# Patient Record
Sex: MALE | Race: WHITE | NOT HISPANIC OR LATINO | Employment: FULL TIME | URBAN - METROPOLITAN AREA
[De-identification: names, ages, dates, MRNs, and addresses within clinical notes are randomized per-mention and may not be internally consistent; named-entity substitution may affect disease eponyms.]

---

## 2017-01-09 DIAGNOSIS — I10 ESSENTIAL HYPERTENSION: ICD-10-CM

## 2017-01-09 RX ORDER — VALSARTAN AND HYDROCHLOROTHIAZIDE 320; 25 MG/1; MG/1
1 TABLET, FILM COATED ORAL DAILY
Qty: 90 TABLET | Refills: 0 | Status: SHIPPED | OUTPATIENT
Start: 2017-01-09 | End: 2017-03-30 | Stop reason: SDUPTHER

## 2017-01-20 ENCOUNTER — PATIENT MESSAGE (OUTPATIENT)
Dept: FAMILY MEDICINE | Facility: CLINIC | Age: 62
End: 2017-01-20

## 2017-01-20 DIAGNOSIS — E78.5 HYPERLIPIDEMIA: ICD-10-CM

## 2017-01-20 RX ORDER — EZETIMIBE AND SIMVASTATIN 10; 40 MG/1; MG/1
TABLET ORAL
Qty: 90 TABLET | Refills: 0 | Status: SHIPPED | OUTPATIENT
Start: 2017-01-20 | End: 2017-03-30 | Stop reason: SDUPTHER

## 2017-02-10 ENCOUNTER — PATIENT MESSAGE (OUTPATIENT)
Dept: FAMILY MEDICINE | Facility: CLINIC | Age: 62
End: 2017-02-10

## 2017-02-21 ENCOUNTER — PATIENT MESSAGE (OUTPATIENT)
Dept: FAMILY MEDICINE | Facility: CLINIC | Age: 62
End: 2017-02-21

## 2017-03-30 ENCOUNTER — PATIENT MESSAGE (OUTPATIENT)
Dept: UROLOGY | Facility: CLINIC | Age: 62
End: 2017-03-30

## 2017-03-30 ENCOUNTER — LAB VISIT (OUTPATIENT)
Dept: LAB | Facility: HOSPITAL | Age: 62
End: 2017-03-30
Attending: INTERNAL MEDICINE
Payer: COMMERCIAL

## 2017-03-30 ENCOUNTER — OFFICE VISIT (OUTPATIENT)
Dept: FAMILY MEDICINE | Facility: CLINIC | Age: 62
End: 2017-03-30
Payer: COMMERCIAL

## 2017-03-30 ENCOUNTER — PATIENT MESSAGE (OUTPATIENT)
Dept: FAMILY MEDICINE | Facility: CLINIC | Age: 62
End: 2017-03-30

## 2017-03-30 VITALS
HEIGHT: 72 IN | BODY MASS INDEX: 26.24 KG/M2 | DIASTOLIC BLOOD PRESSURE: 100 MMHG | OXYGEN SATURATION: 95 % | SYSTOLIC BLOOD PRESSURE: 120 MMHG | WEIGHT: 193.69 LBS | TEMPERATURE: 98 F | HEART RATE: 83 BPM

## 2017-03-30 DIAGNOSIS — Z20.5 EXPOSURE TO VIRAL HEPATITIS: ICD-10-CM

## 2017-03-30 DIAGNOSIS — J01.00 ACUTE NON-RECURRENT MAXILLARY SINUSITIS: Primary | ICD-10-CM

## 2017-03-30 DIAGNOSIS — Z12.5 PROSTATE CANCER SCREENING: ICD-10-CM

## 2017-03-30 DIAGNOSIS — Z23 HEPATITIS B VACCINATION ADMINISTERED AT CURRENT VISIT: ICD-10-CM

## 2017-03-30 LAB
COMPLEXED PSA SERPL-MCNC: 2.7 NG/ML
HCV AB SERPL QL IA: NEGATIVE

## 2017-03-30 PROCEDURE — 84153 ASSAY OF PSA TOTAL: CPT

## 2017-03-30 PROCEDURE — 3074F SYST BP LT 130 MM HG: CPT | Mod: S$GLB,,, | Performed by: NURSE PRACTITIONER

## 2017-03-30 PROCEDURE — 99214 OFFICE O/P EST MOD 30 MIN: CPT | Mod: 25,S$GLB,, | Performed by: NURSE PRACTITIONER

## 2017-03-30 PROCEDURE — 86803 HEPATITIS C AB TEST: CPT

## 2017-03-30 PROCEDURE — 36415 COLL VENOUS BLD VENIPUNCTURE: CPT | Mod: PO

## 2017-03-30 PROCEDURE — 1160F RVW MEDS BY RX/DR IN RCRD: CPT | Mod: S$GLB,,, | Performed by: NURSE PRACTITIONER

## 2017-03-30 PROCEDURE — 3080F DIAST BP >= 90 MM HG: CPT | Mod: S$GLB,,, | Performed by: NURSE PRACTITIONER

## 2017-03-30 PROCEDURE — 99999 PR PBB SHADOW E&M-EST. PATIENT-LVL III: CPT | Mod: PBBFAC,,, | Performed by: NURSE PRACTITIONER

## 2017-03-30 PROCEDURE — 90746 HEPB VACCINE 3 DOSE ADULT IM: CPT | Mod: S$GLB,,, | Performed by: NURSE PRACTITIONER

## 2017-03-30 PROCEDURE — 90471 IMMUNIZATION ADMIN: CPT | Mod: S$GLB,,, | Performed by: NURSE PRACTITIONER

## 2017-03-30 RX ORDER — LEVOCETIRIZINE DIHYDROCHLORIDE 5 MG/1
5 TABLET, FILM COATED ORAL NIGHTLY
Qty: 30 TABLET | Refills: 0 | Status: SHIPPED | OUTPATIENT
Start: 2017-03-30 | End: 2017-10-09

## 2017-03-30 RX ORDER — PROMETHAZINE HYDROCHLORIDE AND DEXTROMETHORPHAN HYDROBROMIDE 6.25; 15 MG/5ML; MG/5ML
5 SYRUP ORAL
Qty: 180 ML | Refills: 0 | Status: SHIPPED | OUTPATIENT
Start: 2017-03-30 | End: 2017-04-09

## 2017-03-30 RX ORDER — FLUTICASONE PROPIONATE 50 MCG
2 SPRAY, SUSPENSION (ML) NASAL DAILY
Qty: 16 G | Refills: 0 | Status: SHIPPED | OUTPATIENT
Start: 2017-03-30 | End: 2021-11-03

## 2017-03-30 NOTE — PATIENT INSTRUCTIONS

## 2017-03-30 NOTE — PROGRESS NOTES
Subjective:       Patient ID: Sawyer Browning is a 62 y.o. male.    Chief Complaint: Sinus Problem and Cough    Sinusitis   This is a new problem. The current episode started in the past 7 days (2 days ago). The problem has been gradually worsening since onset. His pain is at a severity of 0/10. Associated symptoms include headaches and sinus pressure. Pertinent negatives include no chills, congestion, coughing, diaphoresis, ear pain, hoarse voice, neck pain, shortness of breath, sneezing, sore throat or swollen glands. Treatments tried: mucinex. The treatment provided mild relief.       Past Medical History:   Diagnosis Date    BPH (benign prostatic hyperplasia)     Glucose intolerance (impaired glucose tolerance)     Hiatal hernia     Hyperlipidemia     Hypertension     Overweight     Reflux        Social History     Social History    Marital status:      Spouse name: N/A    Number of children: 2    Years of education: N/A     Occupational History    Works in [employer]      Social History Main Topics    Smoking status: Never Smoker    Smokeless tobacco: Never Used    Alcohol use Yes      Comment: Nightly/socially    Drug use: Not on file    Sexual activity: Not on file     Other Topics Concern    Not on file     Social History Narrative       Past Surgical History:   Procedure Laterality Date    inguinal hernia      x2    tonsillectomy         Review of Systems   Constitutional: Negative for chills and diaphoresis.   HENT: Positive for sinus pressure. Negative for congestion, ear pain, hoarse voice, sneezing, sore throat and trouble swallowing.    Respiratory: Negative for cough and shortness of breath.    Gastrointestinal: Negative for constipation, diarrhea, nausea and vomiting.   Musculoskeletal: Negative for neck pain.   Skin: Negative for color change and rash.   Neurological: Positive for headaches.   All other systems reviewed and are negative.      Objective:   BP (!) 120/100 (BP  Location: Right arm, Patient Position: Sitting, BP Method: Manual)  Pulse 83  Temp 98.1 °F (36.7 °C) (Oral)   Ht 6' (1.829 m)  Wt 87.9 kg (193 lb 10.8 oz)  SpO2 95%  BMI 26.27 kg/m2     Physical Exam   Constitutional: He is oriented to person, place, and time. He appears well-developed and well-nourished. He is cooperative.   HENT:   Head: Normocephalic and atraumatic.   Right Ear: Hearing, external ear and ear canal normal. No middle ear effusion.   Left Ear: Hearing, external ear and ear canal normal.  No middle ear effusion.   Nose: Mucosal edema, rhinorrhea and sinus tenderness present. Right sinus exhibits maxillary sinus tenderness and frontal sinus tenderness. Left sinus exhibits maxillary sinus tenderness and frontal sinus tenderness.   Mouth/Throat: Uvula is midline and mucous membranes are normal. Posterior oropharyngeal erythema present. No oropharyngeal exudate or posterior oropharyngeal edema.   Cardiovascular: Normal rate, regular rhythm, S1 normal, S2 normal, normal heart sounds and normal pulses.    Pulmonary/Chest: Effort normal and breath sounds normal. No respiratory distress. He has no decreased breath sounds. He has no wheezes. He has no rhonchi. He has no rales.   Lymphadenopathy:     He has cervical adenopathy.   Neurological: He is alert and oriented to person, place, and time.   Skin: Skin is warm, dry and intact. He is not diaphoretic. No pallor.   Psychiatric: He has a normal mood and affect. His speech is normal and behavior is normal.   Vitals reviewed.      Assessment:       1. Acute non-recurrent maxillary sinusitis    2. Prostate cancer screening    3. Hepatitis B vaccination administered at current visit        Plan:       Sawyer was seen today for sinus problem and cough.    Diagnoses and all orders for this visit:    Acute non-recurrent maxillary sinusitis  -     fluticasone (FLONASE) 50 mcg/actuation nasal spray; 2 sprays by Each Nare route once daily.  -     levocetirizine  (XYZAL) 5 MG tablet; Take 1 tablet (5 mg total) by mouth every evening.  -     promethazine-dextromethorphan (PROMETHAZINE-DM) 6.25-15 mg/5 mL Syrp; Take 5 mLs by mouth every 4 to 6 hours as needed.  · Drink plenty of water, hot tea, and other liquids. This may help thin mucus. It also may promote sinus drainage.  · Heat may help soothe painful areas of the face. Use a towel soaked in hot water. Or,  the shower and direct the hot spray onto your face. Using a vaporizer along with a menthol rub at night may also help.   · An expectorant containing guaifenesin may help thin the mucus and promote drainage from the sinuses.  · Over-the-counter decongestants may be used unless a similar medicine was prescribed. Nasal sprays work the fastest. Use one that contains phenylephrine or oxymetazoline. First blow the nose gently. Then use the spray. Do not use these medicines more often than directed on the label or symptoms may get worse. You may also use tablets containing pseudoephedrine. Avoid products that combine ingredients, because side effects may be increased. Read labels. You can also ask the pharmacist for help. (NOTE: Persons with high blood pressure should not use decongestants. They can raise blood pressure.)  · Over-the-counter antihistamines may help if allergies contributed to your sinusitis.    · Use acetaminophen or ibuprofen to control pain, unless another pain medicine was prescribed. (If you have chronic liver or kidney disease or ever had a stomach ulcer, talk with your doctor before using these medicines. Aspirin should never be used in anyone under 18 years of age who is ill with a fever. It may cause severe liver damage.)  · Use nasal rinses or irrigation as instructed by your health care provider.  · Don't smoke. This can worsen symptoms.    Prostate cancer screening  -     PSA, Screening; Future    Hepatitis B vaccination administered at current visit  -     Hepatitis B Vaccine (Adult)  (IM)      Return if symptoms worsen or fail to improve.

## 2017-03-30 NOTE — MR AVS SNAPSHOT
[facility]  [address]  [place] [address]  Phone: [phone]  Fax: [phone]                  Sawyer Browning   3/30/2017 9:00 AM   Office Visit    Description:  Male : 1955   Provider:  YUMI Jasso   Department:  [facility]           Reason for Visit     Sinus Problem     Cough           Diagnoses this Visit        Comments    Acute non-recurrent maxillary sinusitis    -  Primary     Need for diphtheria-tetanus-pertussis (Tdap) vaccine, adult/adolescent         Prostate cancer screening                To Do List           Future Appointments        Provider Department Dept Phone    2017 10:45 AM Colt Lloyd Jr., MD [facility] [phone]      Goals (5 Years of Data)              8/26/16    1/12/16    7/23/15    HEMOGLOBIN A1C < 7.0   5.4  5.3  5.2    Related Problems    Glucose intolerance (impaired glucose tolerance)      Follow-Up and Disposition     Return if symptoms worsen or fail to improve.       These Medications        Disp Refills Start End    fluticasone (FLONASE) 50 mcg/actuation nasal spray 16 g 0 3/30/2017     2 sprays by Each Nare route once daily. - Each Nare    Pharmacy: [facility] [address] [place], [place] [address] Ph #: [phone]       levocetirizine (XYZAL) 5 MG tablet 30 tablet 0 3/30/2017 3/30/2018    Take 1 tablet (5 mg total) by mouth every evening. - Oral    Pharmacy: [facility] [address] [place], [place] [address] Ph #: [phone]       promethazine-dextromethorphan (PROMETHAZINE-DM) 6.25-15 mg/5 mL Syrp 180 mL 0 3/30/2017 2017    Take 5 mLs by mouth every 4 to 6 hours as needed. - Oral    Pharmacy: [facility] [address] [place], [place] [address] #: [phone]         [facility]     [facility] Care [facility] - 24/7 Assistance  Unless otherwise directed by your  provider, please contact [facility], our nurse care line that is available for  assistance.     Registered nurses in the [facility] provide: appointment scheduling, clinical advisement, health education, and other advisory services.  Call: [phone] (toll free)               Medications           Message regarding Medications     Verify the changes and/or additions to your medication regime listed below are the same as discussed with your clinician today.  If any of these changes or additions are incorrect, please notify your healthcare provider.        START taking these NEW medications        Refills    fluticasone (FLONASE) 50 mcg/actuation nasal spray 0    Si sprays by Each Nare route once daily.    Class: Normal    Route: Each Nare    levocetirizine (XYZAL) 5 MG tablet 0    Sig: Take 1 tablet (5 mg total) by mouth every evening.    Class: Normal    Route: Oral    promethazine-dextromethorphan (PROMETHAZINE-DM) 6.25-15 mg/5 mL Syrp 0    Sig: Take 5 mLs by mouth every 4 to 6 hours as needed.    Class: Normal    Route: Oral      STOP taking these medications     meloxicam (MOBIC) 15 MG tablet Take 1 tablet(s) by mouth once a day as needed.           Verify that the below list of medications is an accurate representation of the medications you are currently taking.  If none reported, the list may be blank. If incorrect, please contact your healthcare provider. Carry this list with you in case of emergency.           Current Medications     amlodipine (NORVASC) 10 MG tablet TAKE 1 BY MOUTH DAILY    CIALIS 20 mg Tab TAKE 1 BY MOUTH ONCE DAILY AS DIRECTED    ezetimibe-simvastatin 10-40 mg (VYTORIN) 10-40 mg per tablet Take 1 tablet by mouth every evening.    fluticasone (FLONASE) 50 mcg/actuation nasal spray 2 sprays by Each Nare route once daily.    lansoprazole (PREVACID) 30 MG capsule TAKE 1 BY MOUTH DAILY    levocetirizine (XYZAL) 5 MG tablet Take 1 tablet (5 mg total) by mouth every  evening.    promethazine-dextromethorphan (PROMETHAZINE-DM) 6.25-15 mg/5 mL Syrp Take 5 mLs by mouth every 4 to 6 hours as needed.    valsartan-hydrochlorothiazide (DIOVAN-HCT) 320-25 mg per tablet TAKE 1 BY MOUTH DAILY           Clinical Reference Information           Your Vitals Were     BP Pulse Temp Height Weight SpO2    120/100 (BP Location: Right arm, Patient Position: Sitting, BP Method: Manual) 83 98.1 °F (36.7 °C) (Oral) 6' (1.829 m) 87.9 kg (193 lb 10.8 oz) 95%    BMI                26.27 kg/m2          Blood Pressure          Most Recent Value    BP  (!)  120/100      Allergies as of 3/30/2017     Ace Inhibitors    Losartan      Immunizations Administered on Date of Encounter - 3/30/2017     Name Date Dose VIS Date Route    TDAP  Incomplete 0.5 mL 2/24/2015 Intramuscular      Orders Placed During Today's Visit      Normal Orders This Visit    Tdap Vaccine     Future Labs/Procedures Expected by Expires    PSA, Screening  3/30/2017 5/29/2018      Instructions      Sinusitis (No Antibiotics)    The sinuses are air-filled spaces within the bones of the face. They connect to the inside of the nose. Sinusitis is an inflammation of the tissue lining the sinus cavity. Sinus inflammation can occur during a cold. It can also be due to allergies to pollens and other particles in the air. It can cause symptoms such as sinus congestion, headache, sore throat, facial swelling and fullness. It may also cause a low-grade fever. No infection is present, and no antibiotic treatment is needed.  Home care  · Drink plenty of water, hot tea, and other liquids. This may help thin mucus. It also may promote sinus drainage.  · Heat may help soothe painful areas of the face. Use a towel soaked in hot water. Or,  the shower and direct the hot spray onto your face. Using a vaporizer along with a menthol rub at night may also help.   · An expectorant containing guaifenesin may help thin the mucus and promote drainage from  the sinuses.  · Over-the-counter decongestants may be used unless a similar medicine was prescribed. Nasal sprays work the fastest. Use one that contains phenylephrine or oxymetazoline. First blow the nose gently. Then use the spray. Do not use these medicines more often than directed on the label or symptoms may get worse. You may also use tablets containing pseudoephedrine. Avoid products that combine ingredients, because side effects may be increased. Read labels. You can also ask the pharmacist for help. (NOTE: Persons with high blood pressure should not use decongestants. They can raise blood pressure.)  · Over-the-counter antihistamines may help if allergies contributed to your sinusitis.    · Use acetaminophen or ibuprofen to control pain, unless another pain medicine was prescribed. (If you have chronic liver or kidney disease or ever had a stomach ulcer, talk with your doctor before using these medicines. Aspirin should never be used in anyone under 18 years of age who is ill with a fever. It may cause severe liver damage.)  · Use nasal rinses or irrigation as instructed by your health care provider.  · Don't smoke. This can worsen symptoms.  Follow-up care  Follow up with your healthcare provider or our staff if you are not improving within the next week.  When to seek medical advice  Call your healthcare provider if any of these occur:  · Green or yellow discharge from the nose or into the throat  · Facial pain or headache becoming more severe  · Stiff neck  · Unusual drowsiness or confusion  · Swelling of the forehead or eyelids  · Vision problems, including blurred or double vision  · Fever of 100.4ºF (38ºC) or higher, or as directed by your healthcare provider  · Seizure  · Breathing problems  · Symptoms not resolving within 10 days  Date Last Reviewed: 4/13/2015  © 7722-3664 [employer]. [address] [place] [address], [place] [address]. All rights reserved. This information is not intended as a  substitute for professional medical care. Always follow your healthcare professional's instructions.             Language Assistance Services     ATTENTION: Language assistance services are available, free of charge. Please call [phone].      ATENCIÓN: Si habla chiara, tiene a bailey disposición servicios gratuitos de asistencia lingüística. Llame al [phone].     CHÚ Ý: N?u b?n nói Ti?ng Vi?t, có các d?ch v? h? tr? ngôn ng? mi?n phí dành cho b?n. G?i s? [phone].         [facility] complies with applicable Federal civil rights laws and does not discriminate on the basis of race, color, national origin, age, disability, or sex.

## 2017-04-10 DIAGNOSIS — I10 ESSENTIAL HYPERTENSION: ICD-10-CM

## 2017-04-10 RX ORDER — AMLODIPINE BESYLATE 10 MG/1
TABLET ORAL
Qty: 90 TABLET | Refills: 0 | Status: SHIPPED | OUTPATIENT
Start: 2017-04-10 | End: 2017-04-21 | Stop reason: SDUPTHER

## 2017-04-21 DIAGNOSIS — N52.9 ED (ERECTILE DYSFUNCTION) OF ORGANIC ORIGIN: ICD-10-CM

## 2017-04-21 DIAGNOSIS — I10 ESSENTIAL HYPERTENSION: ICD-10-CM

## 2017-04-21 DIAGNOSIS — K21.9 GASTROESOPHAGEAL REFLUX DISEASE WITHOUT ESOPHAGITIS: ICD-10-CM

## 2017-04-21 DIAGNOSIS — E78.5 HYPERLIPIDEMIA, UNSPECIFIED HYPERLIPIDEMIA TYPE: Primary | ICD-10-CM

## 2017-04-21 DIAGNOSIS — R73.02 GLUCOSE INTOLERANCE (IMPAIRED GLUCOSE TOLERANCE): ICD-10-CM

## 2017-04-21 RX ORDER — VALSARTAN AND HYDROCHLOROTHIAZIDE 320; 25 MG/1; MG/1
TABLET, FILM COATED ORAL
Qty: 90 TABLET | Refills: 0 | Status: SHIPPED | OUTPATIENT
Start: 2017-04-21 | End: 2017-08-07 | Stop reason: SDUPTHER

## 2017-04-21 RX ORDER — AMLODIPINE BESYLATE 10 MG/1
TABLET ORAL
Qty: 90 TABLET | Refills: 0 | Status: SHIPPED | OUTPATIENT
Start: 2017-04-21 | End: 2017-04-21 | Stop reason: SDUPTHER

## 2017-04-21 RX ORDER — AMLODIPINE BESYLATE 10 MG/1
TABLET ORAL
Qty: 90 TABLET | Refills: 0 | Status: SHIPPED | OUTPATIENT
Start: 2017-04-21 | End: 2017-07-17 | Stop reason: SDUPTHER

## 2017-04-21 RX ORDER — LANSOPRAZOLE 30 MG/1
CAPSULE, DELAYED RELEASE ORAL
Qty: 90 CAPSULE | Refills: 0 | Status: SHIPPED | OUTPATIENT
Start: 2017-04-21 | End: 2017-07-27 | Stop reason: SDUPTHER

## 2017-04-21 RX ORDER — TADALAFIL 20 MG/1
TABLET, FILM COATED ORAL
Qty: 6 TABLET | Refills: 0 | Status: SHIPPED | OUTPATIENT
Start: 2017-04-21 | End: 2017-12-08 | Stop reason: SDUPTHER

## 2017-04-25 ENCOUNTER — OFFICE VISIT (OUTPATIENT)
Dept: UROLOGY | Facility: CLINIC | Age: 62
End: 2017-04-25
Payer: COMMERCIAL

## 2017-04-25 VITALS
HEIGHT: 72 IN | DIASTOLIC BLOOD PRESSURE: 106 MMHG | WEIGHT: 193 LBS | BODY MASS INDEX: 26.14 KG/M2 | HEART RATE: 68 BPM | SYSTOLIC BLOOD PRESSURE: 159 MMHG

## 2017-04-25 DIAGNOSIS — N40.1 BPH WITH URINARY OBSTRUCTION: Primary | ICD-10-CM

## 2017-04-25 DIAGNOSIS — N13.8 BPH WITH URINARY OBSTRUCTION: Primary | ICD-10-CM

## 2017-04-25 PROCEDURE — 99213 OFFICE O/P EST LOW 20 MIN: CPT | Mod: S$GLB,,, | Performed by: UROLOGY

## 2017-04-25 PROCEDURE — 3080F DIAST BP >= 90 MM HG: CPT | Mod: S$GLB,,, | Performed by: UROLOGY

## 2017-04-25 PROCEDURE — 1160F RVW MEDS BY RX/DR IN RCRD: CPT | Mod: S$GLB,,, | Performed by: UROLOGY

## 2017-04-25 PROCEDURE — 99999 PR PBB SHADOW E&M-EST. PATIENT-LVL III: CPT | Mod: PBBFAC,,, | Performed by: UROLOGY

## 2017-04-25 PROCEDURE — 3077F SYST BP >= 140 MM HG: CPT | Mod: S$GLB,,, | Performed by: UROLOGY

## 2017-04-25 NOTE — PROGRESS NOTES
Subjective:       Patient ID: Sawyer Browning is a 62 y.o. male.    Chief Complaint: Annual Exam    HPI she is here for yearly exam.  His PSA is 2.7 which is improved.  He has minimal symptoms.    Past Medical History:   Diagnosis Date    BPH (benign prostatic hyperplasia)     Glucose intolerance (impaired glucose tolerance)     Hiatal hernia     Hyperlipidemia     Hypertension     Overweight     Reflux        Past Surgical History:   Procedure Laterality Date    inguinal hernia      x2    tonsillectomy         Family History   Problem Relation Age of Onset    Hypertension Mother     Macular degeneration Mother      - gets shots in her eye    Stroke Father     Heart disease Father     Aneurysm Father     Migraines Sister     Arthritis Brother      knees    Dementia Paternal Grandmother     Diabetes Paternal Grandmother     Heart disease Paternal Grandfather     Diabetes Brother     Hernia Brother     Cancer Paternal Aunt     Cancer Paternal Uncle     Cancer Paternal Aunt     Cancer Paternal Uncle     Colon cancer Neg Hx     Prostate cancer Neg Hx        Social History     Social History    Marital status:      Spouse name: N/A    Number of children: 2    Years of education: N/A     Occupational History    Works in [employer]      Social History Main Topics    Smoking status: Never Smoker    Smokeless tobacco: Never Used    Alcohol use Yes      Comment: Nightly/socially    Drug use: Not on file    Sexual activity: Not on file     Other Topics Concern    Not on file     Social History Narrative       Allergies:  Ace inhibitors and Losartan    Medications:    Current Outpatient Prescriptions:     amlodipine (NORVASC) 10 MG tablet, TAKE 1 BY MOUTH DAILY, Disp: 90 tablet, Rfl: 0    CIALIS 20 mg Tab, TAKE 1 BY MOUTH DAILY AS DIRECTED, Disp: 6 tablet, Rfl: 0    ezetimibe-simvastatin 10-40 mg (VYTORIN) 10-40 mg per tablet, Take 1 tablet by mouth every evening., Disp: 90 tablet, Rfl:  0    fluticasone (FLONASE) 50 mcg/actuation nasal spray, 2 sprays by Each Nare route once daily., Disp: 16 g, Rfl: 0    lansoprazole (PREVACID) 30 MG capsule, TAKE 1 BY MOUTH DAILY, Disp: 90 capsule, Rfl: 0    levocetirizine (XYZAL) 5 MG tablet, Take 1 tablet (5 mg total) by mouth every evening., Disp: 30 tablet, Rfl: 0    valsartan-hydrochlorothiazide (DIOVAN-HCT) 320-25 mg per tablet, TAKE 1 BY MOUTH DAILY, Disp: 90 tablet, Rfl: 0    Review of Systems   Constitutional: Negative for activity change, appetite change, chills, diaphoresis, fatigue, fever and unexpected weight change.   HENT: Negative for congestion, dental problem, hearing loss, mouth sores, postnasal drip, rhinorrhea, sinus pressure and trouble swallowing.    Eyes: Negative for pain, discharge and itching.   Respiratory: Negative for apnea, cough, choking, chest tightness, shortness of breath and wheezing.    Cardiovascular: Negative for chest pain, palpitations and leg swelling.   Gastrointestinal: Negative for abdominal distention, abdominal pain, anal bleeding, blood in stool, constipation, diarrhea, nausea, rectal pain and vomiting.   Endocrine: Negative for polydipsia and polyuria.   Genitourinary: Negative for decreased urine volume, difficulty urinating, discharge, dysuria, enuresis, flank pain, frequency, genital sores, hematuria, penile pain, penile swelling, scrotal swelling, testicular pain and urgency.   Musculoskeletal: Negative for arthralgias, back pain and myalgias.   Skin: Negative for color change, rash and wound.   Neurological: Negative for dizziness, syncope, speech difficulty, light-headedness and headaches.   Hematological: Negative for adenopathy. Does not bruise/bleed easily.   Psychiatric/Behavioral: Negative for behavioral problems, confusion, hallucinations and sleep disturbance.       Objective:      Physical Exam   Constitutional: He appears well-developed.   HENT:   Head: Normocephalic.   Cardiovascular: Normal  rate.    Pulmonary/Chest: Effort normal.   Abdominal: Soft.   Genitourinary: Prostate normal.   Genitourinary Comments: 30 g benign   Neurological: He is alert.   Skin: Skin is warm.     Psychiatric: He has a normal mood and affect.       Assessment:       1. BPH with urinary obstruction        Plan:       Sawyer was seen today for annual exam.    Diagnoses and all orders for this visit:    BPH with urinary obstruction        turn clinic 1 year no PSA

## 2017-04-26 RX ORDER — EZETIMIBE AND SIMVASTATIN 10; 40 MG/1; MG/1
1 TABLET ORAL NIGHTLY
Qty: 90 TABLET | Refills: 0 | Status: SHIPPED | OUTPATIENT
Start: 2017-04-26 | End: 2017-09-12 | Stop reason: SDUPTHER

## 2017-05-16 ENCOUNTER — LAB VISIT (OUTPATIENT)
Dept: LAB | Facility: HOSPITAL | Age: 62
End: 2017-05-16
Attending: INTERNAL MEDICINE
Payer: COMMERCIAL

## 2017-05-16 DIAGNOSIS — I10 ESSENTIAL HYPERTENSION: ICD-10-CM

## 2017-05-16 DIAGNOSIS — R73.02 GLUCOSE INTOLERANCE (IMPAIRED GLUCOSE TOLERANCE): ICD-10-CM

## 2017-05-16 DIAGNOSIS — E78.5 HYPERLIPIDEMIA, UNSPECIFIED HYPERLIPIDEMIA TYPE: ICD-10-CM

## 2017-05-16 LAB
ALBUMIN SERPL BCP-MCNC: 4.3 G/DL
ALP SERPL-CCNC: 69 U/L
ALT SERPL W/O P-5'-P-CCNC: 36 U/L
ANION GAP SERPL CALC-SCNC: 11 MMOL/L
AST SERPL-CCNC: 29 U/L
BASOPHILS # BLD AUTO: 0.03 K/UL
BASOPHILS NFR BLD: 0.5 %
BILIRUB SERPL-MCNC: 0.8 MG/DL
BUN SERPL-MCNC: 21 MG/DL
CALCIUM SERPL-MCNC: 9.3 MG/DL
CHLORIDE SERPL-SCNC: 105 MMOL/L
CHOLEST/HDLC SERPL: 2.8 {RATIO}
CO2 SERPL-SCNC: 27 MMOL/L
CREAT SERPL-MCNC: 1.1 MG/DL
DIFFERENTIAL METHOD: ABNORMAL
EOSINOPHIL # BLD AUTO: 0.1 K/UL
EOSINOPHIL NFR BLD: 2 %
ERYTHROCYTE [DISTWIDTH] IN BLOOD BY AUTOMATED COUNT: 12.8 %
EST. GFR  (AFRICAN AMERICAN): >60 ML/MIN/1.73 M^2
EST. GFR  (NON AFRICAN AMERICAN): >60 ML/MIN/1.73 M^2
GLUCOSE SERPL-MCNC: 103 MG/DL
HCT VFR BLD AUTO: 46.9 %
HDL/CHOLESTEROL RATIO: 35.6 %
HDLC SERPL-MCNC: 160 MG/DL
HDLC SERPL-MCNC: 57 MG/DL
HGB BLD-MCNC: 16 G/DL
LDLC SERPL CALC-MCNC: 80.6 MG/DL
LYMPHOCYTES # BLD AUTO: 1.2 K/UL
LYMPHOCYTES NFR BLD: 21.4 %
MCH RBC QN AUTO: 31.1 PG
MCHC RBC AUTO-ENTMCNC: 34.1 %
MCV RBC AUTO: 91 FL
MONOCYTES # BLD AUTO: 0.8 K/UL
MONOCYTES NFR BLD: 13.7 %
NEUTROPHILS # BLD AUTO: 3.5 K/UL
NEUTROPHILS NFR BLD: 62 %
NONHDLC SERPL-MCNC: 103 MG/DL
PLATELET # BLD AUTO: 176 K/UL
PMV BLD AUTO: 11.9 FL
POTASSIUM SERPL-SCNC: 4.1 MMOL/L
PROT SERPL-MCNC: 7.2 G/DL
RBC # BLD AUTO: 5.15 M/UL
SODIUM SERPL-SCNC: 143 MMOL/L
TRIGL SERPL-MCNC: 112 MG/DL
WBC # BLD AUTO: 5.56 K/UL

## 2017-05-16 PROCEDURE — 85025 COMPLETE CBC W/AUTO DIFF WBC: CPT

## 2017-05-16 PROCEDURE — 36415 COLL VENOUS BLD VENIPUNCTURE: CPT | Mod: PO

## 2017-05-16 PROCEDURE — 83036 HEMOGLOBIN GLYCOSYLATED A1C: CPT

## 2017-05-16 PROCEDURE — 80053 COMPREHEN METABOLIC PANEL: CPT

## 2017-05-16 PROCEDURE — 80061 LIPID PANEL: CPT

## 2017-05-17 ENCOUNTER — TELEPHONE (OUTPATIENT)
Dept: FAMILY MEDICINE | Facility: CLINIC | Age: 62
End: 2017-05-17

## 2017-05-17 LAB
ESTIMATED AVG GLUCOSE: 111 MG/DL
HBA1C MFR BLD HPLC: 5.5 %

## 2017-07-17 DIAGNOSIS — I10 ESSENTIAL HYPERTENSION: ICD-10-CM

## 2017-07-17 RX ORDER — AMLODIPINE BESYLATE 10 MG/1
TABLET ORAL
Qty: 90 TABLET | Refills: 0 | Status: SHIPPED | OUTPATIENT
Start: 2017-07-17 | End: 2017-10-09 | Stop reason: SDUPTHER

## 2017-07-27 DIAGNOSIS — K21.9 GASTROESOPHAGEAL REFLUX DISEASE WITHOUT ESOPHAGITIS: ICD-10-CM

## 2017-07-27 RX ORDER — LANSOPRAZOLE 30 MG/1
30 CAPSULE, DELAYED RELEASE ORAL DAILY PRN
Qty: 90 CAPSULE | Refills: 0 | Status: SHIPPED | OUTPATIENT
Start: 2017-07-27 | End: 2017-10-23 | Stop reason: SDUPTHER

## 2017-08-07 DIAGNOSIS — I10 ESSENTIAL HYPERTENSION: ICD-10-CM

## 2017-08-08 RX ORDER — VALSARTAN AND HYDROCHLOROTHIAZIDE 320; 25 MG/1; MG/1
1 TABLET, FILM COATED ORAL DAILY
Qty: 90 TABLET | Refills: 0 | Status: SHIPPED | OUTPATIENT
Start: 2017-08-08 | End: 2017-10-09 | Stop reason: SDUPTHER

## 2017-09-12 DIAGNOSIS — E78.5 HYPERLIPIDEMIA, UNSPECIFIED HYPERLIPIDEMIA TYPE: Primary | ICD-10-CM

## 2017-09-12 RX ORDER — EZETIMIBE AND SIMVASTATIN 10; 40 MG/1; MG/1
1 TABLET ORAL NIGHTLY
Qty: 90 TABLET | Refills: 0 | Status: SHIPPED | OUTPATIENT
Start: 2017-09-12 | End: 2017-10-09 | Stop reason: SDUPTHER

## 2017-10-09 ENCOUNTER — OFFICE VISIT (OUTPATIENT)
Dept: FAMILY MEDICINE | Facility: CLINIC | Age: 62
End: 2017-10-09
Payer: COMMERCIAL

## 2017-10-09 VITALS
BODY MASS INDEX: 26.35 KG/M2 | OXYGEN SATURATION: 95 % | WEIGHT: 194.56 LBS | HEART RATE: 64 BPM | DIASTOLIC BLOOD PRESSURE: 80 MMHG | TEMPERATURE: 99 F | HEIGHT: 72 IN | SYSTOLIC BLOOD PRESSURE: 122 MMHG

## 2017-10-09 DIAGNOSIS — Z23 NEED FOR SHINGLES VACCINE: ICD-10-CM

## 2017-10-09 DIAGNOSIS — E66.3 OVERWEIGHT: ICD-10-CM

## 2017-10-09 DIAGNOSIS — K21.9 GASTROESOPHAGEAL REFLUX DISEASE, ESOPHAGITIS PRESENCE NOT SPECIFIED: ICD-10-CM

## 2017-10-09 DIAGNOSIS — R73.02 GLUCOSE INTOLERANCE (IMPAIRED GLUCOSE TOLERANCE): ICD-10-CM

## 2017-10-09 DIAGNOSIS — I10 ESSENTIAL HYPERTENSION: ICD-10-CM

## 2017-10-09 DIAGNOSIS — Z00.00 ROUTINE MEDICAL EXAM: Primary | ICD-10-CM

## 2017-10-09 DIAGNOSIS — E78.5 HYPERLIPIDEMIA, UNSPECIFIED HYPERLIPIDEMIA TYPE: ICD-10-CM

## 2017-10-09 DIAGNOSIS — Z23 NEED FOR TDAP VACCINATION: ICD-10-CM

## 2017-10-09 DIAGNOSIS — Z12.11 COLON CANCER SCREENING: ICD-10-CM

## 2017-10-09 PROCEDURE — 99396 PREV VISIT EST AGE 40-64: CPT | Mod: S$GLB,,, | Performed by: INTERNAL MEDICINE

## 2017-10-09 PROCEDURE — 99999 PR PBB SHADOW E&M-EST. PATIENT-LVL III: CPT | Mod: PBBFAC,,, | Performed by: INTERNAL MEDICINE

## 2017-10-09 RX ORDER — EZETIMIBE AND SIMVASTATIN 10; 40 MG/1; MG/1
1 TABLET ORAL NIGHTLY
Qty: 90 TABLET | Refills: 1 | Status: SHIPPED | OUTPATIENT
Start: 2017-10-09 | End: 2017-12-08 | Stop reason: SDUPTHER

## 2017-10-09 RX ORDER — VALSARTAN AND HYDROCHLOROTHIAZIDE 320; 25 MG/1; MG/1
1 TABLET, FILM COATED ORAL DAILY
Qty: 90 TABLET | Refills: 1 | Status: SHIPPED | OUTPATIENT
Start: 2017-10-09 | End: 2018-02-12 | Stop reason: SDUPTHER

## 2017-10-09 RX ORDER — AMLODIPINE BESYLATE 10 MG/1
TABLET ORAL
Qty: 90 TABLET | Refills: 1 | Status: SHIPPED | OUTPATIENT
Start: 2017-10-09 | End: 2017-10-12 | Stop reason: SDUPTHER

## 2017-10-09 NOTE — PROGRESS NOTES
HISTORY OF PRESENT ILLNESS:  Sawyer Browning is a 62 y.o. male who presents to the clinic today for a routine medical physical exam. His last physical exam was approximately 1 years(s) ago.      PAST MEDICAL HISTORY:  Past Medical History:   Diagnosis Date    BPH (benign prostatic hyperplasia)     Glucose intolerance (impaired glucose tolerance)     Hiatal hernia     Hyperlipidemia     Hypertension     Overweight     Reflux        PAST SURGICAL HISTORY:  Past Surgical History:   Procedure Laterality Date    inguinal hernia      x2    tonsillectomy         SOCIAL HISTORY:  Social History     Social History    Marital status:      Spouse name: N/A    Number of children: 2    Years of education: N/A     Occupational History    Works in [employer]      Social History Main Topics    Smoking status: Never Smoker    Smokeless tobacco: Never Used    Alcohol use Yes      Comment: Nightly/socially    Drug use: No    Sexual activity: Not on file     Other Topics Concern    Not on file     Social History Narrative    No narrative on file       FAMILY HISTORY:  Family History   Problem Relation Age of Onset    Hypertension Mother     Macular degeneration Mother      - gets shots in her eye    Stroke Father     Heart disease Father     Aneurysm Father     Migraines Sister     Arthritis Brother      knees    Dementia Paternal Grandmother     Diabetes Paternal Grandmother     Heart disease Paternal Grandfather     Diabetes Brother     Hernia Brother     Cancer Paternal Aunt     Cancer Paternal Uncle     Cancer Paternal Aunt     Cancer Paternal Uncle     Colon cancer Neg Hx     Prostate cancer Neg Hx        ALLERGIES AND MEDICATIONS: updated and reviewed.  Review of patient's allergies indicates:   Allergen Reactions    Ace inhibitors Other (See Comments)     cough    Losartan      headache     Medication List with Changes/Refills   Current Medications    CIALIS 20 MG TAB    TAKE 1 BY MOUTH  DAILY AS DIRECTED    FLUTICASONE (FLONASE) 50 MCG/ACTUATION NASAL SPRAY    2 sprays by Each Nare route once daily.    LANSOPRAZOLE (PREVACID) 30 MG CAPSULE    Take 1 capsule (30 mg total) by mouth daily as needed (heartburn).   Changed and/or Refilled Medications    Modified Medication Previous Medication    AMLODIPINE (NORVASC) 10 MG TABLET amlodipine (NORVASC) 10 MG tablet       TAKE 1 BY MOUTH DAILY    TAKE 1 BY MOUTH DAILY    EZETIMIBE-SIMVASTATIN 10-40 MG (VYTORIN) 10-40 MG PER TABLET ezetimibe-simvastatin 10-40 mg (VYTORIN) 10-40 mg per tablet       Take 1 tablet by mouth every evening.    Take 1 tablet by mouth every evening.    VALSARTAN-HYDROCHLOROTHIAZIDE (DIOVAN-HCT) 320-25 MG PER TABLET valsartan-hydrochlorothiazide (DIOVAN-HCT) 320-25 mg per tablet       Take 1 tablet by mouth once daily.    Take 1 tablet by mouth once daily.   Discontinued Medications    LEVOCETIRIZINE (XYZAL) 5 MG TABLET    Take 1 tablet (5 mg total) by mouth every evening.             SCREENING HISTORY:  Health Maintenance       Date Due Completion Date    TETANUS VACCINE 01/07/1973 ---    Zoster Vaccine 01/07/2015 ---    Colonoscopy 05/02/2017 5/2/2007 (Done)    Override on 5/2/2007: Done (normal)    PROSTATE-SPECIFIC ANTIGEN 03/30/2018 3/30/2017    Lipid Panel 05/16/2022 5/16/2017            REVIEW OF SYSTEMS:  The patient reports good dietary habits.  The patient does exercise regularly.  General ROS: negative for - chills, fever, malaise or weight loss  Psychological ROS: negative for - anxiety, depression, sleep disturbances or suicidal ideation  Ophthalmic ROS: negative for - blurry vision or eye pain  ENT ROS: negative for - epistaxis, headaches, nasal congestion, oral lesions or sore throat  Allergy and Immunology ROS: negative for - hives  Hematological and Lymphatic ROS: negative for - swollen lymph nodes  Endocrine ROS: negative for - palpitations, polydipsia/polyuria or temperature intolerance  Respiratory ROS: no cough,  "shortness of breath, or wheezing  Cardiovascular ROS: no chest pain or dyspnea on exertion  Gastrointestinal ROS: no abdominal pain, change in bowel habits, or black or bloody stools  Dermatological ROS: negative  Musculoskeletal ROS: negative for - gait disturbance, joint swelling, muscle pain or muscular weakness  Neurological ROS: no TIA or stroke symptoms  Genito-Urinary ROS: no dysuria, trouble voiding, or hematuria      Answers for HPI/ROS submitted by the patient on 10/8/2017   activity change: No  unexpected weight change: No  neck pain: No  hearing loss: No  rhinorrhea: No  trouble swallowing: No  eye discharge: No  visual disturbance: No  chest tightness: No  wheezing: No  chest pain: No  palpitations: No  blood in stool: No  constipation: No  vomiting: No  diarrhea: No  polydipsia: No  polyuria: No  difficulty urinating: No  urgency: No  hematuria: No  joint swelling: No  arthralgias: No  headaches: No  weakness: No  confusion: No  dysphoric mood: No          PHYSICAL EXAM:  Vitals:    10/09/17 1120   BP: 122/80   Pulse: 64   Temp: 98.5 °F (36.9 °C)     Weight: 88.3 kg (194 lb 8.9 oz)   Height: 6' 0.01" (182.9 cm)   Body mass index is 26.38 kg/m².    Physical Examination: General appearance - alert, well appearing, and in no distress and overweight  Mental status - alert, oriented to person, place, and time, normal mood, behavior, speech, dress, motor activity, and thought processes  Eyes - pupils equal and reactive, extraocular eye movements intact, sclera anicteric  Mouth - mucous membranes moist, pharynx normal without lesions  Neck - supple, no significant adenopathy, carotids upstroke normal bilaterally, no bruits, thyroid exam: thyroid is normal in size without nodules or tenderness  Lymphatics - no palpable lymphadenopathy  Chest - clear to auscultation, no wheezes, rales or rhonchi, symmetric air entry  Heart - normal rate and regular rhythm, no gallops noted  Abdomen - soft, nontender, " nondistended, no masses or organomegaly   Male - not examined  Back exam - full range of motion, no tenderness, palpable spasm or pain on motion  Neurological - alert, oriented, normal speech, no focal findings or movement disorder noted, cranial nerves II through XII intact  Musculoskeletal - no joint tenderness, deformity or swelling, no muscular tenderness noted  Extremities - peripheral pulses normal, no pedal edema, no clubbing or cyanosis  Skin - normal coloration and turgor, no rashes, no suspicious skin lesions noted       Results for orders placed or performed in visit on 05/16/17   CBC auto differential   Result Value Ref Range    WBC 5.56 3.90 - 12.70 K/uL    RBC 5.15 4.60 - 6.20 M/uL    Hemoglobin 16.0 14.0 - 18.0 g/dL    Hematocrit 46.9 40.0 - 54.0 %    MCV 91 82 - 98 fL    MCH 31.1 (H) 27.0 - 31.0 pg    MCHC 34.1 32.0 - 36.0 %    RDW 12.8 11.5 - 14.5 %    Platelets 176 150 - 350 K/uL    MPV 11.9 9.2 - 12.9 fL    Gran # 3.5 1.8 - 7.7 K/uL    Lymph # 1.2 1.0 - 4.8 K/uL    Mono # 0.8 0.3 - 1.0 K/uL    Eos # 0.1 0.0 - 0.5 K/uL    Baso # 0.03 0.00 - 0.20 K/uL    Gran% 62.0 38.0 - 73.0 %    Lymph% 21.4 18.0 - 48.0 %    Mono% 13.7 4.0 - 15.0 %    Eosinophil% 2.0 0.0 - 8.0 %    Basophil% 0.5 0.0 - 1.9 %    Differential Method Automated    Comprehensive metabolic panel   Result Value Ref Range    Sodium 143 136 - 145 mmol/L    Potassium 4.1 3.5 - 5.1 mmol/L    Chloride 105 95 - 110 mmol/L    CO2 27 23 - 29 mmol/L    Glucose 103 70 - 110 mg/dL    BUN, Bld 21 8 - 23 mg/dL    Creatinine 1.1 0.5 - 1.4 mg/dL    Calcium 9.3 8.7 - 10.5 mg/dL    Total Protein 7.2 6.0 - 8.4 g/dL    Albumin 4.3 3.5 - 5.2 g/dL    Total Bilirubin 0.8 0.1 - 1.0 mg/dL    Alkaline Phosphatase 69 55 - 135 U/L    AST 29 10 - 40 U/L    ALT 36 10 - 44 U/L    Anion Gap 11 8 - 16 mmol/L    eGFR if African American >60.0 >60 mL/min/1.73 m^2    eGFR if non African American >60.0 >60 mL/min/1.73 m^2   Lipid panel   Result Value Ref Range     Cholesterol 160 120 - 199 mg/dL    Triglycerides 112 30 - 150 mg/dL    HDL 57 40 - 75 mg/dL    LDL Cholesterol 80.6 63.0 - 159.0 mg/dL    HDL/Chol Ratio 35.6 20.0 - 50.0 %    Total Cholesterol/HDL Ratio 2.8 2.0 - 5.0    Non-HDL Cholesterol 103 mg/dL   Hemoglobin A1c   Result Value Ref Range    Hemoglobin A1C 5.5 4.5 - 6.2 %    Estimated Avg Glucose 111 68 - 131 mg/dL          ASSESSMENT AND PLAN:  1. Routine medical exam  Counseled on age appropriate medical preventative services including age appropriate cancer screenings, age appropriate eye and dental exams, over all nutritional health, need for a consistent exercise regimen, and an over all push towards maintaining a vigorous and active lifestyle.  Counseled on age appropriate vaccines and discussed upcoming health care needs based on age/gender. Discussed good sleep hygiene and stress management.     2. Essential hypertension  Discussed sodium restriction, maintaining ideal body weight and regular exercise program as physiologic means to achieve blood pressure control. The patient will strive towards this. The current medical regimen is effective;  continue present plan and medications. Recommended patient to check home readings to monitor and see me for followup as scheduled or sooner as needed. Patient was educated that both decongestant and anti-inflammatory medication may raise blood pressure.   - valsartan-hydrochlorothiazide (DIOVAN-HCT) 320-25 mg per tablet; Take 1 tablet by mouth once daily.  Dispense: 90 tablet; Refill: 1  - amlodipine (NORVASC) 10 MG tablet; TAKE 1 BY MOUTH DAILY  Dispense: 90 tablet; Refill: 1    3. Hyperlipidemia, unspecified hyperlipidemia type  We discussed low fat diet and regular exercise.The current medical regimen is effective;  continue present plan and medications.    - ezetimibe-simvastatin 10-40 mg (VYTORIN) 10-40 mg per tablet; Take 1 tablet by mouth every evening.  Dispense: 90 tablet; Refill: 1    4. Glucose intolerance  (impaired glucose tolerance)  Stable. We discussed low sugar/low carbohydrate diet and regular exercise to prevent progression. No need for prescription medication at this time.     5. Gastroesophageal reflux disease, esophagitis presence not specified  Symptoms controlled. Reflux precautions discussed (eliminate tobacco if a smoker; minimize caffeine, chocolate and red/white peppermint intake; avoid heavy and spicy meals; don't lay down within 2-3 hours after eating). Medication as needed. Patient asked to take medication breaks, if possible - discussed chronic use can limit calcium absorption, increases the risk for intestinal infections, and can cause kidney damage.      6. Overweight  The patient is asked to make an attempt to improve diet and exercise patterns to aid in medical management of this problem.     7. Colon cancer screening  Patient wishes to complete [facility] - paperwork completed and given to patient to complete his part and fax.    8. Need for Tdap vaccination  Patient declines.    9. Need for shingles vaccine  Patient declines.          Return in about 6 months (around 4/9/2018), or if symptoms worsen or fail to improve, for follow up chronic medical conditions.. or sooner as needed.

## 2017-10-12 DIAGNOSIS — I10 ESSENTIAL HYPERTENSION: ICD-10-CM

## 2017-10-12 RX ORDER — AMLODIPINE BESYLATE 10 MG/1
TABLET ORAL
Qty: 90 TABLET | Refills: 1 | Status: SHIPPED | OUTPATIENT
Start: 2017-10-12 | End: 2018-04-23 | Stop reason: SDUPTHER

## 2017-10-23 DIAGNOSIS — K21.9 GASTROESOPHAGEAL REFLUX DISEASE WITHOUT ESOPHAGITIS: ICD-10-CM

## 2017-10-23 RX ORDER — LANSOPRAZOLE 30 MG/1
CAPSULE, DELAYED RELEASE ORAL
Qty: 90 CAPSULE | Refills: 1 | Status: SHIPPED | OUTPATIENT
Start: 2017-10-23 | End: 2018-05-09 | Stop reason: SDUPTHER

## 2017-11-08 ENCOUNTER — PATIENT MESSAGE (OUTPATIENT)
Dept: FAMILY MEDICINE | Facility: CLINIC | Age: 62
End: 2017-11-08

## 2017-11-14 ENCOUNTER — PATIENT MESSAGE (OUTPATIENT)
Dept: FAMILY MEDICINE | Facility: CLINIC | Age: 62
End: 2017-11-14

## 2017-12-08 DIAGNOSIS — N52.9 ED (ERECTILE DYSFUNCTION) OF ORGANIC ORIGIN: ICD-10-CM

## 2017-12-08 DIAGNOSIS — E78.5 HYPERLIPIDEMIA, UNSPECIFIED HYPERLIPIDEMIA TYPE: ICD-10-CM

## 2017-12-08 RX ORDER — EZETIMIBE AND SIMVASTATIN 10; 40 MG/1; MG/1
1 TABLET ORAL NIGHTLY
Qty: 90 TABLET | Refills: 0 | Status: SHIPPED | OUTPATIENT
Start: 2017-12-08 | End: 2017-12-09 | Stop reason: SDUPTHER

## 2017-12-08 RX ORDER — TADALAFIL 20 MG/1
20 TABLET ORAL DAILY
Qty: 6 TABLET | Refills: 0 | Status: SHIPPED | OUTPATIENT
Start: 2017-12-08 | End: 2018-07-02 | Stop reason: SDUPTHER

## 2017-12-09 DIAGNOSIS — E78.5 HYPERLIPIDEMIA, UNSPECIFIED HYPERLIPIDEMIA TYPE: ICD-10-CM

## 2017-12-11 RX ORDER — EZETIMIBE AND SIMVASTATIN 10; 40 MG/1; MG/1
1 TABLET ORAL NIGHTLY
Qty: 90 TABLET | Refills: 0 | Status: SHIPPED | OUTPATIENT
Start: 2017-12-11 | End: 2018-03-21 | Stop reason: SDUPTHER

## 2017-12-21 ENCOUNTER — PATIENT MESSAGE (OUTPATIENT)
Dept: FAMILY MEDICINE | Facility: CLINIC | Age: 62
End: 2017-12-21

## 2017-12-29 ENCOUNTER — PATIENT MESSAGE (OUTPATIENT)
Dept: FAMILY MEDICINE | Facility: CLINIC | Age: 62
End: 2017-12-29

## 2018-01-11 ENCOUNTER — PATIENT MESSAGE (OUTPATIENT)
Dept: FAMILY MEDICINE | Facility: CLINIC | Age: 63
End: 2018-01-11

## 2018-02-01 ENCOUNTER — PATIENT MESSAGE (OUTPATIENT)
Dept: FAMILY MEDICINE | Facility: CLINIC | Age: 63
End: 2018-02-01

## 2018-02-12 DIAGNOSIS — I10 ESSENTIAL HYPERTENSION: ICD-10-CM

## 2018-02-12 RX ORDER — VALSARTAN AND HYDROCHLOROTHIAZIDE 320; 25 MG/1; MG/1
1 TABLET, FILM COATED ORAL DAILY
Qty: 90 TABLET | Refills: 0 | Status: SHIPPED | OUTPATIENT
Start: 2018-02-12 | End: 2018-05-09 | Stop reason: SDUPTHER

## 2018-02-20 ENCOUNTER — PATIENT MESSAGE (OUTPATIENT)
Dept: FAMILY MEDICINE | Facility: CLINIC | Age: 63
End: 2018-02-20

## 2018-02-28 ENCOUNTER — OFFICE VISIT (OUTPATIENT)
Dept: URGENT CARE | Facility: CLINIC | Age: 63
End: 2018-02-28
Payer: COMMERCIAL

## 2018-02-28 VITALS
DIASTOLIC BLOOD PRESSURE: 75 MMHG | TEMPERATURE: 97 F | WEIGHT: 194 LBS | OXYGEN SATURATION: 97 % | HEART RATE: 85 BPM | RESPIRATION RATE: 18 BRPM | HEIGHT: 72 IN | BODY MASS INDEX: 26.28 KG/M2 | SYSTOLIC BLOOD PRESSURE: 103 MMHG

## 2018-02-28 DIAGNOSIS — L25.5 CONTACT DERMATITIS DUE TO PLANTS, EXCEPT FOOD, UNSPECIFIED CONTACT DERMATITIS TYPE: Primary | ICD-10-CM

## 2018-02-28 PROCEDURE — 99214 OFFICE O/P EST MOD 30 MIN: CPT | Mod: S$GLB,,, | Performed by: EMERGENCY MEDICINE

## 2018-02-28 RX ORDER — PREDNISONE 20 MG/1
TABLET ORAL
Qty: 10 TABLET | Refills: 0 | Status: SHIPPED | OUTPATIENT
Start: 2018-02-28 | End: 2018-07-02

## 2018-02-28 NOTE — PROGRESS NOTES
Subjective:       Patient ID: Sawyer Browning is a 63 y.o. male.    Vitals:  height is 6' (1.829 m) and weight is 88 kg (194 lb). His oral temperature is 97 °F (36.1 °C). His blood pressure is 103/75 and his pulse is 85. His respiration is 18 and oxygen saturation is 97%.     Chief Complaint: Rash    Patient states he been having  a rash on his left arm since Sunday.  Patient states he think he may have posion ivy.He was trimming back some plants this weekend and noticed the rash a few days ago      Rash   This is a new problem. The current episode started in the past 7 days. The problem has been gradually worsening since onset. The affected locations include the left arm. Associated with: posion ivy. Pertinent negatives include no anorexia, congestion, fever, joint pain, shortness of breath or sore throat. Treatments tried: onitment. The treatment provided mild relief. There is no history of allergies, asthma, eczema or varicella.     Review of Systems   Constitution: Negative for chills and fever.   HENT: Negative for congestion and sore throat.    Respiratory: Negative for shortness of breath.    Skin: Positive for color change, dry skin, itching and rash.   Musculoskeletal: Negative for joint pain.   Gastrointestinal: Negative for anorexia.       Objective:      Physical Exam   Constitutional: He is oriented to person, place, and time. He appears well-developed and well-nourished.   HENT:   Head: Normocephalic and atraumatic. Head is without abrasion, without contusion and without laceration.   Right Ear: External ear normal.   Left Ear: External ear normal.   Nose: Nose normal.   Mouth/Throat: Oropharynx is clear and moist.   Eyes: Conjunctivae, EOM and lids are normal. Pupils are equal, round, and reactive to light.   Neck: Trachea normal, full passive range of motion without pain and phonation normal. Neck supple.   Cardiovascular: Normal rate, regular rhythm and normal heart sounds.    Pulmonary/Chest: Effort  normal and breath sounds normal. No stridor. No respiratory distress.   Musculoskeletal: Normal range of motion.   Neurological: He is alert and oriented to person, place, and time.   Skin: Skin is warm, dry and intact. Capillary refill takes less than 2 seconds. Lesion and rash noted. No abrasion, no bruising, no burn, no ecchymosis and no laceration noted. Rash is vesicular. No erythema.        Vesicular rash on left volar forearm and left anterior chest and left anterior axillary area.    Psychiatric: He has a normal mood and affect. His speech is normal and behavior is normal. Judgment and thought content normal. Cognition and memory are normal.   Nursing note and vitals reviewed.      Assessment:       1. Contact dermatitis due to plants, except food, unspecified contact dermatitis type        Plan:         Contact dermatitis due to plants, except food, unspecified contact dermatitis type    Other orders  -     predniSONE (DELTASONE) 20 MG tablet; 2 po once daily  for 5 days  Dispense: 10 tablet; Refill: 0

## 2018-02-28 NOTE — PATIENT INSTRUCTIONS
Poison Ivy Rash  You have a rash and itching. This is a delayed reaction to the oils of the poison ivy plant. You likely came in contact with it during the 3 days before your symptoms began. Your skin will become red and itchy. Small blisters may appear. These can break and leak a clear yellow fluid. This fluid is not contagious. The reaction usually starts to go away after 1 to 2 weeks. But it may take 4 to 6 weeks to fully clear.    Home care  Follow these guidelines when caring for yourself at home:  · The plant oils still on your skin or clothes can be spread to other places on your body. They can also be passed on to other people and cause a similar reaction. Thats why its important to wash all of the plant oils off your skin and any clothes that may have been exposed. Wash all clothes that you were wearing. Use hot water with ordinary laundry detergent.  · Don't use over-the-counter creams that have neomycin or bacitracin. These may make the rash worse.  · Avoid anything that heats up your skin. This includes hot showers or baths, or direct sunlight. These can make itching worse.  · Put a cold compress on areas that are leaking (weeping), or on blistered areas. Do this for 30 minutes 3 times a day. To make a cold compress, dip a wash cloth in a mixture of 1 pint of cold water and 1 packet of astringent or oatmeal bath powder. Keep the solution in the refrigerator for future use.  · If large areas of skin are affected, take a lukewarm bath. Add colloidal oatmeal, or 1 cup of cornstarch or baking soda to the water.  · For a rash in a smaller area, use hydrocortisone cream for redness and irritation. But dont use this if another medicine was prescribed. For severe itching, put an ice pack on the area. To make an ice pack, put ice cubes in a plastic bag that seals at the top. Wrap the bag in a clean, thin towel or cloth. Never put ice or an ice pack directly on the skin. Over-the-counter products that have  calamine lotion may also be helpful.  · You can also use an oral antihistamine medicine with diphenhydramine for itching, unless another medicine was prescribed. This medicine may make you sleepy. So use lower doses during the daytime and higher doses at bedtime. Dont use medicine that has diphenhydramine if you have glaucoma. Also dont use it if you are a man who has trouble urinating because of an enlarged prostate. Antihistamines with loratidine cause less drowsiness. They are a good choice for daytime use.  · For severe cases, your provider may prescribe oral steroid medicines. Always take these exactly as prescribed.  Follow-up care  Follow up with your healthcare provider, or as directed. Call your provider if your rash gets worse or you are not starting to get better after 1 week of treatment.  When to seek medical advice  Call your healthcare provider right away if any of these occur:  · Spreading facial rash with swollen mouth or eyelids  · Rash that spreads to the groin and causes swelling of the penis, scrotum, or vaginal area  · Trouble urinating because of swelling in the genital area  Also call your provider if you have signs of infection in the areas of broken blisters:  · Spreading redness  · Pus or fluid draining from the blisters  · Yellow-brown crusts form over the open blisters  · Fever of 1 degree, or higher, above your normal temperature, or as directed by your provider  Call 911  Call 911 if you have severe swelling on your face, eyelids, mouth, throat, or tongue.  Date Last Reviewed: 8/1/2016  © 8416-6034 [employer]. [address] [place] [address], [place] [address]. All rights reserved. This information is not intended as a substitute for professional medical care. Always follow your healthcare professional's instructions.

## 2018-03-21 DIAGNOSIS — E78.5 HYPERLIPIDEMIA, UNSPECIFIED HYPERLIPIDEMIA TYPE: ICD-10-CM

## 2018-03-21 RX ORDER — EZETIMIBE AND SIMVASTATIN 10; 40 MG/1; MG/1
1 TABLET ORAL NIGHTLY
Qty: 90 TABLET | Refills: 0 | Status: SHIPPED | OUTPATIENT
Start: 2018-03-21 | End: 2018-07-02 | Stop reason: SDUPTHER

## 2018-04-23 DIAGNOSIS — I10 ESSENTIAL HYPERTENSION: ICD-10-CM

## 2018-04-23 RX ORDER — AMLODIPINE BESYLATE 10 MG/1
10 TABLET ORAL DAILY
Qty: 90 TABLET | Refills: 0 | Status: SHIPPED | OUTPATIENT
Start: 2018-04-23 | End: 2018-07-30 | Stop reason: SDUPTHER

## 2018-05-09 DIAGNOSIS — K21.9 GASTROESOPHAGEAL REFLUX DISEASE WITHOUT ESOPHAGITIS: ICD-10-CM

## 2018-05-09 DIAGNOSIS — I10 ESSENTIAL HYPERTENSION: ICD-10-CM

## 2018-05-09 RX ORDER — LANSOPRAZOLE 30 MG/1
30 CAPSULE, DELAYED RELEASE ORAL DAILY PRN
Qty: 90 CAPSULE | Refills: 0 | Status: SHIPPED | OUTPATIENT
Start: 2018-05-09 | End: 2018-05-14 | Stop reason: SDUPTHER

## 2018-05-09 RX ORDER — VALSARTAN AND HYDROCHLOROTHIAZIDE 320; 25 MG/1; MG/1
1 TABLET, FILM COATED ORAL DAILY
Qty: 90 TABLET | Refills: 0 | Status: SHIPPED | OUTPATIENT
Start: 2018-05-09 | End: 2018-08-15 | Stop reason: SDUPTHER

## 2018-05-14 DIAGNOSIS — K21.9 GASTROESOPHAGEAL REFLUX DISEASE WITHOUT ESOPHAGITIS: ICD-10-CM

## 2018-05-14 RX ORDER — LANSOPRAZOLE 30 MG/1
30 CAPSULE, DELAYED RELEASE ORAL DAILY PRN
Qty: 90 CAPSULE | Refills: 0 | Status: SHIPPED | OUTPATIENT
Start: 2018-05-14 | End: 2018-11-26 | Stop reason: SDUPTHER

## 2018-07-02 ENCOUNTER — OFFICE VISIT (OUTPATIENT)
Dept: FAMILY MEDICINE | Facility: CLINIC | Age: 63
End: 2018-07-02
Payer: COMMERCIAL

## 2018-07-02 VITALS
DIASTOLIC BLOOD PRESSURE: 74 MMHG | BODY MASS INDEX: 26.32 KG/M2 | HEIGHT: 72 IN | HEART RATE: 66 BPM | WEIGHT: 194.31 LBS | SYSTOLIC BLOOD PRESSURE: 106 MMHG | TEMPERATURE: 99 F

## 2018-07-02 DIAGNOSIS — E78.5 HYPERLIPIDEMIA, UNSPECIFIED HYPERLIPIDEMIA TYPE: ICD-10-CM

## 2018-07-02 DIAGNOSIS — R73.02 GLUCOSE INTOLERANCE (IMPAIRED GLUCOSE TOLERANCE): ICD-10-CM

## 2018-07-02 DIAGNOSIS — N40.0 BENIGN PROSTATIC HYPERPLASIA, UNSPECIFIED WHETHER LOWER URINARY TRACT SYMPTOMS PRESENT: ICD-10-CM

## 2018-07-02 DIAGNOSIS — N52.9 ED (ERECTILE DYSFUNCTION) OF ORGANIC ORIGIN: ICD-10-CM

## 2018-07-02 DIAGNOSIS — Z23 NEED FOR SHINGLES VACCINE: ICD-10-CM

## 2018-07-02 DIAGNOSIS — Z23 NEED FOR TDAP VACCINATION: ICD-10-CM

## 2018-07-02 DIAGNOSIS — E66.3 OVERWEIGHT: ICD-10-CM

## 2018-07-02 DIAGNOSIS — M79.671 RIGHT FOOT PAIN: ICD-10-CM

## 2018-07-02 DIAGNOSIS — I10 ESSENTIAL HYPERTENSION: ICD-10-CM

## 2018-07-02 DIAGNOSIS — Z00.00 ROUTINE MEDICAL EXAM: Primary | ICD-10-CM

## 2018-07-02 DIAGNOSIS — K21.9 GASTROESOPHAGEAL REFLUX DISEASE, ESOPHAGITIS PRESENCE NOT SPECIFIED: ICD-10-CM

## 2018-07-02 PROCEDURE — 3078F DIAST BP <80 MM HG: CPT | Mod: CPTII,S$GLB,, | Performed by: INTERNAL MEDICINE

## 2018-07-02 PROCEDURE — 3074F SYST BP LT 130 MM HG: CPT | Mod: CPTII,S$GLB,, | Performed by: INTERNAL MEDICINE

## 2018-07-02 PROCEDURE — 99214 OFFICE O/P EST MOD 30 MIN: CPT | Mod: S$GLB,,, | Performed by: INTERNAL MEDICINE

## 2018-07-02 PROCEDURE — 3008F BODY MASS INDEX DOCD: CPT | Mod: CPTII,S$GLB,, | Performed by: INTERNAL MEDICINE

## 2018-07-02 PROCEDURE — 99999 PR PBB SHADOW E&M-EST. PATIENT-LVL III: CPT | Mod: PBBFAC,,, | Performed by: INTERNAL MEDICINE

## 2018-07-02 RX ORDER — TADALAFIL 20 MG/1
20 TABLET ORAL DAILY
Qty: 6 TABLET | Refills: 0 | Status: SHIPPED | OUTPATIENT
Start: 2018-07-02 | End: 2018-07-30 | Stop reason: SDUPTHER

## 2018-07-02 RX ORDER — EZETIMIBE AND SIMVASTATIN 10; 40 MG/1; MG/1
1 TABLET ORAL NIGHTLY
Qty: 90 TABLET | Refills: 1 | Status: SHIPPED | OUTPATIENT
Start: 2018-07-02 | End: 2019-01-22 | Stop reason: SDUPTHER

## 2018-07-02 RX ORDER — EZETIMIBE AND SIMVASTATIN 10; 40 MG/1; MG/1
1 TABLET ORAL NIGHTLY
Qty: 90 TABLET | Refills: 1 | Status: SHIPPED | OUTPATIENT
Start: 2018-07-02 | End: 2018-07-02 | Stop reason: SDUPTHER

## 2018-07-02 NOTE — PROGRESS NOTES
HISTORY OF PRESENT ILLNESS:  Sawyer Browning is a 63 y.o. male who presents to the clinic today for a routine medical physical exam. His last physical exam was approximately 1 years(s) ago.      PAST MEDICAL HISTORY:  Past Medical History:   Diagnosis Date    BPH (benign prostatic hyperplasia)     Glucose intolerance (impaired glucose tolerance)     Hiatal hernia     Hyperlipidemia     Hypertension     Overweight     Reflux        PAST SURGICAL HISTORY:  Past Surgical History:   Procedure Laterality Date    inguinal hernia      x2    tonsillectomy         SOCIAL HISTORY:  Social History     Social History    Marital status:      Spouse name: N/A    Number of children: 2    Years of education: N/A     Occupational History    Works in [employer]      Social History Main Topics    Smoking status: Never Smoker    Smokeless tobacco: Never Used    Alcohol use Yes      Comment: Nightly/socially    Drug use: No    Sexual activity: Not on file     Other Topics Concern    Not on file     Social History Narrative    No narrative on file       FAMILY HISTORY:  Family History   Problem Relation Age of Onset    Hypertension Mother     Macular degeneration Mother         - gets shots in her eye    Stroke Father     Heart disease Father     Aneurysm Father     Migraines Sister     Arthritis Brother         knees    Dementia Paternal Grandmother     Diabetes Paternal Grandmother     Heart disease Paternal Grandfather     Diabetes Brother     Hernia Brother     Cancer Paternal Aunt     Cancer Paternal Uncle     Cancer Paternal Aunt     Cancer Paternal Uncle     Colon cancer Neg Hx     Prostate cancer Neg Hx        ALLERGIES AND MEDICATIONS: updated and reviewed.  Review of patient's allergies indicates:   Allergen Reactions    Ace inhibitors Other (See Comments)     cough    Losartan      headache     Medication List with Changes/Refills   Current Medications    AMLODIPINE (NORVASC) 10 MG  TABLET    Take 1 tablet (10 mg total) by mouth once daily.    FLUTICASONE (FLONASE) 50 MCG/ACTUATION NASAL SPRAY    2 sprays by Each Nare route once daily.    LANSOPRAZOLE (PREVACID) 30 MG CAPSULE    Take 1 capsule (30 mg total) by mouth daily as needed (heartburn). Take only as needed.    VALSARTAN-HYDROCHLOROTHIAZIDE (DIOVAN-HCT) 320-25 MG PER TABLET    Take 1 tablet by mouth once daily.   Changed and/or Refilled Medications    Modified Medication Previous Medication    EZETIMIBE-SIMVASTATIN 10-40 MG (VYTORIN) 10-40 MG PER TABLET ezetimibe-simvastatin 10-40 mg (VYTORIN) 10-40 mg per tablet       Take 1 tablet by mouth every evening.    Take 1 tablet by mouth every evening.    TADALAFIL (CIALIS) 20 MG TAB tadalafil (CIALIS) 20 MG Tab       Take 1 tablet (20 mg total) by mouth once daily.    Take 1 tablet (20 mg total) by mouth once daily.   Discontinued Medications    PREDNISONE (DELTASONE) 20 MG TABLET    2 po once daily  for 5 days         CARE TEAM:  Patient Care Team:  Mary Kay Haines MD as PCP - General (Internal Medicine)           SCREENING HISTORY:  Health Maintenance       Date Due Completion Date    TETANUS VACCINE 01/07/1973 ---    Cologuard 01/07/2005 ---    Zoster Vaccine 01/07/2015 ---    PROSTATE-SPECIFIC ANTIGEN 03/30/2018 3/30/2017    Influenza Vaccine 08/01/2018 10/9/2017 (Declined)    Override on 10/9/2017: Declined    Override on 3/30/2017: Declined    Override on 1/29/2016: Declined    Override on 1/13/2014: Declined    Lipid Panel 05/16/2022 5/16/2017            REVIEW OF SYSTEMS:   The patient reports good dietary habits.  The patient does exercise regularly.  Review of Systems   Constitutional: Negative for activity change, chills, fatigue, fever and unexpected weight change.   HENT: Negative for congestion, ear pain, hearing loss, rhinorrhea, sore throat, trouble swallowing and voice change.    Eyes: Negative for photophobia, pain, discharge and visual disturbance.   Respiratory: Negative  for cough, chest tightness, shortness of breath and wheezing.    Cardiovascular: Negative for chest pain, palpitations and leg swelling.   Gastrointestinal: Negative for abdominal pain, blood in stool, constipation, diarrhea, nausea and vomiting.   Endocrine: Negative for polydipsia and polyuria.   Genitourinary: Negative for difficulty urinating, dysuria, frequency, hematuria and urgency.   Musculoskeletal: Negative for arthralgias, gait problem, joint swelling, neck pain and neck stiffness.        - he reports pain on bottom of right foot since Saturday - he walked barefoot around his pool and yard   Skin: Negative for color change and rash.   Neurological: Positive for weakness. Negative for seizures and headaches.   Hematological: Negative for adenopathy. Does not bruise/bleed easily.   Psychiatric/Behavioral: Negative for behavioral problems, confusion and dysphoric mood. The patient is not nervous/anxious.            PHYSICAL EXAM:  Vitals:    07/02/18 1047   BP: 106/74   Pulse: 66   Temp: 98.6 °F (37 °C)     Weight: 88.2 kg (194 lb 5.4 oz)   Height: 6' (182.9 cm)   Body mass index is 26.36 kg/m².    Physical Examination: General appearance - alert, well appearing, and in no distress and overweight  Mental status - alert, oriented to person, place, and time, normal mood, behavior, speech, dress, motor activity, and thought processes  Eyes - pupils equal and reactive, extraocular eye movements intact, sclera anicteric  Mouth - mucous membranes moist, pharynx normal without lesions  Neck - supple, no significant adenopathy, carotids upstroke normal bilaterally, no bruits, thyroid exam: thyroid is normal in size without nodules or tenderness  Lymphatics - no palpable lymphadenopathy  Chest - clear to auscultation, no wheezes, rales or rhonchi, symmetric air entry  Heart - normal rate and regular rhythm, no gallops noted  Abdomen - soft, nontender, nondistended, no masses or organomegaly   Male - not  examined  Back exam - full range of motion, no tenderness, palpable spasm or pain on motion  Neurological - alert, oriented, normal speech, no focal findings or movement disorder noted, cranial nerves II through XII intact  Musculoskeletal - no joint tenderness, deformity or swelling, no muscular tenderness noted  Extremities - peripheral pulses normal, no pedal edema, no clubbing or cyanosis  Skin - normal coloration and turgor, no rashes, no suspicious skin lesions noted; ? 'splinter' in bottom of right foot with point tenderness      ASSESSMENT AND PLAN:  1. Routine medical exam  Counseled on age appropriate medical preventative services including age appropriate cancer screenings, age appropriate eye and dental exams, over all nutritional health, need for a consistent exercise regimen, and an over all push towards maintaining a vigorous and active lifestyle.  Counseled on age appropriate vaccines and discussed upcoming health care needs based on age/gender. Discussed good sleep hygiene and stress management.     2. Essential hypertension  Discussed sodium restriction, maintaining ideal body weight and regular exercise program as physiologic means to achieve blood pressure control. The patient will strive towards this. The current medical regimen is effective;  continue present plan and medications. Recommended patient to check home readings to monitor and see me for followup as scheduled or sooner as needed. Patient was educated that both decongestant and anti-inflammatory medication may raise blood pressure.   - CBC auto differential; Future    3. Hyperlipidemia, unspecified hyperlipidemia type  We discussed low fat diet and regular exercise.The current medical regimen is effective;  continue present plan and medications.    - Lipid panel; Future  - Comprehensive metabolic panel; Future  - ezetimibe-simvastatin 10-40 mg (VYTORIN) 10-40 mg per tablet; Take 1 tablet by mouth every evening.  Dispense: 90 tablet;  Refill: 1    4. Gastroesophageal reflux disease, esophagitis presence not specified  Symptoms controlled. Reflux precautions discussed (eliminate tobacco if a smoker; minimize caffeine, chocolate and red/white peppermint intake; avoid heavy and spicy meals; don't lay down within 2-3 hours after eating; minimize the intake of NSAIDs). Medication as needed. Patient asked to take medication breaks, if possible - discussed chronic use can limit calcium absorption (which can lead to osteopenia/osteoporosis), increases the risk for intestinal infections, and can cause kidney damage. There are also some newer studies that show possible increased risk of mortality.     5. Glucose intolerance (impaired glucose tolerance)  Stable. We discussed low sugar/low carbohydrate diet and regular exercise to prevent progression. No need for prescription medication at this time.   - Hemoglobin A1c; Future    6. Benign prostatic hyperplasia, unspecified whether lower urinary tract symptoms present  Stable. Observe.  - PSA, Screening; Future    7. ED (erectile dysfunction) of organic origin  We discussed low fat diet and regular exercise. Patient is statin intolerant.   - tadalafil (CIALIS) 20 MG Tab; Take 1 tablet (20 mg total) by mouth once daily.  Dispense: 6 tablet; Refill: 0    8. Overweight  The patient is asked to make an attempt to improve diet and exercise patterns to aid in medical management of this problem.     9. Need for Tdap vaccination  Patient was advised to get immunization at the pharmacy.     10. Need for shingles vaccine  Patient was advised to get immunization at the pharmacy.     11. Right foot pain  I think he may have stepped on something.  It was hard to see and the right equipment was not available in the office to try to remove it.  I recommended him to use some over-the-counter drawing save.  If something comes to the surface is wife can try to take it out.  I otherwise offered referral to Podiatry to see if  they can get out of his foot.           Follow-up in about 1 year (around 7/2/2019), or if symptoms worsen or fail to improve, for annual exam. or sooner as needed.

## 2018-07-10 ENCOUNTER — TELEPHONE (OUTPATIENT)
Dept: FAMILY MEDICINE | Facility: CLINIC | Age: 63
End: 2018-07-10

## 2018-07-17 ENCOUNTER — LAB VISIT (OUTPATIENT)
Dept: LAB | Facility: HOSPITAL | Age: 63
End: 2018-07-17
Attending: INTERNAL MEDICINE
Payer: COMMERCIAL

## 2018-07-17 DIAGNOSIS — I10 ESSENTIAL HYPERTENSION: ICD-10-CM

## 2018-07-17 DIAGNOSIS — N40.0 BENIGN PROSTATIC HYPERPLASIA, UNSPECIFIED WHETHER LOWER URINARY TRACT SYMPTOMS PRESENT: ICD-10-CM

## 2018-07-17 DIAGNOSIS — E78.5 HYPERLIPIDEMIA, UNSPECIFIED HYPERLIPIDEMIA TYPE: ICD-10-CM

## 2018-07-17 DIAGNOSIS — R73.02 GLUCOSE INTOLERANCE (IMPAIRED GLUCOSE TOLERANCE): ICD-10-CM

## 2018-07-17 LAB
ALBUMIN SERPL BCP-MCNC: 4.2 G/DL
ALP SERPL-CCNC: 77 U/L
ALT SERPL W/O P-5'-P-CCNC: 47 U/L
ANION GAP SERPL CALC-SCNC: 10 MMOL/L
AST SERPL-CCNC: 36 U/L
BASOPHILS # BLD AUTO: 0.04 K/UL
BASOPHILS NFR BLD: 0.7 %
BILIRUB SERPL-MCNC: 0.7 MG/DL
BUN SERPL-MCNC: 20 MG/DL
CALCIUM SERPL-MCNC: 9.5 MG/DL
CHLORIDE SERPL-SCNC: 105 MMOL/L
CHOLEST SERPL-MCNC: 148 MG/DL
CHOLEST/HDLC SERPL: 3.1 {RATIO}
CO2 SERPL-SCNC: 27 MMOL/L
COMPLEXED PSA SERPL-MCNC: 2.8 NG/ML
CREAT SERPL-MCNC: 1.1 MG/DL
DIFFERENTIAL METHOD: NORMAL
EOSINOPHIL # BLD AUTO: 0.1 K/UL
EOSINOPHIL NFR BLD: 2 %
ERYTHROCYTE [DISTWIDTH] IN BLOOD BY AUTOMATED COUNT: 12.4 %
EST. GFR  (AFRICAN AMERICAN): >60 ML/MIN/1.73 M^2
EST. GFR  (NON AFRICAN AMERICAN): >60 ML/MIN/1.73 M^2
ESTIMATED AVG GLUCOSE: 105 MG/DL
GLUCOSE SERPL-MCNC: 106 MG/DL
HBA1C MFR BLD HPLC: 5.3 %
HCT VFR BLD AUTO: 46.7 %
HDLC SERPL-MCNC: 47 MG/DL
HDLC SERPL: 31.8 %
HGB BLD-MCNC: 15.7 G/DL
IMM GRANULOCYTES # BLD AUTO: 0.03 K/UL
IMM GRANULOCYTES NFR BLD AUTO: 0.5 %
LDLC SERPL CALC-MCNC: 78.8 MG/DL
LYMPHOCYTES # BLD AUTO: 1.3 K/UL
LYMPHOCYTES NFR BLD: 22 %
MCH RBC QN AUTO: 30.5 PG
MCHC RBC AUTO-ENTMCNC: 33.6 G/DL
MCV RBC AUTO: 91 FL
MONOCYTES # BLD AUTO: 0.8 K/UL
MONOCYTES NFR BLD: 13.6 %
NEUTROPHILS # BLD AUTO: 3.7 K/UL
NEUTROPHILS NFR BLD: 61.2 %
NONHDLC SERPL-MCNC: 101 MG/DL
NRBC BLD-RTO: 0 /100 WBC
PLATELET # BLD AUTO: 184 K/UL
PMV BLD AUTO: 11.2 FL
POTASSIUM SERPL-SCNC: 4 MMOL/L
PROT SERPL-MCNC: 7 G/DL
RBC # BLD AUTO: 5.15 M/UL
SODIUM SERPL-SCNC: 142 MMOL/L
TRIGL SERPL-MCNC: 111 MG/DL
WBC # BLD AUTO: 6.1 K/UL

## 2018-07-17 PROCEDURE — 85025 COMPLETE CBC W/AUTO DIFF WBC: CPT

## 2018-07-17 PROCEDURE — 84153 ASSAY OF PSA TOTAL: CPT

## 2018-07-17 PROCEDURE — 36415 COLL VENOUS BLD VENIPUNCTURE: CPT | Mod: PO

## 2018-07-17 PROCEDURE — 80061 LIPID PANEL: CPT

## 2018-07-17 PROCEDURE — 80053 COMPREHEN METABOLIC PANEL: CPT

## 2018-07-17 PROCEDURE — 83036 HEMOGLOBIN GLYCOSYLATED A1C: CPT

## 2018-07-27 ENCOUNTER — TELEPHONE (OUTPATIENT)
Dept: FAMILY MEDICINE | Facility: CLINIC | Age: 63
End: 2018-07-27

## 2018-07-27 NOTE — TELEPHONE ENCOUNTER
----- Message from Yvette Gonzales sent at 7/26/2018  4:01 PM CDT -----  Contact: Sawyer [phone]  Patient is returning a call to Ms. Edwards from a couple of weeks ago. Please call at your earliest convenience.

## 2018-07-27 NOTE — TELEPHONE ENCOUNTER
Spoke with the pt, he was calling back from a couple of weeks ago.  I informed the pt, that a refill on the Cialis was given on 7/2/18; 6 tablets with 0 refills.  I gave the pt, the address and telephone number to the pharmacy.  Mr. Jacques said he takes his Cialis only as needed.  Patient verbalized understandings.

## 2018-07-29 ENCOUNTER — PATIENT MESSAGE (OUTPATIENT)
Dept: FAMILY MEDICINE | Facility: CLINIC | Age: 63
End: 2018-07-29

## 2018-07-29 DIAGNOSIS — N52.9 ED (ERECTILE DYSFUNCTION) OF ORGANIC ORIGIN: ICD-10-CM

## 2018-07-30 DIAGNOSIS — I10 ESSENTIAL HYPERTENSION: ICD-10-CM

## 2018-07-30 RX ORDER — TADALAFIL 20 MG/1
20 TABLET ORAL DAILY
Qty: 6 TABLET | Refills: 0 | Status: SHIPPED | OUTPATIENT
Start: 2018-07-30 | End: 2019-02-19 | Stop reason: SDUPTHER

## 2018-07-30 RX ORDER — AMLODIPINE BESYLATE 10 MG/1
10 TABLET ORAL DAILY
Qty: 90 TABLET | Refills: 0 | Status: SHIPPED | OUTPATIENT
Start: 2018-07-30 | End: 2018-10-16 | Stop reason: SDUPTHER

## 2018-07-30 NOTE — TELEPHONE ENCOUNTER
Spoke with the pt, he states the pharmacy didn't receive the refill on his Cialis 20 mg.  Pt is requesting a second refill on Cialis 20 mg, to mail order [facility].  Patient verbalized understandings.

## 2018-07-30 NOTE — TELEPHONE ENCOUNTER
Spoke with the pt, I informed him that the refill on the Cialis was approved.  Patient verbalized understandings.

## 2018-08-15 DIAGNOSIS — I10 ESSENTIAL HYPERTENSION: ICD-10-CM

## 2018-08-15 RX ORDER — VALSARTAN AND HYDROCHLOROTHIAZIDE 320; 25 MG/1; MG/1
1 TABLET, FILM COATED ORAL DAILY
Qty: 90 TABLET | Refills: 1 | Status: SHIPPED | OUTPATIENT
Start: 2018-08-15 | End: 2019-01-22 | Stop reason: SDUPTHER

## 2018-10-16 DIAGNOSIS — I10 ESSENTIAL HYPERTENSION: ICD-10-CM

## 2018-10-16 RX ORDER — AMLODIPINE BESYLATE 10 MG/1
TABLET ORAL
Qty: 90 TABLET | Refills: 0 | Status: SHIPPED | OUTPATIENT
Start: 2018-10-16 | End: 2019-01-22 | Stop reason: SDUPTHER

## 2018-10-24 ENCOUNTER — TELEPHONE (OUTPATIENT)
Dept: FAMILY MEDICINE | Facility: CLINIC | Age: 63
End: 2018-10-24

## 2018-10-24 NOTE — TELEPHONE ENCOUNTER
----- Message from Mary Kay Haines MD sent at 10/19/2018  4:40 PM CDT -----  Please call patient:  I received a fax from [facility] that his colo guard order has .  We still do not have any type of colon cancer screening on him.  Does he want to try and get this ordered again, or does he want to switch to a fit kit or colonoscopy?

## 2018-10-25 NOTE — TELEPHONE ENCOUNTER
Patient return call and said that he will call laboratories and get another script for the [place] Guard.

## 2018-10-26 NOTE — TELEPHONE ENCOUNTER
Please advise patient that I just received the letter this past week that the order from last year had .  He needs to go to the web site and put in all his insurance information.  Then they will send me the request for an order.

## 2018-10-26 NOTE — TELEPHONE ENCOUNTER
Pt is calling to for an order for the colon guard. Pt stated it has to come from the provider and fax order to [phone].

## 2018-11-01 ENCOUNTER — PATIENT MESSAGE (OUTPATIENT)
Dept: FAMILY MEDICINE | Facility: CLINIC | Age: 63
End: 2018-11-01

## 2018-11-20 ENCOUNTER — PATIENT MESSAGE (OUTPATIENT)
Dept: FAMILY MEDICINE | Facility: CLINIC | Age: 63
End: 2018-11-20

## 2018-11-26 DIAGNOSIS — K21.9 GASTROESOPHAGEAL REFLUX DISEASE WITHOUT ESOPHAGITIS: ICD-10-CM

## 2018-11-26 RX ORDER — LANSOPRAZOLE 30 MG/1
30 CAPSULE, DELAYED RELEASE ORAL DAILY PRN
Qty: 90 CAPSULE | Refills: 0 | Status: SHIPPED | OUTPATIENT
Start: 2018-11-26 | End: 2019-01-22 | Stop reason: SDUPTHER

## 2018-12-10 ENCOUNTER — PATIENT MESSAGE (OUTPATIENT)
Dept: FAMILY MEDICINE | Facility: CLINIC | Age: 63
End: 2018-12-10

## 2019-01-07 ENCOUNTER — PATIENT MESSAGE (OUTPATIENT)
Dept: FAMILY MEDICINE | Facility: CLINIC | Age: 64
End: 2019-01-07

## 2019-01-17 ENCOUNTER — PATIENT MESSAGE (OUTPATIENT)
Dept: FAMILY MEDICINE | Facility: CLINIC | Age: 64
End: 2019-01-17

## 2019-01-19 ENCOUNTER — PATIENT MESSAGE (OUTPATIENT)
Dept: FAMILY MEDICINE | Facility: CLINIC | Age: 64
End: 2019-01-19

## 2019-01-21 DIAGNOSIS — N52.9 ED (ERECTILE DYSFUNCTION) OF ORGANIC ORIGIN: ICD-10-CM

## 2019-01-21 DIAGNOSIS — I10 ESSENTIAL HYPERTENSION: ICD-10-CM

## 2019-01-21 RX ORDER — TADALAFIL 20 MG/1
20 TABLET ORAL DAILY
Qty: 6 TABLET | Refills: 0 | Status: CANCELLED | OUTPATIENT
Start: 2019-01-21

## 2019-01-21 RX ORDER — AMLODIPINE BESYLATE 10 MG/1
10 TABLET ORAL DAILY
Qty: 90 TABLET | Refills: 0 | Status: CANCELLED | OUTPATIENT
Start: 2019-01-21

## 2019-01-21 NOTE — TELEPHONE ENCOUNTER
Spoke with the pt and he would like these medications send to [facility] delivery.

## 2019-01-22 ENCOUNTER — PATIENT MESSAGE (OUTPATIENT)
Dept: FAMILY MEDICINE | Facility: CLINIC | Age: 64
End: 2019-01-22

## 2019-01-22 DIAGNOSIS — E78.5 HYPERLIPIDEMIA, UNSPECIFIED HYPERLIPIDEMIA TYPE: ICD-10-CM

## 2019-01-22 DIAGNOSIS — K21.9 GASTROESOPHAGEAL REFLUX DISEASE WITHOUT ESOPHAGITIS: ICD-10-CM

## 2019-01-22 DIAGNOSIS — I10 ESSENTIAL HYPERTENSION: ICD-10-CM

## 2019-01-22 RX ORDER — EZETIMIBE AND SIMVASTATIN 10; 40 MG/1; MG/1
1 TABLET ORAL NIGHTLY
Qty: 90 TABLET | Refills: 1 | Status: SHIPPED | OUTPATIENT
Start: 2019-01-22 | End: 2019-07-18 | Stop reason: SDUPTHER

## 2019-01-22 RX ORDER — LANSOPRAZOLE 30 MG/1
30 CAPSULE, DELAYED RELEASE ORAL DAILY PRN
Qty: 90 CAPSULE | Refills: 1 | Status: SHIPPED | OUTPATIENT
Start: 2019-01-22 | End: 2019-06-06 | Stop reason: SDUPTHER

## 2019-01-22 RX ORDER — VALSARTAN AND HYDROCHLOROTHIAZIDE 320; 25 MG/1; MG/1
1 TABLET, FILM COATED ORAL DAILY
Qty: 90 TABLET | Refills: 1 | Status: SHIPPED | OUTPATIENT
Start: 2019-01-22 | End: 2019-08-19 | Stop reason: SDUPTHER

## 2019-01-22 RX ORDER — AMLODIPINE BESYLATE 10 MG/1
10 TABLET ORAL DAILY
Qty: 90 TABLET | Refills: 1 | Status: SHIPPED | OUTPATIENT
Start: 2019-01-22 | End: 2019-04-26 | Stop reason: SDUPTHER

## 2019-01-28 ENCOUNTER — PATIENT MESSAGE (OUTPATIENT)
Dept: FAMILY MEDICINE | Facility: CLINIC | Age: 64
End: 2019-01-28

## 2019-02-19 DIAGNOSIS — N52.9 ED (ERECTILE DYSFUNCTION) OF ORGANIC ORIGIN: ICD-10-CM

## 2019-02-19 RX ORDER — TADALAFIL 20 MG/1
20 TABLET ORAL DAILY
Qty: 6 TABLET | Refills: 0 | Status: SHIPPED | OUTPATIENT
Start: 2019-02-19 | End: 2019-11-21 | Stop reason: SDUPTHER

## 2019-02-26 ENCOUNTER — PATIENT MESSAGE (OUTPATIENT)
Dept: FAMILY MEDICINE | Facility: CLINIC | Age: 64
End: 2019-02-26

## 2019-02-28 ENCOUNTER — TELEPHONE (OUTPATIENT)
Dept: ADMINISTRATIVE | Facility: HOSPITAL | Age: 64
End: 2019-02-28

## 2019-04-26 DIAGNOSIS — I10 ESSENTIAL HYPERTENSION: ICD-10-CM

## 2019-04-26 RX ORDER — AMLODIPINE BESYLATE 10 MG/1
10 TABLET ORAL DAILY
Qty: 90 TABLET | Refills: 0 | Status: SHIPPED | OUTPATIENT
Start: 2019-04-26 | End: 2019-07-31 | Stop reason: SDUPTHER

## 2019-06-06 DIAGNOSIS — K21.9 GASTROESOPHAGEAL REFLUX DISEASE WITHOUT ESOPHAGITIS: ICD-10-CM

## 2019-06-06 RX ORDER — LANSOPRAZOLE 30 MG/1
30 CAPSULE, DELAYED RELEASE ORAL DAILY PRN
Qty: 90 CAPSULE | Refills: 0 | Status: SHIPPED | OUTPATIENT
Start: 2019-06-06 | End: 2019-11-11 | Stop reason: SDUPTHER

## 2019-07-18 DIAGNOSIS — E78.5 HYPERLIPIDEMIA, UNSPECIFIED HYPERLIPIDEMIA TYPE: ICD-10-CM

## 2019-07-18 RX ORDER — EZETIMIBE AND SIMVASTATIN 10; 40 MG/1; MG/1
1 TABLET ORAL NIGHTLY
Qty: 90 TABLET | Refills: 1 | Status: SHIPPED | OUTPATIENT
Start: 2019-07-18 | End: 2020-01-21 | Stop reason: SDUPTHER

## 2019-07-24 ENCOUNTER — PATIENT MESSAGE (OUTPATIENT)
Dept: FAMILY MEDICINE | Facility: CLINIC | Age: 64
End: 2019-07-24

## 2019-07-24 DIAGNOSIS — Z00.00 ROUTINE PHYSICAL EXAMINATION: Primary | ICD-10-CM

## 2019-07-24 DIAGNOSIS — S76.319A HAMSTRING STRAIN, UNSPECIFIED LATERALITY, INITIAL ENCOUNTER: Primary | ICD-10-CM

## 2019-07-29 ENCOUNTER — PATIENT MESSAGE (OUTPATIENT)
Dept: FAMILY MEDICINE | Facility: CLINIC | Age: 64
End: 2019-07-29

## 2019-07-31 ENCOUNTER — OFFICE VISIT (OUTPATIENT)
Dept: FAMILY MEDICINE | Facility: CLINIC | Age: 64
End: 2019-07-31
Payer: COMMERCIAL

## 2019-07-31 ENCOUNTER — LAB VISIT (OUTPATIENT)
Dept: LAB | Facility: HOSPITAL | Age: 64
End: 2019-07-31
Attending: NURSE PRACTITIONER
Payer: COMMERCIAL

## 2019-07-31 VITALS
HEART RATE: 81 BPM | HEIGHT: 72 IN | OXYGEN SATURATION: 96 % | BODY MASS INDEX: 25.94 KG/M2 | TEMPERATURE: 99 F | WEIGHT: 191.5 LBS | DIASTOLIC BLOOD PRESSURE: 74 MMHG | SYSTOLIC BLOOD PRESSURE: 120 MMHG

## 2019-07-31 DIAGNOSIS — R19.7 DIARRHEA, UNSPECIFIED TYPE: ICD-10-CM

## 2019-07-31 DIAGNOSIS — K21.9 GASTROESOPHAGEAL REFLUX DISEASE, ESOPHAGITIS PRESENCE NOT SPECIFIED: ICD-10-CM

## 2019-07-31 DIAGNOSIS — Z00.00 ROUTINE MEDICAL EXAM: Primary | ICD-10-CM

## 2019-07-31 DIAGNOSIS — Z23 NEED FOR TDAP VACCINATION: ICD-10-CM

## 2019-07-31 DIAGNOSIS — N40.0 BENIGN PROSTATIC HYPERPLASIA, UNSPECIFIED WHETHER LOWER URINARY TRACT SYMPTOMS PRESENT: ICD-10-CM

## 2019-07-31 DIAGNOSIS — R73.02 GLUCOSE INTOLERANCE (IMPAIRED GLUCOSE TOLERANCE): ICD-10-CM

## 2019-07-31 DIAGNOSIS — E78.5 HYPERLIPIDEMIA, UNSPECIFIED HYPERLIPIDEMIA TYPE: ICD-10-CM

## 2019-07-31 DIAGNOSIS — E66.3 OVERWEIGHT: ICD-10-CM

## 2019-07-31 DIAGNOSIS — Z71.89 ADVANCED DIRECTIVES, COUNSELING/DISCUSSION: ICD-10-CM

## 2019-07-31 DIAGNOSIS — I10 ESSENTIAL HYPERTENSION: ICD-10-CM

## 2019-07-31 DIAGNOSIS — Z00.00 ROUTINE PHYSICAL EXAMINATION: ICD-10-CM

## 2019-07-31 DIAGNOSIS — S76.301D RIGHT HAMSTRING INJURY, SUBSEQUENT ENCOUNTER: ICD-10-CM

## 2019-07-31 DIAGNOSIS — Z23 NEED FOR SHINGLES VACCINE: ICD-10-CM

## 2019-07-31 LAB
ALBUMIN SERPL BCP-MCNC: 4.5 G/DL (ref 3.5–5.2)
ALP SERPL-CCNC: 68 U/L (ref 55–135)
ALT SERPL W/O P-5'-P-CCNC: 39 U/L (ref 10–44)
ANION GAP SERPL CALC-SCNC: 10 MMOL/L (ref 8–16)
AST SERPL-CCNC: 25 U/L (ref 10–40)
BASOPHILS # BLD AUTO: 0.04 K/UL (ref 0–0.2)
BASOPHILS NFR BLD: 0.7 % (ref 0–1.9)
BILIRUB SERPL-MCNC: 0.8 MG/DL (ref 0.1–1)
BUN SERPL-MCNC: 25 MG/DL (ref 8–23)
CALCIUM SERPL-MCNC: 9.8 MG/DL (ref 8.7–10.5)
CHLORIDE SERPL-SCNC: 104 MMOL/L (ref 95–110)
CHOLEST SERPL-MCNC: 167 MG/DL (ref 120–199)
CHOLEST/HDLC SERPL: 3.2 {RATIO} (ref 2–5)
CO2 SERPL-SCNC: 28 MMOL/L (ref 23–29)
COMPLEXED PSA SERPL-MCNC: 2.9 NG/ML (ref 0–4)
CREAT SERPL-MCNC: 1.3 MG/DL (ref 0.5–1.4)
DIFFERENTIAL METHOD: NORMAL
EOSINOPHIL # BLD AUTO: 0.1 K/UL (ref 0–0.5)
EOSINOPHIL NFR BLD: 1.3 % (ref 0–8)
ERYTHROCYTE [DISTWIDTH] IN BLOOD BY AUTOMATED COUNT: 12.5 % (ref 11.5–14.5)
EST. GFR  (AFRICAN AMERICAN): >60 ML/MIN/1.73 M^2
EST. GFR  (NON AFRICAN AMERICAN): 57.7 ML/MIN/1.73 M^2
ESTIMATED AVG GLUCOSE: 108 MG/DL (ref 68–131)
GLUCOSE SERPL-MCNC: 111 MG/DL (ref 70–110)
HBA1C MFR BLD HPLC: 5.4 % (ref 4–5.6)
HCT VFR BLD AUTO: 47.4 % (ref 40–54)
HDLC SERPL-MCNC: 52 MG/DL (ref 40–75)
HDLC SERPL: 31.1 % (ref 20–50)
HGB BLD-MCNC: 16.1 G/DL (ref 14–18)
IMM GRANULOCYTES # BLD AUTO: 0.01 K/UL (ref 0–0.04)
IMM GRANULOCYTES NFR BLD AUTO: 0.2 % (ref 0–0.5)
LDLC SERPL CALC-MCNC: 79.4 MG/DL (ref 63–159)
LYMPHOCYTES # BLD AUTO: 1.3 K/UL (ref 1–4.8)
LYMPHOCYTES NFR BLD: 22.1 % (ref 18–48)
MCH RBC QN AUTO: 30.6 PG (ref 27–31)
MCHC RBC AUTO-ENTMCNC: 34 G/DL (ref 32–36)
MCV RBC AUTO: 90 FL (ref 82–98)
MONOCYTES # BLD AUTO: 0.8 K/UL (ref 0.3–1)
MONOCYTES NFR BLD: 12.9 % (ref 4–15)
NEUTROPHILS # BLD AUTO: 3.8 K/UL (ref 1.8–7.7)
NEUTROPHILS NFR BLD: 62.8 % (ref 38–73)
NONHDLC SERPL-MCNC: 115 MG/DL
NRBC BLD-RTO: 0 /100 WBC
PLATELET # BLD AUTO: 162 K/UL (ref 150–350)
PMV BLD AUTO: 11.8 FL (ref 9.2–12.9)
POTASSIUM SERPL-SCNC: 3.9 MMOL/L (ref 3.5–5.1)
PROT SERPL-MCNC: 7.2 G/DL (ref 6–8.4)
RBC # BLD AUTO: 5.27 M/UL (ref 4.6–6.2)
SODIUM SERPL-SCNC: 142 MMOL/L (ref 136–145)
TRIGL SERPL-MCNC: 178 MG/DL (ref 30–150)
WBC # BLD AUTO: 6.07 K/UL (ref 3.9–12.7)

## 2019-07-31 PROCEDURE — 99396 PR PREVENTIVE VISIT,EST,40-64: ICD-10-PCS | Mod: S$GLB,,, | Performed by: INTERNAL MEDICINE

## 2019-07-31 PROCEDURE — 3078F PR MOST RECENT DIASTOLIC BLOOD PRESSURE < 80 MM HG: ICD-10-PCS | Mod: CPTII,S$GLB,, | Performed by: INTERNAL MEDICINE

## 2019-07-31 PROCEDURE — 99999 PR PBB SHADOW E&M-EST. PATIENT-LVL III: ICD-10-PCS | Mod: PBBFAC,,, | Performed by: INTERNAL MEDICINE

## 2019-07-31 PROCEDURE — 80053 COMPREHEN METABOLIC PANEL: CPT

## 2019-07-31 PROCEDURE — 3074F SYST BP LT 130 MM HG: CPT | Mod: CPTII,S$GLB,, | Performed by: INTERNAL MEDICINE

## 2019-07-31 PROCEDURE — 84153 ASSAY OF PSA TOTAL: CPT

## 2019-07-31 PROCEDURE — 80061 LIPID PANEL: CPT

## 2019-07-31 PROCEDURE — 85025 COMPLETE CBC W/AUTO DIFF WBC: CPT

## 2019-07-31 PROCEDURE — 83036 HEMOGLOBIN GLYCOSYLATED A1C: CPT

## 2019-07-31 PROCEDURE — 3074F PR MOST RECENT SYSTOLIC BLOOD PRESSURE < 130 MM HG: ICD-10-PCS | Mod: CPTII,S$GLB,, | Performed by: INTERNAL MEDICINE

## 2019-07-31 PROCEDURE — 99396 PREV VISIT EST AGE 40-64: CPT | Mod: S$GLB,,, | Performed by: INTERNAL MEDICINE

## 2019-07-31 PROCEDURE — 3078F DIAST BP <80 MM HG: CPT | Mod: CPTII,S$GLB,, | Performed by: INTERNAL MEDICINE

## 2019-07-31 PROCEDURE — 36415 COLL VENOUS BLD VENIPUNCTURE: CPT | Mod: PO

## 2019-07-31 PROCEDURE — 99999 PR PBB SHADOW E&M-EST. PATIENT-LVL III: CPT | Mod: PBBFAC,,, | Performed by: INTERNAL MEDICINE

## 2019-07-31 RX ORDER — AMLODIPINE BESYLATE 10 MG/1
10 TABLET ORAL DAILY
Qty: 90 TABLET | Refills: 3 | Status: SHIPPED | OUTPATIENT
Start: 2019-07-31 | End: 2020-05-08 | Stop reason: SDUPTHER

## 2019-07-31 NOTE — PROGRESS NOTES
HISTORY OF PRESENT ILLNESS:  Sawyer Browning is a 64 y.o. male who presents to the clinic today for a routine physical exam. His last physical exam was approximately 1 years(s) ago.      PAST MEDICAL HISTORY:  Past Medical History:   Diagnosis Date    BPH (benign prostatic hyperplasia)     Glucose intolerance (impaired glucose tolerance)     Hiatal hernia     Hyperlipidemia     Hypertension     Overweight     Reflux        PAST SURGICAL HISTORY:  Past Surgical History:   Procedure Laterality Date    inguinal hernia      x2    tonsillectomy         SOCIAL HISTORY:  Social History     Socioeconomic History    Marital status:      Spouse name: Not on file    Number of children: 2    Years of education: Not on file    Highest education level: Not on file   Occupational History    Occupation: Works in [employer]   Social Needs    Financial resource strain: Not on file    Food insecurity:     Worry: Not on file     Inability: Not on file    Transportation needs:     Medical: Not on file     Non-medical: Not on file   Tobacco Use    Smoking status: Never Smoker    Smokeless tobacco: Never Used   Substance and Sexual Activity    Alcohol use: Yes     Comment: Nightly/socially    Drug use: No    Sexual activity: Not on file   Lifestyle    Physical activity:     Days per week: Not on file     Minutes per session: Not on file    Stress: Not at all   Relationships    Social connections:     Talks on phone: Not on file     Gets together: Not on file     Attends [employer] service: Not on file     Active member of club or organization: Not on file     Attends meetings of clubs or organizations: Not on file     Relationship status: Not on file   Other Topics Concern    Not on file   Social History Narrative    Not on file       FAMILY HISTORY:  Family History   Problem Relation Age of Onset    Hypertension Mother     Macular degeneration Mother         - gets shots in her eye    Stroke Father      Heart disease Father     Aneurysm Father     Migraines Sister     Arthritis Brother         knees    Dementia Paternal Grandmother     Diabetes Paternal Grandmother     Heart disease Paternal Grandfather     Diabetes Brother     Hernia Brother     Cancer Paternal Aunt     Cancer Paternal Uncle     Cancer Paternal Aunt     Cancer Paternal Uncle     Colon cancer Neg Hx     Prostate cancer Neg Hx        ALLERGIES AND MEDICATIONS: updated and reviewed.  Review of patient's allergies indicates:   Allergen Reactions    Ace inhibitors Other (See Comments)     cough    Losartan      headache     Medication List with Changes/Refills   Current Medications    AMLODIPINE (NORVASC) 10 MG TABLET    Take 1 tablet (10 mg total) by mouth once daily.    EZETIMIBE-SIMVASTATIN 10-40 MG (VYTORIN) 10-40 MG PER TABLET    Take 1 tablet by mouth every evening.    FLUTICASONE (FLONASE) 50 MCG/ACTUATION NASAL SPRAY    2 sprays by Each Nare route once daily.    LANSOPRAZOLE (PREVACID) 30 MG CAPSULE    Take 1 capsule (30 mg total) by mouth daily as needed (heartburn). Take only as needed.    TADALAFIL (CIALIS) 20 MG TAB    Take 1 tablet (20 mg total) by mouth once daily.    VALSARTAN-HYDROCHLOROTHIAZIDE (DIOVAN-HCT) 320-25 MG PER TABLET    Take 1 tablet by mouth once daily.         CARE TEAM:  Patient Care Team:  Mary Kay Haines MD as PCP - General (Internal Medicine)           SCREENING HISTORY:  Health Maintenance       Date Due Completion Date    TETANUS VACCINE 01/07/1973 ---    Shingles Vaccine (1 of 2) 01/07/2005 ---    PROSTATE-SPECIFIC ANTIGEN 07/17/2019 7/17/2018    Influenza Vaccine 08/01/2019 2/26/2019 (Declined)    Override on 2/26/2019: Declined    Override on 10/9/2017: Declined    Override on 3/30/2017: Declined    Override on 1/29/2016: Declined    Override on 1/13/2014: Declined    Cologuard 01/03/2022 1/3/2019    Lipid Panel 07/17/2023 7/17/2018            REVIEW OF SYSTEMS:   The patient reports good dietary  habits.  The patient does exercise regularly.  Review of Systems   Constitutional: Negative for activity change, chills, fatigue, fever and unexpected weight change.   HENT: Negative for congestion, ear pain, hearing loss, rhinorrhea, sore throat, trouble swallowing and voice change.    Eyes: Negative for photophobia, pain, discharge and visual disturbance.   Respiratory: Negative for cough, chest tightness, shortness of breath and wheezing.    Cardiovascular: Negative for chest pain, palpitations and leg swelling.   Gastrointestinal: Positive for diarrhea (- rare; mostly am after having breakfast). Negative for abdominal pain, blood in stool, constipation, nausea and vomiting.   Endocrine: Negative for polydipsia and polyuria.   Genitourinary: Negative for difficulty urinating, dysuria, frequency, hematuria and urgency.   Musculoskeletal: Negative for arthralgias, gait problem, joint swelling, neck pain and neck stiffness.        Right hamstring injury about 3 weeks ago - will go to PT soon; hurst mostly with driving   Skin: Negative for color change and rash.   Neurological: Negative for seizures, weakness and headaches.   Hematological: Negative for adenopathy. Does not bruise/bleed easily.   Psychiatric/Behavioral: Negative for behavioral problems, confusion and dysphoric mood. The patient is not nervous/anxious.            PHYSICAL EXAM:  Vitals:    07/31/19 0911   BP: 120/74   Pulse: 81   Temp: 98.6 °F (37 °C)     Weight: 86.8 kg (191 lb 7.5 oz)   Height: 6' (182.9 cm)   Body mass index is 25.97 kg/m².    Physical Examination: General appearance - alert, well appearing, and in no distress and overweight  Mental status - alert, oriented to person, place, and time, normal mood, behavior, speech, dress, motor activity, and thought processes  Eyes - pupils equal and reactive, extraocular eye movements intact, sclera anicteric  Mouth - mucous membranes moist, pharynx normal without lesions  Neck - supple, no  significant adenopathy, carotids upstroke normal bilaterally, no bruits, thyroid exam: thyroid is normal in size without nodules or tenderness  Lymphatics - no palpable lymphadenopathy  Chest - clear to auscultation, no wheezes, rales or rhonchi, symmetric air entry  Heart - normal rate and regular rhythm, no gallops noted  Abdomen - soft, nontender, nondistended, no masses or organomegaly   Male - not examined  Back exam - full range of motion, no tenderness, palpable spasm or pain on motion  Neurological - alert, oriented, normal speech, no focal findings or movement disorder noted, cranial nerves II through XII intact  Musculoskeletal - no joint tenderness, deformity or swelling, no muscular tenderness noted  Extremities - peripheral pulses normal, no pedal edema, no clubbing or cyanosis  Skin - normal coloration and turgor, no rashes, no suspicious skin lesions noted       ASSESSMENT AND PLAN:  1. Routine medical exam  Counseled on age appropriate medical preventative services including age appropriate cancer screenings, age appropriate eye and dental exams, over all nutritional health, need for a consistent exercise regimen, and an over all push towards maintaining a vigorous and active lifestyle.  Counseled on age appropriate vaccines and discussed upcoming health care needs based on age/gender. Discussed good sleep hygiene and stress management.     2. Essential hypertension  Discussed sodium restriction, maintaining ideal body weight and regular exercise program as physiologic means to achieve blood pressure control. The patient will strive towards this. The current medical regimen is effective;  continue present plan and medications. Recommended patient to check home readings to monitor and see me for followup as scheduled or sooner as needed. Patient was educated that both decongestant and anti-inflammatory medication may raise blood pressure.   - amLODIPine (NORVASC) 10 MG tablet; Take 1 tablet (10 mg  total) by mouth once daily.  Dispense: 90 tablet; Refill: 0    3. Hyperlipidemia, unspecified hyperlipidemia type  We discussed low fat diet and regular exercise.The current medical regimen is effective;  continue present plan and medications.      4. Glucose intolerance (impaired glucose tolerance)  Stable. We discussed low sugar/low carbohydrate diet and regular exercise to prevent progression. No need for prescription medication at this time.     5. Benign prostatic hyperplasia, unspecified whether lower urinary tract symptoms present  The natural history of prostate cancer and ongoing controversy regarding screening and potential treatment outcomes of prostate cancer has been discussed with the patient. The meaning of a false positive PSA and a false negative PSA has been discussed. He indicates understanding of the limitations of this screening test and wishes to proceed with screening PSA testing.   Followed by urology.    6. Gastroesophageal reflux disease, esophagitis presence not specified  Symptoms controlled: yes. Reflux precautions discussed (eliminate tobacco if a smoker; minimize caffeine, chocolate and red/white peppermint intake; avoid heavy and spicy meals; don't lay down within 2-3 hours after eating; minimize the intake of NSAIDs). Medication as needed. Patient asked to take medication breaks, if possible - discussed chronic use can limit calcium absorption (which can lead to osteopenia/osteoporosis), increases the risk for intestinal infections, and can cause kidney damage. There are also some newer studies that show possible increased risk of mortality.     7. Overweight  The patient is asked to continue make an attempt to improve diet and exercise patterns to aid in medical management of this problem.     8. Need for shingles vaccine  Patient was advised to get immunization at the pharmacy.     9. Need for Tdap vaccination  Patient was advised to get immunization at the pharmacy.     10.  Advanced directives, counseling/discussion  I initiated the process and explained the importance of advance care planning today with the patient.  Advanced directives were discussed due to patient's age and/or chronic medical conditions. Prognosis based on current condition: excellent.   Paperwork was given to complete living will (At this point in time, the patient does have full decision-making capacity.  We discussed different extreme health states that he could experience, and reviewed what kind of medical care he would want in those situations) and medical POA (The patient received detailed information about the importance of designating a Health Care Power of . He was also instructed to communicate with this person about their wishes for future healthcare, should he become sick and lose decision-making capacity).   [place] was not discussed.   Approximately 3 minute(s) were spent on counseling/discussion regarding end of life decision making.                Follow up in about 1 year (around 7/31/2020), or if symptoms worsen or fail to improve, for annual exam. or sooner as needed.

## 2019-08-02 ENCOUNTER — TELEPHONE (OUTPATIENT)
Dept: FAMILY MEDICINE | Facility: CLINIC | Age: 64
End: 2019-08-02

## 2019-08-02 NOTE — TELEPHONE ENCOUNTER
----- Message from Nila Kemp sent at 8/1/2019  4:41 PM CDT -----  Contact: [facility]- [place]   Type: Patient Call Back    Who called: Kori     What is the request in detail: Says the patient was seen today but they need a referral to be sent over to the office for him. Please call back     Can the clinic reply by [facility]?  Call  Would the patient rather a call back or a response via [facility]?  Call     Best call back number: [phone]

## 2019-08-19 DIAGNOSIS — I10 ESSENTIAL HYPERTENSION: ICD-10-CM

## 2019-08-19 RX ORDER — VALSARTAN AND HYDROCHLOROTHIAZIDE 320; 25 MG/1; MG/1
1 TABLET, FILM COATED ORAL DAILY
Qty: 90 TABLET | Refills: 2 | Status: SHIPPED | OUTPATIENT
Start: 2019-08-19 | End: 2019-11-21 | Stop reason: SDUPTHER

## 2019-08-19 NOTE — TELEPHONE ENCOUNTER
Medication refill.      Reason For Visit  Reason For Visit:   Patient presents for initial evaluation .   Chaperone: ARIS FLORES is unaccompanied and accompanied by his father.     Referred By:   Dr. James   Written Consent: Given      History of Present Illness  HPI Free Text: Social isolation, excessive video game playing. Depression and anxiety. Oppositionalism with father in particular.      Past Psych History  Past Psych Hx:   Current Diagnosis: F32.9, F40.10      Review of Systems   Review of Symptoms:   Sleeping patterns: Very, very poor, due to excessive video game playing.   Anxiety: Present.      Family History  Free Text:   Parents  when pt was around 10.  Pt lived with mother in [place] from age 11 to 15.  Step-father and mother have a tumultuous relationship.  1/2 sister Alaina 3 (on father's side)  Father 48  Step-mother- 41, alcoholic, frequently argues with pt's father.      Social History   Social History:   Effects/consequences of drug/alcohol use: consumed alcohol a few times a week while living in [place]   Legal Concerns: No    History: No   Access to Firearms: No   A FOID Card: No       Trama/Abuse   Trauma & Abuse:   Suicidal Potential: none.   Violence to Unrelated Others: none.   Domestic Violence: none.   Sexual Abuse: none.   Substance Abuse: none.   Child Abuse/Neglect: none.   Elder Abuse/Neglect: none.       Physical Exam    Orientation: Oriented x3.   Observed appearance/grooming: neat   The patient's psychomotor tone exhibited normal psychomotor tone   The patient's speech exhibited a normal rate, a normal rhythm, normal tone and normal volume   Thought processes: The patient exhibited normal thought processes.   Associations: Evaluation of thought association showed no deficiency.   Thought Content: The patient presents no evidence of delusions, presents no evidence of hallucinations, presents no evidence of obsessions, denied preoccupation with violent thoughts, presents no evidence of  suicidal ideation, presents no evidence of suicidal intent, presents no evidence of suicidal plans, presents no evidence of homicidal ideation, presents no evidence of homicidal intention and presents no evidence of homicidal plans, but not future oriented.         Judgment/Insight:The patient demonstrated impaired judgment and impaired insight.       Assessment   1. Depression, major (296.20) (F32.9)   2. Childhood social anxiety disorder (313.21) (F40.10)  Axis IV:   Primary support group problems.   Social environment problems.   Social maladjustment.   No educational problems.   No occupational problems.   No housing problems.   No financial problems.   No problems with access to health care.   No problems with legal system interaction.       Orders  Treatment Intervention Recommendations:   Level of Care Reccommended:Level of Outpatient Care Reccommended: Symptom Alleviation/Axis I.   Intesity (frequency) of Service Recommeded: Every other Week.   Mode(s) of Treatment: Individual Theapy.      Discussion/Summary  Discussion Summary: Pt voluntary, but, tires of attending therapy, has seen a few therapists in the past, and doesn't find the process to be especially helpful. But, will try in this case. Must work on streamlining his sleep, setting limits on video game time, and pursuing educational goals.      Time  Consult Counseling: Total face to face time spent with patient 60 minutes. Greater than 50% of the total time was spent counseling and/or coordinating care.      Signatures   Electronically signed by : CHANDLER CAGE LCPC; Feb 21 2017  6:47PM CST

## 2019-10-21 ENCOUNTER — HOSPITAL ENCOUNTER (EMERGENCY)
Facility: HOSPITAL | Age: 64
Discharge: HOME | End: 2019-10-21
Attending: EMERGENCY MEDICINE
Payer: COMMERCIAL

## 2019-10-21 VITALS
TEMPERATURE: 99 F | OXYGEN SATURATION: 96 % | HEART RATE: 90 BPM | DIASTOLIC BLOOD PRESSURE: 94 MMHG | WEIGHT: 190 LBS | SYSTOLIC BLOOD PRESSURE: 129 MMHG | HEIGHT: 72 IN | BODY MASS INDEX: 25.73 KG/M2 | RESPIRATION RATE: 19 BRPM

## 2019-10-21 DIAGNOSIS — R05.9 COUGH: ICD-10-CM

## 2019-10-21 DIAGNOSIS — J18.9 ATYPICAL PNEUMONIA: Primary | ICD-10-CM

## 2019-10-21 PROBLEM — I77.1 TORTUOUS AORTA: Status: ACTIVE | Noted: 2019-10-21

## 2019-10-21 PROCEDURE — 96372 THER/PROPH/DIAG INJ SC/IM: CPT | Mod: ER

## 2019-10-21 PROCEDURE — 99284 EMERGENCY DEPT VISIT MOD MDM: CPT | Mod: 25,ER

## 2019-10-21 PROCEDURE — 63600175 PHARM REV CODE 636 W HCPCS: Mod: ER

## 2019-10-21 RX ORDER — AZITHROMYCIN 250 MG/1
250 TABLET, FILM COATED ORAL DAILY
Qty: 6 TABLET | Refills: 0 | Status: SHIPPED | OUTPATIENT
Start: 2019-10-21 | End: 2020-08-03

## 2019-10-21 RX ORDER — DEXAMETHASONE SODIUM PHOSPHATE 100 MG/10ML
8 INJECTION INTRAMUSCULAR; INTRAVENOUS
Status: DISCONTINUED | OUTPATIENT
Start: 2019-10-21 | End: 2019-10-21

## 2019-10-21 RX ORDER — DEXAMETHASONE SODIUM PHOSPHATE 4 MG/ML
INJECTION, SOLUTION INTRA-ARTICULAR; INTRALESIONAL; INTRAMUSCULAR; INTRAVENOUS; SOFT TISSUE
Status: COMPLETED
Start: 2019-10-21 | End: 2019-10-21

## 2019-10-21 RX ORDER — DEXAMETHASONE SODIUM PHOSPHATE 100 MG/10ML
8 INJECTION INTRAMUSCULAR; INTRAVENOUS
Status: COMPLETED | OUTPATIENT
Start: 2019-10-21 | End: 2019-10-21

## 2019-10-21 RX ADMIN — DEXAMETHASONE SODIUM PHOSPHATE 8 MG: 4 INJECTION, SOLUTION INTRA-ARTICULAR; INTRALESIONAL; INTRAMUSCULAR; INTRAVENOUS; SOFT TISSUE at 11:10

## 2019-10-21 NOTE — ED PROVIDER NOTES
Encounter Date: 10/21/2019       History     Chief Complaint   Patient presents with    Cough     cough, increasing since last week, initially with sore throat, now increasing congested cough.  note- [place]     This is a 64-year-old male with history of hypertension comes in complaining of one week history of cough and congestion. He reports that he was in [place] last week and he got a sore throat initially that progressed to rhinorrhea and cough. He reports that the cough has gotten worse. He has been coughing up yellowish sputum. He reports that he was up all night coughing. He denies any chest pain or shortness of breath. No fevers or chills. No exacerbating or alleviating factors. He has been taking OTC meds with no improvement.        Review of patient's allergies indicates:   Allergen Reactions    Ace inhibitors Other (See Comments)     cough    Losartan      headache     Past Medical History:   Diagnosis Date    BPH (benign prostatic hyperplasia)     Glucose intolerance (impaired glucose tolerance)     Hiatal hernia     Hyperlipidemia     Hypertension     Overweight     Reflux      Past Surgical History:   Procedure Laterality Date    inguinal hernia      x2    tonsillectomy       Family History   Problem Relation Age of Onset    Hypertension Mother     Macular degeneration Mother         - gets shots in her eye    Stroke Father     Heart disease Father     Aneurysm Father     Migraines Sister     Arthritis Brother         knees    Dementia Paternal Grandmother     Diabetes Paternal Grandmother     Heart disease Paternal Grandfather     Diabetes Brother     Hernia Brother     Cancer Paternal Aunt     Cancer Paternal Uncle     Cancer Paternal Aunt     Cancer Paternal Uncle     Colon cancer Neg Hx     Prostate cancer Neg Hx      Social History     Tobacco Use    Smoking status: Never Smoker    Smokeless tobacco: Never Used   Substance Use Topics     Alcohol use: Yes     Comment: Nightly/socially    Drug use: No     Review of Systems   Constitutional: Negative for chills and fever.   HENT: Positive for congestion and sore throat. Negative for trouble swallowing.    Respiratory: Positive for cough. Negative for shortness of breath.    Cardiovascular: Negative for chest pain and palpitations.   Gastrointestinal: Negative for abdominal pain, diarrhea, nausea and vomiting.   Musculoskeletal: Negative for back pain and neck pain.   Neurological: Negative for weakness, numbness and headaches.   All other systems reviewed and are negative.      Physical Exam     Initial Vitals [10/21/19 0945]   BP Pulse Resp Temp SpO2   (!) 129/94 90 19 98.7 °F (37.1 °C) 96 %      MAP       --         Physical Exam    Constitutional: Vital signs are normal. He appears well-developed and well-nourished.  Non-toxic appearance. No distress.   HENT:   Head: Normocephalic and atraumatic.   Eyes: Conjunctivae and EOM are normal. Pupils are equal, round, and reactive to light.   Neck: Normal range of motion. Neck supple. No muscular tenderness present. No neck rigidity.   Cardiovascular: Normal rate, regular rhythm and intact distal pulses.   Pulmonary/Chest: Breath sounds normal. He has no wheezes.   Abdominal: Soft. Normal appearance and bowel sounds are normal. There is no tenderness.   Musculoskeletal: Normal range of motion.   Lymphadenopathy:     He has no cervical adenopathy.     He has no axillary adenopathy.   Neurological: He is alert and oriented to person, place, and time. He has normal strength. No cranial nerve deficit or sensory deficit. Gait normal.   Skin: Skin is warm, dry and intact.   Psychiatric: He has a normal mood and affect. His behavior is normal.         ED Course   Procedures  Labs Reviewed - No data to display       Imaging Results          X-Ray Chest PA And Lateral (Final result)  Result time 10/21/19 10:15:56    Final result by Manuelito James MD (10/21/19  10:15:56)                 Impression:      No acute findings      Electronically signed by: Manuelito James MD  Date:    10/21/2019  Time:    10:15             Narrative:    EXAMINATION:  XR CHEST PA AND LATERAL    CLINICAL HISTORY:  Cough    TECHNIQUE:  PA and lateral views of the chest were performed.    COMPARISON:  None    FINDINGS:  Heart size is normal.  Thoracic aorta has tortuosity in the thoracic descending portion.  Lungs are clear.  No effusion or pneumothorax identified.  Mild degenerative osteophytes and disc space narrowing in the thoracic spine.                                 Medical Decision Making:   Initial Assessment:   This is a 64-year-old male with history of hypertension who comes in complaining of cough and congestion.  On examination his vitals are stable.  He is afebrile.  On physical exam lungs are clear.  He is nontoxic appearing.  Orders included chest x-ray.  Differential Diagnosis:   Pneumonia, bronchitis, URI, influenza, sinusitis.  Independently Interpreted Test(s):   I have ordered and independently interpreted X-rays - see summary below.       <> Summary of X-Ray Reading(s): Chest x-ray was reviewed by me and showed no infiltrate or effusion.  ED Management:  Patient's workup is unremarkable but he is actively coughing with productive sputum in the ER.  In light of that he was given Decadron 8 mg IM for symptomatic relief.  He will be discharged with Z-Kevin.  He is to follow up closely outpatient and return to the ER for any concerns.                      Clinical Impression:       ICD-10-CM ICD-9-CM   1. Atypical pneumonia J18.9 486   2. Cough R05 786.2         Disposition:   Disposition: Discharged  Condition: Stable                        Mary Freitas MD  10/21/19 1055

## 2019-10-23 ENCOUNTER — PATIENT MESSAGE (OUTPATIENT)
Dept: FAMILY MEDICINE | Facility: CLINIC | Age: 64
End: 2019-10-23

## 2019-11-11 DIAGNOSIS — K21.9 GASTROESOPHAGEAL REFLUX DISEASE WITHOUT ESOPHAGITIS: ICD-10-CM

## 2019-11-11 RX ORDER — LANSOPRAZOLE 30 MG/1
30 CAPSULE, DELAYED RELEASE ORAL DAILY PRN
Qty: 90 CAPSULE | Refills: 0 | Status: SHIPPED | OUTPATIENT
Start: 2019-11-11 | End: 2020-03-15

## 2019-11-21 DIAGNOSIS — N52.9 ED (ERECTILE DYSFUNCTION) OF ORGANIC ORIGIN: ICD-10-CM

## 2019-11-21 DIAGNOSIS — I10 ESSENTIAL HYPERTENSION: ICD-10-CM

## 2019-11-21 RX ORDER — TADALAFIL 20 MG/1
20 TABLET ORAL DAILY
Qty: 18 TABLET | Refills: 1 | Status: SHIPPED | OUTPATIENT
Start: 2019-11-21 | End: 2020-08-03 | Stop reason: SDUPTHER

## 2019-11-21 RX ORDER — VALSARTAN AND HYDROCHLOROTHIAZIDE 320; 25 MG/1; MG/1
1 TABLET, FILM COATED ORAL DAILY
Qty: 90 TABLET | Refills: 1 | Status: SHIPPED | OUTPATIENT
Start: 2019-11-21 | End: 2020-05-22 | Stop reason: SDUPTHER

## 2019-11-25 DIAGNOSIS — I10 ESSENTIAL HYPERTENSION: Primary | ICD-10-CM

## 2019-11-25 RX ORDER — VALSARTAN AND HYDROCHLOROTHIAZIDE 160; 12.5 MG/1; MG/1
2 TABLET, FILM COATED ORAL DAILY
Qty: 180 TABLET | Refills: 0 | Status: SHIPPED | OUTPATIENT
Start: 2019-11-25 | End: 2020-08-03 | Stop reason: ALTCHOICE

## 2019-11-25 NOTE — TELEPHONE ENCOUNTER
[facility] states patient Vaksartan HCTZ  320-25 mg is on backordered. Pharmacy would like to know if you would authorize Valsartan HCTZ 160-12.5 2 tablets daily if so please send new script.

## 2019-11-26 ENCOUNTER — TELEPHONE (OUTPATIENT)
Dept: FAMILY MEDICINE | Facility: CLINIC | Age: 64
End: 2019-11-26

## 2019-11-26 NOTE — TELEPHONE ENCOUNTER
----- Message from Paulette Devries sent at 11/26/2019 11:25 AM CST -----  Contact: [facility]  Type:  Pharmacy Calling to Clarify an RX    Name of Caller: [facility]    Pharmacy Name: [facility]    Prescription Name: valsartan-hydrochlorothiazide (DIOVAN-HCT) 320-25 mg per tablet    What do they need to clarify? The prescription is on back order     Can you be contacted via [facility]? No     Best Call Back Number: [phone]   Ref# [id number]

## 2019-11-26 NOTE — TELEPHONE ENCOUNTER
(Spoke with Shaji) Pharmacy states that they have the rx as valsartan 160-12.5 if you would like to order the pateint would just have to double (2 tablets once daily 180 qty)    Please advise..    Thanks,  Nathaniel

## 2019-11-26 NOTE — TELEPHONE ENCOUNTER
Notified Nancy the pharmacy tech and Miles the pharmacist the message below to make the change and she verbalized understanding.      Thanks,  Nathaniel

## 2019-11-29 ENCOUNTER — PATIENT MESSAGE (OUTPATIENT)
Dept: UROLOGY | Facility: CLINIC | Age: 64
End: 2019-11-29

## 2019-12-10 DIAGNOSIS — I10 ESSENTIAL HYPERTENSION: Primary | ICD-10-CM

## 2019-12-10 RX ORDER — VALSARTAN 320 MG/1
320 TABLET ORAL DAILY
Qty: 90 TABLET | Refills: 1 | Status: SHIPPED | OUTPATIENT
Start: 2019-12-10 | End: 2020-08-03 | Stop reason: ALTCHOICE

## 2019-12-10 RX ORDER — HYDROCHLOROTHIAZIDE 25 MG/1
25 TABLET ORAL DAILY
Qty: 90 TABLET | Refills: 1 | Status: SHIPPED | OUTPATIENT
Start: 2019-12-10 | End: 2020-08-03 | Stop reason: ALTCHOICE

## 2019-12-10 NOTE — TELEPHONE ENCOUNTER
Please call patient to advise of this information. We already changed him to half the dose twice a day. He cannot tolerate losartan. Is he ok if we try olmesartan instead?

## 2019-12-10 NOTE — TELEPHONE ENCOUNTER
Left message for pt. To call office, blood pressure medication is on back order. Dr. Haines sent in 2 medication in place of the one . Medication was split

## 2019-12-10 NOTE — TELEPHONE ENCOUNTER
Medication on back order, can it be split in 2. Medication is queued up, but it is showing medication contraindication. Please advise.

## 2019-12-10 NOTE — TELEPHONE ENCOUNTER
----- Message from Michelle Keane sent at 12/10/2019 10:47 AM CST -----  Contact: [facility]   Name of Who is Calling:[facility]        What is the request in detail:[facility]   is requesting a call back from clinical team in regard tovalsartan-hydrochlorothiazide (DIOVAN-HCT) 160-12.5 mg per tablet     Please contact to further discuss and advise.          Can the clinic reply by [facility]:       What Number to Call Back if not in [facility]: [phone]

## 2019-12-20 ENCOUNTER — PATIENT OUTREACH (OUTPATIENT)
Dept: ADMINISTRATIVE | Facility: OTHER | Age: 64
End: 2019-12-20

## 2019-12-24 ENCOUNTER — OFFICE VISIT (OUTPATIENT)
Dept: UROLOGY | Facility: CLINIC | Age: 64
End: 2019-12-24
Payer: COMMERCIAL

## 2019-12-24 VITALS
HEART RATE: 70 BPM | SYSTOLIC BLOOD PRESSURE: 128 MMHG | WEIGHT: 194 LBS | DIASTOLIC BLOOD PRESSURE: 93 MMHG | BODY MASS INDEX: 26.28 KG/M2 | HEIGHT: 72 IN

## 2019-12-24 DIAGNOSIS — N40.1 BPH WITH URINARY OBSTRUCTION: Primary | ICD-10-CM

## 2019-12-24 DIAGNOSIS — N13.8 BPH WITH URINARY OBSTRUCTION: Primary | ICD-10-CM

## 2019-12-24 PROCEDURE — 3080F DIAST BP >= 90 MM HG: CPT | Mod: CPTII,S$GLB,, | Performed by: UROLOGY

## 2019-12-24 PROCEDURE — 3080F PR MOST RECENT DIASTOLIC BLOOD PRESSURE >= 90 MM HG: ICD-10-PCS | Mod: CPTII,S$GLB,, | Performed by: UROLOGY

## 2019-12-24 PROCEDURE — 99999 PR PBB SHADOW E&M-EST. PATIENT-LVL III: CPT | Mod: PBBFAC,,, | Performed by: UROLOGY

## 2019-12-24 PROCEDURE — 99213 OFFICE O/P EST LOW 20 MIN: CPT | Mod: S$GLB,,, | Performed by: UROLOGY

## 2019-12-24 PROCEDURE — 3008F PR BODY MASS INDEX (BMI) DOCUMENTED: ICD-10-PCS | Mod: CPTII,S$GLB,, | Performed by: UROLOGY

## 2019-12-24 PROCEDURE — 3074F SYST BP LT 130 MM HG: CPT | Mod: CPTII,S$GLB,, | Performed by: UROLOGY

## 2019-12-24 PROCEDURE — 99999 PR PBB SHADOW E&M-EST. PATIENT-LVL III: ICD-10-PCS | Mod: PBBFAC,,, | Performed by: UROLOGY

## 2019-12-24 PROCEDURE — 3074F PR MOST RECENT SYSTOLIC BLOOD PRESSURE < 130 MM HG: ICD-10-PCS | Mod: CPTII,S$GLB,, | Performed by: UROLOGY

## 2019-12-24 PROCEDURE — 3008F BODY MASS INDEX DOCD: CPT | Mod: CPTII,S$GLB,, | Performed by: UROLOGY

## 2019-12-24 PROCEDURE — 99213 PR OFFICE/OUTPT VISIT, EST, LEVL III, 20-29 MIN: ICD-10-PCS | Mod: S$GLB,,, | Performed by: UROLOGY

## 2019-12-24 NOTE — PROGRESS NOTES
Subjective:       Patient ID: Sawyer Browning is a 64 y.o. male.    Chief Complaint: Annual Exam    HPI Sawyer Browning is a 64 y.o. male with a PMHx of HTN, HLD, and BPH who presents to the clinic for prostate management. Patient is satisfied with voiding habits. He does report an occasional reduced void strength. He reports taking probiotics a year ago and noticed improvement of intermittent diarrhea. PSA 4 months ago was 2.9.      Past Medical History:   Diagnosis Date    BPH (benign prostatic hyperplasia)     Glucose intolerance (impaired glucose tolerance)     Hiatal hernia     Hyperlipidemia     Hypertension     Overweight     Reflux        Past Surgical History:   Procedure Laterality Date    inguinal hernia      x2    tonsillectomy         Family History   Problem Relation Age of Onset    Hypertension Mother     Macular degeneration Mother         - gets shots in her eye    Stroke Father     Heart disease Father     Aneurysm Father     Migraines Sister     Arthritis Brother         knees    Dementia Paternal Grandmother     Diabetes Paternal Grandmother     Heart disease Paternal Grandfather     Diabetes Brother     Hernia Brother     Cancer Paternal Aunt     Cancer Paternal Uncle     Cancer Paternal Aunt     Cancer Paternal Uncle     Colon cancer Neg Hx     Prostate cancer Neg Hx        Social History     Socioeconomic History    Marital status:      Spouse name: Not on file    Number of children: 2    Years of education: Not on file    Highest education level: Not on file   Occupational History    Occupation: Works in insurance   Social Needs    Financial resource strain: Not on file    Food insecurity:     Worry: Not on file     Inability: Not on file    Transportation needs:     Medical: Not on file     Non-medical: Not on file   Tobacco Use    Smoking status: Never Smoker    Smokeless tobacco: Never Used   Substance and Sexual Activity    Alcohol use: Yes      Comment: Nightly/socially    Drug use: No    Sexual activity: Not on file   Lifestyle    Physical activity:     Days per week: Not on file     Minutes per session: Not on file    Stress: Not at all   Relationships    Social connections:     Talks on phone: Not on file     Gets together: Not on file     Attends Mosque service: Not on file     Active member of club or organization: Not on file     Attends meetings of clubs or organizations: Not on file     Relationship status: Not on file   Other Topics Concern    Not on file   Social History Narrative    Not on file       Allergies:  Ace inhibitors and Losartan    Medications:    Current Outpatient Medications:     amLODIPine (NORVASC) 10 MG tablet, Take 1 tablet (10 mg total) by mouth once daily., Disp: 90 tablet, Rfl: 3    azithromycin (Z-CHAGO) 250 MG tablet, Take 1 tablet (250 mg total) by mouth once daily. Take first 2 tablets together, then 1 every day until finished., Disp: 6 tablet, Rfl: 0    ezetimibe-simvastatin 10-40 mg (VYTORIN) 10-40 mg per tablet, Take 1 tablet by mouth every evening., Disp: 90 tablet, Rfl: 1    fluticasone (FLONASE) 50 mcg/actuation nasal spray, 2 sprays by Each Nare route once daily., Disp: 16 g, Rfl: 0    hydroCHLOROthiazide (HYDRODIURIL) 25 MG tablet, Take 1 tablet (25 mg total) by mouth once daily., Disp: 90 tablet, Rfl: 1    lansoprazole (PREVACID) 30 MG capsule, Take 1 capsule (30 mg total) by mouth daily as needed (heartburn). Take only as needed., Disp: 90 capsule, Rfl: 0    tadalafil (CIALIS) 20 MG Tab, Take 1 tablet (20 mg total) by mouth once daily., Disp: 18 tablet, Rfl: 1    valsartan (DIOVAN) 320 MG tablet, Take 1 tablet (320 mg total) by mouth once daily., Disp: 90 tablet, Rfl: 1    valsartan-hydrochlorothiazide (DIOVAN-HCT) 160-12.5 mg per tablet, Take 2 tablets by mouth once daily., Disp: 180 tablet, Rfl: 0    valsartan-hydrochlorothiazide (DIOVAN-HCT) 320-25 mg per tablet, Take 1 tablet by mouth once  daily., Disp: 90 tablet, Rfl: 1    Review of Systems   Constitutional: Negative for activity change, appetite change, chills, diaphoresis, fatigue, fever and unexpected weight change.   HENT: Negative for congestion, dental problem, hearing loss, mouth sores, postnasal drip, rhinorrhea, sinus pressure and trouble swallowing.    Eyes: Negative for pain, discharge and itching.   Respiratory: Negative for apnea, cough, choking, chest tightness, shortness of breath and wheezing.    Cardiovascular: Negative for chest pain, palpitations and leg swelling.   Gastrointestinal: Negative for abdominal distention, abdominal pain, anal bleeding, blood in stool, constipation, diarrhea, nausea, rectal pain and vomiting.   Endocrine: Negative for polydipsia and polyuria.   Genitourinary: Negative for decreased urine volume, difficulty urinating, discharge, dysuria, enuresis, flank pain, frequency, genital sores, hematuria, penile pain, penile swelling, scrotal swelling and urgency.   Musculoskeletal: Negative for arthralgias and back pain.   Skin: Negative for color change, rash and wound.   Neurological: Negative for dizziness, syncope, speech difficulty, light-headedness and headaches.   Hematological: Negative for adenopathy. Does not bruise/bleed easily.   Psychiatric/Behavioral: Negative for behavioral problems and confusion.       Objective:       LABS  PSA 7/31/2019  Ref Range & Units 4mo ago    PSA, SCREEN 0.00 - 4.00 ng/mL 2.9          Physical Exam   Constitutional: He appears well-developed.   HENT:   Head: Normocephalic.   Neck: Neck supple.   Cardiovascular: Normal rate.    Pulmonary/Chest: Effort normal.   Abdominal: Soft.   Genitourinary:   Genitourinary Comments: Prostate was smooth without nodularity. No rectal masses. 35 grams benign. Normal perineum.     Neurological: He is alert.   Skin: Skin is warm.     Psychiatric: He has a normal mood and affect.       Assessment:       1. BPH with urinary obstruction         Plan:       Sawyer was seen today for annual exam.    Diagnoses and all orders for this visit:    BPH with urinary obstruction          RTC 1 year for YASH    I, Maryjane Peterson, am acting as a scribe on this patient encounter in the presence and under the supervision of Dr. Lloyd.    12/24/2019 9:26 AM  I, Dr. Lloyd, personally performed the services described in this documentation.   All medical record entries made by the scribe were at my direction and in my presence.   I have reviewed the chart and agree that the record is accurate and complete.   Colt Lloyd MD.  9:26 AM 12/24/2019

## 2020-01-21 DIAGNOSIS — E78.5 HYPERLIPIDEMIA, UNSPECIFIED HYPERLIPIDEMIA TYPE: ICD-10-CM

## 2020-01-22 RX ORDER — EZETIMIBE AND SIMVASTATIN 10; 40 MG/1; MG/1
1 TABLET ORAL NIGHTLY
Qty: 90 TABLET | Refills: 1 | Status: SHIPPED | OUTPATIENT
Start: 2020-01-22 | End: 2020-08-03 | Stop reason: SDUPTHER

## 2020-02-08 ENCOUNTER — PATIENT MESSAGE (OUTPATIENT)
Dept: FAMILY MEDICINE | Facility: CLINIC | Age: 65
End: 2020-02-08

## 2020-03-15 DIAGNOSIS — K21.9 GASTROESOPHAGEAL REFLUX DISEASE WITHOUT ESOPHAGITIS: ICD-10-CM

## 2020-03-15 RX ORDER — LANSOPRAZOLE 30 MG/1
CAPSULE, DELAYED RELEASE ORAL
Qty: 90 CAPSULE | Refills: 0 | Status: SHIPPED | OUTPATIENT
Start: 2020-03-15 | End: 2020-08-03 | Stop reason: SDUPTHER

## 2020-05-08 DIAGNOSIS — I10 ESSENTIAL HYPERTENSION: ICD-10-CM

## 2020-05-08 RX ORDER — AMLODIPINE BESYLATE 10 MG/1
10 TABLET ORAL DAILY
Qty: 90 TABLET | Refills: 0 | Status: SHIPPED | OUTPATIENT
Start: 2020-05-08 | End: 2020-08-03 | Stop reason: SDUPTHER

## 2020-05-22 ENCOUNTER — PATIENT MESSAGE (OUTPATIENT)
Dept: FAMILY MEDICINE | Facility: CLINIC | Age: 65
End: 2020-05-22

## 2020-07-14 ENCOUNTER — PATIENT MESSAGE (OUTPATIENT)
Dept: FAMILY MEDICINE | Facility: CLINIC | Age: 65
End: 2020-07-14

## 2020-07-14 DIAGNOSIS — Z00.00 NORMAL PHYSICAL EXAMINATION: Primary | ICD-10-CM

## 2020-07-14 DIAGNOSIS — Z12.5 SCREENING FOR PROSTATE CANCER: ICD-10-CM

## 2020-07-28 ENCOUNTER — LAB VISIT (OUTPATIENT)
Dept: LAB | Facility: HOSPITAL | Age: 65
End: 2020-07-28
Attending: INTERNAL MEDICINE
Payer: COMMERCIAL

## 2020-07-28 DIAGNOSIS — Z00.00 NORMAL PHYSICAL EXAMINATION: ICD-10-CM

## 2020-07-28 DIAGNOSIS — Z12.5 SCREENING FOR PROSTATE CANCER: ICD-10-CM

## 2020-07-28 LAB
ALBUMIN SERPL BCP-MCNC: 4.2 G/DL (ref 3.5–5.2)
ALP SERPL-CCNC: 64 U/L (ref 55–135)
ALT SERPL W/O P-5'-P-CCNC: 45 U/L (ref 10–44)
ANION GAP SERPL CALC-SCNC: 10 MMOL/L (ref 8–16)
AST SERPL-CCNC: 32 U/L (ref 10–40)
BASOPHILS # BLD AUTO: 0.05 K/UL (ref 0–0.2)
BASOPHILS NFR BLD: 0.8 % (ref 0–1.9)
BILIRUB SERPL-MCNC: 0.7 MG/DL (ref 0.1–1)
BUN SERPL-MCNC: 18 MG/DL (ref 8–23)
CALCIUM SERPL-MCNC: 9.1 MG/DL (ref 8.7–10.5)
CHLORIDE SERPL-SCNC: 105 MMOL/L (ref 95–110)
CHOLEST SERPL-MCNC: 150 MG/DL (ref 120–199)
CHOLEST/HDLC SERPL: 2.5 {RATIO} (ref 2–5)
CO2 SERPL-SCNC: 24 MMOL/L (ref 23–29)
COMPLEXED PSA SERPL-MCNC: 2.6 NG/ML (ref 0–4)
CREAT SERPL-MCNC: 1.1 MG/DL (ref 0.5–1.4)
DIFFERENTIAL METHOD: NORMAL
EOSINOPHIL # BLD AUTO: 0.1 K/UL (ref 0–0.5)
EOSINOPHIL NFR BLD: 1.8 % (ref 0–8)
ERYTHROCYTE [DISTWIDTH] IN BLOOD BY AUTOMATED COUNT: 12.9 % (ref 11.5–14.5)
EST. GFR  (AFRICAN AMERICAN): >60 ML/MIN/1.73 M^2
EST. GFR  (NON AFRICAN AMERICAN): >60 ML/MIN/1.73 M^2
ESTIMATED AVG GLUCOSE: 108 MG/DL (ref 68–131)
GLUCOSE SERPL-MCNC: 113 MG/DL (ref 70–110)
HBA1C MFR BLD HPLC: 5.4 % (ref 4–5.6)
HCT VFR BLD AUTO: 47.3 % (ref 40–54)
HDLC SERPL-MCNC: 61 MG/DL (ref 40–75)
HDLC SERPL: 40.7 % (ref 20–50)
HGB BLD-MCNC: 15.5 G/DL (ref 14–18)
IMM GRANULOCYTES # BLD AUTO: 0.03 K/UL (ref 0–0.04)
IMM GRANULOCYTES NFR BLD AUTO: 0.5 % (ref 0–0.5)
LDLC SERPL CALC-MCNC: 73.8 MG/DL (ref 63–159)
LYMPHOCYTES # BLD AUTO: 1.5 K/UL (ref 1–4.8)
LYMPHOCYTES NFR BLD: 25.2 % (ref 18–48)
MCH RBC QN AUTO: 30.9 PG (ref 27–31)
MCHC RBC AUTO-ENTMCNC: 32.8 G/DL (ref 32–36)
MCV RBC AUTO: 94 FL (ref 82–98)
MONOCYTES # BLD AUTO: 0.8 K/UL (ref 0.3–1)
MONOCYTES NFR BLD: 12.5 % (ref 4–15)
NEUTROPHILS # BLD AUTO: 3.6 K/UL (ref 1.8–7.7)
NEUTROPHILS NFR BLD: 59.2 % (ref 38–73)
NONHDLC SERPL-MCNC: 89 MG/DL
NRBC BLD-RTO: 0 /100 WBC
PLATELET # BLD AUTO: 155 K/UL (ref 150–350)
PMV BLD AUTO: 11.6 FL (ref 9.2–12.9)
POTASSIUM SERPL-SCNC: 4 MMOL/L (ref 3.5–5.1)
PROT SERPL-MCNC: 7.1 G/DL (ref 6–8.4)
RBC # BLD AUTO: 5.02 M/UL (ref 4.6–6.2)
SODIUM SERPL-SCNC: 139 MMOL/L (ref 136–145)
TRIGL SERPL-MCNC: 76 MG/DL (ref 30–150)
TSH SERPL DL<=0.005 MIU/L-ACNC: 1.29 UIU/ML (ref 0.4–4)
WBC # BLD AUTO: 6.02 K/UL (ref 3.9–12.7)

## 2020-07-28 PROCEDURE — 80061 LIPID PANEL: CPT

## 2020-07-28 PROCEDURE — 85025 COMPLETE CBC W/AUTO DIFF WBC: CPT

## 2020-07-28 PROCEDURE — 36415 COLL VENOUS BLD VENIPUNCTURE: CPT | Mod: PO

## 2020-07-28 PROCEDURE — 84153 ASSAY OF PSA TOTAL: CPT

## 2020-07-28 PROCEDURE — 83036 HEMOGLOBIN GLYCOSYLATED A1C: CPT

## 2020-07-28 PROCEDURE — 80053 COMPREHEN METABOLIC PANEL: CPT

## 2020-07-28 PROCEDURE — 84443 ASSAY THYROID STIM HORMONE: CPT

## 2020-08-03 ENCOUNTER — OFFICE VISIT (OUTPATIENT)
Dept: FAMILY MEDICINE | Facility: CLINIC | Age: 65
End: 2020-08-03
Payer: COMMERCIAL

## 2020-08-03 VITALS
HEIGHT: 72 IN | SYSTOLIC BLOOD PRESSURE: 120 MMHG | BODY MASS INDEX: 26.18 KG/M2 | WEIGHT: 193.25 LBS | TEMPERATURE: 98 F | DIASTOLIC BLOOD PRESSURE: 76 MMHG | OXYGEN SATURATION: 97 % | HEART RATE: 74 BPM

## 2020-08-03 DIAGNOSIS — Z23 NEED FOR STREPTOCOCCUS PNEUMONIAE VACCINATION: Primary | ICD-10-CM

## 2020-08-03 DIAGNOSIS — E78.49 OTHER HYPERLIPIDEMIA: ICD-10-CM

## 2020-08-03 DIAGNOSIS — N52.9 ED (ERECTILE DYSFUNCTION) OF ORGANIC ORIGIN: ICD-10-CM

## 2020-08-03 DIAGNOSIS — Z11.4 SCREENING FOR HIV (HUMAN IMMUNODEFICIENCY VIRUS): ICD-10-CM

## 2020-08-03 DIAGNOSIS — Z23 NEED FOR SHINGLES VACCINE: ICD-10-CM

## 2020-08-03 DIAGNOSIS — Z23 NEED FOR TDAP VACCINATION: ICD-10-CM

## 2020-08-03 DIAGNOSIS — Z71.89 ADVANCED DIRECTIVES, COUNSELING/DISCUSSION: ICD-10-CM

## 2020-08-03 DIAGNOSIS — E66.3 OVERWEIGHT: ICD-10-CM

## 2020-08-03 DIAGNOSIS — K21.9 GASTROESOPHAGEAL REFLUX DISEASE, ESOPHAGITIS PRESENCE NOT SPECIFIED: ICD-10-CM

## 2020-08-03 DIAGNOSIS — R73.02 GLUCOSE INTOLERANCE (IMPAIRED GLUCOSE TOLERANCE): ICD-10-CM

## 2020-08-03 DIAGNOSIS — Z00.00 ROUTINE MEDICAL EXAM: ICD-10-CM

## 2020-08-03 DIAGNOSIS — I77.1 TORTUOUS AORTA: ICD-10-CM

## 2020-08-03 DIAGNOSIS — I10 ESSENTIAL HYPERTENSION: ICD-10-CM

## 2020-08-03 PROCEDURE — 90471 PNEUMOCOCCAL CONJUGATE VACCINE 13-VALENT LESS THAN 5YO & GREATER THAN: ICD-10-PCS | Mod: S$GLB,,, | Performed by: INTERNAL MEDICINE

## 2020-08-03 PROCEDURE — 3074F SYST BP LT 130 MM HG: CPT | Mod: CPTII,S$GLB,, | Performed by: INTERNAL MEDICINE

## 2020-08-03 PROCEDURE — 3074F PR MOST RECENT SYSTOLIC BLOOD PRESSURE < 130 MM HG: ICD-10-PCS | Mod: CPTII,S$GLB,, | Performed by: INTERNAL MEDICINE

## 2020-08-03 PROCEDURE — 3078F DIAST BP <80 MM HG: CPT | Mod: CPTII,S$GLB,, | Performed by: INTERNAL MEDICINE

## 2020-08-03 PROCEDURE — 99397 PER PM REEVAL EST PAT 65+ YR: CPT | Mod: 25,S$GLB,, | Performed by: INTERNAL MEDICINE

## 2020-08-03 PROCEDURE — 3078F PR MOST RECENT DIASTOLIC BLOOD PRESSURE < 80 MM HG: ICD-10-PCS | Mod: CPTII,S$GLB,, | Performed by: INTERNAL MEDICINE

## 2020-08-03 PROCEDURE — 90670 PCV13 VACCINE IM: CPT | Mod: S$GLB,,, | Performed by: INTERNAL MEDICINE

## 2020-08-03 PROCEDURE — 99999 PR PBB SHADOW E&M-EST. PATIENT-LVL IV: CPT | Mod: PBBFAC,,, | Performed by: INTERNAL MEDICINE

## 2020-08-03 PROCEDURE — 99999 PR PBB SHADOW E&M-EST. PATIENT-LVL IV: ICD-10-PCS | Mod: PBBFAC,,, | Performed by: INTERNAL MEDICINE

## 2020-08-03 PROCEDURE — 99397 PR PREVENTIVE VISIT,EST,65 & OVER: ICD-10-PCS | Mod: 25,S$GLB,, | Performed by: INTERNAL MEDICINE

## 2020-08-03 PROCEDURE — 90471 IMMUNIZATION ADMIN: CPT | Mod: S$GLB,,, | Performed by: INTERNAL MEDICINE

## 2020-08-03 PROCEDURE — 90670 PNEUMOCOCCAL CONJUGATE VACCINE 13-VALENT LESS THAN 5YO & GREATER THAN: ICD-10-PCS | Mod: S$GLB,,, | Performed by: INTERNAL MEDICINE

## 2020-08-03 RX ORDER — AMLODIPINE BESYLATE 10 MG/1
10 TABLET ORAL DAILY
Qty: 90 TABLET | Refills: 3 | Status: SHIPPED | OUTPATIENT
Start: 2020-08-03 | End: 2020-10-20 | Stop reason: SDUPTHER

## 2020-08-03 RX ORDER — TADALAFIL 20 MG/1
20 TABLET ORAL DAILY
Qty: 18 TABLET | Refills: 1 | Status: SHIPPED | OUTPATIENT
Start: 2020-08-03 | End: 2020-10-20 | Stop reason: SDUPTHER

## 2020-08-03 RX ORDER — LANSOPRAZOLE 30 MG/1
CAPSULE, DELAYED RELEASE ORAL
Qty: 90 CAPSULE | Refills: 0 | Status: SHIPPED | OUTPATIENT
Start: 2020-08-03 | End: 2020-10-20 | Stop reason: SDUPTHER

## 2020-08-03 RX ORDER — EZETIMIBE AND SIMVASTATIN 10; 40 MG/1; MG/1
1 TABLET ORAL NIGHTLY
Qty: 90 TABLET | Refills: 3 | Status: SHIPPED | OUTPATIENT
Start: 2020-08-03 | End: 2020-10-20 | Stop reason: SDUPTHER

## 2020-08-03 NOTE — PROGRESS NOTES
HISTORY OF PRESENT ILLNESS:  Sawyer Browning is a 65 y.o. male who presents to the clinic today for a routine medical physical exam. His last physical exam was approximately 1 years(s) ago.      PAST MEDICAL HISTORY:  Past Medical History:   Diagnosis Date    BPH (benign prostatic hyperplasia)     Glucose intolerance (impaired glucose tolerance)     Hiatal hernia     Hyperlipidemia     Hypertension     Overweight     Reflux        PAST SURGICAL HISTORY:  Past Surgical History:   Procedure Laterality Date    inguinal hernia      x2    tonsillectomy         SOCIAL HISTORY:  Social History     Socioeconomic History    Marital status:      Spouse name: Not on file    Number of children: 2    Years of education: Not on file    Highest education level: Not on file   Occupational History    Occupation: Works in [employer]   Social Needs    Financial resource strain: Not hard at all    Food insecurity     Worry: Never true     Inability: Never true    Transportation needs     Medical: No     Non-medical: No   Tobacco Use    Smoking status: Never Smoker    Smokeless tobacco: Never Used   Substance and Sexual Activity    Alcohol use: Yes     Frequency: 2-4 times a month     Drinks per session: Patient refused     Binge frequency: Never     Comment: Nightly/socially    Drug use: No    Sexual activity: Not on file   Lifestyle    Physical activity     Days per week: 3 days     Minutes per session: 50 min    Stress: Not at all   Relationships    Social connections     Talks on phone: Twice a week     Gets together: Three times a week     Attends Anabaptist service: Not on file     Active member of club or organization: No     Attends meetings of clubs or organizations: Never     Relationship status:    Other Topics Concern    Not on file   Social History Narrative    Not on file       FAMILY HISTORY:  Family History   Problem Relation Age of Onset    Hypertension Mother     Macular  degeneration Mother         - gets shots in her eye    Stroke Father     Heart disease Father     Aneurysm Father     Migraines Sister     Arthritis Brother         knees    Dementia Paternal Grandmother     Diabetes Paternal Grandmother     Heart disease Paternal Grandfather     Diabetes Brother     Hernia Brother     Cancer Paternal Aunt     Cancer Paternal Uncle     Cancer Paternal Aunt     Cancer Paternal Uncle     Colon cancer Neg Hx     Prostate cancer Neg Hx        ALLERGIES AND MEDICATIONS: updated and reviewed.  Review of patient's allergies indicates:   Allergen Reactions    Ace inhibitors Other (See Comments)     cough    Losartan      headache     Medication List with Changes/Refills   Current Medications    FLUTICASONE (FLONASE) 50 MCG/ACTUATION NASAL SPRAY    2 sprays by Each Nare route once daily.    VALSARTAN-HYDROCHLOROTHIAZIDE (DIOVAN-HCT) 320-25 MG PER TABLET    Take 1 tablet by mouth once daily.   Changed and/or Refilled Medications    Modified Medication Previous Medication    AMLODIPINE (NORVASC) 10 MG TABLET amLODIPine (NORVASC) 10 MG tablet       Take 1 tablet (10 mg total) by mouth once daily.    Take 1 tablet (10 mg total) by mouth once daily.    EZETIMIBE-SIMVASTATIN 10-40 MG (VYTORIN) 10-40 MG PER TABLET ezetimibe-simvastatin 10-40 mg (VYTORIN) 10-40 mg per tablet       Take 1 tablet by mouth every evening.    Take 1 tablet by mouth every evening.    LANSOPRAZOLE (PREVACID) 30 MG CAPSULE lansoprazole (PREVACID) 30 MG capsule       TAKE 1 CAPSULE DAILY ONLY  AS NEEDED FOR HEARTBURN    TAKE 1 CAPSULE DAILY ONLY  AS NEEDED FOR HEARTBURN    TADALAFIL (CIALIS) 20 MG TAB tadalafil (CIALIS) 20 MG Tab       Take 1 tablet (20 mg total) by mouth once daily.    Take 1 tablet (20 mg total) by mouth once daily.   Discontinued Medications    AZITHROMYCIN (Z-CHAGO) 250 MG TABLET    Take 1 tablet (250 mg total) by mouth once daily. Take first 2 tablets together, then 1 every day until  finished.    HYDROCHLOROTHIAZIDE (HYDRODIURIL) 25 MG TABLET    Take 1 tablet (25 mg total) by mouth once daily.    VALSARTAN (DIOVAN) 320 MG TABLET    Take 1 tablet (320 mg total) by mouth once daily.    VALSARTAN-HYDROCHLOROTHIAZIDE (DIOVAN-HCT) 160-12.5 MG PER TABLET    Take 2 tablets by mouth once daily.         CARE TEAM:  Patient Care Team:  Mary Kay Haines MD as PCP - General (Internal Medicine)           SCREENING HISTORY:  Health Maintenance       Date Due Completion Date    HIV Screening 01/07/1970 ---    TETANUS VACCINE 01/07/1973 ---    Shingles Vaccine (1 of 2) 01/07/2005 ---    Pneumococcal Vaccine (65+ Low/Medium Risk) (1 of 2 - PCV13) 01/07/2020 ---    Influenza Vaccine (1) 09/01/2020 ---    PROSTATE-SPECIFIC ANTIGEN 07/28/2021 7/28/2020    Colorectal Cancer Screening 01/03/2022 1/3/2019    Override on 5/2/2007: Done (normal)    Lipid Panel 07/28/2025 7/28/2020            REVIEW OF SYSTEMS:   The patient reports: good dietary habits.  The patient reports : that they exercise regularly. He does cross fit 3 times per week.  Review of Systems   Constitutional: Negative for activity change, chills, fatigue, fever and unexpected weight change.   HENT: Negative for congestion, ear pain, hearing loss, rhinorrhea, sore throat, trouble swallowing and voice change.    Eyes: Negative for photophobia, pain, discharge and visual disturbance.   Respiratory: Negative for cough, chest tightness, shortness of breath and wheezing.    Cardiovascular: Negative for chest pain, palpitations and leg swelling.   Gastrointestinal: Negative for abdominal pain, blood in stool, constipation, diarrhea, nausea and vomiting.   Endocrine: Negative for polydipsia and polyuria.   Genitourinary: Negative for difficulty urinating, dysuria, frequency, hematuria and urgency.   Musculoskeletal: Negative for arthralgias, gait problem, joint swelling, neck pain and neck stiffness.   Skin: Negative for color change and rash.   Neurological:  Negative for seizures, weakness and headaches.   Hematological: Negative for adenopathy. Does not bruise/bleed easily.   Psychiatric/Behavioral: Negative for behavioral problems, confusion and dysphoric mood. The patient is not nervous/anxious.      ROS : patient denies: difficulty initiating urination          PHYSICAL EXAM:  Vitals:    08/03/20 1248   BP: 120/76   Pulse:    Temp:      Weight: 87.6 kg (193 lb 3.7 oz)   Height: 6' (182.9 cm)   Body mass index is 26.21 kg/m².    General appearance - alert, well appearing, and in no distress, overweight  Psychiatric - alert, oriented to person, place, and time, normal mood, behavior, speech, dress, motor activity, and thought processes  Eyes - pupils equal and reactive, extraocular eye movements intact, sclera anicteric  Mouth - not examined; patient wearing mask due to Covid 19 pandemic  Neck - supple, no significant adenopathy, carotids upstroke normal bilaterally, no bruits, thyroid exam: thyroid is normal in size without nodules or tenderness  Lymphatics - no palpable cervical lymphadenopathy  Chest - clear to auscultation, no wheezes, rales or rhonchi, symmetric air entry  Heart - normal rate and regular rhythm, no gallops noted  Abdomen - soft, nontender, nondistended, no masses or organomegaly   Male - not examined  Back exam - full range of motion, no tenderness, palpable spasm or pain on motion  Neurological - alert, normal speech, no focal findings or movement disorder noted, cranial nerves II through XII intact  Musculoskeletal - no joint tenderness, deformity or swelling, no muscular tenderness noted  Extremities - peripheral pulses normal, no pedal edema, no clubbing or cyanosis  Skin - normal coloration and turgor, no rashes, no suspicious skin lesions noted      Labs:  Results for orders placed or performed in visit on 07/28/20   Comprehensive metabolic panel   Result Value Ref Range    Sodium 139 136 - 145 mmol/L    Potassium 4.0 3.5 - 5.1 mmol/L     Chloride 105 95 - 110 mmol/L    CO2 24 23 - 29 mmol/L    Glucose 113 (H) 70 - 110 mg/dL    BUN, Bld 18 8 - 23 mg/dL    Creatinine 1.1 0.5 - 1.4 mg/dL    Calcium 9.1 8.7 - 10.5 mg/dL    Total Protein 7.1 6.0 - 8.4 g/dL    Albumin 4.2 3.5 - 5.2 g/dL    Total Bilirubin 0.7 0.1 - 1.0 mg/dL    Alkaline Phosphatase 64 55 - 135 U/L    AST 32 10 - 40 U/L    ALT 45 (H) 10 - 44 U/L    Anion Gap 10 8 - 16 mmol/L    eGFR if African American >60.0 >60 mL/min/1.73 m^2    eGFR if non African American >60.0 >60 mL/min/1.73 m^2   CBC auto differential   Result Value Ref Range    WBC 6.02 3.90 - 12.70 K/uL    RBC 5.02 4.60 - 6.20 M/uL    Hemoglobin 15.5 14.0 - 18.0 g/dL    Hematocrit 47.3 40.0 - 54.0 %    Mean Corpuscular Volume 94 82 - 98 fL    Mean Corpuscular Hemoglobin 30.9 27.0 - 31.0 pg    Mean Corpuscular Hemoglobin Conc 32.8 32.0 - 36.0 g/dL    RDW 12.9 11.5 - 14.5 %    Platelets 155 150 - 350 K/uL    MPV 11.6 9.2 - 12.9 fL    Immature Granulocytes 0.5 0.0 - 0.5 %    Gran # (ANC) 3.6 1.8 - 7.7 K/uL    Immature Grans (Abs) 0.03 0.00 - 0.04 K/uL    Lymph # 1.5 1.0 - 4.8 K/uL    Mono # 0.8 0.3 - 1.0 K/uL    Eos # 0.1 0.0 - 0.5 K/uL    Baso # 0.05 0.00 - 0.20 K/uL    nRBC 0 0 /100 WBC    Gran% 59.2 38.0 - 73.0 %    Lymph% 25.2 18.0 - 48.0 %    Mono% 12.5 4.0 - 15.0 %    Eosinophil% 1.8 0.0 - 8.0 %    Basophil% 0.8 0.0 - 1.9 %    Differential Method Automated    Hemoglobin A1C   Result Value Ref Range    Hemoglobin A1C 5.4 4.0 - 5.6 %    Estimated Avg Glucose 108 68 - 131 mg/dL   Lipid Panel   Result Value Ref Range    Cholesterol 150 120 - 199 mg/dL    Triglycerides 76 30 - 150 mg/dL    HDL 61 40 - 75 mg/dL    LDL Cholesterol 73.8 63.0 - 159.0 mg/dL    Hdl/Cholesterol Ratio 40.7 20.0 - 50.0 %    Total Cholesterol/HDL Ratio 2.5 2.0 - 5.0    Non-HDL Cholesterol 89 mg/dL   TSH   Result Value Ref Range    TSH 1.292 0.400 - 4.000 uIU/mL   PSA, Screening   Result Value Ref Range    PSA, SCREEN 2.6 0.00 - 4.00 ng/mL             ASSESSMENT AND PLAN:  1. Routine medical exam  Counseled on age appropriate medical preventative services including age appropriate cancer screenings, age appropriate eye and dental exams, over all nutritional health, need for a consistent exercise regimen, and an over all push towards maintaining a vigorous and active lifestyle.  Counseled on age appropriate vaccines and discussed upcoming health care needs based on age/gender. Discussed good sleep hygiene and stress management.    2. Essential hypertension  Discussed sodium restriction, maintaining ideal body weight and regular exercise program as physiologic means to achieve blood pressure control. The patient will strive towards this.   The current medical regimen is effective;  continue present plan and medications. Recommended patient to check home readings to monitor and see me for followup as scheduled or sooner as needed.   Patient was educated that both decongestant and anti-inflammatory medication may raise blood pressure.  The patient is not eligible for the digital hypertension program.  - amLODIPine (NORVASC) 10 MG tablet; Take 1 tablet (10 mg total) by mouth once daily.  Dispense: 90 tablet; Refill: 3    3. Other hyperlipidemia  We discussed low fat diet and regular exercise.The current medical regimen is effective;  continue present plan and medications.   - ezetimibe-simvastatin 10-40 mg (VYTORIN) 10-40 mg per tablet; Take 1 tablet by mouth every evening.  Dispense: 90 tablet; Refill: 3    4. Glucose intolerance (impaired glucose tolerance)  Stable. We discussed low sugar/low carbohydrate diet and regular exercise to prevent progression. No need for prescription medication at this time.    5. Tortuous aorta  Patient with tortuous/ectatic Aorta.  Stable/asymptomatic. Currently stable on lipid lowering medication and b/p monitoring.     6. Gastroesophageal reflux disease, esophagitis presence not specified  Symptoms controlled: yes - takes  medication occasionally as needed.   Reflux precautions discussed (eliminate tobacco if a smoker; minimize caffeine, chocolate and red/white peppermint intake; avoid heavy and spicy meals; don't lay down within 2-3 hours after eating; minimize the intake of NSAIDs). Medication as needed.   Patient asked to take medication breaks, if possible - discussed chronic use can limit calcium absorption (which can lead to osteopenia/osteoporosis), increases the risk for intestinal infections, and can cause kidney damage. There are also some newer studies that show possible increased risk of mortality.  - lansoprazole (PREVACID) 30 MG capsule; TAKE 1 CAPSULE DAILY ONLY  AS NEEDED FOR HEARTBURN  Dispense: 90 capsule; Refill: 0    7. Overweight  The patient is asked to continue to make an attempt to improve diet and exercise patterns to aid in medical management of this problem.     8. Need for Streptococcus pneumoniae vaccination    - (In Office Administered) Pneumococcal Conjugate Vaccine (13 Valent) (IM)    9. Need for shingles vaccine  Deferred.    10. Need for Tdap vaccination  Deferred.    11. Screening for HIV (human immunodeficiency virus)  Routine screening with next blood draw.    12. Advanced directives, counseling/discussion  He reports that he is planning to work on advanced care planning in the near future.  He will bring us a copy of these papers once completed.    13. ED (erectile dysfunction) of organic origin  Stable. Medication as needed.  - tadalafiL (CIALIS) 20 MG Tab; Take 1 tablet (20 mg total) by mouth once daily.  Dispense: 18 tablet; Refill: 1           Follow up in about 1 year (around 8/3/2021), or if symptoms worsen or fail to improve, for annual exam. or sooner as needed.

## 2020-10-20 DIAGNOSIS — I10 ESSENTIAL HYPERTENSION: ICD-10-CM

## 2020-10-20 RX ORDER — VALSARTAN AND HYDROCHLOROTHIAZIDE 320; 25 MG/1; MG/1
1 TABLET, FILM COATED ORAL DAILY
Qty: 90 TABLET | Refills: 2 | Status: SHIPPED | OUTPATIENT
Start: 2020-10-20 | End: 2020-10-20 | Stop reason: SDUPTHER

## 2020-12-21 DIAGNOSIS — E78.49 OTHER HYPERLIPIDEMIA: ICD-10-CM

## 2020-12-21 DIAGNOSIS — I10 ESSENTIAL HYPERTENSION: ICD-10-CM

## 2020-12-21 RX ORDER — AMLODIPINE BESYLATE 10 MG/1
10 TABLET ORAL DAILY
Qty: 90 TABLET | Refills: 2 | Status: SHIPPED | OUTPATIENT
Start: 2020-12-21 | End: 2021-04-26 | Stop reason: SDUPTHER

## 2020-12-21 RX ORDER — LANSOPRAZOLE 30 MG/1
CAPSULE, DELAYED RELEASE ORAL
Qty: 90 CAPSULE | Refills: 0 | Status: SHIPPED | OUTPATIENT
Start: 2020-12-21 | End: 2021-09-26 | Stop reason: SDUPTHER

## 2020-12-21 RX ORDER — EZETIMIBE AND SIMVASTATIN 10; 40 MG/1; MG/1
1 TABLET ORAL NIGHTLY
Qty: 90 TABLET | Refills: 2 | Status: SHIPPED | OUTPATIENT
Start: 2020-12-21 | End: 2021-04-26 | Stop reason: SDUPTHER

## 2020-12-21 RX ORDER — VALSARTAN AND HYDROCHLOROTHIAZIDE 320; 25 MG/1; MG/1
1 TABLET, FILM COATED ORAL DAILY
Qty: 90 TABLET | Refills: 2 | Status: SHIPPED | OUTPATIENT
Start: 2020-12-21 | End: 2021-04-26 | Stop reason: SDUPTHER

## 2020-12-30 ENCOUNTER — PATIENT OUTREACH (OUTPATIENT)
Dept: ADMINISTRATIVE | Facility: OTHER | Age: 65
End: 2020-12-30

## 2020-12-30 NOTE — PROGRESS NOTES
LINKS immunization registry not responding  Care Everywhere updated  Health Maintenance updated  Chart reviewed for overdue Proactive [facility] Encounters (ARIES) health maintenance testing (CRS, Breast Ca, Diabetic Eye Exam)   Orders entered:N/A

## 2021-02-03 ENCOUNTER — PATIENT MESSAGE (OUTPATIENT)
Dept: UROLOGY | Facility: CLINIC | Age: 66
End: 2021-02-03

## 2021-02-03 ENCOUNTER — PATIENT MESSAGE (OUTPATIENT)
Dept: FAMILY MEDICINE | Facility: CLINIC | Age: 66
End: 2021-02-03

## 2021-02-03 DIAGNOSIS — G56.00 CARPAL TUNNEL SYNDROME, UNSPECIFIED LATERALITY: Primary | ICD-10-CM

## 2021-02-08 ENCOUNTER — TELEPHONE (OUTPATIENT)
Dept: FAMILY MEDICINE | Facility: CLINIC | Age: 66
End: 2021-02-08

## 2021-02-09 ENCOUNTER — CLINICAL SUPPORT (OUTPATIENT)
Dept: URGENT CARE | Facility: CLINIC | Age: 66
End: 2021-02-09
Payer: COMMERCIAL

## 2021-02-09 DIAGNOSIS — Z11.9 SCREENING EXAMINATION FOR INFECTIOUS DISEASE: Primary | ICD-10-CM

## 2021-02-09 LAB
CTP QC/QA: YES
SARS-COV-2 RDRP RESP QL NAA+PROBE: NEGATIVE

## 2021-02-09 PROCEDURE — U0002 COVID-19 LAB TEST NON-CDC: HCPCS | Mod: QW,S$GLB,, | Performed by: INTERNAL MEDICINE

## 2021-02-09 PROCEDURE — U0002: ICD-10-PCS | Mod: QW,S$GLB,, | Performed by: INTERNAL MEDICINE

## 2021-03-08 DIAGNOSIS — G56.03 BILATERAL CARPAL TUNNEL SYNDROME: Primary | ICD-10-CM

## 2021-03-09 ENCOUNTER — OFFICE VISIT (OUTPATIENT)
Dept: ORTHOPEDICS | Facility: CLINIC | Age: 66
End: 2021-03-09
Payer: COMMERCIAL

## 2021-03-09 VITALS
WEIGHT: 194.44 LBS | BODY MASS INDEX: 26.34 KG/M2 | OXYGEN SATURATION: 96 % | SYSTOLIC BLOOD PRESSURE: 122 MMHG | RESPIRATION RATE: 18 BRPM | HEIGHT: 72 IN | DIASTOLIC BLOOD PRESSURE: 86 MMHG | HEART RATE: 84 BPM

## 2021-03-09 DIAGNOSIS — G56.03 BILATERAL CARPAL TUNNEL SYNDROME: Primary | ICD-10-CM

## 2021-03-09 PROCEDURE — 3008F BODY MASS INDEX DOCD: CPT | Mod: CPTII,S$GLB,, | Performed by: ORTHOPAEDIC SURGERY

## 2021-03-09 PROCEDURE — 99999 PR PBB SHADOW E&M-EST. PATIENT-LVL IV: ICD-10-PCS | Mod: PBBFAC,,, | Performed by: ORTHOPAEDIC SURGERY

## 2021-03-09 PROCEDURE — 99203 OFFICE O/P NEW LOW 30 MIN: CPT | Mod: S$GLB,,, | Performed by: ORTHOPAEDIC SURGERY

## 2021-03-09 PROCEDURE — 1101F PR PT FALLS ASSESS DOC 0-1 FALLS W/OUT INJ PAST YR: ICD-10-PCS | Mod: CPTII,S$GLB,, | Performed by: ORTHOPAEDIC SURGERY

## 2021-03-09 PROCEDURE — 3008F PR BODY MASS INDEX (BMI) DOCUMENTED: ICD-10-PCS | Mod: CPTII,S$GLB,, | Performed by: ORTHOPAEDIC SURGERY

## 2021-03-09 PROCEDURE — 3288F FALL RISK ASSESSMENT DOCD: CPT | Mod: CPTII,S$GLB,, | Performed by: ORTHOPAEDIC SURGERY

## 2021-03-09 PROCEDURE — 1159F PR MEDICATION LIST DOCUMENTED IN MEDICAL RECORD: ICD-10-PCS | Mod: S$GLB,,, | Performed by: ORTHOPAEDIC SURGERY

## 2021-03-09 PROCEDURE — 3288F PR FALLS RISK ASSESSMENT DOCUMENTED: ICD-10-PCS | Mod: CPTII,S$GLB,, | Performed by: ORTHOPAEDIC SURGERY

## 2021-03-09 PROCEDURE — 1159F MED LIST DOCD IN RCRD: CPT | Mod: S$GLB,,, | Performed by: ORTHOPAEDIC SURGERY

## 2021-03-09 PROCEDURE — 3079F PR MOST RECENT DIASTOLIC BLOOD PRESSURE 80-89 MM HG: ICD-10-PCS | Mod: CPTII,S$GLB,, | Performed by: ORTHOPAEDIC SURGERY

## 2021-03-09 PROCEDURE — 3074F SYST BP LT 130 MM HG: CPT | Mod: CPTII,S$GLB,, | Performed by: ORTHOPAEDIC SURGERY

## 2021-03-09 PROCEDURE — 1126F AMNT PAIN NOTED NONE PRSNT: CPT | Mod: S$GLB,,, | Performed by: ORTHOPAEDIC SURGERY

## 2021-03-09 PROCEDURE — 3079F DIAST BP 80-89 MM HG: CPT | Mod: CPTII,S$GLB,, | Performed by: ORTHOPAEDIC SURGERY

## 2021-03-09 PROCEDURE — 99999 PR PBB SHADOW E&M-EST. PATIENT-LVL IV: CPT | Mod: PBBFAC,,, | Performed by: ORTHOPAEDIC SURGERY

## 2021-03-09 PROCEDURE — 3074F PR MOST RECENT SYSTOLIC BLOOD PRESSURE < 130 MM HG: ICD-10-PCS | Mod: CPTII,S$GLB,, | Performed by: ORTHOPAEDIC SURGERY

## 2021-03-09 PROCEDURE — 99203 PR OFFICE/OUTPT VISIT, NEW, LEVL III, 30-44 MIN: ICD-10-PCS | Mod: S$GLB,,, | Performed by: ORTHOPAEDIC SURGERY

## 2021-03-09 PROCEDURE — 1126F PR PAIN SEVERITY QUANTIFIED, NO PAIN PRESENT: ICD-10-PCS | Mod: S$GLB,,, | Performed by: ORTHOPAEDIC SURGERY

## 2021-03-09 PROCEDURE — 1101F PT FALLS ASSESS-DOCD LE1/YR: CPT | Mod: CPTII,S$GLB,, | Performed by: ORTHOPAEDIC SURGERY

## 2021-04-26 DIAGNOSIS — I10 ESSENTIAL HYPERTENSION: ICD-10-CM

## 2021-04-26 DIAGNOSIS — E78.49 OTHER HYPERLIPIDEMIA: ICD-10-CM

## 2021-04-27 RX ORDER — VALSARTAN AND HYDROCHLOROTHIAZIDE 320; 25 MG/1; MG/1
1 TABLET, FILM COATED ORAL DAILY
Qty: 90 TABLET | Refills: 0 | Status: SHIPPED | OUTPATIENT
Start: 2021-04-27 | End: 2021-07-27

## 2021-04-27 RX ORDER — EZETIMIBE AND SIMVASTATIN 10; 40 MG/1; MG/1
1 TABLET ORAL NIGHTLY
Qty: 90 TABLET | Refills: 0 | Status: SHIPPED | OUTPATIENT
Start: 2021-04-27 | End: 2021-07-26

## 2021-04-27 RX ORDER — AMLODIPINE BESYLATE 10 MG/1
10 TABLET ORAL DAILY
Qty: 90 TABLET | Refills: 0 | Status: SHIPPED | OUTPATIENT
Start: 2021-04-27 | End: 2021-07-26

## 2021-07-13 ENCOUNTER — OFFICE VISIT (OUTPATIENT)
Dept: UROLOGY | Facility: CLINIC | Age: 66
End: 2021-07-13
Payer: COMMERCIAL

## 2021-07-13 VITALS
SYSTOLIC BLOOD PRESSURE: 118 MMHG | BODY MASS INDEX: 25.38 KG/M2 | WEIGHT: 187.38 LBS | DIASTOLIC BLOOD PRESSURE: 85 MMHG | HEART RATE: 77 BPM | HEIGHT: 72 IN

## 2021-07-13 DIAGNOSIS — N40.1 BPH WITH URINARY OBSTRUCTION: Primary | ICD-10-CM

## 2021-07-13 DIAGNOSIS — N13.8 BPH WITH URINARY OBSTRUCTION: Primary | ICD-10-CM

## 2021-07-13 PROCEDURE — 3008F PR BODY MASS INDEX (BMI) DOCUMENTED: ICD-10-PCS | Mod: CPTII,S$GLB,, | Performed by: UROLOGY

## 2021-07-13 PROCEDURE — 1126F AMNT PAIN NOTED NONE PRSNT: CPT | Mod: S$GLB,,, | Performed by: UROLOGY

## 2021-07-13 PROCEDURE — 1101F PT FALLS ASSESS-DOCD LE1/YR: CPT | Mod: CPTII,S$GLB,, | Performed by: UROLOGY

## 2021-07-13 PROCEDURE — 99213 PR OFFICE/OUTPT VISIT, EST, LEVL III, 20-29 MIN: ICD-10-PCS | Mod: S$GLB,,, | Performed by: UROLOGY

## 2021-07-13 PROCEDURE — 1101F PR PT FALLS ASSESS DOC 0-1 FALLS W/OUT INJ PAST YR: ICD-10-PCS | Mod: CPTII,S$GLB,, | Performed by: UROLOGY

## 2021-07-13 PROCEDURE — 1126F PR PAIN SEVERITY QUANTIFIED, NO PAIN PRESENT: ICD-10-PCS | Mod: S$GLB,,, | Performed by: UROLOGY

## 2021-07-13 PROCEDURE — 3288F FALL RISK ASSESSMENT DOCD: CPT | Mod: CPTII,S$GLB,, | Performed by: UROLOGY

## 2021-07-13 PROCEDURE — 3288F PR FALLS RISK ASSESSMENT DOCUMENTED: ICD-10-PCS | Mod: CPTII,S$GLB,, | Performed by: UROLOGY

## 2021-07-13 PROCEDURE — 99999 PR PBB SHADOW E&M-EST. PATIENT-LVL III: CPT | Mod: PBBFAC,,, | Performed by: UROLOGY

## 2021-07-13 PROCEDURE — 1159F PR MEDICATION LIST DOCUMENTED IN MEDICAL RECORD: ICD-10-PCS | Mod: S$GLB,,, | Performed by: UROLOGY

## 2021-07-13 PROCEDURE — 1159F MED LIST DOCD IN RCRD: CPT | Mod: S$GLB,,, | Performed by: UROLOGY

## 2021-07-13 PROCEDURE — 99213 OFFICE O/P EST LOW 20 MIN: CPT | Mod: S$GLB,,, | Performed by: UROLOGY

## 2021-07-13 PROCEDURE — 3008F BODY MASS INDEX DOCD: CPT | Mod: CPTII,S$GLB,, | Performed by: UROLOGY

## 2021-07-13 PROCEDURE — 99999 PR PBB SHADOW E&M-EST. PATIENT-LVL III: ICD-10-PCS | Mod: PBBFAC,,, | Performed by: UROLOGY

## 2021-07-26 DIAGNOSIS — I10 ESSENTIAL HYPERTENSION: ICD-10-CM

## 2021-07-26 DIAGNOSIS — E78.49 OTHER HYPERLIPIDEMIA: ICD-10-CM

## 2021-07-26 RX ORDER — AMLODIPINE BESYLATE 10 MG/1
TABLET ORAL
Qty: 90 TABLET | Refills: 0 | Status: SHIPPED | OUTPATIENT
Start: 2021-07-26 | End: 2021-11-03 | Stop reason: SDUPTHER

## 2021-07-26 RX ORDER — EZETIMIBE AND SIMVASTATIN 10; 40 MG/1; MG/1
TABLET ORAL
Qty: 90 TABLET | Refills: 0 | Status: SHIPPED | OUTPATIENT
Start: 2021-07-26 | End: 2021-11-03 | Stop reason: SDUPTHER

## 2021-07-27 DIAGNOSIS — I10 ESSENTIAL HYPERTENSION: ICD-10-CM

## 2021-07-27 RX ORDER — VALSARTAN AND HYDROCHLOROTHIAZIDE 320; 25 MG/1; MG/1
TABLET, FILM COATED ORAL
Qty: 90 TABLET | Refills: 0 | Status: SHIPPED | OUTPATIENT
Start: 2021-07-27 | End: 2021-11-03 | Stop reason: SDUPTHER

## 2021-07-29 ENCOUNTER — TELEPHONE (OUTPATIENT)
Dept: FAMILY MEDICINE | Facility: CLINIC | Age: 66
End: 2021-07-29

## 2021-07-29 DIAGNOSIS — R73.02 GLUCOSE INTOLERANCE (IMPAIRED GLUCOSE TOLERANCE): ICD-10-CM

## 2021-07-29 DIAGNOSIS — E78.49 OTHER HYPERLIPIDEMIA: Primary | ICD-10-CM

## 2021-07-29 DIAGNOSIS — I10 ESSENTIAL HYPERTENSION: ICD-10-CM

## 2021-08-09 ENCOUNTER — OFFICE VISIT (OUTPATIENT)
Dept: URGENT CARE | Facility: CLINIC | Age: 66
End: 2021-08-09
Payer: COMMERCIAL

## 2021-08-09 VITALS
HEART RATE: 97 BPM | SYSTOLIC BLOOD PRESSURE: 93 MMHG | WEIGHT: 187 LBS | BODY MASS INDEX: 25.33 KG/M2 | OXYGEN SATURATION: 95 % | DIASTOLIC BLOOD PRESSURE: 55 MMHG | HEIGHT: 72 IN | RESPIRATION RATE: 16 BRPM | TEMPERATURE: 99 F

## 2021-08-09 DIAGNOSIS — R05.9 COUGH: Primary | ICD-10-CM

## 2021-08-09 LAB
CTP QC/QA: YES
SARS-COV-2 RDRP RESP QL NAA+PROBE: NEGATIVE

## 2021-08-09 PROCEDURE — 99213 PR OFFICE/OUTPT VISIT, EST, LEVL III, 20-29 MIN: ICD-10-PCS | Mod: S$GLB,,, | Performed by: NURSE PRACTITIONER

## 2021-08-09 PROCEDURE — 1160F RVW MEDS BY RX/DR IN RCRD: CPT | Mod: CPTII,S$GLB,, | Performed by: NURSE PRACTITIONER

## 2021-08-09 PROCEDURE — U0002: ICD-10-PCS | Mod: QW,S$GLB,, | Performed by: NURSE PRACTITIONER

## 2021-08-09 PROCEDURE — 3008F PR BODY MASS INDEX (BMI) DOCUMENTED: ICD-10-PCS | Mod: CPTII,S$GLB,, | Performed by: NURSE PRACTITIONER

## 2021-08-09 PROCEDURE — 3074F PR MOST RECENT SYSTOLIC BLOOD PRESSURE < 130 MM HG: ICD-10-PCS | Mod: CPTII,S$GLB,, | Performed by: NURSE PRACTITIONER

## 2021-08-09 PROCEDURE — 99213 OFFICE O/P EST LOW 20 MIN: CPT | Mod: S$GLB,,, | Performed by: NURSE PRACTITIONER

## 2021-08-09 PROCEDURE — 1160F PR REVIEW ALL MEDS BY PRESCRIBER/CLIN PHARMACIST DOCUMENTED: ICD-10-PCS | Mod: CPTII,S$GLB,, | Performed by: NURSE PRACTITIONER

## 2021-08-09 PROCEDURE — 3078F DIAST BP <80 MM HG: CPT | Mod: CPTII,S$GLB,, | Performed by: NURSE PRACTITIONER

## 2021-08-09 PROCEDURE — 3008F BODY MASS INDEX DOCD: CPT | Mod: CPTII,S$GLB,, | Performed by: NURSE PRACTITIONER

## 2021-08-09 PROCEDURE — 1159F MED LIST DOCD IN RCRD: CPT | Mod: CPTII,S$GLB,, | Performed by: NURSE PRACTITIONER

## 2021-08-09 PROCEDURE — 1126F PR PAIN SEVERITY QUANTIFIED, NO PAIN PRESENT: ICD-10-PCS | Mod: CPTII,S$GLB,, | Performed by: NURSE PRACTITIONER

## 2021-08-09 PROCEDURE — 3078F PR MOST RECENT DIASTOLIC BLOOD PRESSURE < 80 MM HG: ICD-10-PCS | Mod: CPTII,S$GLB,, | Performed by: NURSE PRACTITIONER

## 2021-08-09 PROCEDURE — 1159F PR MEDICATION LIST DOCUMENTED IN MEDICAL RECORD: ICD-10-PCS | Mod: CPTII,S$GLB,, | Performed by: NURSE PRACTITIONER

## 2021-08-09 PROCEDURE — U0002 COVID-19 LAB TEST NON-CDC: HCPCS | Mod: QW,S$GLB,, | Performed by: NURSE PRACTITIONER

## 2021-08-09 PROCEDURE — 1126F AMNT PAIN NOTED NONE PRSNT: CPT | Mod: CPTII,S$GLB,, | Performed by: NURSE PRACTITIONER

## 2021-08-09 PROCEDURE — 3074F SYST BP LT 130 MM HG: CPT | Mod: CPTII,S$GLB,, | Performed by: NURSE PRACTITIONER

## 2021-08-11 ENCOUNTER — PATIENT MESSAGE (OUTPATIENT)
Dept: FAMILY MEDICINE | Facility: CLINIC | Age: 66
End: 2021-08-11

## 2021-09-27 RX ORDER — LANSOPRAZOLE 30 MG/1
CAPSULE, DELAYED RELEASE ORAL
Qty: 90 CAPSULE | Refills: 0 | Status: SHIPPED | OUTPATIENT
Start: 2021-09-27 | End: 2021-11-03 | Stop reason: SDUPTHER

## 2021-10-05 ENCOUNTER — PATIENT MESSAGE (OUTPATIENT)
Dept: ADMINISTRATIVE | Facility: HOSPITAL | Age: 66
End: 2021-10-05

## 2021-11-02 ENCOUNTER — LAB VISIT (OUTPATIENT)
Dept: LAB | Facility: HOSPITAL | Age: 66
End: 2021-11-02
Attending: INTERNAL MEDICINE
Payer: COMMERCIAL

## 2021-11-02 DIAGNOSIS — E78.49 OTHER HYPERLIPIDEMIA: ICD-10-CM

## 2021-11-02 DIAGNOSIS — R73.02 GLUCOSE INTOLERANCE (IMPAIRED GLUCOSE TOLERANCE): ICD-10-CM

## 2021-11-02 DIAGNOSIS — N40.1 BPH WITH URINARY OBSTRUCTION: ICD-10-CM

## 2021-11-02 DIAGNOSIS — N13.8 BPH WITH URINARY OBSTRUCTION: ICD-10-CM

## 2021-11-02 DIAGNOSIS — I10 ESSENTIAL HYPERTENSION: ICD-10-CM

## 2021-11-02 LAB
ALBUMIN SERPL BCP-MCNC: 4.4 G/DL (ref 3.5–5.2)
ALP SERPL-CCNC: 80 U/L (ref 55–135)
ALT SERPL W/O P-5'-P-CCNC: 57 U/L (ref 10–44)
ANION GAP SERPL CALC-SCNC: 10 MMOL/L (ref 8–16)
AST SERPL-CCNC: 37 U/L (ref 10–40)
BASOPHILS # BLD AUTO: 0.04 K/UL (ref 0–0.2)
BASOPHILS NFR BLD: 0.6 % (ref 0–1.9)
BILIRUB SERPL-MCNC: 0.8 MG/DL (ref 0.1–1)
BUN SERPL-MCNC: 17 MG/DL (ref 8–23)
CALCIUM SERPL-MCNC: 10 MG/DL (ref 8.7–10.5)
CHLORIDE SERPL-SCNC: 104 MMOL/L (ref 95–110)
CHOLEST SERPL-MCNC: 148 MG/DL (ref 120–199)
CHOLEST/HDLC SERPL: 3 {RATIO} (ref 2–5)
CO2 SERPL-SCNC: 28 MMOL/L (ref 23–29)
COMPLEXED PSA SERPL-MCNC: 5.3 NG/ML (ref 0–4)
CREAT SERPL-MCNC: 1.2 MG/DL (ref 0.5–1.4)
DIFFERENTIAL METHOD: NORMAL
EOSINOPHIL # BLD AUTO: 0.2 K/UL (ref 0–0.5)
EOSINOPHIL NFR BLD: 2.3 % (ref 0–8)
ERYTHROCYTE [DISTWIDTH] IN BLOOD BY AUTOMATED COUNT: 12.3 % (ref 11.5–14.5)
EST. GFR  (AFRICAN AMERICAN): >60 ML/MIN/1.73 M^2
EST. GFR  (NON AFRICAN AMERICAN): >60 ML/MIN/1.73 M^2
ESTIMATED AVG GLUCOSE: 111 MG/DL (ref 68–131)
GLUCOSE SERPL-MCNC: 120 MG/DL (ref 70–110)
HBA1C MFR BLD: 5.5 % (ref 4–5.6)
HCT VFR BLD AUTO: 49 % (ref 40–54)
HDLC SERPL-MCNC: 50 MG/DL (ref 40–75)
HDLC SERPL: 33.8 % (ref 20–50)
HGB BLD-MCNC: 16.5 G/DL (ref 14–18)
IMM GRANULOCYTES # BLD AUTO: 0.02 K/UL (ref 0–0.04)
IMM GRANULOCYTES NFR BLD AUTO: 0.3 % (ref 0–0.5)
LDLC SERPL CALC-MCNC: 81.4 MG/DL (ref 63–159)
LYMPHOCYTES # BLD AUTO: 1.6 K/UL (ref 1–4.8)
LYMPHOCYTES NFR BLD: 22.7 % (ref 18–48)
MCH RBC QN AUTO: 30.4 PG (ref 27–31)
MCHC RBC AUTO-ENTMCNC: 33.7 G/DL (ref 32–36)
MCV RBC AUTO: 90 FL (ref 82–98)
MONOCYTES # BLD AUTO: 0.8 K/UL (ref 0.3–1)
MONOCYTES NFR BLD: 12.3 % (ref 4–15)
NEUTROPHILS # BLD AUTO: 4.2 K/UL (ref 1.8–7.7)
NEUTROPHILS NFR BLD: 61.8 % (ref 38–73)
NONHDLC SERPL-MCNC: 98 MG/DL
NRBC BLD-RTO: 0 /100 WBC
PLATELET # BLD AUTO: 176 K/UL (ref 150–450)
PMV BLD AUTO: 11.1 FL (ref 9.2–12.9)
POTASSIUM SERPL-SCNC: 4.1 MMOL/L (ref 3.5–5.1)
PROT SERPL-MCNC: 8 G/DL (ref 6–8.4)
RBC # BLD AUTO: 5.43 M/UL (ref 4.6–6.2)
SODIUM SERPL-SCNC: 142 MMOL/L (ref 136–145)
TRIGL SERPL-MCNC: 83 MG/DL (ref 30–150)
WBC # BLD AUTO: 6.83 K/UL (ref 3.9–12.7)

## 2021-11-02 PROCEDURE — 80053 COMPREHEN METABOLIC PANEL: CPT | Performed by: INTERNAL MEDICINE

## 2021-11-02 PROCEDURE — 85025 COMPLETE CBC W/AUTO DIFF WBC: CPT | Performed by: INTERNAL MEDICINE

## 2021-11-02 PROCEDURE — 83036 HEMOGLOBIN GLYCOSYLATED A1C: CPT | Performed by: INTERNAL MEDICINE

## 2021-11-02 PROCEDURE — 80061 LIPID PANEL: CPT | Performed by: INTERNAL MEDICINE

## 2021-11-02 PROCEDURE — 36415 COLL VENOUS BLD VENIPUNCTURE: CPT | Mod: PN | Performed by: UROLOGY

## 2021-11-02 PROCEDURE — 84153 ASSAY OF PSA TOTAL: CPT | Performed by: UROLOGY

## 2021-11-03 ENCOUNTER — PATIENT MESSAGE (OUTPATIENT)
Dept: UROLOGY | Facility: CLINIC | Age: 66
End: 2021-11-03
Payer: COMMERCIAL

## 2021-11-03 ENCOUNTER — OFFICE VISIT (OUTPATIENT)
Dept: FAMILY MEDICINE | Facility: CLINIC | Age: 66
End: 2021-11-03
Payer: COMMERCIAL

## 2021-11-03 VITALS
WEIGHT: 189.25 LBS | DIASTOLIC BLOOD PRESSURE: 68 MMHG | SYSTOLIC BLOOD PRESSURE: 122 MMHG | TEMPERATURE: 98 F | HEART RATE: 73 BPM | OXYGEN SATURATION: 96 % | BODY MASS INDEX: 25.63 KG/M2 | HEIGHT: 72 IN

## 2021-11-03 DIAGNOSIS — K21.9 GASTROESOPHAGEAL REFLUX DISEASE, UNSPECIFIED WHETHER ESOPHAGITIS PRESENT: ICD-10-CM

## 2021-11-03 DIAGNOSIS — M25.562 CHRONIC PAIN OF BOTH KNEES: ICD-10-CM

## 2021-11-03 DIAGNOSIS — Z23 NEED FOR SHINGLES VACCINE: ICD-10-CM

## 2021-11-03 DIAGNOSIS — N40.0 BENIGN PROSTATIC HYPERPLASIA, UNSPECIFIED WHETHER LOWER URINARY TRACT SYMPTOMS PRESENT: ICD-10-CM

## 2021-11-03 DIAGNOSIS — R73.02 GLUCOSE INTOLERANCE (IMPAIRED GLUCOSE TOLERANCE): ICD-10-CM

## 2021-11-03 DIAGNOSIS — I77.1 TORTUOUS AORTA: ICD-10-CM

## 2021-11-03 DIAGNOSIS — G89.29 CHRONIC PAIN OF BOTH KNEES: ICD-10-CM

## 2021-11-03 DIAGNOSIS — I10 ESSENTIAL HYPERTENSION: ICD-10-CM

## 2021-11-03 DIAGNOSIS — R74.8 ELEVATED LIVER ENZYMES: ICD-10-CM

## 2021-11-03 DIAGNOSIS — Z23 NEED FOR STREPTOCOCCUS PNEUMONIAE VACCINATION: ICD-10-CM

## 2021-11-03 DIAGNOSIS — M25.561 CHRONIC PAIN OF BOTH KNEES: ICD-10-CM

## 2021-11-03 DIAGNOSIS — E78.49 OTHER HYPERLIPIDEMIA: ICD-10-CM

## 2021-11-03 DIAGNOSIS — E66.3 OVERWEIGHT: ICD-10-CM

## 2021-11-03 DIAGNOSIS — R19.5 LOOSE STOOLS: ICD-10-CM

## 2021-11-03 DIAGNOSIS — Z23 FLU VACCINE NEED: ICD-10-CM

## 2021-11-03 DIAGNOSIS — Z23 NEED FOR TDAP VACCINATION: ICD-10-CM

## 2021-11-03 DIAGNOSIS — Z00.00 ROUTINE MEDICAL EXAM: Primary | ICD-10-CM

## 2021-11-03 PROCEDURE — 3288F FALL RISK ASSESSMENT DOCD: CPT | Mod: CPTII,S$GLB,, | Performed by: INTERNAL MEDICINE

## 2021-11-03 PROCEDURE — 90471 PNEUMOCOCCAL POLYSACCHARIDE VACCINE 23-VALENT =>2YO SQ IM: ICD-10-PCS | Mod: S$GLB,,, | Performed by: INTERNAL MEDICINE

## 2021-11-03 PROCEDURE — 1159F MED LIST DOCD IN RCRD: CPT | Mod: CPTII,S$GLB,, | Performed by: INTERNAL MEDICINE

## 2021-11-03 PROCEDURE — 90732 PNEUMOCOCCAL POLYSACCHARIDE VACCINE 23-VALENT =>2YO SQ IM: ICD-10-PCS | Mod: S$GLB,,, | Performed by: INTERNAL MEDICINE

## 2021-11-03 PROCEDURE — 3288F PR FALLS RISK ASSESSMENT DOCUMENTED: ICD-10-PCS | Mod: CPTII,S$GLB,, | Performed by: INTERNAL MEDICINE

## 2021-11-03 PROCEDURE — 1101F PR PT FALLS ASSESS DOC 0-1 FALLS W/OUT INJ PAST YR: ICD-10-PCS | Mod: CPTII,S$GLB,, | Performed by: INTERNAL MEDICINE

## 2021-11-03 PROCEDURE — 1160F PR REVIEW ALL MEDS BY PRESCRIBER/CLIN PHARMACIST DOCUMENTED: ICD-10-PCS | Mod: CPTII,S$GLB,, | Performed by: INTERNAL MEDICINE

## 2021-11-03 PROCEDURE — 3074F PR MOST RECENT SYSTOLIC BLOOD PRESSURE < 130 MM HG: ICD-10-PCS | Mod: CPTII,S$GLB,, | Performed by: INTERNAL MEDICINE

## 2021-11-03 PROCEDURE — 90732 PPSV23 VACC 2 YRS+ SUBQ/IM: CPT | Mod: S$GLB,,, | Performed by: INTERNAL MEDICINE

## 2021-11-03 PROCEDURE — 1126F PR PAIN SEVERITY QUANTIFIED, NO PAIN PRESENT: ICD-10-PCS | Mod: CPTII,S$GLB,, | Performed by: INTERNAL MEDICINE

## 2021-11-03 PROCEDURE — 3078F DIAST BP <80 MM HG: CPT | Mod: CPTII,S$GLB,, | Performed by: INTERNAL MEDICINE

## 2021-11-03 PROCEDURE — 3008F PR BODY MASS INDEX (BMI) DOCUMENTED: ICD-10-PCS | Mod: CPTII,S$GLB,, | Performed by: INTERNAL MEDICINE

## 2021-11-03 PROCEDURE — 1159F PR MEDICATION LIST DOCUMENTED IN MEDICAL RECORD: ICD-10-PCS | Mod: CPTII,S$GLB,, | Performed by: INTERNAL MEDICINE

## 2021-11-03 PROCEDURE — 3078F PR MOST RECENT DIASTOLIC BLOOD PRESSURE < 80 MM HG: ICD-10-PCS | Mod: CPTII,S$GLB,, | Performed by: INTERNAL MEDICINE

## 2021-11-03 PROCEDURE — 90471 IMMUNIZATION ADMIN: CPT | Mod: S$GLB,,, | Performed by: INTERNAL MEDICINE

## 2021-11-03 PROCEDURE — 99999 PR PBB SHADOW E&M-EST. PATIENT-LVL III: CPT | Mod: PBBFAC,,, | Performed by: INTERNAL MEDICINE

## 2021-11-03 PROCEDURE — 99999 PR PBB SHADOW E&M-EST. PATIENT-LVL III: ICD-10-PCS | Mod: PBBFAC,,, | Performed by: INTERNAL MEDICINE

## 2021-11-03 PROCEDURE — 1101F PT FALLS ASSESS-DOCD LE1/YR: CPT | Mod: CPTII,S$GLB,, | Performed by: INTERNAL MEDICINE

## 2021-11-03 PROCEDURE — 3044F HG A1C LEVEL LT 7.0%: CPT | Mod: CPTII,S$GLB,, | Performed by: INTERNAL MEDICINE

## 2021-11-03 PROCEDURE — 3044F PR MOST RECENT HEMOGLOBIN A1C LEVEL <7.0%: ICD-10-PCS | Mod: CPTII,S$GLB,, | Performed by: INTERNAL MEDICINE

## 2021-11-03 PROCEDURE — 3008F BODY MASS INDEX DOCD: CPT | Mod: CPTII,S$GLB,, | Performed by: INTERNAL MEDICINE

## 2021-11-03 PROCEDURE — 99397 PER PM REEVAL EST PAT 65+ YR: CPT | Mod: 25,S$GLB,, | Performed by: INTERNAL MEDICINE

## 2021-11-03 PROCEDURE — 3074F SYST BP LT 130 MM HG: CPT | Mod: CPTII,S$GLB,, | Performed by: INTERNAL MEDICINE

## 2021-11-03 PROCEDURE — 99397 PR PREVENTIVE VISIT,EST,65 & OVER: ICD-10-PCS | Mod: 25,S$GLB,, | Performed by: INTERNAL MEDICINE

## 2021-11-03 PROCEDURE — 1126F AMNT PAIN NOTED NONE PRSNT: CPT | Mod: CPTII,S$GLB,, | Performed by: INTERNAL MEDICINE

## 2021-11-03 PROCEDURE — 1160F RVW MEDS BY RX/DR IN RCRD: CPT | Mod: CPTII,S$GLB,, | Performed by: INTERNAL MEDICINE

## 2021-11-03 RX ORDER — AMLODIPINE BESYLATE 10 MG/1
10 TABLET ORAL DAILY
Qty: 90 TABLET | Refills: 3 | Status: SHIPPED | OUTPATIENT
Start: 2021-11-03 | End: 2021-12-19 | Stop reason: SDUPTHER

## 2021-11-03 RX ORDER — CALC/MAG/B COMPLEX/D3/HERB 61
TABLET ORAL
COMMUNITY
End: 2023-06-28 | Stop reason: CLARIF

## 2021-11-03 RX ORDER — EZETIMIBE AND SIMVASTATIN 10; 40 MG/1; MG/1
1 TABLET ORAL NIGHTLY
Qty: 90 TABLET | Refills: 3 | Status: SHIPPED | OUTPATIENT
Start: 2021-11-03 | End: 2021-12-19 | Stop reason: SDUPTHER

## 2021-11-03 RX ORDER — LANSOPRAZOLE 30 MG/1
CAPSULE, DELAYED RELEASE ORAL
Qty: 90 CAPSULE | Refills: 0 | Status: SHIPPED | OUTPATIENT
Start: 2021-11-03 | End: 2022-07-07 | Stop reason: SDUPTHER

## 2021-11-03 RX ORDER — VALSARTAN AND HYDROCHLOROTHIAZIDE 320; 25 MG/1; MG/1
1 TABLET, FILM COATED ORAL DAILY
Qty: 90 TABLET | Refills: 3 | Status: SHIPPED | OUTPATIENT
Start: 2021-11-03 | End: 2021-11-22 | Stop reason: SDUPTHER

## 2021-11-17 ENCOUNTER — HOSPITAL ENCOUNTER (OUTPATIENT)
Dept: RADIOLOGY | Facility: HOSPITAL | Age: 66
Discharge: HOME | End: 2021-11-17
Attending: INTERNAL MEDICINE
Payer: COMMERCIAL

## 2021-11-17 DIAGNOSIS — R74.8 ELEVATED LIVER ENZYMES: ICD-10-CM

## 2021-11-17 DIAGNOSIS — N13.8 BPH WITH URINARY OBSTRUCTION: Primary | ICD-10-CM

## 2021-11-17 DIAGNOSIS — K76.0 FATTY LIVER: ICD-10-CM

## 2021-11-17 DIAGNOSIS — R97.20 ELEVATED PSA: ICD-10-CM

## 2021-11-17 DIAGNOSIS — N40.1 BPH WITH URINARY OBSTRUCTION: Primary | ICD-10-CM

## 2021-11-17 PROCEDURE — 76705 ECHO EXAM OF ABDOMEN: CPT | Mod: 26,,, | Performed by: RADIOLOGY

## 2021-11-17 PROCEDURE — 76705 ECHO EXAM OF ABDOMEN: CPT | Mod: TC

## 2021-11-17 PROCEDURE — 76705 US ABDOMEN LIMITED: ICD-10-PCS | Mod: 26,,, | Performed by: RADIOLOGY

## 2021-11-22 DIAGNOSIS — I10 ESSENTIAL HYPERTENSION: ICD-10-CM

## 2021-11-22 RX ORDER — VALSARTAN AND HYDROCHLOROTHIAZIDE 320; 25 MG/1; MG/1
1 TABLET, FILM COATED ORAL DAILY
Qty: 90 TABLET | Refills: 2 | Status: SHIPPED | OUTPATIENT
Start: 2021-11-22 | End: 2021-12-19 | Stop reason: SDUPTHER

## 2021-11-29 ENCOUNTER — PATIENT OUTREACH (OUTPATIENT)
Dept: ADMINISTRATIVE | Facility: OTHER | Age: 66
End: 2021-11-29
Payer: COMMERCIAL

## 2021-11-30 ENCOUNTER — OFFICE VISIT (OUTPATIENT)
Dept: HEPATOLOGY | Facility: CLINIC | Age: 66
End: 2021-11-30
Payer: COMMERCIAL

## 2021-11-30 VITALS
OXYGEN SATURATION: 95 % | SYSTOLIC BLOOD PRESSURE: 111 MMHG | HEIGHT: 72 IN | TEMPERATURE: 97 F | RESPIRATION RATE: 18 BRPM | WEIGHT: 192.44 LBS | DIASTOLIC BLOOD PRESSURE: 78 MMHG | BODY MASS INDEX: 26.07 KG/M2 | HEART RATE: 68 BPM

## 2021-11-30 DIAGNOSIS — K76.0 FATTY LIVER: Primary | ICD-10-CM

## 2021-11-30 DIAGNOSIS — R74.01 ELEVATED ALANINE AMINOTRANSFERASE (ALT) LEVEL: ICD-10-CM

## 2021-11-30 PROCEDURE — 99999 PR PBB SHADOW E&M-EST. PATIENT-LVL IV: ICD-10-PCS | Mod: PBBFAC,,, | Performed by: NURSE PRACTITIONER

## 2021-11-30 PROCEDURE — 99203 OFFICE O/P NEW LOW 30 MIN: CPT | Mod: S$GLB,,, | Performed by: NURSE PRACTITIONER

## 2021-11-30 PROCEDURE — 99999 PR PBB SHADOW E&M-EST. PATIENT-LVL IV: CPT | Mod: PBBFAC,,, | Performed by: NURSE PRACTITIONER

## 2021-11-30 PROCEDURE — 99203 PR OFFICE/OUTPT VISIT, NEW, LEVL III, 30-44 MIN: ICD-10-PCS | Mod: S$GLB,,, | Performed by: NURSE PRACTITIONER

## 2021-12-14 ENCOUNTER — PROCEDURE VISIT (OUTPATIENT)
Dept: HEPATOLOGY | Facility: CLINIC | Age: 66
End: 2021-12-14
Payer: COMMERCIAL

## 2021-12-14 ENCOUNTER — PATIENT MESSAGE (OUTPATIENT)
Dept: HEPATOLOGY | Facility: CLINIC | Age: 66
End: 2021-12-14

## 2021-12-14 DIAGNOSIS — R74.01 ELEVATED ALANINE AMINOTRANSFERASE (ALT) LEVEL: ICD-10-CM

## 2021-12-14 DIAGNOSIS — K76.0 FATTY LIVER: Primary | ICD-10-CM

## 2021-12-14 PROCEDURE — 91200 FIBROSCAN (VIBRATION CONTROLLED TRANSIENT ELASTOGRAPHY): ICD-10-PCS | Mod: S$GLB,,, | Performed by: NURSE PRACTITIONER

## 2021-12-14 PROCEDURE — 91200 LIVER ELASTOGRAPHY: CPT | Mod: S$GLB,,, | Performed by: NURSE PRACTITIONER

## 2021-12-19 DIAGNOSIS — I10 ESSENTIAL HYPERTENSION: ICD-10-CM

## 2021-12-19 DIAGNOSIS — E78.49 OTHER HYPERLIPIDEMIA: ICD-10-CM

## 2021-12-22 ENCOUNTER — TELEPHONE (OUTPATIENT)
Dept: FAMILY MEDICINE | Facility: CLINIC | Age: 66
End: 2021-12-22
Payer: COMMERCIAL

## 2021-12-22 ENCOUNTER — PATIENT MESSAGE (OUTPATIENT)
Dept: FAMILY MEDICINE | Facility: CLINIC | Age: 66
End: 2021-12-22
Payer: COMMERCIAL

## 2021-12-22 DIAGNOSIS — U07.1 COVID-19 VIRUS INFECTION: Primary | ICD-10-CM

## 2021-12-22 LAB
CTP QC/QA: YES
SARS-COV-2 AG RESP QL IA.RAPID: POSITIVE

## 2021-12-26 ENCOUNTER — PATIENT MESSAGE (OUTPATIENT)
Dept: FAMILY MEDICINE | Facility: CLINIC | Age: 66
End: 2021-12-26
Payer: COMMERCIAL

## 2021-12-29 RX ORDER — VALSARTAN AND HYDROCHLOROTHIAZIDE 320; 25 MG/1; MG/1
1 TABLET, FILM COATED ORAL DAILY
Qty: 90 TABLET | Refills: 2 | OUTPATIENT
Start: 2021-12-29

## 2021-12-29 RX ORDER — VALSARTAN AND HYDROCHLOROTHIAZIDE 320; 25 MG/1; MG/1
1 TABLET, FILM COATED ORAL DAILY
Qty: 90 TABLET | Refills: 0 | Status: SHIPPED | OUTPATIENT
Start: 2021-12-29 | End: 2022-04-11

## 2021-12-29 RX ORDER — AMLODIPINE BESYLATE 10 MG/1
10 TABLET ORAL DAILY
Qty: 90 TABLET | Refills: 0 | Status: SHIPPED | OUTPATIENT
Start: 2021-12-29 | End: 2022-03-24

## 2021-12-29 RX ORDER — EZETIMIBE AND SIMVASTATIN 10; 40 MG/1; MG/1
1 TABLET ORAL NIGHTLY
Qty: 90 TABLET | Refills: 0 | Status: SHIPPED | OUTPATIENT
Start: 2021-12-29 | End: 2022-06-15 | Stop reason: SDUPTHER

## 2022-01-04 DIAGNOSIS — Z12.11 COLON CANCER SCREENING: Primary | ICD-10-CM

## 2022-01-29 LAB — NONINV COLON CA DNA+OCC BLD SCRN STL QL: NEGATIVE

## 2022-02-17 ENCOUNTER — PATIENT MESSAGE (OUTPATIENT)
Dept: UROLOGY | Facility: CLINIC | Age: 67
End: 2022-02-17
Payer: COMMERCIAL

## 2022-02-17 ENCOUNTER — TELEPHONE (OUTPATIENT)
Dept: HEPATOLOGY | Facility: CLINIC | Age: 67
End: 2022-02-17
Payer: COMMERCIAL

## 2022-02-17 ENCOUNTER — LAB VISIT (OUTPATIENT)
Dept: LAB | Facility: HOSPITAL | Age: 67
End: 2022-02-17
Attending: UROLOGY
Payer: COMMERCIAL

## 2022-02-17 DIAGNOSIS — R97.20 ELEVATED PSA: ICD-10-CM

## 2022-02-17 DIAGNOSIS — R74.01 ELEVATED ALANINE AMINOTRANSFERASE (ALT) LEVEL: ICD-10-CM

## 2022-02-17 DIAGNOSIS — K76.0 FATTY LIVER: ICD-10-CM

## 2022-02-17 DIAGNOSIS — N40.1 BPH WITH URINARY OBSTRUCTION: ICD-10-CM

## 2022-02-17 DIAGNOSIS — N13.8 BPH WITH URINARY OBSTRUCTION: ICD-10-CM

## 2022-02-17 LAB
ALBUMIN SERPL BCP-MCNC: 4.2 G/DL (ref 3.5–5.2)
ALP SERPL-CCNC: 60 U/L (ref 55–135)
ALT SERPL W/O P-5'-P-CCNC: 34 U/L (ref 10–44)
AST SERPL-CCNC: 22 U/L (ref 10–40)
BILIRUB DIRECT SERPL-MCNC: 0.4 MG/DL (ref 0.1–0.3)
BILIRUB SERPL-MCNC: 0.9 MG/DL (ref 0.1–1)
COMPLEXED PSA SERPL-MCNC: 4.1 NG/ML (ref 0–4)
PROT SERPL-MCNC: 7 G/DL (ref 6–8.4)

## 2022-02-17 PROCEDURE — 84153 ASSAY OF PSA TOTAL: CPT | Performed by: UROLOGY

## 2022-02-17 PROCEDURE — 36415 COLL VENOUS BLD VENIPUNCTURE: CPT | Mod: PN | Performed by: UROLOGY

## 2022-02-17 PROCEDURE — 80076 HEPATIC FUNCTION PANEL: CPT | Performed by: NURSE PRACTITIONER

## 2022-03-03 ENCOUNTER — PATIENT MESSAGE (OUTPATIENT)
Dept: UROLOGY | Facility: CLINIC | Age: 67
End: 2022-03-03
Payer: COMMERCIAL

## 2022-03-24 DIAGNOSIS — I10 ESSENTIAL HYPERTENSION: ICD-10-CM

## 2022-03-24 RX ORDER — AMLODIPINE BESYLATE 10 MG/1
TABLET ORAL
Qty: 90 TABLET | Refills: 1 | Status: SHIPPED | OUTPATIENT
Start: 2022-03-24 | End: 2022-09-05 | Stop reason: SDUPTHER

## 2022-03-24 NOTE — TELEPHONE ENCOUNTER
Refill Authorization Note   Sawyer Browning  is requesting a refill authorization.  Brief Assessment and Rationale for Refill:  Defer    -Medication-Related Problems Identified:   Therapeutic duplication  Drug-drug interaction  Medication Therapy Plan:  defer - amlodipine suspension (historical med) 11/3/21 and amlodipine tablets both active on patient's chart - please remove duplicate meds if appropriate    Medication Reconciliation Completed: No   Comments:   --->Care Gap information included below if applicable.       Requested Prescriptions   Pending Prescriptions Disp Refills    amLODIPine (NORVASC) 10 MG tablet [Pharmacy Med Name: AMLODIPINE TAB 10MG] 90 tablet 2     Sig: TAKE 1 TABLET ONCE DAILY. (PLEASE INACTIVATE ALL PRIOR SCRIPTS WITH SAME NAME AND STRENGTH INCLUDING ON HOLDS)       Cardiovascular:  Calcium Channel Blockers Failed - 3/24/2022 12:25 PM        Failed - Matches previous order       Previous Authorizing Provider: Deny Mercado MD (amLODIPine (NORVASC) 10 MG tablet)  Previous Pharmacy: [facility] - [place], [place] - [address] AT Portal to Registered [facility] Sites            Passed - Patient is at least 18 years old        Passed - Last BP in normal range within 360 days     BP Readings from Last 3 Encounters:   11/30/21 111/78   11/03/21 122/68   08/09/21 (!) 93/55               Passed - Valid encounter within last 15 months     Recent Visits  Date Type Provider Dept   11/03/21 Office Visit Mary Kay Haines MD [facility]/ Internal Med/ Peds   08/03/20 Office Visit Mary Kay Haines MD [facility]/ Internal Med/ Peds   Showing recent visits within past 720 days and meeting all other requirements  Future Appointments  No visits were found meeting these conditions.  Showing future appointments within next 150 days and meeting all other requirements                Passed - No ED/Hospital visits since last PCP visit     Last PCP Visit: 11/3/2021 Last  Admission:  Last ED Visit: 10/21/2019              Appointments  past 12m or future 3m with PCP    Date Provider   Last Visit   11/3/2021 Mary Kay Haines MD   Next Visit   Visit date not found Mary Kay Haines MD   ED visits in past 90 days: 0     Note composed:12:31 PM 03/24/2022

## 2022-03-24 NOTE — TELEPHONE ENCOUNTER
No new care gaps identified.  Powered by [employer]. Reference number: [id number].   3/24/2022 11:47:21 AM CDT

## 2022-04-05 ENCOUNTER — OFFICE VISIT (OUTPATIENT)
Dept: URGENT CARE | Facility: CLINIC | Age: 67
End: 2022-04-05
Payer: COMMERCIAL

## 2022-04-05 VITALS
SYSTOLIC BLOOD PRESSURE: 126 MMHG | OXYGEN SATURATION: 98 % | BODY MASS INDEX: 26.01 KG/M2 | TEMPERATURE: 98 F | WEIGHT: 192 LBS | HEART RATE: 71 BPM | HEIGHT: 72 IN | RESPIRATION RATE: 18 BRPM | DIASTOLIC BLOOD PRESSURE: 86 MMHG

## 2022-04-05 DIAGNOSIS — L03.90 CELLULITIS, UNSPECIFIED CELLULITIS SITE: Primary | ICD-10-CM

## 2022-04-05 PROCEDURE — 1126F PR PAIN SEVERITY QUANTIFIED, NO PAIN PRESENT: ICD-10-PCS | Mod: CPTII,S$GLB,,

## 2022-04-05 PROCEDURE — 3079F PR MOST RECENT DIASTOLIC BLOOD PRESSURE 80-89 MM HG: ICD-10-PCS | Mod: CPTII,S$GLB,,

## 2022-04-05 PROCEDURE — 1160F RVW MEDS BY RX/DR IN RCRD: CPT | Mod: CPTII,S$GLB,,

## 2022-04-05 PROCEDURE — 3074F SYST BP LT 130 MM HG: CPT | Mod: CPTII,S$GLB,,

## 2022-04-05 PROCEDURE — 1160F PR REVIEW ALL MEDS BY PRESCRIBER/CLIN PHARMACIST DOCUMENTED: ICD-10-PCS | Mod: CPTII,S$GLB,,

## 2022-04-05 PROCEDURE — 3074F PR MOST RECENT SYSTOLIC BLOOD PRESSURE < 130 MM HG: ICD-10-PCS | Mod: CPTII,S$GLB,,

## 2022-04-05 PROCEDURE — 1159F MED LIST DOCD IN RCRD: CPT | Mod: CPTII,S$GLB,,

## 2022-04-05 PROCEDURE — 3008F BODY MASS INDEX DOCD: CPT | Mod: CPTII,S$GLB,,

## 2022-04-05 PROCEDURE — 99213 PR OFFICE/OUTPT VISIT, EST, LEVL III, 20-29 MIN: ICD-10-PCS | Mod: S$GLB,,,

## 2022-04-05 PROCEDURE — 1126F AMNT PAIN NOTED NONE PRSNT: CPT | Mod: CPTII,S$GLB,,

## 2022-04-05 PROCEDURE — 99213 OFFICE O/P EST LOW 20 MIN: CPT | Mod: S$GLB,,,

## 2022-04-05 PROCEDURE — 3008F PR BODY MASS INDEX (BMI) DOCUMENTED: ICD-10-PCS | Mod: CPTII,S$GLB,,

## 2022-04-05 PROCEDURE — 1159F PR MEDICATION LIST DOCUMENTED IN MEDICAL RECORD: ICD-10-PCS | Mod: CPTII,S$GLB,,

## 2022-04-05 PROCEDURE — 3079F DIAST BP 80-89 MM HG: CPT | Mod: CPTII,S$GLB,,

## 2022-04-05 RX ORDER — MUPIROCIN 20 MG/G
OINTMENT TOPICAL 3 TIMES DAILY
Qty: 22 G | Refills: 0 | Status: SHIPPED | OUTPATIENT
Start: 2022-04-05 | End: 2022-04-12

## 2022-04-05 RX ORDER — CEPHALEXIN 500 MG/1
500 CAPSULE ORAL 4 TIMES DAILY
Qty: 28 CAPSULE | Refills: 0 | Status: SHIPPED | OUTPATIENT
Start: 2022-04-05 | End: 2022-04-12

## 2022-04-05 NOTE — PROGRESS NOTES
"Subjective:       Patient ID: Sawyer Browning is a 67 y.o. male.    Vitals:  height is 6' 0.01" (1.829 m) and weight is 87.1 kg (192 lb). His temporal temperature is 97.7 °F (36.5 °C). His blood pressure is 126/86 and his pulse is 71. His respiration is 18 and oxygen saturation is 98%.     Chief Complaint: Rash    Patient reports an infected rash (bump) on his right elbow that started yesterday. Patient believes it is an infected staph.    Other  This is a new problem. The current episode started today. Associated symptoms include a rash. Pertinent negatives include no chills, congestion, diaphoresis, fatigue, fever, nausea, neck pain, sore throat or vomiting. Nothing aggravates the symptoms. He has tried nothing for the symptoms.       Constitution: Negative for chills, sweating, fatigue and fever.   HENT: Negative for ear pain, congestion, sinus pain, sinus pressure and sore throat.    Neck: Negative for neck pain and neck stiffness.   Eyes: Negative for eye pain, eye redness and photophobia.   Gastrointestinal: Negative for nausea, vomiting, constipation and diarrhea.   Skin: Positive for rash and erythema. Negative for hives.   Allergic/Immunologic: Negative for hives, itching and sneezing.   Neurological: Negative for disorientation and altered mental status.   Psychiatric/Behavioral: Negative for altered mental status, disorientation and confusion.       Objective:      Physical Exam   Constitutional: He is oriented to person, place, and time. He appears well-developed.   HENT:   Head: Normocephalic and atraumatic. Head is without abrasion, without contusion and without laceration.   Ears:   Right Ear: External ear normal.   Left Ear: External ear normal.   Nose: Nose normal.   Mouth/Throat: Oropharynx is clear and moist and mucous membranes are normal.   Eyes: Conjunctivae, EOM and lids are normal. Pupils are equal, round, and reactive to light.   Neck: Trachea normal and phonation normal. Neck supple. "   Cardiovascular: Normal rate, regular rhythm and normal heart sounds.   Pulmonary/Chest: Effort normal and breath sounds normal. No stridor. No respiratory distress.   Musculoskeletal: Normal range of motion.         General: Normal range of motion.   Neurological: He is alert and oriented to person, place, and time.   Skin: Skin is warm, dry, intact, rash, pustular and papular. Capillary refill takes less than 2 seconds. erythema No abrasion, No burn, No bruising and No ecchymosis   Psychiatric: His speech is normal and behavior is normal. Judgment and thought content normal.   Nursing note and vitals reviewed.            Assessment:       1. Cellulitis, unspecified cellulitis site          Plan:         Cellulitis, unspecified cellulitis site  -     cephALEXin (KEFLEX) 500 MG capsule; Take 1 capsule (500 mg total) by mouth 4 (four) times daily. for 7 days  Dispense: 28 capsule; Refill: 0  -     mupirocin (BACTROBAN) 2 % ointment; Apply topically 3 (three) times daily. for 7 days  Dispense: 22 g; Refill: 0                 Patient Instructions   - Rest.    - Drink plenty of fluids.    - Acetaminophen (tylenol) or Ibuprofen (advil,motrin) as directed as needed for fever/pain. Avoid tylenol if you have a history of liver disease. Do not take ibuprofen if you have a history of GI bleeding, kidney disease, or if you take blood thinners.   - Ibuprofen dosing for adults: 400 mg by mouth every 4-6 hours as needed. Max: 2400 mg/day; Info: use lowest effective dose, shortest effective treatment duration; give w/ food if GI upset occurs.  - Ibuprofen dosing for children: [6 mo-12 yo] Dose: 5-10 mg/kg/dose by mouth every 6-8h as needed; Max: 40 mg/kg/day; Info: use lower dose for fever <102.5 F, higher dose for fever >102.5 F; use shortest effective tx duration; give w/ food if GI upset occurs. [13 yo and older] Dose: 200-400 mg by mouth every 4-6 hours as needed; Max: 1200 mg/day; Info: use lowest effective dose, shortest  effective tx duration; give w/ food if GI upset occurs.  - Tylenol dosing for adults: [By mouth route, immediate-release form] Dose: 325-1000 mg by mouth every 4-6h as needed; Max: 1 g/4h and 4 g/day from all sources. [By mouth route, extended-release form] Dose: 650-1300 mg Extended Release by mouth every 8h as needed; Max: 4 g/day from all sources.   - Tylenol dosing for children: 6-10 yo [ oral tablet/capsule ] Dose: 1 tab/cap by mouth every 4-6h as needed; Max: 5 tabs or caps/24h; Info: do not exceed 75 mg/kg/day, up to 1 g/4h and 4 g/day, from all sources. 11 yo and older [ oral tablet/capsule ] Dose: 1-2 tabs/caps by mouth every 4-6h as needed; Max: 10 tabs or caps/24h; Info: do not exceed 1 g/4h and 4 g/day from all sources.    - You must understand that you have received an Urgent Care treatment only and that you may be released before all of your medical problems are known or treated.   - You, the patient, will arrange for follow up care as instructed.   - If your condition worsens or fails to improve we recommend that you receive another evaluation at the ER immediately or contact your PCP to discuss your concerns or return here.   - Follow up with your PCP or specialty clinic as directed in the next 1-2 weeks if not improved or as needed.  You can call [phone] to schedule an appointment with the appropriate provider.    If your symptoms do not improve or worsen, go to the emergency room immediately.

## 2022-04-05 NOTE — PATIENT INSTRUCTIONS
- Rest.    - Drink plenty of fluids.    - Acetaminophen (tylenol) or Ibuprofen (advil,motrin) as directed as needed for fever/pain. Avoid tylenol if you have a history of liver disease. Do not take ibuprofen if you have a history of GI bleeding, kidney disease, or if you take blood thinners.   - Ibuprofen dosing for adults: 400 mg by mouth every 4-6 hours as needed. Max: 2400 mg/day; Info: use lowest effective dose, shortest effective treatment duration; give w/ food if GI upset occurs.  - Ibuprofen dosing for children: [6 mo-10 yo] Dose: 5-10 mg/kg/dose by mouth every 6-8h as needed; Max: 40 mg/kg/day; Info: use lower dose for fever <102.5 F, higher dose for fever >102.5 F; use shortest effective tx duration; give w/ food if GI upset occurs. [11 yo and older] Dose: 200-400 mg by mouth every 4-6 hours as needed; Max: 1200 mg/day; Info: use lowest effective dose, shortest effective tx duration; give w/ food if GI upset occurs.  - Tylenol dosing for adults: [By mouth route, immediate-release form] Dose: 325-1000 mg by mouth every 4-6h as needed; Max: 1 g/4h and 4 g/day from all sources. [By mouth route, extended-release form] Dose: 650-1300 mg Extended Release by mouth every 8h as needed; Max: 4 g/day from all sources.   - Tylenol dosing for children: 6-10 yo [ oral tablet/capsule ] Dose: 1 tab/cap by mouth every 4-6h as needed; Max: 5 tabs or caps/24h; Info: do not exceed 75 mg/kg/day, up to 1 g/4h and 4 g/day, from all sources. 11 yo and older [ oral tablet/capsule ] Dose: 1-2 tabs/caps by mouth every 4-6h as needed; Max: 10 tabs or caps/24h; Info: do not exceed 1 g/4h and 4 g/day from all sources.    - You must understand that you have received an Urgent Care treatment only and that you may be released before all of your medical problems are known or treated.   - You, the patient, will arrange for follow up care as instructed.   - If your condition worsens or fails to improve we recommend that you receive another  evaluation at the ER immediately or contact your PCP to discuss your concerns or return here.   - Follow up with your PCP or specialty clinic as directed in the next 1-2 weeks if not improved or as needed.  You can call [phone] to schedule an appointment with the appropriate provider.    If your symptoms do not improve or worsen, go to the emergency room immediately.

## 2022-04-06 ENCOUNTER — PATIENT MESSAGE (OUTPATIENT)
Dept: UROLOGY | Facility: CLINIC | Age: 67
End: 2022-04-06
Payer: COMMERCIAL

## 2022-04-09 DIAGNOSIS — I10 ESSENTIAL HYPERTENSION: ICD-10-CM

## 2022-04-09 NOTE — TELEPHONE ENCOUNTER
No new care gaps identified.  Powered by [employer]. Reference number: [id number].   4/09/2022 1:19:44 AM CDT

## 2022-04-11 RX ORDER — VALSARTAN AND HYDROCHLOROTHIAZIDE 320; 25 MG/1; MG/1
TABLET, FILM COATED ORAL
Qty: 90 TABLET | Refills: 1 | Status: SHIPPED | OUTPATIENT
Start: 2022-04-11 | End: 2022-10-10 | Stop reason: SDUPTHER

## 2022-04-11 NOTE — TELEPHONE ENCOUNTER
Refill Authorization Note   Sawyer Browning  is requesting a refill authorization.  Brief Assessment and Rationale for Refill:  Approve     Medication Therapy Plan:       Medication Reconciliation Completed: No   Comments:     No Care Gaps recommended.     Note composed:11:10 AM 04/11/2022

## 2022-04-18 ENCOUNTER — PATIENT MESSAGE (OUTPATIENT)
Dept: FAMILY MEDICINE | Facility: CLINIC | Age: 67
End: 2022-04-18
Payer: COMMERCIAL

## 2022-06-14 ENCOUNTER — OFFICE VISIT (OUTPATIENT)
Dept: FAMILY MEDICINE | Facility: CLINIC | Age: 67
End: 2022-06-14
Payer: COMMERCIAL

## 2022-06-14 ENCOUNTER — PATIENT MESSAGE (OUTPATIENT)
Dept: FAMILY MEDICINE | Facility: CLINIC | Age: 67
End: 2022-06-14

## 2022-06-14 DIAGNOSIS — I10 ESSENTIAL HYPERTENSION: ICD-10-CM

## 2022-06-14 DIAGNOSIS — E78.49 OTHER HYPERLIPIDEMIA: ICD-10-CM

## 2022-06-14 DIAGNOSIS — J06.9 UPPER RESPIRATORY TRACT INFECTION, UNSPECIFIED TYPE: Primary | ICD-10-CM

## 2022-06-14 DIAGNOSIS — I77.1 TORTUOUS AORTA: ICD-10-CM

## 2022-06-14 DIAGNOSIS — K21.9 GASTROESOPHAGEAL REFLUX DISEASE, UNSPECIFIED WHETHER ESOPHAGITIS PRESENT: ICD-10-CM

## 2022-06-14 PROCEDURE — 99214 OFFICE O/P EST MOD 30 MIN: CPT | Mod: 95,,, | Performed by: NURSE PRACTITIONER

## 2022-06-14 PROCEDURE — 99214 PR OFFICE/OUTPT VISIT, EST, LEVL IV, 30-39 MIN: ICD-10-PCS | Mod: 95,,, | Performed by: NURSE PRACTITIONER

## 2022-06-14 RX ORDER — BENZONATATE 200 MG/1
200 CAPSULE ORAL 3 TIMES DAILY PRN
Qty: 15 CAPSULE | Refills: 0 | Status: SHIPPED | OUTPATIENT
Start: 2022-06-14 | End: 2022-07-01 | Stop reason: SDUPTHER

## 2022-06-14 RX ORDER — ACETAMINOPHEN AND CHLORPHENIRAMINE MALEATE 325; 2 MG/1; MG/1
1 TABLET, FILM COATED ORAL 4 TIMES DAILY PRN
Qty: 30 TABLET | Refills: 0 | Status: SHIPPED | OUTPATIENT
Start: 2022-06-14 | End: 2023-06-28

## 2022-06-14 NOTE — PROGRESS NOTES
The patient location is:  Patient Home  The chief complaint leading to consultation is: as below  Visit type: Virtual visit with synchronous audio and video  Total time spent with patient: 23 minutes  Each patient to whom he or she provides medical services by telemedicine is:  (1) informed of the relationship between the physician and patient and the respective role of any other health care provider with respect to management of the patient; and (2) notified that he may decline to receive medical services by telemedicine and may withdraw from such care at any time.      HPI     Chief Complaint:  cough.      Sawyer Browning is a 67 y.o. male with multiple medical diagnoses as listed in the medical history and problem list that presents for cough.  Pt is new to me but is known to this clinic with his last appointment being Visit date not found.      Cough  This is a new problem. The current episode started in the past 7 days. The problem has been waxing and waning. The problem occurs every few hours. The cough is non-productive. Associated symptoms include nasal congestion, postnasal drip, rhinorrhea and a sore throat. Pertinent negatives include no chest pain, chills, ear congestion, ear pain, fever, headaches, heartburn, hemoptysis, myalgias, rash, shortness of breath, sweats, weight loss or wheezing. Nothing aggravates the symptoms. His past medical history is significant for environmental allergies. There is no history of asthma, bronchiectasis, bronchitis, COPD, emphysema or pneumonia.       Recent COVID19 test today was negative.     he is compliant with medications daily without any adverse side effects.    Patient Care Team:  Mary Kay Haines MD as PCP - General (Internal Medicine)    History     Past Medical History:  Past Medical History:   Diagnosis Date    BPH (benign prostatic hyperplasia)     Elevated liver enzymes     Fatty infiltration of liver     Glucose intolerance (impaired glucose tolerance)      Hiatal hernia     Hyperlipidemia     Hypertension     Overweight     Reflux        Past Surgical History:  Past Surgical History:   Procedure Laterality Date    inguinal hernia      x2    tonsillectomy         Social History:  Social History     Socioeconomic History    Marital status:     Number of children: 2   Occupational History    Occupation: Works in insurance   Tobacco Use    Smoking status: Never Smoker    Smokeless tobacco: Never Used   Substance and Sexual Activity    Alcohol use: Yes     Comment: 2 glasses of wine most evenings.    Drug use: No     Social Determinants of Health     Financial Resource Strain: Unknown    Difficulty of Paying Living Expenses: Patient refused   Food Insecurity: Unknown    Worried About Running Out of Food in the Last Year: Patient refused    Ran Out of Food in the Last Year: Patient refused   Transportation Needs: Unknown    Lack of Transportation (Medical): Patient refused    Lack of Transportation (Non-Medical): Patient refused   Social Connections: Unknown    Frequency of Communication with Friends and Family: Patient refused    Frequency of Social Gatherings with Friends and Family: Patient refused    Active Member of Clubs or Organizations: Patient refused    Attends Club or Organization Meetings: Patient refused    Marital Status: Patient refused   Housing Stability: Unknown    Unable to Pay for Housing in the Last Year: Patient refused    Unstable Housing in the Last Year: Patient refused       Family History:  Family History   Problem Relation Age of Onset    Hypertension Mother     Macular degeneration Mother         - gets shots in her eye    Stroke Father     Heart disease Father     Aneurysm Father     Migraines Sister     Arthritis Brother         knees    Dementia Paternal Grandmother     Diabetes Paternal Grandmother     Heart disease Paternal Grandfather     Diabetes Brother     Hernia Brother     Cancer  Paternal Aunt     Cancer Paternal Uncle     Cancer Paternal Aunt     Cancer Paternal Uncle     Colon cancer Neg Hx     Prostate cancer Neg Hx        Allergies and Medications: (updated and reviewed)  Review of patient's allergies indicates:   Allergen Reactions    Ace inhibitors Other (See Comments)     cough    Losartan      headache     Current Outpatient Medications   Medication Sig Dispense Refill    amLODIPine (NORVASC) 10 MG tablet TAKE 1 TABLET ONCE DAILY. (PLEASE INACTIVATE ALL PRIOR SCRIPTS WITH SAME NAME AND STRENGTH INCLUDING ON HOLDS) 90 tablet 1    atorvastatin calcium (ATORVASTATIN ORAL) atorvastatin Take No date recorded No form recorded No frequency recorded No route recorded No set duration recorded No set duration amount recorded active No dosage strength recorded No dosage strength units of measure recorded      benzonatate (TESSALON) 200 MG capsule Take 1 capsule (200 mg total) by mouth 3 (three) times daily as needed for Cough. 15 capsule 0    chlorpheniramine-acetaminophen (CORICIDIN HBP COLD AND FLU) 2-325 mg Tab Take 1 tablet by mouth 4 (four) times daily as needed (runny nose, sneezing, body ache, fever). 30 tablet 0    ezetimibe-simvastatin 10-40 mg (VYTORIN) 10-40 mg per tablet Take 1 tablet by mouth every evening. 90 tablet 0    lansoprazole (PREVACID) 15 MG capsule lansoprazole Take No date recorded No form recorded No frequency recorded No route recorded No set duration recorded No set duration amount recorded active No dosage strength recorded No dosage strength units of measure recorded      lansoprazole (PREVACID) 30 MG capsule TAKE 1 CAPSULE DAILY ONLY  AS NEEDED FOR HEARTBURN. TAKE ONLY AS NEEDED. 90 capsule 0    tadalafiL (CIALIS) 20 MG Tab Take 1 tablet (20 mg total) by mouth once daily. 18 tablet 1    valsartan-hydrochlorothiazide (DIOVAN-HCT) 320-25 mg per tablet TAKE 1 TABLET ONCE DAILY (PLEASE INACTIVATE ALL PRIOR SCRIPTS WITH SAME NAME AND STRENGTH INCLUDING  ON HOLDS) 90 tablet 1     No current facility-administered medications for this visit.       Exam     Review of Systems:  (as noted above)  Review of Systems   Constitutional: Negative for chills, fever and weight loss.   HENT: Positive for postnasal drip, rhinorrhea and sore throat. Negative for ear pain.    Eyes: Negative for visual disturbance.   Respiratory: Positive for cough. Negative for hemoptysis, chest tightness, shortness of breath and wheezing.    Cardiovascular: Negative for chest pain.   Gastrointestinal: Negative for abdominal distention and heartburn.   Endocrine: Negative for polyuria.   Musculoskeletal: Negative for myalgias.   Skin: Negative for rash.   Allergic/Immunologic: Positive for environmental allergies.   Neurological: Negative for headaches.   Psychiatric/Behavioral: Negative for agitation.       Physical Exam:   Physical Exam  Constitutional:       General: He is not in acute distress.     Appearance: He is ill-appearing. He is not toxic-appearing or diaphoretic.       There were no vitals filed for this visit.   There is no height or weight on file to calculate BMI.    Assessment & Plan   (all problems are new to me)      Problem List Items Addressed This Visit        Cardiac/Vascular    Essential hypertension    Current Assessment & Plan       -continue current medication regimen  -DASH diet, regular cardiovascular exercises, portion control  - ?weight loss  -f/u with BP logs in 2 weeks if BP is not consistently <140/90               Other hyperlipidemia    Current Assessment & Plan     discussed ways to lower triglycerides such as cutting simple sugars out of diet (white breads, candies, cookies, cakes, etc.) and reducing/eliminating intake of highly processed trans fatty acids.   Exercise 30 minutes a day for 4-5 days a week.   Eat more fiber.               Tortuous aorta    Overview     - noted on CXR 10/21/2019              GI    GERD (gastroesophageal reflux disease)    Current  Assessment & Plan     -stable, discussed with patient about avoiding potential dietary triggers  -avoid spicy/greasy/sour/acidic foods, as well as tea/coffee/chocolate if possible  -Tylenol as needed for pain, avoid NSAIDs  -Keep food diary                 Other Visit Diagnoses     Upper respiratory tract infection, unspecified type    -  Primary    -Mild symptoms, most likely viral etiology.  -based on clinical findings today, does not warrant Abx treatment at this time. D/w pt about the potential risks of inappropriate Abx use and that if patient's Sx worsens, we will re-evaluate to assess the need to change the treatment plan.    -Warm tea with honey and lemon, and/or warm salt water gargles every 4 hours PRN for sore throat. May try OTC Cepacol if pt desires.  -advised frequent hand washing, rest, and plenty of fluids. Increase water intake to 64-80 oz daily to help thin mucus.  -coricidin tablets as needed for fever, headaches, sore throat, ear pain, bodyaches, and/or nasal/sinus inflammation.   -Nasal Saline spray (Over the counter [place] spray or [place])  2 sprays each nostril 2-3 times a day for nasal congestion.     I counseled the patient on fluids, rest, OTC medications that can safely be used, hand/cough hygiene, expected course of illness, and when further medical attention would be warranted.      Discussed DDx, condition, and treatment     ED precautions given.   Notify provider if symptoms do not resolve or increase in severity.   Patient verbalizes understanding and agrees with plan of care.        Relevant Medications    benzonatate (TESSALON) 200 MG capsule    chlorpheniramine-acetaminophen (CORICIDIN HBP COLD AND FLU) 2-325 mg Tab          --------------------------------------------    Health Maintenance:  Health Maintenance       Date Due Completion Date    TETANUS VACCINE Never done ---    Shingles Vaccine (1 of 2) Never done ---    COVID-19 Vaccine (3 - Booster for Pfizer series) 07/22/2021  2/22/2021    Influenza Vaccine (Season Ended) 09/01/2022 ---    PROSTATE-SPECIFIC ANTIGEN 02/17/2023 2/17/2022    Colorectal Cancer Screening 01/21/2025 1/21/2022    Override on 5/2/2007: Done (normal)    Lipid Panel 11/02/2026 11/2/2021          Health maintenance reviewed.     Follow Up:  Follow up in about 2 weeks (around 6/28/2022), or if symptoms worsen or fail to improve.      The patient expressed understanding and no barriers to adherence were identified.      - The patient indicates understanding of these issues and agrees with the plan. Brief care plan is updated and reviewed with the patient as applicable.      - The patient is given an After Visit Summary that lists all medications with directions, allergies, education, orders placed during this encounter and follow-up instructions.      - I have reviewed the patient's medical information including past medical, family, and social history sections including the medications and allergies.      - We discussed the patient's current medications.     This note was created by combination of typed  and MModal dictation.  Transcription errors may be present.  If there are any questions, please contact me.       Butch Crowe NP

## 2022-06-14 NOTE — PATIENT INSTRUCTIONS
Patient Education       Viral Upper Respiratory Infection Discharge Instructions, Adult   About this topic   You have an upper respiratory infection or URI. A URI can affect your nose, throat, ears, and sinuses. A virus is the cause of almost all URIs and antibiotics will not help you feel better more quickly. The common cold is an example of a viral URI.  URIs are easy to spread from person to person, most often through coughing or sneezing. A URI will almost always get better in a week or two without any treatment.         What care is needed at home?   Ask your doctor what you need to do when you go home. Make sure you ask questions if you do not understand what the doctor says.  If you smoke, try to quit. Your doctor or nurse can help.  Drink lots of fluids like water, juice, or broth. This will help replace any fluids lost if you have a runny nose or fever. Warm tea or soup can help soothe a sore throat.  If the air in your home feels dry, use a cool mist humidifier. This can help a stuffy nose and make it easier to breathe.  You can also use saline nose drops to relieve stuffiness.  If you decide to take over-the-counter cough or cold medicines, follow the directions on the label carefully. Be sure you do not take more than 1 medicine that contains acetaminophen. Also, if you have a heart problem or high blood pressure, check with your doctor before you take any of these medicines.  Wash your hands often. Cough or sneeze into a tissue or your elbow instead of your hands. This will help keep others healthy.  What follow-up care is needed?   Your doctor may ask you to make visits to the office to check on your progress. Be sure to keep these visits.  What drugs may be needed?   The doctor may order drugs to:  Open up the tubes of your lungs  Treat viral infection  Relieve or stop coughing  Help with pain from a sore throat  Relieve runny and stuffy nose  Provide oxygen  Will physical activity be limited?   You  need to rest for a few days to let your body recover from the infection.  What changes to diet are needed?   Eat soft foods like soup if swallowing is too painful.  What problems could happen?   Asthma attack  Sinus infections  Lung problems like pneumonia and bronchitis  Severe fluid loss. This is dehydration.  What can be done to prevent this health problem?   Wash your hands often with soap and water for at least 20 seconds, especially after coughing or sneezing. Alcohol-based hand sanitizers also work to kill the virus.  If you are sick, cover your mouth and nose with tissue when you cough or sneeze. You can also cough into your elbow. Throw away tissues in the trash and wash your hands after touching used tissues.  Do not get too close (kissing, hugging) to people who are sick.  Do not share towels or hankies with anyone who is sick. Clean commonly handled things like door handles, remotes, toys, and phones. Wipe them with a disinfectant.  Stay away from crowded places.  Cover your nose and mouth when you sneeze or cough.  Take vitamin C to help build up your body's ability to fight disease.  Get a flu shot each year.  When do I need to call the doctor?   You have trouble breathing when talking or sitting still.  You have a fever of 100.4°F (38°C) or higher for several days, chills, a very bad sore throat, or ear or sinus pain.  You develop a new fever after several days of feeling the same or improving.  You develop chest pain when you cough.  You have a cough that lasts more than 10 days.  You cough up blood, or the color of the mucus you cough up changes.  Teach Back: Helping You Understand   The Teach Back Method helps you understand the information we are giving you. After you talk with the staff, tell them in your own words what you learned. This helps to make sure the staff has described each thing clearly. It also helps to explain things that may have been confusing. Before going home, make sure you can  do these:  I can tell you about my condition.  I can tell you what may help ease my signs.  I can tell you what I will do if I have a fever, chills, breathing very fast, or trouble breathing.  Where can I learn more?   American Lung Association  https://www.lung.org/blog/can-you-exercise-with-a-cold   American Lung Association  https://www.lung.org/lung-health-diseases/lung-disease-lookup/influenza/facts-about-the-common-cold   NHS Choices  https://www.nhs.uk/conditions/respiratory-tract-infection/   UpToDate  https://www.[employer]/contents/the-common-cold-in-adults-beyond-the-basics   Last Reviewed Date   2021-06-08  Consumer Information Use and Disclaimer   This information is not specific medical advice and does not replace information you receive from your health care provider. This is only a brief summary of general information. It does NOT include all information about conditions, illnesses, injuries, tests, procedures, treatments, therapies, discharge instructions or life-style choices that may apply to you. You must talk with your health care provider for complete information about your health and treatment options. This information should not be used to decide whether or not to accept your health care providers advice, instructions or recommendations. Only your health care provider has the knowledge and training to provide advice that is right for you.  Copyright   Copyright © 2021 Up[employer]Date, Inc. and its affiliates and/or licensors. All rights reserved.  Patient Education       Cough, Runny Nose, and the Common Cold Discharge Instructions   About this topic   The common cold is an infection in the nose and throat. A tiny germ, called a virus, causes this infection. It often affects your nose, throat, ears, and sinuses. A cold can easily spread from person to person. Coughing, sneezing, or touching something with the germ on it spreads the cold.  Most colds go away on their own without treatment.  Antibiotics will not help a cold. Your doctor may order drugs to help with the signs of a cold. Even though you may feel very sick, the common cold is not a health emergency.         What care is needed at home?   Ask your doctor what you need to do when you go home. Make sure you ask questions if you do not understand what the doctor says.  To help you feel better:  Use a cool mist humidifier to avoid breathing dry air.  Use hard candy or cough drops to soothe sore throat and cough.  Gargle with salt water. Mix 1/2 teaspoon (2.5 grams) salt with a cup (240 mL) of warm water a few times a day.  Spray saltwater mist in each nostril. Any normal saline spray works.  Sip warm liquids to keep your throat moist.  Take warm, steamy showers to help soothe the cough.  Do not smoke or be in smoke-filled places. Avoid things that may cause breathing problems like vaping, fumes, pollution, dust, and other common allergens.  You may want to use over-the-counter medicines for allergies or acid reflux if your cough is due to one of these problems.  You can also use an over-the-counter cough medicine.  Drink plenty of fluids whenever you are thirsty.  What follow-up care is needed?   Your doctor may ask you to make visits to the office to check on your progress. Be sure to keep these visits.  What drugs may be needed?   Your doctor may order drugs to:  Help a stuffy nose  Lower a fever  Help with pain  Treat an allergy  Help with cough  Always talk to your doctor before you take any drugs. This includes over-the-counter (OTC) drugs and herbal supplements. This is very important if you have high blood pressure.  Will physical activity be limited?   Get lots of rest. Sleep when you are feeling tired. Avoid doing tiring activities.  What changes to diet are needed?   Chicken soup may be helpful. Warm fluids help thin mucus and help the body get rid of it easier.  What problems could happen?   Colds may cause signs of asthma for people  who have asthma.  Sinus or ear infection  Lung infections  Bronchitis  What can be done to prevent this health problem?   Wash your hands often with soap and water for at least 20 seconds, especially after coughing or sneezing. Alcohol-based hand sanitizers also work to kill the virus.  If you are sick, cover your mouth and nose with tissue when you cough or sneeze. You can also cough into your elbow. Throw away tissues in the trash and wash your hands after touching used tissues.  Clean items and surfaces you most often touch like door handles, remotes, phones, or toys. Wipe them with a disinfectant. This can help reduce the spread of infection.  Do not get too close (kissing, hugging) to people who are sick.  Do not share towels or hankies with anyone who is sick.  Stay away from crowded places.  Keep your hands away from your eyes and nose because the virus can enter easily to those body areas.  Get a flu shot each year.  When do I need to call the doctor?   You have chest pain when you cough or trouble breathing.  You start to cough up blood or yellow or green mucus.  You have a fever of 100.4°F (38°C) or higher or chills.  You cough so hard you throw up.  You are still coughing in 10 days.  Helpful tips   It is normal that mucus from your runny nose may become thicker and change color. Do not take an antibiotic. Instead, drink more water-based fluids, especially hot tea and soups.  Most colds go away within a week. If you are still sick after 14 days, see your doctor.  Teach Back: Helping You Understand   The Teach Back Method helps you understand the information we are giving you. After you talk with the staff, tell them in your own words what you learned. This helps to make sure the staff has described each thing clearly. It also helps to explain things that may have been confusing. Before going home, make sure you can do these:  I can tell you about my condition.  I can tell you what may help ease my  breathing.  I can tell you what I can do to help avoid passing the infection to others.  I can tell you what I will do if I have a fever, chills, or trouble breathing.  Where can I learn more?   American Academy of Family Physicians  https://familydoctor.org/condition/colds-and-the-flu/   American Lung Association  http://www.lung.org/lung-health-and-diseases/lung-disease-lookup/influenza/facts-about-the-common-cold.html   Centers for Disease Control and Prevention  https://www.cdc.gov/features/rhinoviruses/   Kids Health  http://kidshealth.org/en/parents/cold.html?ref=search&WT.ac=efl-y-jjua-fq-xmzzal-coh   Last Reviewed Date   2021-06-09  Consumer Information Use and Disclaimer   This information is not specific medical advice and does not replace information you receive from your health care provider. This is only a brief summary of general information. It does NOT include all information about conditions, illnesses, injuries, tests, procedures, treatments, therapies, discharge instructions or life-style choices that may apply to you. You must talk with your health care provider for complete information about your health and treatment options. This information should not be used to decide whether or not to accept your health care providers advice, instructions or recommendations. Only your health care provider has the knowledge and training to provide advice that is right for you.  Copyright   Copyright © 2021 UpToDate, Inc. and its affiliates and/or licensors. All rights reserved.

## 2022-06-15 NOTE — ASSESSMENT & PLAN NOTE
-continue current medication regimen  -DASH diet, regular cardiovascular exercises, portion control  - ?weight loss  -f/u with BP logs in 2 weeks if BP is not consistently <140/90

## 2022-06-16 ENCOUNTER — PATIENT MESSAGE (OUTPATIENT)
Dept: FAMILY MEDICINE | Facility: CLINIC | Age: 67
End: 2022-06-16
Payer: COMMERCIAL

## 2022-06-30 ENCOUNTER — PATIENT MESSAGE (OUTPATIENT)
Dept: FAMILY MEDICINE | Facility: CLINIC | Age: 67
End: 2022-06-30
Payer: COMMERCIAL

## 2022-06-30 DIAGNOSIS — J06.9 UPPER RESPIRATORY TRACT INFECTION, UNSPECIFIED TYPE: ICD-10-CM

## 2022-07-01 RX ORDER — BENZONATATE 200 MG/1
200 CAPSULE ORAL 3 TIMES DAILY PRN
Qty: 30 CAPSULE | Refills: 0 | Status: SHIPPED | OUTPATIENT
Start: 2022-07-01 | End: 2023-06-28

## 2022-07-07 DIAGNOSIS — K21.9 GASTROESOPHAGEAL REFLUX DISEASE, UNSPECIFIED WHETHER ESOPHAGITIS PRESENT: ICD-10-CM

## 2022-07-07 RX ORDER — LANSOPRAZOLE 30 MG/1
CAPSULE, DELAYED RELEASE ORAL
Qty: 90 CAPSULE | Refills: 0 | Status: SHIPPED | OUTPATIENT
Start: 2022-07-07 | End: 2022-11-30 | Stop reason: SDUPTHER

## 2022-07-07 NOTE — TELEPHONE ENCOUNTER
No new care gaps identified.  [facility] Embedded Care Gaps. Reference number: [id number]. 7/07/2022   8:37:20 AM CDT

## 2022-08-06 ENCOUNTER — PATIENT MESSAGE (OUTPATIENT)
Dept: FAMILY MEDICINE | Facility: CLINIC | Age: 67
End: 2022-08-06
Payer: COMMERCIAL

## 2022-09-30 ENCOUNTER — PATIENT MESSAGE (OUTPATIENT)
Dept: FAMILY MEDICINE | Facility: CLINIC | Age: 67
End: 2022-09-30
Payer: COMMERCIAL

## 2022-10-06 NOTE — LETTER
December 24, 2019      Mary Kay Haines MD  [address]  [place] [address]           [facility] - Urology 4th Floor  [address]  [place] [address]  Phone: [phone]          Patient: Sawyer Browning   MR Number: [id number]   YOB: 1955   Date of Visit: 12/24/2019       Dear Dr. Mary Kay Haines:    Thank you for referring Sawyer Browning to me for evaluation. Attached you will find relevant portions of my assessment and plan of care.    If you have questions, please do not hesitate to call me. I look forward to following Sawyer Browning along with you.    Sincerely,    Colt Lloyd Jr., MD    Enclosure  CC:  No Recipients    If you would like to receive this communication electronically, please contact [email or web address] or [phone] to request more information on [employer] access.    For providers and/or their staff who would like to refer a patient to [facility], please contact us through our one-stop-shop provider referral line, [facility] , at [phone].    If you feel you have received this communication in error or would no longer like to receive these types of communications, please e-mail [email or web address]         
Cellulitis of forearm

## 2022-10-11 ENCOUNTER — OFFICE VISIT (OUTPATIENT)
Dept: UROLOGY | Facility: CLINIC | Age: 67
End: 2022-10-11
Payer: COMMERCIAL

## 2022-10-11 VITALS
DIASTOLIC BLOOD PRESSURE: 88 MMHG | BODY MASS INDEX: 25.68 KG/M2 | HEIGHT: 72 IN | HEART RATE: 81 BPM | SYSTOLIC BLOOD PRESSURE: 151 MMHG | WEIGHT: 189.63 LBS

## 2022-10-11 DIAGNOSIS — N40.1 BPH WITH URINARY OBSTRUCTION: Primary | ICD-10-CM

## 2022-10-11 DIAGNOSIS — N13.8 BPH WITH URINARY OBSTRUCTION: Primary | ICD-10-CM

## 2022-10-11 PROCEDURE — 1126F PR PAIN SEVERITY QUANTIFIED, NO PAIN PRESENT: ICD-10-PCS | Mod: CPTII,S$GLB,, | Performed by: UROLOGY

## 2022-10-11 PROCEDURE — 1159F MED LIST DOCD IN RCRD: CPT | Mod: CPTII,S$GLB,, | Performed by: UROLOGY

## 2022-10-11 PROCEDURE — 1160F PR REVIEW ALL MEDS BY PRESCRIBER/CLIN PHARMACIST DOCUMENTED: ICD-10-PCS | Mod: CPTII,S$GLB,, | Performed by: UROLOGY

## 2022-10-11 PROCEDURE — 3079F DIAST BP 80-89 MM HG: CPT | Mod: CPTII,S$GLB,, | Performed by: UROLOGY

## 2022-10-11 PROCEDURE — 3288F PR FALLS RISK ASSESSMENT DOCUMENTED: ICD-10-PCS | Mod: CPTII,S$GLB,, | Performed by: UROLOGY

## 2022-10-11 PROCEDURE — 1101F PR PT FALLS ASSESS DOC 0-1 FALLS W/OUT INJ PAST YR: ICD-10-PCS | Mod: CPTII,S$GLB,, | Performed by: UROLOGY

## 2022-10-11 PROCEDURE — 3288F FALL RISK ASSESSMENT DOCD: CPT | Mod: CPTII,S$GLB,, | Performed by: UROLOGY

## 2022-10-11 PROCEDURE — 99999 PR PBB SHADOW E&M-EST. PATIENT-LVL III: ICD-10-PCS | Mod: PBBFAC,,, | Performed by: UROLOGY

## 2022-10-11 PROCEDURE — 1159F PR MEDICATION LIST DOCUMENTED IN MEDICAL RECORD: ICD-10-PCS | Mod: CPTII,S$GLB,, | Performed by: UROLOGY

## 2022-10-11 PROCEDURE — 99213 PR OFFICE/OUTPT VISIT, EST, LEVL III, 20-29 MIN: ICD-10-PCS | Mod: S$GLB,,, | Performed by: UROLOGY

## 2022-10-11 PROCEDURE — 3079F PR MOST RECENT DIASTOLIC BLOOD PRESSURE 80-89 MM HG: ICD-10-PCS | Mod: CPTII,S$GLB,, | Performed by: UROLOGY

## 2022-10-11 PROCEDURE — 3077F SYST BP >= 140 MM HG: CPT | Mod: CPTII,S$GLB,, | Performed by: UROLOGY

## 2022-10-11 PROCEDURE — 1101F PT FALLS ASSESS-DOCD LE1/YR: CPT | Mod: CPTII,S$GLB,, | Performed by: UROLOGY

## 2022-10-11 PROCEDURE — 1160F RVW MEDS BY RX/DR IN RCRD: CPT | Mod: CPTII,S$GLB,, | Performed by: UROLOGY

## 2022-10-11 PROCEDURE — 99999 PR PBB SHADOW E&M-EST. PATIENT-LVL III: CPT | Mod: PBBFAC,,, | Performed by: UROLOGY

## 2022-10-11 PROCEDURE — 3008F BODY MASS INDEX DOCD: CPT | Mod: CPTII,S$GLB,, | Performed by: UROLOGY

## 2022-10-11 PROCEDURE — 99213 OFFICE O/P EST LOW 20 MIN: CPT | Mod: S$GLB,,, | Performed by: UROLOGY

## 2022-10-11 PROCEDURE — 3077F PR MOST RECENT SYSTOLIC BLOOD PRESSURE >= 140 MM HG: ICD-10-PCS | Mod: CPTII,S$GLB,, | Performed by: UROLOGY

## 2022-10-11 PROCEDURE — 1126F AMNT PAIN NOTED NONE PRSNT: CPT | Mod: CPTII,S$GLB,, | Performed by: UROLOGY

## 2022-10-11 PROCEDURE — 3008F PR BODY MASS INDEX (BMI) DOCUMENTED: ICD-10-PCS | Mod: CPTII,S$GLB,, | Performed by: UROLOGY

## 2022-10-11 NOTE — PROGRESS NOTES
Subjective:       Patient ID: Sawyer Browning is a 67 y.o. male.    Chief Complaint: Annual Exam    HPI patient is here for prostate check.  His last PSA was 4.1.  He is voiding well at this time    Past Medical History:   Diagnosis Date    BPH (benign prostatic hyperplasia)     Elevated liver enzymes     Fatty infiltration of liver     Glucose intolerance (impaired glucose tolerance)     Hiatal hernia     Hyperlipidemia     Hypertension     Overweight     Reflux        Past Surgical History:   Procedure Laterality Date    inguinal hernia      x2    tonsillectomy         Family History   Problem Relation Age of Onset    Hypertension Mother     Macular degeneration Mother         - gets shots in her eye    Stroke Father     Heart disease Father     Aneurysm Father     Migraines Sister     Arthritis Brother         knees    Dementia Paternal Grandmother     Diabetes Paternal Grandmother     Heart disease Paternal Grandfather     Diabetes Brother     Hernia Brother     Cancer Paternal Aunt     Cancer Paternal Uncle     Cancer Paternal Aunt     Cancer Paternal Uncle     Colon cancer Neg Hx     Prostate cancer Neg Hx        Social History     Socioeconomic History    Marital status:     Number of children: 2   Occupational History    Occupation: Works in insurance   Tobacco Use    Smoking status: Never    Smokeless tobacco: Never   Substance and Sexual Activity    Alcohol use: Yes     Comment: 2 glasses of wine most evenings.    Drug use: No     Social Determinants of Health     Financial Resource Strain: Unknown    Difficulty of Paying Living Expenses: Patient refused   Food Insecurity: Unknown    Worried About Running Out of Food in the Last Year: Patient refused    Ran Out of Food in the Last Year: Patient refused   Transportation Needs: Unknown    Lack of Transportation (Medical): Patient refused    Lack of Transportation (Non-Medical): Patient refused   Social Connections: Unknown    Frequency of Communication  with Friends and Family: Patient refused    Frequency of Social Gatherings with Friends and Family: Patient refused    Active Member of Clubs or Organizations: Patient refused    Attends Club or Organization Meetings: Patient refused    Marital Status: Patient refused   Housing Stability: Unknown    Unable to Pay for Housing in the Last Year: Patient refused    Unstable Housing in the Last Year: Patient refused       Allergies:  Ace inhibitors and Losartan    Medications:    Current Outpatient Medications:     amLODIPine (NORVASC) 10 MG tablet, Take 1 tablet (10 mg total) by mouth once daily., Disp: 90 tablet, Rfl: 0    ezetimibe-simvastatin 10-40 mg (VYTORIN) 10-40 mg per tablet, Take 1 tablet by mouth every evening., Disp: 90 tablet, Rfl: 0    lansoprazole (PREVACID) 15 MG capsule, lansoprazole Take No date recorded No form recorded No frequency recorded No route recorded No set duration recorded No set duration amount recorded active No dosage strength recorded No dosage strength units of measure recorded, Disp: , Rfl:     lansoprazole (PREVACID) 30 MG capsule, TAKE 1 CAPSULE DAILY ONLY  AS NEEDED FOR HEARTBURN. TAKE ONLY AS NEEDED., Disp: 90 capsule, Rfl: 0    valsartan-hydrochlorothiazide (DIOVAN-HCT) 320-25 mg per tablet, TAKE 1 TABLET ONCE DAILY (PLEASE INACTIVATE ALL PRIOR SCRIPTS WITH SAME NAME AND STRENGTH INCLUDING ON HOLDS) Strength: 320-25 mg, Disp: 90 tablet, Rfl: 0    atorvastatin calcium (ATORVASTATIN ORAL), atorvastatin Take No date recorded No form recorded No frequency recorded No route recorded No set duration recorded No set duration amount recorded active No dosage strength recorded No dosage strength units of measure recorded, Disp: , Rfl:     benzonatate (TESSALON) 200 MG capsule, Take 1 capsule (200 mg total) by mouth 3 (three) times daily as needed for Cough. (Patient not taking: Reported on 10/11/2022), Disp: 30 capsule, Rfl: 0    chlorpheniramine-acetaminophen (CORICIDIN HBP COLD AND FLU)  2-325 mg Tab, Take 1 tablet by mouth 4 (four) times daily as needed (runny nose, sneezing, body ache, fever). (Patient not taking: Reported on 10/11/2022), Disp: 30 tablet, Rfl: 0    tadalafiL (CIALIS) 20 MG Tab, Take 1 tablet (20 mg total) by mouth once daily. (Patient not taking: Reported on 10/11/2022), Disp: 18 tablet, Rfl: 1    Review of Systems   Constitutional:  Negative for activity change, appetite change, chills, diaphoresis, fatigue, fever and unexpected weight change.   HENT:  Negative for congestion, dental problem, hearing loss, mouth sores, postnasal drip, rhinorrhea, sinus pressure and trouble swallowing.    Eyes:  Negative for pain, discharge and itching.   Respiratory:  Negative for apnea, cough, choking, chest tightness, shortness of breath and wheezing.    Cardiovascular:  Negative for chest pain, palpitations and leg swelling.   Gastrointestinal:  Negative for abdominal distention, abdominal pain, anal bleeding, blood in stool, constipation, diarrhea, nausea, rectal pain and vomiting.   Endocrine: Negative for polydipsia and polyuria.   Genitourinary:  Negative for decreased urine volume, difficulty urinating, dysuria, enuresis, flank pain, frequency, genital sores, hematuria, penile discharge, penile pain, penile swelling, scrotal swelling, testicular pain and urgency.   Musculoskeletal:  Negative for arthralgias, back pain and myalgias.   Skin:  Negative for color change, rash and wound.   Neurological:  Negative for dizziness, syncope, speech difficulty, light-headedness and headaches.   Hematological:  Negative for adenopathy. Does not bruise/bleed easily.   Psychiatric/Behavioral:  Negative for behavioral problems, confusion, hallucinations and sleep disturbance.      Objective:      Physical Exam  Constitutional:       Appearance: He is well-developed.   HENT:      Head: Normocephalic.   Cardiovascular:      Rate and Rhythm: Normal rate.   Pulmonary:      Effort: Pulmonary effort is  normal.   Abdominal:      Palpations: Abdomen is soft.   Genitourinary:     Prostate: Normal.      Comments: 30 g benign no nodules  Skin:     General: Skin is warm.   Neurological:      Mental Status: He is alert.       Assessment:       1. BPH with urinary obstruction          Plan:       Sawyer was seen today for annual exam.    Diagnoses and all orders for this visit:    BPH with urinary obstruction        Yearly check

## 2022-11-01 ENCOUNTER — PATIENT MESSAGE (OUTPATIENT)
Dept: FAMILY MEDICINE | Facility: CLINIC | Age: 67
End: 2022-11-01
Payer: COMMERCIAL

## 2022-11-01 DIAGNOSIS — E78.49 OTHER HYPERLIPIDEMIA: Primary | ICD-10-CM

## 2022-11-01 DIAGNOSIS — R73.02 GLUCOSE INTOLERANCE (IMPAIRED GLUCOSE TOLERANCE): ICD-10-CM

## 2022-11-01 DIAGNOSIS — I10 ESSENTIAL HYPERTENSION: ICD-10-CM

## 2022-11-03 ENCOUNTER — LAB VISIT (OUTPATIENT)
Dept: LAB | Facility: HOSPITAL | Age: 67
End: 2022-11-03
Attending: INTERNAL MEDICINE
Payer: COMMERCIAL

## 2022-11-03 ENCOUNTER — TELEPHONE (OUTPATIENT)
Dept: FAMILY MEDICINE | Facility: CLINIC | Age: 67
End: 2022-11-03
Payer: COMMERCIAL

## 2022-11-03 DIAGNOSIS — Z12.5 SCREENING PSA (PROSTATE SPECIFIC ANTIGEN): ICD-10-CM

## 2022-11-03 DIAGNOSIS — R73.02 GLUCOSE INTOLERANCE (IMPAIRED GLUCOSE TOLERANCE): ICD-10-CM

## 2022-11-03 DIAGNOSIS — E78.49 OTHER HYPERLIPIDEMIA: ICD-10-CM

## 2022-11-03 DIAGNOSIS — I10 ESSENTIAL HYPERTENSION: ICD-10-CM

## 2022-11-03 DIAGNOSIS — Z12.5 SCREENING PSA (PROSTATE SPECIFIC ANTIGEN): Primary | ICD-10-CM

## 2022-11-03 LAB
ALBUMIN SERPL BCP-MCNC: 4.3 G/DL (ref 3.5–5.2)
ALP SERPL-CCNC: 58 U/L (ref 55–135)
ALT SERPL W/O P-5'-P-CCNC: 39 U/L (ref 10–44)
ANION GAP SERPL CALC-SCNC: 8 MMOL/L (ref 8–16)
AST SERPL-CCNC: 27 U/L (ref 10–40)
BASOPHILS # BLD AUTO: 0.06 K/UL (ref 0–0.2)
BASOPHILS NFR BLD: 1 % (ref 0–1.9)
BILIRUB SERPL-MCNC: 0.9 MG/DL (ref 0.1–1)
BUN SERPL-MCNC: 18 MG/DL (ref 8–23)
CALCIUM SERPL-MCNC: 9.2 MG/DL (ref 8.7–10.5)
CHLORIDE SERPL-SCNC: 106 MMOL/L (ref 95–110)
CHOLEST SERPL-MCNC: 148 MG/DL (ref 120–199)
CHOLEST/HDLC SERPL: 2.9 {RATIO} (ref 2–5)
CO2 SERPL-SCNC: 26 MMOL/L (ref 23–29)
COMPLEXED PSA SERPL-MCNC: 4.3 NG/ML (ref 0–4)
CREAT SERPL-MCNC: 1.2 MG/DL (ref 0.5–1.4)
DIFFERENTIAL METHOD: ABNORMAL
EOSINOPHIL # BLD AUTO: 0.3 K/UL (ref 0–0.5)
EOSINOPHIL NFR BLD: 5.3 % (ref 0–8)
ERYTHROCYTE [DISTWIDTH] IN BLOOD BY AUTOMATED COUNT: 12.9 % (ref 11.5–14.5)
EST. GFR  (NO RACE VARIABLE): >60 ML/MIN/1.73 M^2
ESTIMATED AVG GLUCOSE: 111 MG/DL (ref 68–131)
GLUCOSE SERPL-MCNC: 104 MG/DL (ref 70–110)
HBA1C MFR BLD: 5.5 % (ref 4–5.6)
HCT VFR BLD AUTO: 46.9 % (ref 40–54)
HDLC SERPL-MCNC: 51 MG/DL (ref 40–75)
HDLC SERPL: 34.5 % (ref 20–50)
HGB BLD-MCNC: 15.9 G/DL (ref 14–18)
IMM GRANULOCYTES # BLD AUTO: 0.01 K/UL (ref 0–0.04)
IMM GRANULOCYTES NFR BLD AUTO: 0.2 % (ref 0–0.5)
LDLC SERPL CALC-MCNC: 78 MG/DL (ref 63–159)
LYMPHOCYTES # BLD AUTO: 1.5 K/UL (ref 1–4.8)
LYMPHOCYTES NFR BLD: 24.7 % (ref 18–48)
MCH RBC QN AUTO: 31.2 PG (ref 27–31)
MCHC RBC AUTO-ENTMCNC: 33.9 G/DL (ref 32–36)
MCV RBC AUTO: 92 FL (ref 82–98)
MONOCYTES # BLD AUTO: 0.9 K/UL (ref 0.3–1)
MONOCYTES NFR BLD: 13.7 % (ref 4–15)
NEUTROPHILS # BLD AUTO: 3.4 K/UL (ref 1.8–7.7)
NEUTROPHILS NFR BLD: 55.1 % (ref 38–73)
NONHDLC SERPL-MCNC: 97 MG/DL
NRBC BLD-RTO: 0 /100 WBC
PLATELET # BLD AUTO: 144 K/UL (ref 150–450)
PMV BLD AUTO: 11.4 FL (ref 9.2–12.9)
POTASSIUM SERPL-SCNC: 3.8 MMOL/L (ref 3.5–5.1)
PROT SERPL-MCNC: 7.1 G/DL (ref 6–8.4)
RBC # BLD AUTO: 5.09 M/UL (ref 4.6–6.2)
SODIUM SERPL-SCNC: 140 MMOL/L (ref 136–145)
TRIGL SERPL-MCNC: 95 MG/DL (ref 30–150)
WBC # BLD AUTO: 6.2 K/UL (ref 3.9–12.7)

## 2022-11-03 PROCEDURE — 80061 LIPID PANEL: CPT | Performed by: INTERNAL MEDICINE

## 2022-11-03 PROCEDURE — 84153 ASSAY OF PSA TOTAL: CPT | Performed by: INTERNAL MEDICINE

## 2022-11-03 PROCEDURE — 36415 COLL VENOUS BLD VENIPUNCTURE: CPT | Mod: PO | Performed by: INTERNAL MEDICINE

## 2022-11-03 PROCEDURE — 85025 COMPLETE CBC W/AUTO DIFF WBC: CPT | Performed by: INTERNAL MEDICINE

## 2022-11-03 PROCEDURE — 80053 COMPREHEN METABOLIC PANEL: CPT | Performed by: INTERNAL MEDICINE

## 2022-11-03 PROCEDURE — 83036 HEMOGLOBIN GLYCOSYLATED A1C: CPT | Performed by: INTERNAL MEDICINE

## 2022-11-03 NOTE — TELEPHONE ENCOUNTER
Please delete order and advise patient he had one less than a year ago. He just saw his urologist and I will defer to them if he needs one now or not.

## 2022-11-07 ENCOUNTER — OFFICE VISIT (OUTPATIENT)
Dept: FAMILY MEDICINE | Facility: CLINIC | Age: 67
End: 2022-11-07
Payer: COMMERCIAL

## 2022-11-07 VITALS
TEMPERATURE: 98 F | HEART RATE: 75 BPM | BODY MASS INDEX: 26.2 KG/M2 | DIASTOLIC BLOOD PRESSURE: 82 MMHG | WEIGHT: 193.44 LBS | SYSTOLIC BLOOD PRESSURE: 118 MMHG | HEIGHT: 72 IN | OXYGEN SATURATION: 94 %

## 2022-11-07 DIAGNOSIS — E66.3 OVERWEIGHT: ICD-10-CM

## 2022-11-07 DIAGNOSIS — K76.0 FATTY LIVER: ICD-10-CM

## 2022-11-07 DIAGNOSIS — Z23 NEED FOR SHINGLES VACCINE: ICD-10-CM

## 2022-11-07 DIAGNOSIS — I10 ESSENTIAL HYPERTENSION: ICD-10-CM

## 2022-11-07 DIAGNOSIS — R73.02 GLUCOSE INTOLERANCE (IMPAIRED GLUCOSE TOLERANCE): ICD-10-CM

## 2022-11-07 DIAGNOSIS — Z23 NEED FOR TDAP VACCINATION: ICD-10-CM

## 2022-11-07 DIAGNOSIS — I77.1 TORTUOUS AORTA: ICD-10-CM

## 2022-11-07 DIAGNOSIS — K21.9 GASTROESOPHAGEAL REFLUX DISEASE, UNSPECIFIED WHETHER ESOPHAGITIS PRESENT: ICD-10-CM

## 2022-11-07 DIAGNOSIS — Z23 FLU VACCINE NEED: ICD-10-CM

## 2022-11-07 DIAGNOSIS — E78.49 OTHER HYPERLIPIDEMIA: ICD-10-CM

## 2022-11-07 DIAGNOSIS — Z00.00 ROUTINE MEDICAL EXAM: Primary | ICD-10-CM

## 2022-11-07 DIAGNOSIS — N40.0 BENIGN PROSTATIC HYPERPLASIA, UNSPECIFIED WHETHER LOWER URINARY TRACT SYMPTOMS PRESENT: ICD-10-CM

## 2022-11-07 PROCEDURE — 3288F FALL RISK ASSESSMENT DOCD: CPT | Mod: CPTII,S$GLB,, | Performed by: INTERNAL MEDICINE

## 2022-11-07 PROCEDURE — 3008F BODY MASS INDEX DOCD: CPT | Mod: CPTII,S$GLB,, | Performed by: INTERNAL MEDICINE

## 2022-11-07 PROCEDURE — 99999 PR PBB SHADOW E&M-EST. PATIENT-LVL IV: CPT | Mod: PBBFAC,,, | Performed by: INTERNAL MEDICINE

## 2022-11-07 PROCEDURE — 1160F PR REVIEW ALL MEDS BY PRESCRIBER/CLIN PHARMACIST DOCUMENTED: ICD-10-PCS | Mod: CPTII,S$GLB,, | Performed by: INTERNAL MEDICINE

## 2022-11-07 PROCEDURE — 3008F PR BODY MASS INDEX (BMI) DOCUMENTED: ICD-10-PCS | Mod: CPTII,S$GLB,, | Performed by: INTERNAL MEDICINE

## 2022-11-07 PROCEDURE — 1126F PR PAIN SEVERITY QUANTIFIED, NO PAIN PRESENT: ICD-10-PCS | Mod: CPTII,S$GLB,, | Performed by: INTERNAL MEDICINE

## 2022-11-07 PROCEDURE — 99397 PR PREVENTIVE VISIT,EST,65 & OVER: ICD-10-PCS | Mod: S$GLB,,, | Performed by: INTERNAL MEDICINE

## 2022-11-07 PROCEDURE — 99999 PR PBB SHADOW E&M-EST. PATIENT-LVL IV: ICD-10-PCS | Mod: PBBFAC,,, | Performed by: INTERNAL MEDICINE

## 2022-11-07 PROCEDURE — 1159F PR MEDICATION LIST DOCUMENTED IN MEDICAL RECORD: ICD-10-PCS | Mod: CPTII,S$GLB,, | Performed by: INTERNAL MEDICINE

## 2022-11-07 PROCEDURE — 3074F PR MOST RECENT SYSTOLIC BLOOD PRESSURE < 130 MM HG: ICD-10-PCS | Mod: CPTII,S$GLB,, | Performed by: INTERNAL MEDICINE

## 2022-11-07 PROCEDURE — 3044F PR MOST RECENT HEMOGLOBIN A1C LEVEL <7.0%: ICD-10-PCS | Mod: CPTII,S$GLB,, | Performed by: INTERNAL MEDICINE

## 2022-11-07 PROCEDURE — 3288F PR FALLS RISK ASSESSMENT DOCUMENTED: ICD-10-PCS | Mod: CPTII,S$GLB,, | Performed by: INTERNAL MEDICINE

## 2022-11-07 PROCEDURE — 1101F PT FALLS ASSESS-DOCD LE1/YR: CPT | Mod: CPTII,S$GLB,, | Performed by: INTERNAL MEDICINE

## 2022-11-07 PROCEDURE — 3044F HG A1C LEVEL LT 7.0%: CPT | Mod: CPTII,S$GLB,, | Performed by: INTERNAL MEDICINE

## 2022-11-07 PROCEDURE — 1126F AMNT PAIN NOTED NONE PRSNT: CPT | Mod: CPTII,S$GLB,, | Performed by: INTERNAL MEDICINE

## 2022-11-07 PROCEDURE — 99397 PER PM REEVAL EST PAT 65+ YR: CPT | Mod: S$GLB,,, | Performed by: INTERNAL MEDICINE

## 2022-11-07 PROCEDURE — 1101F PR PT FALLS ASSESS DOC 0-1 FALLS W/OUT INJ PAST YR: ICD-10-PCS | Mod: CPTII,S$GLB,, | Performed by: INTERNAL MEDICINE

## 2022-11-07 PROCEDURE — 1160F RVW MEDS BY RX/DR IN RCRD: CPT | Mod: CPTII,S$GLB,, | Performed by: INTERNAL MEDICINE

## 2022-11-07 PROCEDURE — 3079F PR MOST RECENT DIASTOLIC BLOOD PRESSURE 80-89 MM HG: ICD-10-PCS | Mod: CPTII,S$GLB,, | Performed by: INTERNAL MEDICINE

## 2022-11-07 PROCEDURE — 1159F MED LIST DOCD IN RCRD: CPT | Mod: CPTII,S$GLB,, | Performed by: INTERNAL MEDICINE

## 2022-11-07 PROCEDURE — 3074F SYST BP LT 130 MM HG: CPT | Mod: CPTII,S$GLB,, | Performed by: INTERNAL MEDICINE

## 2022-11-07 PROCEDURE — 3079F DIAST BP 80-89 MM HG: CPT | Mod: CPTII,S$GLB,, | Performed by: INTERNAL MEDICINE

## 2022-11-08 NOTE — PROGRESS NOTES
HISTORY OF PRESENT ILLNESS:  Sawyer Browning is a 67 y.o. male who presents to the clinic today for a routine medical physical exam. His last physical exam was lw pe time; ago: approximately 1 years(s) ago.    Objective    PAST MEDICAL HISTORY:  Past Medical History:   Diagnosis Date    BPH (benign prostatic hyperplasia)     Elevated liver enzymes     Fatty infiltration of liver     Glucose intolerance (impaired glucose tolerance)     Hiatal hernia     Hyperlipidemia     Hypertension     Overweight     Reflux        PAST SURGICAL HISTORY:  Past Surgical History:   Procedure Laterality Date    inguinal hernia      x2    tonsillectomy         SOCIAL HISTORY:  Social History     Socioeconomic History    Marital status:     Number of children: 2   Occupational History    Occupation: Works in insurance   Tobacco Use    Smoking status: Never    Smokeless tobacco: Never   Substance and Sexual Activity    Alcohol use: Yes     Comment: 2 glasses of wine most evenings.    Drug use: No     Social Determinants of Health     Financial Resource Strain: Unknown    Difficulty of Paying Living Expenses: Patient refused   Food Insecurity: Unknown    Worried About Running Out of Food in the Last Year: Patient refused    Ran Out of Food in the Last Year: Patient refused   Transportation Needs: Unknown    Lack of Transportation (Medical): Patient refused    Lack of Transportation (Non-Medical): Patient refused   Physical Activity: Insufficiently Active    Days of Exercise per Week: 3 days    Minutes of Exercise per Session: 30 min   Stress: No Stress Concern Present    Feeling of Stress : Not at all   Social Connections: Unknown    Frequency of Communication with Friends and Family: More than three times a week    Frequency of Social Gatherings with Friends and Family: More than three times a week    Active Member of Clubs or Organizations: Patient refused    Attends Club or Organization Meetings: Patient refused    Marital Status:     Housing Stability: Unknown    Unable to Pay for Housing in the Last Year: Patient refused    Number of Places Lived in the Last Year: 1    Unstable Housing in the Last Year: No       FAMILY HISTORY:  Family History   Problem Relation Age of Onset    Hypertension Mother     Macular degeneration Mother         - gets shots in her eye    Stroke Father     Heart disease Father     Aneurysm Father     Migraines Sister     Arthritis Brother         knees    Dementia Paternal Grandmother     Diabetes Paternal Grandmother     Heart disease Paternal Grandfather     Diabetes Brother     Hernia Brother     Cancer Paternal Aunt     Cancer Paternal Uncle     Cancer Paternal Aunt     Cancer Paternal Uncle     Colon cancer Neg Hx     Prostate cancer Neg Hx        ALLERGIES AND MEDICATIONS: updated and reviewed.  Review of patient's allergies indicates:   Allergen Reactions    Ace inhibitors Other (See Comments)     cough    Losartan      headache     Medication List with Changes/Refills   Current Medications    AMLODIPINE (NORVASC) 10 MG TABLET    Take 1 tablet (10 mg total) by mouth once daily.    ATORVASTATIN CALCIUM (ATORVASTATIN ORAL)    atorvastatin Take No date recorded No form recorded No frequency recorded No route recorded No set duration recorded No set duration amount recorded active No dosage strength recorded No dosage strength units of measure recorded    BENZONATATE (TESSALON) 200 MG CAPSULE    Take 1 capsule (200 mg total) by mouth 3 (three) times daily as needed for Cough.    CHLORPHENIRAMINE-ACETAMINOPHEN (CORICIDIN HBP COLD AND FLU) 2-325 MG TAB    Take 1 tablet by mouth 4 (four) times daily as needed (runny nose, sneezing, body ache, fever).    EZETIMIBE-SIMVASTATIN 10-40 MG (VYTORIN) 10-40 MG PER TABLET    Take 1 tablet by mouth every evening.    LANSOPRAZOLE (PREVACID) 15 MG CAPSULE    lansoprazole Take No date recorded No form recorded No frequency recorded No route recorded No set duration  recorded No set duration amount recorded active No dosage strength recorded No dosage strength units of measure recorded    LANSOPRAZOLE (PREVACID) 30 MG CAPSULE    TAKE 1 CAPSULE DAILY ONLY  AS NEEDED FOR HEARTBURN. TAKE ONLY AS NEEDED.    TADALAFIL (CIALIS) 20 MG TAB    Take 1 tablet (20 mg total) by mouth once daily.    VALSARTAN-HYDROCHLOROTHIAZIDE (DIOVAN-HCT) 320-25 MG PER TABLET    TAKE 1 TABLET ONCE DAILY (PLEASE INACTIVATE ALL PRIOR SCRIPTS WITH SAME NAME AND STRENGTH INCLUDING ON HOLDS) Strength: 320-25 mg         CARE TEAM:  Patient Care Team:  Mary Kay Haines MD as PCP - General (Internal Medicine)             SCREENING HISTORY:  Health Maintenance         Date Due Completion Date    TETANUS VACCINE Never done ---    Shingles Vaccine (1 of 2) Never done ---    COVID-19 Vaccine (3 - Booster for Pfizer series) 04/19/2021 2/22/2021    PROSTATE-SPECIFIC ANTIGEN 11/03/2023 11/3/2022    Colorectal Cancer Screening 01/21/2025 1/21/2022    Override on 5/2/2007: Done (normal)    Lipid Panel 11/03/2027 11/3/2022              REVIEW OF SYSTEMS:   The patient reports : good dietary habits.  The patient reports  : that they exercise regularly.  Review of Systems   Constitutional:  Negative for activity change and unexpected weight change.        He has been eating local honey recently and he states he feels 'better'.   HENT:  Negative for hearing loss, rhinorrhea and trouble swallowing.    Eyes:  Negative for discharge and visual disturbance.   Respiratory:  Negative for chest tightness and wheezing.    Cardiovascular:  Negative for chest pain and palpitations.   Gastrointestinal:  Negative for blood in stool, constipation, diarrhea and vomiting.   Endocrine: Negative for polydipsia and polyuria.   Genitourinary:  Negative for difficulty urinating, hematuria and urgency.   Musculoskeletal:  Negative for arthralgias, joint swelling and neck pain.   Neurological:  Negative for weakness and headaches.  "  Psychiatric/Behavioral:  Negative for confusion and dysphoric mood.    ROS : patient denies: difficulty initiating urination          PHYSICAL EXAM:  274}  Vitals:    11/07/22 1412   BP: 118/82   Pulse: 75   Temp: 98 °F (36.7 °C)     Weight: 87.8 kg (193 lb 7.3 oz)   Height: 6' 0.01" (182.9 cm)   Body mass index is 26.23 kg/m².    General appearance - alert, well appearing, and in no distress, overweight  Psychiatric - alert, oriented to person, place, and time, normal behavior, speech, dress, motor activity and thought process  Eyes - pupils equal and reactive, extraocular eye movements intact, sclera anicteric  Mouth - not examined  Neck - supple, no significant adenopathy, carotids upstroke normal bilaterally, no bruits  Lymphatics - no palpable cervical lymphadenopathy  Chest - clear to auscultation, no wheezes, rales or rhonchi, symmetric air entry  Heart - normal rate and regular rhythm, no gallops noted  Neurological - alert, normal speech, no focal findings; cranial nerves II through XII intact  Musculoskeletal - no joint tenderness, deformity or swelling, no muscular tenderness noted  Extremities - peripheral pulses normal, no pedal edema, no clubbing or cyanosis  Skin - normal coloration, no suspicious skin lesions      Labs:  Results for orders placed or performed in visit on 11/03/22   CBC Auto Differential   Result Value Ref Range    WBC 6.20 3.90 - 12.70 K/uL    RBC 5.09 4.60 - 6.20 M/uL    Hemoglobin 15.9 14.0 - 18.0 g/dL    Hematocrit 46.9 40.0 - 54.0 %    MCV 92 82 - 98 fL    MCH 31.2 (H) 27.0 - 31.0 pg    MCHC 33.9 32.0 - 36.0 g/dL    RDW 12.9 11.5 - 14.5 %    Platelets 144 (L) 150 - 450 K/uL    MPV 11.4 9.2 - 12.9 fL    Immature Granulocytes 0.2 0.0 - 0.5 %    Gran # (ANC) 3.4 1.8 - 7.7 K/uL    Immature Grans (Abs) 0.01 0.00 - 0.04 K/uL    Lymph # 1.5 1.0 - 4.8 K/uL    Mono # 0.9 0.3 - 1.0 K/uL    Eos # 0.3 0.0 - 0.5 K/uL    Baso # 0.06 0.00 - 0.20 K/uL    nRBC 0 0 /100 WBC    Gran % 55.1 38.0 " - 73.0 %    Lymph % 24.7 18.0 - 48.0 %    Mono % 13.7 4.0 - 15.0 %    Eosinophil % 5.3 0.0 - 8.0 %    Basophil % 1.0 0.0 - 1.9 %    Differential Method Automated    Comprehensive Metabolic Panel   Result Value Ref Range    Sodium 140 136 - 145 mmol/L    Potassium 3.8 3.5 - 5.1 mmol/L    Chloride 106 95 - 110 mmol/L    CO2 26 23 - 29 mmol/L    Glucose 104 70 - 110 mg/dL    BUN 18 8 - 23 mg/dL    Creatinine 1.2 0.5 - 1.4 mg/dL    Calcium 9.2 8.7 - 10.5 mg/dL    Total Protein 7.1 6.0 - 8.4 g/dL    Albumin 4.3 3.5 - 5.2 g/dL    Total Bilirubin 0.9 0.1 - 1.0 mg/dL    Alkaline Phosphatase 58 55 - 135 U/L    AST 27 10 - 40 U/L    ALT 39 10 - 44 U/L    Anion Gap 8 8 - 16 mmol/L    eGFR >60.0 >60 mL/min/1.73 m^2   Lipid Panel   Result Value Ref Range    Cholesterol 148 120 - 199 mg/dL    Triglycerides 95 30 - 150 mg/dL    HDL 51 40 - 75 mg/dL    LDL Cholesterol 78.0 63.0 - 159.0 mg/dL    HDL/Cholesterol Ratio 34.5 20.0 - 50.0 %    Total Cholesterol/HDL Ratio 2.9 2.0 - 5.0    Non-HDL Cholesterol 97 mg/dL   Hemoglobin A1C   Result Value Ref Range    Hemoglobin A1C 5.5 4.0 - 5.6 %    Estimated Avg Glucose 111 68 - 131 mg/dL   PSA, Screening   Result Value Ref Range    PSA, Screen 4.3 (H) 0.00 - 4.00 ng/mL            ASSESSMENT AND PLAN: 274}  1. Routine medical exam  Counseled on age appropriate medical preventative services including age appropriate cancer screenings, age appropriate eye and dental exams, over all nutritional health, need for a consistent exercise regimen, and an over all push towards maintaining a vigorous and active lifestyle.  Counseled on age appropriate vaccines and discussed upcoming health care needs based on age/gender. Discussed good sleep hygiene and stress management.      2. Essential hypertension  BP Readings from Last 1 Encounters:   11/07/22 118/82      The current medical regimen is effective;  continue present plan and medications. Recommended patient to check home readings to monitor and see  me for followup as scheduled or sooner as needed.   Discussed sodium restriction, maintaining ideal body weight and regular exercise program as physiologic means to continue to achieve blood pressure control in addition to medication compliance.  Patient was educated that both decongestant and anti-inflammatory medication may raise blood pressure.  The patient is not active on the digital hypertension program.      3. Other hyperlipidemia  Lab Results   Component Value Date    CHOL 148 11/03/2022     Lab Results   Component Value Date    HDL 51 11/03/2022     Lab Results   Component Value Date    LDLCALC 78.0 11/03/2022     Lab Results   Component Value Date    TRIG 95 11/03/2022     Lab Results   Component Value Date    LDLCALC 78.0 11/03/2022     We discussed low fat diet and regular exercise.The current medical regimen is effective;  continue present plan and medications.       4. Glucose intolerance (impaired glucose tolerance)  Lab Results   Component Value Date    HGBA1C 5.5 11/03/2022     Stable. We discussed low sugar/low carbohydrate diet and regular exercise to prevent progression. No need for prescription medication at this time.  Check A1c yearly.      5. Tortuous aorta  Patient with tortuous/ectatic Aorta.  Stable/asymptomatic. Currently stable on lipid lowering medication and blood pressure monitoring.     Overview:  - noted on CXR 10/21/2019      6. Benign prostatic hyperplasia, unspecified whether lower urinary tract symptoms present  The current medical regimen is effective;  continue present plan and medications. Recent PSA slightly elevated but stable from previous - will ask his urologist to review.  Followed by: Urology.     Overview:  - followed by urology, Dr. Lloyd  - 4/2015 - TR ultrasound with hbx - bx negative x 6 (for elevated PSA)      7. Gastroesophageal reflux disease, unspecified whether esophagitis present  Symptoms controlled: yes - takes medication occasionally as needed.   Reflux  precautions discussed (eliminate tobacco if a smoker; minimize caffeine, chocolate and red/white peppermint intake; avoid heavy and spicy meals; don't lay down within 2-3 hours after eating; minimize the intake of NSAIDs). Medication as needed.   Patient asked to take medication breaks, if possible - discussed chronic use can limit calcium absorption (which can lead to osteopenia/osteoporosis), increases the risk for intestinal infections, and can cause kidney damage. There are also some newer studies that show possible increased risk of mortality.      8. Fatty liver  Lab Results   Component Value Date    ALT 39 11/03/2022    AST 27 11/03/2022    GGT 29 03/18/2009    ALKPHOS 58 11/03/2022    BILITOT 0.9 11/03/2022     Asymptomatic. We discussed the need for weight loss to prevent progression to cirrhosis of the liver. LFTs normal. Patient does not require further evaluation at this time.      9. Overweight  BMI Readings from Last 3 Encounters:   11/07/22 26.23 kg/m²   10/11/22 25.71 kg/m²   04/05/22 26.03 kg/m²     The patient is asked to continue to make an attempt to improve diet and exercise patterns to aid in medical management of this problem.       10. Flu vaccine need/11. Need for shingles vaccine/12. Need for Tdap vaccination  Patient declined all immunizations today.           Orders Placed This Encounter   Procedures    Zoster Recombinant Vaccine       Follow up in about 1 year (around 11/7/2023), or if symptoms worsen or fail to improve, for annual exam. or sooner as needed.

## 2022-11-23 ENCOUNTER — TELEPHONE (OUTPATIENT)
Dept: FAMILY MEDICINE | Facility: CLINIC | Age: 67
End: 2022-11-23
Payer: COMMERCIAL

## 2022-11-23 NOTE — TELEPHONE ENCOUNTER
----- Message from Anu Jonas sent at 11/23/2022  1:41 PM CST -----  Type:  Patient Returning Call    Who Called: self    Who Left Message for Patient: Yenni    Does the patient know what this is regarding?:yes    Would the patient rather a call back or a response via [facility]? call    Best Call Back Number:.[phone] (home) [phone] (work)

## 2022-11-23 NOTE — TELEPHONE ENCOUNTER
----- Message from Anastasiya Groves MA sent at 11/23/2022 10:52 AM CST -----  Type: Patient Call Back    Who called: Self    What is the request in detail: had a missed call from the office ..     Can the clinic reply by [facility]?No    Would the patient rather a call back or a response via [facility]? yes    Best call back number: [phone]

## 2023-05-09 DIAGNOSIS — E78.49 OTHER HYPERLIPIDEMIA: ICD-10-CM

## 2023-05-09 RX ORDER — EZETIMIBE AND SIMVASTATIN 10; 40 MG/1; MG/1
TABLET ORAL
Qty: 90 TABLET | Refills: 1 | Status: SHIPPED | OUTPATIENT
Start: 2023-05-09 | End: 2023-08-14

## 2023-05-09 NOTE — TELEPHONE ENCOUNTER
No care due was identified.  [facility] Embedded Care Due Messages. Reference number: [id number].   5/09/2023 6:01:26 PM CDT

## 2023-05-10 NOTE — TELEPHONE ENCOUNTER
Refill Decision Note   Sawyer Browning  is requesting a refill authorization.  Brief Assessment and Rationale for Refill:  Approve     Medication Therapy Plan:       Medication Reconciliation Completed: No   Comments:     No Care Gaps recommended.     Note composed:10:49 PM 05/09/2023

## 2023-06-28 ENCOUNTER — OFFICE VISIT (OUTPATIENT)
Dept: CARDIOLOGY | Facility: CLINIC | Age: 68
End: 2023-06-28
Payer: COMMERCIAL

## 2023-06-28 VITALS
BODY MASS INDEX: 26.16 KG/M2 | DIASTOLIC BLOOD PRESSURE: 87 MMHG | SYSTOLIC BLOOD PRESSURE: 125 MMHG | HEART RATE: 74 BPM | WEIGHT: 193.13 LBS | HEIGHT: 72 IN | OXYGEN SATURATION: 96 %

## 2023-06-28 DIAGNOSIS — R01.1 UNDIAGNOSED CARDIAC MURMURS: ICD-10-CM

## 2023-06-28 DIAGNOSIS — E78.49 OTHER HYPERLIPIDEMIA: ICD-10-CM

## 2023-06-28 DIAGNOSIS — I77.1 TORTUOUS AORTA: ICD-10-CM

## 2023-06-28 DIAGNOSIS — Z82.49 FAMILY HISTORY OF AORTIC ANEURYSM: ICD-10-CM

## 2023-06-28 DIAGNOSIS — I10 ESSENTIAL HYPERTENSION: Primary | ICD-10-CM

## 2023-06-28 PROCEDURE — 1126F PR PAIN SEVERITY QUANTIFIED, NO PAIN PRESENT: ICD-10-PCS | Mod: CPTII,S$GLB,, | Performed by: INTERNAL MEDICINE

## 2023-06-28 PROCEDURE — 3074F SYST BP LT 130 MM HG: CPT | Mod: CPTII,S$GLB,, | Performed by: INTERNAL MEDICINE

## 2023-06-28 PROCEDURE — 1160F RVW MEDS BY RX/DR IN RCRD: CPT | Mod: CPTII,S$GLB,, | Performed by: INTERNAL MEDICINE

## 2023-06-28 PROCEDURE — 1159F MED LIST DOCD IN RCRD: CPT | Mod: CPTII,S$GLB,, | Performed by: INTERNAL MEDICINE

## 2023-06-28 PROCEDURE — 3079F DIAST BP 80-89 MM HG: CPT | Mod: CPTII,S$GLB,, | Performed by: INTERNAL MEDICINE

## 2023-06-28 PROCEDURE — 3008F BODY MASS INDEX DOCD: CPT | Mod: CPTII,S$GLB,, | Performed by: INTERNAL MEDICINE

## 2023-06-28 PROCEDURE — 3079F PR MOST RECENT DIASTOLIC BLOOD PRESSURE 80-89 MM HG: ICD-10-PCS | Mod: CPTII,S$GLB,, | Performed by: INTERNAL MEDICINE

## 2023-06-28 PROCEDURE — 99205 PR OFFICE/OUTPT VISIT, NEW, LEVL V, 60-74 MIN: ICD-10-PCS | Mod: S$GLB,,, | Performed by: INTERNAL MEDICINE

## 2023-06-28 PROCEDURE — 99205 OFFICE O/P NEW HI 60 MIN: CPT | Mod: S$GLB,,, | Performed by: INTERNAL MEDICINE

## 2023-06-28 PROCEDURE — 3008F PR BODY MASS INDEX (BMI) DOCUMENTED: ICD-10-PCS | Mod: CPTII,S$GLB,, | Performed by: INTERNAL MEDICINE

## 2023-06-28 PROCEDURE — 1160F PR REVIEW ALL MEDS BY PRESCRIBER/CLIN PHARMACIST DOCUMENTED: ICD-10-PCS | Mod: CPTII,S$GLB,, | Performed by: INTERNAL MEDICINE

## 2023-06-28 PROCEDURE — 99999 PR PBB SHADOW E&M-EST. PATIENT-LVL IV: CPT | Mod: PBBFAC,,, | Performed by: INTERNAL MEDICINE

## 2023-06-28 PROCEDURE — 99999 PR PBB SHADOW E&M-EST. PATIENT-LVL IV: ICD-10-PCS | Mod: PBBFAC,,, | Performed by: INTERNAL MEDICINE

## 2023-06-28 PROCEDURE — 1159F PR MEDICATION LIST DOCUMENTED IN MEDICAL RECORD: ICD-10-PCS | Mod: CPTII,S$GLB,, | Performed by: INTERNAL MEDICINE

## 2023-06-28 PROCEDURE — 1126F AMNT PAIN NOTED NONE PRSNT: CPT | Mod: CPTII,S$GLB,, | Performed by: INTERNAL MEDICINE

## 2023-06-28 PROCEDURE — 3074F PR MOST RECENT SYSTOLIC BLOOD PRESSURE < 130 MM HG: ICD-10-PCS | Mod: CPTII,S$GLB,, | Performed by: INTERNAL MEDICINE

## 2023-06-28 NOTE — PROGRESS NOTES
Subjective:   Patient ID:  Sawyer Browning is a 68 y.o. male who presents for management of Hyperlipidemia, Hypertension, and cardiovascular risk factors      HPI:       68-year-old male with past medical history of hypertension, hyperlipidemia, family history of infrarenal abdominal aortic aneurysm presenting for management of cardiovascular risk factors.  He is able to exercise without any cardiovascular limitations.  He is compliant with his medications.  He takes statin and an ARB.  He has a 10 year cardiovascular risk of 14.7%.        The 10-year ASCVD risk score (Rell BURT, et al., 2019) is: 14.7%    Values used to calculate the score:      Age: 68 years      Sex: Male      Is Non- : No      Diabetic: No      Tobacco smoker: No      Systolic Blood Pressure: 125 mmHg      Is BP treated: Yes      HDL Cholesterol: 51 mg/dL      Total Cholesterol: 148 mg/dL          Patient Active Problem List    Diagnosis Date Noted    Elevated alanine aminotransferase (ALT) level 2021    Fatty liver 2021    Tortuous aorta 10/21/2019     - noted on CXR 10/21/2019      BPH (benign prostatic hyperplasia)      - followed by urology, Dr. Lloyd  - 2015 - TR ultrasound with hbx - bx negative x 6 (for elevated PSA)      Glucose intolerance (impaired glucose tolerance)     Overweight     Essential hypertension     Other hyperlipidemia     GERD (gastroesophageal reflux disease)            Right Arm BP - Sittin/86  Left Arm BP - Sittin/87        LABS      LAST HbA1c  Lab Results   Component Value Date    HGBA1C 5.5 2022       Lipid panel  Lab Results   Component Value Date    CHOL 148 2022    CHOL 148 2021    CHOL 150 2020     Lab Results   Component Value Date    HDL 51 2022    HDL 50 2021    HDL 61 2020     Lab Results   Component Value Date    LDLCALC 78.0 2022    LDLCALC 81.4 2021    LDLCALC 73.8 2020     Lab Results   Component  Value Date    TRIG 95 11/03/2022    TRIG 83 11/02/2021    TRIG 76 07/28/2020     Lab Results   Component Value Date    CHOLHDL 34.5 11/03/2022    CHOLHDL 33.8 11/02/2021    CHOLHDL 40.7 07/28/2020            Review of Systems   Constitutional: Negative for diaphoresis, night sweats, weight gain and weight loss.   HENT:  Negative for congestion.    Eyes:  Negative for blurred vision, discharge and double vision.   Cardiovascular:  Negative for chest pain, claudication, cyanosis, dyspnea on exertion, irregular heartbeat, leg swelling, near-syncope, orthopnea, palpitations, paroxysmal nocturnal dyspnea and syncope.   Respiratory:  Negative for cough, shortness of breath and wheezing.    Endocrine: Negative for cold intolerance, heat intolerance and polyphagia.   Hematologic/Lymphatic: Negative for adenopathy and bleeding problem. Does not bruise/bleed easily.   Skin:  Negative for dry skin and nail changes.   Musculoskeletal:  Negative for arthritis, back pain, falls, joint pain, myalgias and neck pain.   Gastrointestinal:  Negative for bloating, abdominal pain, change in bowel habit and constipation.   Genitourinary:  Negative for bladder incontinence, dysuria, flank pain, genital sores and missed menses.   Neurological:  Negative for aphonia, brief paralysis, difficulty with concentration, dizziness and weakness.   Psychiatric/Behavioral:  Negative for altered mental status and memory loss. The patient does not have insomnia.    Allergic/Immunologic: Negative for environmental allergies.     Objective:   Physical Exam  Constitutional:       Appearance: He is well-developed.      Interventions: He is not intubated.  HENT:      Head: Normocephalic and atraumatic.      Right Ear: External ear normal.      Left Ear: External ear normal.   Eyes:      General: No scleral icterus.        Right eye: No discharge.         Left eye: No discharge.      Conjunctiva/sclera: Conjunctivae normal.      Pupils: Pupils are equal,  round, and reactive to light.   Neck:      Thyroid: No thyromegaly.      Vascular: Normal carotid pulses. No carotid bruit, hepatojugular reflux or JVD.      Trachea: No tracheal deviation.   Cardiovascular:      Rate and Rhythm: Normal rate and regular rhythm. No extrasystoles are present.     Chest Wall: PMI is not displaced.      Pulses:           Carotid pulses are 2+ on the right side and 2+ on the left side.       Radial pulses are 2+ on the right side and 2+ on the left side.        Femoral pulses are 2+ on the right side and 2+ on the left side.       Popliteal pulses are 2+ on the right side and 2+ on the left side.        Dorsalis pedis pulses are 2+ on the right side and 2+ on the left side.        Posterior tibial pulses are 2+ on the right side and 2+ on the left side.      Heart sounds: S1 normal and S2 normal. Heart sounds not distant. No midsystolic click. Murmur heard.   Systolic murmur is present with a grade of 2/6 at the upper right sternal border.     No friction rub. No gallop. No S3 sounds.   Pulmonary:      Effort: Pulmonary effort is normal. No tachypnea, bradypnea, accessory muscle usage or respiratory distress. He is not intubated.      Breath sounds: Normal breath sounds. No stridor. No decreased breath sounds, wheezing or rales.   Chest:      Chest wall: No tenderness.   Abdominal:      General: There is no distension or abdominal bruit.      Palpations: There is no mass or pulsatile mass.      Tenderness: There is no abdominal tenderness. There is no guarding or rebound.   Musculoskeletal:         General: No tenderness. Normal range of motion.      Cervical back: Normal range of motion and neck supple.   Lymphadenopathy:      Cervical: No cervical adenopathy.   Skin:     General: Skin is warm.      Coloration: Skin is not pale.      Findings: No erythema or rash.   Neurological:      Mental Status: He is alert and oriented to person, place, and time.      Cranial Nerves: No cranial  nerve deficit.      Coordination: Coordination normal.      Deep Tendon Reflexes: Reflexes are normal and symmetric.   Psychiatric:         Behavior: Behavior normal.         Thought Content: Thought content normal.         Judgment: Judgment normal.       Assessment:     1. Essential hypertension    2. Other hyperlipidemia    3. Tortuous aorta    4. Family history of aortic aneurysm        Plan:         Calcium score  AAA US in view of family history of AAA  Echo in view of undiagnosed cardiac murmur        Virtual visit after results.  Adjust lipid-lowering regiment target LDL less than 70  Add aspirin 81 mg daily          Continue with current medical plan and lifestyle changes.  Return sooner for concerns or questions. If symptoms persist go to the ED  I have reviewed all pertinent data on this patient       I have reviewed the patient's medical history in detail and updated the computerized patient record.    Orders Placed This Encounter   Procedures    CT Cardiac Scoring     Standing Status:   Future     Standing Expiration Date:   6/28/2024     Order Specific Question:   May the Radiologist modify the order per protocol to meet the clinical needs of the patient?     Answer:   Yes    US Abdominal Aorta     Standing Status:   Future     Standing Expiration Date:   6/28/2024     Order Specific Question:   May the Radiologist modify the order per protocol to meet the clinical needs of the patient?     Answer:   Yes     Order Specific Question:   Release to patient     Answer:   Immediate       Follow up as scheduled. Return sooner for concerns or questions            He expressed verbal understanding and agreed with the plan        Patient's Medications   New Prescriptions    No medications on file   Previous Medications    AMLODIPINE (NORVASC) 10 MG TABLET    TAKE 1 TABLET ONCE DAILY    EZETIMIBE-SIMVASTATIN 10-40 MG (VYTORIN) 10-40 MG PER TABLET    TAKE 1 TABLET EVERY EVENING    LANSOPRAZOLE (PREVACID) 30 MG  CAPSULE    TAKE 1 CAPSULE DAILY ONLY  AS NEEDED FOR HEARTBURN.   TAKE ONLY AS NEEDED.    TADALAFIL (CIALIS) 20 MG TAB    Take 1 tablet (20 mg total) by mouth once daily.    VALSARTAN-HYDROCHLOROTHIAZIDE (DIOVAN-HCT) 320-25 MG PER TABLET    TAKE 1 TABLET by mouth ONCE DAILY   Modified Medications    No medications on file   Discontinued Medications

## 2023-06-29 ENCOUNTER — HOSPITAL ENCOUNTER (OUTPATIENT)
Dept: CARDIOLOGY | Facility: HOSPITAL | Age: 68
Discharge: HOME | End: 2023-06-29
Attending: INTERNAL MEDICINE
Payer: COMMERCIAL

## 2023-06-29 VITALS — BODY MASS INDEX: 26.14 KG/M2 | WEIGHT: 193 LBS | HEIGHT: 72 IN

## 2023-06-29 DIAGNOSIS — R01.1 UNDIAGNOSED CARDIAC MURMURS: ICD-10-CM

## 2023-06-29 DIAGNOSIS — I10 ESSENTIAL HYPERTENSION: ICD-10-CM

## 2023-06-29 LAB
ASCENDING AORTA: 4.88 CM
AV INDEX (PROSTH): 1.17
AV MEAN GRADIENT: 4 MMHG
AV PEAK GRADIENT: 7 MMHG
AV REGURGITATION PRESSURE HALF TIME: 723.88 MS
AV VALVE AREA: 3.74 CM2
AV VELOCITY RATIO: 1.06
BSA FOR ECHO PROCEDURE: 2.11 M2
CV ECHO LV RWT: 0.55 CM
DOP CALC AO PEAK VEL: 1.34 M/S
DOP CALC AO VTI: 22.8 CM
DOP CALC LVOT AREA: 3.2 CM2
DOP CALC LVOT DIAMETER: 2.02 CM
DOP CALC LVOT PEAK VEL: 1.42 M/S
DOP CALC LVOT STROKE VOLUME: 85.2 CM3
DOP CALC MV VTI: 27.2 CM
DOP CALCLVOT PEAK VEL VTI: 26.6 CM
E WAVE DECELERATION TIME: 339.69 MSEC
E/A RATIO: 0.89
E/E' RATIO: 6.33 M/S
ECHO LV POSTERIOR WALL: 1.01 CM (ref 0.6–1.1)
EJECTION FRACTION: 65 %
FRACTIONAL SHORTENING: 32 % (ref 28–44)
INTERVENTRICULAR SEPTUM: 1 CM (ref 0.6–1.1)
IVC DIAMETER: 1.55 CM
LA MAJOR: 3.82 CM
LA MINOR: 3.52 CM
LA WIDTH: 3.6 CM
LEFT ATRIUM SIZE: 3.69 CM
LEFT ATRIUM VOLUME INDEX: 19.7 ML/M2
LEFT ATRIUM VOLUME: 41.37 CM3
LEFT INTERNAL DIMENSION IN SYSTOLE: 2.52 CM (ref 2.1–4)
LEFT VENTRICLE DIASTOLIC VOLUME INDEX: 27.65 ML/M2
LEFT VENTRICLE DIASTOLIC VOLUME: 58.06 ML
LEFT VENTRICLE MASS INDEX: 54 G/M2
LEFT VENTRICLE SYSTOLIC VOLUME INDEX: 10.8 ML/M2
LEFT VENTRICLE SYSTOLIC VOLUME: 22.73 ML
LEFT VENTRICULAR INTERNAL DIMENSION IN DIASTOLE: 3.7 CM (ref 3.5–6)
LEFT VENTRICULAR MASS: 113.35 G
LV LATERAL E/E' RATIO: 5.18 M/S
LV SEPTAL E/E' RATIO: 8.14 M/S
LVOT MG: 4.11 MMHG
LVOT MV: 0.94 CM/S
MV MEAN GRADIENT: 1 MMHG
MV PEAK A VEL: 0.64 M/S
MV PEAK E VEL: 0.57 M/S
MV PEAK GRADIENT: 4 MMHG
MV STENOSIS PRESSURE HALF TIME: 98.51 MS
MV VALVE AREA BY CONTINUITY EQUATION: 3.13 CM2
MV VALVE AREA P 1/2 METHOD: 2.23 CM2
PISA AR MAX VEL: 3.1 M/S
PISA TR MAX VEL: 2.68 M/S
PV PEAK VELOCITY: 0.86 CM/S
RA MAJOR: 4.95 CM
RA PRESSURE: 3 MMHG
RA WIDTH: 3.8 CM
RV TISSUE DOPPLER FREE WALL SYSTOLIC VELOCITY 1 (APICAL 4 CHAMBER VIEW): 13.77 CM/S
SINUS: 3.12 CM
STJ: 2.84 CM
TDI LATERAL: 0.11 M/S
TDI SEPTAL: 0.07 M/S
TDI: 0.09 M/S
TR MAX PG: 29 MMHG
TRICUSPID ANNULAR PLANE SYSTOLIC EXCURSION: 2.54 CM
TV REST PULMONARY ARTERY PRESSURE: 32 MMHG

## 2023-06-29 PROCEDURE — 93306 ECHO (CUPID ONLY): ICD-10-PCS | Mod: 26,,, | Performed by: INTERNAL MEDICINE

## 2023-06-29 PROCEDURE — 93306 TTE W/DOPPLER COMPLETE: CPT

## 2023-06-29 PROCEDURE — 93306 TTE W/DOPPLER COMPLETE: CPT | Mod: 26,,, | Performed by: INTERNAL MEDICINE

## 2023-06-30 ENCOUNTER — TELEPHONE (OUTPATIENT)
Dept: CARDIOLOGY | Facility: CLINIC | Age: 68
End: 2023-06-30
Payer: COMMERCIAL

## 2023-06-30 ENCOUNTER — HOSPITAL ENCOUNTER (OUTPATIENT)
Dept: RADIOLOGY | Facility: HOSPITAL | Age: 68
Discharge: HOME | End: 2023-06-30
Attending: INTERNAL MEDICINE
Payer: COMMERCIAL

## 2023-06-30 DIAGNOSIS — I77.1 TORTUOUS AORTA: ICD-10-CM

## 2023-06-30 PROCEDURE — 75571 CT HRT W/O DYE W/CA TEST: CPT | Mod: 26,,, | Performed by: RADIOLOGY

## 2023-06-30 PROCEDURE — 75571 CT CALCIUM SCORING CARDIAC: ICD-10-PCS | Mod: 26,,, | Performed by: RADIOLOGY

## 2023-06-30 PROCEDURE — 75571 CT HRT W/O DYE W/CA TEST: CPT | Mod: TC

## 2023-06-30 NOTE — TELEPHONE ENCOUNTER
Tryied calling her back, no answer,    It looked like patient was already at her taking the Score     test at the time I called her.        Nw                      ----- Message from Lorene Kellogg sent at 6/30/2023  7:08 AM CDT -----  Type:  Needs Medical Advice    Who Called: pt  Symptoms (please be specific): pt has questions /concerns about a test that is scheduled for 06/30/23@3:30 pm  a ct scoring test    Would the patient rather a call back or a response via [facility]? call  Best Call Back Number: [phone]  Additional Information:

## 2023-07-21 ENCOUNTER — HOSPITAL ENCOUNTER (OUTPATIENT)
Dept: RADIOLOGY | Facility: HOSPITAL | Age: 68
Discharge: HOME | End: 2023-07-21
Attending: INTERNAL MEDICINE
Payer: COMMERCIAL

## 2023-07-21 DIAGNOSIS — K21.9 GASTROESOPHAGEAL REFLUX DISEASE, UNSPECIFIED WHETHER ESOPHAGITIS PRESENT: ICD-10-CM

## 2023-07-21 DIAGNOSIS — Z82.49 FAMILY HISTORY OF AORTIC ANEURYSM: ICD-10-CM

## 2023-07-21 PROCEDURE — 76775 US EXAM ABDO BACK WALL LIM: CPT | Mod: TC

## 2023-07-21 PROCEDURE — 76775 US ABDOMINAL AORTA: ICD-10-PCS | Mod: 26,,, | Performed by: RADIOLOGY

## 2023-07-21 PROCEDURE — 76775 US EXAM ABDO BACK WALL LIM: CPT | Mod: 26,,, | Performed by: RADIOLOGY

## 2023-07-22 NOTE — TELEPHONE ENCOUNTER
Refill Routing Note   Medication(s) are not appropriate for processing by [facility] for the following reason(s):      Drug-disease interaction    [facility] action(s):  Defer Care Due:  None identified     Medication Therapy Plan: Drug-Disease: lansoprazole and Fatty liver      Appointments  past 12m or future 3m with PCP    Date Provider   Last Visit   11/7/2022 Mary Kay Haines MD   Next Visit   Visit date not found Mary Kay Haines MD   ED visits in past 90 days: 0        Note composed:11:08 PM 07/21/2023

## 2023-07-24 RX ORDER — LANSOPRAZOLE 30 MG/1
CAPSULE, DELAYED RELEASE ORAL
Qty: 90 CAPSULE | Refills: 0 | Status: SHIPPED | OUTPATIENT
Start: 2023-07-24 | End: 2023-11-20 | Stop reason: SDUPTHER

## 2023-07-24 NOTE — TELEPHONE ENCOUNTER
Patient due for office visit in November - please schedule.  Please address HM now, if applicable.

## 2023-07-25 ENCOUNTER — PATIENT MESSAGE (OUTPATIENT)
Dept: CARDIOLOGY | Facility: CLINIC | Age: 68
End: 2023-07-25
Payer: COMMERCIAL

## 2023-07-28 ENCOUNTER — PATIENT MESSAGE (OUTPATIENT)
Dept: CARDIOLOGY | Facility: CLINIC | Age: 68
End: 2023-07-28
Payer: COMMERCIAL

## 2023-07-29 ENCOUNTER — TELEPHONE (OUTPATIENT)
Dept: CARDIOLOGY | Facility: HOSPITAL | Age: 68
End: 2023-07-29
Payer: COMMERCIAL

## 2023-07-29 NOTE — TELEPHONE ENCOUNTER
Your total calcium score is 4147     Incidental findings: There is a 3 mm right upper lobe perifissural pulmonary nodule (series 3, image 18).  There is an aneurysm of the ascending thoracic aorta measuring up to 5.2 x 5.1 cm in greatest transaxial dimension.  There are calcifications of the thoracic aorta.     Impression:     Your total calcium score is 4147.  High coronary heart disease risk.     Ascending thoracic aortic aneurysm.     Right upper lobe pulmonary nodule measuring 3 mm.  For a solid nodule <6 mm, Fleischner Society 2017 guidelines recommend no routine follow up for a low risk patient, or follow-up with non-contrast chest CT at 12 months in a high risk patient.       There is no infra-renal abdominal aortic aneurysm       Echo:    The estimated ejection fraction is 65%.  The left ventricle is normal in size with normal systolic function.  Grade I left ventricular diastolic dysfunction.  The ascending aorta is moderately dilated.  Normal right ventricular size with normal right ventricular systolic function.  Mild left atrial enlargement.  Normal central venous pressure (3 mmHg).  The estimated PA systolic pressure is 32 mmHg.           We will set up a follow up with one of the cardiologist in the office for further care

## 2023-07-29 NOTE — TELEPHONE ENCOUNTER
----- Message from Kyra Martinez MA sent at 7/28/2023  3:11 PM CDT -----  Contact: pt  Dear Demetri,    Pt would like the interpretation of ABdominal Us,  CT score ,  Echo  6-.      Maryellen Carrillo    ----- Message -----  From: Carl Gomez  Sent: 7/28/2023   2:42 PM CDT  To: Esmer BOCANEGRA Staff    Type: Requesting to speak with nurse        Who Called: PT  Regarding: discuss test results  Would the patient rather a call back or a response via [facility]? Call back  Best Call Back Number: [phone]  Additional Information:

## 2023-08-01 ENCOUNTER — PATIENT MESSAGE (OUTPATIENT)
Dept: CARDIOLOGY | Facility: CLINIC | Age: 68
End: 2023-08-01
Payer: COMMERCIAL

## 2023-08-03 ENCOUNTER — TELEPHONE (OUTPATIENT)
Dept: CARDIOLOGY | Facility: CLINIC | Age: 68
End: 2023-08-03
Payer: COMMERCIAL

## 2023-08-03 NOTE — TELEPHONE ENCOUNTER
----- Message from Meenakshi Alcantara sent at 8/3/2023  3:01 PM CDT -----  Regarding: Earlier Appointment  Name of Who is Calling:  Patient          What is the request in detail:  Patient would like to be seen earlier than September            Can the clinic reply by [facility]: Yes            What Number to Call Back if not in [facility]:[phone]

## 2023-08-14 ENCOUNTER — OFFICE VISIT (OUTPATIENT)
Dept: CARDIOLOGY | Facility: CLINIC | Age: 68
End: 2023-08-14
Payer: COMMERCIAL

## 2023-08-14 VITALS
OXYGEN SATURATION: 97 % | HEART RATE: 76 BPM | BODY MASS INDEX: 26.64 KG/M2 | WEIGHT: 196.38 LBS | DIASTOLIC BLOOD PRESSURE: 82 MMHG | SYSTOLIC BLOOD PRESSURE: 114 MMHG

## 2023-08-14 DIAGNOSIS — E78.00 HYPERCHOLESTEROLEMIA: ICD-10-CM

## 2023-08-14 DIAGNOSIS — I71.21 ANEURYSM OF ASCENDING AORTA WITHOUT RUPTURE: ICD-10-CM

## 2023-08-14 DIAGNOSIS — R93.1 AGATSTON CORONARY ARTERY CALCIUM SCORE GREATER THAN 400: Primary | ICD-10-CM

## 2023-08-14 DIAGNOSIS — I25.10 ATHEROSCLEROSIS OF NATIVE CORONARY ARTERY OF NATIVE HEART WITHOUT ANGINA PECTORIS: ICD-10-CM

## 2023-08-14 DIAGNOSIS — I10 PRIMARY HYPERTENSION: ICD-10-CM

## 2023-08-14 PROCEDURE — 1159F PR MEDICATION LIST DOCUMENTED IN MEDICAL RECORD: ICD-10-PCS | Mod: CPTII,S$GLB,, | Performed by: INTERNAL MEDICINE

## 2023-08-14 PROCEDURE — 3074F PR MOST RECENT SYSTOLIC BLOOD PRESSURE < 130 MM HG: ICD-10-PCS | Mod: CPTII,S$GLB,, | Performed by: INTERNAL MEDICINE

## 2023-08-14 PROCEDURE — 1160F RVW MEDS BY RX/DR IN RCRD: CPT | Mod: CPTII,S$GLB,, | Performed by: INTERNAL MEDICINE

## 2023-08-14 PROCEDURE — 1159F MED LIST DOCD IN RCRD: CPT | Mod: CPTII,S$GLB,, | Performed by: INTERNAL MEDICINE

## 2023-08-14 PROCEDURE — 93010 ELECTROCARDIOGRAM REPORT: CPT | Mod: S$GLB,,, | Performed by: INTERNAL MEDICINE

## 2023-08-14 PROCEDURE — 99999 PR PBB SHADOW E&M-EST. PATIENT-LVL IV: CPT | Mod: PBBFAC,,, | Performed by: INTERNAL MEDICINE

## 2023-08-14 PROCEDURE — 3079F DIAST BP 80-89 MM HG: CPT | Mod: CPTII,S$GLB,, | Performed by: INTERNAL MEDICINE

## 2023-08-14 PROCEDURE — 93010 EKG 12-LEAD: ICD-10-PCS | Mod: S$GLB,,, | Performed by: INTERNAL MEDICINE

## 2023-08-14 PROCEDURE — 3288F FALL RISK ASSESSMENT DOCD: CPT | Mod: CPTII,S$GLB,, | Performed by: INTERNAL MEDICINE

## 2023-08-14 PROCEDURE — 1160F PR REVIEW ALL MEDS BY PRESCRIBER/CLIN PHARMACIST DOCUMENTED: ICD-10-PCS | Mod: CPTII,S$GLB,, | Performed by: INTERNAL MEDICINE

## 2023-08-14 PROCEDURE — 1126F PR PAIN SEVERITY QUANTIFIED, NO PAIN PRESENT: ICD-10-PCS | Mod: CPTII,S$GLB,, | Performed by: INTERNAL MEDICINE

## 2023-08-14 PROCEDURE — 3074F SYST BP LT 130 MM HG: CPT | Mod: CPTII,S$GLB,, | Performed by: INTERNAL MEDICINE

## 2023-08-14 PROCEDURE — 3288F PR FALLS RISK ASSESSMENT DOCUMENTED: ICD-10-PCS | Mod: CPTII,S$GLB,, | Performed by: INTERNAL MEDICINE

## 2023-08-14 PROCEDURE — 99204 PR OFFICE/OUTPT VISIT, NEW, LEVL IV, 45-59 MIN: ICD-10-PCS | Mod: S$GLB,,, | Performed by: INTERNAL MEDICINE

## 2023-08-14 PROCEDURE — 99204 OFFICE O/P NEW MOD 45 MIN: CPT | Mod: S$GLB,,, | Performed by: INTERNAL MEDICINE

## 2023-08-14 PROCEDURE — 93005 ELECTROCARDIOGRAM TRACING: CPT

## 2023-08-14 PROCEDURE — 3079F PR MOST RECENT DIASTOLIC BLOOD PRESSURE 80-89 MM HG: ICD-10-PCS | Mod: CPTII,S$GLB,, | Performed by: INTERNAL MEDICINE

## 2023-08-14 PROCEDURE — 1101F PT FALLS ASSESS-DOCD LE1/YR: CPT | Mod: CPTII,S$GLB,, | Performed by: INTERNAL MEDICINE

## 2023-08-14 PROCEDURE — 1126F AMNT PAIN NOTED NONE PRSNT: CPT | Mod: CPTII,S$GLB,, | Performed by: INTERNAL MEDICINE

## 2023-08-14 PROCEDURE — 3008F BODY MASS INDEX DOCD: CPT | Mod: CPTII,S$GLB,, | Performed by: INTERNAL MEDICINE

## 2023-08-14 PROCEDURE — 1101F PR PT FALLS ASSESS DOC 0-1 FALLS W/OUT INJ PAST YR: ICD-10-PCS | Mod: CPTII,S$GLB,, | Performed by: INTERNAL MEDICINE

## 2023-08-14 PROCEDURE — 99999 PR PBB SHADOW E&M-EST. PATIENT-LVL IV: ICD-10-PCS | Mod: PBBFAC,,, | Performed by: INTERNAL MEDICINE

## 2023-08-14 PROCEDURE — 3008F PR BODY MASS INDEX (BMI) DOCUMENTED: ICD-10-PCS | Mod: CPTII,S$GLB,, | Performed by: INTERNAL MEDICINE

## 2023-08-14 RX ORDER — ATORVASTATIN CALCIUM 80 MG/1
80 TABLET, FILM COATED ORAL DAILY
Qty: 30 TABLET | Refills: 0 | Status: SHIPPED | OUTPATIENT
Start: 2023-08-14 | End: 2023-08-28

## 2023-08-14 RX ORDER — EZETIMIBE 10 MG/1
10 TABLET ORAL DAILY
Qty: 90 TABLET | Refills: 3 | Status: ON HOLD | OUTPATIENT
Start: 2023-08-14 | End: 2023-09-24 | Stop reason: HOSPADM

## 2023-08-14 RX ORDER — EZETIMIBE 10 MG/1
10 TABLET ORAL DAILY
Qty: 30 TABLET | Refills: 0 | Status: SHIPPED | OUTPATIENT
Start: 2023-08-14 | End: 2023-08-28

## 2023-08-14 RX ORDER — ATORVASTATIN CALCIUM 80 MG/1
80 TABLET, FILM COATED ORAL DAILY
Qty: 90 TABLET | Refills: 3 | Status: SHIPPED | OUTPATIENT
Start: 2023-08-14 | End: 2023-11-20 | Stop reason: SDUPTHER

## 2023-08-14 NOTE — PROGRESS NOTES
[facility]    Chief Complaint  Chief Complaint   Patient presents with    Coronary Artery Disease       HPI:    Had a calcium score done for risk stratification.  No prior cardiac problems.  Had a stress test maybe 30 years ago for screening.  It was normal.  Reports being active.  Exercises at least 3 days per week without exertional dyspnea or chest discomfort.    Medications  Current Outpatient Medications   Medication Sig Dispense Refill    amLODIPine (NORVASC) 10 MG tablet TAKE 1 TABLET ONCE DAILY 90 tablet 3    lansoprazole (PREVACID) 30 MG capsule TAKE 1 CAPSULE DAILY ONLY  AS NEEDED FOR HEARTBURN 90 capsule 0    tadalafiL (CIALIS) 20 MG Tab Take 1 tablet (20 mg total) by mouth once daily. 18 tablet 1    valsartan-hydrochlorothiazide (DIOVAN-HCT) 320-25 mg per tablet TAKE 1 TABLET by mouth ONCE DAILY 90 tablet 3    atorvastatin (LIPITOR) 80 MG tablet Take 1 tablet (80 mg total) by mouth once daily. 90 tablet 3    ezetimibe (ZETIA) 10 mg tablet Take 1 tablet (10 mg total) by mouth once daily. 90 tablet 3     No current facility-administered medications for this visit.        History  Past Medical History:   Diagnosis Date    BPH (benign prostatic hyperplasia)     Elevated liver enzymes     Fatty infiltration of liver     Glucose intolerance (impaired glucose tolerance)     Hiatal hernia     Hyperlipidemia     Hypertension     Overweight     Reflux      Past Surgical History:   Procedure Laterality Date    inguinal hernia      x2    tonsillectomy       Social History     Socioeconomic History    Marital status:     Number of children: 2   Occupational History    Occupation: Works in insurance   Tobacco Use    Smoking status: Never    Smokeless tobacco: Never   Substance and Sexual Activity    Alcohol use: Yes     Comment: 2 glasses of wine most evenings.    Drug use: No     Social Determinants of Health     Financial Resource Strain: Low Risk  (8/11/2023)    Overall Financial Resource  Strain (CARDIA)     Difficulty of Paying Living Expenses: Not hard at all   Food Insecurity: Unknown (8/11/2023)    Hunger Vital Sign     Worried About Running Out of Food in the Last Year: Patient refused     Ran Out of Food in the Last Year: Patient refused   Transportation Needs: Unknown (8/11/2023)    PRAPARE - Transportation     Lack of Transportation (Medical): Patient refused     Lack of Transportation (Non-Medical): Patient refused   Physical Activity: Sufficiently Active (8/11/2023)    Exercise Vital Sign     Days of Exercise per Week: 3 days     Minutes of Exercise per Session: 60 min   Stress: No Stress Concern Present (8/11/2023)    [employer] - Occupational Stress Questionnaire     Feeling of Stress : Only a little   Social Connections: Unknown (8/11/2023)    Social Connection and Isolation Panel [NHANES]     Frequency of Communication with Friends and Family: More than three times a week     Frequency of Social Gatherings with Friends and Family: More than three times a week     Active Member of Clubs or Organizations: Patient refused     Attends Club or Organization Meetings: Patient refused     Marital Status:    Housing Stability: Unknown (8/11/2023)    Housing Stability Vital Sign     Unable to Pay for Housing in the Last Year: Patient refused     Number of Places Lived in the Last Year: 1     Unstable Housing in the Last Year: Patient refused     Family History   Problem Relation Age of Onset    Hypertension Mother     Macular degeneration Mother         - gets shots in her eye    Stroke Father     Heart disease Father     Aneurysm Father     Migraines Sister     Arthritis Brother         knees    Dementia Paternal Grandmother     Diabetes Paternal Grandmother     Heart disease Paternal Grandfather     Diabetes Brother     Hernia Brother     Cancer Paternal Aunt     Cancer Paternal Uncle     Cancer Paternal Aunt     Cancer Paternal Uncle     Colon cancer Neg Hx      Prostate cancer Neg Hx         Allergies  Review of patient's allergies indicates:   Allergen Reactions    Ace inhibitors Other (See Comments)     cough    Losartan      headache       Review of Systems   Review of Systems   Constitutional: Negative for malaise/fatigue, weight gain and weight loss.   Eyes:  Negative for visual disturbance.   Cardiovascular:  Negative for chest pain, claudication, cyanosis, dyspnea on exertion, irregular heartbeat, leg swelling, near-syncope, orthopnea, palpitations, paroxysmal nocturnal dyspnea and syncope.   Respiratory:  Negative for cough, hemoptysis, shortness of breath, sleep disturbances due to breathing and wheezing.    Hematologic/Lymphatic: Negative for bleeding problem. Does not bruise/bleed easily.   Skin:  Negative for poor wound healing.   Musculoskeletal:  Negative for muscle cramps and myalgias.   Gastrointestinal:  Negative for abdominal pain, anorexia, diarrhea, heartburn, hematemesis, hematochezia, melena, nausea and vomiting.   Genitourinary:  Negative for hematuria and nocturia.   Neurological:  Negative for excessive daytime sleepiness, dizziness, focal weakness, light-headedness and weakness.       Physical Exam  Vitals:    08/14/23 0947   BP: 114/82   Pulse: 76     Wt Readings from Last 1 Encounters:   08/14/23 89.1 kg (196 lb 6.4 oz)     Physical Exam  Vitals and nursing note reviewed.   Constitutional:       General: He is not in acute distress.     Appearance: He is not toxic-appearing or diaphoretic.   HENT:      Head: Normocephalic and atraumatic.      Mouth/Throat:      Lips: Pink.      Mouth: Mucous membranes are moist.   Eyes:      General: No scleral icterus.     Conjunctiva/sclera: Conjunctivae normal.   Neck:      Thyroid: No thyromegaly.      Vascular: No carotid bruit, hepatojugular reflux or JVD.      Trachea: Trachea normal.   Cardiovascular:      Rate and Rhythm: Normal rate and regular rhythm. No extrasystoles are present.     Chest Wall:  PMI is not displaced.      Pulses:           Carotid pulses are 2+ on the right side and 2+ on the left side.       Radial pulses are 2+ on the right side and 2+ on the left side.        Dorsalis pedis pulses are 2+ on the right side and 2+ on the left side.        Posterior tibial pulses are 2+ on the right side and 2+ on the left side.      Heart sounds: S1 normal and S2 normal. No murmur heard.     No friction rub. No S3 or S4 sounds.   Pulmonary:      Effort: Pulmonary effort is normal. No tachypnea, bradypnea, accessory muscle usage or respiratory distress.      Breath sounds: Normal breath sounds and air entry. No decreased breath sounds, wheezing, rhonchi or rales.   Abdominal:      General: Bowel sounds are normal. There is no distension or abdominal bruit.      Palpations: Abdomen is soft. There is no hepatomegaly, splenomegaly or pulsatile mass.      Tenderness: There is no abdominal tenderness.   Musculoskeletal:         General: No tenderness or deformity.      Right lower leg: No edema.      Left lower leg: No edema.   Skin:     General: Skin is warm and dry.      Capillary Refill: Capillary refill takes less than 2 seconds.      Coloration: Skin is not cyanotic or pale.      Nails: There is no clubbing.   Neurological:      General: No focal deficit present.      Mental Status: He is alert and oriented to person, place, and time.   Psychiatric:         Attention and Perception: Attention normal.         Mood and Affect: Mood normal.         Speech: Speech normal.         Behavior: Behavior normal. Behavior is cooperative.         Labs  Hospital Outpatient Visit on 06/29/2023   Component Date Value Ref Range Status    BSA 06/29/2023 2.11  m2 Final    TDI SEPTAL 06/29/2023 0.07  m/s Final    LV LATERAL E/E' RATIO 06/29/2023 5.18  m/s Final    LV SEPTAL E/E' RATIO 06/29/2023 8.14  m/s Final    LA WIDTH 06/29/2023 3.60  cm Final    IVC diameter 06/29/2023 1.55  cm Final    Left Ventricular Outflow Tract  Laury* 06/29/2023 0.94  cm/s Final    Left Ventricular Outflow Tract Laury* 06/29/2023 4.11  mmHg Final    TDI LATERAL 06/29/2023 0.11  m/s Final    PV PEAK VELOCITY 06/29/2023 0.86  cm/s Final    LVIDd 06/29/2023 3.70  3.5 - 6.0 cm Final    IVS 06/29/2023 1.00  0.6 - 1.1 cm Final    Posterior Wall 06/29/2023 1.01  0.6 - 1.1 cm Final    LVIDs 06/29/2023 2.52  2.1 - 4.0 cm Final    FS 06/29/2023 32  28 - 44 % Final    LA volume 06/29/2023 41.37  cm3 Final    Sinus 06/29/2023 3.12  cm Final    STJ 06/29/2023 2.84  cm Final    Ascending aorta 06/29/2023 4.88  cm Final    LV mass 06/29/2023 113.35  g Final    LA size 06/29/2023 3.69  cm Final    TAPSE 06/29/2023 2.54  cm Final    RV S' 06/29/2023 13.77  cm/s Final    Left Ventricle Relative Wall Thick* 06/29/2023 0.55  cm Final    AV regurgitation pressure 1/2 time 06/29/2023 [id number]  ms Final    AV mean gradient 06/29/2023 4  mmHg Final    AV valve area 06/29/2023 3.74  cm2 Final    AV Velocity Ratio 06/29/2023 1.06   Final    AV index (prosthetic) 06/29/2023 1.17   Final    MV mean gradient 06/29/2023 1  mmHg Final    MV valve area p 1/2 method 06/29/2023 2.23  cm2 Final    MV valve area by continuity eq 06/29/2023 3.13  cm2 Final    E/A ratio 06/29/2023 0.89   Final    Mean e' 06/29/2023 0.09  m/s Final    E wave deceleration time 06/29/2023 339.69  msec Final    LVOT diameter 06/29/2023 2.02  cm Final    LVOT area 06/29/2023 3.2  cm2 Final    LVOT peak bernie 06/29/2023 1.42  m/s Final    LVOT peak VTI 06/29/2023 26.60  cm Final    Ao peak bernie 06/29/2023 1.34  m/s Final    Ao VTI 06/29/2023 22.8  cm Final    LVOT stroke volume 06/29/2023 85.20  cm3 Final    AV peak gradient 06/29/2023 7  mmHg Final    MV peak gradient 06/29/2023 4  mmHg Final    E/E' ratio 06/29/2023 6.33  m/s Final    MV Peak E Bernie 06/29/2023 0.57  m/s Final    AR Max Bernie 06/29/2023 3.10  m/s Final    TR Max Bernie 06/29/2023 2.68  m/s Final    MV VTI 06/29/2023 27.2  cm Final    MV stenosis  pressure 1/2 time 06/29/2023 98.51  ms Final    MV Peak A Arias 06/29/2023 0.64  m/s Final    LV Systolic Volume 06/29/2023 22.73  mL Final    LV Systolic Volume Index 06/29/2023 10.8  mL/m2 Final    LV Diastolic Volume 06/29/2023 58.06  mL Final    LV Diastolic Volume Index 06/29/2023 27.65  mL/m2 Final    LA Volume Index 06/29/2023 19.7  mL/m2 Final    LV Mass Index 06/29/2023 54  g/m2 Final    RA Major Axis 06/29/2023 4.95  cm Final    Left Atrium Minor Axis 06/29/2023 3.52  cm Final    Left Atrium Major Axis 06/29/2023 3.82  cm Final    Triscuspid Valve Regurgitation Pea* 06/29/2023 29  mmHg Final    RA Width 06/29/2023 3.80  cm Final    Right Atrial Pressure (from IVC) 06/29/2023 3  mmHg Final    EF 06/29/2023 65  % Final    TV resting pulmonary artery pressu* 06/29/2023 32  mmHg Final       Imaging  US Abdominal Aorta    Result Date: 7/21/2023  EXAMINATION: US ABDOMINAL AORTA CLINICAL HISTORY: Family history of ischemic heart disease and other diseases of the circulatory system TECHNIQUE: Limited ultrasound was performed of the abdominal aorta, with cross sectional diameter measurements obtained. COMPARISON: None. FINDINGS: The proximal abdominal aorta measures 2.1 x 1.9 cm. The mid abdominal aorta measures 2.1 x 2.4 cm. The distal abdominal aorta measures 2.1 x 2.3 cm. The right iliac artery measures 1.3 x 1.4 cm. The left iliac artery measures 1.4 x 1.5 cm. Aortoiliac atherosclerosis: Moderate     No abdominal aortic aneurysm. Electronically signed by resident: Jaison Mccoy Date:    07/21/2023 Time:    08:58 Electronically signed by: Rafal Villegas Jr Date:    07/21/2023 Time:    09:38      Assessment  1. Agatston coronary artery calcium score greater than 400  - Stress Echo Which stress agent will be used? Treadmill Exercise; Color Flow Doppler? No; Future    2. Atherosclerosis of native coronary artery of native heart without angina pectoris  - Lipid Panel; Future  - Stress Echo Which stress agent will  be used? Treadmill Exercise; Color Flow Doppler? No; Future  - Comprehensive Metabolic Panel; Future    3. Aneurysm of ascending aorta without rupture  - CTA Chest Aorta Non Coronary; Future    4. Primary hypertension  Controlled    5. Hypercholesterolemia  With his calcium score, will make changes with a goal of achieving LDL less than 70.      Plan and Discussion    He has a good functional status without angina.  We discussed the implications of his high calcium score.  Discussed invasive versus noninvasive methods for determining if he has obstructive coronary artery disease.  Will plan for stress test.  Discussed his diagnosis of thoracic aortic aneurysm.  Needs to be monitored.  Will plan for a dedicated scan 6 months after his last one (December.)    The 10-year ASCVD risk score (Rell DK, et al., 2019) is: 12.6%    Values used to calculate the score:      Age: 68 years      Sex: Male      Is Non- : No      Diabetic: No      Tobacco smoker: No      Systolic Blood Pressure: 114 mmHg      Is BP treated: Yes      HDL Cholesterol: 51 mg/dL      Total Cholesterol: 148 mg/dL     Follow Up  Follow up in about 6 months (around 2/14/2024).      Chon Lopez MD

## 2023-08-22 ENCOUNTER — HOSPITAL ENCOUNTER (OUTPATIENT)
Dept: CARDIOLOGY | Facility: HOSPITAL | Age: 68
Discharge: HOME | End: 2023-08-22
Attending: INTERNAL MEDICINE
Payer: COMMERCIAL

## 2023-08-22 VITALS — BODY MASS INDEX: 25.6 KG/M2 | HEIGHT: 72 IN | WEIGHT: 189 LBS

## 2023-08-22 DIAGNOSIS — R93.1 AGATSTON CORONARY ARTERY CALCIUM SCORE GREATER THAN 400: ICD-10-CM

## 2023-08-22 DIAGNOSIS — I25.10 ATHEROSCLEROSIS OF NATIVE CORONARY ARTERY OF NATIVE HEART WITHOUT ANGINA PECTORIS: ICD-10-CM

## 2023-08-22 LAB
ASCENDING AORTA: 5 CM
AV INDEX (PROSTH): 0.9
AV MEAN GRADIENT: 5 MMHG
AV PEAK GRADIENT: 11 MMHG
AV VALVE AREA BY VELOCITY RATIO: 3.54 CM²
AV VALVE AREA: 3.21 CM²
AV VELOCITY RATIO: 0.99
BSA FOR ECHO PROCEDURE: 2.09 M2
CV ECHO LV RWT: 0.31 CM
CV STRESS BASE HR: 68 BPM
DIASTOLIC BLOOD PRESSURE: 94 MMHG
DOP CALC AO PEAK VEL: 1.63 M/S
DOP CALC AO VTI: 31.75 CM
DOP CALC LVOT AREA: 3.6 CM2
DOP CALC LVOT DIAMETER: 2.13 CM
DOP CALC LVOT PEAK VEL: 1.62 M/S
DOP CALC LVOT STROKE VOLUME: 101.82 CM3
DOP CALCLVOT PEAK VEL VTI: 28.59 CM
E WAVE DECELERATION TIME: 238.66 MSEC
E/A RATIO: 0.87
E/E' RATIO: 9.05 M/S
ECHO LV POSTERIOR WALL: 0.7 CM (ref 0.6–1.1)
FRACTIONAL SHORTENING: 35 % (ref 28–44)
INTERVENTRICULAR SEPTUM: 0.8 CM (ref 0.6–1.1)
IVRT: 114.18 MSEC
LA MAJOR: 5.57 CM
LA MINOR: 5.99 CM
LA WIDTH: 3.53 CM
LEFT ATRIUM SIZE: 4.17 CM
LEFT ATRIUM VOLUME INDEX: 34.7 ML/M2
LEFT ATRIUM VOLUME: 72.22 CM3
LEFT INTERNAL DIMENSION IN SYSTOLE: 2.93 CM (ref 2.1–4)
LEFT VENTRICLE DIASTOLIC VOLUME INDEX: 44.42 ML/M2
LEFT VENTRICLE DIASTOLIC VOLUME: 92.39 ML
LEFT VENTRICLE MASS INDEX: 50 G/M2
LEFT VENTRICLE SYSTOLIC VOLUME INDEX: 15.9 ML/M2
LEFT VENTRICLE SYSTOLIC VOLUME: 32.99 ML
LEFT VENTRICULAR INTERNAL DIMENSION IN DIASTOLE: 4.5 CM (ref 3.5–6)
LEFT VENTRICULAR MASS: 104.5 G
LV LATERAL E/E' RATIO: 7.17 M/S
LV SEPTAL E/E' RATIO: 12.29 M/S
MV A" WAVE DURATION": 6.28 MSEC
MV PEAK A VEL: 0.99 M/S
MV PEAK E VEL: 0.86 M/S
MV STENOSIS PRESSURE HALF TIME: 69.21 MS
MV VALVE AREA P 1/2 METHOD: 3.18 CM2
OHS CV CPX 1 MINUTE RECOVERY HEART RATE: 118 BPM
OHS CV CPX 85 PERCENT MAX PREDICTED HEART RATE MALE: 129
OHS CV CPX ESTIMATED METS: 13
OHS CV CPX MAX PREDICTED HEART RATE: 152
OHS CV CPX PATIENT IS FEMALE: 0
OHS CV CPX PATIENT IS MALE: 1
OHS CV CPX PEAK DIASTOLIC BLOOD PRESSURE: 96 MMHG
OHS CV CPX PEAK HEAR RATE: 144 BPM
OHS CV CPX PEAK RATE PRESSURE PRODUCT: ABNORMAL
OHS CV CPX PEAK SYSTOLIC BLOOD PRESSURE: 178 MMHG
OHS CV CPX PERCENT MAX PREDICTED HEART RATE ACHIEVED: 95
OHS CV CPX RATE PRESSURE PRODUCT PRESENTING: 8296
PISA TR MAX VEL: 2.57 M/S
POST STRESS EJECTION FRACTION: 60 %
PULM VEIN S/D RATIO: 1.74
PV PEAK D VEL: 0.38 M/S
PV PEAK S VEL: 0.66 M/S
RA MAJOR: 5.59 CM
RA PRESSURE ESTIMATED: 3 MMHG
RA WIDTH: 3.99 CM
RIGHT VENTRICULAR END-DIASTOLIC DIMENSION: 3.2 CM
RV TB RVSP: 6 MMHG
SINUS: 4.2 CM
STJ: 4.2 CM
STRESS ECHO POST EXERCISE DUR MIN: 8 MINUTES
STRESS ECHO POST EXERCISE DUR SEC: 4 SECONDS
STRESS ST DEPRESSION: 1 MM
SYSTOLIC BLOOD PRESSURE: 122 MMHG
TDI LATERAL: 0.12 M/S
TDI SEPTAL: 0.07 M/S
TDI: 0.1 M/S
TR MAX PG: 26 MMHG
TRICUSPID ANNULAR PLANE SYSTOLIC EXCURSION: 1.96 CM
TV REST PULMONARY ARTERY PRESSURE: 29 MMHG
Z-SCORE OF LEFT VENTRICULAR DIMENSION IN END DIASTOLE: -3.47
Z-SCORE OF LEFT VENTRICULAR DIMENSION IN END SYSTOLE: -2.25

## 2023-08-22 PROCEDURE — 93351 STRESS TTE COMPLETE: CPT | Mod: 26,,, | Performed by: INTERNAL MEDICINE

## 2023-08-22 PROCEDURE — 93351 STRESS ECHO (CUPID ONLY): ICD-10-PCS | Mod: 26,,, | Performed by: INTERNAL MEDICINE

## 2023-08-22 PROCEDURE — 93351 STRESS TTE COMPLETE: CPT

## 2023-08-28 ENCOUNTER — OFFICE VISIT (OUTPATIENT)
Dept: CARDIOLOGY | Facility: CLINIC | Age: 68
End: 2023-08-28
Payer: COMMERCIAL

## 2023-08-28 ENCOUNTER — LAB VISIT (OUTPATIENT)
Dept: LAB | Facility: OTHER | Age: 68
End: 2023-08-28
Attending: INTERNAL MEDICINE
Payer: COMMERCIAL

## 2023-08-28 VITALS
BODY MASS INDEX: 26.42 KG/M2 | HEART RATE: 72 BPM | SYSTOLIC BLOOD PRESSURE: 134 MMHG | WEIGHT: 194.81 LBS | DIASTOLIC BLOOD PRESSURE: 86 MMHG

## 2023-08-28 DIAGNOSIS — I25.10 ATHEROSCLEROSIS OF NATIVE CORONARY ARTERY OF NATIVE HEART WITHOUT ANGINA PECTORIS: Primary | ICD-10-CM

## 2023-08-28 DIAGNOSIS — I25.10 ATHEROSCLEROSIS OF NATIVE CORONARY ARTERY OF NATIVE HEART WITHOUT ANGINA PECTORIS: ICD-10-CM

## 2023-08-28 LAB
ANION GAP SERPL CALC-SCNC: 10 MMOL/L (ref 8–16)
BASOPHILS # BLD AUTO: 0.06 K/UL (ref 0–0.2)
BASOPHILS NFR BLD: 0.9 % (ref 0–1.9)
BUN SERPL-MCNC: 14 MG/DL (ref 8–23)
CALCIUM SERPL-MCNC: 9.2 MG/DL (ref 8.7–10.5)
CHLORIDE SERPL-SCNC: 105 MMOL/L (ref 95–110)
CO2 SERPL-SCNC: 25 MMOL/L (ref 23–29)
CREAT SERPL-MCNC: 1 MG/DL (ref 0.5–1.4)
DIFFERENTIAL METHOD: NORMAL
EOSINOPHIL # BLD AUTO: 0.1 K/UL (ref 0–0.5)
EOSINOPHIL NFR BLD: 1.2 % (ref 0–8)
ERYTHROCYTE [DISTWIDTH] IN BLOOD BY AUTOMATED COUNT: 12.5 % (ref 11.5–14.5)
EST. GFR  (NO RACE VARIABLE): >60 ML/MIN/1.73 M^2
GLUCOSE SERPL-MCNC: 130 MG/DL (ref 70–110)
HCT VFR BLD AUTO: 46.6 % (ref 40–54)
HGB BLD-MCNC: 16.3 G/DL (ref 14–18)
IMM GRANULOCYTES # BLD AUTO: 0.03 K/UL (ref 0–0.04)
IMM GRANULOCYTES NFR BLD AUTO: 0.5 % (ref 0–0.5)
INR PPP: 1 (ref 0.8–1.2)
LYMPHOCYTES # BLD AUTO: 1.3 K/UL (ref 1–4.8)
LYMPHOCYTES NFR BLD: 19.4 % (ref 18–48)
MCH RBC QN AUTO: 30.4 PG (ref 27–31)
MCHC RBC AUTO-ENTMCNC: 35 G/DL (ref 32–36)
MCV RBC AUTO: 87 FL (ref 82–98)
MONOCYTES # BLD AUTO: 0.8 K/UL (ref 0.3–1)
MONOCYTES NFR BLD: 13.1 % (ref 4–15)
NEUTROPHILS # BLD AUTO: 4.2 K/UL (ref 1.8–7.7)
NEUTROPHILS NFR BLD: 64.9 % (ref 38–73)
NRBC BLD-RTO: 0 /100 WBC
PLATELET # BLD AUTO: 191 K/UL (ref 150–450)
PMV BLD AUTO: 10.9 FL (ref 9.2–12.9)
POTASSIUM SERPL-SCNC: 3.5 MMOL/L (ref 3.5–5.1)
PROTHROMBIN TIME: 10.9 SEC (ref 9–12.5)
RBC # BLD AUTO: 5.36 M/UL (ref 4.6–6.2)
SODIUM SERPL-SCNC: 140 MMOL/L (ref 136–145)
WBC # BLD AUTO: 6.43 K/UL (ref 3.9–12.7)

## 2023-08-28 PROCEDURE — 99999 PR PBB SHADOW E&M-EST. PATIENT-LVL III: ICD-10-PCS | Mod: PBBFAC,,, | Performed by: INTERNAL MEDICINE

## 2023-08-28 PROCEDURE — 1159F MED LIST DOCD IN RCRD: CPT | Mod: CPTII,S$GLB,, | Performed by: INTERNAL MEDICINE

## 2023-08-28 PROCEDURE — 1101F PT FALLS ASSESS-DOCD LE1/YR: CPT | Mod: CPTII,S$GLB,, | Performed by: INTERNAL MEDICINE

## 2023-08-28 PROCEDURE — 3079F DIAST BP 80-89 MM HG: CPT | Mod: CPTII,S$GLB,, | Performed by: INTERNAL MEDICINE

## 2023-08-28 PROCEDURE — 3288F PR FALLS RISK ASSESSMENT DOCUMENTED: ICD-10-PCS | Mod: CPTII,S$GLB,, | Performed by: INTERNAL MEDICINE

## 2023-08-28 PROCEDURE — 1126F PR PAIN SEVERITY QUANTIFIED, NO PAIN PRESENT: ICD-10-PCS | Mod: CPTII,S$GLB,, | Performed by: INTERNAL MEDICINE

## 2023-08-28 PROCEDURE — 3075F PR MOST RECENT SYSTOLIC BLOOD PRESS GE 130-139MM HG: ICD-10-PCS | Mod: CPTII,S$GLB,, | Performed by: INTERNAL MEDICINE

## 2023-08-28 PROCEDURE — 1160F PR REVIEW ALL MEDS BY PRESCRIBER/CLIN PHARMACIST DOCUMENTED: ICD-10-PCS | Mod: CPTII,S$GLB,, | Performed by: INTERNAL MEDICINE

## 2023-08-28 PROCEDURE — 3079F PR MOST RECENT DIASTOLIC BLOOD PRESSURE 80-89 MM HG: ICD-10-PCS | Mod: CPTII,S$GLB,, | Performed by: INTERNAL MEDICINE

## 2023-08-28 PROCEDURE — 3075F SYST BP GE 130 - 139MM HG: CPT | Mod: CPTII,S$GLB,, | Performed by: INTERNAL MEDICINE

## 2023-08-28 PROCEDURE — 85610 PROTHROMBIN TIME: CPT | Performed by: INTERNAL MEDICINE

## 2023-08-28 PROCEDURE — 36415 COLL VENOUS BLD VENIPUNCTURE: CPT | Performed by: INTERNAL MEDICINE

## 2023-08-28 PROCEDURE — 99214 PR OFFICE/OUTPT VISIT, EST, LEVL IV, 30-39 MIN: ICD-10-PCS | Mod: S$GLB,,, | Performed by: INTERNAL MEDICINE

## 2023-08-28 PROCEDURE — 3008F PR BODY MASS INDEX (BMI) DOCUMENTED: ICD-10-PCS | Mod: CPTII,S$GLB,, | Performed by: INTERNAL MEDICINE

## 2023-08-28 PROCEDURE — 85025 COMPLETE CBC W/AUTO DIFF WBC: CPT | Performed by: INTERNAL MEDICINE

## 2023-08-28 PROCEDURE — 1126F AMNT PAIN NOTED NONE PRSNT: CPT | Mod: CPTII,S$GLB,, | Performed by: INTERNAL MEDICINE

## 2023-08-28 PROCEDURE — 80048 BASIC METABOLIC PNL TOTAL CA: CPT | Performed by: INTERNAL MEDICINE

## 2023-08-28 PROCEDURE — 3008F BODY MASS INDEX DOCD: CPT | Mod: CPTII,S$GLB,, | Performed by: INTERNAL MEDICINE

## 2023-08-28 PROCEDURE — 3288F FALL RISK ASSESSMENT DOCD: CPT | Mod: CPTII,S$GLB,, | Performed by: INTERNAL MEDICINE

## 2023-08-28 PROCEDURE — 1101F PR PT FALLS ASSESS DOC 0-1 FALLS W/OUT INJ PAST YR: ICD-10-PCS | Mod: CPTII,S$GLB,, | Performed by: INTERNAL MEDICINE

## 2023-08-28 PROCEDURE — 99999 PR PBB SHADOW E&M-EST. PATIENT-LVL III: CPT | Mod: PBBFAC,,, | Performed by: INTERNAL MEDICINE

## 2023-08-28 PROCEDURE — 99214 OFFICE O/P EST MOD 30 MIN: CPT | Mod: S$GLB,,, | Performed by: INTERNAL MEDICINE

## 2023-08-28 PROCEDURE — 1159F PR MEDICATION LIST DOCUMENTED IN MEDICAL RECORD: ICD-10-PCS | Mod: CPTII,S$GLB,, | Performed by: INTERNAL MEDICINE

## 2023-08-28 PROCEDURE — 1160F RVW MEDS BY RX/DR IN RCRD: CPT | Mod: CPTII,S$GLB,, | Performed by: INTERNAL MEDICINE

## 2023-08-28 RX ORDER — SODIUM CHLORIDE 0.9 % (FLUSH) 0.9 %
10 SYRINGE (ML) INJECTION
Status: DISCONTINUED | OUTPATIENT
Start: 2023-08-28 | End: 2023-09-24 | Stop reason: HOSPADM

## 2023-08-28 RX ORDER — SODIUM CHLORIDE 9 MG/ML
INJECTION, SOLUTION INTRAVENOUS CONTINUOUS
Status: CANCELLED | OUTPATIENT
Start: 2023-08-28

## 2023-08-28 NOTE — PATIENT INSTRUCTIONS
Start Aspirin 81mg daily.    Procedure: cardiac catheterization/angiogram  Procedure date: September 6, 2023  Procedure time: 8:00am    Arrive at the Outpatient Surgery Unit ([facility]) 2 hours prior to procedure, 6:00am.  Nothing to eat or drink after midnight.  Take all morning medication with a sip of water.  If you are diabetic, do not take any diabetes medication the morning of the procedure.   Please make arrangements for a family member or friend to bring you home after the procedure. You will not be able to drive home.          If you have any questions, please call our office at [phone]. Ask for Dr.Yount Molly's nurse.

## 2023-08-28 NOTE — H&P (VIEW-ONLY)
[facility]    Chief Complaint  Chief Complaint   Patient presents with    Coronary Artery Disease       HPI:    No problems since last visit.  Stress test was positive for ischemia in the distribution anterior descending artery.    Medications  Current Outpatient Medications   Medication Sig Dispense Refill    amLODIPine (NORVASC) 10 MG tablet TAKE 1 TABLET ONCE DAILY 90 tablet 3    atorvastatin (LIPITOR) 80 MG tablet Take 1 tablet (80 mg total) by mouth once daily. 90 tablet 3    ezetimibe (ZETIA) 10 mg tablet Take 1 tablet (10 mg total) by mouth once daily. 90 tablet 3    lansoprazole (PREVACID) 30 MG capsule TAKE 1 CAPSULE DAILY ONLY  AS NEEDED FOR HEARTBURN 90 capsule 0    tadalafiL (CIALIS) 20 MG Tab Take 1 tablet (20 mg total) by mouth once daily. 18 tablet 1    valsartan-hydrochlorothiazide (DIOVAN-HCT) 320-25 mg per tablet TAKE 1 TABLET by mouth ONCE DAILY 90 tablet 3     No current facility-administered medications for this visit.        History  Past Medical History:   Diagnosis Date    BPH (benign prostatic hyperplasia)     Elevated liver enzymes     Fatty infiltration of liver     Glucose intolerance (impaired glucose tolerance)     Hiatal hernia     Hyperlipidemia     Hypertension     Overweight     Reflux      Past Surgical History:   Procedure Laterality Date    inguinal hernia      x2    tonsillectomy       Social History     Socioeconomic History    Marital status:     Number of children: 2   Occupational History    Occupation: Works in insurance   Tobacco Use    Smoking status: Never    Smokeless tobacco: Never   Substance and Sexual Activity    Alcohol use: Yes     Comment: 2 glasses of wine most evenings.    Drug use: No     Social Determinants of Health     Financial Resource Strain: Low Risk  (8/11/2023)    Overall Financial Resource Strain (CARDIA)     Difficulty of Paying Living Expenses: Not hard at all   Food Insecurity: Unknown (8/11/2023)    Hunger Vital Sign      Worried About Running Out of Food in the Last Year: Patient refused     Ran Out of Food in the Last Year: Patient refused   Transportation Needs: Unknown (8/11/2023)    PRAPARE - Transportation     Lack of Transportation (Medical): Patient refused     Lack of Transportation (Non-Medical): Patient refused   Physical Activity: Sufficiently Active (8/11/2023)    Exercise Vital Sign     Days of Exercise per Week: 3 days     Minutes of Exercise per Session: 60 min   Stress: No Stress Concern Present (8/11/2023)    [employer] - Occupational Stress Questionnaire     Feeling of Stress : Only a little   Social Connections: Unknown (8/11/2023)    Social Connection and Isolation Panel [NHANES]     Frequency of Communication with Friends and Family: More than three times a week     Frequency of Social Gatherings with Friends and Family: More than three times a week     Active Member of Clubs or Organizations: Patient refused     Attends Club or Organization Meetings: Patient refused     Marital Status:    Housing Stability: Unknown (8/11/2023)    Housing Stability Vital Sign     Unable to Pay for Housing in the Last Year: Patient refused     Number of Places Lived in the Last Year: 1     Unstable Housing in the Last Year: Patient refused     Family History   Problem Relation Age of Onset    Hypertension Mother     Macular degeneration Mother         - gets shots in her eye    Stroke Father     Heart disease Father     Aneurysm Father     Migraines Sister     Arthritis Brother         knees    Dementia Paternal Grandmother     Diabetes Paternal Grandmother     Heart disease Paternal Grandfather     Diabetes Brother     Hernia Brother     Cancer Paternal Aunt     Cancer Paternal Uncle     Cancer Paternal Aunt     Cancer Paternal Uncle     Colon cancer Neg Hx     Prostate cancer Neg Hx         Allergies  Review of patient's allergies indicates:   Allergen Reactions    Ace inhibitors Other (See  Comments)     cough    Losartan      headache       Review of Systems   Review of Systems   Constitutional: Negative for malaise/fatigue, weight gain and weight loss.   Eyes:  Negative for visual disturbance.   Cardiovascular:  Negative for chest pain, claudication, cyanosis, dyspnea on exertion, irregular heartbeat, leg swelling, near-syncope, orthopnea, palpitations, paroxysmal nocturnal dyspnea and syncope.   Respiratory:  Negative for cough, hemoptysis, shortness of breath, sleep disturbances due to breathing and wheezing.    Hematologic/Lymphatic: Negative for bleeding problem. Does not bruise/bleed easily.   Skin:  Negative for poor wound healing.   Musculoskeletal:  Negative for muscle cramps and myalgias.   Gastrointestinal:  Negative for abdominal pain, anorexia, diarrhea, heartburn, hematemesis, hematochezia, melena, nausea and vomiting.   Genitourinary:  Negative for hematuria and nocturia.   Neurological:  Negative for excessive daytime sleepiness, dizziness, focal weakness, light-headedness and weakness.       Physical Exam  Vitals:    08/28/23 1024   BP: 134/86   Pulse: 72     Wt Readings from Last 1 Encounters:   08/28/23 88.4 kg (194 lb 12.8 oz)     Physical Exam  Vitals and nursing note reviewed.   Constitutional:       General: He is not in acute distress.     Appearance: He is not toxic-appearing or diaphoretic.   HENT:      Head: Normocephalic and atraumatic.      Mouth/Throat:      Lips: Pink.      Mouth: Mucous membranes are moist.   Eyes:      General: No scleral icterus.     Conjunctiva/sclera: Conjunctivae normal.   Neck:      Thyroid: No thyromegaly.      Vascular: No carotid bruit, hepatojugular reflux or JVD.      Trachea: Trachea normal.   Cardiovascular:      Rate and Rhythm: Normal rate and regular rhythm. No extrasystoles are present.     Chest Wall: PMI is not displaced.      Pulses:           Carotid pulses are 2+ on the right side and 2+ on the left side.       Radial pulses are 2+  on the right side and 2+ on the left side.        Dorsalis pedis pulses are 2+ on the right side and 2+ on the left side.        Posterior tibial pulses are 2+ on the right side and 2+ on the left side.      Heart sounds: S1 normal and S2 normal. No murmur heard.     No friction rub. No S3 or S4 sounds.   Pulmonary:      Effort: Pulmonary effort is normal. No tachypnea, bradypnea, accessory muscle usage or respiratory distress.      Breath sounds: Normal breath sounds and air entry. No decreased breath sounds, wheezing, rhonchi or rales.   Abdominal:      General: Bowel sounds are normal. There is no distension or abdominal bruit.      Palpations: Abdomen is soft. There is no hepatomegaly, splenomegaly or pulsatile mass.      Tenderness: There is no abdominal tenderness.   Musculoskeletal:         General: No tenderness or deformity.      Right lower leg: No edema.      Left lower leg: No edema.   Skin:     General: Skin is warm and dry.      Capillary Refill: Capillary refill takes less than 2 seconds.      Coloration: Skin is not cyanotic or pale.      Nails: There is no clubbing.   Neurological:      General: No focal deficit present.      Mental Status: He is alert and oriented to person, place, and time.   Psychiatric:         Attention and Perception: Attention normal.         Mood and Affect: Mood normal.         Speech: Speech normal.         Behavior: Behavior normal. Behavior is cooperative.         Labs  Hospital Outpatient Visit on 08/22/2023   Component Date Value Ref Range Status    BSA 08/22/2023 2.09  m2 Final    85% Max Predicted HR 08/22/2023 129   Final    Max Predicted HR 08/22/2023 152   Final    [facility] CV CPX PATIENT IS MALE 08/22/2023 1.0   Final    [facility] CV CPX PATIENT IS FEMALE 08/22/2023 0.0   Final    Systolic blood pressure 08/22/2023 122  mmHg Final    Diastolic blood pressure 08/22/2023 94  mmHg Final    HR at rest 08/22/2023 68  bpm Final    Exercise duration (min) 08/22/2023 8  minutes  "Final    Exercise duration (sec) 08/22/2023 4  seconds Final    Peak Systolic BP 08/22/2023 178  mmHg Final    Peak Diatolic BP 08/22/2023 96  mmHg Final    Peak HR 08/22/2023 144  bpm Final    Estimated METs 08/22/2023 13   Final    % Max HR Achieved 08/22/2023 95   Final    RPP 08/22/2023 8,296   Final    Peak RPP 08/22/2023 25,632   Final    LVOT stroke volume 08/22/2023 101.82  cm3 Final    LVIDd 08/22/2023 4.50  3.5 - 6.0 cm Final    LV Systolic Volume 08/22/2023 32.99  mL Final    LV Systolic Volume Index 08/22/2023 15.9  mL/m2 Final    LVIDs 08/22/2023 2.93  2.1 - 4.0 cm Final    LV Diastolic Volume 08/22/2023 92.39  mL Final    LV Diastolic Volume Index 08/22/2023 44.42  mL/m2 Final    IVS 08/22/2023 0.8  0.6 - 1.1 cm Final    LVOT diameter 08/22/2023 2.13  cm Final    LVOT area 08/22/2023 3.6  cm2 Final    FS 08/22/2023 35  28 - 44 % Final    Left Ventricle Relative Wall Thick* 08/22/2023 0.31  cm Final    Posterior Wall 08/22/2023 0.7  0.6 - 1.1 cm Final    LV mass 08/22/2023 104.50  g Final    LV Mass Index 08/22/2023 50  g/m2 Final    MV Peak E Arias 08/22/2023 0.86  m/s Final    TDI LATERAL 08/22/2023 0.12  m/s Final    TDI SEPTAL 08/22/2023 0.07  m/s Final    E/E' ratio 08/22/2023 9.05  m/s Final    MV Peak A Arias 08/22/2023 0.99  m/s Final    TR Max Arias 08/22/2023 2.57  m/s Final    E/A ratio 08/22/2023 0.87   Final    IVRT 08/22/2023 114.18  msec Final    E wave deceleration time 08/22/2023 238.66  msec Final    MV "A" wave duration 08/22/2023 6.28  msec Final    LV SEPTAL E/E' RATIO 08/22/2023 12.29  m/s Final    LA Volume Index 08/22/2023 34.7  mL/m2 Final    LV LATERAL E/E' RATIO 08/22/2023 7.17  m/s Final    LA volume 08/22/2023 72.22  cm3 Final    PV Peak S Arias 08/22/2023 0.66  m/s Final    PV Peak D Arias 08/22/2023 0.38  m/s Final    Pulm vein S/D ratio 08/22/2023 1.74   Final    LVOT peak arias 08/22/2023 1.62  m/s Final    LA size 08/22/2023 4.17  cm Final    Left Atrium Major Axis 08/22/2023 5.57  " cm Final    Left Atrium Minor Axis 08/22/2023 5.99  cm Final    LA WIDTH 08/22/2023 3.53  cm Final    RVDD 08/22/2023 3.20  cm Final    TAPSE 08/22/2023 1.96  cm Final    RA Major Axis 08/22/2023 5.59  cm Final    RA Width 08/22/2023 3.99  cm Final    AV mean gradient 08/22/2023 5  mmHg Final    AV peak gradient 08/22/2023 11  mmHg Final    Ao peak bernie 08/22/2023 1.63  m/s Final    Ao VTI 08/22/2023 31.75  cm Final    LVOT peak VTI 08/22/2023 28.59  cm Final    AV valve area 08/22/2023 3.21  cm² Final    AV Velocity Ratio 08/22/2023 0.99   Final    AV index (prosthetic) 08/22/2023 0.90   Final    RUPA by Velocity Ratio 08/22/2023 3.54  cm² Final    MV stenosis pressure 1/2 time 08/22/2023 69.21  ms Final    MV valve area p 1/2 method 08/22/2023 3.18  cm2 Final    Triscuspid Valve Regurgitation Pea* 08/22/2023 26  mmHg Final    Sinus 08/22/2023 4.2  cm Final    STJ 08/22/2023 4.2  cm Final    Ascending aorta 08/22/2023 5.0  cm Final    Mean e' 08/22/2023 0.10  m/s Final    ZLVIDS 08/22/2023 -2.25   Final    ZLVIDD 08/22/2023 -3.47   Final    1 Minute Recovery HR 08/22/2023 118  bpm Final    ST Depression (mm) 08/22/2023 1.0  mm Final    TV resting pulmonary artery pressu* 08/22/2023 29  mmHg Final    RV TB RVSP 08/22/2023 6  mmHg Final    Est. RA pres 08/22/2023 3  mmHg Final    Post-Stress Ejection Fraction 08/22/2023 60  % Final   Hospital Outpatient Visit on 06/29/2023   Component Date Value Ref Range Status    BSA 06/29/2023 2.11  m2 Final    TDI SEPTAL 06/29/2023 0.07  m/s Final    LV LATERAL E/E' RATIO 06/29/2023 5.18  m/s Final    LV SEPTAL E/E' RATIO 06/29/2023 8.14  m/s Final    LA WIDTH 06/29/2023 3.60  cm Final    IVC diameter 06/29/2023 1.55  cm Final    Left Ventricular Outflow Tract Laury* 06/29/2023 0.94  cm/s Final    Left Ventricular Outflow Tract Laury* 06/29/2023 4.11  mmHg Final    TDI LATERAL 06/29/2023 0.11  m/s Final    PV PEAK VELOCITY 06/29/2023 0.86  cm/s Final    LVIDd 06/29/2023 3.70  3.5 - 6.0  cm Final    IVS 06/29/2023 1.00  0.6 - 1.1 cm Final    Posterior Wall 06/29/2023 1.01  0.6 - 1.1 cm Final    LVIDs 06/29/2023 2.52  2.1 - 4.0 cm Final    FS 06/29/2023 32  28 - 44 % Final    LA volume 06/29/2023 41.37  cm3 Final    Sinus 06/29/2023 3.12  cm Final    STJ 06/29/2023 2.84  cm Final    Ascending aorta 06/29/2023 4.88  cm Final    LV mass 06/29/2023 113.35  g Final    LA size 06/29/2023 3.69  cm Final    TAPSE 06/29/2023 2.54  cm Final    RV S' 06/29/2023 13.77  cm/s Final    Left Ventricle Relative Wall Thick* 06/29/2023 0.55  cm Final    AV regurgitation pressure 1/2 time 06/29/2023 [id number]  ms Final    AV mean gradient 06/29/2023 4  mmHg Final    AV valve area 06/29/2023 3.74  cm2 Final    AV Velocity Ratio 06/29/2023 1.06   Final    AV index (prosthetic) 06/29/2023 1.17   Final    MV mean gradient 06/29/2023 1  mmHg Final    MV valve area p 1/2 method 06/29/2023 2.23  cm2 Final    MV valve area by continuity eq 06/29/2023 3.13  cm2 Final    E/A ratio 06/29/2023 0.89   Final    Mean e' 06/29/2023 0.09  m/s Final    E wave deceleration time 06/29/2023 339.69  msec Final    LVOT diameter 06/29/2023 2.02  cm Final    LVOT area 06/29/2023 3.2  cm2 Final    LVOT peak arias 06/29/2023 1.42  m/s Final    LVOT peak VTI 06/29/2023 26.60  cm Final    Ao peak arias 06/29/2023 1.34  m/s Final    Ao VTI 06/29/2023 22.8  cm Final    LVOT stroke volume 06/29/2023 85.20  cm3 Final    AV peak gradient 06/29/2023 7  mmHg Final    MV peak gradient 06/29/2023 4  mmHg Final    E/E' ratio 06/29/2023 6.33  m/s Final    MV Peak E Arias 06/29/2023 0.57  m/s Final    AR Max Raias 06/29/2023 3.10  m/s Final    TR Max Arias 06/29/2023 2.68  m/s Final    MV VTI 06/29/2023 27.2  cm Final    MV stenosis pressure 1/2 time 06/29/2023 98.51  ms Final    MV Peak A Arias 06/29/2023 0.64  m/s Final    LV Systolic Volume 06/29/2023 22.73  mL Final    LV Systolic Volume Index 06/29/2023 10.8  mL/m2 Final    LV Diastolic Volume 06/29/2023  58.06  mL Final    LV Diastolic Volume Index 06/29/2023 27.65  mL/m2 Final    LA Volume Index 06/29/2023 19.7  mL/m2 Final    LV Mass Index 06/29/2023 54  g/m2 Final    RA Major Axis 06/29/2023 4.95  cm Final    Left Atrium Minor Axis 06/29/2023 3.52  cm Final    Left Atrium Major Axis 06/29/2023 3.82  cm Final    Triscuspid Valve Regurgitation Pea* 06/29/2023 29  mmHg Final    RA Width 06/29/2023 3.80  cm Final    Right Atrial Pressure (from IVC) 06/29/2023 3  mmHg Final    EF 06/29/2023 65  % Final    TV resting pulmonary artery pressu* 06/29/2023 32  mmHg Final       Imaging  Stress Echo Which stress agent will be used? Treadmill Exercise; Color Flow Doppler? No    Result Date: 8/22/2023    Stress Protocol: The patient exercised for 8 minutes 4 seconds on a high ramp protocol, corresponding to a functional capacity of 13 METS, achieving a peak heart rate of 144 bpm, which is 95 % of the age predicted maximum heart rate. Their exercise capacity was average. The patient reported no symptoms during the stress test. The test was stopped because the patient experienced fatigue.   Post-stress Echo: The left ventricle systolic function is normal with an EF of 60 %. The post-stress echo showed worsened wall motion abnormalities compared to baseline which are indicative of myocardial ischemia in the LAD territory. The following segments are hypokinetic at peak stress: mid anteroseptal, apical septal and apex.. Right ventricle systolic function is normal.   ECG Conclusion: The ECG portion of the study is negative for ischemia.   Stress ECG: There is 1.0 mm of upward-sloping ST segment depression in the inferolateral leads (II, III, aVF, V5 and V6) noted during stress. During stress, occasional PACs are noted. During stress, frequent PVCs are noted. There is normal blood pressure response with stress.   Left Ventricle: The left ventricle is normal in size. Ventricular mass is normal. Normal wall thickness. Normal wall  motion. There is normal systolic function with a visually estimated ejection fraction of 60 - 65%. There is normal diastolic function.   Right Ventricle: Normal right ventricular cavity size. Wall thickness is normal. Right ventricle wall motion  is normal. Systolic function is normal.   Aortic Valve: The aortic valve is a trileaflet valve. There is mild to moderate aortic regurgitation.   Pulmonary Artery: The estimated pulmonary artery systolic pressure is 29 mmHg.   Aorta: Aortic root is mildly dilated measuring 4.2 cm. Ascending aorta is moderately dilated measuring 5.0 cm.   IVC/SVC: Normal venous pressure at 3 mmHg.       Assessment  1. Atherosclerosis of native coronary artery of native heart without angina pectoris  Abnormal stress test      Plan and Discussion    Plan for coronary angiography.  Start aspirin 81 mg daily. The procedure including its risks, benefits and alternatives were discussed in detail.  All questions were answered.  After shared decision making, appropriate consents were signed.    The 10-year ASCVD risk score (Rell BURT, et al., 2019) is: 16.5%    Values used to calculate the score:      Age: 68 years      Sex: Male      Is Non- : No      Diabetic: No      Tobacco smoker: No      Systolic Blood Pressure: 134 mmHg      Is BP treated: Yes      HDL Cholesterol: 51 mg/dL      Total Cholesterol: 148 mg/dL         Chon Lopez MD

## 2023-08-28 NOTE — PROGRESS NOTES
[facility]    Chief Complaint  Chief Complaint   Patient presents with    Coronary Artery Disease       HPI:    No problems since last visit.  Stress test was positive for ischemia in the distribution anterior descending artery.    Medications  Current Outpatient Medications   Medication Sig Dispense Refill    amLODIPine (NORVASC) 10 MG tablet TAKE 1 TABLET ONCE DAILY 90 tablet 3    atorvastatin (LIPITOR) 80 MG tablet Take 1 tablet (80 mg total) by mouth once daily. 90 tablet 3    ezetimibe (ZETIA) 10 mg tablet Take 1 tablet (10 mg total) by mouth once daily. 90 tablet 3    lansoprazole (PREVACID) 30 MG capsule TAKE 1 CAPSULE DAILY ONLY  AS NEEDED FOR HEARTBURN 90 capsule 0    tadalafiL (CIALIS) 20 MG Tab Take 1 tablet (20 mg total) by mouth once daily. 18 tablet 1    valsartan-hydrochlorothiazide (DIOVAN-HCT) 320-25 mg per tablet TAKE 1 TABLET by mouth ONCE DAILY 90 tablet 3     No current facility-administered medications for this visit.        History  Past Medical History:   Diagnosis Date    BPH (benign prostatic hyperplasia)     Elevated liver enzymes     Fatty infiltration of liver     Glucose intolerance (impaired glucose tolerance)     Hiatal hernia     Hyperlipidemia     Hypertension     Overweight     Reflux      Past Surgical History:   Procedure Laterality Date    inguinal hernia      x2    tonsillectomy       Social History     Socioeconomic History    Marital status:     Number of children: 2   Occupational History    Occupation: Works in insurance   Tobacco Use    Smoking status: Never    Smokeless tobacco: Never   Substance and Sexual Activity    Alcohol use: Yes     Comment: 2 glasses of wine most evenings.    Drug use: No     Social Determinants of Health     Financial Resource Strain: Low Risk  (8/11/2023)    Overall Financial Resource Strain (CARDIA)     Difficulty of Paying Living Expenses: Not hard at all   Food Insecurity: Unknown (8/11/2023)    Hunger Vital Sign      Worried About Running Out of Food in the Last Year: Patient refused     Ran Out of Food in the Last Year: Patient refused   Transportation Needs: Unknown (8/11/2023)    PRAPARE - Transportation     Lack of Transportation (Medical): Patient refused     Lack of Transportation (Non-Medical): Patient refused   Physical Activity: Sufficiently Active (8/11/2023)    Exercise Vital Sign     Days of Exercise per Week: 3 days     Minutes of Exercise per Session: 60 min   Stress: No Stress Concern Present (8/11/2023)    [employer] - Occupational Stress Questionnaire     Feeling of Stress : Only a little   Social Connections: Unknown (8/11/2023)    Social Connection and Isolation Panel [NHANES]     Frequency of Communication with Friends and Family: More than three times a week     Frequency of Social Gatherings with Friends and Family: More than three times a week     Active Member of Clubs or Organizations: Patient refused     Attends Club or Organization Meetings: Patient refused     Marital Status:    Housing Stability: Unknown (8/11/2023)    Housing Stability Vital Sign     Unable to Pay for Housing in the Last Year: Patient refused     Number of Places Lived in the Last Year: 1     Unstable Housing in the Last Year: Patient refused     Family History   Problem Relation Age of Onset    Hypertension Mother     Macular degeneration Mother         - gets shots in her eye    Stroke Father     Heart disease Father     Aneurysm Father     Migraines Sister     Arthritis Brother         knees    Dementia Paternal Grandmother     Diabetes Paternal Grandmother     Heart disease Paternal Grandfather     Diabetes Brother     Hernia Brother     Cancer Paternal Aunt     Cancer Paternal Uncle     Cancer Paternal Aunt     Cancer Paternal Uncle     Colon cancer Neg Hx     Prostate cancer Neg Hx         Allergies  Review of patient's allergies indicates:   Allergen Reactions    Ace inhibitors Other (See  Comments)     cough    Losartan      headache       Review of Systems   Review of Systems   Constitutional: Negative for malaise/fatigue, weight gain and weight loss.   Eyes:  Negative for visual disturbance.   Cardiovascular:  Negative for chest pain, claudication, cyanosis, dyspnea on exertion, irregular heartbeat, leg swelling, near-syncope, orthopnea, palpitations, paroxysmal nocturnal dyspnea and syncope.   Respiratory:  Negative for cough, hemoptysis, shortness of breath, sleep disturbances due to breathing and wheezing.    Hematologic/Lymphatic: Negative for bleeding problem. Does not bruise/bleed easily.   Skin:  Negative for poor wound healing.   Musculoskeletal:  Negative for muscle cramps and myalgias.   Gastrointestinal:  Negative for abdominal pain, anorexia, diarrhea, heartburn, hematemesis, hematochezia, melena, nausea and vomiting.   Genitourinary:  Negative for hematuria and nocturia.   Neurological:  Negative for excessive daytime sleepiness, dizziness, focal weakness, light-headedness and weakness.       Physical Exam  Vitals:    08/28/23 1024   BP: 134/86   Pulse: 72     Wt Readings from Last 1 Encounters:   08/28/23 88.4 kg (194 lb 12.8 oz)     Physical Exam  Vitals and nursing note reviewed.   Constitutional:       General: He is not in acute distress.     Appearance: He is not toxic-appearing or diaphoretic.   HENT:      Head: Normocephalic and atraumatic.      Mouth/Throat:      Lips: Pink.      Mouth: Mucous membranes are moist.   Eyes:      General: No scleral icterus.     Conjunctiva/sclera: Conjunctivae normal.   Neck:      Thyroid: No thyromegaly.      Vascular: No carotid bruit, hepatojugular reflux or JVD.      Trachea: Trachea normal.   Cardiovascular:      Rate and Rhythm: Normal rate and regular rhythm. No extrasystoles are present.     Chest Wall: PMI is not displaced.      Pulses:           Carotid pulses are 2+ on the right side and 2+ on the left side.       Radial pulses are 2+  on the right side and 2+ on the left side.        Dorsalis pedis pulses are 2+ on the right side and 2+ on the left side.        Posterior tibial pulses are 2+ on the right side and 2+ on the left side.      Heart sounds: S1 normal and S2 normal. No murmur heard.     No friction rub. No S3 or S4 sounds.   Pulmonary:      Effort: Pulmonary effort is normal. No tachypnea, bradypnea, accessory muscle usage or respiratory distress.      Breath sounds: Normal breath sounds and air entry. No decreased breath sounds, wheezing, rhonchi or rales.   Abdominal:      General: Bowel sounds are normal. There is no distension or abdominal bruit.      Palpations: Abdomen is soft. There is no hepatomegaly, splenomegaly or pulsatile mass.      Tenderness: There is no abdominal tenderness.   Musculoskeletal:         General: No tenderness or deformity.      Right lower leg: No edema.      Left lower leg: No edema.   Skin:     General: Skin is warm and dry.      Capillary Refill: Capillary refill takes less than 2 seconds.      Coloration: Skin is not cyanotic or pale.      Nails: There is no clubbing.   Neurological:      General: No focal deficit present.      Mental Status: He is alert and oriented to person, place, and time.   Psychiatric:         Attention and Perception: Attention normal.         Mood and Affect: Mood normal.         Speech: Speech normal.         Behavior: Behavior normal. Behavior is cooperative.         Labs  Hospital Outpatient Visit on 08/22/2023   Component Date Value Ref Range Status    BSA 08/22/2023 2.09  m2 Final    85% Max Predicted HR 08/22/2023 129   Final    Max Predicted HR 08/22/2023 152   Final    [facility] CV CPX PATIENT IS MALE 08/22/2023 1.0   Final    [facility] CV CPX PATIENT IS FEMALE 08/22/2023 0.0   Final    Systolic blood pressure 08/22/2023 122  mmHg Final    Diastolic blood pressure 08/22/2023 94  mmHg Final    HR at rest 08/22/2023 68  bpm Final    Exercise duration (min) 08/22/2023 8  minutes  "Final    Exercise duration (sec) 08/22/2023 4  seconds Final    Peak Systolic BP 08/22/2023 178  mmHg Final    Peak Diatolic BP 08/22/2023 96  mmHg Final    Peak HR 08/22/2023 144  bpm Final    Estimated METs 08/22/2023 13   Final    % Max HR Achieved 08/22/2023 95   Final    RPP 08/22/2023 8,296   Final    Peak RPP 08/22/2023 25,632   Final    LVOT stroke volume 08/22/2023 101.82  cm3 Final    LVIDd 08/22/2023 4.50  3.5 - 6.0 cm Final    LV Systolic Volume 08/22/2023 32.99  mL Final    LV Systolic Volume Index 08/22/2023 15.9  mL/m2 Final    LVIDs 08/22/2023 2.93  2.1 - 4.0 cm Final    LV Diastolic Volume 08/22/2023 92.39  mL Final    LV Diastolic Volume Index 08/22/2023 44.42  mL/m2 Final    IVS 08/22/2023 0.8  0.6 - 1.1 cm Final    LVOT diameter 08/22/2023 2.13  cm Final    LVOT area 08/22/2023 3.6  cm2 Final    FS 08/22/2023 35  28 - 44 % Final    Left Ventricle Relative Wall Thick* 08/22/2023 0.31  cm Final    Posterior Wall 08/22/2023 0.7  0.6 - 1.1 cm Final    LV mass 08/22/2023 104.50  g Final    LV Mass Index 08/22/2023 50  g/m2 Final    MV Peak E Arias 08/22/2023 0.86  m/s Final    TDI LATERAL 08/22/2023 0.12  m/s Final    TDI SEPTAL 08/22/2023 0.07  m/s Final    E/E' ratio 08/22/2023 9.05  m/s Final    MV Peak A Arias 08/22/2023 0.99  m/s Final    TR Max Arias 08/22/2023 2.57  m/s Final    E/A ratio 08/22/2023 0.87   Final    IVRT 08/22/2023 114.18  msec Final    E wave deceleration time 08/22/2023 238.66  msec Final    MV "A" wave duration 08/22/2023 6.28  msec Final    LV SEPTAL E/E' RATIO 08/22/2023 12.29  m/s Final    LA Volume Index 08/22/2023 34.7  mL/m2 Final    LV LATERAL E/E' RATIO 08/22/2023 7.17  m/s Final    LA volume 08/22/2023 72.22  cm3 Final    PV Peak S Arias 08/22/2023 0.66  m/s Final    PV Peak D Arias 08/22/2023 0.38  m/s Final    Pulm vein S/D ratio 08/22/2023 1.74   Final    LVOT peak arias 08/22/2023 1.62  m/s Final    LA size 08/22/2023 4.17  cm Final    Left Atrium Major Axis 08/22/2023 5.57  " cm Final    Left Atrium Minor Axis 08/22/2023 5.99  cm Final    LA WIDTH 08/22/2023 3.53  cm Final    RVDD 08/22/2023 3.20  cm Final    TAPSE 08/22/2023 1.96  cm Final    RA Major Axis 08/22/2023 5.59  cm Final    RA Width 08/22/2023 3.99  cm Final    AV mean gradient 08/22/2023 5  mmHg Final    AV peak gradient 08/22/2023 11  mmHg Final    Ao peak bernie 08/22/2023 1.63  m/s Final    Ao VTI 08/22/2023 31.75  cm Final    LVOT peak VTI 08/22/2023 28.59  cm Final    AV valve area 08/22/2023 3.21  cm² Final    AV Velocity Ratio 08/22/2023 0.99   Final    AV index (prosthetic) 08/22/2023 0.90   Final    RUPA by Velocity Ratio 08/22/2023 3.54  cm² Final    MV stenosis pressure 1/2 time 08/22/2023 69.21  ms Final    MV valve area p 1/2 method 08/22/2023 3.18  cm2 Final    Triscuspid Valve Regurgitation Pea* 08/22/2023 26  mmHg Final    Sinus 08/22/2023 4.2  cm Final    STJ 08/22/2023 4.2  cm Final    Ascending aorta 08/22/2023 5.0  cm Final    Mean e' 08/22/2023 0.10  m/s Final    ZLVIDS 08/22/2023 -2.25   Final    ZLVIDD 08/22/2023 -3.47   Final    1 Minute Recovery HR 08/22/2023 118  bpm Final    ST Depression (mm) 08/22/2023 1.0  mm Final    TV resting pulmonary artery pressu* 08/22/2023 29  mmHg Final    RV TB RVSP 08/22/2023 6  mmHg Final    Est. RA pres 08/22/2023 3  mmHg Final    Post-Stress Ejection Fraction 08/22/2023 60  % Final   Hospital Outpatient Visit on 06/29/2023   Component Date Value Ref Range Status    BSA 06/29/2023 2.11  m2 Final    TDI SEPTAL 06/29/2023 0.07  m/s Final    LV LATERAL E/E' RATIO 06/29/2023 5.18  m/s Final    LV SEPTAL E/E' RATIO 06/29/2023 8.14  m/s Final    LA WIDTH 06/29/2023 3.60  cm Final    IVC diameter 06/29/2023 1.55  cm Final    Left Ventricular Outflow Tract Laury* 06/29/2023 0.94  cm/s Final    Left Ventricular Outflow Tract Laury* 06/29/2023 4.11  mmHg Final    TDI LATERAL 06/29/2023 0.11  m/s Final    PV PEAK VELOCITY 06/29/2023 0.86  cm/s Final    LVIDd 06/29/2023 3.70  3.5 - 6.0  cm Final    IVS 06/29/2023 1.00  0.6 - 1.1 cm Final    Posterior Wall 06/29/2023 1.01  0.6 - 1.1 cm Final    LVIDs 06/29/2023 2.52  2.1 - 4.0 cm Final    FS 06/29/2023 32  28 - 44 % Final    LA volume 06/29/2023 41.37  cm3 Final    Sinus 06/29/2023 3.12  cm Final    STJ 06/29/2023 2.84  cm Final    Ascending aorta 06/29/2023 4.88  cm Final    LV mass 06/29/2023 113.35  g Final    LA size 06/29/2023 3.69  cm Final    TAPSE 06/29/2023 2.54  cm Final    RV S' 06/29/2023 13.77  cm/s Final    Left Ventricle Relative Wall Thick* 06/29/2023 0.55  cm Final    AV regurgitation pressure 1/2 time 06/29/2023 723.884949460790412  ms Final    AV mean gradient 06/29/2023 4  mmHg Final    AV valve area 06/29/2023 3.74  cm2 Final    AV Velocity Ratio 06/29/2023 1.06   Final    AV index (prosthetic) 06/29/2023 1.17   Final    MV mean gradient 06/29/2023 1  mmHg Final    MV valve area p 1/2 method 06/29/2023 2.23  cm2 Final    MV valve area by continuity eq 06/29/2023 3.13  cm2 Final    E/A ratio 06/29/2023 0.89   Final    Mean e' 06/29/2023 0.09  m/s Final    E wave deceleration time 06/29/2023 339.69  msec Final    LVOT diameter 06/29/2023 2.02  cm Final    LVOT area 06/29/2023 3.2  cm2 Final    LVOT peak arias 06/29/2023 1.42  m/s Final    LVOT peak VTI 06/29/2023 26.60  cm Final    Ao peak arias 06/29/2023 1.34  m/s Final    Ao VTI 06/29/2023 22.8  cm Final    LVOT stroke volume 06/29/2023 85.20  cm3 Final    AV peak gradient 06/29/2023 7  mmHg Final    MV peak gradient 06/29/2023 4  mmHg Final    E/E' ratio 06/29/2023 6.33  m/s Final    MV Peak E Arias 06/29/2023 0.57  m/s Final    AR Max Arias 06/29/2023 3.10  m/s Final    TR Max Arias 06/29/2023 2.68  m/s Final    MV VTI 06/29/2023 27.2  cm Final    MV stenosis pressure 1/2 time 06/29/2023 98.51  ms Final    MV Peak A Arias 06/29/2023 0.64  m/s Final    LV Systolic Volume 06/29/2023 22.73  mL Final    LV Systolic Volume Index 06/29/2023 10.8  mL/m2 Final    LV Diastolic Volume 06/29/2023  58.06  mL Final    LV Diastolic Volume Index 06/29/2023 27.65  mL/m2 Final    LA Volume Index 06/29/2023 19.7  mL/m2 Final    LV Mass Index 06/29/2023 54  g/m2 Final    RA Major Axis 06/29/2023 4.95  cm Final    Left Atrium Minor Axis 06/29/2023 3.52  cm Final    Left Atrium Major Axis 06/29/2023 3.82  cm Final    Triscuspid Valve Regurgitation Pea* 06/29/2023 29  mmHg Final    RA Width 06/29/2023 3.80  cm Final    Right Atrial Pressure (from IVC) 06/29/2023 3  mmHg Final    EF 06/29/2023 65  % Final    TV resting pulmonary artery pressu* 06/29/2023 32  mmHg Final       Imaging  Stress Echo Which stress agent will be used? Treadmill Exercise; Color Flow Doppler? No    Result Date: 8/22/2023    Stress Protocol: The patient exercised for 8 minutes 4 seconds on a high ramp protocol, corresponding to a functional capacity of 13 METS, achieving a peak heart rate of 144 bpm, which is 95 % of the age predicted maximum heart rate. Their exercise capacity was average. The patient reported no symptoms during the stress test. The test was stopped because the patient experienced fatigue.   Post-stress Echo: The left ventricle systolic function is normal with an EF of 60 %. The post-stress echo showed worsened wall motion abnormalities compared to baseline which are indicative of myocardial ischemia in the LAD territory. The following segments are hypokinetic at peak stress: mid anteroseptal, apical septal and apex.. Right ventricle systolic function is normal.   ECG Conclusion: The ECG portion of the study is negative for ischemia.   Stress ECG: There is 1.0 mm of upward-sloping ST segment depression in the inferolateral leads (II, III, aVF, V5 and V6) noted during stress. During stress, occasional PACs are noted. During stress, frequent PVCs are noted. There is normal blood pressure response with stress.   Left Ventricle: The left ventricle is normal in size. Ventricular mass is normal. Normal wall thickness. Normal wall  motion. There is normal systolic function with a visually estimated ejection fraction of 60 - 65%. There is normal diastolic function.   Right Ventricle: Normal right ventricular cavity size. Wall thickness is normal. Right ventricle wall motion  is normal. Systolic function is normal.   Aortic Valve: The aortic valve is a trileaflet valve. There is mild to moderate aortic regurgitation.   Pulmonary Artery: The estimated pulmonary artery systolic pressure is 29 mmHg.   Aorta: Aortic root is mildly dilated measuring 4.2 cm. Ascending aorta is moderately dilated measuring 5.0 cm.   IVC/SVC: Normal venous pressure at 3 mmHg.       Assessment  1. Atherosclerosis of native coronary artery of native heart without angina pectoris  Abnormal stress test      Plan and Discussion    Plan for coronary angiography.  Start aspirin 81 mg daily. The procedure including its risks, benefits and alternatives were discussed in detail.  All questions were answered.  After shared decision making, appropriate consents were signed.    The 10-year ASCVD risk score (Rell BURT, et al., 2019) is: 16.5%    Values used to calculate the score:      Age: 68 years      Sex: Male      Is Non- : No      Diabetic: No      Tobacco smoker: No      Systolic Blood Pressure: 134 mmHg      Is BP treated: Yes      HDL Cholesterol: 51 mg/dL      Total Cholesterol: 148 mg/dL         Chon Lopez MD

## 2023-09-06 ENCOUNTER — HOSPITAL ENCOUNTER (OUTPATIENT)
Facility: OTHER | Age: 68
Discharge: HOME | End: 2023-09-06
Attending: INTERNAL MEDICINE | Admitting: INTERNAL MEDICINE
Payer: COMMERCIAL

## 2023-09-06 ENCOUNTER — PATIENT MESSAGE (OUTPATIENT)
Dept: CARDIOLOGY | Facility: OTHER | Age: 68
End: 2023-09-06
Payer: COMMERCIAL

## 2023-09-06 VITALS
SYSTOLIC BLOOD PRESSURE: 139 MMHG | DIASTOLIC BLOOD PRESSURE: 92 MMHG | BODY MASS INDEX: 26.38 KG/M2 | HEART RATE: 63 BPM | OXYGEN SATURATION: 96 % | WEIGHT: 194.81 LBS | HEIGHT: 72 IN | RESPIRATION RATE: 16 BRPM | TEMPERATURE: 98 F

## 2023-09-06 DIAGNOSIS — I25.758: Primary | ICD-10-CM

## 2023-09-06 DIAGNOSIS — I25.10 ATHEROSCLEROSIS OF NATIVE CORONARY ARTERY OF NATIVE HEART WITHOUT ANGINA PECTORIS: ICD-10-CM

## 2023-09-06 PROCEDURE — 63600175 PHARM REV CODE 636 W HCPCS: Performed by: INTERNAL MEDICINE

## 2023-09-06 PROCEDURE — 93454 PR CATH PLACE/CORONARY ANGIO, IMG SUPER/INTERP: ICD-10-PCS | Mod: 26,,, | Performed by: INTERNAL MEDICINE

## 2023-09-06 PROCEDURE — 93454 CORONARY ARTERY ANGIO S&I: CPT | Mod: 26,,, | Performed by: INTERNAL MEDICINE

## 2023-09-06 PROCEDURE — 93454 CORONARY ARTERY ANGIO S&I: CPT | Performed by: INTERNAL MEDICINE

## 2023-09-06 PROCEDURE — C1769 GUIDE WIRE: HCPCS | Performed by: INTERNAL MEDICINE

## 2023-09-06 PROCEDURE — 25000003 PHARM REV CODE 250: Performed by: INTERNAL MEDICINE

## 2023-09-06 PROCEDURE — C1894 INTRO/SHEATH, NON-LASER: HCPCS | Performed by: INTERNAL MEDICINE

## 2023-09-06 PROCEDURE — 99152 PR MOD CONSCIOUS SEDATION, SAME PHYS, 5+ YRS, FIRST 15 MIN: ICD-10-PCS | Mod: ,,, | Performed by: INTERNAL MEDICINE

## 2023-09-06 PROCEDURE — 99152 MOD SED SAME PHYS/QHP 5/>YRS: CPT | Mod: ,,, | Performed by: INTERNAL MEDICINE

## 2023-09-06 PROCEDURE — 25500020 PHARM REV CODE 255: Performed by: INTERNAL MEDICINE

## 2023-09-06 PROCEDURE — 99152 MOD SED SAME PHYS/QHP 5/>YRS: CPT | Performed by: INTERNAL MEDICINE

## 2023-09-06 PROCEDURE — C1887 CATHETER, GUIDING: HCPCS | Performed by: INTERNAL MEDICINE

## 2023-09-06 RX ORDER — ACETAMINOPHEN 325 MG/1
650 TABLET ORAL EVERY 4 HOURS PRN
Status: DISCONTINUED | OUTPATIENT
Start: 2023-09-06 | End: 2023-09-06 | Stop reason: HOSPADM

## 2023-09-06 RX ORDER — FENTANYL CITRATE 50 UG/ML
INJECTION, SOLUTION INTRAMUSCULAR; INTRAVENOUS
Status: DISCONTINUED | OUTPATIENT
Start: 2023-09-06 | End: 2023-09-06 | Stop reason: HOSPADM

## 2023-09-06 RX ORDER — SODIUM CHLORIDE 9 MG/ML
INJECTION, SOLUTION INTRAVENOUS CONTINUOUS
Status: DISCONTINUED | OUTPATIENT
Start: 2023-09-06 | End: 2023-09-06 | Stop reason: HOSPADM

## 2023-09-06 RX ORDER — SODIUM CHLORIDE 9 MG/ML
INJECTION, SOLUTION INTRAVENOUS CONTINUOUS
Status: ACTIVE | OUTPATIENT
Start: 2023-09-06 | End: 2023-09-06

## 2023-09-06 RX ORDER — LIDOCAINE HYDROCHLORIDE 10 MG/ML
INJECTION, SOLUTION EPIDURAL; INFILTRATION; INTRACAUDAL; PERINEURAL
Status: DISCONTINUED | OUTPATIENT
Start: 2023-09-06 | End: 2023-09-06 | Stop reason: HOSPADM

## 2023-09-06 RX ORDER — HEPARIN SODIUM 1000 [USP'U]/ML
INJECTION, SOLUTION INTRAVENOUS; SUBCUTANEOUS
Status: DISCONTINUED | OUTPATIENT
Start: 2023-09-06 | End: 2023-09-06 | Stop reason: HOSPADM

## 2023-09-06 RX ORDER — HEPARIN SOD,PORCINE/0.9 % NACL 1000/500ML
INTRAVENOUS SOLUTION INTRAVENOUS
Status: DISCONTINUED | OUTPATIENT
Start: 2023-09-06 | End: 2023-09-06 | Stop reason: HOSPADM

## 2023-09-06 RX ORDER — ASPIRIN 81 MG/1
81 TABLET ORAL DAILY
Refills: 0 | Status: ON HOLD
Start: 2023-09-06 | End: 2023-09-24 | Stop reason: HOSPADM

## 2023-09-06 RX ORDER — MIDAZOLAM HYDROCHLORIDE 1 MG/ML
INJECTION INTRAMUSCULAR; INTRAVENOUS
Status: DISCONTINUED | OUTPATIENT
Start: 2023-09-06 | End: 2023-09-06 | Stop reason: HOSPADM

## 2023-09-06 RX ORDER — ONDANSETRON 8 MG/1
8 TABLET, ORALLY DISINTEGRATING ORAL EVERY 8 HOURS PRN
Status: DISCONTINUED | OUTPATIENT
Start: 2023-09-06 | End: 2023-09-06 | Stop reason: HOSPADM

## 2023-09-06 RX ADMIN — SODIUM CHLORIDE: 9 INJECTION, SOLUTION INTRAVENOUS at 06:09

## 2023-09-06 NOTE — Clinical Note
The site was marked. The groin and right radial was prepped. The site was prepped with ChloraPrep. The site was clipped. The patient was draped.

## 2023-09-06 NOTE — Clinical Note
The catheter was inserted over the wire into the ostium   left main. An angiography was performed of the left coronary arteries. Multiple views were taken. The angiography was performed via hand injection with .

## 2023-09-06 NOTE — PLAN OF CARE
Sawyer Browning has met all discharge criteria from Phase II. Vital Signs are stable, ambulating  without difficulty. Discharge instructions given, patient verbalized understanding. Discharged from facility via wheelchair in stable condition.

## 2023-09-06 NOTE — Clinical Note
The catheter was removed from the ostium   right coronary artery. The catheter was unable to engage the area..

## 2023-09-06 NOTE — Clinical Note
The catheter was inserted over the wire into the ostium   right coronary artery. An angiography was performed of the right coronary arteries. The angiography was performed via hand injection with .

## 2023-09-06 NOTE — DISCHARGE SUMMARY
[facility] - Cath Lab ([employer])  Discharge Note  Short Stay    Procedure(s) (LRB):  ANGIOGRAM, CORONARY ARTERY (N/A)      OUTCOME: Patient tolerated treatment/procedure well without complication and is now ready for discharge.    DISPOSITION: Home or Self Care    FINAL DIAGNOSIS:  Coronary atherosclerosis doses of native artery in native heart with stable angina    FOLLOWUP: In clinic    DISCHARGE INSTRUCTIONS:    Discharge Procedure Orders   Diet general     Remove dressing in 24 hours        TIME SPENT ON DISCHARGE: 14 minutes

## 2023-09-07 ENCOUNTER — TELEPHONE (OUTPATIENT)
Dept: CARDIOTHORACIC SURGERY | Facility: CLINIC | Age: 68
End: 2023-09-07
Payer: COMMERCIAL

## 2023-09-07 ENCOUNTER — PATIENT MESSAGE (OUTPATIENT)
Dept: FAMILY MEDICINE | Facility: CLINIC | Age: 68
End: 2023-09-07
Payer: COMMERCIAL

## 2023-09-07 DIAGNOSIS — I25.10 CORONARY ARTERY DISEASE, UNSPECIFIED VESSEL OR LESION TYPE, UNSPECIFIED WHETHER ANGINA PRESENT, UNSPECIFIED WHETHER NATIVE OR TRANSPLANTED HEART: ICD-10-CM

## 2023-09-07 DIAGNOSIS — I10 ESSENTIAL HYPERTENSION: Primary | ICD-10-CM

## 2023-09-07 NOTE — TELEPHONE ENCOUNTER
Spoke with patient and updated him on additional testing requested by Dr Mooney. Appointments scheduled.    Informed patient that he would need to fast for 4 hours prior to CTA TAVR scan.    Pt verbalizes understanding and is able to see appointments in [facility] portal.    Julie Haase RN  [facility] Nurse Navigator [phone]

## 2023-09-08 ENCOUNTER — HOSPITAL ENCOUNTER (OUTPATIENT)
Dept: RADIOLOGY | Facility: HOSPITAL | Age: 68
Discharge: HOME | End: 2023-09-08
Attending: THORACIC SURGERY (CARDIOTHORACIC VASCULAR SURGERY)
Payer: COMMERCIAL

## 2023-09-08 DIAGNOSIS — I10 ESSENTIAL HYPERTENSION: ICD-10-CM

## 2023-09-08 DIAGNOSIS — I25.10 CORONARY ARTERY DISEASE, UNSPECIFIED VESSEL OR LESION TYPE, UNSPECIFIED WHETHER ANGINA PRESENT, UNSPECIFIED WHETHER NATIVE OR TRANSPLANTED HEART: ICD-10-CM

## 2023-09-08 PROCEDURE — 71275 CT ANGIOGRAPHY CHEST: CPT | Mod: 26,,, | Performed by: RADIOLOGY

## 2023-09-08 PROCEDURE — 74174 CTA ABD&PLVS W/CONTRAST: CPT | Mod: TC

## 2023-09-08 PROCEDURE — 74174 CTA CARDIAC TAVR_PARTNERS (XPD): ICD-10-PCS | Mod: 26,,, | Performed by: RADIOLOGY

## 2023-09-08 PROCEDURE — 71275 CT ANGIOGRAPHY CHEST: CPT | Mod: TC

## 2023-09-08 PROCEDURE — 71275 CTA CARDIAC TAVR_PARTNERS (XPD): ICD-10-PCS | Mod: 26,,, | Performed by: RADIOLOGY

## 2023-09-08 PROCEDURE — 74174 CTA ABD&PLVS W/CONTRAST: CPT | Mod: 26,,, | Performed by: RADIOLOGY

## 2023-09-08 PROCEDURE — 25500020 PHARM REV CODE 255: Performed by: THORACIC SURGERY (CARDIOTHORACIC VASCULAR SURGERY)

## 2023-09-08 RX ADMIN — IOHEXOL 100 ML: 350 INJECTION, SOLUTION INTRAVENOUS at 02:09

## 2023-09-13 ENCOUNTER — OFFICE VISIT (OUTPATIENT)
Dept: CARDIOTHORACIC SURGERY | Facility: CLINIC | Age: 68
End: 2023-09-13
Payer: COMMERCIAL

## 2023-09-13 ENCOUNTER — HOSPITAL ENCOUNTER (OUTPATIENT)
Dept: VASCULAR SURGERY | Facility: CLINIC | Age: 68
Discharge: HOME | End: 2023-09-13
Attending: THORACIC SURGERY (CARDIOTHORACIC VASCULAR SURGERY)
Payer: COMMERCIAL

## 2023-09-13 VITALS
HEIGHT: 72 IN | DIASTOLIC BLOOD PRESSURE: 86 MMHG | BODY MASS INDEX: 26.12 KG/M2 | WEIGHT: 192.81 LBS | SYSTOLIC BLOOD PRESSURE: 142 MMHG | HEART RATE: 79 BPM

## 2023-09-13 DIAGNOSIS — I71.20 THORACIC AORTIC ANEURYSM WITHOUT RUPTURE, UNSPECIFIED PART: ICD-10-CM

## 2023-09-13 DIAGNOSIS — I65.23 BILATERAL CAROTID ARTERY STENOSIS: Primary | ICD-10-CM

## 2023-09-13 DIAGNOSIS — I10 ESSENTIAL HYPERTENSION: ICD-10-CM

## 2023-09-13 DIAGNOSIS — I25.10 CORONARY ARTERY DISEASE, UNSPECIFIED VESSEL OR LESION TYPE, UNSPECIFIED WHETHER ANGINA PRESENT, UNSPECIFIED WHETHER NATIVE OR TRANSPLANTED HEART: ICD-10-CM

## 2023-09-13 DIAGNOSIS — I25.10 CORONARY ARTERY DISEASE, UNSPECIFIED VESSEL OR LESION TYPE, UNSPECIFIED WHETHER ANGINA PRESENT, UNSPECIFIED WHETHER NATIVE OR TRANSPLANTED HEART: Primary | ICD-10-CM

## 2023-09-13 DIAGNOSIS — I25.758: ICD-10-CM

## 2023-09-13 PROCEDURE — 1126F AMNT PAIN NOTED NONE PRSNT: CPT | Mod: CPTII,S$GLB,, | Performed by: THORACIC SURGERY (CARDIOTHORACIC VASCULAR SURGERY)

## 2023-09-13 PROCEDURE — 93880 PR DUPLEX SCAN EXTRACRANIAL,BILAT: ICD-10-PCS | Mod: S$GLB,,, | Performed by: SURGERY

## 2023-09-13 PROCEDURE — 3079F DIAST BP 80-89 MM HG: CPT | Mod: CPTII,S$GLB,, | Performed by: THORACIC SURGERY (CARDIOTHORACIC VASCULAR SURGERY)

## 2023-09-13 PROCEDURE — 99205 OFFICE O/P NEW HI 60 MIN: CPT | Mod: S$GLB,,, | Performed by: THORACIC SURGERY (CARDIOTHORACIC VASCULAR SURGERY)

## 2023-09-13 PROCEDURE — 1159F MED LIST DOCD IN RCRD: CPT | Mod: CPTII,S$GLB,, | Performed by: THORACIC SURGERY (CARDIOTHORACIC VASCULAR SURGERY)

## 2023-09-13 PROCEDURE — 1126F PR PAIN SEVERITY QUANTIFIED, NO PAIN PRESENT: ICD-10-PCS | Mod: CPTII,S$GLB,, | Performed by: THORACIC SURGERY (CARDIOTHORACIC VASCULAR SURGERY)

## 2023-09-13 PROCEDURE — 1159F PR MEDICATION LIST DOCUMENTED IN MEDICAL RECORD: ICD-10-PCS | Mod: CPTII,S$GLB,, | Performed by: THORACIC SURGERY (CARDIOTHORACIC VASCULAR SURGERY)

## 2023-09-13 PROCEDURE — 93880 EXTRACRANIAL BILAT STUDY: CPT | Mod: S$GLB,,, | Performed by: SURGERY

## 2023-09-13 PROCEDURE — 3288F PR FALLS RISK ASSESSMENT DOCUMENTED: ICD-10-PCS | Mod: CPTII,S$GLB,, | Performed by: THORACIC SURGERY (CARDIOTHORACIC VASCULAR SURGERY)

## 2023-09-13 PROCEDURE — 99999 PR PBB SHADOW E&M-EST. PATIENT-LVL III: ICD-10-PCS | Mod: PBBFAC,,, | Performed by: THORACIC SURGERY (CARDIOTHORACIC VASCULAR SURGERY)

## 2023-09-13 PROCEDURE — 3077F PR MOST RECENT SYSTOLIC BLOOD PRESSURE >= 140 MM HG: ICD-10-PCS | Mod: CPTII,S$GLB,, | Performed by: THORACIC SURGERY (CARDIOTHORACIC VASCULAR SURGERY)

## 2023-09-13 PROCEDURE — 3079F PR MOST RECENT DIASTOLIC BLOOD PRESSURE 80-89 MM HG: ICD-10-PCS | Mod: CPTII,S$GLB,, | Performed by: THORACIC SURGERY (CARDIOTHORACIC VASCULAR SURGERY)

## 2023-09-13 PROCEDURE — 3008F PR BODY MASS INDEX (BMI) DOCUMENTED: ICD-10-PCS | Mod: CPTII,S$GLB,, | Performed by: THORACIC SURGERY (CARDIOTHORACIC VASCULAR SURGERY)

## 2023-09-13 PROCEDURE — 99205 PR OFFICE/OUTPT VISIT, NEW, LEVL V, 60-74 MIN: ICD-10-PCS | Mod: S$GLB,,, | Performed by: THORACIC SURGERY (CARDIOTHORACIC VASCULAR SURGERY)

## 2023-09-13 PROCEDURE — 3288F FALL RISK ASSESSMENT DOCD: CPT | Mod: CPTII,S$GLB,, | Performed by: THORACIC SURGERY (CARDIOTHORACIC VASCULAR SURGERY)

## 2023-09-13 PROCEDURE — 3077F SYST BP >= 140 MM HG: CPT | Mod: CPTII,S$GLB,, | Performed by: THORACIC SURGERY (CARDIOTHORACIC VASCULAR SURGERY)

## 2023-09-13 PROCEDURE — 1101F PT FALLS ASSESS-DOCD LE1/YR: CPT | Mod: CPTII,S$GLB,, | Performed by: THORACIC SURGERY (CARDIOTHORACIC VASCULAR SURGERY)

## 2023-09-13 PROCEDURE — 3008F BODY MASS INDEX DOCD: CPT | Mod: CPTII,S$GLB,, | Performed by: THORACIC SURGERY (CARDIOTHORACIC VASCULAR SURGERY)

## 2023-09-13 PROCEDURE — 99999 PR PBB SHADOW E&M-EST. PATIENT-LVL III: CPT | Mod: PBBFAC,,, | Performed by: THORACIC SURGERY (CARDIOTHORACIC VASCULAR SURGERY)

## 2023-09-13 PROCEDURE — 1101F PR PT FALLS ASSESS DOC 0-1 FALLS W/OUT INJ PAST YR: ICD-10-PCS | Mod: CPTII,S$GLB,, | Performed by: THORACIC SURGERY (CARDIOTHORACIC VASCULAR SURGERY)

## 2023-09-13 NOTE — H&P (VIEW-ONLY)
Subjective:      Patient ID: Sawyer Browning is a 68 y.o. male.    Chief Complaint: No chief complaint on file.      HPI:  Sawyer Browning is a 68 y.o. male who presents to an initial surgical evaluation for multivessel CAD and TAA. Medical conditions include hypertension, hyperlipidemia, fatty liver disease. Patient had an elevated calcium score of 4147 which prompt further cardiac ischemic work up. A 5 cm TAA was also incidentally noted.  Stress test was positive for ischemia in the distribution anterior descending artery. He subsequently underwent  LHC significant for chronic total occlusion of the mid left anterior descending coronary artery with left-to-left and right-to-left collateral flow; high-grade stenosis of the distal right coronary artery. Patient is asymptomatic. Denies chest pain and back pain. He is able to exercise without any cardiovascular limitations. Does cross fit. Independently perform ADLs. No Assistive devices for walking. Denies prior history if strokes, stents, sternotomies.  Blood pressure well controlled with medications. Denies tobacco or illicit drug use. Has 2 glass of wine/ day. He is compliant with his medications. Positive for family history of aneurysm- father.     Of note, was diagnosed with NALD in 2021,   fibroscan : 12/2021  Findings  Median liver stiffness score:  4.4  CAP Reading: dB/m:  291     IQR/med %:  39  Interpretation  Fibrosis interpretation is based on medial liver stiffness - Kilopascal (kPa).     Fibrosis Stage:  F 0-1  Steatosis interpretation is based on controlled attenuation parameter - (dB/m).     Steatosis Grade:  S3    Family and social history reviewed    Review of patient's allergies indicates:   Allergen Reactions    Ace inhibitors Other (See Comments)     cough    Losartan      headache     Past Medical History:   Diagnosis Date    BPH (benign prostatic hyperplasia)     Elevated liver enzymes     Fatty infiltration of liver     Glucose intolerance (impaired  glucose tolerance)     Hiatal hernia     Hyperlipidemia     Hypertension     Overweight     Reflux      Past Surgical History:   Procedure Laterality Date    CORONARY ANGIOGRAPHY N/A 9/6/2023    Procedure: ANGIOGRAM, CORONARY ARTERY;  Surgeon: Chon Lopez MD;  [facility];  Service: Cardiology;  Laterality: N/A;  right radial    inguinal hernia      x2    tonsillectomy       Family History       Problem Relation (Age of Onset)    Aneurysm Father    Arthritis Brother    Cancer Paternal Aunt, Paternal Uncle, Paternal Aunt, Paternal Uncle    Dementia Paternal Grandmother    Diabetes Paternal Grandmother, Brother    Heart disease Father, Paternal Grandfather    Hernia Brother    Hypertension Mother    Macular degeneration Mother    Migraines Sister    Stroke Father          Social History     Socioeconomic History    Marital status:     Number of children: 2   Occupational History    Occupation: Works in insurance   Tobacco Use    Smoking status: Never    Smokeless tobacco: Never   Substance and Sexual Activity    Alcohol use: Yes     Comment: 2 glasses of wine most evenings.    Drug use: No     Social Determinants of Health     Financial Resource Strain: Low Risk  (8/11/2023)    Overall Financial Resource Strain (CARDIA)     Difficulty of Paying Living Expenses: Not hard at all   Food Insecurity: Unknown (8/11/2023)    Hunger Vital Sign     Worried About Running Out of Food in the Last Year: Patient refused     Ran Out of Food in the Last Year: Patient refused   Transportation Needs: Unknown (8/11/2023)    PRAPARE - Transportation     Lack of Transportation (Medical): Patient refused     Lack of Transportation (Non-Medical): Patient refused   Physical Activity: Sufficiently Active (8/11/2023)    Exercise Vital Sign     Days of Exercise per Week: 3 days     Minutes of Exercise per Session: 60 min   Stress: No Stress Concern Present (8/11/2023)    [employer] -  Occupational Stress Questionnaire     Feeling of Stress : Only a little   Social Connections: Unknown (8/11/2023)    Social Connection and Isolation Panel [NHANES]     Frequency of Communication with Friends and Family: More than three times a week     Frequency of Social Gatherings with Friends and Family: More than three times a week     Active Member of Clubs or Organizations: Patient refused     Attends Club or Organization Meetings: Patient refused     Marital Status:    Housing Stability: Unknown (8/11/2023)    Housing Stability Vital Sign     Unable to Pay for Housing in the Last Year: Patient refused     Number of Places Lived in the Last Year: 1     Unstable Housing in the Last Year: Patient refused       Current medications Reviewed  Current Outpatient Medications on File Prior to Visit   Medication Sig Dispense Refill    amLODIPine (NORVASC) 10 MG tablet TAKE 1 TABLET ONCE DAILY 90 tablet 3    aspirin (ECOTRIN) 81 MG EC tablet Take 1 tablet (81 mg total) by mouth once daily.  0    atorvastatin (LIPITOR) 80 MG tablet Take 1 tablet (80 mg total) by mouth once daily. 90 tablet 3    ezetimibe (ZETIA) 10 mg tablet Take 1 tablet (10 mg total) by mouth once daily. 90 tablet 3    lansoprazole (PREVACID) 30 MG capsule TAKE 1 CAPSULE DAILY ONLY  AS NEEDED FOR HEARTBURN 90 capsule 0    tadalafiL (CIALIS) 20 MG Tab Take 1 tablet (20 mg total) by mouth once daily. 18 tablet 1    valsartan-hydrochlorothiazide (DIOVAN-HCT) 320-25 mg per tablet TAKE 1 TABLET by mouth ONCE DAILY 90 tablet 3     Current Facility-Administered Medications on File Prior to Visit   Medication Dose Route Frequency Provider Last Rate Last Admin    sodium chloride 0.9% flush 10 mL  10 mL Intravenous PRN Chon Lopez MD           Review of Systems   Constitutional:  Negative for activity change, appetite change, fatigue and fever.   HENT:  Negative for nosebleeds.    Respiratory:  Negative for cough and shortness of breath.     Cardiovascular:  Negative for chest pain, palpitations and leg swelling.   Gastrointestinal:  Negative for abdominal distention, abdominal pain and nausea.   Genitourinary:  Negative for frequency.   Musculoskeletal:  Negative for arthralgias and myalgias.   Skin:  Negative for rash.   Neurological:  Negative for dizziness and numbness.   Hematological:  Does not bruise/bleed easily.     Objective:   Physical Exam  Constitutional:       Appearance: Normal appearance.   HENT:      Head: Normocephalic and atraumatic.   Eyes:      Extraocular Movements: Extraocular movements intact.   Cardiovascular:      Rate and Rhythm: Normal rate and regular rhythm.   Pulmonary:      Effort: Pulmonary effort is normal.   Abdominal:      General: Abdomen is flat.   Musculoskeletal:         General: Normal range of motion.      Cervical back: Normal range of motion.   Skin:     General: Skin is warm and dry.      Capillary Refill: Capillary refill takes less than 2 seconds.      Coloration: Skin is not pale.   Neurological:      General: No focal deficit present.      Mental Status: He is alert.         Diagnostic Results: reviewed  Carotid US 9/13/23  RIGHT SIDE:   1-39% Right ICA stenosis.   Heterogeneous plaque noted in the right internal carotid artery.   Retrograde flow noted in the right vertebral artery.     LEFT SIDE:   1-39% Left ICA stenosis.   Heterogeneous plaque noted in the left internal carotid artery.   Antegrade flow noted in the left vertebral artery.      CTA TAVR 9/8/23  Impression:     TAVR measurements as above     Ascending thoracic aortic aneurysm measuring up to 5.0 cm, similar to prior.  Note is made of a prominent sinus of Valsalva measuring up to 4.1 cm.     Mild bladder wall thickening and in the context of prostatomegaly may reflect chronic outlet obstruction.  No surrounding inflammatory change.     Sigmoid diverticulosis.     Additional findings as above.    [facility] 9/6/23    Chronic total occlusion of  the mid left anterior descending coronary artery with left-to-left and right-to-left collateral flow.    High-grade stenosis of the distal right coronary artery.    Stress ECHO 8/22/23    Stress Protocol: The patient exercised for 8 minutes 4 seconds on a high ramp protocol, corresponding to a functional capacity of 13 METS, achieving a peak heart rate of 144 bpm, which is 95 % of the age predicted maximum heart rate. Their exercise capacity was average. The patient reported no symptoms during the stress test. The test was stopped because the patient experienced fatigue.    Post-stress Echo: The left ventricle systolic function is normal with an EF of 60 %. The post-stress echo showed worsened wall motion abnormalities compared to baseline which are indicative of myocardial ischemia in the LAD territory. The following segments are hypokinetic at peak stress: mid anteroseptal, apical septal and apex.. Right ventricle systolic function is normal.    ECG Conclusion: The ECG portion of the study is negative for ischemia.    Stress ECG: There is 1.0 mm of upward-sloping ST segment depression in the inferolateral leads (II, III, aVF, V5 and V6) noted during stress. During stress, occasional PACs are noted. During stress, frequent PVCs are noted. There is normal blood pressure response with stress.    Left Ventricle: The left ventricle is normal in size. Ventricular mass is normal. Normal wall thickness. Normal wall motion. There is normal systolic function with a visually estimated ejection fraction of 60 - 65%. There is normal diastolic function.    Right Ventricle: Normal right ventricular cavity size. Wall thickness is normal. Right ventricle wall motion  is normal. Systolic function is normal.    Aortic Valve: The aortic valve is a trileaflet valve. There is mild to moderate aortic regurgitation.    Pulmonary Artery: The estimated pulmonary artery systolic pressure is 29 mmHg.    Aorta: Aortic root is mildly dilated  measuring 4.2 cm. Ascending aorta is moderately dilated measuring 5.0 cm.    IVC/SVC: Normal venous pressure at 3 mmHg.     ECHO   The estimated ejection fraction is 65%.  The left ventricle is normal in size with normal systolic function.  Grade I left ventricular diastolic dysfunction.  The ascending aorta is moderately dilated.  Normal right ventricular size with normal right ventricular systolic function.  Mild left atrial enlargement.  Normal central venous pressure (3 mmHg).  The estimated PA systolic pressure is 32 mmHg.     CT cardiac score   Impression:     Your total calcium score is 4147.  High coronary heart disease risk.     Ascending thoracic aortic aneurysm.     Right upper lobe pulmonary nodule measuring 3 mm.  For a solid nodule <6 mm, Fleischner Society 2017 guidelines recommend no routine follow up for a low risk patient, or follow-up with non-contrast chest CT at 12 months in a high risk patient.  Assessment:   Multivessel CAD   TAA   Plan:     I have seen the patient and reviewed the physician assistant's note above. I have personally interviewed and examined the patient at bedside and agree with the findings.     Mr. Browning is a 68 year-old gentleman with a history of aortic root and ascending aortic aneurysm, fatty liver disease, and hypertension.  He is asymptomatic (no angina, no dyspnea) and had a recent positive stress test showing ischemia.  His most recent echocardiogram showed 65% ejection fraction with no significant valvulopathy.  Angiogram showed totally occluded proximal LAD with a small-moderate vs moderate sized distal LAD (fills via left to left collaterals), nonsignificant LCx disease, and 95% distal RCA with a large distal RCA target, moderate PDA target, and moderate right posterolateral ventricular artery target.    CTA chest showed 4.5cm Sinus of Valsalva, 5.0cm Ascending Aorta, 4.0cm proximal aortic arch, minimal ascending aortic and moderate aortic arch  calcifications.    We had an extensive discussion with him regarding the ACC/AHA guidelines and we agreed that he has indications for surgery involving coronary artery bypass surgery x 2 (LIMA-LAD, SVG-dRCA), valve sparing aortic root replacement vs bioBentall, ascending aorta replacement, possible yvonne arch replacement under hypothermic circulatory arrest.  We went through the risks and benefits as well as the perioperative expectations and we agreed he is an appropriate surgical candidate.  We have tentatively scheduled his surgery for Monday, September 18, 2023.    We will also obtain a carotid ultrasound prior to surgery.       Imtiaz Mooney MD  Cardiothoracic Surgery  [facility]

## 2023-09-13 NOTE — PROGRESS NOTES
Subjective:      Patient ID: Sawyer Browning is a 68 y.o. male.    Chief Complaint: No chief complaint on file.      HPI:  Sawyer Browning is a 68 y.o. male who presents to an initial surgical evaluation for multivessel CAD and TAA. Medical conditions include hypertension, hyperlipidemia, fatty liver disease. Patient had an elevated calcium score of 4147 which prompt further cardiac ischemic work up. A 5 cm TAA was also incidentally noted.  Stress test was positive for ischemia in the distribution anterior descending artery. He subsequently underwent  LHC significant for chronic total occlusion of the mid left anterior descending coronary artery with left-to-left and right-to-left collateral flow; high-grade stenosis of the distal right coronary artery. Patient is asymptomatic. Denies chest pain and back pain. He is able to exercise without any cardiovascular limitations. Does cross fit. Independently perform ADLs. No Assistive devices for walking. Denies prior history if strokes, stents, sternotomies.  Blood pressure well controlled with medications. Denies tobacco or illicit drug use. Has 2 glass of wine/ day. He is compliant with his medications. Positive for family history of aneurysm- father.     Of note, was diagnosed with NALD in 2021,   fibroscan : 12/2021  Findings  Median liver stiffness score:  4.4  CAP Reading: dB/m:  291     IQR/med %:  39  Interpretation  Fibrosis interpretation is based on medial liver stiffness - Kilopascal (kPa).     Fibrosis Stage:  F 0-1  Steatosis interpretation is based on controlled attenuation parameter - (dB/m).     Steatosis Grade:  S3    Family and social history reviewed    Review of patient's allergies indicates:   Allergen Reactions    Ace inhibitors Other (See Comments)     cough    Losartan      headache     Past Medical History:   Diagnosis Date    BPH (benign prostatic hyperplasia)     Elevated liver enzymes     Fatty infiltration of liver     Glucose intolerance (impaired  glucose tolerance)     Hiatal hernia     Hyperlipidemia     Hypertension     Overweight     Reflux      Past Surgical History:   Procedure Laterality Date    CORONARY ANGIOGRAPHY N/A 9/6/2023    Procedure: ANGIOGRAM, CORONARY ARTERY;  Surgeon: Chon Lopez MD;  Location: [facility] CATH LAB;  Service: Cardiology;  Laterality: N/A;  right radial    inguinal hernia      x2    tonsillectomy       Family History       Problem Relation (Age of Onset)    Aneurysm Father    Arthritis Brother    Cancer Paternal Aunt, Paternal Uncle, Paternal Aunt, Paternal Uncle    Dementia Paternal Grandmother    Diabetes Paternal Grandmother, Brother    Heart disease Father, Paternal Grandfather    Hernia Brother    Hypertension Mother    Macular degeneration Mother    Migraines Sister    Stroke Father          Social History     Socioeconomic History    Marital status:     Number of children: 2   Occupational History    Occupation: Works in insurance   Tobacco Use    Smoking status: Never    Smokeless tobacco: Never   Substance and Sexual Activity    Alcohol use: Yes     Comment: 2 glasses of wine most evenings.    Drug use: No     Social Determinants of Health     Financial Resource Strain: Low Risk  (8/11/2023)    Overall Financial Resource Strain (CARDIA)     Difficulty of Paying Living Expenses: Not hard at all   Food Insecurity: Unknown (8/11/2023)    Hunger Vital Sign     Worried About Running Out of Food in the Last Year: Patient refused     Ran Out of Food in the Last Year: Patient refused   Transportation Needs: Unknown (8/11/2023)    PRAPARE - Transportation     Lack of Transportation (Medical): Patient refused     Lack of Transportation (Non-Medical): Patient refused   Physical Activity: Sufficiently Active (8/11/2023)    Exercise Vital Sign     Days of Exercise per Week: 3 days     Minutes of Exercise per Session: 60 min   Stress: No Stress Concern Present (8/11/2023)    [employer] -  Occupational Stress Questionnaire     Feeling of Stress : Only a little   Social Connections: Unknown (8/11/2023)    Social Connection and Isolation Panel [NHANES]     Frequency of Communication with Friends and Family: More than three times a week     Frequency of Social Gatherings with Friends and Family: More than three times a week     Active Member of Clubs or Organizations: Patient refused     Attends Club or Organization Meetings: Patient refused     Marital Status:    Housing Stability: Unknown (8/11/2023)    Housing Stability Vital Sign     Unable to Pay for Housing in the Last Year: Patient refused     Number of Places Lived in the Last Year: 1     Unstable Housing in the Last Year: Patient refused       Current medications Reviewed  Current Outpatient Medications on File Prior to Visit   Medication Sig Dispense Refill    amLODIPine (NORVASC) 10 MG tablet TAKE 1 TABLET ONCE DAILY 90 tablet 3    aspirin (ECOTRIN) 81 MG EC tablet Take 1 tablet (81 mg total) by mouth once daily.  0    atorvastatin (LIPITOR) 80 MG tablet Take 1 tablet (80 mg total) by mouth once daily. 90 tablet 3    ezetimibe (ZETIA) 10 mg tablet Take 1 tablet (10 mg total) by mouth once daily. 90 tablet 3    lansoprazole (PREVACID) 30 MG capsule TAKE 1 CAPSULE DAILY ONLY  AS NEEDED FOR HEARTBURN 90 capsule 0    tadalafiL (CIALIS) 20 MG Tab Take 1 tablet (20 mg total) by mouth once daily. 18 tablet 1    valsartan-hydrochlorothiazide (DIOVAN-HCT) 320-25 mg per tablet TAKE 1 TABLET by mouth ONCE DAILY 90 tablet 3     Current Facility-Administered Medications on File Prior to Visit   Medication Dose Route Frequency Provider Last Rate Last Admin    sodium chloride 0.9% flush 10 mL  10 mL Intravenous PRN Chon Lopez MD           Review of Systems   Constitutional:  Negative for activity change, appetite change, fatigue and fever.   HENT:  Negative for nosebleeds.    Respiratory:  Negative for cough and shortness of breath.     Cardiovascular:  Negative for chest pain, palpitations and leg swelling.   Gastrointestinal:  Negative for abdominal distention, abdominal pain and nausea.   Genitourinary:  Negative for frequency.   Musculoskeletal:  Negative for arthralgias and myalgias.   Skin:  Negative for rash.   Neurological:  Negative for dizziness and numbness.   Hematological:  Does not bruise/bleed easily.     Objective:   Physical Exam  Constitutional:       Appearance: Normal appearance.   HENT:      Head: Normocephalic and atraumatic.   Eyes:      Extraocular Movements: Extraocular movements intact.   Cardiovascular:      Rate and Rhythm: Normal rate and regular rhythm.   Pulmonary:      Effort: Pulmonary effort is normal.   Abdominal:      General: Abdomen is flat.   Musculoskeletal:         General: Normal range of motion.      Cervical back: Normal range of motion.   Skin:     General: Skin is warm and dry.      Capillary Refill: Capillary refill takes less than 2 seconds.      Coloration: Skin is not pale.   Neurological:      General: No focal deficit present.      Mental Status: He is alert.         Diagnostic Results: reviewed  Carotid US 9/13/23  RIGHT SIDE:   1-39% Right ICA stenosis.   Heterogeneous plaque noted in the right internal carotid artery.   Retrograde flow noted in the right vertebral artery.     LEFT SIDE:   1-39% Left ICA stenosis.   Heterogeneous plaque noted in the left internal carotid artery.   Antegrade flow noted in the left vertebral artery.      CTA TAVR 9/8/23  Impression:     TAVR measurements as above     Ascending thoracic aortic aneurysm measuring up to 5.0 cm, similar to prior.  Note is made of a prominent sinus of Valsalva measuring up to 4.1 cm.     Mild bladder wall thickening and in the context of prostatomegaly may reflect chronic outlet obstruction.  No surrounding inflammatory change.     Sigmoid diverticulosis.     Additional findings as above.    [facility] 9/6/23    Chronic total occlusion of  the mid left anterior descending coronary artery with left-to-left and right-to-left collateral flow.    High-grade stenosis of the distal right coronary artery.    Stress ECHO 8/22/23    Stress Protocol: The patient exercised for 8 minutes 4 seconds on a high ramp protocol, corresponding to a functional capacity of 13 METS, achieving a peak heart rate of 144 bpm, which is 95 % of the age predicted maximum heart rate. Their exercise capacity was average. The patient reported no symptoms during the stress test. The test was stopped because the patient experienced fatigue.    Post-stress Echo: The left ventricle systolic function is normal with an EF of 60 %. The post-stress echo showed worsened wall motion abnormalities compared to baseline which are indicative of myocardial ischemia in the LAD territory. The following segments are hypokinetic at peak stress: mid anteroseptal, apical septal and apex.. Right ventricle systolic function is normal.    ECG Conclusion: The ECG portion of the study is negative for ischemia.    Stress ECG: There is 1.0 mm of upward-sloping ST segment depression in the inferolateral leads (II, III, aVF, V5 and V6) noted during stress. During stress, occasional PACs are noted. During stress, frequent PVCs are noted. There is normal blood pressure response with stress.    Left Ventricle: The left ventricle is normal in size. Ventricular mass is normal. Normal wall thickness. Normal wall motion. There is normal systolic function with a visually estimated ejection fraction of 60 - 65%. There is normal diastolic function.    Right Ventricle: Normal right ventricular cavity size. Wall thickness is normal. Right ventricle wall motion  is normal. Systolic function is normal.    Aortic Valve: The aortic valve is a trileaflet valve. There is mild to moderate aortic regurgitation.    Pulmonary Artery: The estimated pulmonary artery systolic pressure is 29 mmHg.    Aorta: Aortic root is mildly dilated  measuring 4.2 cm. Ascending aorta is moderately dilated measuring 5.0 cm.    IVC/SVC: Normal venous pressure at 3 mmHg.     ECHO   The estimated ejection fraction is 65%.  The left ventricle is normal in size with normal systolic function.  Grade I left ventricular diastolic dysfunction.  The ascending aorta is moderately dilated.  Normal right ventricular size with normal right ventricular systolic function.  Mild left atrial enlargement.  Normal central venous pressure (3 mmHg).  The estimated PA systolic pressure is 32 mmHg.     CT cardiac score   Impression:     Your total calcium score is 4147.  High coronary heart disease risk.     Ascending thoracic aortic aneurysm.     Right upper lobe pulmonary nodule measuring 3 mm.  For a solid nodule <6 mm, Fleischner Society 2017 guidelines recommend no routine follow up for a low risk patient, or follow-up with non-contrast chest CT at 12 months in a high risk patient.  Assessment:   Multivessel CAD   TAA   Plan:     I have seen the patient and reviewed the physician assistant's note above. I have personally interviewed and examined the patient at bedside and agree with the findings.     Mr. Browning is a 68 year-old gentleman with a history of aortic root and ascending aortic aneurysm, fatty liver disease, and hypertension.  He is asymptomatic (no angina, no dyspnea) and had a recent positive stress test showing ischemia.  His most recent echocardiogram showed 65% ejection fraction with no significant valvulopathy.  Angiogram showed totally occluded proximal LAD with a small-moderate vs moderate sized distal LAD (fills via left to left collaterals), nonsignificant LCx disease, and 95% distal RCA with a large distal RCA target, moderate PDA target, and moderate right posterolateral ventricular artery target.    CTA chest showed 4.5cm Sinus of Valsalva, 5.0cm Ascending Aorta, 4.0cm proximal aortic arch, minimal ascending aortic and moderate aortic arch  calcifications.    We had an extensive discussion with him regarding the ACC/AHA guidelines and we agreed that he has indications for surgery involving coronary artery bypass surgery x 2 (LIMA-LAD, SVG-dRCA), valve sparing aortic root replacement vs bioBentall, ascending aorta replacement, possible yvonne arch replacement under hypothermic circulatory arrest.  We went through the risks and benefits as well as the perioperative expectations and we agreed he is an appropriate surgical candidate.  We have tentatively scheduled his surgery for Monday, September 18, 2023.    We will also obtain a carotid ultrasound prior to surgery.       Imtiaz Mooney MD  Cardiothoracic Surgery  [facility]

## 2023-09-13 NOTE — LETTER
September 13, 2023        Chon Lopez MD  [address]  [address]  [facility] [address]             [facility] - Cardiovasc Surg 2nd Fl  [address]  [facility] [address]  Phone: [phone]   Patient: Sawyer Browning   MR Number: [id number]   YOB: 1955   Date of Visit: 9/13/2023     Dear Dr. Lopez,     It has been a privilege for us to see your patient, Mr. Browning, at our [facility].  We had a chance to meet with him and went over the history, echocardiographic, as well as angiographic findings in detail.  As you know, he is a 68 year-old gentleman with a history of aortic root and ascending aortic aneurysm, fatty liver disease, and hypertension.  He is asymptomatic (no angina, no dyspnea) and had a recent positive stress test showing ischemia.  His most recent echocardiogram showed 65% ejection fraction with no significant valvulopathy.  Angiogram showed totally occluded proximal LAD with a small-moderate vs moderate sized distal LAD (fills via left to left collaterals), nonsignificant LCx disease, and 95% distal RCA with a large distal RCA target, moderate PDA target, and moderate right posterolateral ventricular artery target.    CTA chest showed 4.5cm Sinus of Valsalva, 5.0cm Ascending Aorta, 4.0cm proximal aortic arch, minimal ascending aortic and moderate aortic arch calcifications.    We had an extensive discussion with him regarding the ACC/AHA guidelines and we agreed that he has indications for surgery involving coronary artery bypass surgery x 2 (LIMA-LAD, SVG-dRCA), valve sparing aortic root replacement vs bioBentall, ascending aorta replacement, possible yvonne arch replacement under hypothermic circulatory arrest.  We went through the risks and benefits as well as the perioperative expectations and we agreed he is an appropriate surgical candidate.  We have tentatively scheduled his surgery for Monday, September 18, 2023, and I will be delighted to  contact you around the time of his surgery.    We will also obtain a carotid ultrasound prior to surgery.     Thank you for referring Mr. Browning and for your trust and confidence in our [facility].    Sincerely,     Imtiaz Mooney MD  Cardiothoracic Surgery  [facility]

## 2023-09-14 ENCOUNTER — DOCUMENTATION ONLY (OUTPATIENT)
Dept: CARDIOTHORACIC SURGERY | Facility: CLINIC | Age: 68
End: 2023-09-14
Payer: COMMERCIAL

## 2023-09-14 NOTE — PROGRESS NOTES
"Pt and spouse given both verbal and written instructions given for surgery.    Instructed patient to take his last dose of amlodipine and valsartan on Thursday, 9/14.      PREPARING FOR SURGERY    Your surgery has been scheduled for:    Day: Monday    Date:  9/18    Arrival Time: 5am    Start Time: 7am    You should report to the second floor surgery center, located on the [facility] side of the second floor of the [facility]. The phone number is [phone].       THE NIGHT BEFORE SURGERY    Eat a light supper on the night before your surgery, no greasy or fatty foods.    DO NOT EAT OR DRINK ANYTHING AFTER MIDNIGHT, INCLUDING GUM, HARD CANDY, MINTS, OR CHEWING TOBACCO    Take a complete shower. Wash your body from the neck down with Hibiclens (chlorhexidine gluconate) soap. Hibiclens soap may be purchased over the counter at the pharmacy. Keep the soap away from your eyes, ears, and mouth. After washing with Hibiclens, rinse thoroughly. You may also use any soap labeled "antibacterial". Shampoo your hair with your regular shampoo.         THE DAY OF SURGERY    Take another shower with Hibiclens or any antibacterial soap, to reduce the change of infection.    You may brush your teeth and rinse your mouth, but do not shallow any water.    Do not apply perfume, powder, body lotions or deodorant on the day of surgery.    Do not wear any makeup, including mascara and false eyelashes.    Nail polish should be removed.    Wear comfortable clothes, such as button front shirt and loose-fitting pants.    Leave all jewelry, including body piercings and valuables at home.    Hairpins and clasps must be removed before you enter the operating room.    You may wear glasses, dentures and hearing aids before and after surgery. They may need to be removed before going into the operating room. Contact lenses worn before surgery must be removed before entering the operating room. Please bring a case for your hearing " aids, glasses and/or contacts.    Bring any devices you will need after surgery such as crutches or canes.    If you have sleep apnea, please bring your CPAP machine.    If you have an implantable device, such as a pacemaker or AICD, please bring the device information card, if you have one.       If you have any questions or concerns, please don't hesitate to call.     Julie Haase RN  [facility] Nurse Navigator [phone]

## 2023-09-14 NOTE — PROGRESS NOTES
09/14/23 0002   Pre-Operative Teaching Checklist   Pre-Op Home Scrubs and/or clip Complete   Pre-Op Home Treatments (bowl prep, antibiotic prep) Complete   Pre-Operative Teaching Instructions Complete;Patient;Significant Other   Verbalizes Understanding of Pre-Operative Instructions Complete   Arrangements for Special Needs N/A   Pre-Op Medical Evaluation By Complete   Pre-Op Medical Evaluation Status at the Time of Teaching Complete   Blood Bank Orders T&S only   Operative Consent Complete;Signed;Dated;Timed;Witnessed  (Scanned into media)   Blood Consent Complete;Signed;Dated;Witnessed  (scanned into media)   Pain Management Complete   Pre-Operative Instructions   Bedtime Medication/Morning Medication Complete   NPO after Complete   Family Lounge Complete   Post Operative Instructions Given Preoperatively Complete   Verbalizes Understanding of Pre-Operative Instructions Complete   Anticoag instructions Complete

## 2023-09-15 ENCOUNTER — ANESTHESIA EVENT (OUTPATIENT)
Dept: SURGERY | Facility: HOSPITAL | Age: 68
DRG: 220 | End: 2023-09-15
Payer: COMMERCIAL

## 2023-09-15 ENCOUNTER — TELEPHONE (OUTPATIENT)
Dept: CARDIOTHORACIC SURGERY | Facility: CLINIC | Age: 68
End: 2023-09-15
Payer: COMMERCIAL

## 2023-09-15 NOTE — TELEPHONE ENCOUNTER
Called pt and informed him of arrival time for surgery.  Pt instructed to report to [facility] at 5:00 Monday morning.  Pt reminded to perform Hibiclens shower Sunday night and Monday morning, and to become NPO at midnight.  Pt verbalized understanding.     Julie Haase RN  [facility] Nurse Navigator [phone]

## 2023-09-16 NOTE — ANESTHESIA PREPROCEDURE EVALUATION
[facility]  Anesthesia Pre-Operative Evaluation   09/16/2023        Sawyer Browning, 1955  [id number]  Procedure(s) (LRB):  REPLACEMENT, AORTIC ROOT (N/A)  BENTALL PROCEDURE (N/A)  EXCISION, ANEURYSM, AORTA, THORACIC (N/A)  CORONARY ARTERY BYPASS GRAFT (CABG) (N/A)  HARVEST-VEIN-ENDOVASCULAR (N/A)    Subjective    Sawyer Browning is a 68 y.o. male w/ a significant PMHx of HTN, fatty liver disease, CAD (asymptomatic, positive stress test w/ ischemia in LAD region, angiogram w/  at mid LAD and high grade stenosis of distal RCA), and and aortic root and ascending aortic aneurysm.    Echocardiogram 8/22: showed 65% ejection fraction with no significant valvulopathy.      Angiogram 9/6: showed totally occluded proximal LAD with a small-moderate vs moderate sized distal LAD (fills via left to left collaterals), nonsignificant LCx disease, and 95% distal RCA with a large distal RCA target, moderate PDA target, and moderate right posterolateral ventricular artery target.     CTA Chest 9/8: showed 4.5cm Sinus of Valsalva, 5.0cm Ascending Aorta, 4.0cm proximal aortic arch, minimal ascending aortic and moderate aortic arch calcifications.     Patient now presents for coronary artery bypass surgery x 2 (LIMA-LAD, SVG-dRCA), valve sparing aortic root replacement vs bioBentall, ascending aorta replacement, possible yvonne arch replacement under hypothermic circulatory arrest with Dr. Shirley.       Prev Airway: None documented.      Patient Active Problem List   Diagnosis    Essential hypertension    Other hyperlipidemia    GERD (gastroesophageal reflux disease)    Overweight    Glucose intolerance (impaired glucose tolerance)    BPH (benign prostatic hyperplasia)    Tortuous aorta    Fatty liver    Elevated alanine aminotransferase (ALT) level    Bilateral carotid artery stenosis       Review of patient's allergies indicates:   Allergen Reactions    Ace inhibitors Other (See Comments)     cough     Losartan      headache       Current Inpatient Medications:       Current Facility-Administered Medications on File Prior to Encounter   Medication Dose Route Frequency Provider Last Rate Last Admin    sodium chloride 0.9% flush 10 mL  10 mL Intravenous PRN Chon Lopez MD         Current Outpatient Medications on File Prior to Encounter   Medication Sig Dispense Refill    amLODIPine (NORVASC) 10 MG tablet TAKE 1 TABLET ONCE DAILY 90 tablet 3    aspirin (ECOTRIN) 81 MG EC tablet Take 1 tablet (81 mg total) by mouth once daily.  0    atorvastatin (LIPITOR) 80 MG tablet Take 1 tablet (80 mg total) by mouth once daily. 90 tablet 3    ezetimibe (ZETIA) 10 mg tablet Take 1 tablet (10 mg total) by mouth once daily. 90 tablet 3    lansoprazole (PREVACID) 30 MG capsule TAKE 1 CAPSULE DAILY ONLY  AS NEEDED FOR HEARTBURN 90 capsule 0    tadalafiL (CIALIS) 20 MG Tab Take 1 tablet (20 mg total) by mouth once daily. 18 tablet 1    valsartan-hydrochlorothiazide (DIOVAN-HCT) 320-25 mg per tablet TAKE 1 TABLET by mouth ONCE DAILY 90 tablet 3       Past Surgical History:   Procedure Laterality Date    CORONARY ANGIOGRAPHY N/A 9/6/2023    Procedure: ANGIOGRAM, CORONARY ARTERY;  Surgeon: Chon Lopez MD;  Location: [facility] CATH LAB;  Service: Cardiology;  Laterality: N/A;  right radial    inguinal hernia      x2    tonsillectomy         Social History:  Tobacco Use: Low Risk  (9/8/2023)    Patient History     Smoking Tobacco Use: Never     Smokeless Tobacco Use: Never     Passive Exposure: Not on file       Alcohol Use: Unknown (8/11/2023)    AUDIT-C     Frequency of Alcohol Consumption: Patient refused     Average Number of Drinks: Patient refused     Frequency of Binge Drinking: Patient refused       Objective    Vital Signs Range:  BMI Readings from Last 1 Encounters:   09/13/23 26.15 kg/m²               Significant Labs:        Component Value Date/Time    WBC 6.43 08/28/2023 1102    HGB 16.3 08/28/2023 1102     HCT 46.6 08/28/2023 1102     08/28/2023 1102     08/28/2023 1102    K 3.5 08/28/2023 1102     08/28/2023 1102    CO2 25 08/28/2023 1102     (H) 08/28/2023 1102    BUN 14 08/28/2023 1102    CREATININE 1.0 08/28/2023 1102    CALCIUM 9.2 08/28/2023 1102    ALBUMIN 4.3 11/03/2022 1001    PROT 7.1 11/03/2022 1001    ALKPHOS 58 11/03/2022 1001    BILITOT 0.9 11/03/2022 1001    AST 27 11/03/2022 1001    ALT 39 11/03/2022 1001    INR 1.0 08/28/2023 1102    HGBA1C 5.5 11/03/2022 1001        Please see Results Review for additional labs.     Diagnostic Studies: All relevant studies, reviewed.      EKG:   Results for orders placed or performed in visit on 08/14/23   IN OFFICE EKG 12-LEAD (to [place])    Collection Time: 08/14/23 10:00 AM    Narrative    Test Reason : R93.1,    Vent. Rate : 068 BPM     Atrial Rate : 068 BPM     P-R Int : 160 ms          QRS Dur : 090 ms      QT Int : 396 ms       P-R-T Axes : 060 -01 020 degrees     QTc Int : 421 ms    Normal sinus rhythm  Normal ECG  No previous ECGs available  Confirmed by John FRANCO, Chon LEDESMA ([id number]) on 8/16/2023 12:11:36 PM    Referred By: [email or web address]   SELF           Confirmed By:Chon Lopez MD       ECHO:  Results for orders placed during the hospital encounter of 06/29/23    Echo    Interpretation Summary  · The estimated ejection fraction is 65%.  · The left ventricle is normal in size with normal systolic function.  · Grade I left ventricular diastolic dysfunction.  · The ascending aorta is moderately dilated.  · Normal right ventricular size with normal right ventricular systolic function.  · Mild left atrial enlargement.  · Normal central venous pressure (3 mmHg).  · The estimated PA systolic pressure is 32 mmHg.        Pre-op Assessment    I have reviewed the Patient Summary Reports.     I have reviewed the Nursing Notes. I have reviewed the NPO Status.   I have reviewed the Medications.     Review of Systems  Anesthesia Hx:  Denies Family Hx  of Anesthesia complications.   Denies Personal Hx of Anesthesia complications.   Social:  Non-Smoker    Hematology/Oncology:  Hematology Normal        EENT/Dental:EENT/Dental Normal   Cardiovascular:   Exercise tolerance: good Hypertension CAD   hyperlipidemia    Pulmonary:  Pulmonary Normal    Hepatic/GI:   Denies Liver Disease.    Neurological:  Neurology Normal    Endocrine:  Endocrine Normal        Physical Exam  General: Well nourished, Cooperative, Alert and Oriented    Airway:  Mallampati: III   Mouth Opening: Small, but > 3cm  TM Distance: Normal  Tongue: Normal  Neck ROM: Normal ROM    Dental:  Intact        Anesthesia Plan  Type of Anesthesia, risks & benefits discussed:    Anesthesia Type: Gen ETT  Intra-op Monitoring Plan: Standard ASA Monitors, Art Line, Central Line and GWEN  Post Op Pain Control Plan: multimodal analgesia and IV/PO Opioids PRN  Induction:  IV  Airway Plan: Direct and Video, Post-Induction  Informed Consent: Informed consent signed with the Patient and all parties understand the risks and agree with anesthesia plan.  All questions answered.   ASA Score: 4  Day of Surgery Review of History & Physical: H&P Update referred to the surgeon/provider.    Ready For Surgery From Anesthesia Perspective.     .

## 2023-09-18 ENCOUNTER — HOSPITAL ENCOUNTER (INPATIENT)
Facility: HOSPITAL | Age: 68
LOS: 6 days | Discharge: HOME | DRG: 220 | End: 2023-09-24
Attending: THORACIC SURGERY (CARDIOTHORACIC VASCULAR SURGERY) | Admitting: THORACIC SURGERY (CARDIOTHORACIC VASCULAR SURGERY)
Payer: COMMERCIAL

## 2023-09-18 ENCOUNTER — ANESTHESIA (OUTPATIENT)
Dept: SURGERY | Facility: HOSPITAL | Age: 68
DRG: 220 | End: 2023-09-18
Payer: COMMERCIAL

## 2023-09-18 DIAGNOSIS — Q24.8 ABNORMAL CARDIAC VALVE: ICD-10-CM

## 2023-09-18 DIAGNOSIS — I71.21 AORTIC ROOT ANEURYSM: ICD-10-CM

## 2023-09-18 DIAGNOSIS — Z98.890 POST-OPERATIVE STATE: Primary | ICD-10-CM

## 2023-09-18 DIAGNOSIS — R73.9 TRANSIENT HYPERGLYCEMIA POST PROCEDURE: ICD-10-CM

## 2023-09-18 DIAGNOSIS — Z95.1 S/P CABG (CORONARY ARTERY BYPASS GRAFT): Primary | ICD-10-CM

## 2023-09-18 DIAGNOSIS — I25.10 CORONARY ARTERY DISEASE: ICD-10-CM

## 2023-09-18 PROBLEM — I71.20 THORACIC AORTIC ANEURYSM WITHOUT RUPTURE: Status: ACTIVE | Noted: 2023-09-18

## 2023-09-18 PROBLEM — Q25.43 AORTIC ROOT ANEURYSM: Status: ACTIVE | Noted: 2023-09-18

## 2023-09-18 LAB
ABO + RH BLD: NORMAL
ALBUMIN SERPL BCP-MCNC: 3.3 G/DL (ref 3.5–5.2)
ALBUMIN SERPL BCP-MCNC: 3.3 G/DL (ref 3.5–5.2)
ALBUMIN SERPL BCP-MCNC: 4.3 G/DL (ref 3.5–5.2)
ALLENS TEST: ABNORMAL
ALP SERPL-CCNC: 41 U/L (ref 55–135)
ALP SERPL-CCNC: 41 U/L (ref 55–135)
ALP SERPL-CCNC: 63 U/L (ref 55–135)
ALT SERPL W/O P-5'-P-CCNC: 27 U/L (ref 10–44)
ALT SERPL W/O P-5'-P-CCNC: 27 U/L (ref 10–44)
ALT SERPL W/O P-5'-P-CCNC: 39 U/L (ref 10–44)
ANION GAP SERPL CALC-SCNC: 11 MMOL/L (ref 8–16)
ANION GAP SERPL CALC-SCNC: 16 MMOL/L (ref 8–16)
ANION GAP SERPL CALC-SCNC: 16 MMOL/L (ref 8–16)
ANISOCYTOSIS BLD QL SMEAR: SLIGHT
APTT PPP: 23.7 SEC (ref 21–32)
APTT PPP: 24.5 SEC (ref 21–32)
APTT PPP: 24.8 SEC (ref 21–32)
APTT PPP: 47.4 SEC (ref 21–32)
APTT PPP: 47.4 SEC (ref 21–32)
AST SERPL-CCNC: 38 U/L (ref 10–40)
AST SERPL-CCNC: 49 U/L (ref 10–40)
AST SERPL-CCNC: 49 U/L (ref 10–40)
BASOPHILS # BLD AUTO: 0.03 K/UL (ref 0–0.2)
BASOPHILS # BLD AUTO: 0.03 K/UL (ref 0–0.2)
BASOPHILS # BLD AUTO: 0.07 K/UL (ref 0–0.2)
BASOPHILS # BLD AUTO: 0.08 K/UL (ref 0–0.2)
BASOPHILS NFR BLD: 0.2 % (ref 0–1.9)
BASOPHILS NFR BLD: 0.3 % (ref 0–1.9)
BASOPHILS NFR BLD: 0.4 % (ref 0–1.9)
BASOPHILS NFR BLD: 1 % (ref 0–1.9)
BILIRUB SERPL-MCNC: 0.7 MG/DL (ref 0.1–1)
BILIRUB SERPL-MCNC: 1.2 MG/DL (ref 0.1–1)
BILIRUB SERPL-MCNC: 1.2 MG/DL (ref 0.1–1)
BLD GP AB SCN CELLS X3 SERPL QL: NORMAL
BUN SERPL-MCNC: 15 MG/DL (ref 8–23)
BUN SERPL-MCNC: 15 MG/DL (ref 8–23)
BUN SERPL-MCNC: 16 MG/DL (ref 8–23)
CA-I BLDV-SCNC: 1.05 MMOL/L (ref 1.06–1.42)
CALCIUM SERPL-MCNC: 8.3 MG/DL (ref 8.7–10.5)
CALCIUM SERPL-MCNC: 8.3 MG/DL (ref 8.7–10.5)
CALCIUM SERPL-MCNC: 9.1 MG/DL (ref 8.7–10.5)
CHLORIDE SERPL-SCNC: 103 MMOL/L (ref 95–110)
CHLORIDE SERPL-SCNC: 107 MMOL/L (ref 95–110)
CHLORIDE SERPL-SCNC: 107 MMOL/L (ref 95–110)
CO2 SERPL-SCNC: 19 MMOL/L (ref 23–29)
CO2 SERPL-SCNC: 19 MMOL/L (ref 23–29)
CO2 SERPL-SCNC: 23 MMOL/L (ref 23–29)
CREAT SERPL-MCNC: 0.9 MG/DL (ref 0.5–1.4)
CREAT SERPL-MCNC: 1 MG/DL (ref 0.5–1.4)
CREAT SERPL-MCNC: 1 MG/DL (ref 0.5–1.4)
DELSYS: ABNORMAL
DIFFERENTIAL METHOD: ABNORMAL
EOSINOPHIL # BLD AUTO: 0 K/UL (ref 0–0.5)
EOSINOPHIL # BLD AUTO: 0 K/UL (ref 0–0.5)
EOSINOPHIL # BLD AUTO: 0.1 K/UL (ref 0–0.5)
EOSINOPHIL # BLD AUTO: 0.2 K/UL (ref 0–0.5)
EOSINOPHIL NFR BLD: 0 % (ref 0–8)
EOSINOPHIL NFR BLD: 0.1 % (ref 0–8)
EOSINOPHIL NFR BLD: 0.5 % (ref 0–8)
EOSINOPHIL NFR BLD: 2.1 % (ref 0–8)
ERYTHROCYTE [DISTWIDTH] IN BLOOD BY AUTOMATED COUNT: 12.2 % (ref 11.5–14.5)
ERYTHROCYTE [DISTWIDTH] IN BLOOD BY AUTOMATED COUNT: 12.4 % (ref 11.5–14.5)
ERYTHROCYTE [DISTWIDTH] IN BLOOD BY AUTOMATED COUNT: 12.7 % (ref 11.5–14.5)
ERYTHROCYTE [SEDIMENTATION RATE] IN BLOOD BY WESTERGREN METHOD: 20 MM/H
ERYTHROCYTE [SEDIMENTATION RATE] IN BLOOD BY WESTERGREN METHOD: 30 MM/H
ERYTHROCYTE [SEDIMENTATION RATE] IN BLOOD BY WESTERGREN METHOD: 30 MM/H
EST. GFR  (NO RACE VARIABLE): >60 ML/MIN/1.73 M^2
FIBRINOGEN PPP-MCNC: 182 MG/DL (ref 182–400)
FIBRINOGEN PPP-MCNC: 194 MG/DL (ref 182–400)
FIBRINOGEN PPP-MCNC: 194 MG/DL (ref 182–400)
FIBRINOGEN PPP-MCNC: 196 MG/DL (ref 182–400)
FIO2: 0.65
FIO2: 100
FIO2: 50
FIO2: 50
FIO2: 60
FIO2: 65
GLUCOSE SERPL-MCNC: 117 MG/DL (ref 70–110)
GLUCOSE SERPL-MCNC: 139 MG/DL (ref 70–110)
GLUCOSE SERPL-MCNC: 147 MG/DL (ref 70–110)
GLUCOSE SERPL-MCNC: 150 MG/DL (ref 70–110)
GLUCOSE SERPL-MCNC: 153 MG/DL (ref 70–110)
GLUCOSE SERPL-MCNC: 176 MG/DL (ref 70–110)
GLUCOSE SERPL-MCNC: 179 MG/DL (ref 70–110)
GLUCOSE SERPL-MCNC: 191 MG/DL (ref 70–110)
GLUCOSE SERPL-MCNC: 192 MG/DL (ref 70–110)
GLUCOSE SERPL-MCNC: 193 MG/DL (ref 70–110)
GLUCOSE SERPL-MCNC: 195 MG/DL (ref 70–110)
GLUCOSE SERPL-MCNC: 213 MG/DL (ref 70–110)
GLUCOSE SERPL-MCNC: 213 MG/DL (ref 70–110)
HCO3 UR-SCNC: 16.7 MMOL/L (ref 24–28)
HCO3 UR-SCNC: 17.2 MMOL/L (ref 24–28)
HCO3 UR-SCNC: 18.4 MMOL/L (ref 24–28)
HCO3 UR-SCNC: 19 MMOL/L (ref 24–28)
HCO3 UR-SCNC: 19.5 MMOL/L (ref 24–28)
HCO3 UR-SCNC: 19.8 MMOL/L (ref 24–28)
HCO3 UR-SCNC: 20.9 MMOL/L (ref 24–28)
HCO3 UR-SCNC: 24.3 MMOL/L (ref 24–28)
HCO3 UR-SCNC: 25.2 MMOL/L (ref 24–28)
HCO3 UR-SCNC: 27.3 MMOL/L (ref 24–28)
HCO3 UR-SCNC: 27.3 MMOL/L (ref 24–28)
HCO3 UR-SCNC: 28 MMOL/L (ref 24–28)
HCO3 UR-SCNC: 28.1 MMOL/L (ref 24–28)
HCO3 UR-SCNC: 28.1 MMOL/L (ref 24–28)
HCO3 UR-SCNC: 28.8 MMOL/L (ref 24–28)
HCO3 UR-SCNC: 29.6 MMOL/L (ref 24–28)
HCT VFR BLD AUTO: 31.6 % (ref 40–54)
HCT VFR BLD AUTO: 32.4 % (ref 40–54)
HCT VFR BLD AUTO: 37.1 % (ref 40–54)
HCT VFR BLD AUTO: 37.1 % (ref 40–54)
HCT VFR BLD AUTO: 44 % (ref 40–54)
HCT VFR BLD CALC: 25 %PCV (ref 36–54)
HCT VFR BLD CALC: 26 %PCV (ref 36–54)
HCT VFR BLD CALC: 28 %PCV (ref 36–54)
HCT VFR BLD CALC: 29 %PCV (ref 36–54)
HCT VFR BLD CALC: 30 %PCV (ref 36–54)
HCT VFR BLD CALC: 31 %PCV (ref 36–54)
HCT VFR BLD CALC: 33 %PCV (ref 36–54)
HCT VFR BLD CALC: 34 %PCV (ref 36–54)
HCT VFR BLD CALC: 37 %PCV (ref 36–54)
HGB BLD-MCNC: 10.9 G/DL (ref 14–18)
HGB BLD-MCNC: 11 G/DL (ref 14–18)
HGB BLD-MCNC: 12.8 G/DL (ref 14–18)
HGB BLD-MCNC: 12.8 G/DL (ref 14–18)
HGB BLD-MCNC: 15.6 G/DL (ref 14–18)
IMM GRANULOCYTES # BLD AUTO: 0.05 K/UL (ref 0–0.04)
IMM GRANULOCYTES # BLD AUTO: 0.11 K/UL (ref 0–0.04)
IMM GRANULOCYTES # BLD AUTO: 0.13 K/UL (ref 0–0.04)
IMM GRANULOCYTES # BLD AUTO: 0.24 K/UL (ref 0–0.04)
IMM GRANULOCYTES NFR BLD AUTO: 0.7 % (ref 0–0.5)
IMM GRANULOCYTES NFR BLD AUTO: 0.7 % (ref 0–0.5)
IMM GRANULOCYTES NFR BLD AUTO: 1.1 % (ref 0–0.5)
IMM GRANULOCYTES NFR BLD AUTO: 1.1 % (ref 0–0.5)
INR PPP: 1 (ref 0.8–1.2)
INR PPP: 1.1 (ref 0.8–1.2)
INR PPP: 1.2 (ref 0.8–1.2)
INR PPP: 1.2 (ref 0.8–1.2)
INR PPP: 4.4 (ref 0.8–1.2)
LDH SERPL L TO P-CCNC: 1.15 MMOL/L (ref 0.5–2.2)
LDH SERPL L TO P-CCNC: 1.35 MMOL/L (ref 0.36–1.25)
LDH SERPL L TO P-CCNC: 1.54 MMOL/L (ref 0.36–1.25)
LDH SERPL L TO P-CCNC: 1.68 MMOL/L (ref 0.36–1.25)
LDH SERPL L TO P-CCNC: 4.78 MMOL/L (ref 0.36–1.25)
LDH SERPL L TO P-CCNC: 5.12 MMOL/L (ref 0.36–1.25)
LDH SERPL L TO P-CCNC: 5.42 MMOL/L (ref 0.36–1.25)
LDH SERPL L TO P-CCNC: 7.25 MMOL/L (ref 0.36–1.25)
LDH SERPL L TO P-CCNC: 7.32 MMOL/L (ref 0.36–1.25)
LDH SERPL L TO P-CCNC: 7.4 MMOL/L (ref 0.36–1.25)
LDH SERPL L TO P-CCNC: 7.64 MMOL/L (ref 0.36–1.25)
LYMPHOCYTES # BLD AUTO: 0.6 K/UL (ref 1–4.8)
LYMPHOCYTES # BLD AUTO: 1.4 K/UL (ref 1–4.8)
LYMPHOCYTES # BLD AUTO: 1.5 K/UL (ref 1–4.8)
LYMPHOCYTES # BLD AUTO: 2.3 K/UL (ref 1–4.8)
LYMPHOCYTES NFR BLD: 10.2 % (ref 18–48)
LYMPHOCYTES NFR BLD: 14 % (ref 18–48)
LYMPHOCYTES NFR BLD: 20.8 % (ref 18–48)
LYMPHOCYTES NFR BLD: 3.4 % (ref 18–48)
MAGNESIUM SERPL-MCNC: 2.8 MG/DL (ref 1.6–2.6)
MAGNESIUM SERPL-MCNC: 2.8 MG/DL (ref 1.6–2.6)
MCH RBC QN AUTO: 29.9 PG (ref 27–31)
MCH RBC QN AUTO: 30.6 PG (ref 27–31)
MCH RBC QN AUTO: 30.9 PG (ref 27–31)
MCH RBC QN AUTO: 31.1 PG (ref 27–31)
MCH RBC QN AUTO: 31.1 PG (ref 27–31)
MCHC RBC AUTO-ENTMCNC: 34 G/DL (ref 32–36)
MCHC RBC AUTO-ENTMCNC: 34.5 G/DL (ref 32–36)
MCHC RBC AUTO-ENTMCNC: 35.5 G/DL (ref 32–36)
MCV RBC AUTO: 84 FL (ref 82–98)
MCV RBC AUTO: 90 FL (ref 82–98)
MODE: ABNORMAL
MONOCYTES # BLD AUTO: 0.9 K/UL (ref 0.3–1)
MONOCYTES # BLD AUTO: 1 K/UL (ref 0.3–1)
MONOCYTES # BLD AUTO: 2.2 K/UL (ref 0.3–1)
MONOCYTES # BLD AUTO: 2.3 K/UL (ref 0.3–1)
MONOCYTES NFR BLD: 10.1 % (ref 4–15)
MONOCYTES NFR BLD: 12.5 % (ref 4–15)
MONOCYTES NFR BLD: 12.8 % (ref 4–15)
MONOCYTES NFR BLD: 9.7 % (ref 4–15)
NEUTROPHILS # BLD AUTO: 15.1 K/UL (ref 1.8–7.7)
NEUTROPHILS # BLD AUTO: 17.9 K/UL (ref 1.8–7.7)
NEUTROPHILS # BLD AUTO: 4.5 K/UL (ref 1.8–7.7)
NEUTROPHILS # BLD AUTO: 7.5 K/UL (ref 1.8–7.7)
NEUTROPHILS NFR BLD: 62.6 % (ref 38–73)
NEUTROPHILS NFR BLD: 74 % (ref 38–73)
NEUTROPHILS NFR BLD: 78.5 % (ref 38–73)
NEUTROPHILS NFR BLD: 83.2 % (ref 38–73)
NRBC BLD-RTO: 0 /100 WBC
PCO2 BLDA: 31.5 MMHG (ref 35–45)
PCO2 BLDA: 33.8 MMHG (ref 35–45)
PCO2 BLDA: 34 MMHG (ref 35–45)
PCO2 BLDA: 34.2 MMHG (ref 35–45)
PCO2 BLDA: 35.9 MMHG (ref 35–45)
PCO2 BLDA: 38.9 MMHG (ref 35–45)
PCO2 BLDA: 39 MMHG (ref 35–45)
PCO2 BLDA: 42.8 MMHG (ref 35–45)
PCO2 BLDA: 43.7 MMHG (ref 35–45)
PCO2 BLDA: 48.9 MMHG (ref 35–45)
PCO2 BLDA: 49.4 MMHG (ref 35–45)
PCO2 BLDA: 52.1 MMHG (ref 35–45)
PCO2 BLDA: 52.7 MMHG (ref 35–45)
PCO2 BLDA: 59.2 MMHG (ref 35–45)
PCO2 BLDA: 60.6 MMHG (ref 35–45)
PCO2 BLDA: 61.3 MMHG (ref 35–45)
PEEP: 5
PH SMN: 7.25 [PH] (ref 7.35–7.45)
PH SMN: 7.27 [PH] (ref 7.35–7.45)
PH SMN: 7.27 [PH] (ref 7.35–7.45)
PH SMN: 7.29 [PH] (ref 7.35–7.45)
PH SMN: 7.3 [PH] (ref 7.35–7.45)
PH SMN: 7.31 [PH] (ref 7.35–7.45)
PH SMN: 7.32 [PH] (ref 7.35–7.45)
PH SMN: 7.32 [PH] (ref 7.35–7.45)
PH SMN: 7.34 [PH] (ref 7.35–7.45)
PH SMN: 7.34 [PH] (ref 7.35–7.45)
PH SMN: 7.37 [PH] (ref 7.35–7.45)
PH SMN: 7.37 [PH] (ref 7.35–7.45)
PH SMN: 7.38 [PH] (ref 7.35–7.45)
PH SMN: 7.39 [PH] (ref 7.35–7.45)
PH SMN: 7.4 [PH] (ref 7.35–7.45)
PH SMN: 7.4 [PH] (ref 7.35–7.45)
PHOSPHATE SERPL-MCNC: 4.2 MG/DL (ref 2.7–4.5)
PHOSPHATE SERPL-MCNC: 4.2 MG/DL (ref 2.7–4.5)
PLATELET # BLD AUTO: 106 K/UL (ref 150–450)
PLATELET # BLD AUTO: 106 K/UL (ref 150–450)
PLATELET # BLD AUTO: 132 K/UL (ref 150–450)
PLATELET # BLD AUTO: 132 K/UL (ref 150–450)
PLATELET # BLD AUTO: 181 K/UL (ref 150–450)
PLATELET BLD QL SMEAR: ABNORMAL
PMV BLD AUTO: 10.7 FL (ref 9.2–12.9)
PMV BLD AUTO: 11.1 FL (ref 9.2–12.9)
PMV BLD AUTO: 11.2 FL (ref 9.2–12.9)
PMV BLD AUTO: 11.2 FL (ref 9.2–12.9)
PMV BLD AUTO: 11.3 FL (ref 9.2–12.9)
PO2 BLDA: 143 MMHG (ref 80–100)
PO2 BLDA: 251 MMHG (ref 80–100)
PO2 BLDA: 267 MMHG (ref 80–100)
PO2 BLDA: 306 MMHG (ref 80–100)
PO2 BLDA: 313 MMHG (ref 80–100)
PO2 BLDA: 349 MMHG (ref 80–100)
PO2 BLDA: 356 MMHG (ref 80–100)
PO2 BLDA: 424 MMHG (ref 80–100)
PO2 BLDA: 552 MMHG (ref 80–100)
PO2 BLDA: 61 MMHG (ref 40–60)
PO2 BLDA: 73 MMHG (ref 80–100)
PO2 BLDA: 84 MMHG (ref 80–100)
PO2 BLDA: 86 MMHG (ref 80–100)
PO2 BLDA: 96 MMHG (ref 80–100)
PO2 BLDA: 98 MMHG (ref 80–100)
PO2 BLDA: 98 MMHG (ref 80–100)
POC BE: -1 MMOL/L
POC BE: -10 MMOL/L
POC BE: -5 MMOL/L
POC BE: -8 MMOL/L
POC BE: -8 MMOL/L
POC BE: -9 MMOL/L
POC BE: 0 MMOL/L
POC BE: 0 MMOL/L
POC BE: 1 MMOL/L
POC BE: 1 MMOL/L
POC BE: 2 MMOL/L
POC BE: 2 MMOL/L
POC BE: 3 MMOL/L
POC BE: 5 MMOL/L
POC IONIZED CALCIUM: 0.92 MMOL/L (ref 1.06–1.42)
POC IONIZED CALCIUM: 0.95 MMOL/L (ref 1.06–1.42)
POC IONIZED CALCIUM: 0.97 MMOL/L (ref 1.06–1.42)
POC IONIZED CALCIUM: 0.98 MMOL/L (ref 1.06–1.42)
POC IONIZED CALCIUM: 1 MMOL/L (ref 1.06–1.42)
POC IONIZED CALCIUM: 1 MMOL/L (ref 1.06–1.42)
POC IONIZED CALCIUM: 1.02 MMOL/L (ref 1.06–1.42)
POC IONIZED CALCIUM: 1.02 MMOL/L (ref 1.06–1.42)
POC IONIZED CALCIUM: 1.11 MMOL/L (ref 1.06–1.42)
POC IONIZED CALCIUM: 1.14 MMOL/L (ref 1.06–1.42)
POC IONIZED CALCIUM: 1.14 MMOL/L (ref 1.06–1.42)
POC IONIZED CALCIUM: 1.17 MMOL/L (ref 1.06–1.42)
POC IONIZED CALCIUM: 1.17 MMOL/L (ref 1.06–1.42)
POC IONIZED CALCIUM: 1.19 MMOL/L (ref 1.06–1.42)
POC IONIZED CALCIUM: 1.19 MMOL/L (ref 1.06–1.42)
POC IONIZED CALCIUM: 1.57 MMOL/L (ref 1.06–1.42)
POC PCO2 TEMP: 33.6 MMHG
POC PCO2 TEMP: 39.1 MMHG
POC PCO2 TEMP: 39.3 MMHG
POC PCO2 TEMP: 41.7 MMHG
POC PCO2 TEMP: 47.2 MMHG
POC PH TEMP: 7.35
POC PH TEMP: 7.38
POC PH TEMP: 7.42
POC PH TEMP: 7.44
POC PH TEMP: 7.48
POC PO2 TEMP: 240 MMHG
POC PO2 TEMP: 270 MMHG
POC PO2 TEMP: 301 MMHG
POC PO2 TEMP: 369 MMHG
POC PO2 TEMP: 43 MMHG
POC SATURATED O2: 100 % (ref 95–100)
POC SATURATED O2: 90 % (ref 95–100)
POC SATURATED O2: 93 % (ref 95–100)
POC SATURATED O2: 96 % (ref 95–100)
POC SATURATED O2: 96 % (ref 95–100)
POC SATURATED O2: 97 % (ref 95–100)
POC SATURATED O2: 97 % (ref 95–100)
POC SATURATED O2: 98 % (ref 95–100)
POC SATURATED O2: 99 % (ref 95–100)
POC TCO2: 18 MMOL/L (ref 23–27)
POC TCO2: 18 MMOL/L (ref 23–27)
POC TCO2: 19 MMOL/L (ref 23–27)
POC TCO2: 20 MMOL/L (ref 23–27)
POC TCO2: 20 MMOL/L (ref 23–27)
POC TCO2: 21 MMOL/L (ref 23–27)
POC TCO2: 22 MMOL/L (ref 23–27)
POC TCO2: 25 MMOL/L (ref 23–27)
POC TCO2: 27 MMOL/L (ref 23–27)
POC TCO2: 29 MMOL/L (ref 23–27)
POC TCO2: 29 MMOL/L (ref 23–27)
POC TCO2: 29 MMOL/L (ref 24–29)
POC TCO2: 30 MMOL/L (ref 23–27)
POC TCO2: 30 MMOL/L (ref 23–27)
POC TCO2: 31 MMOL/L (ref 23–27)
POC TCO2: 31 MMOL/L (ref 23–27)
POC TEMPERATURE: ABNORMAL
POCT GLUCOSE: 178 MG/DL (ref 70–110)
POCT GLUCOSE: 182 MG/DL (ref 70–110)
POCT GLUCOSE: 190 MG/DL (ref 70–110)
POCT GLUCOSE: 192 MG/DL (ref 70–110)
POCT GLUCOSE: 193 MG/DL (ref 70–110)
POCT GLUCOSE: 212 MG/DL (ref 70–110)
POTASSIUM BLD-SCNC: 3.1 MMOL/L (ref 3.5–5.1)
POTASSIUM BLD-SCNC: 3.5 MMOL/L (ref 3.5–5.1)
POTASSIUM BLD-SCNC: 3.6 MMOL/L (ref 3.5–5.1)
POTASSIUM BLD-SCNC: 3.7 MMOL/L (ref 3.5–5.1)
POTASSIUM BLD-SCNC: 3.7 MMOL/L (ref 3.5–5.1)
POTASSIUM BLD-SCNC: 3.8 MMOL/L (ref 3.5–5.1)
POTASSIUM BLD-SCNC: 3.9 MMOL/L (ref 3.5–5.1)
POTASSIUM BLD-SCNC: 3.9 MMOL/L (ref 3.5–5.1)
POTASSIUM BLD-SCNC: 4 MMOL/L (ref 3.5–5.1)
POTASSIUM BLD-SCNC: 4.3 MMOL/L (ref 3.5–5.1)
POTASSIUM BLD-SCNC: 4.3 MMOL/L (ref 3.5–5.1)
POTASSIUM BLD-SCNC: 4.4 MMOL/L (ref 3.5–5.1)
POTASSIUM BLD-SCNC: 4.5 MMOL/L (ref 3.5–5.1)
POTASSIUM BLD-SCNC: 4.7 MMOL/L (ref 3.5–5.1)
POTASSIUM SERPL-SCNC: 3.3 MMOL/L (ref 3.5–5.1)
POTASSIUM SERPL-SCNC: 3.3 MMOL/L (ref 3.5–5.1)
POTASSIUM SERPL-SCNC: 4.1 MMOL/L (ref 3.5–5.1)
PROT SERPL-MCNC: 5.1 G/DL (ref 6–8.4)
PROT SERPL-MCNC: 5.1 G/DL (ref 6–8.4)
PROT SERPL-MCNC: 7.5 G/DL (ref 6–8.4)
PROTHROMBIN TIME: 10.7 SEC (ref 9–12.5)
PROTHROMBIN TIME: 12.1 SEC (ref 9–12.5)
PROTHROMBIN TIME: 12.3 SEC (ref 9–12.5)
PROTHROMBIN TIME: 12.3 SEC (ref 9–12.5)
PROTHROMBIN TIME: 43.4 SEC (ref 9–12.5)
PROVIDER CREDENTIALS: ABNORMAL
PROVIDER NOTIFIED: ABNORMAL
RBC # BLD AUTO: 3.53 M/UL (ref 4.6–6.2)
RBC # BLD AUTO: 3.6 M/UL (ref 4.6–6.2)
RBC # BLD AUTO: 4.11 M/UL (ref 4.6–6.2)
RBC # BLD AUTO: 4.11 M/UL (ref 4.6–6.2)
RBC # BLD AUTO: 5.22 M/UL (ref 4.6–6.2)
SAMPLE: ABNORMAL
SAMPLE: NORMAL
SITE: ABNORMAL
SODIUM BLD-SCNC: 137 MMOL/L (ref 136–145)
SODIUM BLD-SCNC: 140 MMOL/L (ref 136–145)
SODIUM BLD-SCNC: 141 MMOL/L (ref 136–145)
SODIUM BLD-SCNC: 142 MMOL/L (ref 136–145)
SODIUM BLD-SCNC: 143 MMOL/L (ref 136–145)
SODIUM BLD-SCNC: 144 MMOL/L (ref 136–145)
SODIUM SERPL-SCNC: 137 MMOL/L (ref 136–145)
SODIUM SERPL-SCNC: 142 MMOL/L (ref 136–145)
SODIUM SERPL-SCNC: 142 MMOL/L (ref 136–145)
SP02: 100
SP02: 96
SP02: 99
SPECIMEN OUTDATE: NORMAL
TIME NOTIFIED: 1710
TIME NOTIFIED: 1710
TIME NOTIFIED: 1800
TIME NOTIFIED: 1805
VERBAL RESULT READBACK PERFORMED: YES
VT: 460
WBC # BLD AUTO: 10.11 K/UL (ref 3.9–12.7)
WBC # BLD AUTO: 18.16 K/UL (ref 3.9–12.7)
WBC # BLD AUTO: 22.79 K/UL (ref 3.9–12.7)
WBC # BLD AUTO: 22.79 K/UL (ref 3.9–12.7)
WBC # BLD AUTO: 7.25 K/UL (ref 3.9–12.7)

## 2023-09-18 PROCEDURE — 27100025 HC TUBING, SET FLUID WARMER: Performed by: ANESTHESIOLOGY

## 2023-09-18 PROCEDURE — 33517 PR CABG, ARTERY-VEIN, SINGLE: ICD-10-PCS | Mod: ,,, | Performed by: THORACIC SURGERY (CARDIOTHORACIC VASCULAR SURGERY)

## 2023-09-18 PROCEDURE — C1751 CATH, INF, PER/CENT/MIDLINE: HCPCS | Performed by: ANESTHESIOLOGY

## 2023-09-18 PROCEDURE — 33517 CABG ARTERY-VEIN SINGLE: CPT | Mod: ,,, | Performed by: THORACIC SURGERY (CARDIOTHORACIC VASCULAR SURGERY)

## 2023-09-18 PROCEDURE — 99499 UNLISTED E&M SERVICE: CPT | Mod: ,,, | Performed by: ANESTHESIOLOGY

## 2023-09-18 PROCEDURE — 82565 ASSAY OF CREATININE: CPT

## 2023-09-18 PROCEDURE — 86920 COMPATIBILITY TEST SPIN: CPT | Performed by: STUDENT IN AN ORGANIZED HEALTH CARE EDUCATION/TRAINING PROGRAM

## 2023-09-18 PROCEDURE — 84295 ASSAY OF SERUM SODIUM: CPT

## 2023-09-18 PROCEDURE — 27800505 HC CATH, RADIAL ARTERY KIT: Performed by: ANESTHESIOLOGY

## 2023-09-18 PROCEDURE — C1768 GRAFT, VASCULAR: HCPCS | Performed by: THORACIC SURGERY (CARDIOTHORACIC VASCULAR SURGERY)

## 2023-09-18 PROCEDURE — 85610 PROTHROMBIN TIME: CPT | Mod: 91 | Performed by: THORACIC SURGERY (CARDIOTHORACIC VASCULAR SURGERY)

## 2023-09-18 PROCEDURE — 33866 PR AORTIC HEMIARCH GRAFT, W/ISOL & CONTRL ARCH VESSELS: ICD-10-PCS | Mod: ,,, | Performed by: THORACIC SURGERY (CARDIOTHORACIC VASCULAR SURGERY)

## 2023-09-18 PROCEDURE — 99223 1ST HOSP IP/OBS HIGH 75: CPT | Mod: ,,, | Performed by: NURSE PRACTITIONER

## 2023-09-18 PROCEDURE — 84100 ASSAY OF PHOSPHORUS: CPT | Performed by: THORACIC SURGERY (CARDIOTHORACIC VASCULAR SURGERY)

## 2023-09-18 PROCEDURE — C1894 INTRO/SHEATH, NON-LASER: HCPCS | Performed by: ANESTHESIOLOGY

## 2023-09-18 PROCEDURE — 82803 BLOOD GASES ANY COMBINATION: CPT

## 2023-09-18 PROCEDURE — 37000009 HC ANESTHESIA EA ADD 15 MINS: Performed by: THORACIC SURGERY (CARDIOTHORACIC VASCULAR SURGERY)

## 2023-09-18 PROCEDURE — 27000445 HC TEMPORARY PACEMAKER LEADS

## 2023-09-18 PROCEDURE — 37000008 HC ANESTHESIA 1ST 15 MINUTES: Performed by: THORACIC SURGERY (CARDIOTHORACIC VASCULAR SURGERY)

## 2023-09-18 PROCEDURE — 37799 UNLISTED PX VASCULAR SURGERY: CPT

## 2023-09-18 PROCEDURE — 99499 NO LOS: ICD-10-PCS | Mod: ,,, | Performed by: ANESTHESIOLOGY

## 2023-09-18 PROCEDURE — 33533 CABG ARTERIAL SINGLE: CPT | Mod: 51,,, | Performed by: THORACIC SURGERY (CARDIOTHORACIC VASCULAR SURGERY)

## 2023-09-18 PROCEDURE — 85520 HEPARIN ASSAY: CPT

## 2023-09-18 PROCEDURE — 83605 ASSAY OF LACTIC ACID: CPT

## 2023-09-18 PROCEDURE — 86901 BLOOD TYPING SEROLOGIC RH(D): CPT | Mod: 91

## 2023-09-18 PROCEDURE — 84132 ASSAY OF SERUM POTASSIUM: CPT

## 2023-09-18 PROCEDURE — 93325 DOPPLER ECHO COLOR FLOW MAPG: CPT | Mod: 26,,, | Performed by: ANESTHESIOLOGY

## 2023-09-18 PROCEDURE — 99223 PR INITIAL HOSPITAL CARE,LEVL III: ICD-10-PCS | Mod: ,,, | Performed by: NURSE PRACTITIONER

## 2023-09-18 PROCEDURE — 85025 COMPLETE CBC W/AUTO DIFF WBC: CPT | Mod: 91 | Performed by: THORACIC SURGERY (CARDIOTHORACIC VASCULAR SURGERY)

## 2023-09-18 PROCEDURE — 63600175 PHARM REV CODE 636 W HCPCS

## 2023-09-18 PROCEDURE — 85610 PROTHROMBIN TIME: CPT | Performed by: ANESTHESIOLOGY

## 2023-09-18 PROCEDURE — 27000191 HC C-V MONITORING

## 2023-09-18 PROCEDURE — 85384 FIBRINOGEN ACTIVITY: CPT | Mod: 91 | Performed by: STUDENT IN AN ORGANIZED HEALTH CARE EDUCATION/TRAINING PROGRAM

## 2023-09-18 PROCEDURE — 85025 COMPLETE CBC W/AUTO DIFF WBC: CPT | Mod: 91

## 2023-09-18 PROCEDURE — 93503 SWAN GANZ LINE: ICD-10-PCS | Mod: 59,,, | Performed by: ANESTHESIOLOGY

## 2023-09-18 PROCEDURE — 27100026 HC SHUNT SENSOR, TERUMO

## 2023-09-18 PROCEDURE — 93325 PR DOPPLER COLOR FLOW VELOCITY MAP: ICD-10-PCS | Mod: 26,,, | Performed by: ANESTHESIOLOGY

## 2023-09-18 PROCEDURE — 85014 HEMATOCRIT: CPT

## 2023-09-18 PROCEDURE — 93312 TEE: ICD-10-PCS | Mod: 26,59,, | Performed by: ANESTHESIOLOGY

## 2023-09-18 PROCEDURE — 27200953 HC CARDIOPLEGIA SYSTEM

## 2023-09-18 PROCEDURE — 27202608 HC CANNULA, MISC

## 2023-09-18 PROCEDURE — 85610 PROTHROMBIN TIME: CPT | Mod: 91

## 2023-09-18 PROCEDURE — 76937 SWAN GANZ LINE: ICD-10-PCS | Mod: 26,,, | Performed by: ANESTHESIOLOGY

## 2023-09-18 PROCEDURE — 82330 ASSAY OF CALCIUM: CPT

## 2023-09-18 PROCEDURE — 27200678 HC TRANSDUCER MONITOR KIT TRIPLE: Performed by: ANESTHESIOLOGY

## 2023-09-18 PROCEDURE — 25000003 PHARM REV CODE 250: Performed by: STUDENT IN AN ORGANIZED HEALTH CARE EDUCATION/TRAINING PROGRAM

## 2023-09-18 PROCEDURE — C1729 CATH, DRAINAGE: HCPCS | Performed by: THORACIC SURGERY (CARDIOTHORACIC VASCULAR SURGERY)

## 2023-09-18 PROCEDURE — 33866 AORTIC HEMIARCH GRAFT: CPT | Mod: ,,, | Performed by: THORACIC SURGERY (CARDIOTHORACIC VASCULAR SURGERY)

## 2023-09-18 PROCEDURE — D9220A PRA ANESTHESIA: ICD-10-PCS | Mod: ,,, | Performed by: ANESTHESIOLOGY

## 2023-09-18 PROCEDURE — 63600175 PHARM REV CODE 636 W HCPCS: Performed by: STUDENT IN AN ORGANIZED HEALTH CARE EDUCATION/TRAINING PROGRAM

## 2023-09-18 PROCEDURE — 93320 DOPPLER ECHO COMPLETE: CPT | Mod: 26,,, | Performed by: ANESTHESIOLOGY

## 2023-09-18 PROCEDURE — 85384 FIBRINOGEN ACTIVITY: CPT | Mod: 91 | Performed by: THORACIC SURGERY (CARDIOTHORACIC VASCULAR SURGERY)

## 2023-09-18 PROCEDURE — 85730 THROMBOPLASTIN TIME PARTIAL: CPT | Mod: 91 | Performed by: THORACIC SURGERY (CARDIOTHORACIC VASCULAR SURGERY)

## 2023-09-18 PROCEDURE — 33533 PR CABG, ARTERIAL, SINGLE: ICD-10-PCS | Mod: 51,,, | Performed by: THORACIC SURGERY (CARDIOTHORACIC VASCULAR SURGERY)

## 2023-09-18 PROCEDURE — 33864 PR ASCEND AORTA GRAFT W VALVE SPARING ANNULUS REMODEL: ICD-10-PCS | Mod: ,,, | Performed by: THORACIC SURGERY (CARDIOTHORACIC VASCULAR SURGERY)

## 2023-09-18 PROCEDURE — 63600175 PHARM REV CODE 636 W HCPCS: Performed by: ANESTHESIOLOGY

## 2023-09-18 PROCEDURE — 63600175 PHARM REV CODE 636 W HCPCS: Mod: JZ,JG

## 2023-09-18 PROCEDURE — 76937 US GUIDE VASCULAR ACCESS: CPT | Mod: 26,,, | Performed by: ANESTHESIOLOGY

## 2023-09-18 PROCEDURE — 33508 PR ENDOSCOPY W/VIDEO-ASST VEIN HARVEST,CABG: ICD-10-PCS | Mod: 59,,, | Performed by: THORACIC SURGERY (CARDIOTHORACIC VASCULAR SURGERY)

## 2023-09-18 PROCEDURE — 36620 INSERTION CATHETER ARTERY: CPT | Mod: 59,,, | Performed by: ANESTHESIOLOGY

## 2023-09-18 PROCEDURE — 36000712 HC OR TIME LEV V 1ST 15 MIN: Performed by: THORACIC SURGERY (CARDIOTHORACIC VASCULAR SURGERY)

## 2023-09-18 PROCEDURE — C9248 INJ, CLEVIDIPINE BUTYRATE: HCPCS | Mod: JZ,JG

## 2023-09-18 PROCEDURE — 99900035 HC TECH TIME PER 15 MIN (STAT)

## 2023-09-18 PROCEDURE — 20000000 HC ICU ROOM

## 2023-09-18 PROCEDURE — 27100088 HC CELL SAVER

## 2023-09-18 PROCEDURE — 86900 BLOOD TYPING SEROLOGIC ABO: CPT | Performed by: STUDENT IN AN ORGANIZED HEALTH CARE EDUCATION/TRAINING PROGRAM

## 2023-09-18 PROCEDURE — 88305 TISSUE EXAM BY PATHOLOGIST: ICD-10-PCS | Mod: 26,,, | Performed by: PATHOLOGY

## 2023-09-18 PROCEDURE — 94002 VENT MGMT INPAT INIT DAY: CPT

## 2023-09-18 PROCEDURE — 36620 ARTERIAL: ICD-10-PCS | Mod: 59,,, | Performed by: ANESTHESIOLOGY

## 2023-09-18 PROCEDURE — 85730 THROMBOPLASTIN TIME PARTIAL: CPT | Performed by: STUDENT IN AN ORGANIZED HEALTH CARE EDUCATION/TRAINING PROGRAM

## 2023-09-18 PROCEDURE — 93312 ECHO TRANSESOPHAGEAL: CPT | Mod: 26,59,, | Performed by: ANESTHESIOLOGY

## 2023-09-18 PROCEDURE — 83735 ASSAY OF MAGNESIUM: CPT | Performed by: THORACIC SURGERY (CARDIOTHORACIC VASCULAR SURGERY)

## 2023-09-18 PROCEDURE — 39541 PR REPAIR DIAPHR HERNIA TRAUMA CHR: ICD-10-PCS | Mod: 51,,, | Performed by: THORACIC SURGERY (CARDIOTHORACIC VASCULAR SURGERY)

## 2023-09-18 PROCEDURE — P9045 ALBUMIN (HUMAN), 5%, 250 ML: HCPCS | Mod: JZ,JG

## 2023-09-18 PROCEDURE — 85025 COMPLETE CBC W/AUTO DIFF WBC: CPT | Mod: 91 | Performed by: STUDENT IN AN ORGANIZED HEALTH CARE EDUCATION/TRAINING PROGRAM

## 2023-09-18 PROCEDURE — 94761 N-INVAS EAR/PLS OXIMETRY MLT: CPT

## 2023-09-18 PROCEDURE — 36415 COLL VENOUS BLD VENIPUNCTURE: CPT | Performed by: STUDENT IN AN ORGANIZED HEALTH CARE EDUCATION/TRAINING PROGRAM

## 2023-09-18 PROCEDURE — 93503 INSERT/PLACE HEART CATHETER: CPT | Mod: 59,,, | Performed by: ANESTHESIOLOGY

## 2023-09-18 PROCEDURE — 85610 PROTHROMBIN TIME: CPT | Mod: 91 | Performed by: STUDENT IN AN ORGANIZED HEALTH CARE EDUCATION/TRAINING PROGRAM

## 2023-09-18 PROCEDURE — 27201423 OPTIME MED/SURG SUP & DEVICES STERILE SUPPLY: Performed by: THORACIC SURGERY (CARDIOTHORACIC VASCULAR SURGERY)

## 2023-09-18 PROCEDURE — 33864 ASCENDING AORTIC GRAFT: CPT | Mod: ,,, | Performed by: THORACIC SURGERY (CARDIOTHORACIC VASCULAR SURGERY)

## 2023-09-18 PROCEDURE — 80053 COMPREHEN METABOLIC PANEL: CPT | Mod: 91 | Performed by: THORACIC SURGERY (CARDIOTHORACIC VASCULAR SURGERY)

## 2023-09-18 PROCEDURE — 25000003 PHARM REV CODE 250

## 2023-09-18 PROCEDURE — 88305 TISSUE EXAM BY PATHOLOGIST: CPT | Performed by: PATHOLOGY

## 2023-09-18 PROCEDURE — 27201037 HC PRESSURE MONITORING SET UP

## 2023-09-18 PROCEDURE — 82330 ASSAY OF CALCIUM: CPT | Performed by: STUDENT IN AN ORGANIZED HEALTH CARE EDUCATION/TRAINING PROGRAM

## 2023-09-18 PROCEDURE — D9220A PRA ANESTHESIA: Mod: ,,, | Performed by: ANESTHESIOLOGY

## 2023-09-18 PROCEDURE — 85025 COMPLETE CBC W/AUTO DIFF WBC: CPT | Performed by: ANESTHESIOLOGY

## 2023-09-18 PROCEDURE — 85384 FIBRINOGEN ACTIVITY: CPT | Performed by: ANESTHESIOLOGY

## 2023-09-18 PROCEDURE — 27100171 HC OXYGEN HIGH FLOW UP TO 24 HOURS

## 2023-09-18 PROCEDURE — 33508 ENDOSCOPIC VEIN HARVEST: CPT | Mod: 59,,, | Performed by: THORACIC SURGERY (CARDIOTHORACIC VASCULAR SURGERY)

## 2023-09-18 PROCEDURE — 27000175 HC ADULT BYPASS PUMP

## 2023-09-18 PROCEDURE — 25000003 PHARM REV CODE 250: Performed by: ANESTHESIOLOGY

## 2023-09-18 PROCEDURE — 85730 THROMBOPLASTIN TIME PARTIAL: CPT | Mod: 91

## 2023-09-18 PROCEDURE — 82800 BLOOD PH: CPT

## 2023-09-18 PROCEDURE — 93320 PR DOPPLER ECHO HEART,COMPLETE: ICD-10-PCS | Mod: 26,,, | Performed by: ANESTHESIOLOGY

## 2023-09-18 PROCEDURE — 99900026 HC AIRWAY MAINTENANCE (STAT)

## 2023-09-18 PROCEDURE — 39541 REPAIR OF DIAPHRAGM HERNIA: CPT | Mod: 51,,, | Performed by: THORACIC SURGERY (CARDIOTHORACIC VASCULAR SURGERY)

## 2023-09-18 PROCEDURE — 88305 TISSUE EXAM BY PATHOLOGIST: CPT | Mod: 26,,, | Performed by: PATHOLOGY

## 2023-09-18 PROCEDURE — 85002 BLEEDING TIME TEST: CPT

## 2023-09-18 PROCEDURE — 36592 COLLECT BLOOD FROM PICC: CPT

## 2023-09-18 PROCEDURE — 36000713 HC OR TIME LEV V EA ADD 15 MIN: Performed by: THORACIC SURGERY (CARDIOTHORACIC VASCULAR SURGERY)

## 2023-09-18 PROCEDURE — 80053 COMPREHEN METABOLIC PANEL: CPT | Performed by: STUDENT IN AN ORGANIZED HEALTH CARE EDUCATION/TRAINING PROGRAM

## 2023-09-18 DEVICE — PATCH PERI-GUARD 6CM X 8CM: Type: IMPLANTABLE DEVICE | Site: THORACIC | Status: FUNCTIONAL

## 2023-09-18 RX ORDER — PROPOFOL 10 MG/ML
0-50 INJECTION, EMULSION INTRAVENOUS CONTINUOUS
Status: DISCONTINUED | OUTPATIENT
Start: 2023-09-18 | End: 2023-09-19

## 2023-09-18 RX ORDER — FAMOTIDINE 10 MG/ML
20 INJECTION INTRAVENOUS 2 TIMES DAILY
Status: DISCONTINUED | OUTPATIENT
Start: 2023-09-18 | End: 2023-09-19

## 2023-09-18 RX ORDER — CALCIUM GLUCONATE 20 MG/ML
1 INJECTION, SOLUTION INTRAVENOUS
Status: DISCONTINUED | OUTPATIENT
Start: 2023-09-18 | End: 2023-09-21

## 2023-09-18 RX ORDER — ASPIRIN 325 MG
325 TABLET, DELAYED RELEASE (ENTERIC COATED) ORAL DAILY
Status: DISCONTINUED | OUTPATIENT
Start: 2023-09-18 | End: 2023-09-18 | Stop reason: SDUPTHER

## 2023-09-18 RX ORDER — HEPARIN SODIUM 1000 [USP'U]/ML
INJECTION, SOLUTION INTRAVENOUS; SUBCUTANEOUS
Status: DISCONTINUED | OUTPATIENT
Start: 2023-09-18 | End: 2023-09-18

## 2023-09-18 RX ORDER — ALBUMIN HUMAN 50 G/1000ML
25 SOLUTION INTRAVENOUS ONCE
Status: COMPLETED | OUTPATIENT
Start: 2023-09-18 | End: 2023-09-18

## 2023-09-18 RX ORDER — HYDROMORPHONE HYDROCHLORIDE 1 MG/ML
0.5 INJECTION, SOLUTION INTRAMUSCULAR; INTRAVENOUS; SUBCUTANEOUS
Status: DISCONTINUED | OUTPATIENT
Start: 2023-09-18 | End: 2023-09-18

## 2023-09-18 RX ORDER — NOREPINEPHRINE BITARTRATE/D5W 4MG/250ML
0-3 PLASTIC BAG, INJECTION (ML) INTRAVENOUS CONTINUOUS
Status: DISCONTINUED | OUTPATIENT
Start: 2023-09-18 | End: 2023-09-18

## 2023-09-18 RX ORDER — POTASSIUM CHLORIDE 14.9 MG/ML
20 INJECTION INTRAVENOUS
Status: DISCONTINUED | OUTPATIENT
Start: 2023-09-18 | End: 2023-09-20

## 2023-09-18 RX ORDER — METOCLOPRAMIDE HYDROCHLORIDE 5 MG/ML
5 INJECTION INTRAMUSCULAR; INTRAVENOUS EVERY 6 HOURS PRN
Status: DISCONTINUED | OUTPATIENT
Start: 2023-09-18 | End: 2023-09-21 | Stop reason: DRUGHIGH

## 2023-09-18 RX ORDER — INDOMETHACIN 25 MG/1
100 CAPSULE ORAL ONCE
Status: COMPLETED | OUTPATIENT
Start: 2023-09-18 | End: 2023-09-18

## 2023-09-18 RX ORDER — NOREPINEPHRINE BITARTRATE 1 MG/ML
INJECTION, SOLUTION INTRAVENOUS
Status: DISCONTINUED | OUTPATIENT
Start: 2023-09-18 | End: 2023-09-18

## 2023-09-18 RX ORDER — ESMOLOL HYDROCHLORIDE 10 MG/ML
INJECTION INTRAVENOUS
Status: DISCONTINUED | OUTPATIENT
Start: 2023-09-18 | End: 2023-09-18

## 2023-09-18 RX ORDER — LIDOCAINE HYDROCHLORIDE 20 MG/ML
INJECTION, SOLUTION EPIDURAL; INFILTRATION; INTRACAUDAL; PERINEURAL
Status: DISCONTINUED | OUTPATIENT
Start: 2023-09-18 | End: 2023-09-18

## 2023-09-18 RX ORDER — ACETAMINOPHEN 500 MG
1000 TABLET ORAL
Status: COMPLETED | OUTPATIENT
Start: 2023-09-18 | End: 2023-09-18

## 2023-09-18 RX ORDER — TRANEXAMIC ACID 100 MG/ML
INJECTION, SOLUTION INTRAVENOUS CONTINUOUS PRN
Status: DISCONTINUED | OUTPATIENT
Start: 2023-09-18 | End: 2023-09-18

## 2023-09-18 RX ORDER — FENTANYL CITRATE 50 UG/ML
INJECTION, SOLUTION INTRAMUSCULAR; INTRAVENOUS
Status: DISCONTINUED | OUTPATIENT
Start: 2023-09-18 | End: 2023-09-18

## 2023-09-18 RX ORDER — ASPIRIN 325 MG
325 TABLET ORAL DAILY
Status: DISCONTINUED | OUTPATIENT
Start: 2023-09-18 | End: 2023-09-24 | Stop reason: HOSPADM

## 2023-09-18 RX ORDER — ONDANSETRON 2 MG/ML
INJECTION INTRAMUSCULAR; INTRAVENOUS
Status: DISCONTINUED | OUTPATIENT
Start: 2023-09-18 | End: 2023-09-18

## 2023-09-18 RX ORDER — ROCURONIUM BROMIDE 10 MG/ML
INJECTION, SOLUTION INTRAVENOUS
Status: DISCONTINUED | OUTPATIENT
Start: 2023-09-18 | End: 2023-09-18

## 2023-09-18 RX ORDER — MIDAZOLAM HYDROCHLORIDE 1 MG/ML
INJECTION INTRAMUSCULAR; INTRAVENOUS
Status: DISCONTINUED | OUTPATIENT
Start: 2023-09-18 | End: 2023-09-18

## 2023-09-18 RX ORDER — DEXMEDETOMIDINE HYDROCHLORIDE 4 UG/ML
0-1.4 INJECTION, SOLUTION INTRAVENOUS CONTINUOUS
Status: DISCONTINUED | OUTPATIENT
Start: 2023-09-18 | End: 2023-09-19

## 2023-09-18 RX ORDER — PHENYLEPHRINE HYDROCHLORIDE 10 MG/ML
INJECTION INTRAVENOUS
Status: DISCONTINUED | OUTPATIENT
Start: 2023-09-18 | End: 2023-09-18

## 2023-09-18 RX ORDER — OXYCODONE HYDROCHLORIDE 5 MG/1
5 TABLET ORAL EVERY 4 HOURS PRN
Status: DISCONTINUED | OUTPATIENT
Start: 2023-09-18 | End: 2023-09-24 | Stop reason: HOSPADM

## 2023-09-18 RX ORDER — ALBUMIN HUMAN 50 G/1000ML
25 SOLUTION INTRAVENOUS ONCE
Status: COMPLETED | OUTPATIENT
Start: 2023-09-19 | End: 2023-09-19

## 2023-09-18 RX ORDER — NITROGLYCERIN 5 MG/ML
INJECTION, SOLUTION INTRAVENOUS
Status: DISCONTINUED | OUTPATIENT
Start: 2023-09-18 | End: 2023-09-18

## 2023-09-18 RX ORDER — MAGNESIUM SULFATE HEPTAHYDRATE 40 MG/ML
4 INJECTION, SOLUTION INTRAVENOUS
Status: DISCONTINUED | OUTPATIENT
Start: 2023-09-18 | End: 2023-09-20

## 2023-09-18 RX ORDER — ONDANSETRON 2 MG/ML
4 INJECTION INTRAMUSCULAR; INTRAVENOUS EVERY 6 HOURS PRN
Status: DISCONTINUED | OUTPATIENT
Start: 2023-09-18 | End: 2023-09-24 | Stop reason: HOSPADM

## 2023-09-18 RX ORDER — ALBUMIN HUMAN 50 G/1000ML
SOLUTION INTRAVENOUS CONTINUOUS PRN
Status: DISCONTINUED | OUTPATIENT
Start: 2023-09-18 | End: 2023-09-18

## 2023-09-18 RX ORDER — SODIUM CHLORIDE 9 MG/ML
INJECTION, SOLUTION INTRAVENOUS CONTINUOUS PRN
Status: DISCONTINUED | OUTPATIENT
Start: 2023-09-18 | End: 2023-09-18

## 2023-09-18 RX ORDER — PROTAMINE SULFATE 10 MG/ML
INJECTION, SOLUTION INTRAVENOUS
Status: DISCONTINUED | OUTPATIENT
Start: 2023-09-18 | End: 2023-09-18

## 2023-09-18 RX ORDER — POLYETHYLENE GLYCOL 3350 17 G/17G
17 POWDER, FOR SOLUTION ORAL DAILY
Status: DISCONTINUED | OUTPATIENT
Start: 2023-09-18 | End: 2023-09-22

## 2023-09-18 RX ORDER — CALCIUM GLUCONATE 20 MG/ML
2 INJECTION, SOLUTION INTRAVENOUS
Status: DISCONTINUED | OUTPATIENT
Start: 2023-09-18 | End: 2023-09-21

## 2023-09-18 RX ORDER — HYDROCODONE BITARTRATE AND ACETAMINOPHEN 500; 5 MG/1; MG/1
TABLET ORAL
Status: DISCONTINUED | OUTPATIENT
Start: 2023-09-18 | End: 2023-09-18

## 2023-09-18 RX ORDER — SODIUM CHLORIDE 9 MG/ML
INJECTION, SOLUTION INTRAVENOUS CONTINUOUS
Status: DISCONTINUED | OUTPATIENT
Start: 2023-09-18 | End: 2023-09-18

## 2023-09-18 RX ORDER — MUPIROCIN 20 MG/G
OINTMENT TOPICAL 2 TIMES DAILY
Status: COMPLETED | OUTPATIENT
Start: 2023-09-18 | End: 2023-09-23

## 2023-09-18 RX ORDER — ASPIRIN 325 MG
325 TABLET ORAL ONCE
Status: DISCONTINUED | OUTPATIENT
Start: 2023-09-18 | End: 2023-09-19

## 2023-09-18 RX ORDER — OXYCODONE HYDROCHLORIDE 10 MG/1
10 TABLET ORAL EVERY 4 HOURS PRN
Status: DISCONTINUED | OUTPATIENT
Start: 2023-09-18 | End: 2023-09-24 | Stop reason: HOSPADM

## 2023-09-18 RX ORDER — KETAMINE HCL IN 0.9 % NACL 50 MG/5 ML
SYRINGE (ML) INTRAVENOUS
Status: DISCONTINUED | OUTPATIENT
Start: 2023-09-18 | End: 2023-09-18

## 2023-09-18 RX ORDER — POTASSIUM CHLORIDE 29.8 MG/ML
40 INJECTION INTRAVENOUS
Status: DISCONTINUED | OUTPATIENT
Start: 2023-09-18 | End: 2023-09-20

## 2023-09-18 RX ORDER — MUPIROCIN 20 MG/G
OINTMENT TOPICAL
Status: DISCONTINUED | OUTPATIENT
Start: 2023-09-18 | End: 2023-09-18

## 2023-09-18 RX ORDER — TRANEXAMIC ACID 100 MG/ML
INJECTION, SOLUTION INTRAVENOUS
Status: DISCONTINUED | OUTPATIENT
Start: 2023-09-18 | End: 2023-09-18

## 2023-09-18 RX ORDER — ACETAMINOPHEN 500 MG
1000 TABLET ORAL EVERY 8 HOURS
Status: DISCONTINUED | OUTPATIENT
Start: 2023-09-18 | End: 2023-09-24 | Stop reason: HOSPADM

## 2023-09-18 RX ORDER — ATORVASTATIN CALCIUM 20 MG/1
80 TABLET, FILM COATED ORAL DAILY
Status: DISCONTINUED | OUTPATIENT
Start: 2023-09-18 | End: 2023-09-24 | Stop reason: HOSPADM

## 2023-09-18 RX ORDER — HYDROMORPHONE HYDROCHLORIDE 1 MG/ML
0.5 INJECTION, SOLUTION INTRAMUSCULAR; INTRAVENOUS; SUBCUTANEOUS
Status: DISCONTINUED | OUTPATIENT
Start: 2023-09-18 | End: 2023-09-20

## 2023-09-18 RX ORDER — LIDOCAINE HYDROCHLORIDE 10 MG/ML
1 INJECTION, SOLUTION EPIDURAL; INFILTRATION; INTRACAUDAL; PERINEURAL ONCE AS NEEDED
Status: DISCONTINUED | OUTPATIENT
Start: 2023-09-18 | End: 2023-09-18

## 2023-09-18 RX ORDER — CALCIUM GLUCONATE 20 MG/ML
3 INJECTION, SOLUTION INTRAVENOUS
Status: DISCONTINUED | OUTPATIENT
Start: 2023-09-18 | End: 2023-09-21

## 2023-09-18 RX ORDER — PROTAMINE SULFATE 10 MG/ML
50 INJECTION, SOLUTION INTRAVENOUS ONCE
Status: COMPLETED | OUTPATIENT
Start: 2023-09-18 | End: 2023-09-18

## 2023-09-18 RX ORDER — PROPOFOL 10 MG/ML
VIAL (ML) INTRAVENOUS
Status: DISCONTINUED | OUTPATIENT
Start: 2023-09-18 | End: 2023-09-18

## 2023-09-18 RX ORDER — MAGNESIUM SULFATE HEPTAHYDRATE 40 MG/ML
2 INJECTION, SOLUTION INTRAVENOUS
Status: DISCONTINUED | OUTPATIENT
Start: 2023-09-18 | End: 2023-09-20

## 2023-09-18 RX ORDER — DOCUSATE SODIUM 100 MG/1
100 CAPSULE, LIQUID FILLED ORAL 2 TIMES DAILY
Status: DISCONTINUED | OUTPATIENT
Start: 2023-09-18 | End: 2023-09-20

## 2023-09-18 RX ORDER — SODIUM CHLORIDE 0.9 % (FLUSH) 0.9 %
10 SYRINGE (ML) INJECTION
Status: DISCONTINUED | OUTPATIENT
Start: 2023-09-18 | End: 2023-09-18

## 2023-09-18 RX ORDER — POTASSIUM CHLORIDE 14.9 MG/ML
60 INJECTION INTRAVENOUS
Status: DISCONTINUED | OUTPATIENT
Start: 2023-09-18 | End: 2023-09-20

## 2023-09-18 RX ORDER — LIDOCAINE HYDROCHLORIDE 10 MG/ML
1 INJECTION, SOLUTION EPIDURAL; INFILTRATION; INTRACAUDAL; PERINEURAL ONCE
Status: DISCONTINUED | OUTPATIENT
Start: 2023-09-18 | End: 2023-09-18

## 2023-09-18 RX ADMIN — ONDANSETRON 1 G: 2 INJECTION INTRAMUSCULAR; INTRAVENOUS at 01:09

## 2023-09-18 RX ADMIN — FENTANYL CITRATE 100 MCG: 50 INJECTION, SOLUTION INTRAMUSCULAR; INTRAVENOUS at 07:09

## 2023-09-18 RX ADMIN — PHENYLEPHRINE HYDROCHLORIDE 100 MCG: 10 INJECTION INTRAVENOUS at 08:09

## 2023-09-18 RX ADMIN — FAMOTIDINE 20 MG: 10 INJECTION, SOLUTION INTRAVENOUS at 08:09

## 2023-09-18 RX ADMIN — PROPOFOL 50 MG: 10 INJECTION, EMULSION INTRAVENOUS at 07:09

## 2023-09-18 RX ADMIN — ALBUMIN (HUMAN) 25 G: 12.5 SOLUTION INTRAVENOUS at 08:09

## 2023-09-18 RX ADMIN — POTASSIUM CHLORIDE 40 MEQ: 29.8 INJECTION, SOLUTION INTRAVENOUS at 03:09

## 2023-09-18 RX ADMIN — NOREPINEPHRINE BITARTRATE 0.02 MCG/KG/MIN: 1 INJECTION, SOLUTION, CONCENTRATE INTRAVENOUS at 07:09

## 2023-09-18 RX ADMIN — EPINEPHRINE 0.04 MCG/KG/MIN: 1 INJECTION INTRAMUSCULAR; INTRAVENOUS; SUBCUTANEOUS at 12:09

## 2023-09-18 RX ADMIN — METHADONE HYDROCHLORIDE 22 MG: 10 INJECTION, SOLUTION INTRAMUSCULAR; INTRAVENOUS; SUBCUTANEOUS at 07:09

## 2023-09-18 RX ADMIN — INSULIN HUMAN 1 UNITS/HR: 1 INJECTION, SOLUTION INTRAVENOUS at 04:09

## 2023-09-18 RX ADMIN — SODIUM BICARBONATE 100 MEQ: 84 INJECTION, SOLUTION INTRAVENOUS at 09:09

## 2023-09-18 RX ADMIN — SUGAMMADEX 200 MG: 100 INJECTION, SOLUTION INTRAVENOUS at 02:09

## 2023-09-18 RX ADMIN — ACETAMINOPHEN 1000 MG: 500 TABLET ORAL at 06:09

## 2023-09-18 RX ADMIN — MUPIROCIN: 20 OINTMENT TOPICAL at 06:09

## 2023-09-18 RX ADMIN — HYDROMORPHONE HYDROCHLORIDE 0.5 MG: 0.5 INJECTION, SOLUTION INTRAMUSCULAR; INTRAVENOUS; SUBCUTANEOUS at 10:09

## 2023-09-18 RX ADMIN — ROCURONIUM BROMIDE 50 MG: 10 INJECTION, SOLUTION INTRAVENOUS at 10:09

## 2023-09-18 RX ADMIN — LIDOCAINE HYDROCHLORIDE 100 MG: 20 INJECTION, SOLUTION EPIDURAL; INFILTRATION; INTRACAUDAL; PERINEURAL at 07:09

## 2023-09-18 RX ADMIN — HEPARIN SODIUM 31000 UNITS: 1000 INJECTION, SOLUTION INTRAVENOUS; SUBCUTANEOUS at 08:09

## 2023-09-18 RX ADMIN — HYDROMORPHONE HYDROCHLORIDE 0.5 MG: 0.5 INJECTION, SOLUTION INTRAMUSCULAR; INTRAVENOUS; SUBCUTANEOUS at 07:09

## 2023-09-18 RX ADMIN — PHENYLEPHRINE HYDROCHLORIDE 100 MCG: 10 INJECTION INTRAVENOUS at 07:09

## 2023-09-18 RX ADMIN — CLEVIPIDINE 2 MG/HR: 0.5 EMULSION INTRAVENOUS at 10:09

## 2023-09-18 RX ADMIN — ALBUMIN (HUMAN) 25 G: 12.5 SOLUTION INTRAVENOUS at 11:09

## 2023-09-18 RX ADMIN — NITROGLYCERIN 50 MCG: 5 INJECTION, SOLUTION INTRAVENOUS at 12:09

## 2023-09-18 RX ADMIN — MIDAZOLAM HYDROCHLORIDE 2 MG: 1 INJECTION INTRAMUSCULAR; INTRAVENOUS at 10:09

## 2023-09-18 RX ADMIN — TRANEXAMIC ACID 1 MG/KG/HR: 100 INJECTION INTRAVENOUS at 07:09

## 2023-09-18 RX ADMIN — ESMOLOL HYDROCHLORIDE 50 MG: 100 INJECTION, SOLUTION INTRAVENOUS at 09:09

## 2023-09-18 RX ADMIN — SODIUM CHLORIDE, SODIUM GLUCONATE, SODIUM ACETATE, POTASSIUM CHLORIDE, MAGNESIUM CHLORIDE, SODIUM PHOSPHATE, DIBASIC, AND POTASSIUM PHOSPHATE: .53; .5; .37; .037; .03; .012; .00082 INJECTION, SOLUTION INTRAVENOUS at 07:09

## 2023-09-18 RX ADMIN — PROTAMINE SULFATE 50 MG: 10 INJECTION, SOLUTION INTRAVENOUS at 06:09

## 2023-09-18 RX ADMIN — CALCIUM GLUCONATE 1 G: 20 INJECTION, SOLUTION INTRAVENOUS at 04:09

## 2023-09-18 RX ADMIN — LIDOCAINE HYDROCHLORIDE 100 MG: 20 INJECTION, SOLUTION EPIDURAL; INFILTRATION; INTRACAUDAL; PERINEURAL at 12:09

## 2023-09-18 RX ADMIN — CEFAZOLIN 2 G: 2 INJECTION, POWDER, FOR SOLUTION INTRAMUSCULAR; INTRAVENOUS at 08:09

## 2023-09-18 RX ADMIN — PROTAMINE SULFATE 260 MG: 10 INJECTION, SOLUTION INTRAVENOUS at 12:09

## 2023-09-18 RX ADMIN — NOREPINEPHRINE BITARTRATE 16 MCG: 1 INJECTION, SOLUTION, CONCENTRATE INTRAVENOUS at 09:09

## 2023-09-18 RX ADMIN — DEXMEDETOMIDINE HYDROCHLORIDE 0.2 MCG/KG/HR: 4 INJECTION, SOLUTION INTRAVENOUS at 07:09

## 2023-09-18 RX ADMIN — CEFAZOLIN 2 G: 2 INJECTION, POWDER, FOR SOLUTION INTRAMUSCULAR; INTRAVENOUS at 12:09

## 2023-09-18 RX ADMIN — ROCURONIUM BROMIDE 50 MG: 10 INJECTION, SOLUTION INTRAVENOUS at 09:09

## 2023-09-18 RX ADMIN — ONDANSETRON 1 G: 2 INJECTION INTRAMUSCULAR; INTRAVENOUS at 12:09

## 2023-09-18 RX ADMIN — ONDANSETRON 4 MG: 2 INJECTION INTRAMUSCULAR; INTRAVENOUS at 09:09

## 2023-09-18 RX ADMIN — MUPIROCIN: 20 OINTMENT TOPICAL at 08:09

## 2023-09-18 RX ADMIN — MIDAZOLAM HYDROCHLORIDE 2 MG: 1 INJECTION INTRAMUSCULAR; INTRAVENOUS at 06:09

## 2023-09-18 RX ADMIN — ROCURONIUM BROMIDE 50 MG: 10 INJECTION, SOLUTION INTRAVENOUS at 11:09

## 2023-09-18 RX ADMIN — Medication 50 MG: at 07:09

## 2023-09-18 RX ADMIN — PROPOFOL 30 MCG/KG/MIN: 10 INJECTION, EMULSION INTRAVENOUS at 06:09

## 2023-09-18 RX ADMIN — ALBUMIN HUMAN: 50 SOLUTION INTRAVENOUS at 01:09

## 2023-09-18 RX ADMIN — SODIUM CHLORIDE: 0.9 INJECTION, SOLUTION INTRAVENOUS at 06:09

## 2023-09-18 RX ADMIN — ROCURONIUM BROMIDE 100 MG: 10 INJECTION, SOLUTION INTRAVENOUS at 07:09

## 2023-09-18 RX ADMIN — TRANEXAMIC ACID 1000 MG: 100 INJECTION INTRAVENOUS at 08:09

## 2023-09-18 RX ADMIN — ROCURONIUM BROMIDE 100 MG: 10 INJECTION, SOLUTION INTRAVENOUS at 08:09

## 2023-09-18 NOTE — CONSULTS
"Unable to see pt today in hospital. Information packet sent to patient re: Phase 2 cardiac rehab, which includes "Your Guide to Living with Heart Disease".  Letter was also sent to patient.    Rl Gibbs RN  Cardiac Rehab Nurse   "

## 2023-09-18 NOTE — PROGRESS NOTES
Autotransfusion/Rapid Infusion Record:      09/18/2023  Autotransfusionist:  Duc Daniel    Surgeon(s) and Role:     * Imtiaz Mooney MD - Primary  Anesthesiologist:  Kasi Lino MD    Past Medical History:   Diagnosis Date    BPH (benign prostatic hyperplasia)     Elevated liver enzymes     Fatty infiltration of liver     Glucose intolerance (impaired glucose tolerance)     Hiatal hernia     Hyperlipidemia     Hypertension     Overweight     Reflux        Procedure(s) (LRB):  VALVE SPARING AORTIC ROOT REPLACEMENT (N/A)  CORONARY ARTERY BYPASS GRAFT (CABG) (N/A)  HARVEST-VEIN-ENDOVASCULAR (Left)  REPAIR, HERNIA, DIAPHRAGMATIC     2:06 PM    Equipment:    Cell Saver     R.I.S.  : Buggl Model: CATSmart or CATSplus : [place]   Model: [id number]     Serial number: [id number]   Serial number:    Disposable lot #: [id number]   Disposable lot #:      Were extra cardiotomies used for cell saver?  no   if yes, #:      Solutions:  Anticoagulant: ACD-A   Expiration date: 02/24 Volume used: 750   Wash solution: 0.9% NaCl   Expiration date: 11/24 Volume used: 4463     Cell saver checklist  Time completed: 07:00          [x]   Circuit assembled correctly     [x]   Cell saver powered and operational     [x]   Vacuum connected, functional, adjust to max -150mmHg     [x]   Anticoagulant drip rate adjusted     [x]   Transfer bag properly labeled with patient name, expiration time, volume,       anticoagulant, OR number, and initials     [x]   Cell saver disinfected after use (completed at end of case)       Cell Saver volumes:    Total volume processed:     2731 mL     Total volume pRBCs recovered     715 mL     Volume pRBCs infused     715 mL         RIS checklist   Time completed:  []   RIS circuit assembled correctly     []   RIS power and operational     []   RIS disinfected after use (completed at end of case)       RIS volumes:    Total volume infused:    (see anesthesia record for blood        product information)    mL       Additional comments:

## 2023-09-18 NOTE — ASSESSMENT & PLAN NOTE
BG goal 110-140 (CTS protocol)     Continue IV insulin infusion protocol  Requires intensive BG monitoring while on protocol       ** Please call Endocrine for any BG related issues **    ** Please notify Endocrine for any change and/or advance in diet**    Discharge planning: TBFABIAN

## 2023-09-18 NOTE — H&P
[facility]  Critical Care - Surgery  History & Physical    Patient Name: Sawyer Browning  MRN: [id number]  Admission Date: 9/18/2023  Code Status: Full Code  Attending Physician: Imtiaz Mooney MD   Primary Care Provider: Mary Kay Haines MD   Principal Problem: Aortic root aneurysm    Subjective:     HPI:  Sawyer Browning is a 68 y.o. male with a PMHx of HTN, fatty liver disease, CAD (asx, positive stress test w/ ischemia in LAD region, angiogram w/  at mid LAD and high grade stenosis of distal RCA), and aortic root as well as ascending aorta aneurysm.  Patient presents to the SICU s/p CABG x 2, AV sparing aortic root repair and hemiarch under 6 minutes of circ arrest as well as a diaphragmatic hernia repair with Dr. Mooney. On admission, patient is intubated, sedated with propofol, and in stable condition. Inotropic and vasopressor requirements upon admission are epi .04 mcg/kg/min of epinephrine, .05 mcg/kg/min of norepinephrine to maintain blood pressure at a MAP >65 and SBP < 120. Central access includes a RIJ 9Fr MAC indroducer with a swan, arterial access includes a L radial arterial line, and peripheral access includes a 20 G PIV left forearm, and a 14 G R forearm. patient also has 1 pleural and 2 mediastinal chest tubes with minimal output.   Intraoperatively, received 3L of crystalloid, 500cc of 5% albumin, 300 of cell saver, 2 autologus units and no other blood product intraoperativel. Urine output intraoperatively was recorded as 765cc. Pre-operative echocardiogram was notable for an EF 65, normal LV systolic function and ascending aorta dilation. Post-operative echo was normal biventricular function with slight increase in RV size, mild valvular abnormalities.   Immediate post-operative plans include hemodynamic stabilization and weaning of cardiac and respiratory support. Plan to wean vasopressors and inotropes as tolerated, wean ventilator support with goal of early extubation,  and closely monitor fluid status with strict Is/Os and continued fluid resuscitation as needed.          Hospital/ICU Course:  No notes on file    Follow-up For: Procedure(s) (LRB):  VALVE SPARING AORTIC ROOT REPLACEMENT (N/A)  CORONARY ARTERY BYPASS GRAFT (CABG) (N/A)  HARVEST-VEIN-ENDOVASCULAR (Left)  REPAIR, HERNIA, DIAPHRAGMATIC    Post-Operative Day: Day of Surgery     Past Medical History:   Diagnosis Date    BPH (benign prostatic hyperplasia)     Elevated liver enzymes     Fatty infiltration of liver     Glucose intolerance (impaired glucose tolerance)     Hiatal hernia     Hyperlipidemia     Hypertension     Overweight     Reflux        Past Surgical History:   Procedure Laterality Date    CORONARY ANGIOGRAPHY N/A 9/6/2023    Procedure: ANGIOGRAM, CORONARY ARTERY;  Surgeon: Chon Lopez MD;  Location: [facility] CATH LAB;  Service: Cardiology;  Laterality: N/A;  right radial    inguinal hernia      x2    tonsillectomy         Review of patient's allergies indicates:   Allergen Reactions    Ace inhibitors Other (See Comments)     cough    Losartan      headache       Family History       Problem Relation (Age of Onset)    Aneurysm Father    Arthritis Brother    Cancer Paternal Aunt, Paternal Uncle, Paternal Aunt, Paternal Uncle    Dementia Paternal Grandmother    Diabetes Paternal Grandmother, Brother    Heart disease Father, Paternal Grandfather    Hernia Brother    Hypertension Mother    Macular degeneration Mother    Migraines Sister    Stroke Father          Tobacco Use    Smoking status: Never    Smokeless tobacco: Never   Substance and Sexual Activity    Alcohol use: Yes     Comment: 2 glasses of wine most evenings.    Drug use: No    Sexual activity: Not on file      Review of Systems   Unable to perform ROS: Intubated     Objective:     Vital Signs (Most Recent):  Temp: 97.6 °F (36.4 °C) (09/18/23 1500)  Pulse: 71 (09/18/23 1500)  Resp: 19 (09/18/23 1500)  BP: (!) 152/95 (09/18/23  0612)  SpO2: 100 % (09/18/23 1500) Vital Signs (24h Range):  Temp:  [97 °F (36.1 °C)-97.6 °F (36.4 °C)] 97.6 °F (36.4 °C)  Pulse:  [70-71] 71  Resp:  [19-20] 19  SpO2:  [98 %-100 %] 100 %  BP: (152)/(95) 152/95  Arterial Line BP: (103)/(52) 103/52     Weight: 86.8 kg (191 lb 5.8 oz)  Body mass index is 25.95 kg/m².      Intake/Output Summary (Last 24 hours) at 9/18/2023 1516  Last data filed at 9/18/2023 1500  Gross per 24 hour   Intake 3811 ml   Output 890 ml   Net 2921 ml          Physical Exam  Constitutional:       General: He is not in acute distress.     Appearance: He is not ill-appearing.      Interventions: He is intubated.   HENT:      Head: Atraumatic.      Mouth/Throat:      Comments: Intubated  Cardiovascular:      Rate and Rhythm: Normal rate and regular rhythm.      Heart sounds: No murmur heard.  Pulmonary:      Effort: No respiratory distress. He is intubated.      Comments: Mechanically ventilated  Abdominal:      Palpations: Abdomen is soft.   Musculoskeletal:      Comments: Left leg wrapped in ace bandage   Skin:     Comments: [id number] incision CDI, Chest tubes in place   Neurological:      Comments: Sedated            Vents:  Vent Mode: A/C (09/18/23 1446)  Ventilator Initiated: Yes (09/18/23 1446)  Set Rate: 20 BPM (09/18/23 1446)  Vt Set: 460 mL (09/18/23 1446)  PEEP/CPAP: 5 cmH20 (09/18/23 1446)  Oxygen Concentration (%): 100 (09/18/23 1500)  Peak Airway Pressure: 19 cmH20 (09/18/23 1446)  Plateau Pressure: 0 cmH20 (09/18/23 1446)  Total Ve: 7.82 L/m (09/18/23 1446)  Negative Inspiratory Force (cm H2O): 0 (09/18/23 1446)    Lines/Drains/Airways       Central Venous Catheter Line  Duration             Pulmonary Artery Catheter Assessment  09/18/23 0824 <1 day              Drain  Duration                  Chest Tube 09/18/23 1300 Tube - 1 Anterior Mediastinal 19 Fr. <1 day         Chest Tube 09/18/23 1300 Tube - 2 Mediastinal 19 Fr. <1 day         Chest Tube 09/18/23 1300 Tube - 3 Left Pleural  19 Fr. <1 day         Urethral Catheter 09/18/23 0748 Temperature probe <1 day              Airway  Duration                  Airway - Non-Surgical 09/18/23 0715 <1 day              Arterial Line  Duration             Arterial Line 09/18/23 0821 Left Radial <1 day              Line  Duration                  Pacer Wires 09/18/23 1259 <1 day              Peripheral Intravenous Line  Duration                  Peripheral IV - Single Lumen 14 G  Right Forearm -- days         Peripheral IV - Single Lumen 09/18/23 0655 20 G Anterior;Distal;Left Forearm <1 day                    Significant Labs:    CBC/Anemia Profile:  Recent Labs   Lab 09/18/23  0635 09/18/23  0745 09/18/23  1137 09/18/23  1204 09/18/23  1233 09/18/23  1309   WBC 7.25  --  10.11  --   --   --    HGB 15.6  --  11.0*  --   --   --    HCT 44.0   < > 32.4* 30* 25* 26*     --  106*  --   --   --    MCV 84  --  90  --   --   --    RDW 12.4  --  12.2  --   --   --     < > = values in this interval not displayed.        Chemistries:  Recent Labs   Lab 09/18/23  0635      K 4.1      CO2 23   BUN 16   CREATININE 0.9   CALCIUM 9.1   ALBUMIN 4.3   PROT 7.5   BILITOT 0.7   ALKPHOS 63   ALT 39   AST 38       ABGs:   Recent Labs   Lab 09/18/23  1309   PH 7.336*   PCO2 39.0   HCO3 20.9*   POCSATURATED 97   BE -5     CMP:   Recent Labs   Lab 09/18/23  0635      K 4.1      CO2 23   *   BUN 16   CREATININE 0.9   CALCIUM 9.1   PROT 7.5   ALBUMIN 4.3   BILITOT 0.7   ALKPHOS 63   AST 38   ALT 39   ANIONGAP 11     All pertinent labs within the past 24 hours have been reviewed.    Significant Imaging: I have reviewed all pertinent imaging results/findings within the past 24 hours.    Assessment/Plan:     Cardiac/Vascular  * Aortic root aneurysm    Neuro/Psych:     - Sedation: Propofol, may switch to precedex if needed to wean to extubate    - Pain:     - Scheduled Tylenol 1000mg Q8H   - PRN Oxycodone 5mg/10mg, Hydromorphone 0.5 Q1H                Cardiac:     - POD #0 s/p valve sparing aortic root repair, hemiarch replacement (6 minute circ arrest), CABG x2, and repair of hernia of morgani with Dr. Mooney    - Postoperative GWEN: Normal bivent function, no wall motion abnormalities, mild increase RV diameter    - BP Goal: MAP > 65, SBP < 120    - Inotropes/Pressors: Epi, levo    - Anti-HTNs: Will initiate when appropriate    - Rhythm:    - Beta Blocker: Will initiate when appropriate    - Statin: Home statin ordered        Pulmonary:     - Goal SpO2 >92%    - Will wean ventilator support as tolerated to extubate    - Chest Tubes x 3 (2 Meds & 1 Pleural)    - ABGs Q1H x 6H, Q3H x 3          Renal:    - Trend BUN/Cr     - Maintain Tsai, record strict Is/Os  - 1.5 L albumin over first 12 hours    Recent Labs   Lab 09/18/23  0635 09/18/23  1504   BUN 16 15  15   CREATININE 0.9 1.0  1.0         FEN / GI:     - Daily CMP, PRN K/Mag/Phos per protocol     - Replace electrolytes as needed    - Nutrition: NPO pending extubation    - Bowel Regimen: Miralax, docusate      ID:     - Tmax:    - Abx: Complete perioperative cefazolin 2g Q8H x 5 doses    Recent Labs   Lab 09/18/23  0635 09/18/23  1137 09/18/23  1504   WBC 7.25 10.11 22.79*  22.79*         Heme/Onc:     - Hgb 15.6 pre-operatively    - Received 300 Cell Saver intraoperatively    - CBC daily    - ASA 325mg daily, will start pending chest tube output    Recent Labs   Lab 09/18/23  0635 09/18/23  1137 09/18/23  1504   HGB 15.6 11.0* 12.8*  12.8*    106* 132*  132*   APTT 24.8  --   --    INR 1.0 4.4*  --          Endocrine:     - CTS Goal -140    - HgbA1c: 5.5    - Endocrinology consulted for insulin management      PPx:   Feeding: NPO, will advance as tolerated following extubation  Analgesia/Sedation: propofol  Thromboembolic Prevention: Start tomorrow  HOB >30: Yes  Stress Ulcer: Yes - famotidine 20mg IV  Glucose Control: Yes, insulin management per Endocrinology -       Lines/Drains/Airway:   ETT  PIV    Art Line: L radial   Central Line: RIJ CVC, swan   Tsai    Chest Tubes: (2 Mediastinal, 1 Pleural)    Pacing Wires: Temporary v pacing wires      Dispo/Code Status/Palliative:     - Continue SICU Care    - Full Code             Critical care was time spent personally by me on the following activities: development of treatment plan with patient or surrogate and bedside caregivers, discussions with consultants, evaluation of patient's response to treatment, examination of patient, ordering and performing treatments and interventions, ordering and review of laboratory studies, ordering and review of radiographic studies, pulse oximetry, re-evaluation of patient's condition.  This critical care time did not overlap with that of any other provider or involve time for any procedures.     Jean Claude Sandoval, DO  Critical Care - Surgery  [facility]

## 2023-09-18 NOTE — OP NOTE
[facility]  Cardiothoracic Surgery Operative Report    Patient Name:  Sawyer Browning; [id number]    Preoperative Diagnosis: Aortic root and ascending aortic aneurysm, proximal aortic arch aneurysm, aortic regurgitation, coronary artery disease    Postoperative Diagnosis:  Same plus Hernia of Delmergagderrell    Date of Operation:  09/18/2023    Operation:  Valve Sparing Aortic Root Replacement (Rafal Procedure) with Coronary Reimplantation with 30 mm Cardioroot bulged aortic root graft  Transverse aortic arch replacement (yvonne arch replacement) with 30mm Cardioroot graft under hypothermic circulatory arrest  Double vessel coronary artery bypass grafting  Left internal mammary artery to the distal left anterior descending artery  Saphenous vein graft to the posterior descending artery  Endoscopic saphenous vein harvest from the left lower extremity  Repair of hernia of Delmergagderrell with patch of bovine pericardium    Surgeon:  Imtiaz Mooney MD    Assistant Surgeon:  none    Fellow:  Frederick Armas MD    Anesthesiologist:  Kasi Lino MD      ---------------------------------------------------------------------------------------------------------------------      Indications for surgery: The patient is a Mr. Browning is a 68 year-old gentleman with a history of aortic root and ascending aortic aneurysm, fatty liver disease, and hypertension.  He is asymptomatic (no angina, no dyspnea) and had a recent positive stress test showing ischemia.  His most recent echocardiogram showed 65% ejection fraction with no significant valvulopathy.  Angiogram showed totally occluded proximal LAD with a small-moderate vs moderate sized distal LAD (fills via left to left collaterals), nonsignificant LCx disease, and 95% distal RCA with a large distal RCA target, moderate PDA target, and moderate right posterolateral ventricular artery target.     CTA chest showed 4.5cm Sinus of Valsalva, 5.0cm Ascending Aorta, 4.0cm proximal aortic arch,  minimal ascending aortic and moderate aortic arch calcifications.     We had an extensive discussion with him regarding the ACC/AHA guidelines and we agreed that he has indications for surgery involving coronary artery bypass surgery x 2 (LIMA-LAD, SVG-dRCA), valve sparing aortic root replacement vs bioBentall, ascending aorta replacement, possible yvonne arch replacement under hypothermic circulatory arrest. . The risks and benefits were explained and informed consent was obtained.     Gross findings: No pericardial adhesions. The aortic valve was trileaflet with dilated Sinus of Valsalva and sinotubular junction causing the poor coaptation.  Moderate sized LAD target, large PDA target.  Superior portion of pericardium closed at finish.  Good biventricular function pre and post bypass.      Procedure:  The patient was brought to the holding area and the indications for surgery were reviewed.  He was placed supine on the operating room table and prepped and draped in the usual sterile fashion. A surgical time out was performed.     A midline incision was followed with division of the sternum. The left internal mammary artery was harvested. Simultaneously, one piece of saphenous vein was harvested endoscopically from the leg. ACT guided heparinization was administered.  The distal aortic arch and right atrium were cannulated.  Cardiopulmonary bypass was commenced and the patient was actively cooled to a target temperature of 26 degrees Celsius..  The ascending aorta was cannulated for administration of cardioplegia and a retrograde catheter was placed in the coronary sinus.  A cross-clamp was applied and 1200cc antegrade cold blood cardioplegia was administered followed by 300cc of retrograde cardioplegia. Subsequent doses of 500cc of retrograde cardioplegia were administered every 20 minutes.  A left ventricular vent was placed through the right superior pulmonary vein.    Attention was turned to the posterior  descending artery. The heart was positioned and the vessel was exposed. The artery was identified and arteriotomy performed with an 11 blade scalpel and elongated with scissors.  A segment of vein was prepared for use.  An end to side anastomosis was constructed with continuous 7-0 prolene. The vein was infused with 80cc of cardioplegia to confirm patency and hemostasis.      Attention was next turned to the distal left anterior descending artery. The heart was repositioned and the LAD was identified. The vessel was opened and the arteriotomy elongated with scissors.  The left internal mammary artery was brought to the field and prepared for use.  The left internal mammary artery was sewn end to side to the LAD with continuous 7-0 prolene. The vessel was briefly unclamped to assess for hemostasis.     The aorta was transected at the sinotubular junction. The distal end was retracted superiorly. Attention was turned towards resection of the root. Sharp dissection was used to remove all diseased aortic tissue to the level of the commisures. A 3-0 silk suture was placed above each commissure and these retraction sutures were clamped radially to the drapes. The right coronary ostia was circumferentially resected. Excess  aortic tissue was removed from the right sinus, leaving a 5mm rim of tissue. This process was repeated for the left coronary ostium and the left sinus tissue. Finally, the non-coronary sinus tissue was excised.     At this point, the target temperature of 26 degrees Celsius was reached.  Ice was placed on the head, the patient was put in Trendelenburg position, the cardiopulmonary bypass pump was turned off and the aortic cross clamp removed.  The diseased aorta was transected to the yvonne arch (to the mid aortic arch and the base of the innominate artery).  A 30mm Cardioroot graft was prepared for use.  The distal arch anastomosis was completed with a continuous 4-0 SH prolene suture to the 30mm  straight graft.  Cardiopulmonary bypass pump was resumed, the arch deaired, and the cross clamped placed on the graft.  Circulatory arrest time was 6 minutes.    Attention was turned back to the aortic root.  The LVOT was sized with a Medtronic Mosaic aortic valve sizer and found to be 27 mm. A 30 mm Cardioroot bulged aortic root graft was selected and the skirt was trimmed to an appropriate size. Six 3-0 Tevdek sutures were placed full thickness through the LVOT: one under each commissure and one at the marianne of each cusp. The sutures were then passed through the graft. The graft was then lowered in place over the valve and each suture was tied. Clips were placed on these knots so that the aortic annulus could easily be visualized with fluoroscopy in case TAVR would be needed in the future.    Attention was turned to the commissure height. In order to achieve perfect coapatation, the valve was allowed to coapt in an ideal plane and the commisures were gradually brought radially to the appropriate height. A 4-0 prolene suture was placed over each commissure in a mattress fashion and then through the graft. The suture was tied. The valve leaflets were reexamined and found to be in perfect position.  Another 4-0 prolene suture was placed at the marianne of the sinus and then through the graft and tied.  A single arm from each suture was then used to construct a continuous anastomosis to the graft which each stitch 2mm apart. Once each sinus was completed the sutures were tied to the 4-0 prolene suture placed earlier at the commissure.  This step was completed at the other sinuses until the entire anastomosis was completed. A hand-held cautery was used to create apertures in the graft for the left and right coronary ostia. Each anastomosis was constructed with 5-0 prolene suture, in a continuous manner.      Attention was now turned to the suvrg-te-wdxpt anastomosis.  The yvonne-arch graft and the root graft were tailored  "for the best fit. The plhlc-lp-gcstn anastomosis was completed with a continuous 4-0 RB prolene suture.  Next, the proximal coronary anastomosis was attended to.  An aperture was created in the ascending aortic graft.  The proximal anastomosis of the SVG-PDA was constructed with continuous 6-0 prolene suture.    A dose of warm "hot shot" cardioplegia was given retrograde.  Air was evacuated and the cross-clamp was removed.    Transesophageal echocardiography confirmed perfect coaptation of the valve leaflets and the absence of aortic insufficiency. Hemostasis was obtained and the patient was weaned from cardio-pulmonary bypass on inotropic support.  The total cardiopulmonary bypass time was 206 minutes and cross clamp time was 164 minutes.  Circulatory arrest time was 6 minutes.  The cannulae were removed and heparin was reversed.      Attention was turned to repair of the hernia of Morgagni.  A 4cm x 6cm patch of bovine pericardium was tailored to an oval.  Using a 0 PDS suture, the patch was anastomosed around the hernia of Morgagni (just below the xiphoid) in a continuous fashion, making sure there was no tension on the patch.  The area was inspected and no defects remained.  Now, drainage tubes were placed behind the sternum and temporary ventricular pacing wires were placed on the heart.  The superior portion of the pericardium was closed with interrupted sutures. The sternum was closed with interrupted steel wires and the soft tissue with layers of absorbable suture. A sterile dressing was applied and the patient was brought to the ICU in stable condition.     I was present in the operating room for the entire operation and immediately available thereafter.    "

## 2023-09-18 NOTE — ANESTHESIA PROCEDURE NOTES
Intubation    Date/Time: 9/18/2023 7:15 AM    Performed by: Kasi Lino MD  Authorized by: Kasi Lino MD    Intubation:     Induction:  Intravenous    Intubated:  Postinduction    Mask Ventilation:  Easy mask    Attempts:  1    Attempted By:  Staff anesthesiologist    Method of Intubation:  Video laryngoscopy    Blade:  Khan 4    Laryngeal View Grade: Grade I - full view of cords      Difficult Airway Encountered?: No      Complications:  None    Airway Device:  Oral endotracheal tube    Airway Device Size:  7.5    Style/Cuff Inflation:  Cuffed (inflated to minimal occlusive pressure)    Placement Verified By:  Capnometry    Complicating Factors:  Small mouth    Findings Post-Intubation:  BS equal bilateral and atraumatic/condition of teeth unchanged

## 2023-09-18 NOTE — ANESTHESIA PROCEDURE NOTES
Arterial    Diagnosis: Coronary artery disease    Patient location during procedure: done in OR    Staffing  Authorizing Provider: Kasi Lino MD  Performing Provider: Costa Shay DO    Staffing  Performed by: Costa Shay DO  Authorized by: Kasi Lino MD    Anesthesiologist was present at the time of the procedure.    Preanesthetic Checklist  Completed: patient identified, IV checked, site marked, risks and benefits discussed, surgical consent, monitors and equipment checked, pre-op evaluation, timeout performed and anesthesia consent givenArterial  Skin Prep: chlorhexidine gluconate and isopropyl alcohol  Local Infiltration: lidocaine  Orientation: left  Location: radial    Catheter Size: 20 G  Catheter placement by Ultrasound guidance. Heme positive aspiration all ports.   Vessel Caliber: medium, patent, compressibility normal  Needle advanced into vessel with real time Ultrasound guidance.  Guidewire confirmed in vessel.Insertion Attempts: 1  Assessment  Dressing: secured with tape and tegaderm  Patient: Tolerated well

## 2023-09-18 NOTE — SUBJECTIVE & OBJECTIVE
Interval HPI:   Overnight events: Admitted to SICU intubated. BG above goal ranges with IV insulin infusion protocol orders in place. No diet orders on file    Eating:   NPO  Nausea: No  Hypoglycemia and intervention: No  Fever: No  TPN and/or TF: No  If yes, type of TF/TPN and rate: n/a    PMH, PSH, FH, SH reviewed     ROS:  Unable to obtain due to: Intubated     Review of Systems    Current Medications and/or Treatments Impacting Glycemic Control  Immunotherapy:    Immunosuppressants       None          Steroids:   Hormones (From admission, onward)      None          Pressors:    Autonomic Drugs (From admission, onward)      None          Hyperglycemia/Diabetes Medications:   Antihyperglycemics (From admission, onward)      Start     Stop Route Frequency Ordered    09/18/23 1545  insulin regular in 0.9 % NaCl 100 unit/100 mL (1 unit/mL) infusion        Question Answer Comment   Insulin rate changes (DO NOT MODIFY ANSWER) \\[email or web address]\CTS INSULIN [id number].pdf    Enter initial dose (Units/hr): 1        -- IV Continuous 09/18/23 1437             PHYSICAL EXAMINATION:  Vitals:    09/18/23 1500   BP:    Pulse: 71   Resp: 19   Temp:      Body mass index is 25.95 kg/m².     Physical Exam   Constitutional: Well developed, NAD.  ENT: External ears no masses with nose patent  Neck: Supple; trachea midline  Cardiovascular: Normal heart sounds, no LE edema. DP +2 bilaterally.  Lungs: Normal effort; lungs anterior bilaterally clear to auscultation.  Intubated on a ventilator.  Abdomen: Soft, no masses, no hernias.  Hypoactive BS noted.  MS: No clubbing or cyanosis of nails noted; unable to assess gait.  Skin: No rashes, lesions, or ulcers; no nodules.   Mid-sternal incision with telfa island dressing, CDI.  CT x 2.  LLE wrapped with ACE wrap.  Psychiatric: BRIAN  Neurological: BRIAN  Foot: Nails in good condition, no amputations noted

## 2023-09-18 NOTE — ASSESSMENT & PLAN NOTE
  Neuro/Psych:     - Sedation: Propofol, may switch to precedex if needed to wean to extubate    - Pain:     - Scheduled Tylenol 1000mg Q8H   - PRN Oxycodone 5mg/10mg, Hydromorphone 0.5 Q1H               Cardiac:     - POD #0 s/p valve sparing aortic root repair, hemiarch replacement (6 minute circ arrest), CABG x2, and repair of hernia of morgani with Dr. Mooney    - Postoperative GWEN: Normal bivent function, no wall motion abnormalities, mild increase RV diameter    - BP Goal: MAP > 65, SBP < 120    - Inotropes/Pressors: Epi, levo    - Anti-HTNs: Will initiate when appropriate    - Rhythm:    - Beta Blocker: Will initiate when appropriate    - Statin: Home statin ordered        Pulmonary:     - Goal SpO2 >92%    - Will wean ventilator support as tolerated to extubate    - Chest Tubes x 3 (2 Meds & 1 Pleural)    - ABGs Q1H x 6H, Q3H x 3          Renal:    - Trend BUN/Cr     - Maintain Tsai, record strict Is/Os  - 1.5 L albumin over first 12 hours    Recent Labs   Lab 09/18/23  0635 09/18/23  1504   BUN 16 15  15   CREATININE 0.9 1.0  1.0         FEN / GI:     - Daily CMP, PRN K/Mag/Phos per protocol     - Replace electrolytes as needed    - Nutrition: NPO pending extubation    - Bowel Regimen: Miralax, docusate      ID:     - Tmax:    - Abx: Complete perioperative cefazolin 2g Q8H x 5 doses    Recent Labs   Lab 09/18/23  0635 09/18/23  1137 09/18/23  1504   WBC 7.25 10.11 22.79*  22.79*         Heme/Onc:     - Hgb 15.6 pre-operatively    - Received 300 Cell Saver intraoperatively    - CBC daily    - ASA 325mg daily, will start pending chest tube output    Recent Labs   Lab 09/18/23  0635 09/18/23  1137 09/18/23  1504   HGB 15.6 11.0* 12.8*  12.8*    106* 132*  132*   APTT 24.8  --   --    INR 1.0 4.4*  --          Endocrine:     - CTS Goal -140    - HgbA1c: 5.5    - Endocrinology consulted for insulin management      PPx:   Feeding: NPO, will advance as tolerated following  extubation  Analgesia/Sedation: propofol  Thromboembolic Prevention: Start tomorrow  HOB >30: Yes  Stress Ulcer: Yes - famotidine 20mg IV  Glucose Control: Yes, insulin management per Endocrinology -      Lines/Drains/Airway:   ETT  PIV    Art Line: L radial   Central Line: RIJ CVC, swan   Tsai    Chest Tubes: (2 Mediastinal, 1 Pleural)    Pacing Wires: Temporary v pacing wires      Dispo/Code Status/Palliative:     - Continue SICU Care    - Full Code

## 2023-09-18 NOTE — ANESTHESIA PROCEDURE NOTES
GWEN    Diagnosis: aortic aneurysm  Patient location during procedure: OR  Exam type: Baseline    Staffing  Performed: anesthesiologist and fellow     Anesthesiologist: Kasi Lino MD        Anesthesiologist Present  Yes      Setup & Induction  Probe Insertion: easy  Exam: completeDoppler Echo: 2D, continuous wave Doppler, 3D, color flow mapping and pulse wave Doppler.  Exam     Left Heart  Left Atruim: normal (men 3.0-4.0; women 2.7-3.8)    Left Ventricle: cm, normal (men 4.2-5.9; women 3.8-5.2)  LV Wall Thickness (posterior wall):cm, normal (men 0.6-1.0 cm; women 0.6-0.9 cm)    LVAD:no  Estimated Ejection Fraction: > 55% normal  Regional Wall Abnormalities: no RWMA            Right Heart  Right Ventricle: dilated  Right Ventricle Function: normal  Right Atria:  normal    Intra Atrial Septum  PFO: no shunt by color flow doppler  no IAS aneurysm  no lipomatous hypertrophy  no Atrial Septal Defect (Asd)    Right Ventricle  Size: dilated, Free Wall Thickness: normal    Aortic Valve:  Stenosis: none.  Morphology: trileaflet    Regurgitation: mild (2+) aortic regurgitation      Mitral Valve:   Morphology:normal  Prolapse: none  Flail: no flail  Jet Description: trace    Tricuspid Valve:  Morphology: normal  Regurgitation: none    Pulmonic Valve:  Morphology:normal  Regurgitation(color flow): none    Great Vessels  Ascending Aorta Atherosclerosis: 2=mild dz (<3mm)  Aortic Arch Atherosclerosis: 3=sessile dz (3-5mm)  IABP: no  Descending Aorta Atherosclerosis: 3=sessile dz (3-5mm)  Aorta    Descending aorta IABP: no    Effusions none    SummaryFindings discussed with surgeon.    Other Findings   Intraoperative GWEN for valve sparing root and arch replacement with CABG x2    Baseline exam:  - LV systolic function is normal with LVEF >55%  - RV is dilated  with normal systolic function  - No regional wall motion abnormalities  - MV: Trace to mild MR; no MS  - AV: Mild AI, no AS  - TV: Trace TR with PA catheter  traversing the valve  - PV: Trace PI with PA catheter traversing the valve  - No PFO via color flow doppler and bubble study  - No significant aortic pathology  - No effusions    Post-procedure exam:  - s/p valve sparing root and arch replacement with CABG x2  - LV systolic function is normal with LVEF >55%  - RV systolic function is normal  - No regional wall motion abnormalities  - MV: Mild MR, no MS  - AV: Trace to mild AI, and AS  - TV: mild TR in the setting of a PA catheter traversing the valve  - PV: Trace PI in the setting of a PA catheter traversing the valve  - No new aortic pathology  - Small left pleural effusion     Stable s/p chest closure  Probe removed atraumatically

## 2023-09-18 NOTE — TRANSFER OF CARE
Anesthesia Transfer of Care Note    Patient: Sawyer Browning    Procedure(s) Performed: Procedure(s) (LRB):  VALVE SPARING AORTIC ROOT REPLACEMENT (N/A)  CORONARY ARTERY BYPASS GRAFT (CABG) (N/A)  HARVEST-VEIN-ENDOVASCULAR (Left)  REPAIR, HERNIA, DIAPHRAGMATIC    Patient location: ICU    Anesthesia Type: general    Transport from OR: Transported from OR intubated on 100% O2  with adequate ventilation controlled by transport ventilator. Upon arrival to PACU/ICU, patient attached to ventilator and auscultated to confirm bilateral breath sounds and adequate TV. Continuous ECG monitoring in transport. Continuous SpO2 monitoring in transport. Continuos invasive BP monitoring in transport    Post pain: adequate analgesia    Post assessment: no apparent anesthetic complications and tolerated procedure well    Post vital signs: stable    Level of consciousness: sedated    Nausea/Vomiting: no nausea/vomiting    Complications: none    Transfer of care protocol was followed      Last vitals:   Visit Vitals  BP (!) 152/95 (BP Location: Right arm, Patient Position: Lying)   Pulse 70   Temp 36.1 °C (97 °F) (Skin)   Resp 20   Ht 6' (1.829 m)   Wt 86.8 kg (191 lb 5.8 oz)   SpO2 100%   BMI 25.95 kg/m²

## 2023-09-18 NOTE — NURSING
Pt arrived to SICU from OR with anesthesia at bedside.  and charge RN notified and at bedside. Pt connected to bedside monitor, VSS at this time. Pt arrives on levo and epi, intubated and sedated. STAT ABG and labs drawn, see results for details. Bed plugged in,wheels locked, call light in reach. Pt spouse updated on plan of care and verbalizes understanding.

## 2023-09-18 NOTE — NURSING
Nurses Note -- 4 Eyes      9/18/2023   6:36 PM      Skin assessed during: Admit      [x] No Altered Skin Integrity Present    [x]Prevention Measures Documented      [] Yes- Altered Skin Integrity Present or Discovered   [] LDA Added if Not in Epic (Describe Wound)   [] New Altered Skin Integrity was Present on Admit and Documented in LDA   [] Wound Image Taken    Wound Care Consulted? No    Attending Nurse:  Yasmin De La Cruz RN/Staff Member:   LYLY Null

## 2023-09-18 NOTE — HPI
Reason for Consult: Management of Hyperglycemia     Surgical Procedure and Date:   9/18/23  VALVE SPARING AORTIC ROOT REPLACEMENT (N/A)  CORONARY ARTERY BYPASS GRAFT (CABG) (N/A)  HARVEST-VEIN-ENDOVASCULAR (Left)  REPAIR, HERNIA, DIAPHRAGMATIC      HPI:   Patient is a 68 y.o. male with a diagnosis of HTN, fatty liver disease, CAD (asymptomatic, positive stress test w/ ischemia in LAD region, angiogram w/  at mid LAD and high grade stenosis of distal RCA), and aortic root and ascending aortic aneurysm. Patient now presents for coronary artery bypass surgery x 2 (LIMA-LAD, SVG-dRCA), valve sparing aortic root replacement vs bioBentall, ascending aorta replacement, possible yvonne arch replacement under hypothermic circulatory arrest with Dr. Shirley. Endocrinology consulted for management of hyperglycemia.     Lab Results   Component Value Date    HGBA1C 5.5 11/03/2022

## 2023-09-18 NOTE — ANESTHESIA PROCEDURE NOTES
[place] [facility]    Diagnosis: Coronary artery disease  Patient location during procedure: done in OR    Staffing  Authorizing Provider: Kasi Lino MD  Performing Provider: Costa Shay DO    Staffing  Performed by: Costa Shay DO  Authorized by: Kasi Lino MD    Anesthesiologist was present at the time of the procedure.  Preanesthetic Checklist  Completed: patient identified, IV checked, site marked, risks and benefits discussed, surgical consent, monitors and equipment checked, pre-op evaluation, timeout performed and anesthesia consent given  [place] [facility]  Skin Prep: chlorhexidine gluconate and isopropyl alcohol  Local Infiltration: none  Location: right,  internal jugular vein  Vessel Caliber: large, patent, compressibility normal  Introducer: 14 Fr double lumen, manometry used.  Device: CCO/Oximetric Catheter   Catheter Size: 9 Fr  Catheter placement by yes. Heme positive aspiration all ports. PAC floated with balloon up not wedgedSterile sheath usedInsertion Attempts: 1  Indication: intravenous therapy, hemodynamic monitoring  Ultrasound Guidance  Needle advanced into vessel with real time Ultrasound guidance.  Image recorded and saved.  Guidewire confirmed in vessel.  Sterile sheath used.  Assessment  Central Line Bundle Protocol followed. Hand hygiene before procedure, surgical cap worn, surgical mask worn, sterile surgical gloves worn, large sterile drape used.  Verification: blood return  Dressing: sutured in place and taped and tegaderm  Patient: Tolerated Well

## 2023-09-18 NOTE — CONSULTS
[facility] - Surgical Intensive Care  Endocrinology  Diabetes Consult Note    Consult Requested by: Imtiaz Mooney MD   Reason for admit: Aortic root aneurysm    HISTORY OF PRESENT ILLNESS:  Reason for Consult: Management of Hyperglycemia     Surgical Procedure and Date:   9/18/23  VALVE SPARING AORTIC ROOT REPLACEMENT (N/A)  CORONARY ARTERY BYPASS GRAFT (CABG) (N/A)  HARVEST-VEIN-ENDOVASCULAR (Left)  REPAIR, HERNIA, DIAPHRAGMATIC      HPI:   Patient is a 68 y.o. male with a diagnosis of HTN, fatty liver disease, CAD (asymptomatic, positive stress test w/ ischemia in LAD region, angiogram w/  at mid LAD and high grade stenosis of distal RCA), and aortic root and ascending aortic aneurysm. Patient now presents for coronary artery bypass surgery x 2 (LIMA-LAD, SVG-dRCA), valve sparing aortic root replacement vs bioBentall, ascending aorta replacement, possible yvonne arch replacement under hypothermic circulatory arrest with Dr. Shirley. Endocrinology consulted for management of hyperglycemia.     Lab Results   Component Value Date    HGBA1C 5.5 11/03/2022             Interval HPI:   Overnight events: Admitted to SICU intubated. BG above goal ranges with IV insulin infusion protocol orders in place. No diet orders on file    Eating:   NPO  Nausea: No  Hypoglycemia and intervention: No  Fever: No  TPN and/or TF: No  If yes, type of TF/TPN and rate: n/a    PMH, PSH, FH, SH reviewed     ROS:  Unable to obtain due to: Intubated     Review of Systems    Current Medications and/or Treatments Impacting Glycemic Control  Immunotherapy:    Immunosuppressants       None          Steroids:   Hormones (From admission, onward)      None          Pressors:    Autonomic Drugs (From admission, onward)      None          Hyperglycemia/Diabetes Medications:   Antihyperglycemics (From admission, onward)      Start     Stop Route Frequency Ordered    09/18/23 3502  insulin regular in 0.9 % NaCl 100 unit/100 mL (1 unit/mL) infusion   "      Question Answer Comment   Insulin rate changes (DO NOT MODIFY ANSWER) [email or web address]\epic\Images\Pharmacy\InsulinInfusions\CTS INSULIN [id number].pdf    Enter initial dose (Units/hr): 1        -- IV Continuous 09/18/23 1437             PHYSICAL EXAMINATION:  Vitals:    09/18/23 1500   BP:    Pulse: 71   Resp: 19   Temp:      Body mass index is 25.95 kg/m².     Physical Exam   Constitutional: Well developed, NAD.  ENT: External ears no masses with nose patent  Neck: Supple; trachea midline  Cardiovascular: Normal heart sounds, no LE edema. DP +2 bilaterally.  Lungs: Normal effort; lungs anterior bilaterally clear to auscultation.  Intubated on a ventilator.  Abdomen: Soft, no masses, no hernias.  Hypoactive BS noted.  MS: No clubbing or cyanosis of nails noted; unable to assess gait.  Skin: No rashes, lesions, or ulcers; no nodules.   Mid-sternal incision with telfa island dressing, CDI.  CT x 2.  LLE wrapped with ACE wrap.  Psychiatric: BRIAN  Neurological: BRIAN  Foot: Nails in good condition, no amputations noted        Labs Reviewed and Include   Recent Labs   Lab 09/18/23  0635   *   CALCIUM 9.1   ALBUMIN 4.3   PROT 7.5      K 4.1   CO2 23      BUN 16   CREATININE 0.9   ALKPHOS 63   ALT 39   AST 38   BILITOT 0.7     Lab Results   Component Value Date    WBC 22.79 (H) 09/18/2023    WBC 22.79 (H) 09/18/2023    HGB 12.8 (L) 09/18/2023    HGB 12.8 (L) 09/18/2023    HCT 37.1 (L) 09/18/2023    HCT 37.1 (L) 09/18/2023    MCV 90 09/18/2023    MCV 90 09/18/2023     (L) 09/18/2023     (L) 09/18/2023     No results for input(s): "TSH", "FREET4" in the last 168 hours.  Lab Results   Component Value Date    HGBA1C 5.5 11/03/2022       Nutritional status:   Body mass index is 25.95 kg/m².  Lab Results   Component Value Date    ALBUMIN 4.3 09/18/2023    ALBUMIN 4.3 11/03/2022    ALBUMIN 4.2 02/17/2022     No results found for: "PREALBUMIN"    Estimated Creatinine Clearance: 86.2 mL/min (based on " "SCr of 0.9 mg/dL).    Accu-Checks  No results for input(s): "POCTGLUCOSE" in the last 72 hours.     ASSESSMENT and PLAN    Cardiac/Vascular  * Aortic root aneurysm  Managed per primary team  Optimize BG control        S/P CABG (coronary artery bypass graft)  Managed per primary team  Optimize BG control          Endocrine  Transient hyperglycemia post procedure  BG goal 110-140 (CTS protocol)     Continue IV insulin infusion protocol  Requires intensive BG monitoring while on protocol       ** Please call Endocrine for any BG related issues **    ** Please notify Endocrine for any change and/or advance in diet**    Discharge planning: TBD            Plan discussed with patient, family, and RN at bedside.     Hedy Holloway NP  Endocrinology  [facility] - Surgical Intensive Care  "

## 2023-09-18 NOTE — RESPIRATORY THERAPY
Received patient from the OR intubated with 7.5 ETT secured at 23cm at the lips. Placed on ventilator with documented settings. Ambu and mask at bedside. Will continue to monitor.

## 2023-09-18 NOTE — HPI
Sawyer Browning is a 68 y.o. male with a PMHx of HTN, fatty liver disease, CAD (asx, positive stress test w/ ischemia in LAD region, angiogram w/  at mid LAD and high grade stenosis of distal RCA), and aortic root as well as ascending aorta aneurysm.  Patient presents to the SICU s/p CABG x 2, AV sparing aortic root repair and hemiarch under 6 minutes of circ arrest as well as a diaphragmatic hernia repair with Dr. Mooney. On admission, patient is intubated, sedated with propofol, and in stable condition. Inotropic and vasopressor requirements upon admission are epi .04 mcg/kg/min of epinephrine, .05 mcg/kg/min of norepinephrine to maintain blood pressure at a MAP >65 and SBP < 120. Central access includes a RIJ 9Fr MAC indroducer with a swan, arterial access includes a L radial arterial line, and peripheral access includes a 20 G PIV left forearm, and a 14 G R forearm. patient also has 1 pleural and 2 mediastinal chest tubes with minimal output.   Intraoperatively, received 3L of crystalloid, 500cc of 5% albumin, 300 of cell saver, 2 autologus units and no other blood product intraoperativel. Urine output intraoperatively was recorded as 765cc. Pre-operative echocardiogram was notable for an EF 65, normal LV systolic function and ascending aorta dilation. Post-operative echo was normal biventricular function with slight increase in RV size, mild valvular abnormalities.   Immediate post-operative plans include hemodynamic stabilization and weaning of cardiac and respiratory support. Plan to wean vasopressors and inotropes as tolerated, wean ventilator support with goal of early extubation, and closely monitor fluid status with strict Is/Os and continued fluid resuscitation as needed.

## 2023-09-19 LAB
ALBUMIN SERPL BCP-MCNC: 3.9 G/DL (ref 3.5–5.2)
ALBUMIN SERPL BCP-MCNC: 4.1 G/DL (ref 3.5–5.2)
ALLENS TEST: ABNORMAL
ALP SERPL-CCNC: 30 U/L (ref 55–135)
ALP SERPL-CCNC: 34 U/L (ref 55–135)
ALT SERPL W/O P-5'-P-CCNC: 19 U/L (ref 10–44)
ALT SERPL W/O P-5'-P-CCNC: 20 U/L (ref 10–44)
ANION GAP SERPL CALC-SCNC: 10 MMOL/L (ref 8–16)
ANION GAP SERPL CALC-SCNC: 6 MMOL/L (ref 8–16)
ANION GAP SERPL CALC-SCNC: 6 MMOL/L (ref 8–16)
APTT PPP: 25.4 SEC (ref 21–32)
AST SERPL-CCNC: 36 U/L (ref 10–40)
AST SERPL-CCNC: 39 U/L (ref 10–40)
BILIRUB SERPL-MCNC: 0.6 MG/DL (ref 0.1–1)
BILIRUB SERPL-MCNC: 0.8 MG/DL (ref 0.1–1)
BLD PROD TYP BPU: NORMAL
BLOOD UNIT EXPIRATION DATE: NORMAL
BLOOD UNIT TYPE CODE: 600
BLOOD UNIT TYPE CODE: 600
BLOOD UNIT TYPE CODE: 6200
BLOOD UNIT TYPE CODE: 6200
BLOOD UNIT TYPE: NORMAL
BUN SERPL-MCNC: 12 MG/DL (ref 8–23)
BUN SERPL-MCNC: 14 MG/DL (ref 8–23)
BUN SERPL-MCNC: 16 MG/DL (ref 8–23)
CA-I BLDV-SCNC: 1.01 MMOL/L (ref 1.06–1.42)
CALCIUM SERPL-MCNC: 8.1 MG/DL (ref 8.7–10.5)
CALCIUM SERPL-MCNC: 8.2 MG/DL (ref 8.7–10.5)
CALCIUM SERPL-MCNC: 8.5 MG/DL (ref 8.7–10.5)
CHLORIDE SERPL-SCNC: 107 MMOL/L (ref 95–110)
CHLORIDE SERPL-SCNC: 109 MMOL/L (ref 95–110)
CHLORIDE SERPL-SCNC: 112 MMOL/L (ref 95–110)
CO2 SERPL-SCNC: 22 MMOL/L (ref 23–29)
CO2 SERPL-SCNC: 27 MMOL/L (ref 23–29)
CO2 SERPL-SCNC: 28 MMOL/L (ref 23–29)
CODING SYSTEM: NORMAL
CREAT SERPL-MCNC: 0.8 MG/DL (ref 0.5–1.4)
CREAT SERPL-MCNC: 0.8 MG/DL (ref 0.5–1.4)
CREAT SERPL-MCNC: 0.9 MG/DL (ref 0.5–1.4)
CROSSMATCH INTERPRETATION: NORMAL
DELSYS: ABNORMAL
DISPENSE STATUS: NORMAL
ERYTHROCYTE [DISTWIDTH] IN BLOOD BY AUTOMATED COUNT: 12.7 % (ref 11.5–14.5)
ERYTHROCYTE [SEDIMENTATION RATE] IN BLOOD BY WESTERGREN METHOD: 20 MM/H
EST. GFR  (NO RACE VARIABLE): >60 ML/MIN/1.73 M^2
FIBRINOGEN PPP-MCNC: 235 MG/DL (ref 182–400)
FIBRINOGEN PPP-MCNC: 255 MG/DL (ref 182–400)
FIO2: 100
FLOW: 10
GLUCOSE SERPL-MCNC: 135 MG/DL (ref 70–110)
GLUCOSE SERPL-MCNC: 140 MG/DL (ref 70–110)
GLUCOSE SERPL-MCNC: 189 MG/DL (ref 70–110)
HCO3 UR-SCNC: 20.4 MMOL/L (ref 24–28)
HCO3 UR-SCNC: 20.7 MMOL/L (ref 24–28)
HCO3 UR-SCNC: 21.9 MMOL/L (ref 24–28)
HCO3 UR-SCNC: 23 MMOL/L (ref 24–28)
HCO3 UR-SCNC: 23.3 MMOL/L (ref 24–28)
HCO3 UR-SCNC: 23.5 MMOL/L (ref 24–28)
HCO3 UR-SCNC: 26.8 MMOL/L (ref 24–28)
HCT VFR BLD AUTO: 29.5 % (ref 40–54)
HCT VFR BLD CALC: 29 %PCV (ref 36–54)
HCT VFR BLD CALC: 30 %PCV (ref 36–54)
HCT VFR BLD CALC: 35 %PCV (ref 36–54)
HGB BLD-MCNC: 10.6 G/DL (ref 14–18)
INR PPP: 1.1 (ref 0.8–1.2)
LDH SERPL L TO P-CCNC: 2.59 MMOL/L (ref 0.36–1.25)
LDH SERPL L TO P-CCNC: 3.38 MMOL/L (ref 0.36–1.25)
LDH SERPL L TO P-CCNC: 4.61 MMOL/L (ref 0.36–1.25)
LDH SERPL L TO P-CCNC: 5.5 MMOL/L (ref 0.36–1.25)
LDH SERPL L TO P-CCNC: 6.75 MMOL/L (ref 0.36–1.25)
LDH SERPL L TO P-CCNC: 8.15 MMOL/L (ref 0.36–1.25)
MAGNESIUM SERPL-MCNC: 1.9 MG/DL (ref 1.6–2.6)
MAGNESIUM SERPL-MCNC: 2.3 MG/DL (ref 1.6–2.6)
MCH RBC QN AUTO: 31.3 PG (ref 27–31)
MCHC RBC AUTO-ENTMCNC: 35.9 G/DL (ref 32–36)
MCV RBC AUTO: 87 FL (ref 82–98)
MODE: ABNORMAL
MODE: ABNORMAL
NUM UNITS TRANS FFP: NORMAL
PCO2 BLDA: 34 MMHG (ref 35–45)
PCO2 BLDA: 38.2 MMHG (ref 35–45)
PCO2 BLDA: 38.4 MMHG (ref 35–45)
PCO2 BLDA: 38.8 MMHG (ref 35–45)
PCO2 BLDA: 41.1 MMHG (ref 35–45)
PCO2 BLDA: 43.1 MMHG (ref 35–45)
PCO2 BLDA: 48.8 MMHG (ref 35–45)
PEEP: 5
PH SMN: 7.23 [PH] (ref 7.35–7.45)
PH SMN: 7.33 [PH] (ref 7.35–7.45)
PH SMN: 7.34 [PH] (ref 7.35–7.45)
PH SMN: 7.34 [PH] (ref 7.35–7.45)
PH SMN: 7.38 [PH] (ref 7.35–7.45)
PH SMN: 7.39 [PH] (ref 7.35–7.45)
PH SMN: 7.5 [PH] (ref 7.35–7.45)
PHOSPHATE SERPL-MCNC: 2.1 MG/DL (ref 2.7–4.5)
PHOSPHATE SERPL-MCNC: 2.7 MG/DL (ref 2.7–4.5)
PLATELET # BLD AUTO: 104 K/UL (ref 150–450)
PMV BLD AUTO: 11.5 FL (ref 9.2–12.9)
PO2 BLDA: 135 MMHG (ref 80–100)
PO2 BLDA: 47 MMHG (ref 80–100)
PO2 BLDA: 55 MMHG (ref 80–100)
PO2 BLDA: 59 MMHG (ref 80–100)
PO2 BLDA: 78 MMHG (ref 80–100)
PO2 BLDA: 80 MMHG (ref 80–100)
PO2 BLDA: 87 MMHG (ref 80–100)
POC BE: -2 MMOL/L
POC BE: -4 MMOL/L
POC BE: -5 MMOL/L
POC BE: -7 MMOL/L
POC BE: 4 MMOL/L
POC IONIZED CALCIUM: 1.05 MMOL/L (ref 1.06–1.42)
POC IONIZED CALCIUM: 1.16 MMOL/L (ref 1.06–1.42)
POC IONIZED CALCIUM: 1.18 MMOL/L (ref 1.06–1.42)
POC IONIZED CALCIUM: 1.18 MMOL/L (ref 1.06–1.42)
POC IONIZED CALCIUM: 1.2 MMOL/L (ref 1.06–1.42)
POC IONIZED CALCIUM: 1.2 MMOL/L (ref 1.06–1.42)
POC IONIZED CALCIUM: 1.22 MMOL/L (ref 1.06–1.42)
POC SATURATED O2: 87 % (ref 95–100)
POC SATURATED O2: 88 % (ref 95–100)
POC SATURATED O2: 90 % (ref 95–100)
POC SATURATED O2: 95 % (ref 95–100)
POC SATURATED O2: 95 % (ref 95–100)
POC SATURATED O2: 96 % (ref 95–100)
POC SATURATED O2: 98 % (ref 95–100)
POC TCO2: 22 MMOL/L (ref 23–27)
POC TCO2: 22 MMOL/L (ref 23–27)
POC TCO2: 23 MMOL/L (ref 23–27)
POC TCO2: 24 MMOL/L (ref 23–27)
POC TCO2: 24 MMOL/L (ref 23–27)
POC TCO2: 25 MMOL/L (ref 23–27)
POC TCO2: 28 MMOL/L (ref 23–27)
POCT GLUCOSE: 112 MG/DL (ref 70–110)
POCT GLUCOSE: 113 MG/DL (ref 70–110)
POCT GLUCOSE: 113 MG/DL (ref 70–110)
POCT GLUCOSE: 115 MG/DL (ref 70–110)
POCT GLUCOSE: 126 MG/DL (ref 70–110)
POCT GLUCOSE: 128 MG/DL (ref 70–110)
POCT GLUCOSE: 132 MG/DL (ref 70–110)
POCT GLUCOSE: 148 MG/DL (ref 70–110)
POCT GLUCOSE: 151 MG/DL (ref 70–110)
POCT GLUCOSE: 155 MG/DL (ref 70–110)
POCT GLUCOSE: 156 MG/DL (ref 70–110)
POCT GLUCOSE: 157 MG/DL (ref 70–110)
POCT GLUCOSE: 167 MG/DL (ref 70–110)
POCT GLUCOSE: 179 MG/DL (ref 70–110)
POCT GLUCOSE: 180 MG/DL (ref 70–110)
POCT GLUCOSE: 181 MG/DL (ref 70–110)
POCT GLUCOSE: 188 MG/DL (ref 70–110)
POCT GLUCOSE: 190 MG/DL (ref 70–110)
POCT GLUCOSE: 197 MG/DL (ref 70–110)
POTASSIUM BLD-SCNC: 3 MMOL/L (ref 3.5–5.1)
POTASSIUM BLD-SCNC: 3.2 MMOL/L (ref 3.5–5.1)
POTASSIUM BLD-SCNC: 3.2 MMOL/L (ref 3.5–5.1)
POTASSIUM BLD-SCNC: 3.3 MMOL/L (ref 3.5–5.1)
POTASSIUM BLD-SCNC: 3.4 MMOL/L (ref 3.5–5.1)
POTASSIUM SERPL-SCNC: 3 MMOL/L (ref 3.5–5.1)
POTASSIUM SERPL-SCNC: 3.2 MMOL/L (ref 3.5–5.1)
POTASSIUM SERPL-SCNC: 3.5 MMOL/L (ref 3.5–5.1)
PROT SERPL-MCNC: 5.4 G/DL (ref 6–8.4)
PROT SERPL-MCNC: 5.5 G/DL (ref 6–8.4)
PROTHROMBIN TIME: 11.9 SEC (ref 9–12.5)
PROVIDER CREDENTIALS: ABNORMAL
PROVIDER CREDENTIALS: ABNORMAL
PROVIDER NOTIFIED: ABNORMAL
PROVIDER NOTIFIED: ABNORMAL
RBC # BLD AUTO: 3.39 M/UL (ref 4.6–6.2)
SAMPLE: ABNORMAL
SITE: ABNORMAL
SODIUM BLD-SCNC: 143 MMOL/L (ref 136–145)
SODIUM BLD-SCNC: 143 MMOL/L (ref 136–145)
SODIUM BLD-SCNC: 145 MMOL/L (ref 136–145)
SODIUM BLD-SCNC: 146 MMOL/L (ref 136–145)
SODIUM BLD-SCNC: 146 MMOL/L (ref 136–145)
SODIUM SERPL-SCNC: 141 MMOL/L (ref 136–145)
SODIUM SERPL-SCNC: 142 MMOL/L (ref 136–145)
SODIUM SERPL-SCNC: 144 MMOL/L (ref 136–145)
SP02: 100
SP02: 93
TIME NOTIFIED: 1455
TIME NOTIFIED: 1455
VERBAL RESULT READBACK PERFORMED: YES
VERBAL RESULT READBACK PERFORMED: YES
VT: 460
WBC # BLD AUTO: 16.01 K/UL (ref 3.9–12.7)

## 2023-09-19 PROCEDURE — 82800 BLOOD PH: CPT

## 2023-09-19 PROCEDURE — 25000003 PHARM REV CODE 250: Performed by: NURSE PRACTITIONER

## 2023-09-19 PROCEDURE — 85384 FIBRINOGEN ACTIVITY: CPT | Mod: 91 | Performed by: STUDENT IN AN ORGANIZED HEALTH CARE EDUCATION/TRAINING PROGRAM

## 2023-09-19 PROCEDURE — 99900035 HC TECH TIME PER 15 MIN (STAT)

## 2023-09-19 PROCEDURE — 85730 THROMBOPLASTIN TIME PARTIAL: CPT

## 2023-09-19 PROCEDURE — 84295 ASSAY OF SERUM SODIUM: CPT

## 2023-09-19 PROCEDURE — 80053 COMPREHEN METABOLIC PANEL: CPT

## 2023-09-19 PROCEDURE — 27100171 HC OXYGEN HIGH FLOW UP TO 24 HOURS

## 2023-09-19 PROCEDURE — 99499 NO LOS: ICD-10-PCS | Mod: ,,, | Performed by: ANESTHESIOLOGY

## 2023-09-19 PROCEDURE — 37799 UNLISTED PX VASCULAR SURGERY: CPT

## 2023-09-19 PROCEDURE — 63600175 PHARM REV CODE 636 W HCPCS: Performed by: STUDENT IN AN ORGANIZED HEALTH CARE EDUCATION/TRAINING PROGRAM

## 2023-09-19 PROCEDURE — C9248 INJ, CLEVIDIPINE BUTYRATE: HCPCS | Mod: JZ,JG

## 2023-09-19 PROCEDURE — 83605 ASSAY OF LACTIC ACID: CPT

## 2023-09-19 PROCEDURE — 63600175 PHARM REV CODE 636 W HCPCS

## 2023-09-19 PROCEDURE — 51798 US URINE CAPACITY MEASURE: CPT

## 2023-09-19 PROCEDURE — 82803 BLOOD GASES ANY COMBINATION: CPT

## 2023-09-19 PROCEDURE — 85014 HEMATOCRIT: CPT

## 2023-09-19 PROCEDURE — 84100 ASSAY OF PHOSPHORUS: CPT

## 2023-09-19 PROCEDURE — 20000000 HC ICU ROOM

## 2023-09-19 PROCEDURE — 25000003 PHARM REV CODE 250: Performed by: THORACIC SURGERY (CARDIOTHORACIC VASCULAR SURGERY)

## 2023-09-19 PROCEDURE — 82565 ASSAY OF CREATININE: CPT

## 2023-09-19 PROCEDURE — 97161 PT EVAL LOW COMPLEX 20 MIN: CPT

## 2023-09-19 PROCEDURE — 99499 UNLISTED E&M SERVICE: CPT | Mod: ,,, | Performed by: ANESTHESIOLOGY

## 2023-09-19 PROCEDURE — 99232 SBSQ HOSP IP/OBS MODERATE 35: CPT | Mod: ,,, | Performed by: NURSE PRACTITIONER

## 2023-09-19 PROCEDURE — 25000003 PHARM REV CODE 250

## 2023-09-19 PROCEDURE — P9045 ALBUMIN (HUMAN), 5%, 250 ML: HCPCS | Mod: JG

## 2023-09-19 PROCEDURE — 94799 UNLISTED PULMONARY SVC/PX: CPT

## 2023-09-19 PROCEDURE — 97165 OT EVAL LOW COMPLEX 30 MIN: CPT

## 2023-09-19 PROCEDURE — 63600175 PHARM REV CODE 636 W HCPCS: Mod: JG

## 2023-09-19 PROCEDURE — 99900026 HC AIRWAY MAINTENANCE (STAT)

## 2023-09-19 PROCEDURE — 82330 ASSAY OF CALCIUM: CPT

## 2023-09-19 PROCEDURE — 94761 N-INVAS EAR/PLS OXIMETRY MLT: CPT

## 2023-09-19 PROCEDURE — 25000003 PHARM REV CODE 250: Performed by: STUDENT IN AN ORGANIZED HEALTH CARE EDUCATION/TRAINING PROGRAM

## 2023-09-19 PROCEDURE — 83735 ASSAY OF MAGNESIUM: CPT | Mod: 91

## 2023-09-19 PROCEDURE — 85027 COMPLETE CBC AUTOMATED: CPT

## 2023-09-19 PROCEDURE — 84132 ASSAY OF SERUM POTASSIUM: CPT

## 2023-09-19 PROCEDURE — 80048 BASIC METABOLIC PNL TOTAL CA: CPT | Mod: XB

## 2023-09-19 PROCEDURE — 99232 PR SUBSEQUENT HOSPITAL CARE,LEVL II: ICD-10-PCS | Mod: ,,, | Performed by: NURSE PRACTITIONER

## 2023-09-19 PROCEDURE — 80053 COMPREHEN METABOLIC PANEL: CPT | Mod: 91

## 2023-09-19 PROCEDURE — 85610 PROTHROMBIN TIME: CPT

## 2023-09-19 PROCEDURE — 84100 ASSAY OF PHOSPHORUS: CPT | Mod: 91

## 2023-09-19 PROCEDURE — 83735 ASSAY OF MAGNESIUM: CPT

## 2023-09-19 RX ORDER — INSULIN ASPART 100 [IU]/ML
0-10 INJECTION, SOLUTION INTRAVENOUS; SUBCUTANEOUS
Status: DISCONTINUED | OUTPATIENT
Start: 2023-09-19 | End: 2023-09-21

## 2023-09-19 RX ORDER — IBUPROFEN 200 MG
16 TABLET ORAL
Status: DISCONTINUED | OUTPATIENT
Start: 2023-09-19 | End: 2023-09-21

## 2023-09-19 RX ORDER — ALBUMIN HUMAN 50 G/1000ML
12.5 SOLUTION INTRAVENOUS ONCE
Status: COMPLETED | OUTPATIENT
Start: 2023-09-19 | End: 2023-09-19

## 2023-09-19 RX ORDER — GLUCAGON 1 MG
1 KIT INJECTION
Status: DISCONTINUED | OUTPATIENT
Start: 2023-09-19 | End: 2023-09-21

## 2023-09-19 RX ORDER — TALC
6 POWDER (GRAM) TOPICAL NIGHTLY PRN
Status: DISCONTINUED | OUTPATIENT
Start: 2023-09-19 | End: 2023-09-24 | Stop reason: HOSPADM

## 2023-09-19 RX ORDER — FAMOTIDINE 20 MG/1
20 TABLET, FILM COATED ORAL 2 TIMES DAILY
Status: DISCONTINUED | OUTPATIENT
Start: 2023-09-19 | End: 2023-09-24 | Stop reason: HOSPADM

## 2023-09-19 RX ORDER — ASPIRIN 300 MG/1
300 SUPPOSITORY RECTAL ONCE
Status: COMPLETED | OUTPATIENT
Start: 2023-09-19 | End: 2023-09-19

## 2023-09-19 RX ORDER — IBUPROFEN 200 MG
24 TABLET ORAL
Status: DISCONTINUED | OUTPATIENT
Start: 2023-09-19 | End: 2023-09-21

## 2023-09-19 RX ORDER — ENOXAPARIN SODIUM 100 MG/ML
40 INJECTION SUBCUTANEOUS EVERY 24 HOURS
Status: DISCONTINUED | OUTPATIENT
Start: 2023-09-19 | End: 2023-09-20

## 2023-09-19 RX ORDER — EZETIMIBE 10 MG/1
10 TABLET ORAL NIGHTLY
Status: DISCONTINUED | OUTPATIENT
Start: 2023-09-19 | End: 2023-09-21

## 2023-09-19 RX ADMIN — EPINEPHRINE 0.06 MCG/KG/MIN: 1 INJECTION INTRAMUSCULAR; INTRAVENOUS; SUBCUTANEOUS at 07:09

## 2023-09-19 RX ADMIN — CEFAZOLIN 2 G: 2 INJECTION, POWDER, FOR SOLUTION INTRAMUSCULAR; INTRAVENOUS at 12:09

## 2023-09-19 RX ADMIN — INSULIN HUMAN 4.7 UNITS/HR: 1 INJECTION, SOLUTION INTRAVENOUS at 04:09

## 2023-09-19 RX ADMIN — ATORVASTATIN CALCIUM 80 MG: 20 TABLET, FILM COATED ORAL at 08:09

## 2023-09-19 RX ADMIN — ACETAMINOPHEN 1000 MG: 500 TABLET ORAL at 09:09

## 2023-09-19 RX ADMIN — ONDANSETRON 4 MG: 2 INJECTION INTRAMUSCULAR; INTRAVENOUS at 03:09

## 2023-09-19 RX ADMIN — OXYCODONE HYDROCHLORIDE 5 MG: 5 TABLET ORAL at 12:09

## 2023-09-19 RX ADMIN — DEXMEDETOMIDINE HYDROCHLORIDE 1 MCG/KG/HR: 4 INJECTION, SOLUTION INTRAVENOUS at 12:09

## 2023-09-19 RX ADMIN — HYDROMORPHONE HYDROCHLORIDE 0.5 MG: 0.5 INJECTION, SOLUTION INTRAMUSCULAR; INTRAVENOUS; SUBCUTANEOUS at 07:09

## 2023-09-19 RX ADMIN — ACETAMINOPHEN 1000 MG: 500 TABLET ORAL at 01:09

## 2023-09-19 RX ADMIN — OXYCODONE HYDROCHLORIDE 5 MG: 5 TABLET ORAL at 08:09

## 2023-09-19 RX ADMIN — SODIUM PHOSPHATE, MONOBASIC, MONOHYDRATE AND SODIUM PHOSPHATE, DIBASIC, ANHYDROUS 15 MMOL: 142; 276 INJECTION, SOLUTION INTRAVENOUS at 11:09

## 2023-09-19 RX ADMIN — POTASSIUM CHLORIDE 40 MEQ: 29.8 INJECTION, SOLUTION INTRAVENOUS at 11:09

## 2023-09-19 RX ADMIN — SODIUM PHOSPHATE, MONOBASIC, MONOHYDRATE AND SODIUM PHOSPHATE, DIBASIC, ANHYDROUS 20.01 MMOL: 142; 276 INJECTION, SOLUTION INTRAVENOUS at 06:09

## 2023-09-19 RX ADMIN — DOCUSATE SODIUM 100 MG: 100 CAPSULE, LIQUID FILLED ORAL at 08:09

## 2023-09-19 RX ADMIN — HYDROMORPHONE HYDROCHLORIDE 0.5 MG: 0.5 INJECTION, SOLUTION INTRAMUSCULAR; INTRAVENOUS; SUBCUTANEOUS at 04:09

## 2023-09-19 RX ADMIN — MUPIROCIN: 20 OINTMENT TOPICAL at 08:09

## 2023-09-19 RX ADMIN — CALCIUM GLUCONATE 2 G: 20 INJECTION, SOLUTION INTRAVENOUS at 10:09

## 2023-09-19 RX ADMIN — CEFAZOLIN 2 G: 2 INJECTION, POWDER, FOR SOLUTION INTRAMUSCULAR; INTRAVENOUS at 03:09

## 2023-09-19 RX ADMIN — OXYCODONE HYDROCHLORIDE 10 MG: 10 TABLET ORAL at 04:09

## 2023-09-19 RX ADMIN — ASPIRIN 300 MG: 300 SUPPOSITORY RECTAL at 02:09

## 2023-09-19 RX ADMIN — DOCUSATE SODIUM 100 MG: 100 CAPSULE, LIQUID FILLED ORAL at 09:09

## 2023-09-19 RX ADMIN — SODIUM CHLORIDE 20 MG/HR: 9 INJECTION, SOLUTION INTRAVENOUS at 10:09

## 2023-09-19 RX ADMIN — HYDROMORPHONE HYDROCHLORIDE 0.5 MG: 0.5 INJECTION, SOLUTION INTRAMUSCULAR; INTRAVENOUS; SUBCUTANEOUS at 10:09

## 2023-09-19 RX ADMIN — SODIUM CHLORIDE 10 MG/HR: 9 INJECTION, SOLUTION INTRAVENOUS at 01:09

## 2023-09-19 RX ADMIN — INSULIN HUMAN 1.8 UNITS/HR: 1 INJECTION, SOLUTION INTRAVENOUS at 03:09

## 2023-09-19 RX ADMIN — ALBUMIN (HUMAN) 12.5 G: 12.5 SOLUTION INTRAVENOUS at 05:09

## 2023-09-19 RX ADMIN — EPINEPHRINE 0.05 MCG/KG/MIN: 1 INJECTION INTRAMUSCULAR; INTRAVENOUS; SUBCUTANEOUS at 01:09

## 2023-09-19 RX ADMIN — FAMOTIDINE 20 MG: 10 INJECTION, SOLUTION INTRAVENOUS at 08:09

## 2023-09-19 RX ADMIN — POLYETHYLENE GLYCOL 3350 17 G: 17 POWDER, FOR SOLUTION ORAL at 08:09

## 2023-09-19 RX ADMIN — INSULIN HUMAN 4.7 UNITS/HR: 1 INJECTION, SOLUTION INTRAVENOUS at 07:09

## 2023-09-19 RX ADMIN — HYDROMORPHONE HYDROCHLORIDE 0.5 MG: 0.5 INJECTION, SOLUTION INTRAMUSCULAR; INTRAVENOUS; SUBCUTANEOUS at 02:09

## 2023-09-19 RX ADMIN — OXYCODONE HYDROCHLORIDE 5 MG: 5 TABLET ORAL at 10:09

## 2023-09-19 RX ADMIN — CLEVIPIDINE 5 MG/HR: 0.5 EMULSION INTRAVENOUS at 08:09

## 2023-09-19 RX ADMIN — POTASSIUM CHLORIDE 20 MEQ: 200 INJECTION, SOLUTION INTRAVENOUS at 05:09

## 2023-09-19 RX ADMIN — ENOXAPARIN SODIUM 40 MG: 40 INJECTION SUBCUTANEOUS at 05:09

## 2023-09-19 RX ADMIN — ASPIRIN 325 MG ORAL TABLET 325 MG: 325 PILL ORAL at 08:09

## 2023-09-19 RX ADMIN — FAMOTIDINE 20 MG: 20 TABLET ORAL at 09:09

## 2023-09-19 RX ADMIN — Medication 6 MG: at 11:09

## 2023-09-19 RX ADMIN — POTASSIUM CHLORIDE 40 MEQ: 29.8 INJECTION, SOLUTION INTRAVENOUS at 06:09

## 2023-09-19 NOTE — SUBJECTIVE & OBJECTIVE
"Interval HPI:   Overnight events: Remains in SICU. BG within goal ranges on IV intensive insulin protocol. Insulin infusion rates ranging from 3.7-6.7 u/hr. Diet clear liquid Fluid - 1500mL    Eating:    clear liquids   Nausea: No  Hypoglycemia and intervention: No  Fever: No  TPN and/or TF: No  If yes, type of TF/TPN and rate: n/a    BP (!) 109/59   Pulse 90   Temp 98.4 °F (36.9 °C) (Oral)   Resp (!) 28   Ht 6' (1.829 m)   Wt 86.8 kg (191 lb 5.8 oz)   SpO2 (!) 90%   BMI 25.95 kg/m²     Labs Reviewed and Include    Recent Labs   Lab 09/19/23  0300   *   CALCIUM 8.2*   ALBUMIN 4.1   PROT 5.5*      K 3.5   CO2 22*   *   BUN 14   CREATININE 0.8   ALKPHOS 30*   ALT 20   AST 39   BILITOT 0.6     Lab Results   Component Value Date    WBC 16.01 (H) 09/19/2023    HGB 10.6 (L) 09/19/2023    HCT 29 (L) 09/19/2023    MCV 87 09/19/2023     (L) 09/19/2023     No results for input(s): "TSH", "FREET4" in the last 168 hours.  Lab Results   Component Value Date    HGBA1C 5.5 11/03/2022       Nutritional status:   Body mass index is 25.95 kg/m².  Lab Results   Component Value Date    ALBUMIN 4.1 09/19/2023    ALBUMIN 3.3 (L) 09/18/2023    ALBUMIN 3.3 (L) 09/18/2023     No results found for: "PREALBUMIN"    Estimated Creatinine Clearance: 97 mL/min (based on SCr of 0.8 mg/dL).    Accu-Checks  Recent Labs     09/19/23  0453 09/19/23  0559 09/19/23  0716 09/19/23  0816 09/19/23  0850 09/19/23  1011 09/19/23  1058 09/19/23  1209 09/19/23  1305 09/19/23  1411   POCTGLUCOSE 167* 155* 156* 151* 157* 128* 113* 113* 115* 112*       Current Medications and/or Treatments Impacting Glycemic Control  Immunotherapy:    Immunosuppressants       None          Steroids:   Hormones (From admission, onward)      None          Pressors:    Autonomic Drugs (From admission, onward)      Start     Stop Route Frequency Ordered    09/18/23 4660  EPINEPHrine 5 mg in dextrose 5% 250 mL infusion (premix)        Question Answer " Comment   Begin at (in mcg/kg/min): 0.02    Titrate by: (in mcg/kg/min) 0.02    Titrate interval: (in minutes) 5    Titrate to maintain: (SBP or MAP or Cardiac Index) MAP    Greater than: (in mmHg) 65    Maximum dose: (in mcg/kg/min) 0.1        -- IV Continuous 09/18/23 1549          Hyperglycemia/Diabetes Medications:   Antihyperglycemics (From admission, onward)      Start     Stop Route Frequency Ordered    09/18/23 1545  insulin regular in 0.9 % NaCl 100 unit/100 mL (1 unit/mL) infusion        Question Answer Comment   Insulin rate changes (DO NOT MODIFY ANSWER) \\[email or web address]\epic\Images\Pharmacy\InsulinInfusions\CTS INSULIN [id number].pdf    Enter initial dose (Units/hr): 1        -- IV Continuous 09/18/23 1434

## 2023-09-19 NOTE — PT/OT/SLP EVAL
Physical Therapy Co-Evaluation with OT    Patient Name:  Sawyer Browning  MRN: [id number]    Admit Date: 9/18/2023  Admitting Diagnosis:  Aortic root aneurysm  Length of Stay: 1 days  Recent Surgery: Procedure(s) (LRB):  VALVE SPARING AORTIC ROOT REPLACEMENT (N/A)  CORONARY ARTERY BYPASS GRAFT (CABG) (N/A)  HARVEST-VEIN-ENDOVASCULAR (Left)  REPAIR, HERNIA, DIAPHRAGMATIC 1 Day Post-Op    Recommendations:     Discharge Recommendations: Other  Equipment recommendations: none  Barriers to discharge: None Identified     Assessment:     Sawyer Browning is a 68 y.o. male admitted to [facility] on 9/18/2023 with medical diagnosis of Aortic root aneurysm. Pt presents with weakness, impaired endurance, impaired functional mobility, decreased coordination, pain, impaired cardiopulmonary response to activity. These deficits effect their roles and responsibilities in which they were able to complete prior to admit.     Pt is agreeable to therapy evaluation and session. Pt up in recliner with daughter at bedside. PTA, pt was (I) with ambulation and ADLs. Pt works out at [facility] 3x/week. Initially, pt was agreeable to walk to sink and perform standing ADLs. But then he stated his chest pain was too much to tolerate that. Pt was agreeable to trial standing and then return to sitting. Able to stand for 15 seconds without assistance. O2 sats between 85-89 during session with breathing technique education provided. Will continue to progress as tolerated.    Sawyer Browning would benefit from acute PT intervention to improve quality of life, focus on recovery of impairments, provide patient/caregiver education, reduce fall risk, and maximize (I) and safety with functional mobility. Once medically stable, recommending pt discharge to Other (home).      Rehab Prognosis: Good    Plan:     During this hospitalization, patient to be seen 5 x/week to address the identified rehab impairments via gait training, therapeutic activities, therapeutic exercises,  "neuromuscular re-education and progress towards stated goals.     Plan of Care Expires:  10/19/23  Plan of Care reviewed with: patient, daughter    This plan of care has been discussed with the patient/caregiver, who was included in its development and is in agreement with the identified goals and treatment plan.     Subjective     Communicated with RN prior to session.  Patient found up in chair upon PT entry to room, agreeable to evaluation. Pt's daughter present during session.    Chief Complaint: chest tube    Patient/Family Comments/goals: "I do not want to do that anymore"    Pain/Comfort:  Pain Rating 1: 8/10  Location 1: chest  Pain Addressed 1: Reposition, Distraction, Cessation of Activity    Patients cultural, spiritual, Oriental orthodox conflicts given the current situation: None identified     Patient History: information obtained from pt     Living Environment: Pt lives with wife in 2 story home  with 3 EDOUARD with dionne HR (Cannot reach both). Bathroom set-up: walk-in shower and built in bench  Bed and bath on second level with full flight of stairs with R HR  Prior Level of Function: independent with mobility and ADLs  DME owned: none  Support Available/Caregiver Assistance:  wife    Objective:   OT present for coeval due to pt's multiple medical comorbidities and functional/cognition deficits requiring two skilled therapists to appropriately progress pt's musculoskeletal strength, neuromuscular control, and endurance while taking into consideration medical acuity and pt safety.    Patient found with: arterial line, blood pressure cuff, central line, chest tube, telemetry, pulse ox (continuous), oxygen, petit catheter    Recent Surgery: Procedure(s) (LRB):  VALVE SPARING AORTIC ROOT REPLACEMENT (N/A)  CORONARY ARTERY BYPASS GRAFT (CABG) (N/A)  HARVEST-VEIN-ENDOVASCULAR (Left)  REPAIR, HERNIA, DIAPHRAGMATIC 1 Day Post-Op  General Precautions: Standard, fall, sternal   Orthopedic Precautions:    Braces:   "   Oxygen Device: nasal cannula 9 L      Exams:    Cognition:  Alert  Command following: Follows multistep verbal commands  Communication: clear/fluent    Sensation:   Light touch sensation: Intact BLEs    Gross Motor Coordination: No deficits noted during functional mobility tasks     Edema/Skin Integrity: None noted; Visible skin intact    Postural examination/scapula alignment:  no deficits noted    Lower Extremity Range of Motion:  Right Lower Extremity: WFL  Left Lower Extremity: WFL    Lower Extremity Strength:  Right Lower Extremity: WFL  Left Lower Extremity: WFL    Functional Mobility:    Bed Mobility:  Not assessed d/t in recliner    Transfers:   Sit <> Stand Transfer: Contact Guard Assistance x 1 trials from recliner with no AD          Balance:  Dynamic Sitting: GOOD: Maintains balance through MODERATE excursions of active trunk movement    Outcome Measure: AM-PAC 6 CLICK MOBILITY  Total Score:18     Patient/Caregiver Education:     Therapist educated pt/caregiver regarding:   PT POC and goals for therapy   Safety with mobility and fall risk   Instruction on use of call button and importance of calling nursing staff for assistance with mobility   Time provided for therapeutic counseling and discussion of current health disposition. All questions answered to satisfaction, within scope of PT practice     Therapist reinforced education re:   Post-op sternal precautions, including no lifting > 5 lbs, pulling or pushing with BUEs.    Modifying daily activities and functional mobility tasks to maintain sternal precautions appropriately   Importance of participation in therapy and engaging in increased OOB mobility with assistance to improve endurance     Patient/caregiver able to verbalize understanding and expressed no further questions this visit; will follow-up with pt/caregiver during current admit for additional questions/concerns within scope of practice.     White board updated.     Patient left up in  chair with all lines intact, call button in reach, RN notified, and daughter present.    GOALS:   Multidisciplinary Problems       Physical Therapy Goals          Problem: Physical Therapy    Goal Priority Disciplines Outcome Goal Variances Interventions   Physical Therapy Goal     PT, PT/OT Ongoing, Progressing     Description: Goals to be met by: 10/3/23     Patient will increase functional independence with mobility by performin. Supine to sit with Stand-by Assistance  2. Sit to supine with Stand-by Assistance  3. Sit to stand transfer with Supervision  4. Bed to chair transfer with Supervision using No Assistive Device  5. Gait  x 250 feet with Supervision using No Assistive Device.   6. Ascend/descend 3 stair with unilateral Handrails Supervision using No Assistive Device.                            History:     Past Medical History:   Diagnosis Date    BPH (benign prostatic hyperplasia)     Elevated liver enzymes     Fatty infiltration of liver     Glucose intolerance (impaired glucose tolerance)     Hiatal hernia     Hyperlipidemia     Hypertension     Overweight     Reflux        Past Surgical History:   Procedure Laterality Date    AORTIC ROOT REPLACEMENT N/A 2023    Procedure: VALVE SPARING AORTIC ROOT REPLACEMENT;  Surgeon: Imtiaz Mooney MD;  Location: [facility] OR [address] [place] [address];  Service: Cardiovascular;  Laterality: N/A;  Valve sparing aortic root replacement, vs biobentall, hemiarch, CABGx2 under  hypothermic circ arrest    CORONARY ANGIOGRAPHY N/A 2023    Procedure: ANGIOGRAM, CORONARY ARTERY;  Surgeon: Chon Lopez MD;  Location: [facility] CATH LAB;  Service: Cardiology;  Laterality: N/A;  right radial    CORONARY ARTERY BYPASS GRAFT (CABG) N/A 2023    Procedure: CORONARY ARTERY BYPASS GRAFT (CABG);  Surgeon: Imtiaz Mooney MD;  Location: [facility] OR [address] [place] [address];  Service: Cardiovascular;  Laterality: N/A;  Vein [place] Start: 821  Vein [place] End: 838  Vein Preparation Start:  839  Vein Preparation End: 912    ENDOSCOPIC HARVEST OF VEIN Left 9/18/2023    Procedure: HARVEST-VEIN-ENDOVASCULAR;  Surgeon: Imtiaz Mooney MD;  Location: [facility] OR [address] [place] [address];  Service: Cardiovascular;  Laterality: Left;  Valve sparing aortic root replacement, vs biobentall, hemiarch, CABGx2 under    inguinal hernia      x2    REPAIR OF DIAPHRAGMATIC HERNIA  9/18/2023    Procedure: REPAIR, HERNIA, DIAPHRAGMATIC;  Surgeon: Imtiaz Mooney MD;  Location: [facility] OR [address] [place] [address];  Service: Cardiovascular;;    tonsillectomy         Time Tracking:     PT Received On: 09/19/23  PT Start Time: 1338     PT Stop Time: 1352  PT Total Time (min): 14 min     Billable Minutes: Evaluation 14    09/19/2023

## 2023-09-19 NOTE — CONSULTS
[facility] - Surgical Intensive Care  Adult Nutrition  Progress Note    SUMMARY       Recommendations    1.) Recommend if and when medically feasible to ADAT, goal of Cardiac diet- fluid per MD.     2.) If PO intake <50%, recommend to provided Boost Plus with meals to help optimize PRO/Kcal.     3.) RD to monitor wt, PO intake, skin, labs.      Goals: to meet % of EEN/EPN by next RD f/u  Nutrition Goal Status: new  Communication of RD Recs:  (POC)    Assessment and Plan    Nutrition Problem  Increased PRO/energy needs    Related to (etiology):   Increased physiological needs    Signs and Symptoms (as evidenced by):   S/p CABG x2     Interventions/Recommendations (treatment strategy):  Collaboration of nutritional care with other providers.    Nutrition Diagnosis Status:   New    Reason for Assessment    Reason For Assessment: consult  Diagnosis: cardiac disease (Aortic root aneurysm; s/p CABG x2)  Relevant Medical History: HLD, GERD, HTN  Interdisciplinary Rounds: did not attend  General Information Comments: RD was consulted for cardiac post op: Pt denies n/v/d/c. Pt endorses good appetite and stated they were looking forward to some food. Pt consumed 50-75% of their CLD. Pt stated that appetite was great PTA, eating 3 meals/day with snacks. Pt states that #. Pt denies any sig wt changes. Pt appears to be well nourished, NFPE not warranted. RD provided Low Na and Cardiac diet edu with pt and caregiver. All questions and comments were addressed. RD contact info left for pt.  Nutrition Discharge Planning: Cardiac diet    Nutrition/Diet History    Spiritual, Cultural Beliefs, Christian Practices, Values that Affect Care: no    Anthropometrics    Temp: 98.4 °F (36.9 °C)  Height Method: Stated  Height: 6' (182.9 cm)  Height (inches): 72 in  Weight Method: Standard Scale  Weight: 86.8 kg (191 lb 5.8 oz)  Weight (lb): 191.36 lb  Ideal Body Weight (IBW), Male: 178 lb  % Ideal Body Weight, Male (lb): 107.51  %  BMI (Calculated): 25.9  BMI Grade: 25 - 29.9 - overweight    Lab/Procedures/Meds    Pertinent Labs Reviewed: reviewed  Pertinent Labs Comments: gluc: 189, Ca: 8.2, phos: 2.1, alk phos: 30, PRO: 5.5  Pertinent Medications Reviewed: reviewed  Pertinent Medications Comments: docusate, enoxaparin, famotidine, atorvastatin, polyethylene glycol, cleviprex, epi, insulin    Estimated/Assessed Needs    Weight Used For Calorie Calculations: 86.8 kg (191 lb 5.8 oz)  Energy Calorie Requirements (kcal): 1736- 2170 kcal  Energy Need Method: Kcal/kg (20-25kcal/kg)  Protein Requirements: 104- 130g (1.2-1.5g/kg)  Weight Used For Protein Calculations: 86.8 kg (191 lb 5.8 oz)  Fluid Requirements (mL): 1ml/1kcal or per MD  Estimated Fluid Requirement Method: RDA Method  RDA Method (mL): 1736    Nutrition Prescription Ordered    Current Diet Order: CLD (no sugar; 1.5L FR)    Evaluation of Received Nutrient/Fluid Intake    I/O: +2.7L since admit  Energy Calories Required: not meeting needs  Protein Required: not meeting needs  Fluid Required:  (as per MD)  Comments: no BM recorded  Tolerance: tolerating  % Intake of Estimated Energy Needs: 0 - 25 %  % Meal Intake: Other: CLD    Nutrition Risk    Level of Risk/Frequency of Follow-up:  (RD to f/u x1-2/week)     Monitor and Evaluation    Food and Nutrient Intake: energy intake, food and beverage intake  Food and Nutrient Adminstration: diet order  Knowledge/Beliefs/Attitudes: food and nutrition knowledge/skill, beliefs and attitudes  Physical Activity and Function: nutrition-related ADLs and IADLs  Anthropometric Measurements: weight, weight change, body mass index  Biochemical Data, Medical Tests and Procedures: electrolyte and renal panel, gastrointestinal profile, glucose/endocrine profile, inflammatory profile, lipid profile  Nutrition-Focused Physical Findings: overall appearance, skin     Nutrition Follow-Up    RD Follow-up?: Yes

## 2023-09-19 NOTE — NURSING
SICU PLAN OF CARE NOTE    Dx: Aortic root aneurysm    Shift Events: VSS, no acute events during shift. See nursing notes for shift details.     Gtts: TXA, epi, insulin, propofol    Neuro: Sedated, Follows Commands, and Moves All Extremities    Cardiac: NSR 70s    Respiratory: Ventilator 50% 5 PEEP    GI: NPO    : Urinary Catheter 1275 cc/shift    Drains: Chest Tube, total output 220 cc /  shift    Labs/Accuchecks: daily labs, accuchecks Q1    Skin: No new skin breakdown noted during shift, pt turned and repositioned throughout shift. Pillows in use, foams in place, SCDs on. Bed plugged in,wheels locked,call light in reach. Spouse updated on plan of care and verbalizes understanding.

## 2023-09-19 NOTE — PLAN OF CARE
[facility] - Surgical Intensive Care  Initial Discharge Assessment       Primary Care Provider: Mary Kay Haines MD    Admission Diagnosis: Coronary artery disease, unspecified vessel or lesion type, unspecified whether angina present, unspecified whether native or transplanted heart [I25.10]  Thoracic aortic aneurysm without rupture, unspecified part [I71.20]  Aortic root aneurysm [I71.21]    Admission Date: 9/18/2023  Expected Discharge Date:          Payor: BLUE CROSS BLUE SHIELD / Plan: BCBS ALL OUT OF STATE / Product Type: PPO /     Extended Emergency Contact Information  Primary Emergency Contact: Chinyere Browning  Address: [address] [place] [address] [address]            [place] [address] [place]  Mobile Phone: [phone]  Relation: Spouse    Discharge Plan A: Home  Discharge Plan B: Home Health      [facility] - [place]SHALL Mcinty[facility] - [facility] AT Portal to Registered [facility] Sites  [facility]  [place] [address]  Phone: [phone] Fax: [phone]    [facility] [place] - [address]  [address] [facility] [address]  Phone: [phone] Fax: [phone]    [id number] - [facility] [address] AT [facility]  [address] [place] [address]  Phone: [phone] Fax: [phone]    [id number] - [place] - [address] [facility]  [address] [facility] [address]  Phone: [phone] Fax: [phone]      Initial Assessment (most recent)       Adult Discharge Assessment - 09/19/23 1149          Discharge Assessment    Assessment Type Discharge Planning Assessment     Confirmed/corrected address, phone number and insurance Yes     Confirmed Demographics Correct on Facesheet     Source of Information family     Communicated FATEMEH with patient/caregiver Date not available/Unable to determine     Reason For Admission Aortic root aneurysm      People in Home spouse     Do you expect to return to your current living situation? Yes     Do you have help at home or someone to help you manage your care at home? Yes     Who are your caregiver(s) and their phone number(s)? Chinyere Browning [phone]     Equipment Currently Used at Home none     Readmission within 30 days? No     Patient currently being followed by outpatient case management? No     Do you currently have service(s) that help you manage your care at home? No     Do you take prescription medications? Yes     Do you have prescription coverage? Yes     Coverage [facility] SHIELD - [facility] ALL OUT OF STATE     Do you have any problems affording any of your prescribed medications? TBD     Is the patient taking medications as prescribed? yes     Who is going to help you get home at discharge? Chinyere Browning     Are you on dialysis? No     Do you take coumadin? No     DME Needed Upon Discharge  other (see comments)   TBD    Discharge Plan discussed with: Spouse/sig other;Patient     Discharge Plan A Home     Discharge Plan B Home Health        Physical Activity    On average, how many days per week do you engage in moderate to strenuous exercise (like a brisk walk)? 3 days        Financial Resource Strain    How hard is it for you to pay for the very basics like food, housing, medical care, and heating? Not hard at all        Housing Stability    In the last 12 months, was there a time when you were not able to pay the mortgage or rent on time? No     In the last 12 months, how many places have you lived? 1     In the last 12 months, was there a time when you did not have a steady place to sleep or slept in a shelter (including now)? No        Transportation Needs    In the past 12 months, has lack of transportation kept you from medical appointments or from getting medications? No     In the past 12 months, has lack of transportation kept you from meetings, work, or from getting things needed for  daily living? No        Food Insecurity    Within the past 12 months, you worried that your food would run out before you got the money to buy more. Never true     Within the past 12 months, the food you bought just didn't last and you didn't have money to get more. Never true        Stress    Do you feel stress - tense, restless, nervous, or anxious, or unable to sleep at night because your mind is troubled all the time - these days? Patient refused        Social Connections    In a typical week, how many times do you talk on the phone with family, friends, or neighbors? More than three times a week     How often do you get together with friends or relatives? More than three times a week     How often do you attend Restoration or Caodaism services? Patient refused     Do you belong to any clubs or organizations such as Restoration groups, unions, fraternal or athletic groups, or school groups? Patient refused     How often do you attend meetings of the clubs or organizations you belong to? Patient refused     Are you , , , , never , or living with a partner?         Alcohol Use    Q1: How often do you have a drink containing alcohol? Patient refused     Q2: How many drinks containing alcohol do you have on a typical day when you are drinking? Patient refused     Q3: How often do you have six or more drinks on one occasion? Patient refused        OTHER    Name(s) of People in Home Chinyere Browning                      Anamika RANDLE met with patient and family  at bedside to complete DPA. Questions answered / contact numbers provided.  Use PREFERRED PHARMACY / BEDSIDE DELIVERY for any necessary medications at time of discharge. The patient is independent with all ADLs - does not use DME, In-home equipment, is not on HD, Coumadin or home oxygen.The patient's wife will be assisting with help upon discharge. The patient's wife  will be providing transportation home. Hospital follow up will be  scheduled with PCP. Will continue to follow for course of hospitalization.     Ld Pompa LMSW  Case Management [facility];

## 2023-09-19 NOTE — ASSESSMENT & PLAN NOTE
Hx of Essental hypertension on home Amlodipine and Valsartan. Will hold home medications in the immediate post operative setting and start when appropriate.     --MAP >65 SBP < 130  --PRN Cleviprex

## 2023-09-19 NOTE — ASSESSMENT & PLAN NOTE
Hx of CAD, s/p CABGx2 (LIMA-LAD and SVG-PDA) and aortic root replacement with Dr. Mooney on 9/18/23    --Continue ASA   --Continue Ezetimibe and Atorvastatin

## 2023-09-19 NOTE — PLAN OF CARE
PT evaluation complete - see note for details. POC and goals established.    Problem: Physical Therapy  Goal: Physical Therapy Goal  Description: Goals to be met by: 10/3/23     Patient will increase functional independence with mobility by performin. Supine to sit with Stand-by Assistance  2. Sit to supine with Stand-by Assistance  3. Sit to stand transfer with Supervision  4. Bed to chair transfer with Supervision using No Assistive Device  5. Gait  x 250 feet with Supervision using No Assistive Device.   6. Ascend/descend 3 stair with unilateral Handrails Supervision using No Assistive Device.     2023 1524 by Darcy Rose, PT  Outcome: Ongoing, Progressing  2023 1523 by Darcy Rose, PT  Outcome: Ongoing, Progressing     2023

## 2023-09-19 NOTE — PLAN OF CARE
Problem: Occupational Therapy  Goal: Occupational Therapy Goal  Description: Goals to be met by:  10/10/2023    Patient will increase functional independence with ADLs by performing:    UE Dressing with Set-up Assistance.  LE Dressing with Supervision.  Grooming while standing at sink with Modified [place].  Toileting from toilet with Modified [place] for hygiene and clothing management.   Supine to sit with Modified [place].  Stand pivot transfers with Modified [place].  Toilet transfer to toilet with Modified [place].    Outcome: Ongoing, Progressing   Patient's goals are set.    Problem: Discharge Planning  Goal: Discharge to home or other facility with appropriate resources  Outcome: Progressing  Flowsheets (Taken 2/12/2023 0258)  Discharge to home or other facility with appropriate resources: Identify barriers to discharge with patient and caregiver     Problem: Neurosensory - Adult  Goal: Achieves stable or improved neurological status  Outcome: Progressing  Flowsheets (Taken 2/12/2023 0258)  Achieves stable or improved neurological status: Assess for and report changes in neurological status     Problem: Skin/Tissue Integrity - Adult  Goal: Skin integrity remains intact  Outcome: Progressing  Flowsheets (Taken 2/12/2023 0258)  Skin Integrity Remains Intact: Monitor for areas of redness and/or skin breakdown     Problem: Musculoskeletal - Adult  Goal: Return mobility to safest level of function  Outcome: Progressing  Flowsheets (Taken 2/12/2023 0258)  Return Mobility to Safest Level of Function:   Assess patient stability and activity tolerance for standing, transferring and ambulating with or without assistive devices   Assist with transfers and ambulation using safe patient handling equipment as needed     Problem: Safety - Adult  Goal: Free from fall injury  Outcome: Progressing  Flowsheets (Taken 2/12/2023 0258)  Free From Fall Injury: Instruct family/caregiver on patient safety     Problem: Skin/Tissue Integrity  Goal: Absence of new skin breakdown  Description: 1. Monitor for areas of redness and/or skin breakdown  2. Assess vascular access sites hourly  3. Every 4-6 hours minimum:  Change oxygen saturation probe site  4. Every 4-6 hours:  If on nasal continuous positive airway pressure, respiratory therapy assess nares and determine need for appliance change or resting period.   Outcome: Progressing     Problem: Pain  Goal: Verbalizes/displays adequate comfort level or baseline comfort level  Outcome: Progressing  Flowsheets (Taken 2/12/2023 0258)  Verbalizes/displays adequate comfort level or baseline comfort level:   Encourage patient to monitor pain and request assistance   Assess pain using appropriate pain scale   Administer analgesics based on type and severity of pain and evaluate response

## 2023-09-19 NOTE — PROGRESS NOTES
"[facility] - Surgical Intensive Care  Endocrinology  Progress Note    Admit Date: 9/18/2023     Reason for Consult: Management of Hyperglycemia     Surgical Procedure and Date:   9/18/23  VALVE SPARING AORTIC ROOT REPLACEMENT (N/A)  CORONARY ARTERY BYPASS GRAFT (CABG) (N/A)  HARVEST-VEIN-ENDOVASCULAR (Left)  REPAIR, HERNIA, DIAPHRAGMATIC      HPI:   Patient is a 68 y.o. male with a diagnosis of HTN, fatty liver disease, CAD (asymptomatic, positive stress test w/ ischemia in LAD region, angiogram w/  at mid LAD and high grade stenosis of distal RCA), and aortic root and ascending aortic aneurysm. Patient now presents for coronary artery bypass surgery x 2 (LIMA-LAD, SVG-dRCA), valve sparing aortic root replacement vs bioBentall, ascending aorta replacement, possible yvonne arch replacement under hypothermic circulatory arrest with Dr. Shirley. Endocrinology consulted for management of hyperglycemia.     Lab Results   Component Value Date    HGBA1C 5.5 11/03/2022             Interval HPI:   Overnight events: Remains in SICU. BG within goal ranges on IV intensive insulin protocol. Insulin infusion rates ranging from 3.7-6.7 u/hr. Diet clear liquid Fluid - 1500mL    Eating:    clear liquids   Nausea: No  Hypoglycemia and intervention: No  Fever: No  TPN and/or TF: No  If yes, type of TF/TPN and rate: n/a    BP (!) 109/59   Pulse 90   Temp 98.4 °F (36.9 °C) (Oral)   Resp (!) 28   Ht 6' (1.829 m)   Wt 86.8 kg (191 lb 5.8 oz)   SpO2 (!) 90%   BMI 25.95 kg/m²     Labs Reviewed and Include    Recent Labs   Lab 09/19/23  0300   *   CALCIUM 8.2*   ALBUMIN 4.1   PROT 5.5*      K 3.5   CO2 22*   *   BUN 14   CREATININE 0.8   ALKPHOS 30*   ALT 20   AST 39   BILITOT 0.6     Lab Results   Component Value Date    WBC 16.01 (H) 09/19/2023    HGB 10.6 (L) 09/19/2023    HCT 29 (L) 09/19/2023    MCV 87 09/19/2023     (L) 09/19/2023     No results for input(s): "TSH", "FREET4" in the last 168 hours.  Lab " "Results   Component Value Date    HGBA1C 5.5 11/03/2022       Nutritional status:   Body mass index is 25.95 kg/m².  Lab Results   Component Value Date    ALBUMIN 4.1 09/19/2023    ALBUMIN 3.3 (L) 09/18/2023    ALBUMIN 3.3 (L) 09/18/2023     No results found for: "PREALBUMIN"    Estimated Creatinine Clearance: 97 mL/min (based on SCr of 0.8 mg/dL).    Accu-Checks  Recent Labs     09/19/23  0453 09/19/23  0559 09/19/23  0716 09/19/23  0816 09/19/23  0850 09/19/23  1011 09/19/23  1058 09/19/23  1209 09/19/23  1305 09/19/23  1411   POCTGLUCOSE 167* 155* 156* 151* 157* 128* 113* 113* 115* 112*       Current Medications and/or Treatments Impacting Glycemic Control  Immunotherapy:    Immunosuppressants       None          Steroids:   Hormones (From admission, onward)      None          Pressors:    Autonomic Drugs (From admission, onward)      Start     Stop Route Frequency Ordered    09/18/23 1645  EPINEPHrine 5 mg in dextrose 5% 250 mL infusion (premix)        Question Answer Comment   Begin at (in mcg/kg/min): 0.02    Titrate by: (in mcg/kg/min) 0.02    Titrate interval: (in minutes) 5    Titrate to maintain: (SBP or MAP or Cardiac Index) MAP    Greater than: (in mmHg) 65    Maximum dose: (in mcg/kg/min) 0.1        -- IV Continuous 09/18/23 1549          Hyperglycemia/Diabetes Medications:   Antihyperglycemics (From admission, onward)      Start     Stop Route Frequency Ordered    09/18/23 1545  insulin regular in 0.9 % NaCl 100 unit/100 mL (1 unit/mL) infusion        Question Answer Comment   Insulin rate changes (DO NOT MODIFY ANSWER) [email or web address] [email or web address] [email or web address]    Enter initial dose (Units/hr): 1        -- IV Continuous 09/18/23 1437            ASSESSMENT and PLAN    Cardiac/Vascular  * Aortic root aneurysm  Managed per primary team  Optimize BG control        S/P CABG (coronary artery bypass graft)  Managed per primary team  Optimize BG " control          Endocrine  Transient hyperglycemia post procedure  BG goal 110-140 (CTS protocol)     Discontinue IV insulin infusion protocol  Start Transition IV insulin infusion at 1.8 u/hr with stepdown parameters (insulin infusion decreased by 50%)  Start Moderate Dose Correction Scale  BG monitoring /hs/0200    ** Please call Endocrine for any BG related issues **    ** Please notify Endocrine for any change and/or advance in diet**    Discharge planning: HAMZAH Holloway NP  Endocrinology  [facility] - Surgical Intensive Care

## 2023-09-19 NOTE — ASSESSMENT & PLAN NOTE
  Neuro/Psych:     - Sedation: None    - Pain:     - Scheduled Tylenol 1000mg Q8H   - PRN Oxycodone 5mg/10mg, Hydromorphone 0.5 Q1H               Cardiac:     - POD #1 s/p valve sparing aortic root repair, hemiarch replacement (6 minute circ arrest), CABG x2, and repair of hernia of morgani with Dr. Mooney    - Postoperative GWEN: Normal bivent function, no wall motion abnormalities, mild increase RV diameter    - BP Goal: MAP > 65, SBP < 130    - Inotropes/Pressors: Epi .06, consider weaning    - Anti-HTNs: Cleviprex gtt to maintain SBP < 130    - Rhythm:    - Beta Blocker: Will initiate when appropriate    - Statin: statin daily    - CVP 18, PA 40's/20's        Pulmonary:     - Goal SpO2 >92%    - Wean supplemental O2 as tolerated    - Chest Tubes x 3 (2 Meds & 1 Pleural)    - ABGs Q2 Hr to trend lactate          Renal:    - Trend BUN/Cr     - Maintain Tsai, record strict Is/Os  - 1.5 L albumin recieved over first 12 hours. Additional 250 given with decreased UOP to ~ 30 cc/hr. Will continue ton monitor closely.     Recent Labs   Lab 09/18/23  0635 09/18/23  1504 09/19/23  0300   BUN 16 15  15 14   CREATININE 0.9 1.0  1.0 0.8         FEN / GI:     - Daily CMP, PRN K/Mag/Phos per protocol     - Replace electrolytes as needed    - Nutrition: CLD, advancing as tolerated    - Bowel Regimen: Miralax, docusate      ID:     - Tmax:    - Abx: Complete perioperative cefazolin 2g Q8H x 5 doses    Recent Labs   Lab 09/18/23  1504 09/18/23 2019 09/19/23  0300   WBC 22.79*  22.79* 18.16* 16.01*         Heme/Onc:     - Hgb 15.6 pre-operatively    - Received 300 Cell Saver intraoperatively    - CBC daily    - ASA 325mg daily, will start pending chest tube output    Recent Labs   Lab 09/18/23  1504 09/18/23  1840 09/18/23 2019 09/19/23  0300   HGB 12.8*  12.8*  --  10.9* 10.6*   *  132*  --  106* 104*   APTT 47.4*  47.4* 24.5 23.7 25.4   INR 1.2  1.2  --  1.1 1.1         Endocrine:     - CTS Goal BG  110-140    - HgbA1c: 5.5    - Endocrinology consulted for insulin management      PPx:   Feeding: CLD, advancing as tolerated  Analgesia/Sedation: MM  Thromboembolic Prevention: Lovenox  HOB >30: Yes  Stress Ulcer: Yes - famotidine 20mg IV  Glucose Control: Yes, insulin management per Endocrinology -      Lines/Drains/Airway:  PIV    Art Line: L radial   Central Line: RIJ CVC, swan   Tsai    Chest Tubes: (2 Mediastinal, 1 Pleural)    Pacing Wires: Temporary v pacing wires      Dispo/Code Status/Palliative:     - Continue SICU Care    - Full Code

## 2023-09-19 NOTE — PT/OT/SLP EVAL
Occupational Therapy   Evaluation/Treatment    Name: Sawyer Browning  MRN: [id number]  Admitting Diagnosis: Aortic root aneurysm  Recent Surgery: Procedure(s) (LRB):  VALVE SPARING AORTIC ROOT REPLACEMENT (N/A)  CORONARY ARTERY BYPASS GRAFT (CABG) (N/A)  HARVEST-VEIN-ENDOVASCULAR (Left)  REPAIR, HERNIA, DIAPHRAGMATIC 1 Day Post-Op    Recommendations:     Discharge Recommendations: other (see comments)  Discharge Equipment Recommendations:  none  Barriers to discharge:  None    Assessment:     Sawyer Browning is a 68 y.o. male with a medical diagnosis of Aortic root aneurysm. Performance deficits affecting function: weakness, impaired endurance, impaired self care skills, impaired functional mobility, gait instability, impaired balance. Patient limited by chest pain on this date and O2 sats between 85-89% during session when standing. Patient would benefit from continued skilled acute OT 5x/wk to improve functional mobility, increase independence with ADLs, and address established goals prior to discharge home.     Rehab Prognosis: Good; patient would benefit from acute skilled OT services to address these deficits and reach maximum level of function.       Plan:     Patient to be seen 5 x/week to address the above listed problems via self-care/home management, therapeutic activities, therapeutic exercises  Plan of Care Expires: 10/19/23  Plan of Care Reviewed with: patient, family    Subjective     Chief Complaint: chest pain  Patient/Family Comments/goals: patient agreed to therapy    Occupational Profile:  Living Environment: Patient lives with wife in a 2 SH with 3 EDOUARD to enter with B HRs (patient cannot reach both). Patient has bed/bath on second floor with full flight of stairs with R handrail. Patient has a walk in shower with built in bench. Patient was independent with ADLs and functional mobility. Patient drives and works.      Pain/Comfort:  Pain Rating 1: 8/10  Location - Orientation 1: generalized  Location 1:  chest  Pain Addressed 1: Reposition, Distraction  Pain Rating Post-Intervention 1: 8/10    Patients cultural, spiritual, Zoroastrian conflicts given the current situation: no    Objective:     Communicated with: NSG prior to session.  Patient found HOB elevated with arterial line, blood pressure cuff, central line, telemetry, pulse ox (continuous), peripheral IV, oxygen, petit catheter, chest tube, external pacer upon OT entry to room.    General Precautions: Standard, fall, sternal  Orthopedic Precautions: N/A  Braces: N/A  Respiratory Status: Nasal cannula, flow 9 L/min    Occupational Performance:    Functional Mobility/Transfers:  Patient completed Sit <> Stand Transfer with contact guard assistance  with  hand-held assist     Cognitive/Visual Perceptual:  Cognitive/Psychosocial Skills:     -       Oriented to: Person, Place, Time, and Situation   -       Follows Commands/attention:Follows multistep  commands  -       Communication: clear/fluent  -       Memory: No Deficits noted  -       Safety awareness/insight to disability: intact   -       Mood/Affect/Coping skills/emotional control: Appropriate to situation  Visual/Perceptual:      -Intact      Physical Exam:  Upper Extremity Range of Motion:     -       Right Upper Extremity: WFL  -       Left Upper Extremity: WFL  Upper Extremity Strength:    -       Right Upper Extremity: WFL  -       Left Upper Extremity: WFL   Strength:    -       Right Upper Extremity: WFL  -       Left Upper Extremity: WFL  Fine Motor Coordination:    -       Intact  Gross motor coordination:   WFL    AMPAC 6 Click ADL:  AMPAC Total Score: 18    Treatment & Education:  Role of OT and POC  ADL retraining  Functional mobility training  Safety  Discharge planning  Importance EOB/OOB activity    Co-treatment performed due to patient's multiple deficits requiring two skilled therapists to appropriately and safely assess patient's strength and endurance while facilitating functional  tasks in addition to accommodating for patient's activity tolerance.     Patient left up in chair with all lines intact, call button in reach, and all needs met.     GOALS:   Multidisciplinary Problems       Occupational Therapy Goals          Problem: Occupational Therapy    Goal Priority Disciplines Outcome Interventions   Occupational Therapy Goal     OT, PT/OT Ongoing, Progressing    Description: Goals to be met by:  10/10/2023    Patient will increase functional independence with ADLs by performing:    UE Dressing with Set-up Assistance.  LE Dressing with Supervision.  Grooming while standing at sink with Modified [place].  Toileting from toilet with Modified [place] for hygiene and clothing management.   Supine to sit with Modified [place].  Stand pivot transfers with Modified [place].  Toilet transfer to toilet with Modified [place].                         History:     Past Medical History:   Diagnosis Date    BPH (benign prostatic hyperplasia)     Elevated liver enzymes     Fatty infiltration of liver     Glucose intolerance (impaired glucose tolerance)     Hiatal hernia     Hyperlipidemia     Hypertension     Overweight     Reflux          Past Surgical History:   Procedure Laterality Date    AORTIC ROOT REPLACEMENT N/A 9/18/2023    Procedure: VALVE SPARING AORTIC ROOT REPLACEMENT;  Surgeon: Imtiaz Mooney MD;  Location: [address];  Service: Cardiovascular;  Laterality: N/A;  Valve sparing aortic root replacement, vs biobentall, hemiarch, CABGx2 under  hypothermic circ arrest    CORONARY ANGIOGRAPHY N/A 9/6/2023    Procedure: ANGIOGRAM, CORONARY ARTERY;  Surgeon: Chon Lopez MD;  Location: [facility] CATH LAB;  Service: Cardiology;  Laterality: N/A;  right radial    CORONARY ARTERY BYPASS GRAFT (CABG) N/A 9/18/2023    Procedure: CORONARY ARTERY BYPASS GRAFT (CABG);  Surgeon: Imtiaz Mooney MD;  Location: [address];  Service: Cardiovascular;  Laterality: N/A;   Vein [place] Start: 821  Vein [place] End: 838  Vein Preparation Start: 839  Vein Preparation End: 912    ENDOSCOPIC HARVEST OF VEIN Left 9/18/2023    Procedure: HARVEST-VEIN-ENDOVASCULAR;  Surgeon: Imtiaz Mooney MD;  Location: [facility] OR [address] [place] [address];  Service: Cardiovascular;  Laterality: Left;  Valve sparing aortic root replacement, vs biobentall, hemiarch, CABGx2 under    inguinal hernia      x2    REPAIR OF DIAPHRAGMATIC HERNIA  9/18/2023    Procedure: REPAIR, HERNIA, DIAPHRAGMATIC;  Surgeon: Imtiaz Mooney MD;  Location: [facility] OR [address] [place] [address];  Service: Cardiovascular;;    tonsillectomy         Time Tracking:     OT Date of Treatment: 09/19/23  OT Start Time: 1338  OT Stop Time: 1352  OT Total Time (min): 14 min    Billable Minutes:Evaluation 14    9/19/2023

## 2023-09-19 NOTE — PROGRESS NOTES
[facility]  Critical Care - Surgery  Progress Note    Patient Name: Sawyer Browning  MRN: [id number]  Admission Date: 9/18/2023  Hospital Length of Stay: 1 days  Code Status: Full Code  Attending Provider: Imtiaz Mooney MD  Primary Care Provider: Mary Kay Haines MD   Principal Problem: Aortic root aneurysm    Subjective:     Hospital/ICU Course:  No notes on file    Interval History/Significant Events: Lactate down trending overnight, extubated to nasal canula ~ 3 am. Received 1.5 L albumin.  decreased. UOP falling off, additional 250 cc albumin being given.     Follow-up For: Procedure(s) (LRB):  VALVE SPARING AORTIC ROOT REPLACEMENT (N/A)  CORONARY ARTERY BYPASS GRAFT (CABG) (N/A)  HARVEST-VEIN-ENDOVASCULAR (Left)  REPAIR, HERNIA, DIAPHRAGMATIC    Post-Operative Day: 1 Day Post-Op    Objective:     Vital Signs (Most Recent):  Temp: 98.5 °F (36.9 °C) (09/19/23 0300)  Pulse: 82 (09/19/23 0615)  Resp: 19 (09/19/23 0615)  BP: (!) 92/57 (09/19/23 0500)  SpO2: (!) 92 % (09/19/23 0615) Vital Signs (24h Range):  Temp:  [97.4 °F (36.3 °C)-98.5 °F (36.9 °C)] 98.5 °F (36.9 °C)  Pulse:  [69-89] 82  Resp:  [11-31] 19  SpO2:  [90 %-100 %] 92 %  BP: ()/(53-84) 92/57  Arterial Line BP: ()/(44-72) 110/47     Weight: 86.8 kg (191 lb 5.8 oz)  Body mass index is 25.95 kg/m².      Intake/Output Summary (Last 24 hours) at 9/19/2023 0625  Last data filed at 9/19/2023 0600  Gross per 24 hour   Intake 5713.96 ml   Output 2600 ml   Net 3113.96 ml          Physical Exam  Constitutional:       General: He is not in acute distress.     Appearance: He is ill-appearing. He is not diaphoretic.   HENT:      Head: Atraumatic.   Eyes:      Extraocular Movements: Extraocular movements intact.   Neck:      Comments: RIJ CVC  Cardiovascular:      Rate and Rhythm: Normal rate and regular rhythm.      Heart sounds: No murmur heard.  Pulmonary:      Effort: Pulmonary effort is normal. No respiratory distress.       Breath sounds: No wheezing.      Comments: Chest tubes in place  Abdominal:      Palpations: Abdomen is soft.      Tenderness: There is no abdominal tenderness.   Musculoskeletal:         General: No swelling.      Cervical back: Neck supple.      Right lower leg: No edema.      Left lower leg: No edema.   Skin:     General: Skin is warm and dry.   Neurological:      Mental Status: He is alert. Mental status is at baseline.   Psychiatric:         Mood and Affect: Mood normal.            Vents:  Vent Mode: Spont (09/19/23 0400)  Ventilator Initiated: Yes (09/18/23 1446)  Set Rate: 30 BPM (09/18/23 1700)  Vt Set: 460 mL (09/18/23 1700)  Pressure Support: 12 cmH20 (09/19/23 0400)  PEEP/CPAP: 5 cmH20 (09/19/23 0400)  Oxygen Concentration (%): 50 (09/19/23 0400)  Peak Airway Pressure: 17 cmH20 (09/19/23 0400)  Plateau Pressure: 0 cmH20 (09/19/23 0400)  Total Ve: 7.26 L/m (09/19/23 0400)  Negative Inspiratory Force (cm H2O): -28 (09/19/23 0400)  F/VT Ratio<105 (RSBI): (!) 16.97 (09/19/23 0100)    Lines/Drains/Airways       Central Venous Catheter Line  Duration              Introducer with Double Lumen 09/18/23 0800 Internal Jugular Right <1 day    Pulmonary Artery Catheter Assessment  09/18/23 0824 <1 day              Drain  Duration                  Chest Tube 09/18/23 1300 Tube - 1 Anterior Mediastinal 19 Fr. <1 day         Chest Tube 09/18/23 1300 Tube - 2 Mediastinal 19 Fr. <1 day         Chest Tube 09/18/23 1300 Tube - 3 Left Pleural 19 Fr. <1 day         Urethral Catheter 09/18/23 0748 Temperature probe <1 day              Airway  Duration                  Airway - Non-Surgical 09/18/23 0715 <1 day              Arterial Line  Duration             Arterial Line 09/18/23 0821 Left Radial <1 day              Line  Duration                  Pacer Wires 09/18/23 1259 <1 day              Peripheral Intravenous Line  Duration                  Peripheral IV - Single Lumen 14 G  Right Forearm -- days         Peripheral IV -  Single Lumen 09/18/23 0655 20 G Anterior;Distal;Left Forearm <1 day                    Significant Labs:    CBC/Anemia Profile:  Recent Labs   Lab 09/18/23  1504 09/18/23  1604 09/18/23 2019 09/18/23 2113 09/19/23  0300   WBC 22.79*  22.79*  --  18.16*  --  16.01*   HGB 12.8*  12.8*  --  10.9*  --  10.6*   HCT 37.1*  37.1*   < > 31.6* 29* 29.5*   *  132*  --  106*  --  104*   MCV 90  90  --  90  --  87   RDW 12.4  12.4  --  12.7  --  12.7    < > = values in this interval not displayed.        Chemistries:  Recent Labs   Lab 09/18/23  0635 09/18/23  1504 09/19/23  0300    142  142 144   K 4.1 3.3*  3.3* 3.5    107  107 112*   CO2 23 19*  19* 22*   BUN 16 15  15 14   CREATININE 0.9 1.0  1.0 0.8   CALCIUM 9.1 8.3*  8.3* 8.2*   ALBUMIN 4.3 3.3*  3.3* 4.1   PROT 7.5 5.1*  5.1* 5.5*   BILITOT 0.7 1.2*  1.2* 0.6   ALKPHOS 63 41*  41* 30*   ALT 39 27  27 20   AST 38 49*  49* 39   MG  --  2.8*  2.8* 2.3   PHOS  --  4.2  4.2 2.1*       CMP:   Recent Labs   Lab 09/18/23  0635 09/18/23  1504 09/19/23  0300    142  142 144   K 4.1 3.3*  3.3* 3.5    107  107 112*   CO2 23 19*  19* 22*   * 213*  213* 189*   BUN 16 15  15 14   CREATININE 0.9 1.0  1.0 0.8   CALCIUM 9.1 8.3*  8.3* 8.2*   PROT 7.5 5.1*  5.1* 5.5*   ALBUMIN 4.3 3.3*  3.3* 4.1   BILITOT 0.7 1.2*  1.2* 0.6   ALKPHOS 63 41*  41* 30*   AST 38 49*  49* 39   ALT 39 27  27 20   ANIONGAP 11 16  16 10     All pertinent labs within the past 24 hours have been reviewed.    Significant Imaging:  I have reviewed all pertinent imaging results/findings within the past 24 hours.    Assessment/Plan:     Cardiac/Vascular  * Aortic root aneurysm    Neuro/Psych:     - Sedation: None    - Pain:     - Scheduled Tylenol 1000mg Q8H   - PRN Oxycodone 5mg/10mg, Hydromorphone 0.5 Q1H               Cardiac:     - POD #1 s/p valve sparing aortic root repair, hemiarch replacement (6 minute circ arrest), CABG x2, and  repair of hernia of morgani with Dr. Mooney    - Postoperative GWEN: Normal bivent function, no wall motion abnormalities, mild increase RV diameter    - BP Goal: MAP > 65, SBP < 130    - Inotropes/Pressors: Epi .06, consider weaning    - Anti-HTNs: Cleviprex gtt to maintain SBP < 130    - Rhythm:    - Beta Blocker: Will initiate when appropriate    - Statin: statin daily    - CVP 18, PA 40's/20's        Pulmonary:     - Goal SpO2 >92%    - Wean supplemental O2 as tolerated    - Chest Tubes x 3 (2 Meds & 1 Pleural)    - ABGs Q2 Hr to trend lactate          Renal:    - Trend BUN/Cr     - Maintain Tsai, record strict Is/Os  - 1.5 L albumin recieved over first 12 hours. Additional 250 given with decreased UOP to ~ 30 cc/hr. Will continue ton monitor closely.     Recent Labs   Lab 09/18/23  0635 09/18/23  1504 09/19/23  0300   BUN 16 15  15 14   CREATININE 0.9 1.0  1.0 0.8         FEN / GI:     - Daily CMP, PRN K/Mag/Phos per protocol     - Replace electrolytes as needed    - Nutrition: CLD, advancing as tolerated    - Bowel Regimen: Miralax, docusate      ID:     - Tmax:    - Abx: Complete perioperative cefazolin 2g Q8H x 5 doses    Recent Labs   Lab 09/18/23  1504 09/18/23  2019 09/19/23  0300   WBC 22.79*  22.79* 18.16* 16.01*         Heme/Onc:     - Hgb 15.6 pre-operatively    - Received 300 Cell Saver intraoperatively    - CBC daily    - ASA 325mg daily, will start pending chest tube output    Recent Labs   Lab 09/18/23  1504 09/18/23  1840 09/18/23 2019 09/19/23  0300   HGB 12.8*  12.8*  --  10.9* 10.6*   *  132*  --  106* 104*   APTT 47.4*  47.4* 24.5 23.7 25.4   INR 1.2  1.2  --  1.1 1.1         Endocrine:     - CTS Goal -140    - HgbA1c: 5.5    - Endocrinology consulted for insulin management      PPx:   Feeding: CLD, advancing as tolerated  Analgesia/Sedation: MM  Thromboembolic Prevention: Lovenox  HOB >30: Yes  Stress Ulcer: Yes - famotidine 20mg IV  Glucose Control: Yes, insulin  management per Endocrinology -      Lines/Drains/Airway:  PIV    Art Line: L radial   Central Line: RIJ CVC, swan   Tsai    Chest Tubes: (2 Mediastinal, 1 Pleural)    Pacing Wires: Temporary v pacing wires      Dispo/Code Status/Palliative:     - Continue SICU Care    - Full Code          Critical care was time spent personally by me on the following activities: development of treatment plan with patient or surrogate and bedside caregivers, discussions with consultants, evaluation of patient's response to treatment, examination of patient, ordering and performing treatments and interventions, ordering and review of laboratory studies, ordering and review of radiographic studies, pulse oximetry, re-evaluation of patient's condition.  This critical care time did not overlap with that of any other provider or involve time for any procedures.     Jean Claude Sandoval, DO  Critical Care - Surgery  [facility]

## 2023-09-19 NOTE — SUBJECTIVE & OBJECTIVE
Interval History/Significant Events: Lactate down trending overnight, extubated to nasal canula ~ 3 am. Received 1.5 L albumin.  decreased. UOP falling off, additional 250 cc albumin being given.     Follow-up For: Procedure(s) (LRB):  VALVE SPARING AORTIC ROOT REPLACEMENT (N/A)  CORONARY ARTERY BYPASS GRAFT (CABG) (N/A)  HARVEST-VEIN-ENDOVASCULAR (Left)  REPAIR, HERNIA, DIAPHRAGMATIC    Post-Operative Day: 1 Day Post-Op    Objective:     Vital Signs (Most Recent):  Temp: 98.5 °F (36.9 °C) (09/19/23 0300)  Pulse: 82 (09/19/23 0615)  Resp: 19 (09/19/23 0615)  BP: (!) 92/57 (09/19/23 0500)  SpO2: (!) 92 % (09/19/23 0615) Vital Signs (24h Range):  Temp:  [97.4 °F (36.3 °C)-98.5 °F (36.9 °C)] 98.5 °F (36.9 °C)  Pulse:  [69-89] 82  Resp:  [11-31] 19  SpO2:  [90 %-100 %] 92 %  BP: ()/(53-84) 92/57  Arterial Line BP: ()/(44-72) 110/47     Weight: 86.8 kg (191 lb 5.8 oz)  Body mass index is 25.95 kg/m².      Intake/Output Summary (Last 24 hours) at 9/19/2023 0625  Last data filed at 9/19/2023 0600  Gross per 24 hour   Intake 5713.96 ml   Output 2600 ml   Net 3113.96 ml          Physical Exam  Constitutional:       General: He is not in acute distress.     Appearance: He is ill-appearing. He is not diaphoretic.   HENT:      Head: Atraumatic.   Eyes:      Extraocular Movements: Extraocular movements intact.   Neck:      Comments: [facility] CVC  Cardiovascular:      Rate and Rhythm: Normal rate and regular rhythm.      Heart sounds: No murmur heard.  Pulmonary:      Effort: Pulmonary effort is normal. No respiratory distress.      Breath sounds: No wheezing.      Comments: Chest tubes in place  Abdominal:      Palpations: Abdomen is soft.      Tenderness: There is no abdominal tenderness.   Musculoskeletal:         General: No swelling.      Cervical back: Neck supple.      Right lower leg: No edema.      Left lower leg: No edema.   Skin:     General: Skin is warm and dry.   Neurological:      Mental Status: He is  alert. Mental status is at baseline.   Psychiatric:         Mood and Affect: Mood normal.            Vents:  Vent Mode: Spont (09/19/23 0400)  Ventilator Initiated: Yes (09/18/23 1446)  Set Rate: 30 BPM (09/18/23 1700)  Vt Set: 460 mL (09/18/23 1700)  Pressure Support: 12 cmH20 (09/19/23 0400)  PEEP/CPAP: 5 cmH20 (09/19/23 0400)  Oxygen Concentration (%): 50 (09/19/23 0400)  Peak Airway Pressure: 17 cmH20 (09/19/23 0400)  Plateau Pressure: 0 cmH20 (09/19/23 0400)  Total Ve: 7.26 L/m (09/19/23 0400)  Negative Inspiratory Force (cm H2O): -28 (09/19/23 0400)  F/VT Ratio<105 (RSBI): (!) 16.97 (09/19/23 0100)    Lines/Drains/Airways       Central Venous Catheter Line  Duration              Introducer with Double Lumen 09/18/23 0800 Internal Jugular Right <1 day    Pulmonary Artery Catheter Assessment  09/18/23 0824 <1 day              Drain  Duration                  Chest Tube 09/18/23 1300 Tube - 1 Anterior Mediastinal 19 Fr. <1 day         Chest Tube 09/18/23 1300 Tube - 2 Mediastinal 19 Fr. <1 day         Chest Tube 09/18/23 1300 Tube - 3 Left Pleural 19 Fr. <1 day         Urethral Catheter 09/18/23 0748 Temperature probe <1 day              Airway  Duration                  Airway - Non-Surgical 09/18/23 0715 <1 day              Arterial Line  Duration             Arterial Line 09/18/23 0821 Left Radial <1 day              Line  Duration                  Pacer Wires 09/18/23 1259 <1 day              Peripheral Intravenous Line  Duration                  Peripheral IV - Single Lumen 14 G  Right Forearm -- days         Peripheral IV - Single Lumen 09/18/23 0655 20 G Anterior;Distal;Left Forearm <1 day                    Significant Labs:    CBC/Anemia Profile:  Recent Labs   Lab 09/18/23  1504 09/18/23  1604 09/18/23  2019 09/18/23  2113 09/19/23  0300   WBC 22.79*  22.79*  --  18.16*  --  16.01*   HGB 12.8*  12.8*  --  10.9*  --  10.6*   HCT 37.1*  37.1*   < > 31.6* 29* 29.5*   *  132*  --  106*  --  104*    MCV 90  90  --  90  --  87   RDW 12.4  12.4  --  12.7  --  12.7    < > = values in this interval not displayed.        Chemistries:  Recent Labs   Lab 09/18/23  0635 09/18/23  1504 09/19/23  0300    142  142 144   K 4.1 3.3*  3.3* 3.5    107  107 112*   CO2 23 19*  19* 22*   BUN 16 15  15 14   CREATININE 0.9 1.0  1.0 0.8   CALCIUM 9.1 8.3*  8.3* 8.2*   ALBUMIN 4.3 3.3*  3.3* 4.1   PROT 7.5 5.1*  5.1* 5.5*   BILITOT 0.7 1.2*  1.2* 0.6   ALKPHOS 63 41*  41* 30*   ALT 39 27  27 20   AST 38 49*  49* 39   MG  --  2.8*  2.8* 2.3   PHOS  --  4.2  4.2 2.1*       CMP:   Recent Labs   Lab 09/18/23  0635 09/18/23  1504 09/19/23  0300    142  142 144   K 4.1 3.3*  3.3* 3.5    107  107 112*   CO2 23 19*  19* 22*   * 213*  213* 189*   BUN 16 15  15 14   CREATININE 0.9 1.0  1.0 0.8   CALCIUM 9.1 8.3*  8.3* 8.2*   PROT 7.5 5.1*  5.1* 5.5*   ALBUMIN 4.3 3.3*  3.3* 4.1   BILITOT 0.7 1.2*  1.2* 0.6   ALKPHOS 63 41*  41* 30*   AST 38 49*  49* 39   ALT 39 27  27 20   ANIONGAP 11 16  16 10     All pertinent labs within the past 24 hours have been reviewed.    Significant Imaging:  I have reviewed all pertinent imaging results/findings within the past 24 hours.

## 2023-09-19 NOTE — ASSESSMENT & PLAN NOTE
BG goal 110-140 (CTS protocol)     Discontinue IV insulin infusion protocol  Start Transition IV insulin infusion at 1.8 u/hr with stepdown parameters (insulin infusion decreased by 50%)  Start Moderate Dose Correction Scale  BG monitoring ac/hs/0200    ** Please call Endocrine for any BG related issues **    ** Please notify Endocrine for any change and/or advance in diet**    Discharge planning: TBD

## 2023-09-19 NOTE — PLAN OF CARE
CM and Sw met with the patient and family re sitter services vs Home Health and how to proceed with in home care post discharge

## 2023-09-19 NOTE — ANESTHESIA POSTPROCEDURE EVALUATION
Anesthesia Post Evaluation    Patient: Sawyer Browning    Procedure(s) Performed: Procedure(s) (LRB):  VALVE SPARING AORTIC ROOT REPLACEMENT (N/A)  CORONARY ARTERY BYPASS GRAFT (CABG) (N/A)  HARVEST-VEIN-ENDOVASCULAR (Left)  REPAIR, HERNIA, DIAPHRAGMATIC    Final Anesthesia Type: general      Patient location during evaluation: ICU  Patient participation: Yes- Able to Participate  Level of consciousness: awake  Post-procedure vital signs: reviewed and stable  Pain management: adequate  Airway patency: patent    PONV status at discharge: No PONV  Anesthetic complications: no      Cardiovascular status: hemodynamically stable  Respiratory status: nasal cannula  Follow-up not needed.          Vitals Value Taken Time   BP 94/66 09/19/23 0901   Temp 36.4 °C (97.6 °F) 09/19/23 0715   Pulse 87 09/19/23 0907   Resp 23 09/19/23 0907   SpO2 92 % 09/19/23 0907   Vitals shown include unvalidated device data.      No case tracking events are documented in the log.      Pain/Phillip Score: Pain Rating Prior to Med Admin: 7 (9/19/2023  8:02 AM)  Pain Rating Post Med Admin: 4 (9/19/2023  5:12 AM)

## 2023-09-19 NOTE — PLAN OF CARE
Pt AAOx4. NSR, BP goals maintained with Cleviprex gtt infusing, SpO2 reading >90% on 10L HFNC. Epi gtt remains infusing @ set rate per CTS team. 1.75L Albumin given. UOP 655cc/shift. CT output 450cc/shift. Pt extubated during shift. CVPs trended, MD aware of current trend. POC reviewed with pt and family members and all questions and concerns addressed. Refer to flowsheet for VS and further assessment.

## 2023-09-19 NOTE — ASSESSMENT & PLAN NOTE
Hx of hyperlipidemia, on atorvastatin and ezetimibe at home. Most recent lipid panel Cholesterol 148, Triglycerides 95, HDL 51, LDL 78    -- Continue home Atorvastatin and Ezetimibe

## 2023-09-20 LAB
ALBUMIN SERPL BCP-MCNC: 3.8 G/DL (ref 3.5–5.2)
ALP SERPL-CCNC: 41 U/L (ref 55–135)
ALT SERPL W/O P-5'-P-CCNC: 20 U/L (ref 10–44)
ANION GAP SERPL CALC-SCNC: 11 MMOL/L (ref 8–16)
ANION GAP SERPL CALC-SCNC: 7 MMOL/L (ref 8–16)
ANION GAP SERPL CALC-SCNC: 7 MMOL/L (ref 8–16)
ANION GAP SERPL CALC-SCNC: 9 MMOL/L (ref 8–16)
AST SERPL-CCNC: 40 U/L (ref 10–40)
BILIRUB SERPL-MCNC: 0.9 MG/DL (ref 0.1–1)
BUN SERPL-MCNC: 12 MG/DL (ref 8–23)
BUN SERPL-MCNC: 13 MG/DL (ref 8–23)
BUN SERPL-MCNC: 16 MG/DL (ref 8–23)
BUN SERPL-MCNC: 17 MG/DL (ref 8–23)
CALCIUM SERPL-MCNC: 8.5 MG/DL (ref 8.7–10.5)
CALCIUM SERPL-MCNC: 8.6 MG/DL (ref 8.7–10.5)
CALCIUM SERPL-MCNC: 8.7 MG/DL (ref 8.7–10.5)
CALCIUM SERPL-MCNC: 8.7 MG/DL (ref 8.7–10.5)
CHLORIDE SERPL-SCNC: 100 MMOL/L (ref 95–110)
CHLORIDE SERPL-SCNC: 103 MMOL/L (ref 95–110)
CHLORIDE SERPL-SCNC: 105 MMOL/L (ref 95–110)
CHLORIDE SERPL-SCNC: 97 MMOL/L (ref 95–110)
CO2 SERPL-SCNC: 29 MMOL/L (ref 23–29)
CO2 SERPL-SCNC: 29 MMOL/L (ref 23–29)
CO2 SERPL-SCNC: 32 MMOL/L (ref 23–29)
CO2 SERPL-SCNC: 34 MMOL/L (ref 23–29)
CREAT SERPL-MCNC: 0.8 MG/DL (ref 0.5–1.4)
CREAT SERPL-MCNC: 0.9 MG/DL (ref 0.5–1.4)
CREAT SERPL-MCNC: 1 MG/DL (ref 0.5–1.4)
CREAT SERPL-MCNC: 1 MG/DL (ref 0.5–1.4)
ERYTHROCYTE [DISTWIDTH] IN BLOOD BY AUTOMATED COUNT: 13.4 % (ref 11.5–14.5)
EST. GFR  (NO RACE VARIABLE): >60 ML/MIN/1.73 M^2
GLUCOSE SERPL-MCNC: 114 MG/DL (ref 70–110)
GLUCOSE SERPL-MCNC: 129 MG/DL (ref 70–110)
GLUCOSE SERPL-MCNC: 179 MG/DL (ref 70–110)
GLUCOSE SERPL-MCNC: 202 MG/DL (ref 70–110)
HCT VFR BLD AUTO: 31.2 % (ref 40–54)
HGB BLD-MCNC: 10.6 G/DL (ref 14–18)
MAGNESIUM SERPL-MCNC: 1.9 MG/DL (ref 1.6–2.6)
MCH RBC QN AUTO: 30.5 PG (ref 27–31)
MCHC RBC AUTO-ENTMCNC: 34 G/DL (ref 32–36)
MCV RBC AUTO: 90 FL (ref 82–98)
PHOSPHATE SERPL-MCNC: 3.7 MG/DL (ref 2.7–4.5)
PLATELET # BLD AUTO: 82 K/UL (ref 150–450)
PMV BLD AUTO: 11.7 FL (ref 9.2–12.9)
POCT GLUCOSE: 102 MG/DL (ref 70–110)
POCT GLUCOSE: 113 MG/DL (ref 70–110)
POCT GLUCOSE: 124 MG/DL (ref 70–110)
POCT GLUCOSE: 159 MG/DL (ref 70–110)
POCT GLUCOSE: 213 MG/DL (ref 70–110)
POTASSIUM SERPL-SCNC: 3.1 MMOL/L (ref 3.5–5.1)
POTASSIUM SERPL-SCNC: 3.2 MMOL/L (ref 3.5–5.1)
POTASSIUM SERPL-SCNC: 3.6 MMOL/L (ref 3.5–5.1)
POTASSIUM SERPL-SCNC: 3.6 MMOL/L (ref 3.5–5.1)
PROT SERPL-MCNC: 5.7 G/DL (ref 6–8.4)
RBC # BLD AUTO: 3.47 M/UL (ref 4.6–6.2)
SODIUM SERPL-SCNC: 139 MMOL/L (ref 136–145)
SODIUM SERPL-SCNC: 140 MMOL/L (ref 136–145)
SODIUM SERPL-SCNC: 141 MMOL/L (ref 136–145)
SODIUM SERPL-SCNC: 143 MMOL/L (ref 136–145)
WBC # BLD AUTO: 17.08 K/UL (ref 3.9–12.7)

## 2023-09-20 PROCEDURE — 97535 SELF CARE MNGMENT TRAINING: CPT

## 2023-09-20 PROCEDURE — 80048 BASIC METABOLIC PNL TOTAL CA: CPT | Mod: XB

## 2023-09-20 PROCEDURE — 83735 ASSAY OF MAGNESIUM: CPT | Performed by: STUDENT IN AN ORGANIZED HEALTH CARE EDUCATION/TRAINING PROGRAM

## 2023-09-20 PROCEDURE — 99499 NO LOS: ICD-10-PCS | Mod: ,,, | Performed by: ANESTHESIOLOGY

## 2023-09-20 PROCEDURE — 63600175 PHARM REV CODE 636 W HCPCS

## 2023-09-20 PROCEDURE — 63600175 PHARM REV CODE 636 W HCPCS: Performed by: NURSE PRACTITIONER

## 2023-09-20 PROCEDURE — 80053 COMPREHEN METABOLIC PANEL: CPT

## 2023-09-20 PROCEDURE — 99499 UNLISTED E&M SERVICE: CPT | Mod: ,,, | Performed by: ANESTHESIOLOGY

## 2023-09-20 PROCEDURE — 94761 N-INVAS EAR/PLS OXIMETRY MLT: CPT

## 2023-09-20 PROCEDURE — 25000003 PHARM REV CODE 250

## 2023-09-20 PROCEDURE — 63600175 PHARM REV CODE 636 W HCPCS: Performed by: STUDENT IN AN ORGANIZED HEALTH CARE EDUCATION/TRAINING PROGRAM

## 2023-09-20 PROCEDURE — 80048 BASIC METABOLIC PNL TOTAL CA: CPT | Mod: 91,XB

## 2023-09-20 PROCEDURE — 97116 GAIT TRAINING THERAPY: CPT

## 2023-09-20 PROCEDURE — 63600150 PHARM REV CODE 636

## 2023-09-20 PROCEDURE — 25000003 PHARM REV CODE 250: Performed by: STUDENT IN AN ORGANIZED HEALTH CARE EDUCATION/TRAINING PROGRAM

## 2023-09-20 PROCEDURE — 84100 ASSAY OF PHOSPHORUS: CPT | Performed by: STUDENT IN AN ORGANIZED HEALTH CARE EDUCATION/TRAINING PROGRAM

## 2023-09-20 PROCEDURE — 99900035 HC TECH TIME PER 15 MIN (STAT)

## 2023-09-20 PROCEDURE — 20000000 HC ICU ROOM

## 2023-09-20 PROCEDURE — 27100171 HC OXYGEN HIGH FLOW UP TO 24 HOURS

## 2023-09-20 PROCEDURE — 63600175 PHARM REV CODE 636 W HCPCS: Mod: JZ,JG

## 2023-09-20 PROCEDURE — 94799 UNLISTED PULMONARY SVC/PX: CPT

## 2023-09-20 PROCEDURE — 25000003 PHARM REV CODE 250: Performed by: THORACIC SURGERY (CARDIOTHORACIC VASCULAR SURGERY)

## 2023-09-20 PROCEDURE — 85027 COMPLETE CBC AUTOMATED: CPT

## 2023-09-20 RX ORDER — POTASSIUM CHLORIDE 29.8 MG/ML
40 INJECTION INTRAVENOUS ONCE
Status: COMPLETED | OUTPATIENT
Start: 2023-09-20 | End: 2023-09-20

## 2023-09-20 RX ORDER — LANOLIN ALCOHOL/MO/W.PET/CERES
800 CREAM (GRAM) TOPICAL
Status: DISCONTINUED | OUTPATIENT
Start: 2023-09-20 | End: 2023-09-24

## 2023-09-20 RX ORDER — AMOXICILLIN 250 MG
1 CAPSULE ORAL 2 TIMES DAILY
Status: DISCONTINUED | OUTPATIENT
Start: 2023-09-20 | End: 2023-09-24 | Stop reason: HOSPADM

## 2023-09-20 RX ORDER — HYDROMORPHONE HYDROCHLORIDE 1 MG/ML
0.5 INJECTION, SOLUTION INTRAMUSCULAR; INTRAVENOUS; SUBCUTANEOUS EVERY 6 HOURS PRN
Status: DISCONTINUED | OUTPATIENT
Start: 2023-09-20 | End: 2023-09-24 | Stop reason: HOSPADM

## 2023-09-20 RX ORDER — SODIUM,POTASSIUM PHOSPHATES 280-250MG
2 POWDER IN PACKET (EA) ORAL
Status: DISCONTINUED | OUTPATIENT
Start: 2023-09-20 | End: 2023-09-24

## 2023-09-20 RX ORDER — METOPROLOL SUCCINATE 25 MG/1
25 TABLET, EXTENDED RELEASE ORAL DAILY
Status: DISCONTINUED | OUTPATIENT
Start: 2023-09-20 | End: 2023-09-21

## 2023-09-20 RX ORDER — POTASSIUM CHLORIDE 20 MEQ/1
20 TABLET, EXTENDED RELEASE ORAL 2 TIMES DAILY
Status: DISCONTINUED | OUTPATIENT
Start: 2023-09-21 | End: 2023-09-24 | Stop reason: HOSPADM

## 2023-09-20 RX ADMIN — POTASSIUM BICARBONATE 50 MEQ: 978 TABLET, EFFERVESCENT ORAL at 10:09

## 2023-09-20 RX ADMIN — POTASSIUM BICARBONATE 35 MEQ: 391 TABLET, EFFERVESCENT ORAL at 06:09

## 2023-09-20 RX ADMIN — FAMOTIDINE 20 MG: 20 TABLET ORAL at 09:09

## 2023-09-20 RX ADMIN — ALTEPLASE 2 MG: 2.2 INJECTION, POWDER, LYOPHILIZED, FOR SOLUTION INTRAVENOUS at 12:09

## 2023-09-20 RX ADMIN — POTASSIUM CHLORIDE 40 MEQ: 29.8 INJECTION, SOLUTION INTRAVENOUS at 04:09

## 2023-09-20 RX ADMIN — ACETAMINOPHEN 1000 MG: 500 TABLET ORAL at 05:09

## 2023-09-20 RX ADMIN — ATORVASTATIN CALCIUM 80 MG: 20 TABLET, FILM COATED ORAL at 08:09

## 2023-09-20 RX ADMIN — EPINEPHRINE 0.04 MCG/KG/MIN: 1 INJECTION INTRAMUSCULAR; INTRAVENOUS; SUBCUTANEOUS at 02:09

## 2023-09-20 RX ADMIN — FAMOTIDINE 20 MG: 20 TABLET ORAL at 08:09

## 2023-09-20 RX ADMIN — INSULIN ASPART 2 UNITS: 100 INJECTION, SOLUTION INTRAVENOUS; SUBCUTANEOUS at 11:09

## 2023-09-20 RX ADMIN — MUPIROCIN: 20 OINTMENT TOPICAL at 09:09

## 2023-09-20 RX ADMIN — OXYCODONE HYDROCHLORIDE 5 MG: 5 TABLET ORAL at 02:09

## 2023-09-20 RX ADMIN — SENNOSIDES AND DOCUSATE SODIUM 1 TABLET: 50; 8.6 TABLET ORAL at 09:09

## 2023-09-20 RX ADMIN — OXYCODONE HYDROCHLORIDE 5 MG: 5 TABLET ORAL at 09:09

## 2023-09-20 RX ADMIN — POTASSIUM CHLORIDE 40 MEQ: 29.8 INJECTION, SOLUTION INTRAVENOUS at 08:09

## 2023-09-20 RX ADMIN — VASOPRESSIN 0.02 UNITS/MIN: 20 INJECTION INTRAVENOUS at 03:09

## 2023-09-20 RX ADMIN — OXYCODONE HYDROCHLORIDE 5 MG: 5 TABLET ORAL at 10:09

## 2023-09-20 RX ADMIN — SODIUM CHLORIDE 25 MG/HR: 9 INJECTION, SOLUTION INTRAVENOUS at 02:09

## 2023-09-20 RX ADMIN — MUPIROCIN: 20 OINTMENT TOPICAL at 08:09

## 2023-09-20 RX ADMIN — OXYCODONE HYDROCHLORIDE 10 MG: 10 TABLET ORAL at 06:09

## 2023-09-20 RX ADMIN — OXYCODONE HYDROCHLORIDE 10 MG: 10 TABLET ORAL at 02:09

## 2023-09-20 RX ADMIN — ONDANSETRON 4 MG: 2 INJECTION INTRAMUSCULAR; INTRAVENOUS at 09:09

## 2023-09-20 RX ADMIN — ASPIRIN 325 MG ORAL TABLET 325 MG: 325 PILL ORAL at 08:09

## 2023-09-20 RX ADMIN — POLYETHYLENE GLYCOL 3350 17 G: 17 POWDER, FOR SOLUTION ORAL at 08:09

## 2023-09-20 RX ADMIN — METOPROLOL SUCCINATE 25 MG: 25 TABLET, EXTENDED RELEASE ORAL at 08:09

## 2023-09-20 RX ADMIN — SODIUM CHLORIDE 25 MG/HR: 9 INJECTION, SOLUTION INTRAVENOUS at 12:09

## 2023-09-20 RX ADMIN — DOCUSATE SODIUM 100 MG: 100 CAPSULE, LIQUID FILLED ORAL at 08:09

## 2023-09-20 RX ADMIN — INSULIN ASPART 4 UNITS: 100 INJECTION, SOLUTION INTRAVENOUS; SUBCUTANEOUS at 05:09

## 2023-09-20 RX ADMIN — SODIUM CHLORIDE 5 MG/HR: 9 INJECTION, SOLUTION INTRAVENOUS at 07:09

## 2023-09-20 RX ADMIN — ACETAMINOPHEN 1000 MG: 500 TABLET ORAL at 01:09

## 2023-09-20 RX ADMIN — SODIUM CHLORIDE 25 MG/HR: 9 INJECTION, SOLUTION INTRAVENOUS at 08:09

## 2023-09-20 RX ADMIN — ACETAMINOPHEN 1000 MG: 500 TABLET ORAL at 09:09

## 2023-09-20 NOTE — SUBJECTIVE & OBJECTIVE
Interval History/Significant Events: Increased oxygen requirement's overnight, at one point requiring AIRVO. UOP increased with benefit, patient O2 need decreasing in AM. Still intermittently requiring cleviprex. Patient pain decently controlled with prn medications.     Follow-up For: Procedure(s) (LRB):  VALVE SPARING AORTIC ROOT REPLACEMENT (N/A)  CORONARY ARTERY BYPASS GRAFT (CABG) (N/A)  HARVEST-VEIN-ENDOVASCULAR (Left)  REPAIR, HERNIA, DIAPHRAGMATIC    Post-Operative Day: 2 Days Post-Op    Objective:     Vital Signs (Most Recent):  Temp: 99.6 °F (37.6 °C) (09/20/23 0300)  Pulse: 85 (09/20/23 0630)  Resp: (!) 23 (09/20/23 0630)  BP: 133/81 (09/20/23 0600)  SpO2: (!) 91 % (09/20/23 0630) Vital Signs (24h Range):  Temp:  [97.6 °F (36.4 °C)-99.6 °F (37.6 °C)] 99.6 °F (37.6 °C)  Pulse:  [71-97] 85  Resp:  [14-29] 23  SpO2:  [86 %-100 %] 91 %  BP: ()/(54-81) 133/81  Arterial Line BP: (102-152)/(46-75) 122/55     Weight: 86.8 kg (191 lb 5.8 oz)  Body mass index is 25.95 kg/m².      Intake/Output Summary (Last 24 hours) at 9/20/2023 0648  Last data filed at 9/20/2023 0629  Gross per 24 hour   Intake 1677.14 ml   Output 5425 ml   Net -3747.86 ml          Physical Exam  Constitutional:       General: He is not in acute distress.     Appearance: He is not ill-appearing or diaphoretic.   HENT:      Head: Normocephalic and atraumatic.      Nose: Nose normal.   Neck:      Comments: [facility] CVC  Cardiovascular:      Rate and Rhythm: Normal rate and regular rhythm.      Heart sounds: No murmur heard.  Pulmonary:      Effort: Pulmonary effort is normal. No respiratory distress.      Breath sounds: No wheezing.      Comments: Chest tubes in place, HFNC  Abdominal:      Palpations: Abdomen is soft.      Tenderness: There is no abdominal tenderness.   Musculoskeletal:      Cervical back: Normal range of motion and neck supple. No tenderness.      Right lower leg: No edema.      Left lower leg: No edema.   Skin:     General:  Skin is warm and dry.   Neurological:      Mental Status: He is alert. Mental status is at baseline.   Psychiatric:         Mood and Affect: Mood normal.            Vents:  Vent Mode: A/C (09/19/23 1538)  Ventilator Initiated: Yes (09/18/23 1446)  Set Rate: 20 BPM (09/19/23 1538)  Vt Set: 360 mL (09/19/23 1538)  Pressure Support: 12 cmH20 (09/19/23 0400)  PEEP/CPAP: 5 cmH20 (09/19/23 1538)  Oxygen Concentration (%): 85 (09/20/23 0200)  Peak Airway Pressure: 0 cmH20 (09/19/23 1538)  Plateau Pressure: 0 cmH20 (09/19/23 1538)  Total Ve: 0 L/m (09/19/23 1538)  Negative Inspiratory Force (cm H2O): 0 (09/19/23 1538)  F/VT Ratio<105 (RSBI): (!) 16.97 (09/19/23 0100)    Lines/Drains/Airways       Central Venous Catheter Line  Duration              Introducer with Double Lumen 09/18/23 0800 Internal Jugular Right 1 day    Pulmonary Artery Catheter Assessment  09/18/23 0824 1 day              Drain  Duration                  Chest Tube 09/18/23 1300 Tube - 1 Anterior Mediastinal 19 Fr. 1 day         Chest Tube 09/18/23 1300 Tube - 2 Mediastinal 19 Fr. 1 day         Chest Tube 09/18/23 1300 Tube - 3 Left Pleural 19 Fr. 1 day         Urethral Catheter 09/18/23 0748 Temperature probe 1 day              Arterial Line  Duration             Arterial Line 09/18/23 0821 Left Radial 1 day              Line  Duration                  Pacer Wires 09/18/23 1259 1 day              Peripheral Intravenous Line  Duration                  Peripheral IV - Single Lumen 14 G  Right Forearm -- days         Peripheral IV - Single Lumen 09/18/23 0655 20 G Anterior;Distal;Left Forearm 1 day                    Significant Labs:    CBC/Anemia Profile:  Recent Labs   Lab 09/18/23 2019 09/18/23 2113 09/19/23  0300 09/19/23  0310 09/19/23  0852 09/19/23 2019 09/20/23  0303   WBC 18.16*  --  16.01*  --   --   --  17.08*   HGB 10.9*  --  10.6*  --   --   --  10.6*   HCT 31.6*   < > 29.5*   < > 29* 29* 31.2*   *  --  104*  --   --   --  82*   MCV  90  --  87  --   --   --  90   RDW 12.7  --  12.7  --   --   --  13.4    < > = values in this interval not displayed.        Chemistries:  Recent Labs   Lab 09/19/23 0300 09/19/23 1652 09/19/23 2159 09/20/23  0303    142 141 143   K 3.5 3.0* 3.2* 3.2*   * 109 107 105   CO2 22* 27 28 29   BUN 14 16 12 12   CREATININE 0.8 0.9 0.8 0.8   CALCIUM 8.2* 8.1* 8.5* 8.7   ALBUMIN 4.1 3.9  --  3.8   PROT 5.5* 5.4*  --  5.7*   BILITOT 0.6 0.8  --  0.9   ALKPHOS 30* 34*  --  41*   ALT 20 19  --  20   AST 39 36  --  40   MG 2.3  --  1.9 1.9   PHOS 2.1*  --  2.7 3.7       ABGs:   Recent Labs   Lab 09/19/23 2019   PH 7.504*   PCO2 34.0*   HCO3 26.8   POCSATURATED 87*   BE 4     CMP:   Recent Labs   Lab 09/19/23 0300 09/19/23 1652 09/19/23 2159 09/20/23  0303    142 141 143   K 3.5 3.0* 3.2* 3.2*   * 109 107 105   CO2 22* 27 28 29   * 140* 135* 129*   BUN 14 16 12 12   CREATININE 0.8 0.9 0.8 0.8   CALCIUM 8.2* 8.1* 8.5* 8.7   PROT 5.5* 5.4*  --  5.7*   ALBUMIN 4.1 3.9  --  3.8   BILITOT 0.6 0.8  --  0.9   ALKPHOS 30* 34*  --  41*   AST 39 36  --  40   ALT 20 19  --  20   ANIONGAP 10 6* 6* 9     All pertinent labs within the past 24 hours have been reviewed.    Significant Imaging:  I have reviewed all pertinent imaging results/findings within the past 24 hours.

## 2023-09-20 NOTE — PLAN OF CARE
1:23 PM  The SW received a secure chat from the patient's bedside nurse stating that the patient's wife will like to discuss d/c options. The SW tried to follow-up with the patient's wife but she had left the patient's room.     2:18 PM  The SW met with the patient and his family to follow-up regarding the patient's d/c plans. The patient's wife informed the SW that her 's d/c is suppose to be on Saturday. The patient's wife reported that she would like to hire someone because she has medical appointments and she will not be home with the patient. The SW contacted [facility] at the request from the patient's wife. The representative informed the SW and the patient's wife that [facility] does not do sitter services. The SW provided the patient's wife with a list of sitter services from the CM resource list from Teams.     Ld Pompa LMSW  Case Management [facility]

## 2023-09-20 NOTE — PLAN OF CARE
Pt AAOx4. NSR, BP goals maintained, SpO2 reading >90% on 8L HFNC. Pt required Airvo overnight, see previous note for further details. Epi gtt weaned and dc'd overnight, per CTS team instructions. UOP 3685cc/shift, Lasix gtt tirated to meet UOP goal, CVPs trended. CT output 260cc/shift. Insulin step down gtt remains infusing. Pt OOBTC, tolerated appropriately. POC reviewed with pt and family member and all questions and concerns addressed. Refer to flowsheet for VS and further assessment.

## 2023-09-20 NOTE — CARE UPDATE
BG goal 140-180    -A1C   Lab Results   Component Value Date    HGBA1C 5.5 11/03/2022         -HOME REGIMEN: none    -INPATIENT REGIMEN: Novolog Correction Scale     -GLUCOSE TREND FOR THE PAST 24HRS: 102-135    -NO HYPOGYCEMIAS NOTED     -Diet: Diet Cardiac Fluid - 1500mL      -Steroids - N/A    -Tube Feeds - N/A      Remains in SICU. BG below goal ranges on IV/SQ insulin regimen.       Plan:  -Discontinue IV insulin infusion at 1.2 u/hr  -Continue Moderate Dose Correction Scale  -BG monitoring ac/hs    Discharge planning: tbd    Endocrine to continue to follow    ** Please call Endocrine for any BG related issues **

## 2023-09-20 NOTE — PROGRESS NOTES
[facility]  Critical Care - Surgery  Progress Note    Patient Name: Sawyer Browning  MRN: [id number]  Admission Date: 9/18/2023  Hospital Length of Stay: 2 days  Code Status: Full Code  Attending Provider: Imtiaz Mooney MD  Primary Care Provider: Mary Kay Haines MD   Principal Problem: Aortic root aneurysm    Subjective:     Hospital/ICU Course:  No notes on file    Interval History/Significant Events: Increased oxygen requirement's overnight, at one point requiring AIRVO. UOP increased with benefit, patient O2 need decreasing in AM. Still intermittently requiring cleviprex. Patient pain decently controlled with prn medications.     Follow-up For: Procedure(s) (LRB):  VALVE SPARING AORTIC ROOT REPLACEMENT (N/A)  CORONARY ARTERY BYPASS GRAFT (CABG) (N/A)  HARVEST-VEIN-ENDOVASCULAR (Left)  REPAIR, HERNIA, DIAPHRAGMATIC    Post-Operative Day: 2 Days Post-Op    Objective:     Vital Signs (Most Recent):  Temp: 99.6 °F (37.6 °C) (09/20/23 0300)  Pulse: 85 (09/20/23 0630)  Resp: (!) 23 (09/20/23 0630)  BP: 133/81 (09/20/23 0600)  SpO2: (!) 91 % (09/20/23 0630) Vital Signs (24h Range):  Temp:  [97.6 °F (36.4 °C)-99.6 °F (37.6 °C)] 99.6 °F (37.6 °C)  Pulse:  [71-97] 85  Resp:  [14-29] 23  SpO2:  [86 %-100 %] 91 %  BP: ()/(54-81) 133/81  Arterial Line BP: (102-152)/(46-75) 122/55     Weight: 86.8 kg (191 lb 5.8 oz)  Body mass index is 25.95 kg/m².      Intake/Output Summary (Last 24 hours) at 9/20/2023 0648  Last data filed at 9/20/2023 0629  Gross per 24 hour   Intake 1677.14 ml   Output 5425 ml   Net -3747.86 ml          Physical Exam  Constitutional:       General: He is not in acute distress.     Appearance: He is not ill-appearing or diaphoretic.   HENT:      Head: Normocephalic and atraumatic.      Nose: Nose normal.   Neck:      Comments: RIJ CVC  Cardiovascular:      Rate and Rhythm: Normal rate and regular rhythm.      Heart sounds: No murmur heard.  Pulmonary:      Effort: Pulmonary  effort is normal. No respiratory distress.      Breath sounds: No wheezing.      Comments: Chest tubes in place, HFNC  Abdominal:      Palpations: Abdomen is soft.      Tenderness: There is no abdominal tenderness.   Musculoskeletal:      Cervical back: Normal range of motion and neck supple. No tenderness.      Right lower leg: No edema.      Left lower leg: No edema.   Skin:     General: Skin is warm and dry.   Neurological:      Mental Status: He is alert. Mental status is at baseline.   Psychiatric:         Mood and Affect: Mood normal.            Vents:  Vent Mode: A/C (09/19/23 1538)  Ventilator Initiated: Yes (09/18/23 1446)  Set Rate: 20 BPM (09/19/23 1538)  Vt Set: 360 mL (09/19/23 1538)  Pressure Support: 12 cmH20 (09/19/23 0400)  PEEP/CPAP: 5 cmH20 (09/19/23 1538)  Oxygen Concentration (%): 85 (09/20/23 0200)  Peak Airway Pressure: 0 cmH20 (09/19/23 1538)  Plateau Pressure: 0 cmH20 (09/19/23 1538)  Total Ve: 0 L/m (09/19/23 1538)  Negative Inspiratory Force (cm H2O): 0 (09/19/23 1538)  F/VT Ratio<105 (RSBI): (!) 16.97 (09/19/23 0100)    Lines/Drains/Airways       Central Venous Catheter Line  Duration              Introducer with Double Lumen 09/18/23 0800 Internal Jugular Right 1 day    Pulmonary Artery Catheter Assessment  09/18/23 0824 1 day              Drain  Duration                  Chest Tube 09/18/23 1300 Tube - 1 Anterior Mediastinal 19 Fr. 1 day         Chest Tube 09/18/23 1300 Tube - 2 Mediastinal 19 Fr. 1 day         Chest Tube 09/18/23 1300 Tube - 3 Left Pleural 19 Fr. 1 day         Urethral Catheter 09/18/23 0748 Temperature probe 1 day              Arterial Line  Duration             Arterial Line 09/18/23 0821 Left Radial 1 day              Line  Duration                  Pacer Wires 09/18/23 1259 1 day              Peripheral Intravenous Line  Duration                  Peripheral IV - Single Lumen 14 G  Right Forearm -- days         Peripheral IV - Single Lumen 09/18/23 0655 20 G  Anterior;Distal;Left Forearm 1 day                    Significant Labs:    CBC/Anemia Profile:  Recent Labs   Lab 09/18/23 2019 09/18/23 2113 09/19/23 0300 09/19/23 0310 09/19/23 0852 09/19/23 2019 09/20/23  0303   WBC 18.16*  --  16.01*  --   --   --  17.08*   HGB 10.9*  --  10.6*  --   --   --  10.6*   HCT 31.6*   < > 29.5*   < > 29* 29* 31.2*   *  --  104*  --   --   --  82*   MCV 90  --  87  --   --   --  90   RDW 12.7  --  12.7  --   --   --  13.4    < > = values in this interval not displayed.        Chemistries:  Recent Labs   Lab 09/19/23 0300 09/19/23 1652 09/19/23 2159 09/20/23  0303    142 141 143   K 3.5 3.0* 3.2* 3.2*   * 109 107 105   CO2 22* 27 28 29   BUN 14 16 12 12   CREATININE 0.8 0.9 0.8 0.8   CALCIUM 8.2* 8.1* 8.5* 8.7   ALBUMIN 4.1 3.9  --  3.8   PROT 5.5* 5.4*  --  5.7*   BILITOT 0.6 0.8  --  0.9   ALKPHOS 30* 34*  --  41*   ALT 20 19  --  20   AST 39 36  --  40   MG 2.3  --  1.9 1.9   PHOS 2.1*  --  2.7 3.7       ABGs:   Recent Labs   Lab 09/19/23 2019   PH 7.504*   PCO2 34.0*   HCO3 26.8   POCSATURATED 87*   BE 4     CMP:   Recent Labs   Lab 09/19/23 0300 09/19/23 1652 09/19/23 2159 09/20/23  0303    142 141 143   K 3.5 3.0* 3.2* 3.2*   * 109 107 105   CO2 22* 27 28 29   * 140* 135* 129*   BUN 14 16 12 12   CREATININE 0.8 0.9 0.8 0.8   CALCIUM 8.2* 8.1* 8.5* 8.7   PROT 5.5* 5.4*  --  5.7*   ALBUMIN 4.1 3.9  --  3.8   BILITOT 0.6 0.8  --  0.9   ALKPHOS 30* 34*  --  41*   AST 39 36  --  40   ALT 20 19  --  20   ANIONGAP 10 6* 6* 9     All pertinent labs within the past 24 hours have been reviewed.    Significant Imaging:  I have reviewed all pertinent imaging results/findings within the past 24 hours.    Assessment/Plan:     Cardiac/Vascular  * Aortic root aneurysm    Neuro/Psych:     - Sedation: None    - Pain:     - Scheduled Tylenol 1000mg Q8H   - PRN Oxycodone 5mg/10mg, Hydromorphone 0.5 Q1H               Cardiac:     - POD #2 s/p valve  sparing aortic root repair, hemiarch replacement (6 minute circ arrest), CABG x2, and repair of hernia of morgani with Dr. Mooney    - Postoperative GWEN: Normal bivent function, no wall motion abnormalities, mild increase RV diameter    - BP Goal: MAP > 65, SBP < 130    - Inotropes/Pressors: Epi is off    - Anti-HTNs: Cleviprex gtt to maintain SBP < 130, if consistently requiring cleviprex may consider addition of Nifedipine    - Rhythm:    - Beta Blocker: Start Toprol 25 daily    - Statin: statin daily    - Ezetimibe daily        Pulmonary:     - Goal SpO2 >92%    - Wean supplemental O2 as tolerated    - Chest Tubes x 3 (2 Meds & 1 Pleural)    - ABGs PRN          Renal:    - Trend BUN/Cr     - Maintain Tsai, record strict Is/Os  - Net negative 3100 cc last 24 hours. Improved oxygenation. UOP goal now 100-200 with lasix gtt    Recent Labs   Lab 09/19/23  1652 09/19/23  2159 09/20/23  0303   BUN 16 12 12   CREATININE 0.9 0.8 0.8         FEN / GI:     - Daily CMP, PRN K/Mag/Phos per protocol. CMP more frequently while on lasix    - Replace electrolytes as needed    - Nutrition: Cardiac diet 1500 cc restriction    - Bowel Regimen: Miralax, docusate      ID:     - Tmax: afebrile    - Abx: Completed perioperative cefazolin 2g Q8H x 5 doses    Recent Labs   Lab 09/18/23 2019 09/19/23  0300 09/20/23  0303   WBC 18.16* 16.01* 17.08*         Heme/Onc:     - CBC daily    - ASA 325mg daily    - Lovenox dvt held in setting of platelet decrease    Recent Labs   Lab 09/18/23  1504 09/18/23  1840 09/18/23  2019 09/19/23  0300 09/20/23  0303   HGB 12.8*  12.8*  --  10.9* 10.6* 10.6*   *  132*  --  106* 104* 82*   APTT 47.4*  47.4* 24.5 23.7 25.4  --    INR 1.2  1.2  --  1.1 1.1  --          Endocrine:     - CTS Goal -140    - HgbA1c: 5.5    - Endocrinology consulted for insulin management      PPx:   Feeding: Cardiac diet, 1500 cc restriction  Analgesia/Sedation: MM  Thromboembolic Prevention: Lovenox  held. Monitoring platelets  HOB >30: Yes  Stress Ulcer: Yes - famotidine 20mg PO  Glucose Control: Yes, insulin management per Endocrinology -      Lines/Drains/Airway:  PIV    Art Line: L radial   Central Line: RIJ CVC, swan (DC today)   Tsai    Chest Tubes: (2 Mediastinal, 1 Pleural)    Pacing Wires: Temporary v pacing wires      Dispo/Code Status/Palliative:     - Continue SICU Care    - Full Code      Thoracic aortic aneurysm without rupture  See Aortic root aneurysm    Coronary artery disease  Hx of CAD, s/p CABGx2 (LIMA-LAD and SVG-PDA) and aortic root replacement with Dr. Mooney on 9/18/23    --Continue ASA   --Continue Ezetimibe and Atorvastatin    Other hyperlipidemia  Hx of hyperlipidemia, on atorvastatin and ezetimibe at home. Most recent lipid panel Cholesterol 148, Triglycerides 95, HDL 51, LDL 78    -- Continue home Atorvastatin and Ezetimibe       Essential hypertension  Hx of Essental hypertension on home Amlodipine and Valsartan. Will hold home medications in the immediate post operative setting and start when appropriate.     --MAP >65 SBP < 130  --PRN Cleviprex         Critical care was time spent personally by me on the following activities: development of treatment plan with patient or surrogate and bedside caregivers, discussions with consultants, evaluation of patient's response to treatment, examination of patient, ordering and performing treatments and interventions, ordering and review of laboratory studies, ordering and review of radiographic studies, pulse oximetry, re-evaluation of patient's condition.  This critical care time did not overlap with that of any other provider or involve time for any procedures.     Jean Claude Sandoval, DO  Critical Care - Surgery  [facility]

## 2023-09-20 NOTE — NURSING
Md notified of pt's SpO2 reading 82-88% on the monitor with pt reporting SOB while on 12L HFNC. ABG obtained, results relayed to MD. Decision made to switch to Airvo and to increase Lasix gtt with a new UOP hourly goal. Care plan updated, will carry out orders.

## 2023-09-20 NOTE — ASSESSMENT & PLAN NOTE
  Neuro/Psych:     - Sedation: None    - Pain:     - Scheduled Tylenol 1000mg Q8H   - PRN Oxycodone 5mg/10mg, Hydromorphone 0.5 Q1H               Cardiac:     - POD #2 s/p valve sparing aortic root repair, hemiarch replacement (6 minute circ arrest), CABG x2, and repair of hernia of morgani with Dr. Mooney    - Postoperative GWEN: Normal bivent function, no wall motion abnormalities, mild increase RV diameter    - BP Goal: MAP > 65, SBP < 130    - Inotropes/Pressors: Epi is off    - Anti-HTNs: Cleviprex gtt to maintain SBP < 130, if consistently requiring cleviprex may consider addition of Nifedipine    - Rhythm:    - Beta Blocker: Start Toprol 25 daily    - Statin: statin daily    - Ezetimibe daily        Pulmonary:     - Goal SpO2 >92%    - Wean supplemental O2 as tolerated    - Chest Tubes x 3 (2 Meds & 1 Pleural)    - ABGs PRN          Renal:    - Trend BUN/Cr     - Maintain Tsai, record strict Is/Os  - Net negative 3100 cc last 24 hours. Improved oxygenation. UOP goal now 100-200 with lasix gtt    Recent Labs   Lab 09/19/23  1652 09/19/23  2159 09/20/23  0303   BUN 16 12 12   CREATININE 0.9 0.8 0.8         FEN / GI:     - Daily CMP, PRN K/Mag/Phos per protocol. CMP more frequently while on lasix    - Replace electrolytes as needed    - Nutrition: Cardiac diet 1500 cc restriction    - Bowel Regimen: Miralax, docusate      ID:     - Tmax: afebrile    - Abx: Completed perioperative cefazolin 2g Q8H x 5 doses    Recent Labs   Lab 09/18/23 2019 09/19/23  0300 09/20/23  0303   WBC 18.16* 16.01* 17.08*         Heme/Onc:     - CBC daily    - ASA 325mg daily    - Lovenox dvt held in setting of platelet decrease    Recent Labs   Lab 09/18/23  1504 09/18/23  1840 09/18/23 2019 09/19/23  0300 09/20/23  0303   HGB 12.8*  12.8*  --  10.9* 10.6* 10.6*   *  132*  --  106* 104* 82*   APTT 47.4*  47.4* 24.5 23.7 25.4  --    INR 1.2  1.2  --  1.1 1.1  --          Endocrine:     - CTS Goal -140    -  HgbA1c: 5.5    - Endocrinology consulted for insulin management      PPx:   Feeding: Cardiac diet, 1500 cc restriction  Analgesia/Sedation: MM  Thromboembolic Prevention: Lovenox held. Monitoring platelets  HOB >30: Yes  Stress Ulcer: Yes - famotidine 20mg PO  Glucose Control: Yes, insulin management per Endocrinology -      Lines/Drains/Airway:  PIV    Art Line: L radial   Central Line: RIJ CVC, swan (DC today)   Tsai    Chest Tubes: (2 Mediastinal, 1 Pleural)    Pacing Wires: Temporary v pacing wires      Dispo/Code Status/Palliative:     - Continue SICU Care    - Full Code

## 2023-09-20 NOTE — PT/OT/SLP PROGRESS
Physical Therapy Treatment    Patient Name:  Sawyer Browning   MRN:  [id number]  Admit Date: 2023  Admitting Diagnosis:  Aortic root aneurysm   Length of Stay: 2 days  Recent Surgery: Procedure(s) (LRB):  VALVE SPARING AORTIC ROOT REPLACEMENT (N/A)  CORONARY ARTERY BYPASS GRAFT (CABG) (N/A)  HARVEST-VEIN-ENDOVASCULAR (Left)  REPAIR, HERNIA, DIAPHRAGMATIC 2 Days Post-Op    Recommendations:     Discharge Recommendations:  other (see comments) (defer to CM/SW)   Discharge Equipment Recommendations: none   Barriers to discharge: None    Plan:     During this hospitalization, patient to be seen 5 x/week to address the listed problems via gait training, therapeutic activities, therapeutic exercises, neuromuscular re-education  Plan of Care Expires:  10/18/23  Plan of Care Reviewed with: patient, spouse    Assessment:     Sawyer Browning is a 68 y.o. male admitted with a medical diagnosis of Aortic root aneurysm. Pt found alert and cooperative with VSS. Pt progressing towards goals, but not at PLOF. Pt tolerated session well.  Pt is improving with therapy evidenced by increased gait distance. Pt would benefit from continued PT services to improve overall functional mobility. Pt would benefit from continued acute PT to maximize functional mobility after surgical intervention.      Problem List: impaired endurance, impaired functional mobility, gait instability, impaired balance, decreased upper extremity function, impaired cardiopulmonary response to activity.  Rehab Prognosis: Good     GOALS:   Multidisciplinary Problems       Physical Therapy Goals          Problem: Physical Therapy    Goal Priority Disciplines Outcome Goal Variances Interventions   Physical Therapy Goal     PT, PT/OT Ongoing, Progressing     Description: Goals to be met by: 10/3/23     Patient will increase functional independence with mobility by performin. Supine to sit with Stand-by Assistance  2. Sit to supine with Stand-by Assistance  3. Sit to  stand transfer with Supervision  4. Bed to chair transfer with Supervision using No Assistive Device  5. Gait  x 250 feet with Supervision using No Assistive Device.   6. Ascend/descend 3 stair with unilateral Handrails Supervision using No Assistive Device.                          Subjective   Communicated with RN prior to session.  Patient found up in chair upon PT entry to room, agreeable to evaluation. Sawyer Browning's wife present during session.    Chief Complaint: none reported   Patient/Family Comments/goals: to get better   Pain/Comfort:  Pain Rating 1: 6/10  Location 1:  (sternal)  Pain Addressed 1: Reposition, Distraction  Pain Rating Post-Intervention 1: 6/10    Objective:   Patient found with: telemetry, pulse ox (continuous), blood pressure cuff, central line, peripheral IV, petit catheter, chest tube, arterial line, oxygen (swan-trisha catheter)   General Precautions: Standard, Cardiac fall, sternal, [place] Trisha catheter (PA cordis)   Orthopedic Precautions:N/A   Braces: N/A   Oxygen Device: High Flow Nasal Cannula   Vitals: /78   Pulse 78   Temp 97.6 °F (36.4 °C) (Oral)   Resp (!) 22   Ht 6' (1.829 m)   Wt 86.8 kg (191 lb 5.8 oz)   SpO2 95%   BMI 25.95 kg/m²     Outcome Measures:  AM-PAC 6 CLICK MOBILITY  Turning over in bed (including adjusting bedclothes, sheets and blankets)?: 3  Sitting down on and standing up from a chair with arms (e.g., wheelchair, bedside commode, etc.): 3  Moving from lying on back to sitting on the side of the bed?: 3  Moving to and from a bed to a chair (including a wheelchair)?: 3  Need to walk in hospital room?: 3  Climbing 3-5 steps with a railing?: 3  Basic Mobility Total Score: 18     Functional Mobility:  Additional staff present: OT - due to pt requiring 2 skilled therapists to safely perform functional mobility and to accommodate pt's activity tolerance  Bed Mobility:   Not performed 2nd to pt found in chair     Transfers:   Sit <> Stand Transfer: contact  guard assistance with no assistive device from EOB      Gait:  Patient ambulated: 10ft + 20ft (ADLs standing at the sink between trials)   Patient required: contact guard  Patient used: no assistive device  Gait Pattern observed: reciprocal gait  Gait Deviation(s): decreased step length and decreased [place]  Impairments due to:  post surgical instability and cautious gait   Comments:   No LOB  No SOB  Verbal cuing for forward gaze, increased step length, and for pacing       Therapeutic Activities, Exercises, and Education:   Educated pt on PT role/POC  Educated pt on importance of OOB activity and daily ambulation   Educated pt on sternal precautions  Pt verbalized understanding     Pt performed ADLs while standing at the sink with OT    Patient left up in chair with all lines intact, call button in reach, RN notified, and wife present..    Time Tracking:     PT Received On: 09/20/23  PT Start Time: 0925     PT Stop Time: 0943  PT Total Time (min): 18 min       Billable Minutes:   Gait Training 10    Treatment Type: Treatment  PT/PTA: PT

## 2023-09-20 NOTE — PT/OT/SLP PROGRESS
Occupational Therapy Treatment    Patient Name:  Sawyer Browning   MRN:  [id number]  Admit Date: 9/18/2023  Admitting Diagnosis:  Aortic root aneurysm   Length of Stay: 2 days  Recent Surgery: Procedure(s) (LRB):  VALVE SPARING AORTIC ROOT REPLACEMENT (N/A)  CORONARY ARTERY BYPASS GRAFT (CABG) (N/A)  HARVEST-VEIN-ENDOVASCULAR (Left)  REPAIR, HERNIA, DIAPHRAGMATIC 2 Days Post-Op    Recommendations:     Discharge Recommendations: other (see comments)  Discharge Equipment Recommendations:  none  Barriers to discharge:  None    Plan:     Patient to be seen 5 x/week to address the above listed problems via self-care/home management, therapeutic activities, therapeutic exercises  Plan of Care Expires: 10/19/23  Plan of Care Reviewed with: patient, spouse    Assessment:   Sawyer Browning is a 68 y.o. male with a medical diagnosis of Aortic root aneurysm.  He presents with the following performance deficits affecting function: weakness, impaired endurance, impaired self care skills, impaired functional mobility, gait instability, impaired balance, decreased safety awareness, pain, impaired cardiopulmonary response to activity, decreased upper extremity function, decreased lower extremity function, impaired coordination.      Pt tolerated session well this date and was willing to participate. Demonstrating continued increased pain, impaired endurance, impaired balance, and impaired cardiopulmonary response to activity, requiring increased time and cueing to complete functional tasks. Patient required cueing for step sequencing to complete ADLs in stance at sink. Observed short gait, increased cueing for correction, unable to achieve this date. Patient is progressing towards established goals, and continues to benefit from acute skilled OT services to increase functional performance and improve quality of life. OT to continue to recommend other at discharge to improve pt functional independence and increase patient safety before  "returning home.      Rehab Prognosis: Good; patient would benefit from acute skilled OT services to address these deficits and reach maximum level of function.        Subjective   Communicated with: Nurse prior to session.  Patient found up in chair with arterial line, blood pressure cuff, telemetry, pulse ox (continuous), central line, chest tube, petit catheter, peripheral IV, oxygen upon OT entry to room. Pt agreeable to participate at this time.    Patient: " I like it wet " -re declining towel to dry face following wetting at sink    Pain/Comfort:  Pain Rating 1: 6/10  Location - Orientation 1: generalized  Location 1: sternal  Pain Addressed 1: Distraction, Cessation of Activity  Pain Rating Post-Intervention 1: 6/10    Objective:   Patient found with: arterial line, blood pressure cuff, telemetry, pulse ox (continuous), central line, chest tube, petit catheter, peripheral IV, oxygen   General Precautions: Standard, Cardiac fall, sternal, [place] Trisha catheter (PA cordis)   Orthopedic Precautions:N/A   Braces: N/A   Oxygen Device: High Flow Nasal Cannula 8L  Vitals: /77   Pulse 83   Temp 98.3 °F (36.8 °C) (Oral)   Resp 16   Ht 6' (1.829 m)   Wt 86.8 kg (191 lb 5.8 oz)   SpO2 97%   BMI 25.95 kg/m²     Outcome Measures:  [facility] 6 Click ADL: 19    Cognition:   Sedated and Cooperative  Command following: follows one-step commands  Communication: clear/fluent    Occupational Performance:  Bed Mobility:    Not assessed    Functional Mobility/Transfers:  Patient completed Sit <> Stand Transfer from chair with contact guard assistance  with  no assistive device   Static Standing Balance: CGA  Dynamic Standing Balance: CGA  Patient completed functional mobility of household distance ~10ft with CGA using no AD to sink.  Patient completed second trial of functional mobility of household distance ~20ft with CGA using no AD.      Activities of Daily Living:  Grooming: contact guard assistance to perform oral care " and wet face in stance at sink    [facility] 6 Click ADL:  [facility] Total Score: 19    Treatment & Education:  -Pt and spouse education on OT role and POC.  -Importance of E/OOB activity with staff assistance, emphasis on daily participation  -Safety during functional transfer and mobility ensured  -Patient provided with education on importance of Bilateral UB/LB integration during functional tasks for improvement in functional performance.   -Education provided/reviewed, questions answered within OT scope of practice.   -Patient demonstrates understanding and learning this date.         Patient left up in chair with all lines intact, call button in reach, nurse notified, and spouse present    GOALS:   Multidisciplinary Problems       Occupational Therapy Goals          Problem: Occupational Therapy    Goal Priority Disciplines Outcome Interventions   Occupational Therapy Goal     OT, PT/OT Ongoing, Progressing    Description: Goals to be met by:  10/10/2023    Patient will increase functional independence with ADLs by performing:    UE Dressing with Set-up Assistance.  LE Dressing with Supervision.  Grooming while standing at sink with Modified [place].  Toileting from toilet with Modified [place] for hygiene and clothing management.   Supine to sit with Modified [place].  Stand pivot transfers with Modified [place].  Toilet transfer to toilet with Modified [place].                         Time Tracking:     OT Date of Treatment: 09/20/23  OT Start Time: 0926  OT Stop Time: 0943  OT Total Time (min): 17 min    Billable Minutes:Self Care/Home Management 17      9/20/2023

## 2023-09-20 NOTE — PLAN OF CARE
SICU PLAN OF CARE NOTE    Dx: Aortic root aneurysm    Goals of Care: MAP >65. SBP <130    Vital Signs (last 12 hours):   Temp:  [97.6 °F (36.4 °C)-98.5 °F (36.9 °C)]   Pulse:  [72-95]   Resp:  [16-29]   BP: ()/(54-72)   SpO2:  [86 %-95 %]   Arterial Line BP: (108-145)/(46-68)      Neuro: AAO x4, Arouses to Voice, Follows Commands, and Moves All Extremities    Cardiac: NSR 80s    Respiratory: High [place] Nasal Cannula 10L    Gtts: Epi 0.03, Lasix 12.5, Cleviprex 3, Insulin 1.5    Urine Output: Urinary Catheter 910 cc/shift    Drains: Chest Tube, total output 570 cc /  shift    Diet: Cardiac Diet 1500 ml FR     Labs/Accuchecks: Accuchecks ACHS and 0200. All labs reviewed with MD.    Skin: No new skin breakdown noted this shift. Bed plugged in and working. Protective foams in use. Frequent weight shift assistance provided.    Shift Events: Lasix gtt started for goal urine output 25-150cc/hr

## 2023-09-21 LAB
ALBUMIN SERPL BCP-MCNC: 3.3 G/DL (ref 3.5–5.2)
ALBUMIN SERPL BCP-MCNC: 3.4 G/DL (ref 3.5–5.2)
ALLENS TEST: ABNORMAL
ALP SERPL-CCNC: 42 U/L (ref 55–135)
ALP SERPL-CCNC: 48 U/L (ref 55–135)
ALT SERPL W/O P-5'-P-CCNC: 26 U/L (ref 10–44)
ALT SERPL W/O P-5'-P-CCNC: 38 U/L (ref 10–44)
ANION GAP SERPL CALC-SCNC: 8 MMOL/L (ref 8–16)
ANION GAP SERPL CALC-SCNC: 8 MMOL/L (ref 8–16)
ASCENDING AORTA: 3.39 CM
AST SERPL-CCNC: 32 U/L (ref 10–40)
AST SERPL-CCNC: 44 U/L (ref 10–40)
AV INDEX (PROSTH): 1.19
AV MEAN GRADIENT: 3 MMHG
AV PEAK GRADIENT: 5 MMHG
AV VALVE AREA BY VELOCITY RATIO: 3.18 CM²
AV VALVE AREA: 3.62 CM²
AV VELOCITY RATIO: 1.04
BILIRUB SERPL-MCNC: 0.9 MG/DL (ref 0.1–1)
BILIRUB SERPL-MCNC: 1 MG/DL (ref 0.1–1)
BSA FOR ECHO PROCEDURE: 2.16 M2
BUN SERPL-MCNC: 19 MG/DL (ref 8–23)
BUN SERPL-MCNC: 23 MG/DL (ref 8–23)
CALCIUM SERPL-MCNC: 8.6 MG/DL (ref 8.7–10.5)
CALCIUM SERPL-MCNC: 9 MG/DL (ref 8.7–10.5)
CHLORIDE SERPL-SCNC: 98 MMOL/L (ref 95–110)
CHLORIDE SERPL-SCNC: 98 MMOL/L (ref 95–110)
CO2 SERPL-SCNC: 32 MMOL/L (ref 23–29)
CO2 SERPL-SCNC: 32 MMOL/L (ref 23–29)
CREAT SERPL-MCNC: 0.9 MG/DL (ref 0.5–1.4)
CREAT SERPL-MCNC: 1 MG/DL (ref 0.5–1.4)
CV ECHO LV RWT: 0.41 CM
DELSYS: ABNORMAL
DOP CALC AO PEAK VEL: 1.14 M/S
DOP CALC AO VTI: 18.53 CM
DOP CALC LVOT AREA: 3 CM2
DOP CALC LVOT DIAMETER: 1.97 CM
DOP CALC LVOT PEAK VEL: 1.19 M/S
DOP CALC LVOT STROKE VOLUME: 66.99 CM3
DOP CALCLVOT PEAK VEL VTI: 21.99 CM
E WAVE DECELERATION TIME: 243.66 MSEC
E/A RATIO: 4.27
E/E' RATIO: 11.06 M/S
ECHO LV POSTERIOR WALL: 0.77 CM (ref 0.6–1.1)
ERYTHROCYTE [DISTWIDTH] IN BLOOD BY AUTOMATED COUNT: 13.2 % (ref 11.5–14.5)
ERYTHROCYTE [SEDIMENTATION RATE] IN BLOOD BY WESTERGREN METHOD: 20 MM/H
EST. GFR  (NO RACE VARIABLE): >60 ML/MIN/1.73 M^2
EST. GFR  (NO RACE VARIABLE): >60 ML/MIN/1.73 M^2
FLOW: 10
FRACTIONAL SHORTENING: 22 % (ref 28–44)
GLUCOSE SERPL-MCNC: 158 MG/DL (ref 70–110)
GLUCOSE SERPL-MCNC: 165 MG/DL (ref 70–110)
HCO3 UR-SCNC: 33.9 MMOL/L
HCT VFR BLD AUTO: 29.5 % (ref 40–54)
HCT VFR BLD CALC: 30 %PCV (ref 36–54)
HGB BLD-MCNC: 10.1 G/DL (ref 14–18)
INTERVENTRICULAR SEPTUM: 0.75 CM (ref 0.6–1.1)
LA MAJOR: 6.03 CM
LA MINOR: 4.87 CM
LA WIDTH: 3.7 CM
LEFT ATRIUM SIZE: 3.89 CM
LEFT ATRIUM VOLUME INDEX: 30.7 ML/M2
LEFT ATRIUM VOLUME: 65.92 CM3
LEFT INTERNAL DIMENSION IN SYSTOLE: 2.92 CM (ref 2.1–4)
LEFT VENTRICLE DIASTOLIC VOLUME INDEX: 27.99 ML/M2
LEFT VENTRICLE DIASTOLIC VOLUME: 60.17 ML
LEFT VENTRICLE MASS INDEX: 37 G/M2
LEFT VENTRICLE SYSTOLIC VOLUME INDEX: 15.2 ML/M2
LEFT VENTRICLE SYSTOLIC VOLUME: 32.64 ML
LEFT VENTRICULAR INTERNAL DIMENSION IN DIASTOLE: 3.75 CM (ref 3.5–6)
LEFT VENTRICULAR MASS: 78.5 G
LV LATERAL E/E' RATIO: 9.4 M/S
LV SEPTAL E/E' RATIO: 13.43 M/S
MAGNESIUM SERPL-MCNC: 2 MG/DL (ref 1.6–2.6)
MCH RBC QN AUTO: 31.1 PG (ref 27–31)
MCHC RBC AUTO-ENTMCNC: 34.2 G/DL (ref 32–36)
MCV RBC AUTO: 91 FL (ref 82–98)
MODE: ABNORMAL
MV PEAK A VEL: 0.22 M/S
MV PEAK E VEL: 0.94 M/S
MV STENOSIS PRESSURE HALF TIME: 70.66 MS
MV VALVE AREA P 1/2 METHOD: 3.11 CM2
PCO2 BLDA: 44.1 MMHG (ref 35–45)
PH SMN: 7.49 [PH] (ref 7.35–7.45)
PHOSPHATE SERPL-MCNC: 2.7 MG/DL (ref 2.7–4.5)
PISA TR MAX VEL: 2.44 M/S
PLATELET # BLD AUTO: 77 K/UL (ref 150–450)
PMV BLD AUTO: 12 FL (ref 9.2–12.9)
PO2 BLDA: 59 MMHG (ref 80–100)
POC BE: 11 MMOL/L
POC IONIZED CALCIUM: 1.09 MMOL/L (ref 1.06–1.42)
POC SATURATED O2: 92 % (ref 95–100)
POC TCO2: 35 MMOL/L (ref 23–27)
POCT GLUCOSE: 120 MG/DL (ref 70–110)
POCT GLUCOSE: 145 MG/DL (ref 70–110)
POTASSIUM BLD-SCNC: 3.3 MMOL/L (ref 3.5–5.1)
POTASSIUM SERPL-SCNC: 3.8 MMOL/L (ref 3.5–5.1)
POTASSIUM SERPL-SCNC: 3.9 MMOL/L (ref 3.5–5.1)
PROT SERPL-MCNC: 5.4 G/DL (ref 6–8.4)
PROT SERPL-MCNC: 5.9 G/DL (ref 6–8.4)
RA MAJOR: 5.14 CM
RA PRESSURE ESTIMATED: 3 MMHG
RA WIDTH: 4.03 CM
RBC # BLD AUTO: 3.25 M/UL (ref 4.6–6.2)
RV TB RVSP: 5 MMHG
SAMPLE: ABNORMAL
SINUS: 2.75 CM
SITE: ABNORMAL
SODIUM BLD-SCNC: 139 MMOL/L (ref 136–145)
SODIUM SERPL-SCNC: 138 MMOL/L (ref 136–145)
SODIUM SERPL-SCNC: 138 MMOL/L (ref 136–145)
SP02: 91
STJ: 2.97 CM
TDI LATERAL: 0.1 M/S
TDI SEPTAL: 0.07 M/S
TDI: 0.09 M/S
TR MAX PG: 24 MMHG
TV REST PULMONARY ARTERY PRESSURE: 27 MMHG
WBC # BLD AUTO: 11.89 K/UL (ref 3.9–12.7)
Z-SCORE OF LEFT VENTRICULAR DIMENSION IN END DIASTOLE: -6.21
Z-SCORE OF LEFT VENTRICULAR DIMENSION IN END SYSTOLE: -2.97

## 2023-09-21 PROCEDURE — 63600175 PHARM REV CODE 636 W HCPCS

## 2023-09-21 PROCEDURE — 80053 COMPREHEN METABOLIC PANEL: CPT | Mod: 91

## 2023-09-21 PROCEDURE — 25000003 PHARM REV CODE 250

## 2023-09-21 PROCEDURE — 25500020 PHARM REV CODE 255: Performed by: NURSE PRACTITIONER

## 2023-09-21 PROCEDURE — C9248 INJ, CLEVIDIPINE BUTYRATE: HCPCS | Mod: JZ,JG

## 2023-09-21 PROCEDURE — 99900035 HC TECH TIME PER 15 MIN (STAT)

## 2023-09-21 PROCEDURE — 99232 SBSQ HOSP IP/OBS MODERATE 35: CPT | Mod: ,,, | Performed by: ANESTHESIOLOGY

## 2023-09-21 PROCEDURE — 25000003 PHARM REV CODE 250: Performed by: THORACIC SURGERY (CARDIOTHORACIC VASCULAR SURGERY)

## 2023-09-21 PROCEDURE — 99232 PR SUBSEQUENT HOSPITAL CARE,LEVL II: ICD-10-PCS | Mod: ,,, | Performed by: ANESTHESIOLOGY

## 2023-09-21 PROCEDURE — 85014 HEMATOCRIT: CPT

## 2023-09-21 PROCEDURE — 94799 UNLISTED PULMONARY SVC/PX: CPT

## 2023-09-21 PROCEDURE — 20000000 HC ICU ROOM

## 2023-09-21 PROCEDURE — 80053 COMPREHEN METABOLIC PANEL: CPT

## 2023-09-21 PROCEDURE — 63600175 PHARM REV CODE 636 W HCPCS: Mod: JZ,JG

## 2023-09-21 PROCEDURE — 82803 BLOOD GASES ANY COMBINATION: CPT

## 2023-09-21 PROCEDURE — 84100 ASSAY OF PHOSPHORUS: CPT | Performed by: STUDENT IN AN ORGANIZED HEALTH CARE EDUCATION/TRAINING PROGRAM

## 2023-09-21 PROCEDURE — 37799 UNLISTED PX VASCULAR SURGERY: CPT

## 2023-09-21 PROCEDURE — 27100171 HC OXYGEN HIGH FLOW UP TO 24 HOURS

## 2023-09-21 PROCEDURE — 97116 GAIT TRAINING THERAPY: CPT

## 2023-09-21 PROCEDURE — 25000003 PHARM REV CODE 250: Performed by: STUDENT IN AN ORGANIZED HEALTH CARE EDUCATION/TRAINING PROGRAM

## 2023-09-21 PROCEDURE — 84295 ASSAY OF SERUM SODIUM: CPT

## 2023-09-21 PROCEDURE — 85027 COMPLETE CBC AUTOMATED: CPT

## 2023-09-21 PROCEDURE — 63600175 PHARM REV CODE 636 W HCPCS: Performed by: STUDENT IN AN ORGANIZED HEALTH CARE EDUCATION/TRAINING PROGRAM

## 2023-09-21 PROCEDURE — 97535 SELF CARE MNGMENT TRAINING: CPT

## 2023-09-21 PROCEDURE — 84132 ASSAY OF SERUM POTASSIUM: CPT

## 2023-09-21 PROCEDURE — 27000221 HC OXYGEN, UP TO 24 HOURS

## 2023-09-21 PROCEDURE — 83735 ASSAY OF MAGNESIUM: CPT | Performed by: STUDENT IN AN ORGANIZED HEALTH CARE EDUCATION/TRAINING PROGRAM

## 2023-09-21 PROCEDURE — 82330 ASSAY OF CALCIUM: CPT

## 2023-09-21 PROCEDURE — 94761 N-INVAS EAR/PLS OXIMETRY MLT: CPT

## 2023-09-21 RX ORDER — POTASSIUM CHLORIDE 14.9 MG/ML
20 INJECTION INTRAVENOUS ONCE
Status: COMPLETED | OUTPATIENT
Start: 2023-09-21 | End: 2023-09-21

## 2023-09-21 RX ORDER — INSULIN ASPART 100 [IU]/ML
0-10 INJECTION, SOLUTION INTRAVENOUS; SUBCUTANEOUS
Status: DISCONTINUED | OUTPATIENT
Start: 2023-09-21 | End: 2023-09-24 | Stop reason: HOSPADM

## 2023-09-21 RX ORDER — IBUPROFEN 200 MG
16 TABLET ORAL
Status: DISCONTINUED | OUTPATIENT
Start: 2023-09-21 | End: 2023-09-24 | Stop reason: HOSPADM

## 2023-09-21 RX ORDER — GLUCAGON 1 MG
1 KIT INJECTION
Status: DISCONTINUED | OUTPATIENT
Start: 2023-09-21 | End: 2023-09-24 | Stop reason: HOSPADM

## 2023-09-21 RX ORDER — METOPROLOL SUCCINATE 50 MG/1
50 TABLET, EXTENDED RELEASE ORAL DAILY
Status: DISCONTINUED | OUTPATIENT
Start: 2023-09-21 | End: 2023-09-22

## 2023-09-21 RX ORDER — IBUPROFEN 200 MG
24 TABLET ORAL
Status: DISCONTINUED | OUTPATIENT
Start: 2023-09-21 | End: 2023-09-24 | Stop reason: HOSPADM

## 2023-09-21 RX ORDER — METOCLOPRAMIDE HYDROCHLORIDE 5 MG/ML
10 INJECTION INTRAMUSCULAR; INTRAVENOUS EVERY 6 HOURS PRN
Status: DISCONTINUED | OUTPATIENT
Start: 2023-09-21 | End: 2023-09-24 | Stop reason: HOSPADM

## 2023-09-21 RX ADMIN — ATORVASTATIN CALCIUM 80 MG: 20 TABLET, FILM COATED ORAL at 08:09

## 2023-09-21 RX ADMIN — ASPIRIN 325 MG ORAL TABLET 325 MG: 325 PILL ORAL at 08:09

## 2023-09-21 RX ADMIN — OXYCODONE HYDROCHLORIDE 5 MG: 5 TABLET ORAL at 10:09

## 2023-09-21 RX ADMIN — OXYCODONE HYDROCHLORIDE 10 MG: 10 TABLET ORAL at 05:09

## 2023-09-21 RX ADMIN — OXYCODONE HYDROCHLORIDE 5 MG: 5 TABLET ORAL at 11:09

## 2023-09-21 RX ADMIN — METOPROLOL SUCCINATE 50 MG: 50 TABLET, EXTENDED RELEASE ORAL at 08:09

## 2023-09-21 RX ADMIN — POLYETHYLENE GLYCOL 3350 17 G: 17 POWDER, FOR SOLUTION ORAL at 08:09

## 2023-09-21 RX ADMIN — ACETAMINOPHEN 1000 MG: 500 TABLET ORAL at 05:09

## 2023-09-21 RX ADMIN — Medication 6 MG: at 09:09

## 2023-09-21 RX ADMIN — MUPIROCIN: 20 OINTMENT TOPICAL at 08:09

## 2023-09-21 RX ADMIN — HUMAN ALBUMIN MICROSPHERES AND PERFLUTREN 0.66 MG: 10; .22 INJECTION, SOLUTION INTRAVENOUS at 01:09

## 2023-09-21 RX ADMIN — SENNOSIDES AND DOCUSATE SODIUM 1 TABLET: 50; 8.6 TABLET ORAL at 08:09

## 2023-09-21 RX ADMIN — SODIUM CHLORIDE 5 MG/HR: 9 INJECTION, SOLUTION INTRAVENOUS at 08:09

## 2023-09-21 RX ADMIN — FAMOTIDINE 20 MG: 20 TABLET ORAL at 08:09

## 2023-09-21 RX ADMIN — POTASSIUM CHLORIDE 20 MEQ: 200 INJECTION, SOLUTION INTRAVENOUS at 12:09

## 2023-09-21 RX ADMIN — OXYCODONE HYDROCHLORIDE 10 MG: 10 TABLET ORAL at 07:09

## 2023-09-21 RX ADMIN — ONDANSETRON 4 MG: 2 INJECTION INTRAMUSCULAR; INTRAVENOUS at 11:09

## 2023-09-21 RX ADMIN — POTASSIUM CHLORIDE 20 MEQ: 1500 TABLET, EXTENDED RELEASE ORAL at 08:09

## 2023-09-21 RX ADMIN — CLEVIPIDINE 2 MG/HR: 0.5 EMULSION INTRAVENOUS at 10:09

## 2023-09-21 RX ADMIN — POTASSIUM CHLORIDE 20 MEQ: 200 INJECTION, SOLUTION INTRAVENOUS at 05:09

## 2023-09-21 RX ADMIN — ACETAMINOPHEN 1000 MG: 500 TABLET ORAL at 09:09

## 2023-09-21 RX ADMIN — ACETAMINOPHEN 1000 MG: 500 TABLET ORAL at 01:09

## 2023-09-21 RX ADMIN — METOCLOPRAMIDE 10 MG: 5 INJECTION, SOLUTION INTRAMUSCULAR; INTRAVENOUS at 03:09

## 2023-09-21 NOTE — PT/OT/SLP PROGRESS
Physical Therapy Treatment    Patient Name:  Sawyer Browning   MRN:  [id number]  Admit Date: 2023  Admitting Diagnosis:  Aortic root aneurysm   Length of Stay: 3 days  Recent Surgery: Procedure(s) (LRB):  VALVE SPARING AORTIC ROOT REPLACEMENT (N/A)  CORONARY ARTERY BYPASS GRAFT (CABG) (N/A)  HARVEST-VEIN-ENDOVASCULAR (Left)  REPAIR, HERNIA, DIAPHRAGMATIC 3 Days Post-Op    Recommendations:     Discharge Recommendations:  other (see comments) (defer to CM/SW)   Discharge Equipment Recommendations: none   Barriers to discharge: None    Plan:     During this hospitalization, patient to be seen 5 x/week to address the listed problems via therapeutic activities, gait training, therapeutic exercises, neuromuscular re-education  Plan of Care Expires:  10/18/23  Plan of Care Reviewed with: patient, daughter    Assessment:     Sawyer Browning is a 68 y.o. male admitted with a medical diagnosis of Aortic root aneurysm. Pt found alert and cooperative. Pt demo'd increased anxiety with functional mobility. Pt progressing towards goals, but not at PLOF. Pt tolerated session well.  Pt is improving with therapy evidenced by increased gait distance. Pt would benefit from continued acute PT to maximize functional mobility after surgical intervention.          Problem List: impaired endurance, gait instability, impaired balance, impaired functional mobility, decreased upper extremity function, impaired cardiopulmonary response to activity.  Rehab Prognosis: Good     GOALS:   Multidisciplinary Problems       Physical Therapy Goals          Problem: Physical Therapy    Goal Priority Disciplines Outcome Goal Variances Interventions   Physical Therapy Goal     PT, PT/OT Ongoing, Progressing     Description: Goals to be met by: 10/3/23     Patient will increase functional independence with mobility by performin. Supine to sit with Stand-by Assistance  2. Sit to supine with Stand-by Assistance  3. Sit to stand transfer with Supervision  4.  "Bed to chair transfer with Supervision using No Assistive Device  5. Gait  x 250 feet with Supervision using No Assistive Device.   6. Ascend/descend 3 stair with unilateral Handrails Supervision using No Assistive Device.                          Subjective   Communicated with RN prior to session.  Patient found up in chair upon PT entry to room, agreeable to evaluation. Sawyer Browning's daughter present during session.    Chief Complaint: nausea   Patient/Family Comments/goals: to get better   Pain/Comfort:  Pain Rating 1: 0/10  Pain Rating Post-Intervention 1: 0/10    Objective:   Patient found with: telemetry, pulse ox (continuous), blood pressure cuff, central line, peripheral IV, petit catheter, chest tube, arterial line   General Precautions: Standard, Cardiac fall, sternal   Orthopedic Precautions:N/A   Braces: N/A   Oxygen Device: Room Air  Vitals: BP 91/62   Pulse 67   Temp 98.5 °F (36.9 °C) (Oral)   Resp 17   Ht 6' 1" (1.854 m)   Wt 90.3 kg (199 lb)   SpO2 97%   BMI 26.25 kg/m²     Outcome Measures:  AM-PAC 6 CLICK MOBILITY  Turning over in bed (including adjusting bedclothes, sheets and blankets)?: 3  Sitting down on and standing up from a chair with arms (e.g., wheelchair, bedside commode, etc.): 3  Moving from lying on back to sitting on the side of the bed?: 3  Moving to and from a bed to a chair (including a wheelchair)?: 3  Need to walk in hospital room?: 3  Climbing 3-5 steps with a railing?: 3  Basic Mobility Total Score: 18     Functional Mobility:  Additional staff present: OT - due to pt requiring 2 skilled therapists to safely perform functional mobility and to accommodate pt's activity tolerance    Bed Mobility:   Not performed 2nd to pt found in chair     Transfers:   Sit <> Stand Transfer: contact guard assistance with no assistive device from chair      Gait:  Patient ambulated: 10ft + 80ft (ADLs standing at the sink between trials)   Patient required: contact guard  Patient used: no " assistive device  Gait Pattern observed: reciprocal gait  Gait Deviation(s): occasional unsteady gait, decreased step length, decreased [place], and increased B weight shifting   Impairments due to: impaired balance, decreased endurance, and increased anxiety with mobility   Comments:   No overt LOB but pt with occasional missteps  Mild SOB  Verbal cuing for pacing, deep breathing, to increase step length          Therapeutic Activities, Exercises, and Education:   Educated pt on PT role/POC  Educated pt on importance of OOB activity and daily ambulation   Educated pt on sternal precautions   Pt verbalized understanding     Pt performed ADLs at the sink with OT    Patient left up in chair with all lines intact, call button in reach, RN notified, and daughter present..    Time Tracking:     PT Received On: 09/21/23  PT Start Time: 1121     PT Stop Time: 1141  PT Total Time (min): 20 min       Billable Minutes:   Gait Training 10    Treatment Type: Treatment  PT/PTA: PT

## 2023-09-21 NOTE — PT/OT/SLP PROGRESS
Occupational Therapy   Treatment    Name: Sawyer Browning  MRN: [id number]  Admitting Diagnosis:  Aortic root aneurysm  3 Days Post-Op    Recommendations:     Discharge Recommendations: other (see comments)  Discharge Equipment Recommendations:  none  Barriers to discharge:  None    Assessment:     Sawyer Browning is a 68 y.o. male with a medical diagnosis of Aortic root aneurysm. Performance deficits affecting function are weakness, impaired endurance, impaired self care skills, impaired functional mobility, gait instability, impaired balance, impaired cardiopulmonary response to activity, decreased upper extremity function. Patient tolerated treatment session and was motivated to participate in therapy. VSS throughout session. Patient would benefit from continued skilled acute OT 5x/wk to improve functional mobility, increase independence with ADLs, and address established goals prior to discharge home.     Rehab Prognosis:  Good; patient would benefit from acute skilled OT services to address these deficits and reach maximum level of function.       Plan:     Patient to be seen 5 x/week to address the above listed problems via self-care/home management, therapeutic activities, therapeutic exercises  Plan of Care Expires: 10/19/23  Plan of Care Reviewed with: patient, daughter    Subjective     Chief Complaint: nausea  Patient/Family Comments/goals: to get better  Pain/Comfort:  Pain Rating 1: 0/10  Pain Rating Post-Intervention 1: 0/10    Objective:     Communicated with: NSG prior to session.  Patient found HOB elevated with telemetry, pulse ox (continuous), blood pressure cuff, central line, peripheral IV, arterial line, chest tube, petit catheter upon OT entry to room.    General Precautions: Standard, fall, sternal    Orthopedic Precautions:N/A  Braces: N/A  Respiratory Status: Room air     Occupational Performance:     Functional Mobility/Transfers:  Patient completed Sit <> Stand Transfer with contact guard assistance   with  hand-held assist   Functional Mobility: bedside chair<>sink with functional ambulation technique with no AD. Patient ambulated in hallway prior to returning to bedside chair after standing at sink performing ADLs. Mild SOB    Activities of Daily Living:  Grooming: stand by assistance standing at sink for oral care and washing face  LE Dressing: stand by assistance Patient was able to cross LEs figure 4 technique to simulate being able to doff and don socks. Patient would be able to perform task.     [facility] 6 Click ADL: 19    Treatment & Education:  Role of OT and POC  ADL retraining  Functional mobility training  Safety  Importance EOB/OOB activity    Co-treatment performed due to patient's multiple deficits requiring two skilled therapists to appropriately and safely assess patient's strength and endurance while facilitating functional tasks in addition to accommodating for patient's activity tolerance.     Patient left up in chair with all lines intact, call button in reach, nurse notified, daughter present, and all needs met.     GOALS:   Multidisciplinary Problems       Occupational Therapy Goals          Problem: Occupational Therapy    Goal Priority Disciplines Outcome Interventions   Occupational Therapy Goal     OT, PT/OT Ongoing, Progressing    Description: Goals to be met by:  10/10/2023    Patient will increase functional independence with ADLs by performing:    UE Dressing with Set-up Assistance.  LE Dressing with Supervision.  Grooming while standing at sink with Modified [place].  Toileting from toilet with Modified [place] for hygiene and clothing management.   Supine to sit with Modified [place].  Stand pivot transfers with Modified [place].  Toilet transfer to toilet with Modified [place].                         Time Tracking:     OT Date of Treatment: 09/21/23  OT Start Time: 1120  OT Stop Time: 1139  OT Total Time (min): 19 min    Billable Minutes:Self Care/Home  Management 19               9/21/2023

## 2023-09-21 NOTE — SUBJECTIVE & OBJECTIVE
Interval History/Significant Events: Required vaso .02 overnight, but is off this AM. UOP appropriate weaning lasix based on goals. Supplemental O2 need decreasing, 1 L this AM. Passing gas, but no BM yet.     Follow-up For: Procedure(s) (LRB):  VALVE SPARING AORTIC ROOT REPLACEMENT (N/A)  CORONARY ARTERY BYPASS GRAFT (CABG) (N/A)  HARVEST-VEIN-ENDOVASCULAR (Left)  REPAIR, HERNIA, DIAPHRAGMATIC    Post-Operative Day: 3 Days Post-Op    Objective:     Vital Signs (Most Recent):  Temp: 98.2 °F (36.8 °C) (09/21/23 0300)  Pulse: 79 (09/21/23 0600)  Resp: 17 (09/21/23 0600)  BP: 91/62 (09/20/23 1600)  SpO2: (!) 93 % (09/21/23 0600) Vital Signs (24h Range):  Temp:  [97.6 °F (36.4 °C)-98.5 °F (36.9 °C)] 98.2 °F (36.8 °C)  Pulse:  [66-93] 79  Resp:  [12-35] 17  SpO2:  [91 %-98 %] 93 %  BP: ()/(50-78) 91/62  Arterial Line BP: ()/(34-74) 127/59     Weight: 90.5 kg (199 lb 8.3 oz)  Body mass index is 27.06 kg/m².      Intake/Output Summary (Last 24 hours) at 9/21/2023 0606  Last data filed at 9/21/2023 0600  Gross per 24 hour   Intake 1369.74 ml   Output 3925 ml   Net -2555.26 ml          Physical Exam  Constitutional:       General: He is not in acute distress.     Appearance: Normal appearance. He is not ill-appearing.   HENT:      Head: Normocephalic.      Nose: Nose normal.   Neck:      Comments: [facility] CVC  Cardiovascular:      Rate and Rhythm: Normal rate and regular rhythm.   Pulmonary:      Effort: Pulmonary effort is normal. No respiratory distress.      Comments: Chest tubes in place, now one liter supplemental O2  Abdominal:      Palpations: Abdomen is soft.      Tenderness: There is no abdominal tenderness.   Musculoskeletal:      Cervical back: Normal range of motion and neck supple.      Right lower leg: No edema.      Left lower leg: No edema.   Skin:     General: Skin is warm and dry.   Neurological:      Mental Status: He is alert. Mental status is at baseline.   Psychiatric:         Mood and Affect:  Mood normal.            Vents:  Vent Mode: A/C (09/19/23 1538)  Ventilator Initiated: Yes (09/18/23 1446)  Set Rate: 20 BPM (09/19/23 1538)  Vt Set: 360 mL (09/19/23 1538)  Pressure Support: 12 cmH20 (09/19/23 0400)  PEEP/CPAP: 5 cmH20 (09/19/23 1538)  Oxygen Concentration (%): 24 (09/21/23 0201)  Peak Airway Pressure: 0 cmH20 (09/19/23 1538)  Plateau Pressure: 0 cmH20 (09/19/23 1538)  Total Ve: 0 L/m (09/19/23 1538)  Negative Inspiratory Force (cm H2O): 0 (09/19/23 1538)  F/VT Ratio<105 (RSBI): (!) 16.97 (09/19/23 0100)    Lines/Drains/Airways       Central Venous Catheter Line  Duration              Introducer with Double Lumen 09/18/23 0800 Internal Jugular Right 2 days              Drain  Duration                  Chest Tube 09/18/23 1300 Tube - 1 Anterior Mediastinal 19 Fr. 2 days         Chest Tube 09/18/23 1300 Tube - 2 Mediastinal 19 Fr. 2 days         Chest Tube 09/18/23 1300 Tube - 3 Left Pleural 19 Fr. 2 days         Urethral Catheter 09/18/23 0748 Temperature probe 2 days              Arterial Line  Duration             Arterial Line 09/18/23 0821 Left Radial 2 days              Line  Duration                  Pacer Wires 09/18/23 1259 2 days              Peripheral Intravenous Line  Duration                  Peripheral IV - Single Lumen 14 G  Right Forearm -- days         Peripheral IV - Single Lumen 09/18/23 0655 20 G Anterior;Distal;Left Forearm 2 days                    Significant Labs:    CBC/Anemia Profile:  Recent Labs   Lab 09/19/23 2019 09/20/23  0303 09/21/23  0258   WBC  --  17.08* 11.89   HGB  --  10.6* 10.1*   HCT 29* 31.2* 29.5*   PLT  --  82* 77*   MCV  --  90 91   RDW  --  13.4 13.2        Chemistries:  Recent Labs   Lab 09/19/23  1652 09/19/23  2159 09/20/23  0303 09/20/23  0920 09/20/23  1711 09/20/23  2314 09/21/23  0258    141 143   < > 140 141 138   K 3.0* 3.2* 3.2*   < > 3.1* 3.6 3.9    107 105   < > 97 100 98   CO2 27 28 29   < > 32* 34* 32*   BUN 16 12 12   < > 16 17  19   CREATININE 0.9 0.8 0.8   < > 1.0 1.0 0.9   CALCIUM 8.1* 8.5* 8.7   < > 8.7 8.5* 8.6*   ALBUMIN 3.9  --  3.8  --   --   --  3.3*   PROT 5.4*  --  5.7*  --   --   --  5.4*   BILITOT 0.8  --  0.9  --   --   --  0.9   ALKPHOS 34*  --  41*  --   --   --  42*   ALT 19  --  20  --   --   --  26   AST 36  --  40  --   --   --  32   MG  --  1.9 1.9  --   --   --  2.0   PHOS  --  2.7 3.7  --   --   --  2.7    < > = values in this interval not displayed.       ABGs:   Recent Labs   Lab 09/19/23 2019   PH 7.504*   PCO2 34.0*   HCO3 26.8   POCSATURATED 87*   BE 4     CMP:   Recent Labs   Lab 09/19/23  1652 09/19/23  2159 09/20/23  0303 09/20/23  0920 09/20/23  1711 09/20/23  2314 09/21/23  0258      < > 143   < > 140 141 138   K 3.0*   < > 3.2*   < > 3.1* 3.6 3.9      < > 105   < > 97 100 98   CO2 27   < > 29   < > 32* 34* 32*   *   < > 129*   < > 202* 114* 158*   BUN 16   < > 12   < > 16 17 19   CREATININE 0.9   < > 0.8   < > 1.0 1.0 0.9   CALCIUM 8.1*   < > 8.7   < > 8.7 8.5* 8.6*   PROT 5.4*  --  5.7*  --   --   --  5.4*   ALBUMIN 3.9  --  3.8  --   --   --  3.3*   BILITOT 0.8  --  0.9  --   --   --  0.9   ALKPHOS 34*  --  41*  --   --   --  42*   AST 36  --  40  --   --   --  32   ALT 19 --  20  --   --   --  26   ANIONGAP 6*   < > 9   < > 11 7* 8    < > = values in this interval not displayed.     All pertinent labs within the past 24 hours have been reviewed.    Significant Imaging:  I have reviewed all pertinent imaging results/findings within the past 24 hours.

## 2023-09-21 NOTE — CARE UPDATE
BG goal 140-180    -A1C   Lab Results   Component Value Date    HGBA1C 5.5 11/03/2022         -HOME REGIMEN: none; no previous hx of DM     -INPATIENT REGIMEN: Novolog Correction Scale     -GLUCOSE TREND FOR THE PAST 24HRS: 102-159    -NO HYPOGYCEMIAS NOTED     -Diet: Diet Cardiac Fluid - 1500mL      -Steroids - N/A    -Tube Feeds - N/A      Remains in SICU. POD 3 s/p CABG. BG reasonably controlled on current prn SQ insulin correction scale.       Plan:  -Continue Moderate Dose Correction Scale  -BG monitoring ac/hs    Discharge planning: tbd    Endocrine to continue to follow    ** Please call Endocrine for any BG related issues **       no

## 2023-09-21 NOTE — ASSESSMENT & PLAN NOTE
  Neuro/Psych:     - Sedation: None    - Pain:     - Scheduled Tylenol 1000mg Q8H   - PRN Oxycodone 5mg/10mg, Hydromorphone 0.5 Q1H               Cardiac:     - POD #3 s/p valve sparing aortic root repair, hemiarch replacement (6 minute circ arrest), CABG x2, and repair of hernia of morgani with Dr. Mooney    - Postoperative GWEN: Normal bivent function, no wall motion abnormalities, mild increase RV diameter    - BP Goal: MAP > 65, SBP < 130    - Inotropes/Pressors: vaso .02 prn    - Anti-HTNs: Cleviprex gtt prn to maintain SBP < 130    - Rhythm:    - Beta Blocker: Toprol 50 daily    - Statin: statin daily    - Ezetimibe daily     - Recheck TTE        Pulmonary:     - Goal SpO2 >92%    - Wean supplemental O2 as tolerated    - Chest Tubes x 3 (2 Meds & 1 Pleural), DC today.     - ABGs PRN          Renal:    - Trend BUN/Cr     - Maintain Tsai, record strict Is/Os  - Net negative 2100 cc last 24 hours. UOP goal now  with lasix gtt    Recent Labs   Lab 09/20/23  1711 09/20/23  2314 09/21/23  0258   BUN 16 17 19   CREATININE 1.0 1.0 0.9         FEN / GI:     - Daily CMP, PRN K/Mag/Phos per protocol. CMP more frequently while on lasix    - Replace electrolytes as needed    - Nutrition: Cardiac diet 1500 cc restriction    - Bowel Regimen: Miralax, docusate      ID:     - Tmax: afebrile    - Abx: Completed perioperative cefazolin 2g Q8H x 5 doses    Recent Labs   Lab 09/19/23  0300 09/20/23  0303 09/21/23  0258   WBC 16.01* 17.08* 11.89         Heme/Onc:     - CBC daily    - ASA 325mg daily    - Lovenox dvt held in setting of platelet decrease. Again decreased but stable    Recent Labs   Lab 09/18/23  1504 09/18/23  1840 09/18/23  2019 09/19/23  0300 09/20/23  0303 09/21/23  0258   HGB 12.8*  12.8*  --  10.9* 10.6* 10.6* 10.1*   *  132*  --  106* 104* 82* 77*   APTT 47.4*  47.4* 24.5 23.7 25.4  --   --    INR 1.2  1.2  --  1.1 1.1  --   --          Endocrine:     - CTS Goal -140    - HgbA1c:  5.5    - Endocrinology consulted for insulin management      PPx:   Feeding: Cardiac diet, 1500 cc restriction  Analgesia/Sedation: MM  Thromboembolic Prevention: Resume  HOB >30: Yes  Stress Ulcer: Yes - famotidine 20mg PO  Glucose Control: Yes, insulin management per Endocrinology -      Lines/Drains/Airway:  PIV    Art Line: L radial   Central Line: RIJ CVC, swan (DC today)   Tsai    Chest Tubes: (2 Mediastinal, 1 Pleural)    Pacing Wires: Temporary v pacing wires      Dispo/Code Status/Palliative:     - Continue SICU Care    - Full Code

## 2023-09-21 NOTE — PLAN OF CARE
SICU PLAN OF CARE NOTE    Dx: Aortic root aneurysm    Goals of Care: MAP >65. SBP <130.    Vital Signs (last 12 hours):   Temp:  [97.6 °F (36.4 °C)-98.5 °F (36.9 °C)]   Pulse:  [69-93]   Resp:  [12-35]   BP: ()/(50-78)   SpO2:  [93 %-98 %]   Arterial Line BP: ()/(34-70)      Neuro: AAO x4, Arouses to Voice, Follows Commands, and Moves All Extremities    Cardiac: NSR 70-80s    Respiratory: high flow Nasal Cannula 2L    Gtts: Lasix 27.5  Intermittently on Epi 0.06 and Vaso 0.02 throughout shift to maintain MAP >65    Urine Output: Urinary Catheter 1650 cc/shift    Drains: Chest Tube, total output 170 cc /  shift    Diet: Cardiac Diet 1500ml FR     Labs/Accuchecks: All labs reviewed with MD. K replaced x2 per MAR. Accuchecks ACHS and 0200.    Skin: No new skin breakdown noted this shift. Bed plugged in and working. Protective foams in place. Frequent weight shift assistance provided.    Shift Events: MD updated throughout shift on urine output and BP.   Currently off pressors, MD instructs to restart vaso 0.02 if needed to maintain MAP >65.

## 2023-09-21 NOTE — PROGRESS NOTES
[facility]  Critical Care - Surgery  Progress Note    Patient Name: Sawyer Browning  MRN: [id number]  Admission Date: 9/18/2023  Hospital Length of Stay: 3 days  Code Status: Full Code  Attending Provider: Imtiaz Mooney MD  Primary Care Provider: Mary Kay Haines MD   Principal Problem: Aortic root aneurysm    Subjective:     Hospital/ICU Course:  No notes on file    Interval History/Significant Events: Required vaso .02 overnight, but is off this AM. UOP appropriate weaning lasix based on goals. Supplemental O2 need decreasing, 1 L this AM. Passing gas, but no BM yet.     Follow-up For: Procedure(s) (LRB):  VALVE SPARING AORTIC ROOT REPLACEMENT (N/A)  CORONARY ARTERY BYPASS GRAFT (CABG) (N/A)  HARVEST-VEIN-ENDOVASCULAR (Left)  REPAIR, HERNIA, DIAPHRAGMATIC    Post-Operative Day: 3 Days Post-Op    Objective:     Vital Signs (Most Recent):  Temp: 98.2 °F (36.8 °C) (09/21/23 0300)  Pulse: 79 (09/21/23 0600)  Resp: 17 (09/21/23 0600)  BP: 91/62 (09/20/23 1600)  SpO2: (!) 93 % (09/21/23 0600) Vital Signs (24h Range):  Temp:  [97.6 °F (36.4 °C)-98.5 °F (36.9 °C)] 98.2 °F (36.8 °C)  Pulse:  [66-93] 79  Resp:  [12-35] 17  SpO2:  [91 %-98 %] 93 %  BP: ()/(50-78) 91/62  Arterial Line BP: ()/(34-74) 127/59     Weight: 90.5 kg (199 lb 8.3 oz)  Body mass index is 27.06 kg/m².      Intake/Output Summary (Last 24 hours) at 9/21/2023 0606  Last data filed at 9/21/2023 0600  Gross per 24 hour   Intake 1369.74 ml   Output 3925 ml   Net -2555.26 ml          Physical Exam  Constitutional:       General: He is not in acute distress.     Appearance: Normal appearance. He is not ill-appearing.   HENT:      Head: Normocephalic.      Nose: Nose normal.   Neck:      Comments: RI CVC  Cardiovascular:      Rate and Rhythm: Normal rate and regular rhythm.   Pulmonary:      Effort: Pulmonary effort is normal. No respiratory distress.      Comments: Chest tubes in place, now one liter supplemental  O2  Abdominal:      Palpations: Abdomen is soft.      Tenderness: There is no abdominal tenderness.   Musculoskeletal:      Cervical back: Normal range of motion and neck supple.      Right lower leg: No edema.      Left lower leg: No edema.   Skin:     General: Skin is warm and dry.   Neurological:      Mental Status: He is alert. Mental status is at baseline.   Psychiatric:         Mood and Affect: Mood normal.            Vents:  Vent Mode: A/C (09/19/23 1538)  Ventilator Initiated: Yes (09/18/23 1446)  Set Rate: 20 BPM (09/19/23 1538)  Vt Set: 360 mL (09/19/23 1538)  Pressure Support: 12 cmH20 (09/19/23 0400)  PEEP/CPAP: 5 cmH20 (09/19/23 1538)  Oxygen Concentration (%): 24 (09/21/23 0201)  Peak Airway Pressure: 0 cmH20 (09/19/23 1538)  Plateau Pressure: 0 cmH20 (09/19/23 1538)  Total Ve: 0 L/m (09/19/23 1538)  Negative Inspiratory Force (cm H2O): 0 (09/19/23 1538)  F/VT Ratio<105 (RSBI): (!) 16.97 (09/19/23 0100)    Lines/Drains/Airways       Central Venous Catheter Line  Duration              Introducer with Double Lumen 09/18/23 0800 Internal Jugular Right 2 days              Drain  Duration                  Chest Tube 09/18/23 1300 Tube - 1 Anterior Mediastinal 19 Fr. 2 days         Chest Tube 09/18/23 1300 Tube - 2 Mediastinal 19 Fr. 2 days         Chest Tube 09/18/23 1300 Tube - 3 Left Pleural 19 Fr. 2 days         Urethral Catheter 09/18/23 0748 Temperature probe 2 days              Arterial Line  Duration             Arterial Line 09/18/23 0821 Left Radial 2 days              Line  Duration                  Pacer Wires 09/18/23 1259 2 days              Peripheral Intravenous Line  Duration                  Peripheral IV - Single Lumen 14 G  Right Forearm -- days         Peripheral IV - Single Lumen 09/18/23 0655 20 G Anterior;Distal;Left Forearm 2 days                    Significant Labs:    CBC/Anemia Profile:  Recent Labs   Lab 09/19/23  2019 09/20/23  0303 09/21/23  0258   WBC  --  17.08* 11.89   HGB   --  10.6* 10.1*   HCT 29* 31.2* 29.5*   PLT  --  82* 77*   MCV  --  90 91   RDW  --  13.4 13.2        Chemistries:  Recent Labs   Lab 09/19/23 1652 09/19/23 2159 09/20/23 0303 09/20/23 0920 09/20/23 1711 09/20/23 2314 09/21/23  0258    141 143   < > 140 141 138   K 3.0* 3.2* 3.2*   < > 3.1* 3.6 3.9    107 105   < > 97 100 98   CO2 27 28 29   < > 32* 34* 32*   BUN 16 12 12   < > 16 17 19   CREATININE 0.9 0.8 0.8   < > 1.0 1.0 0.9   CALCIUM 8.1* 8.5* 8.7   < > 8.7 8.5* 8.6*   ALBUMIN 3.9  --  3.8  --   --   --  3.3*   PROT 5.4*  --  5.7*  --   --   --  5.4*   BILITOT 0.8  --  0.9  --   --   --  0.9   ALKPHOS 34*  --  41*  --   --   --  42*   ALT 19  --  20  --   --   --  26   AST 36  --  40  --   --   --  32   MG  --  1.9 1.9  --   --   --  2.0   PHOS  --  2.7 3.7  --   --   --  2.7    < > = values in this interval not displayed.       ABGs:   Recent Labs   Lab 09/19/23  2019   PH 7.504*   PCO2 34.0*   HCO3 26.8   POCSATURATED 87*   BE 4     CMP:   Recent Labs   Lab 09/19/23 1652 09/19/23 2159 09/20/23 0303 09/20/23 0920 09/20/23 1711 09/20/23 2314 09/21/23  0258      < > 143   < > 140 141 138   K 3.0*   < > 3.2*   < > 3.1* 3.6 3.9      < > 105   < > 97 100 98   CO2 27   < > 29   < > 32* 34* 32*   *   < > 129*   < > 202* 114* 158*   BUN 16   < > 12   < > 16 17 19   CREATININE 0.9   < > 0.8   < > 1.0 1.0 0.9   CALCIUM 8.1*   < > 8.7   < > 8.7 8.5* 8.6*   PROT 5.4*  --  5.7*  --   --   --  5.4*   ALBUMIN 3.9  --  3.8  --   --   --  3.3*   BILITOT 0.8  --  0.9  --   --   --  0.9   ALKPHOS 34*  --  41*  --   --   --  42*   AST 36  --  40  --   --   --  32   ALT 19  --  20  --   --   --  26   ANIONGAP 6*   < > 9   < > 11 7* 8    < > = values in this interval not displayed.     All pertinent labs within the past 24 hours have been reviewed.    Significant Imaging:  I have reviewed all pertinent imaging results/findings within the past 24 hours.    Assessment/Plan:      Cardiac/Vascular  * Aortic root aneurysm    Neuro/Psych:     - Sedation: None    - Pain:     - Scheduled Tylenol 1000mg Q8H   - PRN Oxycodone 5mg/10mg, Hydromorphone 0.5 Q1H               Cardiac:     - POD #3 s/p valve sparing aortic root repair, hemiarch replacement (6 minute circ arrest), CABG x2, and repair of hernia of morgani with Dr. Mooney    - Postoperative GWEN: Normal bivent function, no wall motion abnormalities, mild increase RV diameter    - BP Goal: MAP > 65, SBP < 130    - Inotropes/Pressors: vaso .02 prn    - Anti-HTNs: Cleviprex gtt prn to maintain SBP < 130    - Rhythm:    - Beta Blocker: Toprol 50 daily    - Statin: statin daily    - Ezetimibe daily     - Recheck TTE        Pulmonary:     - Goal SpO2 >92%    - Wean supplemental O2 as tolerated    - Chest Tubes x 3 (2 Meds & 1 Pleural), DC today.     - ABGs PRN          Renal:    - Trend BUN/Cr     - Maintain Tsai, record strict Is/Os  - Net negative 2100 cc last 24 hours. UOP goal now  with lasix gtt    Recent Labs   Lab 09/20/23  1711 09/20/23  2314 09/21/23  0258   BUN 16 17 19   CREATININE 1.0 1.0 0.9         FEN / GI:     - Daily CMP, PRN K/Mag/Phos per protocol. CMP more frequently while on lasix    - Replace electrolytes as needed    - Nutrition: Cardiac diet 1500 cc restriction    - Bowel Regimen: Miralax, docusate      ID:     - Tmax: afebrile    - Abx: Completed perioperative cefazolin 2g Q8H x 5 doses    Recent Labs   Lab 09/19/23  0300 09/20/23  0303 09/21/23  0258   WBC 16.01* 17.08* 11.89         Heme/Onc:     - CBC daily    - ASA 325mg daily    - Lovenox dvt held in setting of platelet decrease. Again decreased but stable    Recent Labs   Lab 09/18/23  1504 09/18/23  1840 09/18/23  2019 09/19/23  0300 09/20/23  0303 09/21/23  0258   HGB 12.8*  12.8*  --  10.9* 10.6* 10.6* 10.1*   *  132*  --  106* 104* 82* 77*   APTT 47.4*  47.4* 24.5 23.7 25.4  --   --    INR 1.2  1.2  --  1.1 1.1  --   --          Endocrine:      - CTS Goal -140    - HgbA1c: 5.5    - Endocrinology consulted for insulin management      PPx:   Feeding: Cardiac diet, 1500 cc restriction  Analgesia/Sedation: MM  Thromboembolic Prevention: Resume  HOB >30: Yes  Stress Ulcer: Yes - famotidine 20mg PO  Glucose Control: Yes, insulin management per Endocrinology -      Lines/Drains/Airway:  PIV    Art Line: L radial   Central Line: RIJ CVC, swan (DC today)   Tsai    Chest Tubes: (2 Mediastinal, 1 Pleural)    Pacing Wires: Temporary v pacing wires      Dispo/Code Status/Palliative:     - Continue SICU Care    - Full Code           Critical care was time spent personally by me on the following activities: development of treatment plan with patient or surrogate and bedside caregivers, discussions with consultants, evaluation of patient's response to treatment, examination of patient, ordering and performing treatments and interventions, ordering and review of laboratory studies, ordering and review of radiographic studies, pulse oximetry, re-evaluation of patient's condition.  This critical care time did not overlap with that of any other provider or involve time for any procedures.     Jean Claude Sandoval, DO  Critical Care - Surgery  [facility]

## 2023-09-22 LAB
ALBUMIN SERPL BCP-MCNC: 3.1 G/DL (ref 3.5–5.2)
ALBUMIN SERPL BCP-MCNC: 3.3 G/DL (ref 3.5–5.2)
ALBUMIN SERPL BCP-MCNC: 3.4 G/DL (ref 3.5–5.2)
ALP SERPL-CCNC: 56 U/L (ref 55–135)
ALP SERPL-CCNC: 59 U/L (ref 55–135)
ALP SERPL-CCNC: 69 U/L (ref 55–135)
ALT SERPL W/O P-5'-P-CCNC: 110 U/L (ref 10–44)
ALT SERPL W/O P-5'-P-CCNC: 65 U/L (ref 10–44)
ALT SERPL W/O P-5'-P-CCNC: 67 U/L (ref 10–44)
ANION GAP SERPL CALC-SCNC: 7 MMOL/L (ref 8–16)
ANION GAP SERPL CALC-SCNC: 7 MMOL/L (ref 8–16)
ANION GAP SERPL CALC-SCNC: 8 MMOL/L (ref 8–16)
AST SERPL-CCNC: 52 U/L (ref 10–40)
AST SERPL-CCNC: 53 U/L (ref 10–40)
AST SERPL-CCNC: 81 U/L (ref 10–40)
BASOPHILS # BLD AUTO: 0.03 K/UL (ref 0–0.2)
BASOPHILS NFR BLD: 0.2 % (ref 0–1.9)
BILIRUB SERPL-MCNC: 0.7 MG/DL (ref 0.1–1)
BILIRUB SERPL-MCNC: 0.9 MG/DL (ref 0.1–1)
BILIRUB SERPL-MCNC: 1.1 MG/DL (ref 0.1–1)
BLD PROD TYP BPU: NORMAL
BLOOD UNIT EXPIRATION DATE: NORMAL
BLOOD UNIT TYPE CODE: 6200
BLOOD UNIT TYPE: NORMAL
BUN SERPL-MCNC: 17 MG/DL (ref 8–23)
BUN SERPL-MCNC: 18 MG/DL (ref 8–23)
BUN SERPL-MCNC: 18 MG/DL (ref 8–23)
CALCIUM SERPL-MCNC: 8 MG/DL (ref 8.7–10.5)
CALCIUM SERPL-MCNC: 8.1 MG/DL (ref 8.7–10.5)
CALCIUM SERPL-MCNC: 8.8 MG/DL (ref 8.7–10.5)
CHLORIDE SERPL-SCNC: 101 MMOL/L (ref 95–110)
CHLORIDE SERPL-SCNC: 99 MMOL/L (ref 95–110)
CHLORIDE SERPL-SCNC: 99 MMOL/L (ref 95–110)
CO2 SERPL-SCNC: 30 MMOL/L (ref 23–29)
CO2 SERPL-SCNC: 31 MMOL/L (ref 23–29)
CO2 SERPL-SCNC: 34 MMOL/L (ref 23–29)
CODING SYSTEM: NORMAL
CREAT SERPL-MCNC: 0.7 MG/DL (ref 0.5–1.4)
CREAT SERPL-MCNC: 0.8 MG/DL (ref 0.5–1.4)
CREAT SERPL-MCNC: 0.9 MG/DL (ref 0.5–1.4)
CROSSMATCH INTERPRETATION: NORMAL
DIFFERENTIAL METHOD: ABNORMAL
DISPENSE STATUS: NORMAL
EOSINOPHIL # BLD AUTO: 0.1 K/UL (ref 0–0.5)
EOSINOPHIL NFR BLD: 1 % (ref 0–8)
ERYTHROCYTE [DISTWIDTH] IN BLOOD BY AUTOMATED COUNT: 12.8 % (ref 11.5–14.5)
EST. GFR  (NO RACE VARIABLE): >60 ML/MIN/1.73 M^2
GLUCOSE SERPL-MCNC: 120 MG/DL (ref 70–110)
GLUCOSE SERPL-MCNC: 126 MG/DL (ref 70–110)
GLUCOSE SERPL-MCNC: 135 MG/DL (ref 70–110)
HCT VFR BLD AUTO: 31.4 % (ref 40–54)
HGB BLD-MCNC: 10.5 G/DL (ref 14–18)
IMM GRANULOCYTES # BLD AUTO: 0.05 K/UL (ref 0–0.04)
IMM GRANULOCYTES NFR BLD AUTO: 0.4 % (ref 0–0.5)
LYMPHOCYTES # BLD AUTO: 1.1 K/UL (ref 1–4.8)
LYMPHOCYTES NFR BLD: 8.9 % (ref 18–48)
MAGNESIUM SERPL-MCNC: 2 MG/DL (ref 1.6–2.6)
MCH RBC QN AUTO: 30.2 PG (ref 27–31)
MCHC RBC AUTO-ENTMCNC: 33.4 G/DL (ref 32–36)
MCV RBC AUTO: 90 FL (ref 82–98)
MONOCYTES # BLD AUTO: 1.5 K/UL (ref 0.3–1)
MONOCYTES NFR BLD: 12.3 % (ref 4–15)
NEUTROPHILS # BLD AUTO: 9.4 K/UL (ref 1.8–7.7)
NEUTROPHILS NFR BLD: 77.2 % (ref 38–73)
NRBC BLD-RTO: 0 /100 WBC
NUM UNITS TRANS PACKED RBC: NORMAL
PHOSPHATE SERPL-MCNC: 3.2 MG/DL (ref 2.7–4.5)
PLATELET # BLD AUTO: 114 K/UL (ref 150–450)
PMV BLD AUTO: 11.2 FL (ref 9.2–12.9)
POCT GLUCOSE: 109 MG/DL (ref 70–110)
POCT GLUCOSE: 133 MG/DL (ref 70–110)
POCT GLUCOSE: 141 MG/DL (ref 70–110)
POTASSIUM SERPL-SCNC: 3.2 MMOL/L (ref 3.5–5.1)
POTASSIUM SERPL-SCNC: 3.4 MMOL/L (ref 3.5–5.1)
POTASSIUM SERPL-SCNC: 3.5 MMOL/L (ref 3.5–5.1)
PROT SERPL-MCNC: 5.5 G/DL (ref 6–8.4)
PROT SERPL-MCNC: 5.9 G/DL (ref 6–8.4)
PROT SERPL-MCNC: 5.9 G/DL (ref 6–8.4)
RBC # BLD AUTO: 3.48 M/UL (ref 4.6–6.2)
SODIUM SERPL-SCNC: 137 MMOL/L (ref 136–145)
SODIUM SERPL-SCNC: 138 MMOL/L (ref 136–145)
SODIUM SERPL-SCNC: 141 MMOL/L (ref 136–145)
WBC # BLD AUTO: 12.18 K/UL (ref 3.9–12.7)

## 2023-09-22 PROCEDURE — 99232 SBSQ HOSP IP/OBS MODERATE 35: CPT | Mod: ,,, | Performed by: ANESTHESIOLOGY

## 2023-09-22 PROCEDURE — 63600175 PHARM REV CODE 636 W HCPCS

## 2023-09-22 PROCEDURE — 83735 ASSAY OF MAGNESIUM: CPT | Performed by: STUDENT IN AN ORGANIZED HEALTH CARE EDUCATION/TRAINING PROGRAM

## 2023-09-22 PROCEDURE — 25000003 PHARM REV CODE 250

## 2023-09-22 PROCEDURE — 94761 N-INVAS EAR/PLS OXIMETRY MLT: CPT

## 2023-09-22 PROCEDURE — 97116 GAIT TRAINING THERAPY: CPT

## 2023-09-22 PROCEDURE — 25000003 PHARM REV CODE 250: Performed by: THORACIC SURGERY (CARDIOTHORACIC VASCULAR SURGERY)

## 2023-09-22 PROCEDURE — 97535 SELF CARE MNGMENT TRAINING: CPT

## 2023-09-22 PROCEDURE — 27000221 HC OXYGEN, UP TO 24 HOURS

## 2023-09-22 PROCEDURE — 25000003 PHARM REV CODE 250: Performed by: STUDENT IN AN ORGANIZED HEALTH CARE EDUCATION/TRAINING PROGRAM

## 2023-09-22 PROCEDURE — 85025 COMPLETE CBC W/AUTO DIFF WBC: CPT | Performed by: THORACIC SURGERY (CARDIOTHORACIC VASCULAR SURGERY)

## 2023-09-22 PROCEDURE — 94799 UNLISTED PULMONARY SVC/PX: CPT

## 2023-09-22 PROCEDURE — 80053 COMPREHEN METABOLIC PANEL: CPT | Mod: 91

## 2023-09-22 PROCEDURE — 84100 ASSAY OF PHOSPHORUS: CPT | Performed by: STUDENT IN AN ORGANIZED HEALTH CARE EDUCATION/TRAINING PROGRAM

## 2023-09-22 PROCEDURE — 99900035 HC TECH TIME PER 15 MIN (STAT)

## 2023-09-22 PROCEDURE — 20000000 HC ICU ROOM

## 2023-09-22 PROCEDURE — 99232 PR SUBSEQUENT HOSPITAL CARE,LEVL II: ICD-10-PCS | Mod: ,,, | Performed by: ANESTHESIOLOGY

## 2023-09-22 PROCEDURE — 80053 COMPREHEN METABOLIC PANEL: CPT | Performed by: THORACIC SURGERY (CARDIOTHORACIC VASCULAR SURGERY)

## 2023-09-22 RX ORDER — POLYETHYLENE GLYCOL 3350 17 G/17G
17 POWDER, FOR SOLUTION ORAL 2 TIMES DAILY
Status: DISCONTINUED | OUTPATIENT
Start: 2023-09-22 | End: 2023-09-24 | Stop reason: HOSPADM

## 2023-09-22 RX ORDER — METOPROLOL SUCCINATE 100 MG/1
100 TABLET, EXTENDED RELEASE ORAL DAILY
Status: DISCONTINUED | OUTPATIENT
Start: 2023-09-22 | End: 2023-09-24 | Stop reason: HOSPADM

## 2023-09-22 RX ORDER — ENOXAPARIN SODIUM 100 MG/ML
40 INJECTION SUBCUTANEOUS EVERY 24 HOURS
Status: DISCONTINUED | OUTPATIENT
Start: 2023-09-22 | End: 2023-09-24 | Stop reason: HOSPADM

## 2023-09-22 RX ADMIN — ACETAMINOPHEN 1000 MG: 500 TABLET ORAL at 09:09

## 2023-09-22 RX ADMIN — FAMOTIDINE 20 MG: 20 TABLET ORAL at 08:09

## 2023-09-22 RX ADMIN — POTASSIUM BICARBONATE 35 MEQ: 391 TABLET, EFFERVESCENT ORAL at 06:09

## 2023-09-22 RX ADMIN — ASPIRIN 325 MG ORAL TABLET 325 MG: 325 PILL ORAL at 08:09

## 2023-09-22 RX ADMIN — POTASSIUM CHLORIDE 20 MEQ: 1500 TABLET, EXTENDED RELEASE ORAL at 09:09

## 2023-09-22 RX ADMIN — MUPIROCIN: 20 OINTMENT TOPICAL at 08:09

## 2023-09-22 RX ADMIN — ACETAMINOPHEN 1000 MG: 500 TABLET ORAL at 01:09

## 2023-09-22 RX ADMIN — SODIUM CHLORIDE 12 MG/HR: 9 INJECTION, SOLUTION INTRAVENOUS at 11:09

## 2023-09-22 RX ADMIN — POTASSIUM BICARBONATE 35 MEQ: 391 TABLET, EFFERVESCENT ORAL at 03:09

## 2023-09-22 RX ADMIN — SENNOSIDES AND DOCUSATE SODIUM 1 TABLET: 50; 8.6 TABLET ORAL at 08:09

## 2023-09-22 RX ADMIN — HYDROMORPHONE HYDROCHLORIDE 0.5 MG: 0.5 INJECTION, SOLUTION INTRAMUSCULAR; INTRAVENOUS; SUBCUTANEOUS at 02:09

## 2023-09-22 RX ADMIN — ATORVASTATIN CALCIUM 80 MG: 20 TABLET, FILM COATED ORAL at 08:09

## 2023-09-22 RX ADMIN — METOPROLOL SUCCINATE 100 MG: 100 TABLET, EXTENDED RELEASE ORAL at 08:09

## 2023-09-22 RX ADMIN — ACETAMINOPHEN 1000 MG: 500 TABLET ORAL at 05:09

## 2023-09-22 RX ADMIN — POTASSIUM BICARBONATE 50 MEQ: 978 TABLET, EFFERVESCENT ORAL at 05:09

## 2023-09-22 RX ADMIN — POLYETHYLENE GLYCOL 3350 17 G: 17 POWDER, FOR SOLUTION ORAL at 08:09

## 2023-09-22 RX ADMIN — Medication 6 MG: at 11:09

## 2023-09-22 RX ADMIN — MUPIROCIN: 20 OINTMENT TOPICAL at 09:09

## 2023-09-22 RX ADMIN — SENNOSIDES AND DOCUSATE SODIUM 1 TABLET: 50; 8.6 TABLET ORAL at 09:09

## 2023-09-22 RX ADMIN — FAMOTIDINE 20 MG: 20 TABLET ORAL at 09:09

## 2023-09-22 RX ADMIN — POTASSIUM CHLORIDE 20 MEQ: 1500 TABLET, EXTENDED RELEASE ORAL at 08:09

## 2023-09-22 RX ADMIN — ENOXAPARIN SODIUM 40 MG: 40 INJECTION SUBCUTANEOUS at 04:09

## 2023-09-22 RX ADMIN — OXYCODONE HYDROCHLORIDE 10 MG: 10 TABLET ORAL at 06:09

## 2023-09-22 NOTE — PROGRESS NOTES
[facility]  Critical Care - Surgery  Progress Note    Patient Name: Sawyer Browning  MRN: [id number]  Admission Date: 9/18/2023  Hospital Length of Stay: 4 days  Code Status: Full Code  Attending Provider: Imtiaz Mooney MD  Primary Care Provider: Mary Kay Haines MD   Principal Problem: Aortic root aneurysm    Subjective:     Hospital/ICU Course:  No notes on file    Interval History/Significant Events: Increase in O2 demand overnight, was not in distress. CXR without interval changes. UOP appropriate, off lasix this AM. Also has required a small amount of cleviprex to maintain SBP goals.     Follow-up For: Procedure(s) (LRB):  VALVE SPARING AORTIC ROOT REPLACEMENT (N/A)  CORONARY ARTERY BYPASS GRAFT (CABG) (N/A)  HARVEST-VEIN-ENDOVASCULAR (Left)  REPAIR, HERNIA, DIAPHRAGMATIC    Post-Operative Day: 4 Days Post-Op    Objective:     Vital Signs (Most Recent):  Temp: 98.5 °F (36.9 °C) (09/22/23 0300)  Pulse: 68 (09/22/23 0400)  Resp: 15 (09/22/23 0400)  BP: 97/67 (09/22/23 0400)  SpO2: 95 % (09/22/23 0400) Vital Signs (24h Range):  Temp:  [98.2 °F (36.8 °C)-98.6 °F (37 °C)] 98.5 °F (36.9 °C)  Pulse:  [59-78] 68  Resp:  [10-36] 15  SpO2:  [87 %-100 %] 95 %  BP: ()/(60-83) 97/67  Arterial Line BP: ()/(43-68) 115/54     Weight: 90.3 kg (199 lb)  Body mass index is 26.25 kg/m².      Intake/Output Summary (Last 24 hours) at 9/22/2023 0600  Last data filed at 9/22/2023 0400  Gross per 24 hour   Intake 718.77 ml   Output 2490 ml   Net -1771.23 ml          Physical Exam  Constitutional:       General: He is not in acute distress.     Appearance: Normal appearance. He is not ill-appearing or toxic-appearing.   HENT:      Head: Normocephalic and atraumatic.      Nose: Nose normal.   Eyes:      Extraocular Movements: Extraocular movements intact.   Neck:      Comments: RIJ CVC  Cardiovascular:      Rate and Rhythm: Normal rate and regular rhythm.      Heart sounds: No murmur  heard.  Pulmonary:      Effort: Pulmonary effort is normal. No respiratory distress.      Breath sounds: No wheezing or rales.      Comments: Chest tubes out, 8 L HFNC  Chest:      Chest wall: No tenderness.   Abdominal:      General: There is no distension.      Palpations: Abdomen is soft.      Tenderness: There is no abdominal tenderness.   Musculoskeletal:         General: No swelling.      Cervical back: Normal range of motion and neck supple.      Right lower leg: No edema.      Left lower leg: No edema.   Skin:     General: Skin is warm and dry.      Coloration: Skin is not jaundiced.   Neurological:      General: No focal deficit present.      Mental Status: He is alert. Mental status is at baseline.   Psychiatric:         Mood and Affect: Mood normal.            Vents:  Vent Mode: A/C (09/19/23 1538)  Ventilator Initiated: Yes (09/18/23 1446)  Set Rate: 20 BPM (09/19/23 1538)  Vt Set: 360 mL (09/19/23 1538)  Pressure Support: 12 cmH20 (09/19/23 0400)  PEEP/CPAP: 5 cmH20 (09/19/23 1538)  Oxygen Concentration (%): 24 (09/21/23 0201)  Peak Airway Pressure: 0 cmH20 (09/19/23 1538)  Plateau Pressure: 0 cmH20 (09/19/23 1538)  Total Ve: 0 L/m (09/19/23 1538)  Negative Inspiratory Force (cm H2O): 0 (09/19/23 1538)  F/VT Ratio<105 (RSBI): (!) 16.97 (09/19/23 0100)    Lines/Drains/Airways       Central Venous Catheter Line  Duration              Introducer with Double Lumen 09/18/23 0800 Internal Jugular Right 3 days    Percutaneous Central Line Insertion/Assessment - Triple Lumen  09/21/23 1841 Internal Jugular Right <1 day              Drain  Duration                  Urethral Catheter 09/18/23 0748 Temperature probe 3 days              Arterial Line  Duration             Arterial Line 09/18/23 0821 Left Radial 3 days              Peripheral Intravenous Line  Duration                  Peripheral IV - Single Lumen 14 G  Right Forearm -- days         Peripheral IV - Single Lumen 09/18/23 0655 20 G  Anterior;Distal;Left Forearm 3 days                    Significant Labs:    CBC/Anemia Profile:  Recent Labs   Lab 09/21/23  0258 09/21/23  2245 09/22/23  0311   WBC 11.89  --  12.18   HGB 10.1*  --  10.5*   HCT 29.5* 30* 31.4*   PLT 77*  --  114*   MCV 91  --  90   RDW 13.2  --  12.8        Chemistries:  Recent Labs   Lab 09/21/23 0258 09/21/23  1527 09/22/23  0311    138 141   K 3.9 3.8 3.5   CL 98 98 99   CO2 32* 32* 34*   BUN 19 23 17   CREATININE 0.9 1.0 0.8   CALCIUM 8.6* 9.0 8.8   ALBUMIN 3.3* 3.4* 3.4*   PROT 5.4* 5.9* 5.9*   BILITOT 0.9 1.0 1.1*   ALKPHOS 42* 48* 56   ALT 26 38 65*   AST 32 44* 52*   MG 2.0  --  2.0   PHOS 2.7  --  3.2       CMP:   Recent Labs   Lab 09/21/23 0258 09/21/23  1527 09/22/23  0311    138 141   K 3.9 3.8 3.5   CL 98 98 99   CO2 32* 32* 34*   * 165* 126*   BUN 19 23 17   CREATININE 0.9 1.0 0.8   CALCIUM 8.6* 9.0 8.8   PROT 5.4* 5.9* 5.9*   ALBUMIN 3.3* 3.4* 3.4*   BILITOT 0.9 1.0 1.1*   ALKPHOS 42* 48* 56   AST 32 44* 52*   ALT 26 38 65*   ANIONGAP 8 8 8     All pertinent labs within the past 24 hours have been reviewed.    Significant Imaging:  I have reviewed all pertinent imaging results/findings within the past 24 hours.    Assessment/Plan:     Cardiac/Vascular  * Aortic root aneurysm    Neuro/Psych:     - Sedation: None    - Pain:     - Scheduled Tylenol 1000mg Q8H   - PRN Oxycodone 5mg/10mg, Hydromorphone 0.5 Q1H               Cardiac:     - POD #4 s/p valve sparing aortic root repair, hemiarch replacement (6 minute circ arrest), CABG x2, and repair of hernia of morgani with Dr. Mooney    - Postoperative GWEN: Normal bivent function, no wall motion abnormalities, mild increase RV diameter    - TTE 9/21, EF 55-60 normal LV systolic function, mild reduced RV function    - BP Goal: MAP > 65, SBP < 130    - Inotropes/Pressors: vaso .02 prn    - Anti-HTNs: Cleviprex gtt prn to maintain SBP < 130    - Rhythm:    - Beta Blocker: Toprol 50 daily    - Statin:  statin daily    - Ezetimibe daily     - Recheck TTE        Pulmonary:     - Goal SpO2 >92%    - Wean supplemental O2 as tolerated    - ABGs PRN          Renal:    - Trend BUN/Cr     - Maintain Tsai, record strict Is/Os  - Net negative 1700 cc last 24 hours. UOP goal now 100-200 with lasix gtt    Recent Labs   Lab 09/21/23  0258 09/21/23  1527 09/22/23  0311   BUN 19 23 17   CREATININE 0.9 1.0 0.8         FEN / GI:     - Daily CMP, PRN K/Mag/Phos per protocol. CMP more frequently while on lasix    - Replace electrolytes as needed    - Nutrition: Cardiac diet 1500 cc restriction    - Bowel Regimen: Miralax, docusate      ID:     - Tmax: afebrile, no leukocytosis    - Abx: Completed perioperative cefazolin 2g Q8H x 5 doses    Recent Labs   Lab 09/20/23  0303 09/21/23  0258 09/22/23  0311   WBC 17.08* 11.89 12.18         Heme/Onc:     - CBC daily    - ASA 325mg daily    - Resume DVT prophylaxis    Recent Labs   Lab 09/18/23  1504 09/18/23  1840 09/18/23  2019 09/19/23  0300 09/20/23  0303 09/21/23  0258 09/22/23  0311   HGB 12.8*  12.8*  --  10.9* 10.6* 10.6* 10.1* 10.5*   *  132*  --  106* 104* 82* 77* 114*   APTT 47.4*  47.4* 24.5 23.7 25.4  --   --   --    INR 1.2  1.2  --  1.1 1.1  --   --   --          Endocrine:     - CTS Goal -140    - HgbA1c: 5.5    - Endocrinology following      PPx:   Feeding: Cardiac diet, 1500 cc restriction  Analgesia/Sedation: MM  Thromboembolic Prevention: Restart lovenox  HOB >30: Yes  Stress Ulcer: Yes - famotidine 20mg PO  Glucose Control: Yes, insulin management per Endocrinology -      Lines/Drains/Airway:  PIV    Art Line: L radial   Central Line: RIJ CVC, shital (DC today)   Quin       Dispo/Code Status/Palliative:     - Continue SICU Care    - Full Code           Critical care was time spent personally by me on the following activities: development of treatment plan with patient or surrogate and bedside caregivers, discussions with consultants, evaluation of  patient's response to treatment, examination of patient, ordering and performing treatments and interventions, ordering and review of laboratory studies, ordering and review of radiographic studies, pulse oximetry, re-evaluation of patient's condition.  This critical care time did not overlap with that of any other provider or involve time for any procedures.     Jean Claude Sandoval,   Critical Care - Surgery  [facility]

## 2023-09-22 NOTE — PT/OT/SLP PROGRESS
Occupational Therapy   Treatment    Name: Sawyer Browning  MRN: [id number]  Admitting Diagnosis:  Aortic root aneurysm  4 Days Post-Op    Recommendations:     Discharge Recommendations: other (see comments)  Discharge Equipment Recommendations:  none  Barriers to discharge:  None    Assessment:     Sawyer Browning is a 68 y.o. male with a medical diagnosis of Aortic root aneurysm. Performance deficits affecting function are weakness, impaired endurance, impaired self care skills, impaired functional mobility, gait instability, impaired balance, impaired cardiopulmonary response to activity. Patient tolerated treatment session well and was motivated to participate in therapy. VSS throughout session. Patient would benefit from continued skilled acute OT 3x/wk to improve functional mobility, increase independence with ADLs, and address established goals prior to discharge home.     Rehab Prognosis:  Good; patient would benefit from acute skilled OT services to address these deficits and reach maximum level of function.       Plan:     Patient to be seen 5 x/week to address the above listed problems via self-care/home management, therapeutic activities, therapeutic exercises  Plan of Care Expires: 10/19/23  Plan of Care Reviewed with: patient, spouse    Subjective     Chief Complaint: none noted  Patient/Family Comments/goals: Patient agreed to therapy  Pain/Comfort:  Pain Rating 1: 0/10  Pain Rating Post-Intervention 1: 0/10    Objective:     Communicated with: NSG prior to session.  Patient found up in chair with telemetry, pulse ox (continuous), blood pressure cuff, central line, petit catheter, chest tube, arterial line, peripheral IV upon OT entry to room.    General Precautions: Standard, fall, sternal    Orthopedic Precautions:N/A  Braces: N/A  Respiratory Status: Nasal cannula, flow 2 L/min     Occupational Performance:     Functional Mobility/Transfers:  Patient completed Sit <> Stand Transfer with stand by assistance   with  no assistive device   Functional Mobility: Patient ambulated bedside chair<>sink with functional ambulation technique with no AD and SBA<>CGA. Patient ambulated in hallway for household mobility distances prior to going to sink for g/h tasks. Patient was mostly SBA, but did need CGA for safety concerns during turns.     Activities of Daily Living:  Grooming: stand by assistance standing at sink with oral care and washing face      AMPA 6 Click ADL: 19    Treatment & Education:  Role of OT and POC  ADL retraining  Functional mobility training  Safety  Importance EOB/OOB activity    Co-treatment performed due to patient's multiple deficits requiring two skilled therapists to appropriately and safely assess patient's strength and endurance while facilitating functional tasks in addition to accommodating for patient's activity tolerance.     Patient left up in chair with all lines intact, call button in reach, RN notified, and all needs met.     GOALS:   Multidisciplinary Problems       Occupational Therapy Goals          Problem: Occupational Therapy    Goal Priority Disciplines Outcome Interventions   Occupational Therapy Goal     OT, PT/OT Ongoing, Progressing    Description: Goals to be met by:  10/10/2023    Patient will increase functional independence with ADLs by performing:    UE Dressing with Set-up Assistance.  LE Dressing with Supervision.  Grooming while standing at sink with Modified [place].  Toileting from toilet with Modified [place] for hygiene and clothing management.   Supine to sit with Modified [place].  Stand pivot transfers with Modified [place].  Toilet transfer to toilet with Modified [place].                         Time Tracking:     OT Date of Treatment: 09/22/23  OT Start Time: 1003  OT Stop Time: 1020  OT Total Time (min): 17 min    Billable Minutes:Self Care/Home Management 17               9/22/2023

## 2023-09-22 NOTE — CARE UPDATE
BG goal 140-180    -A1C   Lab Results   Component Value Date    HGBA1C 5.5 11/03/2022         -HOME REGIMEN: none; no previous hx of DM     -INPATIENT REGIMEN: Novolog Correction Scale     -GLUCOSE TREND FOR THE PAST 24HRS: 102-159    -NO HYPOGYCEMIAS NOTED     -Diet: Diet Cardiac Fluid - 1500mL      -Steroids - N/A    -Tube Feeds - N/A      Remains in SICU. POD 3 s/p CABG. BG reasonably controlled on current prn SQ insulin correction scale.       Plan:  -Continue Moderate Dose Correction Scale  -BG monitoring ac/hs    Discharge planning: tbd    Endocrine to continue to follow    ** Please call Endocrine for any BG related issues **

## 2023-09-22 NOTE — NURSING
Pt BP elevated to SBP 150s, Cleviprex restarted per order. Pt O2 sats decreased to 87%, O2 requirements increased from 1L NC to 10L HFNC. Respiratory at bedside, ABG obtained. MD notified and at bedside. Lasix drip titrated to maintain UOP goals. Chest x-ray obtained. WCTM.

## 2023-09-22 NOTE — ASSESSMENT & PLAN NOTE
  Neuro/Psych:     - Sedation: None    - Pain:     - Scheduled Tylenol 1000mg Q8H   - PRN Oxycodone 5mg/10mg, Hydromorphone 0.5 Q1H               Cardiac:     - POD #4 s/p valve sparing aortic root repair, hemiarch replacement (6 minute circ arrest), CABG x2, and repair of hernia of morgani with Dr. Mooney    - Postoperative GWEN: Normal bivent function, no wall motion abnormalities, mild increase RV diameter    - TTE 9/21, EF 55-60 normal LV systolic function, mild reduced RV function    - BP Goal: MAP > 65, SBP < 130    - Inotropes/Pressors: vaso .02 prn    - Anti-HTNs: Cleviprex gtt prn to maintain SBP < 130    - Rhythm:    - Beta Blocker: Toprol 50 daily    - Statin: statin daily    - Ezetimibe daily     - Recheck TTE        Pulmonary:     - Goal SpO2 >92%    - Wean supplemental O2 as tolerated    - ABGs PRN          Renal:    - Trend BUN/Cr     - Maintain Tsai, record strict Is/Os  - Net negative 1700 cc last 24 hours. UOP goal now 100-200 with lasix gtt    Recent Labs   Lab 09/21/23  0258 09/21/23  1527 09/22/23  0311   BUN 19 23 17   CREATININE 0.9 1.0 0.8         FEN / GI:     - Daily CMP, PRN K/Mag/Phos per protocol. CMP more frequently while on lasix    - Replace electrolytes as needed    - Nutrition: Cardiac diet 1500 cc restriction    - Bowel Regimen: Miralax, docusate      ID:     - Tmax: afebrile, no leukocytosis    - Abx: Completed perioperative cefazolin 2g Q8H x 5 doses    Recent Labs   Lab 09/20/23  0303 09/21/23  0258 09/22/23  0311   WBC 17.08* 11.89 12.18         Heme/Onc:     - CBC daily    - ASA 325mg daily    - Resume DVT prophylaxis    Recent Labs   Lab 09/18/23  1504 09/18/23  1840 09/18/23  2019 09/19/23  0300 09/20/23  0303 09/21/23  0258 09/22/23  0311   HGB 12.8*  12.8*  --  10.9* 10.6* 10.6* 10.1* 10.5*   *  132*  --  106* 104* 82* 77* 114*   APTT 47.4*  47.4* 24.5 23.7 25.4  --   --   --    INR 1.2  1.2  --  1.1 1.1  --   --   --          Endocrine:     - CTS Goal  -140    - HgbA1c: 5.5    - Endocrinology following      PPx:   Feeding: Cardiac diet, 1500 cc restriction  Analgesia/Sedation: MM  Thromboembolic Prevention: Restart lovenox  HOB >30: Yes  Stress Ulcer: Yes - famotidine 20mg PO  Glucose Control: Yes, insulin management per Endocrinology -      Lines/Drains/Airway:  PIV    Art Line: L radial   Central Line: RIJ CVC, swan (DC today)   Quin       Dispo/Code Status/Palliative:     - Continue SICU Care    - Full Code

## 2023-09-22 NOTE — SUBJECTIVE & OBJECTIVE
Interval History/Significant Events: Increase in O2 demand overnight, was not in distress. CXR without interval changes. UOP appropriate, off lasix this AM. Also has required a small amount of cleviprex to maintain SBP goals.     Follow-up For: Procedure(s) (LRB):  VALVE SPARING AORTIC ROOT REPLACEMENT (N/A)  CORONARY ARTERY BYPASS GRAFT (CABG) (N/A)  HARVEST-VEIN-ENDOVASCULAR (Left)  REPAIR, HERNIA, DIAPHRAGMATIC    Post-Operative Day: 4 Days Post-Op    Objective:     Vital Signs (Most Recent):  Temp: 98.5 °F (36.9 °C) (09/22/23 0300)  Pulse: 68 (09/22/23 0400)  Resp: 15 (09/22/23 0400)  BP: 97/67 (09/22/23 0400)  SpO2: 95 % (09/22/23 0400) Vital Signs (24h Range):  Temp:  [98.2 °F (36.8 °C)-98.6 °F (37 °C)] 98.5 °F (36.9 °C)  Pulse:  [59-78] 68  Resp:  [10-36] 15  SpO2:  [87 %-100 %] 95 %  BP: ()/(60-83) 97/67  Arterial Line BP: ()/(43-68) 115/54     Weight: 90.3 kg (199 lb)  Body mass index is 26.25 kg/m².      Intake/Output Summary (Last 24 hours) at 9/22/2023 0600  Last data filed at 9/22/2023 0400  Gross per 24 hour   Intake 718.77 ml   Output 2490 ml   Net -1771.23 ml          Physical Exam  Constitutional:       General: He is not in acute distress.     Appearance: Normal appearance. He is not ill-appearing or toxic-appearing.   HENT:      Head: Normocephalic and atraumatic.      Nose: Nose normal.   Eyes:      Extraocular Movements: Extraocular movements intact.   Neck:      Comments: [facility] CVC  Cardiovascular:      Rate and Rhythm: Normal rate and regular rhythm.      Heart sounds: No murmur heard.  Pulmonary:      Effort: Pulmonary effort is normal. No respiratory distress.      Breath sounds: No wheezing or rales.      Comments: Chest tubes out, 8 L HFNC  Chest:      Chest wall: No tenderness.   Abdominal:      General: There is no distension.      Palpations: Abdomen is soft.      Tenderness: There is no abdominal tenderness.   Musculoskeletal:         General: No swelling.      Cervical back:  Normal range of motion and neck supple.      Right lower leg: No edema.      Left lower leg: No edema.   Skin:     General: Skin is warm and dry.      Coloration: Skin is not jaundiced.   Neurological:      General: No focal deficit present.      Mental Status: He is alert. Mental status is at baseline.   Psychiatric:         Mood and Affect: Mood normal.            Vents:  Vent Mode: A/C (09/19/23 1538)  Ventilator Initiated: Yes (09/18/23 1446)  Set Rate: 20 BPM (09/19/23 1538)  Vt Set: 360 mL (09/19/23 1538)  Pressure Support: 12 cmH20 (09/19/23 0400)  PEEP/CPAP: 5 cmH20 (09/19/23 1538)  Oxygen Concentration (%): 24 (09/21/23 0201)  Peak Airway Pressure: 0 cmH20 (09/19/23 1538)  Plateau Pressure: 0 cmH20 (09/19/23 1538)  Total Ve: 0 L/m (09/19/23 1538)  Negative Inspiratory Force (cm H2O): 0 (09/19/23 1538)  F/VT Ratio<105 (RSBI): (!) 16.97 (09/19/23 0100)    Lines/Drains/Airways       Central Venous Catheter Line  Duration              Introducer with Double Lumen 09/18/23 0800 Internal Jugular Right 3 days    Percutaneous Central Line Insertion/Assessment - Triple Lumen  09/21/23 1841 Internal Jugular Right <1 day              Drain  Duration                  Urethral Catheter 09/18/23 0748 Temperature probe 3 days              Arterial Line  Duration             Arterial Line 09/18/23 0821 Left Radial 3 days              Peripheral Intravenous Line  Duration                  Peripheral IV - Single Lumen 14 G  Right Forearm -- days         Peripheral IV - Single Lumen 09/18/23 0655 20 G Anterior;Distal;Left Forearm 3 days                    Significant Labs:    CBC/Anemia Profile:  Recent Labs   Lab 09/21/23  0258 09/21/23  2245 09/22/23  0311   WBC 11.89  --  12.18   HGB 10.1*  --  10.5*   HCT 29.5* 30* 31.4*   PLT 77*  --  114*   MCV 91  --  90   RDW 13.2  --  12.8        Chemistries:  Recent Labs   Lab 09/21/23  0258 09/21/23  1527 09/22/23  0311    138 141   K 3.9 3.8 3.5   CL 98 98 99   CO2 32* 32*  34*   BUN 19 23 17   CREATININE 0.9 1.0 0.8   CALCIUM 8.6* 9.0 8.8   ALBUMIN 3.3* 3.4* 3.4*   PROT 5.4* 5.9* 5.9*   BILITOT 0.9 1.0 1.1*   ALKPHOS 42* 48* 56   ALT 26 38 65*   AST 32 44* 52*   MG 2.0  --  2.0   PHOS 2.7  --  3.2       CMP:   Recent Labs   Lab 09/21/23  0258 09/21/23  1527 09/22/23  0311    138 141   K 3.9 3.8 3.5   CL 98 98 99   CO2 32* 32* 34*   * 165* 126*   BUN 19 23 17   CREATININE 0.9 1.0 0.8   CALCIUM 8.6* 9.0 8.8   PROT 5.4* 5.9* 5.9*   ALBUMIN 3.3* 3.4* 3.4*   BILITOT 0.9 1.0 1.1*   ALKPHOS 42* 48* 56   AST 32 44* 52*   ALT 26 38 65*   ANIONGAP 8 8 8     All pertinent labs within the past 24 hours have been reviewed.    Significant Imaging:  I have reviewed all pertinent imaging results/findings within the past 24 hours.

## 2023-09-23 LAB
ALBUMIN SERPL BCP-MCNC: 3.4 G/DL (ref 3.5–5.2)
ALP SERPL-CCNC: 69 U/L (ref 55–135)
ALT SERPL W/O P-5'-P-CCNC: 103 U/L (ref 10–44)
ANION GAP SERPL CALC-SCNC: 11 MMOL/L (ref 8–16)
AST SERPL-CCNC: 64 U/L (ref 10–40)
BASOPHILS # BLD AUTO: 0.05 K/UL (ref 0–0.2)
BASOPHILS NFR BLD: 0.5 % (ref 0–1.9)
BILIRUB SERPL-MCNC: 1 MG/DL (ref 0.1–1)
BUN SERPL-MCNC: 17 MG/DL (ref 8–23)
CALCIUM SERPL-MCNC: 8.7 MG/DL (ref 8.7–10.5)
CHLORIDE SERPL-SCNC: 99 MMOL/L (ref 95–110)
CO2 SERPL-SCNC: 29 MMOL/L (ref 23–29)
CREAT SERPL-MCNC: 0.8 MG/DL (ref 0.5–1.4)
DIFFERENTIAL METHOD: ABNORMAL
EOSINOPHIL # BLD AUTO: 0.2 K/UL (ref 0–0.5)
EOSINOPHIL NFR BLD: 1.7 % (ref 0–8)
ERYTHROCYTE [DISTWIDTH] IN BLOOD BY AUTOMATED COUNT: 12.6 % (ref 11.5–14.5)
EST. GFR  (NO RACE VARIABLE): >60 ML/MIN/1.73 M^2
GLUCOSE SERPL-MCNC: 124 MG/DL (ref 70–110)
HCT VFR BLD AUTO: 32.8 % (ref 40–54)
HGB BLD-MCNC: 10.9 G/DL (ref 14–18)
IMM GRANULOCYTES # BLD AUTO: 0.08 K/UL (ref 0–0.04)
IMM GRANULOCYTES NFR BLD AUTO: 0.8 % (ref 0–0.5)
LYMPHOCYTES # BLD AUTO: 1.2 K/UL (ref 1–4.8)
LYMPHOCYTES NFR BLD: 12.5 % (ref 18–48)
MAGNESIUM SERPL-MCNC: 2 MG/DL (ref 1.6–2.6)
MCH RBC QN AUTO: 30.5 PG (ref 27–31)
MCHC RBC AUTO-ENTMCNC: 33.2 G/DL (ref 32–36)
MCV RBC AUTO: 92 FL (ref 82–98)
MONOCYTES # BLD AUTO: 1.4 K/UL (ref 0.3–1)
MONOCYTES NFR BLD: 14.3 % (ref 4–15)
NEUTROPHILS # BLD AUTO: 6.6 K/UL (ref 1.8–7.7)
NEUTROPHILS NFR BLD: 70.2 % (ref 38–73)
NRBC BLD-RTO: 0 /100 WBC
PHOSPHATE SERPL-MCNC: 3.5 MG/DL (ref 2.7–4.5)
PLATELET # BLD AUTO: 152 K/UL (ref 150–450)
PMV BLD AUTO: 11.4 FL (ref 9.2–12.9)
POCT GLUCOSE: 131 MG/DL (ref 70–110)
POCT GLUCOSE: 136 MG/DL (ref 70–110)
POCT GLUCOSE: 164 MG/DL (ref 70–110)
POCT GLUCOSE: 198 MG/DL (ref 70–110)
POTASSIUM SERPL-SCNC: 3.6 MMOL/L (ref 3.5–5.1)
PROT SERPL-MCNC: 6.2 G/DL (ref 6–8.4)
RBC # BLD AUTO: 3.57 M/UL (ref 4.6–6.2)
SODIUM SERPL-SCNC: 139 MMOL/L (ref 136–145)
WBC # BLD AUTO: 9.42 K/UL (ref 3.9–12.7)

## 2023-09-23 PROCEDURE — 25000003 PHARM REV CODE 250

## 2023-09-23 PROCEDURE — 25000003 PHARM REV CODE 250: Performed by: THORACIC SURGERY (CARDIOTHORACIC VASCULAR SURGERY)

## 2023-09-23 PROCEDURE — 63600175 PHARM REV CODE 636 W HCPCS: Performed by: NURSE PRACTITIONER

## 2023-09-23 PROCEDURE — 94761 N-INVAS EAR/PLS OXIMETRY MLT: CPT

## 2023-09-23 PROCEDURE — 99900035 HC TECH TIME PER 15 MIN (STAT)

## 2023-09-23 PROCEDURE — 83735 ASSAY OF MAGNESIUM: CPT | Performed by: STUDENT IN AN ORGANIZED HEALTH CARE EDUCATION/TRAINING PROGRAM

## 2023-09-23 PROCEDURE — 80053 COMPREHEN METABOLIC PANEL: CPT | Performed by: STUDENT IN AN ORGANIZED HEALTH CARE EDUCATION/TRAINING PROGRAM

## 2023-09-23 PROCEDURE — 85025 COMPLETE CBC W/AUTO DIFF WBC: CPT | Performed by: STUDENT IN AN ORGANIZED HEALTH CARE EDUCATION/TRAINING PROGRAM

## 2023-09-23 PROCEDURE — 99232 SBSQ HOSP IP/OBS MODERATE 35: CPT | Mod: GC,,, | Performed by: ANESTHESIOLOGY

## 2023-09-23 PROCEDURE — 25000003 PHARM REV CODE 250: Performed by: STUDENT IN AN ORGANIZED HEALTH CARE EDUCATION/TRAINING PROGRAM

## 2023-09-23 PROCEDURE — 99232 PR SUBSEQUENT HOSPITAL CARE,LEVL II: ICD-10-PCS | Mod: GC,,, | Performed by: ANESTHESIOLOGY

## 2023-09-23 PROCEDURE — 63600175 PHARM REV CODE 636 W HCPCS

## 2023-09-23 PROCEDURE — 20600001 HC STEP DOWN PRIVATE ROOM

## 2023-09-23 PROCEDURE — 84100 ASSAY OF PHOSPHORUS: CPT | Performed by: STUDENT IN AN ORGANIZED HEALTH CARE EDUCATION/TRAINING PROGRAM

## 2023-09-23 RX ORDER — FUROSEMIDE 40 MG/1
40 TABLET ORAL 2 TIMES DAILY
Status: DISCONTINUED | OUTPATIENT
Start: 2023-09-23 | End: 2023-09-24 | Stop reason: HOSPADM

## 2023-09-23 RX ADMIN — ACETAMINOPHEN 1000 MG: 500 TABLET ORAL at 02:09

## 2023-09-23 RX ADMIN — FAMOTIDINE 20 MG: 20 TABLET ORAL at 08:09

## 2023-09-23 RX ADMIN — FUROSEMIDE 40 MG: 40 TABLET ORAL at 08:09

## 2023-09-23 RX ADMIN — POTASSIUM CHLORIDE 20 MEQ: 1500 TABLET, EXTENDED RELEASE ORAL at 09:09

## 2023-09-23 RX ADMIN — ASPIRIN 325 MG ORAL TABLET 325 MG: 325 PILL ORAL at 08:09

## 2023-09-23 RX ADMIN — ACETAMINOPHEN 1000 MG: 500 TABLET ORAL at 09:09

## 2023-09-23 RX ADMIN — POTASSIUM BICARBONATE 50 MEQ: 978 TABLET, EFFERVESCENT ORAL at 06:09

## 2023-09-23 RX ADMIN — POTASSIUM CHLORIDE 20 MEQ: 1500 TABLET, EXTENDED RELEASE ORAL at 08:09

## 2023-09-23 RX ADMIN — MUPIROCIN: 20 OINTMENT TOPICAL at 08:09

## 2023-09-23 RX ADMIN — FUROSEMIDE 40 MG: 40 TABLET ORAL at 05:09

## 2023-09-23 RX ADMIN — METOPROLOL SUCCINATE 100 MG: 100 TABLET, EXTENDED RELEASE ORAL at 08:09

## 2023-09-23 RX ADMIN — ACETAMINOPHEN 1000 MG: 500 TABLET ORAL at 06:09

## 2023-09-23 RX ADMIN — OXYCODONE HYDROCHLORIDE 10 MG: 10 TABLET ORAL at 02:09

## 2023-09-23 RX ADMIN — FAMOTIDINE 20 MG: 20 TABLET ORAL at 09:09

## 2023-09-23 RX ADMIN — SENNOSIDES AND DOCUSATE SODIUM 1 TABLET: 50; 8.6 TABLET ORAL at 09:09

## 2023-09-23 RX ADMIN — ATORVASTATIN CALCIUM 80 MG: 20 TABLET, FILM COATED ORAL at 08:09

## 2023-09-23 RX ADMIN — INSULIN ASPART 1 UNITS: 100 INJECTION, SOLUTION INTRAVENOUS; SUBCUTANEOUS at 09:09

## 2023-09-23 RX ADMIN — ENOXAPARIN SODIUM 40 MG: 40 INJECTION SUBCUTANEOUS at 04:09

## 2023-09-23 RX ADMIN — Medication 6 MG: at 10:09

## 2023-09-23 NOTE — CARE UPDATE
BG goal 140-180    -A1C   Lab Results   Component Value Date    HGBA1C 5.5 11/03/2022         -HOME REGIMEN: none; no previous hx of DM     -INPATIENT REGIMEN: Novolog Correction Scale     -GLUCOSE TREND FOR THE PAST 24HRS: 102-159    -NO HYPOGYCEMIAS NOTED     -Diet: Diet Cardiac Fluid - 1500mL      -Steroids - N/A    -Tube Feeds - N/A      Remains in SICU. CABG. BG controlled on current prn SQ insulin correction scale.       Plan:  -Continue Moderate Dose Correction Scale  -BG monitoring ac/hs    Discharge planning: tbd    Endocrine to continue to follow    ** Please call Endocrine for any BG related issues **

## 2023-09-23 NOTE — SUBJECTIVE & OBJECTIVE
Interval History/Significant Events: NAEON. HDS. Off cleveprex since yesterday morning. UOP 3L, net -2.6L. Tolerating diet. Bmx2. Stable for transfer to wards.     Follow-up For: Procedure(s) (LRB):  VALVE SPARING AORTIC ROOT REPLACEMENT (N/A)  CORONARY ARTERY BYPASS GRAFT (CABG) (N/A)  HARVEST-VEIN-ENDOVASCULAR (Left)  REPAIR, HERNIA, DIAPHRAGMATIC    Post-Operative Day: 5 Days Post-Op    Objective:     Vital Signs (Most Recent):  Temp: 98.6 °F (37 °C) (09/23/23 0000)  Pulse: 69 (09/23/23 0807)  Resp: 18 (09/23/23 0807)  BP: (!) 140/82 (09/23/23 0831)  SpO2: (!) 94 % (09/23/23 0807) Vital Signs (24h Range):  Temp:  [98.4 °F (36.9 °C)-98.6 °F (37 °C)] 98.6 °F (37 °C)  Pulse:  [61-84] 69  Resp:  [10-33] 18  SpO2:  [88 %-99 %] 94 %  BP: ()/() 140/82  Arterial Line BP: ()/(43-88) 150/70     Weight: 90 kg (198 lb 6.6 oz)  Body mass index is 26.18 kg/m².      Intake/Output Summary (Last 24 hours) at 9/23/2023 0851  Last data filed at 9/23/2023 0600  Gross per 24 hour   Intake 354.67 ml   Output 3505 ml   Net -3150.33 ml       Physical Exam  Constitutional:       General: He is not in acute distress.     Appearance: Normal appearance. He is not ill-appearing or toxic-appearing.   HENT:      Head: Normocephalic and atraumatic.      Nose: Nose normal.   Eyes:      Extraocular Movements: Extraocular movements intact.   Neck:      Comments: [facility] CVC  Cardiovascular:      Rate and Rhythm: Normal rate and regular rhythm.      Heart sounds: No murmur heard.  Pulmonary:      Effort: Pulmonary effort is normal. No respiratory distress.      Breath sounds: No wheezing or rales.   Chest:      Chest wall: No tenderness.   Abdominal:      General: There is no distension.      Palpations: Abdomen is soft.      Tenderness: There is no abdominal tenderness.   Musculoskeletal:         General: No swelling.      Cervical back: Normal range of motion and neck supple.      Right lower leg: No edema.      Left lower leg: No  edema.   Skin:     General: Skin is warm and dry.      Coloration: Skin is not jaundiced.   Neurological:      General: No focal deficit present.      Mental Status: He is alert. Mental status is at baseline.   Psychiatric:         Mood and Affect: Mood normal.        Lines/Drains/Airways       Central Venous Catheter Line  Duration              Introducer with Double Lumen 09/18/23 0800 Internal Jugular Right 5 days              Drain  Duration                  Urethral Catheter 09/18/23 0748 Temperature probe 5 days              Arterial Line  Duration             Arterial Line 09/18/23 0821 Left Radial 5 days              Peripheral Intravenous Line  Duration                  Peripheral IV - Single Lumen 14 G  Right Forearm -- days         Peripheral IV - Single Lumen 09/18/23 0655 20 G Anterior;Distal;Left Forearm 5 days         Peripheral IV - Single Lumen 09/22/23 0637 22 G Posterior;Right Hand 1 day                    Significant Labs:    CBC/Anemia Profile:  Recent Labs   Lab 09/21/23  2245 09/22/23  0311 09/23/23  0248   WBC  --  12.18 9.42   HGB  --  10.5* 10.9*   HCT 30* 31.4* 32.8*   PLT  --  114* 152   MCV  --  90 92   RDW  --  12.8 12.6        Chemistries:  Recent Labs   Lab 09/22/23  0311 09/22/23  1311 09/22/23  2117 09/23/23  0248    138 137 139   K 3.5 3.2* 3.4* 3.6   CL 99 101 99 99   CO2 34* 30* 31* 29   BUN 17 18 18 17   CREATININE 0.8 0.7 0.9 0.8   CALCIUM 8.8 8.0* 8.1* 8.7   ALBUMIN 3.4* 3.1* 3.3* 3.4*   PROT 5.9* 5.5* 5.9* 6.2   BILITOT 1.1* 0.9 0.7 1.0   ALKPHOS 56 59 69 69   ALT 65* 67* 110* 103*   AST 52* 53* 81* 64*   MG 2.0  --   --  2.0   PHOS 3.2  --   --  3.5       Significant Imaging:  I have reviewed all pertinent imaging results/findings within the past 24 hours.

## 2023-09-23 NOTE — ASSESSMENT & PLAN NOTE
  Neuro/Psych:     - Sedation: None    - Pain:     - Scheduled Tylenol 1000mg Q8H   - PRN Oxycodone 5mg/10mg, Hydromorphone 0.5 Q1H             Cardiac:     - POD #4 s/p valve sparing aortic root repair, hemiarch replacement (6 minute circ arrest), CABG x2, and repair of hernia of morgani with Dr. Mooney    - Postoperative GWEN: Normal bivent function, no wall motion abnormalities, mild increase RV diameter    - TTE 9/21, EF 55-60 normal LV systolic function, mild reduced RV function    - BP Goal: MAP > 65, SBP < 130    - Inotropes/Pressors: None    - Anti-HTNs: Off cleviprex with SBP in 120s    - Rhythm: NSR    - Beta Blocker: Toprol 50 daily    - Statin: statin daily     - Ezetimibe daily     - Recheck TTE     Pulmonary:     - Goal SpO2 >92%    - Wean supplemental O2 as tolerated    - ABGs PRN        Renal:    - Trend BUN/Cr     - D/C petit    - Net negative 3005 cc last 24 hours. Off lasix gtt. Now PO.     Recent Labs   Lab 09/22/23  1311 09/22/23  2117 09/23/23  0248   BUN 18 18 17   CREATININE 0.7 0.9 0.8         FEN / GI:     - Daily CMP, PRN K/Mag/Phos per protocol. CMP more frequently while on lasix    - Replace electrolytes as needed    - Nutrition: Cardiac diet 1500 cc restriction    - Bowel Regimen: Miralax, docusate      ID:     - Tmax: afebrile, no leukocytosis    - Abx: Completed perioperative cefazolin 2g Q8H x 5 doses    Recent Labs   Lab 09/21/23  0258 09/22/23  0311 09/23/23  0248   WBC 11.89 12.18 9.42         Heme/Onc:     - CBC daily    - ASA 325mg daily    - Lovenox     Recent Labs   Lab 09/18/23  1504 09/18/23  1840 09/18/23  2019 09/19/23  0300 09/20/23  0303 09/21/23  0258 09/22/23  0311 09/23/23  0248   HGB 12.8*  12.8*  --  10.9* 10.6*   < > 10.1* 10.5* 10.9*   *  132*  --  106* 104*   < > 77* 114* 152   APTT 47.4*  47.4* 24.5 23.7 25.4  --   --   --   --    INR 1.2  1.2  --  1.1 1.1  --   --   --   --     < > = values in this interval not displayed.         Endocrine:      - CTS Goal -140    - HgbA1c: 5.5    - Endocrinology following      PPx:   Feeding: Cardiac diet, 1500 cc restriction  Analgesia/Sedation: MM  Thromboembolic Prevention: Restart lovenox  HOB >30: Yes  Stress Ulcer: Yes - famotidine 20mg PO  Glucose Control: Yes, insulin management per Endocrinology -      Lines/Drains/Airway:  PIV  D/C all lines and petit      Dispo/Code Status/Palliative:     - Stable for floor SD    - Full Code

## 2023-09-23 NOTE — PROGRESS NOTES
[facility]  Critical Care - Surgery  Progress Note    Patient Name: Sawyer Browning  MRN: [id number]  Admission Date: 9/18/2023  Hospital Length of Stay: 5 days  Code Status: Full Code  Attending Provider: Imtiaz Mooney MD  Primary Care Provider: Mary Kay Haines MD   Principal Problem: Aortic root aneurysm    Subjective:     Hospital/ICU Course:  No notes on file    Interval History/Significant Events: NAEON. HDS. Off cleveprex since yesterday morning. UOP 3L, net -2.6L. Tolerating diet. Bmx2. Stable for transfer to wards.     Follow-up For: Procedure(s) (LRB):  VALVE SPARING AORTIC ROOT REPLACEMENT (N/A)  CORONARY ARTERY BYPASS GRAFT (CABG) (N/A)  HARVEST-VEIN-ENDOVASCULAR (Left)  REPAIR, HERNIA, DIAPHRAGMATIC    Post-Operative Day: 5 Days Post-Op    Objective:     Vital Signs (Most Recent):  Temp: 98.6 °F (37 °C) (09/23/23 0000)  Pulse: 69 (09/23/23 0807)  Resp: 18 (09/23/23 0807)  BP: (!) 140/82 (09/23/23 0831)  SpO2: (!) 94 % (09/23/23 0807) Vital Signs (24h Range):  Temp:  [98.4 °F (36.9 °C)-98.6 °F (37 °C)] 98.6 °F (37 °C)  Pulse:  [61-84] 69  Resp:  [10-33] 18  SpO2:  [88 %-99 %] 94 %  BP: ()/() 140/82  Arterial Line BP: ()/(43-88) 150/70     Weight: 90 kg (198 lb 6.6 oz)  Body mass index is 26.18 kg/m².      Intake/Output Summary (Last 24 hours) at 9/23/2023 0851  Last data filed at 9/23/2023 0600  Gross per 24 hour   Intake 354.67 ml   Output 3505 ml   Net -3150.33 ml       Physical Exam  Constitutional:       General: He is not in acute distress.     Appearance: Normal appearance. He is not ill-appearing or toxic-appearing.   HENT:      Head: Normocephalic and atraumatic.      Nose: Nose normal.   Eyes:      Extraocular Movements: Extraocular movements intact.   Neck:      Comments: RIJ CVC  Cardiovascular:      Rate and Rhythm: Normal rate and regular rhythm.      Heart sounds: No murmur heard.  Pulmonary:      Effort: Pulmonary effort is normal. No respiratory  distress.      Breath sounds: No wheezing or rales.   Chest:      Chest wall: No tenderness.   Abdominal:      General: There is no distension.      Palpations: Abdomen is soft.      Tenderness: There is no abdominal tenderness.   Musculoskeletal:         General: No swelling.      Cervical back: Normal range of motion and neck supple.      Right lower leg: No edema.      Left lower leg: No edema.   Skin:     General: Skin is warm and dry.      Coloration: Skin is not jaundiced.   Neurological:      General: No focal deficit present.      Mental Status: He is alert. Mental status is at baseline.   Psychiatric:         Mood and Affect: Mood normal.        Lines/Drains/Airways       Central Venous Catheter Line  Duration              Introducer with Double Lumen 09/18/23 0800 Internal Jugular Right 5 days              Drain  Duration                  Urethral Catheter 09/18/23 0748 Temperature probe 5 days              Arterial Line  Duration             Arterial Line 09/18/23 0821 Left Radial 5 days              Peripheral Intravenous Line  Duration                  Peripheral IV - Single Lumen 14 G  Right Forearm -- days         Peripheral IV - Single Lumen 09/18/23 0655 20 G Anterior;Distal;Left Forearm 5 days         Peripheral IV - Single Lumen 09/22/23 0637 22 G Posterior;Right Hand 1 day                    Significant Labs:    CBC/Anemia Profile:  Recent Labs   Lab 09/21/23  2245 09/22/23  0311 09/23/23  0248   WBC  --  12.18 9.42   HGB  --  10.5* 10.9*   HCT 30* 31.4* 32.8*   PLT  --  114* 152   MCV  --  90 92   RDW  --  12.8 12.6        Chemistries:  Recent Labs   Lab 09/22/23  0311 09/22/23  1311 09/22/23  2117 09/23/23  0248    138 137 139   K 3.5 3.2* 3.4* 3.6   CL 99 101 99 99   CO2 34* 30* 31* 29   BUN 17 18 18 17   CREATININE 0.8 0.7 0.9 0.8   CALCIUM 8.8 8.0* 8.1* 8.7   ALBUMIN 3.4* 3.1* 3.3* 3.4*   PROT 5.9* 5.5* 5.9* 6.2   BILITOT 1.1* 0.9 0.7 1.0   ALKPHOS 56 59 69 69   ALT 65* 67* 110* 103*    AST 52* 53* 81* 64*   MG 2.0  --   --  2.0   PHOS 3.2  --   --  3.5       Significant Imaging:  I have reviewed all pertinent imaging results/findings within the past 24 hours.    Assessment/Plan:     Cardiac/Vascular  * Aortic root aneurysm    Neuro/Psych:     - Sedation: None    - Pain:     - Scheduled Tylenol 1000mg Q8H   - PRN Oxycodone 5mg/10mg, Hydromorphone 0.5 Q1H             Cardiac:     - POD #4 s/p valve sparing aortic root repair, hemiarch replacement (6 minute circ arrest), CABG x2, and repair of hernia of morgani with Dr. Mooney    - Postoperative GWEN: Normal bivent function, no wall motion abnormalities, mild increase RV diameter    - TTE 9/21, EF 55-60 normal LV systolic function, mild reduced RV function    - BP Goal: MAP > 65, SBP < 130    - Inotropes/Pressors: None    - Anti-HTNs: Off cleviprex with SBP in 120s    - Rhythm: NSR    - Beta Blocker: Toprol 50 daily    - Statin: statin daily     - Ezetimibe daily     - Recheck TTE     Pulmonary:     - Goal SpO2 >92%    - Wean supplemental O2 as tolerated    - ABGs PRN        Renal:    - Trend BUN/Cr     - D/C petit    - Net negative 3005 cc last 24 hours. Off lasix gtt. Now PO.     Recent Labs   Lab 09/22/23  1311 09/22/23  2117 09/23/23  0248   BUN 18 18 17   CREATININE 0.7 0.9 0.8         FEN / GI:     - Daily CMP, PRN K/Mag/Phos per protocol. CMP more frequently while on lasix    - Replace electrolytes as needed    - Nutrition: Cardiac diet 1500 cc restriction    - Bowel Regimen: Miralax, docusate      ID:     - Tmax: afebrile, no leukocytosis    - Abx: Completed perioperative cefazolin 2g Q8H x 5 doses    Recent Labs   Lab 09/21/23  0258 09/22/23  0311 09/23/23  0248   WBC 11.89 12.18 9.42         Heme/Onc:     - CBC daily    - ASA 325mg daily    - Lovenox     Recent Labs   Lab 09/18/23  1504 09/18/23  1840 09/18/23  2019 09/19/23  0300 09/20/23  0303 09/21/23  0258 09/22/23  0311 09/23/23  0248   HGB 12.8*  12.8*  --  10.9* 10.6*   < >  10.1* 10.5* 10.9*   *  132*  --  106* 104*   < > 77* 114* 152   APTT 47.4*  47.4* 24.5 23.7 25.4  --   --   --   --    INR 1.2  1.2  --  1.1 1.1  --   --   --   --     < > = values in this interval not displayed.         Endocrine:     - CTS Goal -140    - HgbA1c: 5.5    - Endocrinology following      PPx:   Feeding: Cardiac diet, 1500 cc restriction  Analgesia/Sedation: MM  Thromboembolic Prevention: Restart lovenox  HOB >30: Yes  Stress Ulcer: Yes - famotidine 20mg PO  Glucose Control: Yes, insulin management per Endocrinology -      Lines/Drains/Airway:  PIV  D/C all lines and petit      Dispo/Code Status/Palliative:     - Stable for floor SD    - Full Code           Astrid Melgoza MD  Critical Care - Surgery  [facility]

## 2023-09-23 NOTE — PLAN OF CARE
AAOX4,VSS,O2 sats>92% on room air. Plan of care discussed with patient and wife. Patient has no complaints of pain/SOB. Patient ambulating independently. Patient walked in the hallway with wife without complications. Patient self-administering incentive spirometer. Patient continues to void spontaneously post petit catheter removal.  Discussed medications and care. Patient has no questions at this time.Pt visualised and stable.Call light within reach.Pt resting,bed at lowest position.     Problem: Adult Inpatient Plan of Care  Goal: Plan of Care Review  Outcome: Ongoing, Progressing  Goal: Absence of Hospital-Acquired Illness or Injury  Outcome: Ongoing, Progressing  Goal: Optimal Comfort and Wellbeing  Outcome: Ongoing, Progressing     Problem: Infection  Goal: Absence of Infection Signs and Symptoms  Outcome: Ongoing, Progressing     Problem: Fall Injury Risk  Goal: Absence of Fall and Fall-Related Injury  Outcome: Ongoing, Progressing     Problem: Skin Injury Risk Increased  Goal: Skin Health and Integrity  Outcome: Ongoing, Progressing

## 2023-09-23 NOTE — NURSING
Pt arrived from ICU via wheelchair. Pt AxOx4, VSS on room air. Pt up in chair with no complaints of pain or SOB. Pt and wife oriented to the room. Pt with MSI open to air - clean, dry and intact.

## 2023-09-23 NOTE — PT/OT/SLP PROGRESS
Physical Therapy Treatment    Patient Name:  Sawyer Browning   MRN:  [id number]  Admit Date: 2023  Admitting Diagnosis:  Aortic root aneurysm   Length of Stay: 4 days  Recent Surgery: Procedure(s) (LRB):  VALVE SPARING AORTIC ROOT REPLACEMENT (N/A)  CORONARY ARTERY BYPASS GRAFT (CABG) (N/A)  HARVEST-VEIN-ENDOVASCULAR (Left)  REPAIR, HERNIA, DIAPHRAGMATIC 4 Days Post-Op    Recommendations:     Discharge Recommendations:  other (see comments) (defer to CM/SW)   Discharge Equipment Recommendations: none   Barriers to discharge: None    Plan:     During this hospitalization, patient to be seen 5 x/week to address the listed problems via gait training, therapeutic activities, therapeutic exercises, neuromuscular re-education  Plan of Care Expires:  10/18/23  Plan of Care Reviewed with: patient, spouse    Assessment:     Sawyer Browning is a 68 y.o. male admitted with a medical diagnosis of Aortic root aneurysm.       Problem List: weakness, impaired endurance, impaired functional mobility, gait instability, impaired balance, decreased upper extremity function, impaired cardiopulmonary response to activity.  Rehab Prognosis: Good     GOALS:   Multidisciplinary Problems       Physical Therapy Goals          Problem: Physical Therapy    Goal Priority Disciplines Outcome Goal Variances Interventions   Physical Therapy Goal     PT, PT/OT Ongoing, Progressing     Description: Goals to be met by: 10/3/23     Patient will increase functional independence with mobility by performin. Supine to sit with Stand-by Assistance  2. Sit to supine with Stand-by Assistance  3. Sit to stand transfer with Supervision  4. Bed to chair transfer with Supervision using No Assistive Device  5. Gait  x 250 feet with Supervision using No Assistive Device.   6. Ascend/descend 3 stair with unilateral Handrails Supervision using No Assistive Device.                          Subjective   Communicated with RN prior to session.  Patient found up in chair  "upon PT entry to room, agreeable to evaluation. Sawyer Borwning's wife present during session.    Chief Complaint: none reported   Patient/Family Comments/goals: to get better and return home   Pain/Comfort:  Pain Rating 1: 0/10  Pain Rating Post-Intervention 1: 0/10    Objective:   Patient found with: telemetry, pulse ox (continuous), blood pressure cuff, central line, peripheral IV, petit catheter, arterial line, oxygen   General Precautions: Standard, Cardiac fall, sternal   Orthopedic Precautions:N/A   Braces: N/A   Oxygen Device: Nasal Cannula   Vitals: BP (!) 91/53   Pulse 70   Temp 98.6 °F (37 °C) (Oral)   Resp 17   Ht 6' 1" (1.854 m)   Wt 90 kg (198 lb 6.6 oz)   SpO2 (!) 91%   BMI 26.18 kg/m²     Outcome Measures:  AM-PAC 6 CLICK MOBILITY  Turning over in bed (including adjusting bedclothes, sheets and blankets)?: 3  Sitting down on and standing up from a chair with arms (e.g., wheelchair, bedside commode, etc.): 3  Moving from lying on back to sitting on the side of the bed?: 3  Moving to and from a bed to a chair (including a wheelchair)?: 3  Need to walk in hospital room?: 3  Climbing 3-5 steps with a railing?: 3  Basic Mobility Total Score: 18     Functional Mobility:  Additional staff present: OT  Bed Mobility:   Not performed 2nd to pt found in chair     Transfers:   Sit <> Stand Transfer: stand by assistance with no assistive device from chair       Gait:  Patient ambulated: 200ft    Patient required: standy by assistance except CGA for turns   Patient used: no assistive device  Gait Pattern observed: reciprocal gait  Gait Deviation(s): decreased step length, decreased virginia, and occasional inconsistent foot placement   Impairments due to: impaired balance and decreased endurance  Comments:   LOB with turns; verbal cuing to widen YARIEL with turns  Mild SOB  Verbal cuing for purposeful steps, upright posture, and to increase step length     Therapeutic Activities, Exercises, and Education: "   Educated pt on PT role/POC  Educated pt on importance of OOB activity and daily ambulation   Educated pt on PT role/POC  Pt verbalized understanding     Pt performed ADLs standing at the sink with OT      Patient left up in chair with all lines intact, call button in reach, and RN notified..    Time Tracking:     PT Received On: 09/22/23  PT Start Time: 1004     PT Stop Time: 1022  PT Total Time (min): 18 min       Billable Minutes:   Gait Training 10    Treatment Type: Treatment  PT/PTA: PT

## 2023-09-24 VITALS
BODY MASS INDEX: 26.27 KG/M2 | OXYGEN SATURATION: 95 % | HEIGHT: 73 IN | WEIGHT: 198.19 LBS | HEART RATE: 66 BPM | DIASTOLIC BLOOD PRESSURE: 84 MMHG | RESPIRATION RATE: 19 BRPM | TEMPERATURE: 98 F | SYSTOLIC BLOOD PRESSURE: 134 MMHG

## 2023-09-24 LAB
ALBUMIN SERPL BCP-MCNC: 3.2 G/DL (ref 3.5–5.2)
ALP SERPL-CCNC: 63 U/L (ref 55–135)
ALT SERPL W/O P-5'-P-CCNC: 94 U/L (ref 10–44)
ANION GAP SERPL CALC-SCNC: 10 MMOL/L (ref 8–16)
AST SERPL-CCNC: 44 U/L (ref 10–40)
BASOPHILS # BLD AUTO: 0.05 K/UL (ref 0–0.2)
BASOPHILS NFR BLD: 0.6 % (ref 0–1.9)
BILIRUB SERPL-MCNC: 0.9 MG/DL (ref 0.1–1)
BUN SERPL-MCNC: 18 MG/DL (ref 8–23)
CALCIUM SERPL-MCNC: 9.1 MG/DL (ref 8.7–10.5)
CHLORIDE SERPL-SCNC: 99 MMOL/L (ref 95–110)
CO2 SERPL-SCNC: 30 MMOL/L (ref 23–29)
CREAT SERPL-MCNC: 0.8 MG/DL (ref 0.5–1.4)
DIFFERENTIAL METHOD: ABNORMAL
EOSINOPHIL # BLD AUTO: 0.2 K/UL (ref 0–0.5)
EOSINOPHIL NFR BLD: 2.6 % (ref 0–8)
ERYTHROCYTE [DISTWIDTH] IN BLOOD BY AUTOMATED COUNT: 12.7 % (ref 11.5–14.5)
EST. GFR  (NO RACE VARIABLE): >60 ML/MIN/1.73 M^2
GLUCOSE SERPL-MCNC: 106 MG/DL (ref 70–110)
HCT VFR BLD AUTO: 29.1 % (ref 40–54)
HGB BLD-MCNC: 10.1 G/DL (ref 14–18)
IMM GRANULOCYTES # BLD AUTO: 0.09 K/UL (ref 0–0.04)
IMM GRANULOCYTES NFR BLD AUTO: 1 % (ref 0–0.5)
LYMPHOCYTES # BLD AUTO: 1 K/UL (ref 1–4.8)
LYMPHOCYTES NFR BLD: 11.5 % (ref 18–48)
MAGNESIUM SERPL-MCNC: 2.1 MG/DL (ref 1.6–2.6)
MCH RBC QN AUTO: 30.1 PG (ref 27–31)
MCHC RBC AUTO-ENTMCNC: 34.7 G/DL (ref 32–36)
MCV RBC AUTO: 87 FL (ref 82–98)
MONOCYTES # BLD AUTO: 1.3 K/UL (ref 0.3–1)
MONOCYTES NFR BLD: 14.7 % (ref 4–15)
NEUTROPHILS # BLD AUTO: 6 K/UL (ref 1.8–7.7)
NEUTROPHILS NFR BLD: 69.6 % (ref 38–73)
NRBC BLD-RTO: 0 /100 WBC
PHOSPHATE SERPL-MCNC: 3.5 MG/DL (ref 2.7–4.5)
PLATELET # BLD AUTO: 172 K/UL (ref 150–450)
PMV BLD AUTO: 11 FL (ref 9.2–12.9)
POCT GLUCOSE: 123 MG/DL (ref 70–110)
POTASSIUM SERPL-SCNC: 3.6 MMOL/L (ref 3.5–5.1)
PROT SERPL-MCNC: 5.9 G/DL (ref 6–8.4)
RBC # BLD AUTO: 3.35 M/UL (ref 4.6–6.2)
SODIUM SERPL-SCNC: 139 MMOL/L (ref 136–145)
WBC # BLD AUTO: 8.59 K/UL (ref 3.9–12.7)

## 2023-09-24 PROCEDURE — 83735 ASSAY OF MAGNESIUM: CPT | Performed by: STUDENT IN AN ORGANIZED HEALTH CARE EDUCATION/TRAINING PROGRAM

## 2023-09-24 PROCEDURE — 36415 COLL VENOUS BLD VENIPUNCTURE: CPT | Performed by: STUDENT IN AN ORGANIZED HEALTH CARE EDUCATION/TRAINING PROGRAM

## 2023-09-24 PROCEDURE — 84100 ASSAY OF PHOSPHORUS: CPT | Performed by: STUDENT IN AN ORGANIZED HEALTH CARE EDUCATION/TRAINING PROGRAM

## 2023-09-24 PROCEDURE — 25000003 PHARM REV CODE 250

## 2023-09-24 PROCEDURE — 25000003 PHARM REV CODE 250: Performed by: THORACIC SURGERY (CARDIOTHORACIC VASCULAR SURGERY)

## 2023-09-24 PROCEDURE — 94761 N-INVAS EAR/PLS OXIMETRY MLT: CPT

## 2023-09-24 PROCEDURE — 25000003 PHARM REV CODE 250: Performed by: STUDENT IN AN ORGANIZED HEALTH CARE EDUCATION/TRAINING PROGRAM

## 2023-09-24 PROCEDURE — 85025 COMPLETE CBC W/AUTO DIFF WBC: CPT | Performed by: STUDENT IN AN ORGANIZED HEALTH CARE EDUCATION/TRAINING PROGRAM

## 2023-09-24 PROCEDURE — 80053 COMPREHEN METABOLIC PANEL: CPT | Performed by: STUDENT IN AN ORGANIZED HEALTH CARE EDUCATION/TRAINING PROGRAM

## 2023-09-24 RX ORDER — METOPROLOL SUCCINATE 100 MG/1
100 TABLET, EXTENDED RELEASE ORAL DAILY
Qty: 60 TABLET | Refills: 0 | Status: SHIPPED | OUTPATIENT
Start: 2023-09-25 | End: 2023-10-23 | Stop reason: SDUPTHER

## 2023-09-24 RX ORDER — OXYCODONE HYDROCHLORIDE 5 MG/1
5 TABLET ORAL EVERY 4 HOURS PRN
Qty: 30 TABLET | Refills: 0 | Status: CANCELLED | OUTPATIENT
Start: 2023-09-24

## 2023-09-24 RX ORDER — FUROSEMIDE 20 MG/1
80 TABLET ORAL DAILY
Qty: 20 TABLET | Refills: 0 | Status: ON HOLD | OUTPATIENT
Start: 2023-09-24 | End: 2023-09-27 | Stop reason: HOSPADM

## 2023-09-24 RX ORDER — ASPIRIN 325 MG
325 TABLET ORAL DAILY
Qty: 360 TABLET | Refills: 0 | COMMUNITY
Start: 2023-09-25 | End: 2023-10-19

## 2023-09-24 RX ORDER — POTASSIUM CHLORIDE 20 MEQ/1
20 TABLET, EXTENDED RELEASE ORAL 2 TIMES DAILY
Qty: 10 TABLET | Refills: 0 | Status: ON HOLD | OUTPATIENT
Start: 2023-09-24 | End: 2023-09-27 | Stop reason: HOSPADM

## 2023-09-24 RX ORDER — OXYCODONE HYDROCHLORIDE 5 MG/1
5 TABLET ORAL EVERY 4 HOURS PRN
Qty: 30 TABLET | Refills: 0 | Status: SHIPPED | OUTPATIENT
Start: 2023-09-24 | End: 2023-11-01 | Stop reason: ALTCHOICE

## 2023-09-24 RX ORDER — POLYETHYLENE GLYCOL 3350 17 G/17G
17 POWDER, FOR SOLUTION ORAL 2 TIMES DAILY PRN
Refills: 0 | COMMUNITY
Start: 2023-09-24 | End: 2023-11-01 | Stop reason: ALTCHOICE

## 2023-09-24 RX ORDER — ACETAMINOPHEN 500 MG
1000 TABLET ORAL EVERY 8 HOURS PRN
Refills: 0 | COMMUNITY
Start: 2023-09-24 | End: 2023-11-20 | Stop reason: SDUPTHER

## 2023-09-24 RX ADMIN — SENNOSIDES AND DOCUSATE SODIUM 1 TABLET: 50; 8.6 TABLET ORAL at 08:09

## 2023-09-24 RX ADMIN — FUROSEMIDE 40 MG: 40 TABLET ORAL at 08:09

## 2023-09-24 RX ADMIN — ACETAMINOPHEN 1000 MG: 500 TABLET ORAL at 05:09

## 2023-09-24 RX ADMIN — POLYETHYLENE GLYCOL 3350 17 G: 17 POWDER, FOR SOLUTION ORAL at 08:09

## 2023-09-24 RX ADMIN — FAMOTIDINE 20 MG: 20 TABLET ORAL at 08:09

## 2023-09-24 RX ADMIN — OXYCODONE HYDROCHLORIDE 5 MG: 5 TABLET ORAL at 04:09

## 2023-09-24 RX ADMIN — POTASSIUM CHLORIDE 20 MEQ: 1500 TABLET, EXTENDED RELEASE ORAL at 08:09

## 2023-09-24 RX ADMIN — METOPROLOL SUCCINATE 100 MG: 100 TABLET, EXTENDED RELEASE ORAL at 08:09

## 2023-09-24 RX ADMIN — ASPIRIN 325 MG ORAL TABLET 325 MG: 325 PILL ORAL at 08:09

## 2023-09-24 RX ADMIN — ATORVASTATIN CALCIUM 80 MG: 20 TABLET, FILM COATED ORAL at 08:09

## 2023-09-24 NOTE — CARE UPDATE
BG goal 140-180    -A1C   Lab Results   Component Value Date    HGBA1C 5.5 11/03/2022         -HOME REGIMEN: none; no previous hx of DM     -INPATIENT REGIMEN: Novolog Correction Scale     -GLUCOSE TREND FOR THE PAST 24HRS: 102-159    -NO HYPOGYCEMIAS NOTED     -Diet: Diet Cardiac Fluid - 1500mL      -Steroids - N/A    -Tube Feeds - N/A      Remains in CSU. S/P CABG. BG controlled on current prn SQ insulin correction scale.       Plan:  -Continue Moderate Dose Correction Scale  -BG monitoring ac/hs    Discharge planning: tbd    Endocrine to continue to follow    ** Please call Endocrine for any BG related issues **

## 2023-09-24 NOTE — PLAN OF CARE
Problem: Adult Inpatient Plan of Care  Goal: Plan of Care Review  Outcome: Ongoing, Progressing  Goal: Patient-Specific Goal (Individualized)  Outcome: Ongoing, Progressing  Goal: Absence of Hospital-Acquired Illness or Injury  Outcome: Ongoing, Progressing  Goal: Optimal Comfort and Wellbeing  Outcome: Ongoing, Progressing  Goal: Readiness for Transition of Care  Outcome: Ongoing, Progressing     Problem: Infection  Goal: Absence of Infection Signs and Symptoms  Outcome: Ongoing, Progressing     Problem: Fall Injury Risk  Goal: Absence of Fall and Fall-Related Injury  Outcome: Ongoing, Progressing     Problem: Skin Injury Risk Increased  Goal: Skin Health and Integrity  Outcome: Ongoing, Progressing     Problem: Activity Intolerance (Cardiovascular Surgery)  Goal: Improved Activity Tolerance  Outcome: Ongoing, Progressing     Problem: Adjustment to Surgery (Cardiovascular Surgery)  Goal: Optimal Coping with Heart Surgery  Outcome: Ongoing, Progressing     Problem: Bleeding (Cardiovascular Surgery)  Goal: Bleeding (Cardiovascular Surgery)  Outcome: Ongoing, Progressing     Problem: Bowel Motility Impaired (Cardiovascular Surgery)  Goal: Effective Bowel Elimination (Cardiovascular Surgery)  Outcome: Ongoing, Progressing     Problem: Cardiac Function Impaired (Cardiovascular Surgery)  Goal: Effective Cardiac Function  Outcome: Ongoing, Progressing     Problem: Cerebral Tissue Perfusion (Cardiovascular Surgery)  Goal: Optimal Cerebral Tissue Perfusion (Cardiovascular Surgery)  Outcome: Ongoing, Progressing     Problem: Fluid and Electrolyte Imbalance (Cardiovascular Surgery)  Goal: Fluid and Electrolyte Balance (Cardiovascular Surgery)  Outcome: Ongoing, Progressing     Problem: Glycemic Control Impaired (Cardiovascular Surgery)  Goal: Blood Glucose Level Within Targeted Range (Cardiovascular Surgery)  Outcome: Ongoing, Progressing     Problem: Infection (Cardiovascular Surgery)  Goal: Absence of Infection Signs and  Symptoms  Outcome: Ongoing, Progressing     Problem: Ongoing Anesthesia Effects (Cardiovascular Surgery)  Goal: Anesthesia/Sedation Recovery  Outcome: Ongoing, Progressing     Problem: Pain (Cardiovascular Surgery)  Goal: Acceptable Pain Control  Outcome: Ongoing, Progressing     Problem: Postoperative Nausea and Vomiting (Cardiovascular Surgery)  Goal: Nausea and Vomiting Relief (Cardiovascular Surgery)  Outcome: Ongoing, Progressing     Problem: Postoperative Urinary Retention (Cardiovascular Surgery)  Goal: Effective Urinary Elimination (Cardiovascular Surgery)  Outcome: Ongoing, Progressing     Problem: Respiratory Compromise (Cardiovascular Surgery)  Goal: Effective Oxygenation and Ventilation (Cardiovascular Surgery)  Outcome: Ongoing, Progressing

## 2023-09-24 NOTE — HOSPITAL COURSE
On 9/18/2023, the patient was taken to the Operating Room and underwent a valve-sparing aortic root replacement (Rafal Procedure) with coronary reimplantation with a 30 mm Cardioroot bulged aortic root graft, transverse aortic arch replacement (yvonne-arch replacement) with 30mm Cardioroot graft under 6 minutes of hypothermic circulatory arrest, and CABG x2 (LIMA to distal LAD, SVG to PDA). 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  Postoperatively, the patient was taken from the Operating Room to the ICU where the vital signs were monitored and pain was kept under control. The patient was weaned from the drips and extubated in the ICU per protocol. Postoperative TTE showed normal biventricular function, no wall motion abnormalities, mild increase RV diameter. Once hemodynamically stable, the patient was transferred to the Cardiac Step-Down floor for continued strengthening and ambulation. Post operative course was un-complicated. On postoperative day 6, the patient was ready for discharge to home. At the time of discharge, the patient was ambulating unassisted. Pain was well controlled with oral analgesics and the patient was tolerating diet.    MOBILITY AND ACTIVITY:   As tolerated. Patient may shower. No heavy lifting of greater than 5 pounds and no driving.     DIET:   An 1800-calorie ADA with a 1500 mL fluid restriction.     WOUND CARE INSTRUCTIONS:   Check for redness, swelling and drainage around the  incision or wound. Patient is to call for any obvious bleeding, drainage, pus from the wound, unusual problems or difficulties or temperature of greater than 101   degrees.     FOLLOW UP:   Follow up with Dr. Mooney in approximately 5 weeks. Prior to this appointment, the patient will have a chest x-ray and EKG.  Follow-up with cardiology within 3 weeks and PCP within 1 week.     Patient not placed on Ace-Inhibitor at the time of discharge due  to potential for hypotension        DISCHARGE CONDITION:   At the time of discharge, the patient was in sinus rhythm and afebrile with stable vital signs.

## 2023-09-24 NOTE — NURSING
Pt being discharged home with self care. Discharge instructions and education given to patient and wife. All questions and concerns answered and addressed. PIV discontinued without complications. No acute signs of distress noted. Reached out to med's to bed, discharge meds should be arriving shortly to bedside. Tele  discontinued. Wife providing transportation home via private vehicle. Hospital transport will escort pt to discharge vehicle via wheelchair.

## 2023-09-24 NOTE — DISCHARGE SUMMARY
[facility] - Cardiology Stepdown  Cardiothoracic Surgery  Discharge Summary      Patient Name: Sawyer Browning  MRN: [id number]  Admission Date: 9/18/2023  Hospital Length of Stay: 6 days  Discharge Date and Time:  09/24/2023 10:44 AM  Attending Physician: Imtiaz Mooney MD   Discharging Provider: Frederick Armas DO  Primary Care Provider: Mary Kay Haines MD    HPI:   No notes on file    Procedure(s) (LRB):  VALVE SPARING AORTIC ROOT REPLACEMENT (N/A)  CORONARY ARTERY BYPASS GRAFT (CABG) (N/A)  HARVEST-VEIN-ENDOVASCULAR (Left)  REPAIR, HERNIA, DIAPHRAGMATIC      Indwelling Lines/Drains at time of discharge:   Lines/Drains/Airways     None               Hospital Course: On 9/18/2023, the patient was taken to the Operating Room and underwent a valve-sparing aortic root replacement (Rafal Procedure) with coronary reimplantation with a 30 mm Cardioroot bulged aortic root graft, transverse aortic arch replacement (yvonne-arch replacement) with 30mm Cardioroot graft under 6 minutes of hypothermic circulatory arrest, and CABG x2 (LIMA to distal LAD, SVG to PDA). 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  Postoperatively, the patient was taken from the Operating Room to the ICU where the vital signs were monitored and pain was kept under control. The patient was weaned from the drips and extubated in the ICU per protocol. Postoperative TTE showed normal biventricular function, no wall motion abnormalities, mild increase RV diameter. Once hemodynamically stable, the patient was transferred to the Cardiac Step-Down floor for continued strengthening and ambulation. Post operative course was un-complicated. On postoperative day 6, the patient was ready for discharge to home. At the time of discharge, the patient was ambulating unassisted. Pain was well controlled with oral analgesics and the patient was tolerating diet.    MOBILITY AND ACTIVITY:   As tolerated.  Patient may shower. No heavy lifting of greater than 5 pounds and no driving.     DIET:   An 1800-calorie ADA with a 1500 mL fluid restriction.     WOUND CARE INSTRUCTIONS:   Check for redness, swelling and drainage around the  incision or wound. Patient is to call for any obvious bleeding, drainage, pus from the wound, unusual problems or difficulties or temperature of greater than 101   degrees.     FOLLOW UP:   Follow up with Dr. Mooney in approximately 5 weeks. Prior to this appointment, the patient will have a chest x-ray and EKG.  Follow-up with cardiology within 3 weeks and PCP within 1 week.     Patient not placed on Ace-Inhibitor at the time of discharge due to potential for hypotension        DISCHARGE CONDITION:   At the time of discharge, the patient was in sinus rhythm and afebrile with stable vital signs.       Goals of Care Treatment Preferences:  Code Status: Full Code      Consults (From admission, onward)        Status Ordering Provider     Consult to Endocrinology  Once        Provider:  (Not yet assigned)    Completed VERONICA HAUSER     Inpatient consult to Registered Dietitian/Nutritionist  Once        Provider:  (Not yet assigned)    Completed VERONICA HAUSER     Inpatient consult to Cardiac Rehab  Once        Provider:  (Not yet assigned)    Completed VERONICA HAUSER          Significant Diagnostic Studies: n/a    Pending Diagnostic Studies:     Procedure Component Value Units Date/Time    Specimen to Pathology, Surgery Cardiovascular [[id number]] Collected: 09/18/23 1258    Order Status: Sent Lab Status: In process Updated: 09/18/23 1722    Specimen: Tissue           No new Assessment & Plan notes have been filed under this hospital service since the last note was generated.  Service: Cardiothoracic Surgery    Final Active Diagnoses:    Diagnosis Date Noted POA    PRINCIPAL PROBLEM:  Aortic root aneurysm [I71.21] 09/18/2023 Yes    Coronary artery disease [I25.10] 09/18/2023 Yes    Thoracic  aortic aneurysm without rupture [I71.20] 09/18/2023 Yes    Transient hyperglycemia post procedure [R73.9] 09/18/2023 Yes    S/P CABG (coronary artery bypass graft) [Z95.1] 09/18/2023 Not Applicable    Essential hypertension [I10]  Yes    Other hyperlipidemia [E78.49]  Yes      Problems Resolved During this Admission:      Discharged Condition: good    Disposition: home with home care services    Follow Up:   Follow-up Information     Imtiaz Mooney MD Follow up in 5 week(s).    Specialties: Cardiothoracic Surgery, Transplant, Vascular Surgery  Contact information:  [address] [facility] [address]  [phone]             Mary Kay Haines MD Follow up in 1 week(s).    Specialties: Internal Medicine, Pediatrics  Contact information:  [address] [facility] [id number] [phone]             Chon Lopez MD Follow up in 3 week(s).    Specialties: Cardiovascular Disease, Cardiology, Interventional Cardiology  Contact information:  [address] [facility]  [address]  [facility] [id number] [phone]                       Patient Instructions:      Diet Adult Regular     Notify your health care provider if you experience any of the following:  temperature >100.4     Notify your health care provider if you experience any of the following:  persistent nausea and vomiting or diarrhea     Notify your health care provider if you experience any of the following:  severe uncontrolled pain     Notify your health care provider if you experience any of the following:  redness, tenderness, or signs of infection (pain, swelling, redness, odor or green/yellow discharge around incision site)     Notify your health care provider if you experience any of the following:  difficulty breathing or increased cough     Notify your health care provider if you experience any of the following:  persistent dizziness, light-headedness, or visual disturbances     Notify your health care provider if you experience any of the  following:  increased confusion or weakness     No dressing needed     Activity as tolerated   Order Comments: See discharge instructions     Medications:  Reconciled Home Medications:      Medication List      START taking these medications    acetaminophen 500 MG tablet  Commonly known as: TYLENOL  Take 2 tablets (1,000 mg total) by mouth every 8 (eight) hours as needed for Pain.     aspirin 325 MG tablet  Take 1 tablet (325 mg total) by mouth once daily.  Start taking on: September 25, 2023  Replaces: aspirin 81 MG EC tablet     furosemide 20 MG tablet  Commonly known as: LASIX  Take 4 tablets (80 mg total) by mouth once daily. for 5 days     metoprolol succinate 100 MG 24 hr tablet  Commonly known as: TOPROL-XL  Take 1 tablet (100 mg total) by mouth once daily.  Start taking on: September 25, 2023     oxyCODONE 5 MG immediate release tablet  Commonly known as: ROXICODONE  Take 1 tablet (5 mg total) by mouth every 4 (four) hours as needed for Pain.     polyethylene glycol 17 gram Pwpk  Commonly known as: GLYCOLAX  Take 17 g by mouth 2 (two) times daily as needed.     potassium chloride SA 20 MEQ tablet  Commonly known as: K-DUR,KLOR-CON  Take 1 tablet (20 mEq total) by mouth 2 (two) times daily. for 5 days        CONTINUE taking these medications    atorvastatin 80 MG tablet  Commonly known as: LIPITOR  Take 1 tablet (80 mg total) by mouth once daily.     lansoprazole 30 MG capsule  Commonly known as: PREVACID  TAKE 1 CAPSULE DAILY ONLY  AS NEEDED FOR HEARTBURN        STOP taking these medications    amLODIPine 10 MG tablet  Commonly known as: NORVASC     aspirin 81 MG EC tablet  Commonly known as: ECOTRIN  Replaced by: aspirin 325 MG tablet     ezetimibe 10 mg tablet  Commonly known as: ZETIA     tadalafiL 20 MG Tab  Commonly known as: CIALIS     valsartan-hydrochlorothiazide 320-25 mg per tablet  Commonly known as: DIOVAN-HCT          Time spent on the discharge of patient: 10 minutes    Frederick Armas  DO  Cardiothoracic Surgery  [facility] - Cardiology Stepdown

## 2023-09-24 NOTE — PLAN OF CARE
[facility] - Cardiology Stepdown  Discharge Final Note    Primary Care Provider: Mary Kay Haines MD    Expected Discharge Date: 9/24/2023    Final Discharge Note (most recent)       Final Note - 09/24/23 1156          Final Note    Assessment Type Final Discharge Note     Anticipated Discharge Disposition Home or Self Care     Hospital Resources/Appts/Education Provided Provided patient/caregiver with written discharge plan information        Post-Acute Status    Post-Acute Authorization Other     Coverage BCBS     Other Status See Comments   pt's family hired sitters    Discharge Delays None known at this time                     Important Message from Medicare             Contact Info       Imtiaz Mooney MD   Specialty: Cardiothoracic Surgery, Transplant, Vascular Surgery    [address]  [facility] [address]   Phone: [phone]       Next Steps: Follow up in 5 week(s)    Mary Kay Haines MD   Specialty: Internal Medicine, Pediatrics   Relationship: PCP - General    [address] [place] [id number]   Phone: [phone]       Next Steps: Follow up in 1 week(s)    Chon Lopez MD   Specialty: Cardiovascular Disease, Cardiology, Interventional Cardiology    [address] [facility]  [address]  [facility] [address]   Phone: [phone]       Next Steps: Follow up in 3 week(s)            MADAI Mojica, LCSW  [facility] [phone]

## 2023-09-24 NOTE — PLAN OF CARE
Problem: Adult Inpatient Plan of Care  Goal: Plan of Care Review  Outcome: Met     Problem: Adult Inpatient Plan of Care  Goal: Patient-Specific Goal (Individualized)  Outcome: Met     Problem: Adult Inpatient Plan of Care  Goal: Absence of Hospital-Acquired Illness or Injury  Outcome: Met     Problem: Adult Inpatient Plan of Care  Goal: Optimal Comfort and Wellbeing  Outcome: Met     Problem: Adult Inpatient Plan of Care  Goal: Readiness for Transition of Care  Outcome: Met     Problem: Infection  Goal: Absence of Infection Signs and Symptoms  Outcome: Met     Problem: Fall Injury Risk  Goal: Absence of Fall and Fall-Related Injury  Outcome: Met     Problem: Skin Injury Risk Increased  Goal: Skin Health and Integrity  Outcome: Met     Problem: Activity Intolerance (Cardiovascular Surgery)  Goal: Improved Activity Tolerance  Outcome: Met     Problem: Adjustment to Surgery (Cardiovascular Surgery)  Goal: Optimal Coping with Heart Surgery  Outcome: Met     Problem: Bleeding (Cardiovascular Surgery)  Goal: Bleeding (Cardiovascular Surgery)  Outcome: Met

## 2023-09-25 ENCOUNTER — PATIENT MESSAGE (OUTPATIENT)
Dept: INTERNAL MEDICINE | Facility: CLINIC | Age: 68
End: 2023-09-25
Payer: COMMERCIAL

## 2023-09-25 ENCOUNTER — PATIENT OUTREACH (OUTPATIENT)
Dept: ADMINISTRATIVE | Facility: CLINIC | Age: 68
End: 2023-09-25
Payer: COMMERCIAL

## 2023-09-25 NOTE — PROGRESS NOTES
C3 nurse spoke with Sawyer Browning for a TCC post hospital discharge follow up call. The patient has a scheduled HOSFU/ establish care appt  with Alvin Joel MD  on 10/07/23 @ 1000.

## 2023-09-26 ENCOUNTER — HOSPITAL ENCOUNTER (OUTPATIENT)
Facility: HOSPITAL | Age: 68
Discharge: HOME | End: 2023-09-27
Attending: EMERGENCY MEDICINE | Admitting: THORACIC SURGERY (CARDIOTHORACIC VASCULAR SURGERY)
Payer: COMMERCIAL

## 2023-09-26 ENCOUNTER — TELEPHONE (OUTPATIENT)
Dept: CARDIOTHORACIC SURGERY | Facility: CLINIC | Age: 68
End: 2023-09-26
Payer: COMMERCIAL

## 2023-09-26 DIAGNOSIS — R06.02 SHORTNESS OF BREATH: ICD-10-CM

## 2023-09-26 PROBLEM — I50.33 ACUTE ON CHRONIC DIASTOLIC HEART FAILURE: Status: ACTIVE | Noted: 2023-09-26

## 2023-09-26 PROBLEM — I50.33 ACUTE ON CHRONIC DIASTOLIC HEART FAILURE: Status: RESOLVED | Noted: 2023-09-26 | Resolved: 2023-09-26

## 2023-09-26 LAB
ALBUMIN SERPL BCP-MCNC: 3.8 G/DL (ref 3.5–5.2)
ALP SERPL-CCNC: 68 U/L (ref 55–135)
ALT SERPL W/O P-5'-P-CCNC: 81 U/L (ref 10–44)
ANION GAP SERPL CALC-SCNC: 10 MMOL/L (ref 8–16)
ASCENDING AORTA: 3.2 CM
AST SERPL-CCNC: 41 U/L (ref 10–40)
AV INDEX (PROSTH): 0.89
AV MEAN GRADIENT: 4 MMHG
AV PEAK GRADIENT: 8 MMHG
AV VALVE AREA BY VELOCITY RATIO: 2.73 CM²
AV VALVE AREA: 2.88 CM²
AV VELOCITY RATIO: 0.84
BASOPHILS # BLD AUTO: 0.07 K/UL (ref 0–0.2)
BASOPHILS NFR BLD: 0.7 % (ref 0–1.9)
BILIRUB SERPL-MCNC: 0.9 MG/DL (ref 0.1–1)
BNP SERPL-MCNC: 534 PG/ML (ref 0–99)
BSA FOR ECHO PROCEDURE: 2.11 M2
BUN SERPL-MCNC: 19 MG/DL (ref 8–23)
CALCIUM SERPL-MCNC: 9.1 MG/DL (ref 8.7–10.5)
CHLORIDE SERPL-SCNC: 106 MMOL/L (ref 95–110)
CO2 SERPL-SCNC: 25 MMOL/L (ref 23–29)
CREAT SERPL-MCNC: 0.8 MG/DL (ref 0.5–1.4)
CV ECHO LV RWT: 0.41 CM
DIFFERENTIAL METHOD: ABNORMAL
DOP CALC AO PEAK VEL: 1.4 M/S
DOP CALC AO VTI: 25.18 CM
DOP CALC LVOT AREA: 3.2 CM2
DOP CALC LVOT DIAMETER: 2.03 CM
DOP CALC LVOT PEAK VEL: 1.18 M/S
DOP CALC LVOT STROKE VOLUME: 72.62 CM3
DOP CALCLVOT PEAK VEL VTI: 22.45 CM
E WAVE DECELERATION TIME: 231.09 MSEC
E/A RATIO: 2.18
E/E' RATIO: 7.52 M/S
ECHO LV POSTERIOR WALL: 0.99 CM (ref 0.6–1.1)
EOSINOPHIL # BLD AUTO: 0.2 K/UL (ref 0–0.5)
EOSINOPHIL NFR BLD: 2.1 % (ref 0–8)
ERYTHROCYTE [DISTWIDTH] IN BLOOD BY AUTOMATED COUNT: 12.8 % (ref 11.5–14.5)
EST. GFR  (NO RACE VARIABLE): >60 ML/MIN/1.73 M^2
FINAL PATHOLOGIC DIAGNOSIS: NORMAL
FRACTIONAL SHORTENING: 35 % (ref 28–44)
GLUCOSE SERPL-MCNC: 100 MG/DL (ref 70–110)
GROSS: NORMAL
HCT VFR BLD AUTO: 33.3 % (ref 40–54)
HCV AB SERPL QL IA: NORMAL
HGB BLD-MCNC: 11.1 G/DL (ref 14–18)
HIV 1+2 AB+HIV1 P24 AG SERPL QL IA: NORMAL
IMM GRANULOCYTES # BLD AUTO: 0.16 K/UL (ref 0–0.04)
IMM GRANULOCYTES NFR BLD AUTO: 1.7 % (ref 0–0.5)
INTERVENTRICULAR SEPTUM: 0.84 CM (ref 0.6–1.1)
IVC DIAMETER: 2.4 CM
IVRT: 48.53 MSEC
LA MAJOR: 6.04 CM
LA MINOR: 5.97 CM
LA WIDTH: 4.27 CM
LEFT ATRIUM SIZE: 3.64 CM
LEFT ATRIUM VOLUME INDEX MOD: 23.7 ML/M2
LEFT ATRIUM VOLUME INDEX: 37.6 ML/M2
LEFT ATRIUM VOLUME MOD: 50 CM3
LEFT ATRIUM VOLUME: 79.33 CM3
LEFT INTERNAL DIMENSION IN SYSTOLE: 3.14 CM (ref 2.1–4)
LEFT VENTRICLE DIASTOLIC VOLUME INDEX: 52.23 ML/M2
LEFT VENTRICLE DIASTOLIC VOLUME: 110.2 ML
LEFT VENTRICLE MASS INDEX: 73 G/M2
LEFT VENTRICLE SYSTOLIC VOLUME INDEX: 18.5 ML/M2
LEFT VENTRICLE SYSTOLIC VOLUME: 38.99 ML
LEFT VENTRICULAR INTERNAL DIMENSION IN DIASTOLE: 4.85 CM (ref 3.5–6)
LEFT VENTRICULAR MASS: 153.68 G
LV LATERAL E/E' RATIO: 6.41 M/S
LV SEPTAL E/E' RATIO: 9.08 M/S
LYMPHOCYTES # BLD AUTO: 1 K/UL (ref 1–4.8)
LYMPHOCYTES NFR BLD: 10.6 % (ref 18–48)
Lab: NORMAL
MCH RBC QN AUTO: 30.7 PG (ref 27–31)
MCHC RBC AUTO-ENTMCNC: 33.3 G/DL (ref 32–36)
MCV RBC AUTO: 92 FL (ref 82–98)
MONOCYTES # BLD AUTO: 1.3 K/UL (ref 0.3–1)
MONOCYTES NFR BLD: 13.1 % (ref 4–15)
MV A" WAVE DURATION": 11.42 MSEC
MV PEAK A VEL: 0.5 M/S
MV PEAK E VEL: 1.09 M/S
MV STENOSIS PRESSURE HALF TIME: 67.02 MS
MV VALVE AREA P 1/2 METHOD: 3.28 CM2
NEUTROPHILS # BLD AUTO: 7 K/UL (ref 1.8–7.7)
NEUTROPHILS NFR BLD: 71.8 % (ref 38–73)
NRBC BLD-RTO: 0 /100 WBC
PISA TR MAX VEL: 2.9 M/S
PLATELET # BLD AUTO: 244 K/UL (ref 150–450)
PMV BLD AUTO: 9.8 FL (ref 9.2–12.9)
POTASSIUM SERPL-SCNC: 4 MMOL/L (ref 3.5–5.1)
PROT SERPL-MCNC: 6.8 G/DL (ref 6–8.4)
PULM VEIN S/D RATIO: 0.69
PV PEAK D VEL: 0.55 M/S
PV PEAK S VEL: 0.38 M/S
RA MAJOR: 5.72 CM
RA PRESSURE ESTIMATED: 3 MMHG
RA WIDTH: 4.34 CM
RBC # BLD AUTO: 3.62 M/UL (ref 4.6–6.2)
RIGHT VENTRICULAR END-DIASTOLIC DIMENSION: 3.82 CM
RV TB RVSP: 6 MMHG
SINUS: 3.5 CM
SODIUM SERPL-SCNC: 141 MMOL/L (ref 136–145)
STJ: 3.2 CM
TDI LATERAL: 0.17 M/S
TDI SEPTAL: 0.12 M/S
TDI: 0.15 M/S
TR MAX PG: 34 MMHG
TRICUSPID ANNULAR PLANE SYSTOLIC EXCURSION: 0.81 CM
TROPONIN I SERPL DL<=0.01 NG/ML-MCNC: 0.28 NG/ML (ref 0–0.03)
TROPONIN I SERPL DL<=0.01 NG/ML-MCNC: 0.33 NG/ML (ref 0–0.03)
TV REST PULMONARY ARTERY PRESSURE: 37 MMHG
WBC # BLD AUTO: 9.68 K/UL (ref 3.9–12.7)
Z-SCORE OF LEFT VENTRICULAR DIMENSION IN END DIASTOLE: -3.1
Z-SCORE OF LEFT VENTRICULAR DIMENSION IN END SYSTOLE: -1.99

## 2023-09-26 PROCEDURE — G0378 HOSPITAL OBSERVATION PER HR: HCPCS

## 2023-09-26 PROCEDURE — 80053 COMPREHEN METABOLIC PANEL: CPT | Performed by: EMERGENCY MEDICINE

## 2023-09-26 PROCEDURE — 86803 HEPATITIS C AB TEST: CPT | Performed by: PHYSICIAN ASSISTANT

## 2023-09-26 PROCEDURE — 83880 ASSAY OF NATRIURETIC PEPTIDE: CPT | Performed by: EMERGENCY MEDICINE

## 2023-09-26 PROCEDURE — 87389 HIV-1 AG W/HIV-1&-2 AB AG IA: CPT | Performed by: PHYSICIAN ASSISTANT

## 2023-09-26 PROCEDURE — 63600175 PHARM REV CODE 636 W HCPCS

## 2023-09-26 PROCEDURE — 93010 ELECTROCARDIOGRAM REPORT: CPT | Mod: 76,,, | Performed by: INTERNAL MEDICINE

## 2023-09-26 PROCEDURE — 99285 EMERGENCY DEPT VISIT HI MDM: CPT | Mod: 25

## 2023-09-26 PROCEDURE — 85025 COMPLETE CBC W/AUTO DIFF WBC: CPT | Performed by: EMERGENCY MEDICINE

## 2023-09-26 PROCEDURE — 20600001 HC STEP DOWN PRIVATE ROOM

## 2023-09-26 PROCEDURE — 84484 ASSAY OF TROPONIN QUANT: CPT | Performed by: EMERGENCY MEDICINE

## 2023-09-26 PROCEDURE — 25000003 PHARM REV CODE 250

## 2023-09-26 PROCEDURE — 93005 ELECTROCARDIOGRAM TRACING: CPT

## 2023-09-26 PROCEDURE — 93010 EKG 12-LEAD: ICD-10-PCS | Mod: 76,,, | Performed by: INTERNAL MEDICINE

## 2023-09-26 PROCEDURE — 93010 ELECTROCARDIOGRAM REPORT: CPT | Mod: ,,, | Performed by: INTERNAL MEDICINE

## 2023-09-26 PROCEDURE — 96374 THER/PROPH/DIAG INJ IV PUSH: CPT | Mod: 59

## 2023-09-26 RX ORDER — POTASSIUM CHLORIDE 20 MEQ/1
40 TABLET, EXTENDED RELEASE ORAL 2 TIMES DAILY
Status: DISCONTINUED | OUTPATIENT
Start: 2023-09-26 | End: 2023-09-27 | Stop reason: HOSPADM

## 2023-09-26 RX ORDER — POLYETHYLENE GLYCOL 3350 17 G/17G
17 POWDER, FOR SOLUTION ORAL 2 TIMES DAILY PRN
Status: DISCONTINUED | OUTPATIENT
Start: 2023-09-26 | End: 2023-09-27 | Stop reason: HOSPADM

## 2023-09-26 RX ORDER — SODIUM CHLORIDE 0.9 % (FLUSH) 0.9 %
10 SYRINGE (ML) INJECTION
Status: DISCONTINUED | OUTPATIENT
Start: 2023-09-26 | End: 2023-09-27 | Stop reason: HOSPADM

## 2023-09-26 RX ORDER — HYDRALAZINE HYDROCHLORIDE 20 MG/ML
10 INJECTION INTRAMUSCULAR; INTRAVENOUS EVERY 6 HOURS PRN
Status: DISCONTINUED | OUTPATIENT
Start: 2023-09-26 | End: 2023-09-27 | Stop reason: HOSPADM

## 2023-09-26 RX ORDER — OXYCODONE HYDROCHLORIDE 5 MG/1
5 TABLET ORAL EVERY 4 HOURS PRN
Status: DISCONTINUED | OUTPATIENT
Start: 2023-09-26 | End: 2023-09-27 | Stop reason: HOSPADM

## 2023-09-26 RX ORDER — METOPROLOL SUCCINATE 100 MG/1
100 TABLET, EXTENDED RELEASE ORAL DAILY
Status: DISCONTINUED | OUTPATIENT
Start: 2023-09-27 | End: 2023-09-27 | Stop reason: HOSPADM

## 2023-09-26 RX ORDER — FUROSEMIDE 10 MG/ML
80 INJECTION INTRAMUSCULAR; INTRAVENOUS ONCE
Status: COMPLETED | OUTPATIENT
Start: 2023-09-27 | End: 2023-09-27

## 2023-09-26 RX ORDER — ATORVASTATIN CALCIUM 20 MG/1
80 TABLET, FILM COATED ORAL DAILY
Status: DISCONTINUED | OUTPATIENT
Start: 2023-09-27 | End: 2023-09-27 | Stop reason: HOSPADM

## 2023-09-26 RX ORDER — ATORVASTATIN CALCIUM 40 MG/1
80 TABLET, FILM COATED ORAL DAILY
Status: DISCONTINUED | OUTPATIENT
Start: 2023-09-27 | End: 2023-09-26

## 2023-09-26 RX ORDER — FAMOTIDINE 20 MG/1
20 TABLET, FILM COATED ORAL 2 TIMES DAILY
Status: DISCONTINUED | OUTPATIENT
Start: 2023-09-26 | End: 2023-09-27 | Stop reason: HOSPADM

## 2023-09-26 RX ORDER — ASPIRIN 325 MG
325 TABLET ORAL DAILY
Status: DISCONTINUED | OUTPATIENT
Start: 2023-09-27 | End: 2023-09-27 | Stop reason: HOSPADM

## 2023-09-26 RX ORDER — ACETAMINOPHEN 500 MG
1000 TABLET ORAL EVERY 8 HOURS
Status: DISCONTINUED | OUTPATIENT
Start: 2023-09-26 | End: 2023-09-27 | Stop reason: HOSPADM

## 2023-09-26 RX ORDER — FUROSEMIDE 10 MG/ML
80 INJECTION INTRAMUSCULAR; INTRAVENOUS ONCE
Status: COMPLETED | OUTPATIENT
Start: 2023-09-26 | End: 2023-09-26

## 2023-09-26 RX ADMIN — POTASSIUM CHLORIDE 40 MEQ: 1500 TABLET, EXTENDED RELEASE ORAL at 08:09

## 2023-09-26 RX ADMIN — FAMOTIDINE 20 MG: 20 TABLET ORAL at 08:09

## 2023-09-26 RX ADMIN — FUROSEMIDE 80 MG: 10 INJECTION, SOLUTION INTRAVENOUS at 05:09

## 2023-09-26 RX ADMIN — OXYCODONE HYDROCHLORIDE 5 MG: 5 TABLET ORAL at 07:09

## 2023-09-26 NOTE — HPI
Mr. Browning is a 68 year-old gentleman with a history of aortic root and ascending aortic aneurysm s/p valve sparing aortic root repair, hemiarch replacement , CABG x2, and repair of hernia of morgani with Dr. Mooney on 9/18., fatty liver disease, and hypertension who presents to the ED for SOB x 1 day.  Recently discharged from the hospital with no complications on 9/24. Patient reports SOB started last night while he was in bed and carried over to the morning. This prompt him to call Cardiac surgery coordinator and was instructed to go to the ED for further evaluation. He describes it as heaviness. He denies fever/fatigue/weakness, chest pain/discomfort/tightness, palpitations, LE swelling, sudden weight gain. He is compliant with Lasix. Patient has been ambulating after discharge and using IS as instructed.     In the ED, he is asymptomatic and is currently hemodynamically stable.  CXR significant for postoperative changes, no pleural effusion. TTE significant for EF 60-65%, TAPSE 0.8.      Will admit patient to CTS for observation.

## 2023-09-26 NOTE — TELEPHONE ENCOUNTER
Returned call to patient. He was not sure why he was being admitted to observation. Explained that he has too much fluid in his body, and they want to keep him overnight to give him IV Lasix to remove the fluid. The oral dose would be too high to send him home on and also the team wanted to monitor his electrolytes in a safe setting. Explained that Dr Mooney will be by tomorrow to give him a more detailed explanation than I can.    He asked if he would be discharged tomorrow. I told him that was likely BUT ultimately it would depend on how his body responds to the Lasix and what Dr Mooney decided. I am unable to give him a definite answer.     He appreciates the call and says he is getting good care in the ED.    Julie Haase RN  CTS Nurse Navigator [phone]

## 2023-09-26 NOTE — ASSESSMENT & PLAN NOTE
Mr. Browning is a 68 year-old gentleman with a history of aortic root and ascending aortic aneurysm, fatty liver disease, and hypertension who presents to the ED for SOB x 1 day. He is s/p valve sparing aortic root repair, hemiarch replacement , CABG x2, and repair of hernia of morgani with Dr. Mooney on 9/18 and discharged on 9/24. Currently AF and hemodynamically stable on RA. BNP is elevated and he is hypervolemic on exam. CXR shows post operative changes with no pleural effusions. Echo significant for EF 60%, no pericardial effusion or tamponade but TAPSE 0.8. Will admit to CTS for observation.   - Maintain sternal precautions   - ASA  - BB  - Statin  - Lasix  with scheduled potassium ordered   - Encourage ambulation  - multimodal pain management   - Cardiac diet with 1500 cc fluid restriction   - Bowel regimen in place   -  Famotidine for ulcer prevention   - Monitor electrolytes to keep Mag above 2 and Potassium above 4  - OOBTO   - Encourage IS use

## 2023-09-26 NOTE — SUBJECTIVE & OBJECTIVE
No current facility-administered medications on file prior to encounter.     Current Outpatient Medications on File Prior to Encounter   Medication Sig    acetaminophen (TYLENOL) 500 MG tablet Take 2 tablets (1,000 mg total) by mouth every 8 (eight) hours as needed for Pain.    aspirin 325 MG tablet Take 1 tablet (325 mg total) by mouth once daily.    atorvastatin (LIPITOR) 80 MG tablet Take 1 tablet (80 mg total) by mouth once daily.    furosemide (LASIX) 20 MG tablet Take 4 tablets (80 mg total) by mouth once daily. for 5 days    lansoprazole (PREVACID) 30 MG capsule TAKE 1 CAPSULE DAILY ONLY  AS NEEDED FOR HEARTBURN    metoprolol succinate (TOPROL-XL) 100 MG 24 hr tablet Take 1 tablet (100 mg total) by mouth once daily.    oxyCODONE (ROXICODONE) 5 MG immediate release tablet Take 1 tablet (5 mg total) by mouth every 4 (four) hours as needed for Pain.    polyethylene glycol (GLYCOLAX) 17 gram PwPk Take 17 g by mouth 2 (two) times daily as needed.    potassium chloride SA (K-DUR,KLOR-CON) 20 MEQ tablet Take 1 tablet (20 mEq total) by mouth 2 (two) times daily. for 5 days       Review of patient's allergies indicates:   Allergen Reactions    Ace inhibitors Other (See Comments)     cough    Losartan      headache       Past Medical History:   Diagnosis Date    BPH (benign prostatic hyperplasia)     Elevated liver enzymes     Fatty infiltration of liver     Glucose intolerance (impaired glucose tolerance)     Hiatal hernia     Hyperlipidemia     Hypertension     Overweight     Reflux      Past Surgical History:   Procedure Laterality Date    AORTIC ROOT REPLACEMENT N/A 9/18/2023    Procedure: VALVE SPARING AORTIC ROOT REPLACEMENT;  Surgeon: Imtiaz Mooney MD;  Location: [facility] OR [address] [place] [address];  Service: Cardiovascular;  Laterality: N/A;  Valve sparing aortic root replacement, vs biobentall, hemiarch, CABGx2 under  hypothermic circ arrest    CORONARY ANGIOGRAPHY N/A 9/6/2023    Procedure: ANGIOGRAM, CORONARY ARTERY;   Surgeon: Chon Lopez MD;  Location: [facility] CATH LAB;  Service: Cardiology;  Laterality: N/A;  right radial    CORONARY ARTERY BYPASS GRAFT (CABG) N/A 9/18/2023    Procedure: CORONARY ARTERY BYPASS GRAFT (CABG);  Surgeon: Imtiaz Mooney MD;  [facility];  Service: Cardiovascular;  Laterality: N/A;  Vein [place] Start: 821  Vein [place] End: 838  Vein Preparation Start: 839  Vein Preparation End: 912    ENDOSCOPIC HARVEST OF VEIN Left 9/18/2023    Procedure: HARVEST-VEIN-ENDOVASCULAR;  Surgeon: Imtiaz Mooney MD;  [facility];  Service: Cardiovascular;  Laterality: Left;  Valve sparing aortic root replacement, vs biobentall, hemiarch, CABGx2 under    inguinal hernia      x2    REPAIR OF DIAPHRAGMATIC HERNIA  9/18/2023    Procedure: REPAIR, HERNIA, DIAPHRAGMATIC;  Surgeon: Imtiaz Mooney MD;  Location: [facility] OR [address] [place] [address];  Service: Cardiovascular;;    tonsillectomy       Family History       Problem Relation (Age of Onset)    Aneurysm Father    Arthritis Brother    Cancer Paternal Aunt, Paternal Uncle, Paternal Aunt, Paternal Uncle    Dementia Paternal Grandmother    Diabetes Paternal Grandmother, Brother    Heart disease Father, Paternal Grandfather    Hernia Brother    Hypertension Mother    Macular degeneration Mother    Migraines Sister    Stroke Father          Tobacco Use    Smoking status: Never    Smokeless tobacco: Never   Substance and Sexual Activity    Alcohol use: Yes     Comment: 2 glasses of wine most evenings.    Drug use: No    Sexual activity: Not on file     Review of Systems   Constitutional:  Negative for activity change, appetite change, fatigue and fever.   HENT:  Negative for nosebleeds.    Respiratory:  Positive for shortness of breath. Negative for cough.    Cardiovascular:  Negative for chest pain, palpitations and leg swelling.   Gastrointestinal:  Negative for abdominal distention, abdominal pain and nausea.   Genitourinary:  Negative for  "frequency.   Musculoskeletal:  Negative for arthralgias and myalgias.   Skin:  Negative for rash.   Neurological:  Negative for dizziness and numbness.   Hematological:  Does not bruise/bleed easily.     Objective:     Vital Signs (Most Recent):  Temp: 98.5 °F (36.9 °C) (09/26/23 1115)  Pulse: 68 (09/26/23 1233)  Resp: 16 (09/26/23 1233)  BP: 130/77 (09/26/23 1233)  SpO2: (!) 94 % (09/26/23 1233) Vital Signs (24h Range):  Temp:  [98.5 °F (36.9 °C)] 98.5 °F (36.9 °C)  Pulse:  [68-88] 68  Resp:  [16] 16  SpO2:  [94 %] 94 %  BP: (125-130)/(77-82) 130/77     Weight: 86.2 kg (190 lb)  Body mass index is 25.07 kg/m².    SpO2: (!) 94 %        Intake/Output - Last 3 Shifts       None             Lines/Drains/Airways       Peripheral Intravenous Line  Duration                  Peripheral IV - Single Lumen 09/26/23 1218 20 G Anterior;Left Forearm <1 day                          Physical Exam  HENT:      Head: Normocephalic and atraumatic.   Eyes:      Extraocular Movements: Extraocular movements intact.   Neck:      Vascular: Hepatojugular reflux and JVD present.   Cardiovascular:      Rate and Rhythm: Normal rate and regular rhythm.   Pulmonary:      Effort: Pulmonary effort is normal.   Abdominal:      General: Abdomen is flat. There is no distension.      Palpations: Abdomen is soft.   Musculoskeletal:         General: Normal range of motion.      Cervical back: Normal range of motion.      Right lower leg: No edema.      Left lower leg: No edema.   Skin:     General: Skin is warm and dry.      Capillary Refill: Capillary refill takes less than 2 seconds.      Comments: MSI c/d/i   Neurological:      General: No focal deficit present.            Significant Labs:  ABGs: No results for input(s): "PH", "PCO2", "PO2", "HCO3", "POCSATURATED", "BE" in the last 48 hours.  Amylase: No results for input(s): "AMYLASE" in the last 48 hours.  BMP:   Recent Labs   Lab 09/26/23  1227         K 4.0      CO2 25   BUN " "19   CREATININE 0.8   CALCIUM 9.1     Cardiac markers:   Recent Labs   Lab 09/26/23  1227   TROPONINI 0.332*     CBC:   Recent Labs   Lab 09/26/23  1227   WBC 9.68   RBC 3.62*   HGB 11.1*   HCT 33.3*      MCV 92   MCH 30.7   MCHC 33.3     CMP:   Recent Labs   Lab 09/26/23  1227      CALCIUM 9.1   ALBUMIN 3.8   PROT 6.8      K 4.0   CO2 25      BUN 19   CREATININE 0.8   ALKPHOS 68   ALT 81*   AST 41*   BILITOT 0.9     Coagulation: No results for input(s): "PT", "INR", "APTT" in the last 48 hours.  Lactic Acid: No results for input(s): "LACTATE" in the last 48 hours.  LFTs:   Recent Labs   Lab 09/26/23  1227   ALT 81*   AST 41*   ALKPHOS 68   BILITOT 0.9   PROT 6.8   ALBUMIN 3.8     Lipase: No results for input(s): "LIPASE" in the last 48 hours.    Significant Diagnostics:  I have reviewed all pertinent imaging results/findings within the past 24 hours.    CXR 9/26/23  Interval removal of right-sided central venous catheter.  Improved aeration at both lung bases with mild residual bibasilar atelectasis.    "

## 2023-09-26 NOTE — ED NOTES
LOC: The patient is awake and alert; oriented x 3 and speaking appropriately.  APPEARANCE: Patient resting comfortably, patient is clean and well groomed  SKIN: warm and dry, normal skin turgor & moist mucus membranes, skin intact, no breakdown noted.Surgical scar along sternum  dry and intact , no redness.   MUSCULOSKELETAL: Patient moving all extremities well, no obvious swelling or deformities noted  RESPIRATORY: Airway is open and patent, ; respirations are spontaneous, normal effort and rate  CARDIAC: Patient has a normal rate, no peripheral edema noted, capillary refill < 3 seconds; No complaints of chest pain   ABDOMEN: Soft and non tender to palpation, no distention noted. Bowel sounds present x 4

## 2023-09-26 NOTE — ASSESSMENT & PLAN NOTE
Mr. Browning is a 68 year-old gentleman with a history of aortic root and ascending aortic aneurysm, fatty liver disease, and hypertension who presents to the ED for SOB x 1 day. He is s/p valve sparing aortic root repair, hemiarch replacement , CABG x2, and repair of hernia of morgani with Dr. Mooney on 9/18 and discharged on 9/24.CTS consulted for SOB. Currently AF and hemodynamically stable on RA. CXR shows post operative changes with no pleural effusions. Recommend echo for further evaluation. Will discuss with Dr. Mooney, who will give additional recommendations.

## 2023-09-26 NOTE — ED NOTES
Pt placed on cardiac monitor, VS's stable. Side rails up x2, call bell in reach, bed in low position with brake engaged.  Wife at bedside. Pt has no complaints at this time.  Meal served.  Waiting for room assignment.Resp wnl, skin w/d,  AAOx4. IV site asymptomatic

## 2023-09-26 NOTE — ASSESSMENT & PLAN NOTE
Mr. Browning is a 68 year-old gentleman with a history of aortic root and ascending aortic aneurysm, fatty liver disease, and hypertension who presents to the ED for SOB x 1 day. He is s/p valve sparing aortic root repair, hemiarch replacement , CABG x2, and repair of hernia of morgani with Dr. Mooney on 9/18 and discharged on 9/24. Currently AF and hemodynamically stable on RA. BNP is elevated and he is hypervolemic on exam. CXR shows post operative changes with no pleural effusions. Echo significant for EF 60%, no pericardial effusion or tamponade but TAPSE 0.8. Will admit to CTS for observation.       - POD 8  - Maintain sternal precautions   - ASA  - BB  - Statin  - Lasix  with scheduled potassium ordered   - Encourage ambulation  - multimodal pain management   - Cardiac diet with 1500 cc fluid restriction   - Bowel regimen in place   -  Famotidine for ulcer prevention   - Monitor electrolytes to keep Mag above 2 and Potassium above 4  - OOBTC  - Encourage IS use

## 2023-09-26 NOTE — ED PROVIDER NOTES
Chief Complaint   Shortness of Breath (Had recent heart surgery last week and the dr said to come in)      History Of Present Illness   Sawyer Browning is a 68 y.o. male presenting with shortness of breath that started yesterday and has worsened today.  He had heart surgery last week and was discharged a couple of days ago.  He talked to the nurse coordinator this morning and was told to come to the emergency department.  Denies any fever or chills.  He is not having any worsening pain.  He has been using his incentive spirometer and taking his Lasix.        Review of patient's allergies indicates:   Allergen Reactions    Ace inhibitors Other (See Comments)     cough    Losartan      headache       No current facility-administered medications on file prior to encounter.     Current Outpatient Medications on File Prior to Encounter   Medication Sig Dispense Refill    acetaminophen (TYLENOL) 500 MG tablet Take 2 tablets (1,000 mg total) by mouth every 8 (eight) hours as needed for Pain.  0    aspirin 325 MG tablet Take 1 tablet (325 mg total) by mouth once daily. 360 tablet 0    atorvastatin (LIPITOR) 80 MG tablet Take 1 tablet (80 mg total) by mouth once daily. 90 tablet 3    furosemide (LASIX) 20 MG tablet Take 4 tablets (80 mg total) by mouth once daily. for 5 days 20 tablet 0    lansoprazole (PREVACID) 30 MG capsule TAKE 1 CAPSULE DAILY ONLY  AS NEEDED FOR HEARTBURN 90 capsule 0    metoprolol succinate (TOPROL-XL) 100 MG 24 hr tablet Take 1 tablet (100 mg total) by mouth once daily. 60 tablet 0    oxyCODONE (ROXICODONE) 5 MG immediate release tablet Take 1 tablet (5 mg total) by mouth every 4 (four) hours as needed for Pain. 30 tablet 0    polyethylene glycol (GLYCOLAX) 17 gram PwPk Take 17 g by mouth 2 (two) times daily as needed.  0    potassium chloride SA (K-DUR,KLOR-CON) 20 MEQ tablet Take 1 tablet (20 mEq total) by mouth 2 (two) times daily. for 5 days 10 tablet 0       Past History   As per HPI and below:  Past  Medical History:   Diagnosis Date    BPH (benign prostatic hyperplasia)     Elevated liver enzymes     Fatty infiltration of liver     Glucose intolerance (impaired glucose tolerance)     Hiatal hernia     Hyperlipidemia     Hypertension     Overweight     Reflux      Past Surgical History:   Procedure Laterality Date    AORTIC ROOT REPLACEMENT N/A 9/18/2023    Procedure: VALVE SPARING AORTIC ROOT REPLACEMENT;  Surgeon: Imtiaz Mooney MD;  Location: [facility] OR [address] [place] [address];  Service: Cardiovascular;  Laterality: N/A;  Valve sparing aortic root replacement, vs biobentall, hemiarch, CABGx2 under  hypothermic circ arrest    CORONARY ANGIOGRAPHY N/A 9/6/2023    Procedure: ANGIOGRAM, CORONARY ARTERY;  Surgeon: Chon Lopez MD;  Location: [facility] CATH LAB;  Service: Cardiology;  Laterality: N/A;  right radial    CORONARY ARTERY BYPASS GRAFT (CABG) N/A 9/18/2023    Procedure: CORONARY ARTERY BYPASS GRAFT (CABG);  Surgeon: Imtiaz Mooney MD;  Location: [facility] OR [address] [place] [address];  Service: Cardiovascular;  Laterality: N/A;  Vein [place] Start: 821  Vein [place] End: 838  Vein Preparation Start: 839  Vein Preparation End: 912    ENDOSCOPIC HARVEST OF VEIN Left 9/18/2023    Procedure: HARVEST-VEIN-ENDOVASCULAR;  Surgeon: Imtiaz Mooney MD;  Location: [facility] OR [address] [place] [address];  Service: Cardiovascular;  Laterality: Left;  Valve sparing aortic root replacement, vs biobentall, hemiarch, CABGx2 under    inguinal hernia      x2    REPAIR OF DIAPHRAGMATIC HERNIA  9/18/2023    Procedure: REPAIR, HERNIA, DIAPHRAGMATIC;  Surgeon: Imtiaz Mooney MD;  Location: [facility] OR [address] [place] [address];  Service: Cardiovascular;;    tonsillectomy         Social History     Socioeconomic History    Marital status:     Number of children: 2   Occupational History    Occupation: Works in insurance   Tobacco Use    Smoking status: Never    Smokeless tobacco: Never   Substance and Sexual Activity    Alcohol use: Yes     Comment: 2 glasses of wine most evenings.     Drug use: No     Social Determinants of Health     Financial Resource Strain: Low Risk  (9/19/2023)    Overall Financial Resource Strain (CARDIA)     Difficulty of Paying Living Expenses: Not hard at all   Food Insecurity: No Food Insecurity (9/19/2023)    Hunger Vital Sign     Worried About Running Out of Food in the Last Year: Never true     Ran Out of Food in the Last Year: Never true   Transportation Needs: No Transportation Needs (9/19/2023)    PRAPARE - Transportation     Lack of Transportation (Medical): No     Lack of Transportation (Non-Medical): No   Physical Activity: Sufficiently Active (9/19/2023)    Exercise Vital Sign     Days of Exercise per Week: 3 days     Minutes of Exercise per Session: 60 min   Stress: Unknown (9/19/2023)    [employer] - Occupational Stress Questionnaire     Feeling of Stress : Patient refused   Social Connections: Unknown (9/19/2023)    Social Connection and Isolation Panel [NHANES]     Frequency of Communication with Friends and Family: More than three times a week     Frequency of Social Gatherings with Friends and Family: More than three times a week     Attends Orthodoxy Services: Patient refused     Active Member of Clubs or Organizations: Patient refused     Attends Club or Organization Meetings: Patient refused     Marital Status:    Housing Stability: Low Risk  (9/19/2023)    Housing Stability Vital Sign     Unable to Pay for Housing in the Last Year: No     Number of Places Lived in the Last Year: 1     Unstable Housing in the Last Year: No       Family History   Problem Relation Age of Onset    Hypertension Mother     Macular degeneration Mother         - gets shots in her eye    Stroke Father     Heart disease Father     Aneurysm Father     Migraines Sister     Arthritis Brother         knees    Dementia Paternal Grandmother     Diabetes Paternal Grandmother     Heart disease Paternal Grandfather     Diabetes Brother     Hernia  "Brother     Cancer Paternal Aunt     Cancer Paternal Uncle     Cancer Paternal Aunt     Cancer Paternal Uncle     Colon cancer Neg Hx     Prostate cancer Neg Hx        Physical Exam     Vitals:    09/26/23 1402 09/26/23 1433 09/26/23 1533 09/26/23 1602   BP: 125/82 125/82 136/75 129/83   BP Location:       Patient Position:       Pulse: 66 69 67 66   Resp: 19 19 19 (!) 21   Temp:       TempSrc:       SpO2: (!) 94% 95% (!) 94% (!) 94%   Weight:  86.2 kg (190 lb)     Height:  6' 1" (1.854 m)       Appearance: No acute distress.  Skin: No rashes seen.  Good turgor.  No abrasions.  No ecchymoses.  Eyes: No conjunctival injection.  ENT: Oropharynx clear.    Chest: Clear to auscultation bilaterally.  Good air movement.  No wheezes.  No rhonchi.  Cardiovascular: Regular rate and rhythm.  No murmurs. No gallops. No rubs.  Abdomen: Soft.  Not distended.  Nontender.  No guarding.  No rebound.  Musculoskeletal: Good range of motion all joints.  No deformities.  Neck supple.  No meningismus.  Neurologic: Motor intact.  Sensation intact.  Cerebellar intact.  Cranial nerves intact.  Mental Status:  Alert and oriented x 3.  Appropriate, conversant.      Initial MDM   Shortness of breath, recent cardiac surgery.  Will obtain cardiac workup and chest x-ray.  Differential includes ischemia, heart failure, pleural effusion.  Will consult Cardiothoracic surgery.    External Records Reviewed:  Discharge summary reviewed from September 23rd; did well.    Medications Given     Medications   sodium chloride 0.9% flush 10 mL (has no administration in time range)   furosemide injection 80 mg (has no administration in time range)   potassium chloride SA CR tablet 40 mEq (has no administration in time range)   furosemide injection 80 mg (has no administration in time range)   acetaminophen tablet 1,000 mg (has no administration in time range)   aspirin tablet 325 mg (has no administration in time range)   metoprolol succinate (TOPROL-XL) 24 hr " tablet 100 mg (has no administration in time range)   oxyCODONE immediate release tablet 5 mg (has no administration in time range)   polyethylene glycol packet 17 g (has no administration in time range)   famotidine tablet 20 mg (has no administration in time range)   atorvastatin tablet 80 mg (has no administration in time range)       Results and Course     Labs Reviewed   CBC W/ AUTO DIFFERENTIAL - Abnormal; Notable for the following components:       Result Value    RBC 3.62 (*)     Hemoglobin 11.1 (*)     Hematocrit 33.3 (*)     Immature Granulocytes 1.7 (*)     Immature Grans (Abs) 0.16 (*)     Mono # 1.3 (*)     Lymph % 10.6 (*)     All other components within normal limits   COMPREHENSIVE METABOLIC PANEL - Abnormal; Notable for the following components:    AST 41 (*)     ALT 81 (*)     All other components within normal limits   TROPONIN I - Abnormal; Notable for the following components:    Troponin I 0.332 (*)     All other components within normal limits   B-TYPE NATRIURETIC PEPTIDE - Abnormal; Notable for the following components:     (*)     All other components within normal limits   TROPONIN I - Abnormal; Notable for the following components:    Troponin I 0.280 (*)     All other components within normal limits   HIV 1 / 2 ANTIBODY    Narrative:     Release to patient->Immediate   HEPATITIS C ANTIBODY    Narrative:     Release to patient->Immediate       Imaging Results              X-Ray Chest AP Portable (Final result)  Result time 09/26/23 12:27:51      Final result by Aman Zavala MD (09/26/23 12:27:51)                   Impression:      Interval removal of right-sided central venous catheter.  Improved aeration at both lung bases with mild residual bibasilar atelectasis.      Electronically signed by: Aman Zavala MD  Date:    09/26/2023  Time:    12:27               Narrative:    EXAMINATION:  XR CHEST AP PORTABLE    CLINICAL HISTORY:  shortness of breath;    TECHNIQUE:  Single frontal  view of the chest was performed.    COMPARISON:  09/21/2023    FINDINGS:  Postoperative changes are again identified in the thorax.  Interval removal of right-sided central venous catheter.  Cardiac silhouette is magnified by portable AP technique.  The heart appears to be at the upper limits of normal in size and unchanged.  Tortuous thoracic aorta with atherosclerotic calcification in the wall of the aortic arch.  Pulmonary vascularity does not appear congested.  Lungs are satisfactorily expanded.  Improved aeration at both lung bases, particularly on the right.  Mild subsegmental atelectatic changes remain.  No significant pleural fluid and no pneumothorax.  Skeletal structures appear intact.                                      ED Course as of 09/26/23 1645   Tue Sep 26, 2023   1225 X-Ray Chest AP Portable  CXR shows no acute disease per my independent interpretation.     [DC]   1227 EKG 12-lead  EKG shows normal sinus rhythm and no acute ischemia per my independent interpretation.     [DC]   1246 Discussed with CTS.  They would like a formal echo.  Order placed. [DC]   1254 WBC: 9.68 [DC]   1254 Hemoglobin(!): 11.1 [DC]   1254 Platelets: 244 [DC]   1327 BNP(!): 534 [DC]   1327 Creatinine: 0.8 [DC]   1328 Troponin I(!): 0.332  Expected given aortic root repair, will trend [DC]   1645 Troponin I(!): 0.280 [DC]      ED Course User Index  [DC] Rafal Garcia MD           MDM, Impression and Plan   68 y.o. male with shortness of breath after recent heart surgery.  Chest x-ray does not show anything obvious.  Ordered echo per CT surgery, no obvious effusion.  CT surgery plans hospitalization.         Final diagnoses:  [R06.02] Shortness of breath        ED Disposition Condition    Admit                   Rafal Garcia MD  09/26/23 1646

## 2023-09-26 NOTE — H&P
[facility]  Cardiothoracic Surgery  History & Physical    Patient Name: Sawyer Browning  MRN: [id number]  Admission Date: 9/26/2023  Attending Physician: Imtiaz Mooney MD  Referring Provider: Self, Aaareferral    Patient information was obtained from patient.     Subjective:     Chief Complaint/Reason for Admission: Shortness of Breath    History of Present Illness: Mr. Browning is a 68 year-old gentleman with a history of aortic root and ascending aortic aneurysm s/p valve sparing aortic root repair, hemiarch replacement , CABG x2, and repair of hernia of morgani with Dr. Mooney on 9/18., fatty liver disease, and hypertension who presents to the ED for SOB x 1 day.  Recently discharged from the hospital with no complications on 9/24. Patient reports SOB started last night while he was in bed and carried over to the morning. This prompt him to call Cardiac surgery coordinator and was instructed to go to the ED for further evaluation. He describes it as heaviness. He denies fever/fatigue/weakness, chest pain/discomfort/tightness, palpitations, LE swelling, sudden weight gain. He is compliant with Lasix. Patient has been ambulating after discharge and using IS as instructed.     In the ED, he is asymptomatic and is currently hemodynamically stable.  CXR significant for postoperative changes, no pleural effusion. TTE significant for EF 60-65%, TAPSE 0.8.      Will admit patient to CTS for observation and aggressive diuresis.            No current facility-administered medications on file prior to encounter.     Current Outpatient Medications on File Prior to Encounter   Medication Sig    acetaminophen (TYLENOL) 500 MG tablet Take 2 tablets (1,000 mg total) by mouth every 8 (eight) hours as needed for Pain.    aspirin 325 MG tablet Take 1 tablet (325 mg total) by mouth once daily.    atorvastatin (LIPITOR) 80 MG tablet Take 1 tablet (80 mg total) by mouth once daily.    furosemide (LASIX) 20 MG tablet  Take 4 tablets (80 mg total) by mouth once daily. for 5 days    lansoprazole (PREVACID) 30 MG capsule TAKE 1 CAPSULE DAILY ONLY  AS NEEDED FOR HEARTBURN    metoprolol succinate (TOPROL-XL) 100 MG 24 hr tablet Take 1 tablet (100 mg total) by mouth once daily.    oxyCODONE (ROXICODONE) 5 MG immediate release tablet Take 1 tablet (5 mg total) by mouth every 4 (four) hours as needed for Pain.    polyethylene glycol (GLYCOLAX) 17 gram PwPk Take 17 g by mouth 2 (two) times daily as needed.    potassium chloride SA (K-DUR,KLOR-CON) 20 MEQ tablet Take 1 tablet (20 mEq total) by mouth 2 (two) times daily. for 5 days       Review of patient's allergies indicates:   Allergen Reactions    Ace inhibitors Other (See Comments)     cough    Losartan      headache       Past Medical History:   Diagnosis Date    BPH (benign prostatic hyperplasia)     Elevated liver enzymes     Fatty infiltration of liver     Glucose intolerance (impaired glucose tolerance)     Hiatal hernia     Hyperlipidemia     Hypertension     Overweight     Reflux      Past Surgical History:   Procedure Laterality Date    AORTIC ROOT REPLACEMENT N/A 9/18/2023    Procedure: VALVE SPARING AORTIC ROOT REPLACEMENT;  Surgeon: Imtiaz Mooney MD;  Location: [address];  Service: Cardiovascular;  Laterality: N/A;  Valve sparing aortic root replacement, vs biobentall, hemiarch, CABGx2 under  hypothermic circ arrest    CORONARY ANGIOGRAPHY N/A 9/6/2023    Procedure: ANGIOGRAM, CORONARY ARTERY;  Surgeon: Chon Lopez MD;  Location: [facility] CATH LAB;  Service: Cardiology;  Laterality: N/A;  right radial    CORONARY ARTERY BYPASS GRAFT (CABG) N/A 9/18/2023    Procedure: CORONARY ARTERY BYPASS GRAFT (CABG);  Surgeon: Imtiaz Mooney MD;  Location: [facility] OR [address] [place] [address];  Service: Cardiovascular;  Laterality: N/A;  Vein [place] Start: 821  Vein [place] End: 838  Vein Preparation Start: 839  Vein Preparation End: 912    ENDOSCOPIC HARVEST OF VEIN  Left 9/18/2023    Procedure: HARVEST-VEIN-ENDOVASCULAR;  Surgeon: Imtiaz Mooney MD;  Location: [facility] OR 2ND FLR;  Service: Cardiovascular;  Laterality: Left;  Valve sparing aortic root replacement, vs biobentall, hemiarch, CABGx2 under    inguinal hernia      x2    REPAIR OF DIAPHRAGMATIC HERNIA  9/18/2023    Procedure: REPAIR, HERNIA, DIAPHRAGMATIC;  Surgeon: mItiaz Mooney MD;  Location: [facility] OR 2ND FLR;  Service: Cardiovascular;;    tonsillectomy       Family History       Problem Relation (Age of Onset)    Aneurysm Father    Arthritis Brother    Cancer Paternal Aunt, Paternal Uncle, Paternal Aunt, Paternal Uncle    Dementia Paternal Grandmother    Diabetes Paternal Grandmother, Brother    Heart disease Father, Paternal Grandfather    Hernia Brother    Hypertension Mother    Macular degeneration Mother    Migraines Sister    Stroke Father          Tobacco Use    Smoking status: Never    Smokeless tobacco: Never   Substance and Sexual Activity    Alcohol use: Yes     Comment: 2 glasses of wine most evenings.    Drug use: No    Sexual activity: Not on file     Review of Systems   Constitutional:  Negative for activity change, appetite change, fatigue and fever.   HENT:  Negative for nosebleeds.    Respiratory:  Positive for shortness of breath. Negative for cough.    Cardiovascular:  Negative for chest pain, palpitations and leg swelling.   Gastrointestinal:  Negative for abdominal distention, abdominal pain and nausea.   Genitourinary:  Negative for frequency.   Musculoskeletal:  Negative for arthralgias and myalgias.   Skin:  Negative for rash.   Neurological:  Negative for dizziness and numbness.   Hematological:  Does not bruise/bleed easily.     Objective:     Vital Signs (Most Recent):  Temp: 98.2 °F (36.8 °C) (09/26/23 1333)  Pulse: 66 (09/26/23 1602)  Resp: (!) 21 (09/26/23 1602)  BP: 129/83 (09/26/23 1602)  SpO2: (!) 94 % (09/26/23 1602) Vital Signs (24h Range):  Temp:  [98.2 °F (36.8  "°C)-98.5 °F (36.9 °C)] 98.2 °F (36.8 °C)  Pulse:  [64-88] 66  Resp:  [9-21] 21  SpO2:  [93 %-96 %] 94 %  BP: (125-147)/(75-83) 129/83     Weight: 86.2 kg (190 lb)  Body mass index is 25.07 kg/m².    SpO2: (!) 94 %        Intake/Output - Last 3 Shifts       None             Lines/Drains/Airways       Peripheral Intravenous Line  Duration                  Peripheral IV - Single Lumen 09/26/23 1218 20 G Anterior;Left Forearm <1 day                          Physical Exam  Constitutional:       Appearance: Normal appearance.   HENT:      Head: Normocephalic.   Eyes:      Pupils: Pupils are equal, round, and reactive to light.   Neck:      Vascular: Hepatojugular reflux and JVD present.   Cardiovascular:      Rate and Rhythm: Normal rate and regular rhythm.      Pulses: Normal pulses.   Pulmonary:      Effort: Pulmonary effort is normal.   Abdominal:      General: There is no distension.      Palpations: Abdomen is soft.   Musculoskeletal:         General: Normal range of motion.      Right lower leg: No edema.      Left lower leg: No edema.   Skin:     General: Skin is warm and dry.      Comments: MSI CDI   Neurological:      General: No focal deficit present.      Mental Status: He is alert.            Significant Labs:  ABGs: No results for input(s): "PH", "PCO2", "PO2", "HCO3", "POCSATURATED", "BE" in the last 48 hours.  Amylase: No results for input(s): "AMYLASE" in the last 48 hours.  BMP:   Recent Labs   Lab 09/26/23  1227         K 4.0      CO2 25   BUN 19   CREATININE 0.8   CALCIUM 9.1     Cardiac markers:   Recent Labs   Lab 09/26/23  1227   TROPONINI 0.332*     CBC:   Recent Labs   Lab 09/26/23  1227   WBC 9.68   RBC 3.62*   HGB 11.1*   HCT 33.3*      MCV 92   MCH 30.7   MCHC 33.3     CMP:   Recent Labs   Lab 09/26/23  1227      CALCIUM 9.1   ALBUMIN 3.8   PROT 6.8      K 4.0   CO2 25      BUN 19   CREATININE 0.8   ALKPHOS 68   ALT 81*   AST 41*   BILITOT 0.9 " "    Coagulation: No results for input(s): "PT", "INR", "APTT" in the last 48 hours.  Lactic Acid: No results for input(s): "LACTATE" in the last 48 hours.  LFTs:   Recent Labs   Lab 09/26/23  1227   ALT 81*   AST 41*   ALKPHOS 68   BILITOT 0.9   PROT 6.8   ALBUMIN 3.8     Lipase: No results for input(s): "LIPASE" in the last 48 hours.    Significant Diagnostics:  I have reviewed all pertinent imaging results/findings within the past 24 hours.  ECHO 9/26/23    Left Ventricle: The left ventricle is normal in size. Normal wall thickness. regional wall motion abnormalities present. See diagram for wall motion findings. There is normal systolic function with a visually estimated ejection fraction of 60 - 65%. There is normal diastolic function.    Right Ventricle: Normal right ventricular cavity size. Systolic function is normal.    Right Atrium: Right atrium is moderately dilated.    Aortic Valve: The aortic valve is a trileaflet valve. valve is well seated in the 30 mm Cardioroot aortic graft    Pulmonary Artery: The estimated pulmonary artery systolic pressure is 37 mmHg.    IVC/SVC: Normal venous pressure at 3 mmHg.  TAPSE 0.8    CXR 9/26/23  Impression:     Interval removal of right-sided central venous catheter.  Improved aeration at both lung bases with mild residual bibasilar atelectasis.    Assessment/Plan:         SOB (shortness of breath)  Mr. Browning is a 68 year-old gentleman with a history of aortic root and ascending aortic aneurysm, fatty liver disease, and hypertension who presents to the ED for SOB x 1 day. He is s/p valve sparing aortic root repair, hemiarch replacement , CABG x2, and repair of hernia of morgani with Dr. Mooney on 9/18 and discharged on 9/24. Currently AF and hemodynamically stable on RA. BNP is elevated and he is hypervolemic on exam. CXR shows post operative changes with no pleural effusions. Echo significant for EF 60%, no pericardial effusion or tamponade but TAPSE 0.8. Will " admit to CTS for observation.       - POD 8  - Maintain sternal precautions   - ASA  - BB  - Statin  - Lasix  with scheduled potassium ordered   - Encourage ambulation  - multimodal pain management   - Cardiac diet with 1500 cc fluid restriction   - Bowel regimen in place   -  Famotidine for ulcer prevention   - Monitor electrolytes to keep Mag above 2 and Potassium above 4  - OOBTC  - Encourage IS use         S/P CABG (coronary artery bypass graft)  See SOB     GERD (gastroesophageal reflux disease)  See SOB     Other hyperlipidemia  See SOB     Essential hypertension  See SOB         Jasmyne Marte PAPashaC  Cardiothoracic Surgery  [facility]

## 2023-09-26 NOTE — SUBJECTIVE & OBJECTIVE
No current facility-administered medications on file prior to encounter.     Current Outpatient Medications on File Prior to Encounter   Medication Sig    acetaminophen (TYLENOL) 500 MG tablet Take 2 tablets (1,000 mg total) by mouth every 8 (eight) hours as needed for Pain.    aspirin 325 MG tablet Take 1 tablet (325 mg total) by mouth once daily.    atorvastatin (LIPITOR) 80 MG tablet Take 1 tablet (80 mg total) by mouth once daily.    furosemide (LASIX) 20 MG tablet Take 4 tablets (80 mg total) by mouth once daily. for 5 days    lansoprazole (PREVACID) 30 MG capsule TAKE 1 CAPSULE DAILY ONLY  AS NEEDED FOR HEARTBURN    metoprolol succinate (TOPROL-XL) 100 MG 24 hr tablet Take 1 tablet (100 mg total) by mouth once daily.    oxyCODONE (ROXICODONE) 5 MG immediate release tablet Take 1 tablet (5 mg total) by mouth every 4 (four) hours as needed for Pain.    polyethylene glycol (GLYCOLAX) 17 gram PwPk Take 17 g by mouth 2 (two) times daily as needed.    potassium chloride SA (K-DUR,KLOR-CON) 20 MEQ tablet Take 1 tablet (20 mEq total) by mouth 2 (two) times daily. for 5 days       Review of patient's allergies indicates:   Allergen Reactions    Ace inhibitors Other (See Comments)     cough    Losartan      headache       Past Medical History:   Diagnosis Date    BPH (benign prostatic hyperplasia)     Elevated liver enzymes     Fatty infiltration of liver     Glucose intolerance (impaired glucose tolerance)     Hiatal hernia     Hyperlipidemia     Hypertension     Overweight     Reflux      Past Surgical History:   Procedure Laterality Date    AORTIC ROOT REPLACEMENT N/A 9/18/2023    Procedure: VALVE SPARING AORTIC ROOT REPLACEMENT;  Surgeon: Imtiaz Mooney MD;  Location: [facility] OR [address] [place] [address];  Service: Cardiovascular;  Laterality: N/A;  Valve sparing aortic root replacement, vs biobentall, hemiarch, CABGx2 under  hypothermic circ arrest    CORONARY ANGIOGRAPHY N/A 9/6/2023    Procedure: ANGIOGRAM, CORONARY ARTERY;   Surgeon: Chon Lopez MD;  Location: [facility] CATH LAB;  Service: Cardiology;  Laterality: N/A;  right radial    CORONARY ARTERY BYPASS GRAFT (CABG) N/A 9/18/2023    Procedure: CORONARY ARTERY BYPASS GRAFT (CABG);  Surgeon: Imtiaz Mooney MD;  [facility];  Service: Cardiovascular;  Laterality: N/A;  Vein [place] Start: 821  Vein [place] End: 838  Vein Preparation Start: 839  Vein Preparation End: 912    ENDOSCOPIC HARVEST OF VEIN Left 9/18/2023    Procedure: HARVEST-VEIN-ENDOVASCULAR;  Surgeon: Imtiaz Mooney MD;  [facility];  Service: Cardiovascular;  Laterality: Left;  Valve sparing aortic root replacement, vs biobentall, hemiarch, CABGx2 under    inguinal hernia      x2    REPAIR OF DIAPHRAGMATIC HERNIA  9/18/2023    Procedure: REPAIR, HERNIA, DIAPHRAGMATIC;  Surgeon: Imtiaz Mooney MD;  Location: [facility] OR [address] [place] [address];  Service: Cardiovascular;;    tonsillectomy       Family History       Problem Relation (Age of Onset)    Aneurysm Father    Arthritis Brother    Cancer Paternal Aunt, Paternal Uncle, Paternal Aunt, Paternal Uncle    Dementia Paternal Grandmother    Diabetes Paternal Grandmother, Brother    Heart disease Father, Paternal Grandfather    Hernia Brother    Hypertension Mother    Macular degeneration Mother    Migraines Sister    Stroke Father          Tobacco Use    Smoking status: Never    Smokeless tobacco: Never   Substance and Sexual Activity    Alcohol use: Yes     Comment: 2 glasses of wine most evenings.    Drug use: No    Sexual activity: Not on file     Review of Systems   Constitutional:  Negative for activity change, appetite change, fatigue and fever.   HENT:  Negative for nosebleeds.    Respiratory:  Positive for shortness of breath. Negative for cough.    Cardiovascular:  Negative for chest pain, palpitations and leg swelling.   Gastrointestinal:  Negative for abdominal distention, abdominal pain and nausea.   Genitourinary:  Negative for  "frequency.   Musculoskeletal:  Negative for arthralgias and myalgias.   Skin:  Negative for rash.   Neurological:  Negative for dizziness and numbness.   Hematological:  Does not bruise/bleed easily.     Objective:     Vital Signs (Most Recent):  Temp: 98.2 °F (36.8 °C) (09/26/23 1333)  Pulse: 66 (09/26/23 1602)  Resp: (!) 21 (09/26/23 1602)  BP: 129/83 (09/26/23 1602)  SpO2: (!) 94 % (09/26/23 1602) Vital Signs (24h Range):  Temp:  [98.2 °F (36.8 °C)-98.5 °F (36.9 °C)] 98.2 °F (36.8 °C)  Pulse:  [64-88] 66  Resp:  [9-21] 21  SpO2:  [93 %-96 %] 94 %  BP: (125-147)/(75-83) 129/83     Weight: 86.2 kg (190 lb)  Body mass index is 25.07 kg/m².    SpO2: (!) 94 %        Intake/Output - Last 3 Shifts       None             Lines/Drains/Airways       Peripheral Intravenous Line  Duration                  Peripheral IV - Single Lumen 09/26/23 1218 20 G Anterior;Left Forearm <1 day                          Physical Exam  Constitutional:       Appearance: Normal appearance.   HENT:      Head: Normocephalic.   Eyes:      Pupils: Pupils are equal, round, and reactive to light.   Neck:      Vascular: Hepatojugular reflux and JVD present.   Cardiovascular:      Rate and Rhythm: Normal rate and regular rhythm.      Pulses: Normal pulses.   Pulmonary:      Effort: Pulmonary effort is normal.   Abdominal:      General: There is no distension.      Palpations: Abdomen is soft.   Musculoskeletal:         General: Normal range of motion.      Right lower leg: No edema.      Left lower leg: No edema.   Skin:     General: Skin is warm and dry.      Comments: MSI CDI   Neurological:      General: No focal deficit present.      Mental Status: He is alert.            Significant Labs:  ABGs: No results for input(s): "PH", "PCO2", "PO2", "HCO3", "POCSATURATED", "BE" in the last 48 hours.  Amylase: No results for input(s): "AMYLASE" in the last 48 hours.  BMP:   Recent Labs   Lab 09/26/23  1227         K 4.0      CO2 25 " "  BUN 19   CREATININE 0.8   CALCIUM 9.1     Cardiac markers:   Recent Labs   Lab 09/26/23  1227   TROPONINI 0.332*     CBC:   Recent Labs   Lab 09/26/23  1227   WBC 9.68   RBC 3.62*   HGB 11.1*   HCT 33.3*      MCV 92   MCH 30.7   MCHC 33.3     CMP:   Recent Labs   Lab 09/26/23  1227      CALCIUM 9.1   ALBUMIN 3.8   PROT 6.8      K 4.0   CO2 25      BUN 19   CREATININE 0.8   ALKPHOS 68   ALT 81*   AST 41*   BILITOT 0.9     Coagulation: No results for input(s): "PT", "INR", "APTT" in the last 48 hours.  Lactic Acid: No results for input(s): "LACTATE" in the last 48 hours.  LFTs:   Recent Labs   Lab 09/26/23  1227   ALT 81*   AST 41*   ALKPHOS 68   BILITOT 0.9   PROT 6.8   ALBUMIN 3.8     Lipase: No results for input(s): "LIPASE" in the last 48 hours.    Significant Diagnostics:  I have reviewed all pertinent imaging results/findings within the past 24 hours.  ECHO 9/26/23    Left Ventricle: The left ventricle is normal in size. Normal wall thickness. regional wall motion abnormalities present. See diagram for wall motion findings. There is normal systolic function with a visually estimated ejection fraction of 60 - 65%. There is normal diastolic function.    Right Ventricle: Normal right ventricular cavity size. Systolic function is normal.    Right Atrium: Right atrium is moderately dilated.    Aortic Valve: The aortic valve is a trileaflet valve. valve is well seated in the 30 mm Cardioroot aortic graft    Pulmonary Artery: The estimated pulmonary artery systolic pressure is 37 mmHg.    IVC/SVC: Normal venous pressure at 3 mmHg.  TAPSE 0.8    CXR 9/26/23  Impression:     Interval removal of right-sided central venous catheter.  Improved aeration at both lung bases with mild residual bibasilar atelectasis.  "

## 2023-09-26 NOTE — CONSULTS
[facility] - Emergency Dept  Cardiothoracic Surgery  Consult Note    Patient Name: Sawyer Browning  MRN: [id number]  Admission Date: 9/26/2023  Attending Physician: Rafal Garcia MD  Referring Provider: Self, Aaareferral    Patient information was obtained from patient.     Inpatient consult to Cardiothoracic Surgery  Consult performed by: Jasmyne Marte PA-C  Consult ordered by: Rafal Garcia MD        Subjective:     Principal Problem: <principal problem not specified>    History of Present Illness: Mr. Browning is a 68 year-old gentleman with a history of aortic root and ascending aortic aneurysm s/p valve sparing aortic root repair, hemiarch replacement , CABG x2, and repair of hernia of morgani with Dr. Mooney on 9/18., fatty liver disease, and hypertension who presents to the ED for SOB x 1 day.  Recently discharged from the hospital with no complications on 9/24. Patient reports SOB started last night while he was in bed and carried over to the morning. He describes it as heaviness. This prompt him to call Cardiac surgery coordinator and was instructed to go to the ED for further evaluation.  He denies fever/fatigue/weakness, chest pain/discomfort/tightness, palpitations, LE swelling, abd distention, sudden weight gain. He is compliant with Lasix. Patient has been ambulating after discharge and using IS as instructed.     In the ED, he is asymptomatic and is currently hemodynamically stable.  CXR significant for postoperative changes, no pleural effusions.     CTS consulted for for further evaluation.       No current facility-administered medications on file prior to encounter.     Current Outpatient Medications on File Prior to Encounter   Medication Sig    acetaminophen (TYLENOL) 500 MG tablet Take 2 tablets (1,000 mg total) by mouth every 8 (eight) hours as needed for Pain.    aspirin 325 MG tablet Take 1 tablet (325 mg total) by mouth once daily.    atorvastatin (LIPITOR) 80 MG tablet Take 1 tablet (80  mg total) by mouth once daily.    furosemide (LASIX) 20 MG tablet Take 4 tablets (80 mg total) by mouth once daily. for 5 days    lansoprazole (PREVACID) 30 MG capsule TAKE 1 CAPSULE DAILY ONLY  AS NEEDED FOR HEARTBURN    metoprolol succinate (TOPROL-XL) 100 MG 24 hr tablet Take 1 tablet (100 mg total) by mouth once daily.    oxyCODONE (ROXICODONE) 5 MG immediate release tablet Take 1 tablet (5 mg total) by mouth every 4 (four) hours as needed for Pain.    polyethylene glycol (GLYCOLAX) 17 gram PwPk Take 17 g by mouth 2 (two) times daily as needed.    potassium chloride SA (K-DUR,KLOR-CON) 20 MEQ tablet Take 1 tablet (20 mEq total) by mouth 2 (two) times daily. for 5 days       Review of patient's allergies indicates:   Allergen Reactions    Ace inhibitors Other (See Comments)     cough    Losartan      headache       Past Medical History:   Diagnosis Date    BPH (benign prostatic hyperplasia)     Elevated liver enzymes     Fatty infiltration of liver     Glucose intolerance (impaired glucose tolerance)     Hiatal hernia     Hyperlipidemia     Hypertension     Overweight     Reflux      Past Surgical History:   Procedure Laterality Date    AORTIC ROOT REPLACEMENT N/A 9/18/2023    Procedure: VALVE SPARING AORTIC ROOT REPLACEMENT;  Surgeon: Imtiaz Mooney MD;  Location: [facility] OR [address] [place] [address];  Service: Cardiovascular;  Laterality: N/A;  Valve sparing aortic root replacement, vs biobentall, hemiarch, CABGx2 under  hypothermic circ arrest    CORONARY ANGIOGRAPHY N/A 9/6/2023    Procedure: ANGIOGRAM, CORONARY ARTERY;  Surgeon: Chon Lopez MD;  Location: [facility] CATH LAB;  Service: Cardiology;  Laterality: N/A;  right radial    CORONARY ARTERY BYPASS GRAFT (CABG) N/A 9/18/2023    Procedure: CORONARY ARTERY BYPASS GRAFT (CABG);  Surgeon: Imtiaz Mooney MD;  Location: [facility] OR [address] [place] [address];  Service: Cardiovascular;  Laterality: N/A;  Vein Wells Start: 821  Vein Wells End: 838  Vein Preparation Start: 839  Vein  Preparation End: 912    ENDOSCOPIC HARVEST OF VEIN Left 9/18/2023    Procedure: HARVEST-VEIN-ENDOVASCULAR;  Surgeon: Imtiaz Mooney MD;  Location: [facility] OR [address] [place] [address];  Service: Cardiovascular;  Laterality: Left;  Valve sparing aortic root replacement, vs biobentall, hemiarch, CABGx2 under    inguinal hernia      x2    REPAIR OF DIAPHRAGMATIC HERNIA  9/18/2023    Procedure: REPAIR, HERNIA, DIAPHRAGMATIC;  Surgeon: Imtiaz Mooney MD;  Location: [facility] OR [address] [place] [address];  Service: Cardiovascular;;    tonsillectomy       Family History       Problem Relation (Age of Onset)    Aneurysm Father    Arthritis Brother    Cancer Paternal Aunt, Paternal Uncle, Paternal Aunt, Paternal Uncle    Dementia Paternal Grandmother    Diabetes Paternal Grandmother, Brother    Heart disease Father, Paternal Grandfather    Hernia Brother    Hypertension Mother    Macular degeneration Mother    Migraines Sister    Stroke Father          Tobacco Use    Smoking status: Never    Smokeless tobacco: Never   Substance and Sexual Activity    Alcohol use: Yes     Comment: 2 glasses of wine most evenings.    Drug use: No    Sexual activity: Not on file     Review of Systems   Constitutional:  Negative for activity change, appetite change, fatigue and fever.   HENT:  Negative for nosebleeds.    Respiratory:  Positive for shortness of breath. Negative for cough.    Cardiovascular:  Negative for chest pain, palpitations and leg swelling.   Gastrointestinal:  Negative for abdominal distention, abdominal pain and nausea.   Genitourinary:  Negative for frequency.   Musculoskeletal:  Negative for arthralgias and myalgias.   Skin:  Negative for rash.   Neurological:  Negative for dizziness and numbness.   Hematological:  Does not bruise/bleed easily.     Objective:     Vital Signs (Most Recent):  Temp: 98.5 °F (36.9 °C) (09/26/23 1115)  Pulse: 68 (09/26/23 1233)  Resp: 16 (09/26/23 1233)  BP: 130/77 (09/26/23 1233)  SpO2: (!) 94 % (09/26/23 1233) Vital  "Signs (24h Range):  Temp:  [98.5 °F (36.9 °C)] 98.5 °F (36.9 °C)  Pulse:  [68-88] 68  Resp:  [16] 16  SpO2:  [94 %] 94 %  BP: (125-130)/(77-82) 130/77     Weight: 86.2 kg (190 lb)  Body mass index is 25.07 kg/m².    SpO2: (!) 94 %        Intake/Output - Last 3 Shifts       None             Lines/Drains/Airways       Peripheral Intravenous Line  Duration                  Peripheral IV - Single Lumen 09/26/23 1218 20 G Anterior;Left Forearm <1 day                          Physical Exam  HENT:      Head: Normocephalic and atraumatic.   Eyes:      Extraocular Movements: Extraocular movements intact.   Neck:      Vascular: Hepatojugular reflux and JVD present.   Cardiovascular:      Rate and Rhythm: Normal rate and regular rhythm.   Pulmonary:      Effort: Pulmonary effort is normal.   Abdominal:      General: Abdomen is flat. There is no distension.      Palpations: Abdomen is soft.   Musculoskeletal:         General: Normal range of motion.      Cervical back: Normal range of motion.      Right lower leg: No edema.      Left lower leg: No edema.   Skin:     General: Skin is warm and dry.      Capillary Refill: Capillary refill takes less than 2 seconds.      Comments: MSI c/d/i   Neurological:      General: No focal deficit present.            Significant Labs:  ABGs: No results for input(s): "PH", "PCO2", "PO2", "HCO3", "POCSATURATED", "BE" in the last 48 hours.  Amylase: No results for input(s): "AMYLASE" in the last 48 hours.  BMP:   Recent Labs   Lab 09/26/23  1227         K 4.0      CO2 25   BUN 19   CREATININE 0.8   CALCIUM 9.1     Cardiac markers:   Recent Labs   Lab 09/26/23  1227   TROPONINI 0.332*     CBC:   Recent Labs   Lab 09/26/23  1227   WBC 9.68   RBC 3.62*   HGB 11.1*   HCT 33.3*      MCV 92   MCH 30.7   MCHC 33.3     CMP:   Recent Labs   Lab 09/26/23  1227      CALCIUM 9.1   ALBUMIN 3.8   PROT 6.8      K 4.0   CO2 25      BUN 19   CREATININE 0.8   ALKPHOS " "68   ALT 81*   AST 41*   BILITOT 0.9     Coagulation: No results for input(s): "PT", "INR", "APTT" in the last 48 hours.  Lactic Acid: No results for input(s): "LACTATE" in the last 48 hours.  LFTs:   Recent Labs   Lab 09/26/23  1227   ALT 81*   AST 41*   ALKPHOS 68   BILITOT 0.9   PROT 6.8   ALBUMIN 3.8     Lipase: No results for input(s): "LIPASE" in the last 48 hours.    Significant Diagnostics:  I have reviewed all pertinent imaging results/findings within the past 24 hours.    CXR 9/26/23  Interval removal of right-sided central venous catheter.  Improved aeration at both lung bases with mild residual bibasilar atelectasis.      Assessment/Plan:         SOB (shortness of breath)  Mr. Browning is a 68 year-old gentleman with a history of aortic root and ascending aortic aneurysm, fatty liver disease, and hypertension who presents to the ED for SOB x 1 day. He is s/p valve sparing aortic root repair, hemiarch replacement , CABG x2, and repair of hernia of morgani with Dr. Mooney on 9/18 and was discharged on 9/24. CTS consulted for SOB. Currently asymptomatic and hemodynamically stable on RA. CXR shows post operative changes with no pleural effusions. Recommend echo for further evaluation. Will discuss with Dr. Mooney, who will give additional recommendations.         Thank you for your consult. Please contact us if you have any additional questions.    Jasmyne Marte PA-C  Cardiothoracic Surgery  [facility] - [facility] Dept  "

## 2023-09-26 NOTE — ED TRIAGE NOTES
Patient to ED with complaints of shortness of breath x last night when trying to sleep. Denies chest pain, NVD, HA, dizziness,  complaints. PMH open heart surgery last week. Incision site to chest appears to be healing well with no redness or drainage noted to site. Patient remains aaox4. Respirations even and unlabored at rest. Skin warm and dry. Wife at bedside. Plan of care discussed with patient - verbalized understanding. Pending ED workup at this time.

## 2023-09-27 VITALS
HEIGHT: 73 IN | WEIGHT: 190 LBS | OXYGEN SATURATION: 93 % | DIASTOLIC BLOOD PRESSURE: 71 MMHG | RESPIRATION RATE: 17 BRPM | BODY MASS INDEX: 25.18 KG/M2 | TEMPERATURE: 98 F | SYSTOLIC BLOOD PRESSURE: 126 MMHG | HEART RATE: 70 BPM

## 2023-09-27 LAB
ALBUMIN SERPL BCP-MCNC: 4.1 G/DL (ref 3.5–5.2)
ALP SERPL-CCNC: 74 U/L (ref 55–135)
ALT SERPL W/O P-5'-P-CCNC: 82 U/L (ref 10–44)
ANION GAP SERPL CALC-SCNC: 9 MMOL/L (ref 8–16)
AST SERPL-CCNC: 35 U/L (ref 10–40)
BASOPHILS # BLD AUTO: 0.06 K/UL (ref 0–0.2)
BASOPHILS NFR BLD: 0.6 % (ref 0–1.9)
BILIRUB SERPL-MCNC: 1 MG/DL (ref 0.1–1)
BUN SERPL-MCNC: 19 MG/DL (ref 8–23)
CALCIUM SERPL-MCNC: 9.6 MG/DL (ref 8.7–10.5)
CHLORIDE SERPL-SCNC: 106 MMOL/L (ref 95–110)
CO2 SERPL-SCNC: 28 MMOL/L (ref 23–29)
CREAT SERPL-MCNC: 1.1 MG/DL (ref 0.5–1.4)
DIFFERENTIAL METHOD: ABNORMAL
EOSINOPHIL # BLD AUTO: 0.2 K/UL (ref 0–0.5)
EOSINOPHIL NFR BLD: 2.5 % (ref 0–8)
ERYTHROCYTE [DISTWIDTH] IN BLOOD BY AUTOMATED COUNT: 13.1 % (ref 11.5–14.5)
EST. GFR  (NO RACE VARIABLE): >60 ML/MIN/1.73 M^2
GLUCOSE SERPL-MCNC: 105 MG/DL (ref 70–110)
HCT VFR BLD AUTO: 37.4 % (ref 40–54)
HGB BLD-MCNC: 12.4 G/DL (ref 14–18)
IMM GRANULOCYTES # BLD AUTO: 0.18 K/UL (ref 0–0.04)
IMM GRANULOCYTES NFR BLD AUTO: 1.8 % (ref 0–0.5)
LYMPHOCYTES # BLD AUTO: 1.2 K/UL (ref 1–4.8)
LYMPHOCYTES NFR BLD: 12.2 % (ref 18–48)
MCH RBC QN AUTO: 30.8 PG (ref 27–31)
MCHC RBC AUTO-ENTMCNC: 33.2 G/DL (ref 32–36)
MCV RBC AUTO: 93 FL (ref 82–98)
MONOCYTES # BLD AUTO: 1.3 K/UL (ref 0.3–1)
MONOCYTES NFR BLD: 13.1 % (ref 4–15)
NEUTROPHILS # BLD AUTO: 6.8 K/UL (ref 1.8–7.7)
NEUTROPHILS NFR BLD: 69.8 % (ref 38–73)
NRBC BLD-RTO: 0 /100 WBC
PHOSPHATE SERPL-MCNC: 3.7 MG/DL (ref 2.7–4.5)
PLATELET # BLD AUTO: 328 K/UL (ref 150–450)
PMV BLD AUTO: 10.5 FL (ref 9.2–12.9)
POTASSIUM SERPL-SCNC: 3.7 MMOL/L (ref 3.5–5.1)
PROT SERPL-MCNC: 7.5 G/DL (ref 6–8.4)
RBC # BLD AUTO: 4.03 M/UL (ref 4.6–6.2)
SODIUM SERPL-SCNC: 143 MMOL/L (ref 136–145)
WBC # BLD AUTO: 9.77 K/UL (ref 3.9–12.7)

## 2023-09-27 PROCEDURE — 85025 COMPLETE CBC W/AUTO DIFF WBC: CPT

## 2023-09-27 PROCEDURE — 84100 ASSAY OF PHOSPHORUS: CPT

## 2023-09-27 PROCEDURE — 96376 TX/PRO/DX INJ SAME DRUG ADON: CPT

## 2023-09-27 PROCEDURE — G0378 HOSPITAL OBSERVATION PER HR: HCPCS

## 2023-09-27 PROCEDURE — 63600175 PHARM REV CODE 636 W HCPCS

## 2023-09-27 PROCEDURE — 25000003 PHARM REV CODE 250

## 2023-09-27 PROCEDURE — 80053 COMPREHEN METABOLIC PANEL: CPT

## 2023-09-27 PROCEDURE — 36415 COLL VENOUS BLD VENIPUNCTURE: CPT

## 2023-09-27 RX ORDER — POTASSIUM CHLORIDE 20 MEQ/1
40 TABLET, EXTENDED RELEASE ORAL DAILY
Qty: 16 TABLET | Refills: 0 | Status: SHIPPED | OUTPATIENT
Start: 2023-09-27 | End: 2023-10-06

## 2023-09-27 RX ORDER — FUROSEMIDE 20 MG/1
TABLET ORAL
Qty: 44 TABLET | Refills: 0 | Status: SHIPPED | OUTPATIENT
Start: 2023-09-27 | End: 2023-11-01 | Stop reason: ALTCHOICE

## 2023-09-27 RX ADMIN — POTASSIUM CHLORIDE 40 MEQ: 1500 TABLET, EXTENDED RELEASE ORAL at 09:09

## 2023-09-27 RX ADMIN — ASPIRIN 325 MG ORAL TABLET 325 MG: 325 PILL ORAL at 09:09

## 2023-09-27 RX ADMIN — ACETAMINOPHEN 1000 MG: 500 TABLET ORAL at 01:09

## 2023-09-27 RX ADMIN — ATORVASTATIN CALCIUM 80 MG: 20 TABLET, FILM COATED ORAL at 09:09

## 2023-09-27 RX ADMIN — ACETAMINOPHEN 1000 MG: 500 TABLET ORAL at 05:09

## 2023-09-27 RX ADMIN — METOPROLOL SUCCINATE 100 MG: 100 TABLET, EXTENDED RELEASE ORAL at 09:09

## 2023-09-27 RX ADMIN — FUROSEMIDE 80 MG: 10 INJECTION, SOLUTION INTRAVENOUS at 05:09

## 2023-09-27 NOTE — NURSING
Patient arrived to the unit. Telemetry box applied and vital signs documented in flow sheet. Identification band placed on patient. Oriented to room, call bell with in reach, bed is in low lock position. Will continue to monitor.

## 2023-09-27 NOTE — NURSING
Patient is ready for discharge. Patient stable alert and oriented. IVs removed. No complaints of pain. Discussed discharge plan. Reviewed medications and side effects, appointments, and answered questions with patient and family. Meds delivered to bedside.

## 2023-09-27 NOTE — HOSPITAL COURSE
On 9/26/23, the patient was admitted to CSU for observation due SOB 2/2 hypervolemia. He was aggressively diuresed and is ready for discharge to home. At the time of discharge, the patient was asymptomatic and ambulating unassisted. Pain was well controlled with oral analgesics and the patient was tolerating the diet.     MOBILITY AND ACTIVITY: As tolerated. Patient may shower. No heavy lifting of greater than 5 pounds and no driving.     DIET: An 1800-calorie ADA with a 1500 mL fluid restriction.     WOUND CARE INSTRUCTIONS: Check for redness, swelling and drainage around the  incision or wound. Patient is to call for any obvious bleeding, drainage, pus from the wound, unusual problems or difficulties or temperature of greater than 101   degrees.     FOLLOWUP: Follow up with Dr. Mooney  in approximately 5 weeks. Prior to this  appointment, the patient will have an EKG.     Patient not placed on Ace-Inhibitor at the time of discharge due to potential for hypotension       DISCHARGE CONDITION: At the time of discharge, the patient was in sinus rhythm and afebrile with stable vital signs.

## 2023-09-27 NOTE — PLAN OF CARE
09/27/23 1449   Final Note   Assessment Type Final Discharge Note   Anticipated Discharge Disposition Home   What phone number can be called within the next 1-3 days to see how you are doing after discharge? [phone]   Hospital Resources/Appts/Education Provided Provided patient/caregiver with written discharge plan information;Post-Acute resouces added to AVS   Post-Acute Status   Discharge Delays None known at this time     Patient discharged home prior to initial assessment . No needs per CM .     Keenan Ritchie RN    [phone]      Future Appointments   Date Time Provider Department Center   10/7/2023 10:00 AM Alvin Joel MD [facility] IM [facility] PCW   11/1/2023  8:30 AM EKG, APPT [facility] EKG [facility]   11/1/2023  9:00 AM Imtiaz Mooney MD [facility] CARDVAS [facility]   11/20/2023  3:20 PM Mary Kay Haines MD [facility] MED [place]   12/1/2023  9:15 AM [facility] OIC-CT1 500 LB LIMIT [facility] IC [facility]

## 2023-09-27 NOTE — CARE UPDATE
Active Problem List with Overview Notes    Diagnosis Date Noted    SOB (shortness of breath) 09/26/2023    Coronary artery disease 09/18/2023    Thoracic aortic aneurysm without rupture 09/18/2023    Aortic root aneurysm 09/18/2023    Transient hyperglycemia post procedure 09/18/2023    S/P CABG (coronary artery bypass graft) 09/18/2023    Bilateral carotid artery stenosis 09/13/2023    Elevated alanine aminotransferase (ALT) level 11/30/2021    Fatty liver 11/17/2021    Tortuous aorta 10/21/2019     - noted on CXR 10/21/2019      BPH (benign prostatic hyperplasia)      - followed by urology, Dr. Lloyd  - 4/2015 - TR ultrasound with hbx - bx negative x 6 (for elevated PSA)      Glucose intolerance (impaired glucose tolerance)     Overweight     Essential hypertension     Other hyperlipidemia     GERD (gastroesophageal reflux disease)        [facility] Virtual Round Query:  - patient had acute on chronic diastolic HF 2/2 hypervolemia           All Queries discussed with attending Cardiothoracic Surgeon and in agreement.

## 2023-09-27 NOTE — DISCHARGE SUMMARY
[facility] - Cardiology Stepdown  Cardiothoracic Surgery  Discharge Summary      Patient Name: Sawyer Browning  MRN: [id number]  Admission Date: 9/26/2023  Hospital Length of Stay: 1 days  Discharge Date and Time:  09/27/2023 10:11 AM  Attending Physician: Imtiaz Mooney MD   Discharging Provider: Jasmyne Marte PA-C  Primary Care Provider: Mary Kay Haines MD    HPI:   Mr. Browning is a 68 year-old gentleman with a history of aortic root and ascending aortic aneurysm s/p valve sparing aortic root repair, hemiarch replacement , CABG x2, and repair of hernia of morgani with Dr. Mooney on 9/18., fatty liver disease, and hypertension who presents to the ED for SOB x 1 day.  Recently discharged from the hospital with no complications on 9/24. Patient reports SOB started last night while he was in bed and carried over to the morning. This prompt him to call Cardiac surgery coordinator and was instructed to go to the ED for further evaluation. He describes it as heaviness. He denies fever/fatigue/weakness, chest pain/discomfort/tightness, palpitations, LE swelling, sudden weight gain. He is compliant with Lasix. Patient has been ambulating after discharge and using IS as instructed.     In the ED, he is asymptomatic and is currently hemodynamically stable.  CXR significant for postoperative changes, no pleural effusion. TTE significant for EF 60-65%, TAPSE 0.8.      Will admit patient to CTS for observation.         * No surgery found *      Indwelling Lines/Drains at time of discharge:   Lines/Drains/Airways       None                 Hospital Course: On 9/26/23, the patient was admitted to CSU for observation due SOB 2/2 hypervolemia. He was aggressively diuresed and is ready for discharge to home. At the time of discharge, the patient was asymptomatic and ambulating unassisted. Pain was well controlled with oral analgesics and the patient was tolerating the diet.     MOBILITY AND ACTIVITY: As tolerated. Patient may  shower. No heavy lifting of greater than 5 pounds and no driving.     DIET: An 1800-calorie ADA with a 1500 mL fluid restriction.     WOUND CARE INSTRUCTIONS: Check for redness, swelling and drainage around the  incision or wound. Patient is to call for any obvious bleeding, drainage, pus from the wound, unusual problems or difficulties or temperature of greater than 101   degrees.     FOLLOWUP: Follow up with Dr. Mooney  in approximately 5 weeks. Prior to this  appointment, the patient will have an EKG.     Patient not placed on Ace-Inhibitor at the time of discharge due to potential for hypotension       DISCHARGE CONDITION: At the time of discharge, the patient was in sinus rhythm and afebrile with stable vital signs.        Goals of Care Treatment Preferences:  Code Status: Full Code      Consults (From admission, onward)          Status Ordering Provider     Inpatient consult to Cardiothoracic Surgery  Once        Provider:  (Not yet assigned)    HARRIETT Govea              No new Assessment & Plan notes have been filed under this hospital service since the last note was generated.  Service: Cardiothoracic Surgery    Final Active Diagnoses:    Diagnosis Date Noted POA    PRINCIPAL PROBLEM:  SOB (shortness of breath) [R06.02] 09/26/2023 Unknown    S/P CABG (coronary artery bypass graft) [Z95.1] 09/18/2023 Not Applicable    Fatty liver [K76.0] 11/17/2021 Yes    Essential hypertension [I10]  Yes    Other hyperlipidemia [E78.49]  Yes    GERD (gastroesophageal reflux disease) [K21.9]  Yes      Problems Resolved During this Admission:    Diagnosis Date Noted Date Resolved POA    Acute on chronic diastolic heart failure [I50.33] 09/26/2023 09/26/2023 Unknown      Discharged Condition: stable    Disposition: Home or Self Care    Follow Up:    Patient Instructions:   No discharge procedures on file.  Medications:  Reconciled Home Medications:      Medication List        CHANGE how you take these medications       furosemide 20 MG tablet  Commonly known as: LASIX  Take 4 tablets (80 mg) by mouth twice a day for 3 days, then take 4 tablets (80 mg) once daily for 5 days  What changed:   how much to take  how to take this  when to take this  additional instructions     potassium chloride SA 20 MEQ tablet  Commonly known as: K-DUR,KLOR-CON  Take 2 tablets (40 mEq total) by mouth once daily. for 8 days  What changed:   how much to take  when to take this            CONTINUE taking these medications      acetaminophen 500 MG tablet  Commonly known as: TYLENOL  Take 2 tablets (1,000 mg total) by mouth every 8 (eight) hours as needed for Pain.     aspirin 325 MG tablet  Take 1 tablet (325 mg total) by mouth once daily.     atorvastatin 80 MG tablet  Commonly known as: LIPITOR  Take 1 tablet (80 mg total) by mouth once daily.     lansoprazole 30 MG capsule  Commonly known as: PREVACID  TAKE 1 CAPSULE DAILY ONLY  AS NEEDED FOR HEARTBURN     metoprolol succinate 100 MG 24 hr tablet  Commonly known as: TOPROL-XL  Take 1 tablet (100 mg total) by mouth once daily.     oxyCODONE 5 MG immediate release tablet  Commonly known as: ROXICODONE  Take 1 tablet (5 mg total) by mouth every 4 (four) hours as needed for Pain.     polyethylene glycol 17 gram Pwpk  Commonly known as: GLYCOLAX  Take 17 g by mouth 2 (two) times daily as needed.            Time spent on the discharge of patient: 55 minutes    Jasmyne Marte PA-C  Cardiothoracic Surgery  [facility] - Cardiology Stepdown

## 2023-09-28 ENCOUNTER — PATIENT MESSAGE (OUTPATIENT)
Dept: CARDIOTHORACIC SURGERY | Facility: CLINIC | Age: 68
End: 2023-09-28
Payer: COMMERCIAL

## 2023-09-29 ENCOUNTER — TELEPHONE (OUTPATIENT)
Dept: CARDIOTHORACIC SURGERY | Facility: CLINIC | Age: 68
End: 2023-09-29
Payer: COMMERCIAL

## 2023-09-29 DIAGNOSIS — I25.758: ICD-10-CM

## 2023-09-29 DIAGNOSIS — Z98.890 POST-OPERATIVE STATE: Primary | ICD-10-CM

## 2023-09-29 DIAGNOSIS — I25.10 CORONARY ARTERY DISEASE, UNSPECIFIED VESSEL OR LESION TYPE, UNSPECIFIED WHETHER ANGINA PRESENT, UNSPECIFIED WHETHER NATIVE OR TRANSPLANTED HEART: ICD-10-CM

## 2023-09-29 DIAGNOSIS — I71.20 THORACIC AORTIC ANEURYSM WITHOUT RUPTURE, UNSPECIFIED PART: ICD-10-CM

## 2023-09-29 NOTE — TELEPHONE ENCOUNTER
Returned patient's call regarding lab work. Reviewed lab work with patient. Explained his H+H was a little low, but it's trending up which is a good thing.     He asked about HH. I will put order in for HH. Explained they will call him to set up home visit.    He stated that when he lays down, he can feel his heart beat. Explained it is normal to be aware of your heart beating after surgery and that the sensation of being aware of his heart beats will go away as he is further out from surgery.    He said he is breathing better and moving around. He said other than being thirsty, he is having no problems with the Lasix.    No other concerns at this time.    Julie Haase RN  CTS Nurse Navigator [phone]

## 2023-10-01 PROCEDURE — G0180 MD CERTIFICATION HHA PATIENT: HCPCS | Mod: ,,, | Performed by: THORACIC SURGERY (CARDIOTHORACIC VASCULAR SURGERY)

## 2023-10-01 PROCEDURE — G0180 PR HOME HEALTH MD CERTIFICATION: ICD-10-PCS | Mod: ,,, | Performed by: THORACIC SURGERY (CARDIOTHORACIC VASCULAR SURGERY)

## 2023-10-02 ENCOUNTER — TELEPHONE (OUTPATIENT)
Dept: CARDIOTHORACIC SURGERY | Facility: CLINIC | Age: 68
End: 2023-10-02
Payer: COMMERCIAL

## 2023-10-02 NOTE — TELEPHONE ENCOUNTER
Pt called and reported he was feeling dizzy and weak when he tried to go outside. He stated his blood pressure was 91/76. He drank two bottles of water and felt better. Blood pressure increased to 100/70. Discussed with MARSHALL Marte and he can come off the fluid restrictions and long as he drinks a reasonable amount of water. Instructed him to call clinic if he has swelling, feels SOB, or gains 3lbs or more overnight.    He verbalized understanding.    Pt asked if he could fly on Oct 25th to [place]. Discussed with Dr. Mooney. Pt can fly as long as he follows sternal precautions.    Julie Haase RN  CTS Nurse Navigator [phone]

## 2023-10-03 ENCOUNTER — PATIENT MESSAGE (OUTPATIENT)
Dept: FAMILY MEDICINE | Facility: CLINIC | Age: 68
End: 2023-10-03
Payer: COMMERCIAL

## 2023-10-04 ENCOUNTER — PATIENT MESSAGE (OUTPATIENT)
Dept: CARDIOLOGY | Facility: CLINIC | Age: 68
End: 2023-10-04
Payer: COMMERCIAL

## 2023-10-05 ENCOUNTER — TELEPHONE (OUTPATIENT)
Dept: CARDIOTHORACIC SURGERY | Facility: CLINIC | Age: 68
End: 2023-10-05
Payer: COMMERCIAL

## 2023-10-05 NOTE — TELEPHONE ENCOUNTER
Returned call to patient. Left  with contact number.    Julie Haase RN  CTS Nurse Navigator [phone]      ----- Message from Adela Arnett sent at 10/5/2023  2:55 PM CDT -----  Regarding: returning missed c/b  Contact: pt @[phone]  Pt is returning a missed call from someone in the office and is asking for a return call back soon. Thanks.         Reason for call:returning missed c/b         Patient's DX:n/a         Patient requesting call back or [facility] [phone]

## 2023-10-05 NOTE — TELEPHONE ENCOUNTER
Returned pt's call and let message.    Julie Haase RN  [facility] Nurse Navigator [phone]

## 2023-10-06 ENCOUNTER — OFFICE VISIT (OUTPATIENT)
Dept: CARDIOLOGY | Facility: CLINIC | Age: 68
End: 2023-10-06
Payer: COMMERCIAL

## 2023-10-06 VITALS
BODY MASS INDEX: 24.43 KG/M2 | HEART RATE: 102 BPM | WEIGHT: 185.19 LBS | DIASTOLIC BLOOD PRESSURE: 82 MMHG | SYSTOLIC BLOOD PRESSURE: 110 MMHG

## 2023-10-06 DIAGNOSIS — I10 PRIMARY HYPERTENSION: ICD-10-CM

## 2023-10-06 DIAGNOSIS — I48.91 ATRIAL FIBRILLATION, UNSPECIFIED TYPE: ICD-10-CM

## 2023-10-06 DIAGNOSIS — I71.21 ANEURYSM OF ASCENDING AORTA WITHOUT RUPTURE: ICD-10-CM

## 2023-10-06 DIAGNOSIS — E78.00 HYPERCHOLESTEROLEMIA: ICD-10-CM

## 2023-10-06 DIAGNOSIS — I25.118 ATHEROSCLEROSIS OF NATIVE CORONARY ARTERY OF NATIVE HEART WITH STABLE ANGINA PECTORIS: Primary | ICD-10-CM

## 2023-10-06 PROCEDURE — 1159F MED LIST DOCD IN RCRD: CPT | Mod: CPTII,S$GLB,, | Performed by: INTERNAL MEDICINE

## 2023-10-06 PROCEDURE — 3079F DIAST BP 80-89 MM HG: CPT | Mod: CPTII,S$GLB,, | Performed by: INTERNAL MEDICINE

## 2023-10-06 PROCEDURE — 1111F DSCHRG MED/CURRENT MED MERGE: CPT | Mod: CPTII,S$GLB,, | Performed by: INTERNAL MEDICINE

## 2023-10-06 PROCEDURE — 1126F AMNT PAIN NOTED NONE PRSNT: CPT | Mod: CPTII,S$GLB,, | Performed by: INTERNAL MEDICINE

## 2023-10-06 PROCEDURE — 3074F SYST BP LT 130 MM HG: CPT | Mod: CPTII,S$GLB,, | Performed by: INTERNAL MEDICINE

## 2023-10-06 PROCEDURE — 1160F RVW MEDS BY RX/DR IN RCRD: CPT | Mod: CPTII,S$GLB,, | Performed by: INTERNAL MEDICINE

## 2023-10-06 PROCEDURE — 3288F PR FALLS RISK ASSESSMENT DOCUMENTED: ICD-10-PCS | Mod: CPTII,S$GLB,, | Performed by: INTERNAL MEDICINE

## 2023-10-06 PROCEDURE — 3008F PR BODY MASS INDEX (BMI) DOCUMENTED: ICD-10-PCS | Mod: CPTII,S$GLB,, | Performed by: INTERNAL MEDICINE

## 2023-10-06 PROCEDURE — 3079F PR MOST RECENT DIASTOLIC BLOOD PRESSURE 80-89 MM HG: ICD-10-PCS | Mod: CPTII,S$GLB,, | Performed by: INTERNAL MEDICINE

## 2023-10-06 PROCEDURE — 1101F PT FALLS ASSESS-DOCD LE1/YR: CPT | Mod: CPTII,S$GLB,, | Performed by: INTERNAL MEDICINE

## 2023-10-06 PROCEDURE — 1160F PR REVIEW ALL MEDS BY PRESCRIBER/CLIN PHARMACIST DOCUMENTED: ICD-10-PCS | Mod: CPTII,S$GLB,, | Performed by: INTERNAL MEDICINE

## 2023-10-06 PROCEDURE — 93005 ELECTROCARDIOGRAM TRACING: CPT

## 2023-10-06 PROCEDURE — 99215 PR OFFICE/OUTPT VISIT, EST, LEVL V, 40-54 MIN: ICD-10-PCS | Mod: S$GLB,,, | Performed by: INTERNAL MEDICINE

## 2023-10-06 PROCEDURE — 99999 PR PBB SHADOW E&M-EST. PATIENT-LVL III: ICD-10-PCS | Mod: PBBFAC,,, | Performed by: INTERNAL MEDICINE

## 2023-10-06 PROCEDURE — 99215 OFFICE O/P EST HI 40 MIN: CPT | Mod: S$GLB,,, | Performed by: INTERNAL MEDICINE

## 2023-10-06 PROCEDURE — 1159F PR MEDICATION LIST DOCUMENTED IN MEDICAL RECORD: ICD-10-PCS | Mod: CPTII,S$GLB,, | Performed by: INTERNAL MEDICINE

## 2023-10-06 PROCEDURE — 93010 ELECTROCARDIOGRAM REPORT: CPT | Mod: S$GLB,,, | Performed by: INTERNAL MEDICINE

## 2023-10-06 PROCEDURE — 3008F BODY MASS INDEX DOCD: CPT | Mod: CPTII,S$GLB,, | Performed by: INTERNAL MEDICINE

## 2023-10-06 PROCEDURE — 1126F PR PAIN SEVERITY QUANTIFIED, NO PAIN PRESENT: ICD-10-PCS | Mod: CPTII,S$GLB,, | Performed by: INTERNAL MEDICINE

## 2023-10-06 PROCEDURE — 93010 EKG 12-LEAD: ICD-10-PCS | Mod: S$GLB,,, | Performed by: INTERNAL MEDICINE

## 2023-10-06 PROCEDURE — 3288F FALL RISK ASSESSMENT DOCD: CPT | Mod: CPTII,S$GLB,, | Performed by: INTERNAL MEDICINE

## 2023-10-06 PROCEDURE — 3074F PR MOST RECENT SYSTOLIC BLOOD PRESSURE < 130 MM HG: ICD-10-PCS | Mod: CPTII,S$GLB,, | Performed by: INTERNAL MEDICINE

## 2023-10-06 PROCEDURE — 1111F PR DISCHARGE MEDS RECONCILED W/ CURRENT OUTPATIENT MED LIST: ICD-10-PCS | Mod: CPTII,S$GLB,, | Performed by: INTERNAL MEDICINE

## 2023-10-06 PROCEDURE — 1101F PR PT FALLS ASSESS DOC 0-1 FALLS W/OUT INJ PAST YR: ICD-10-PCS | Mod: CPTII,S$GLB,, | Performed by: INTERNAL MEDICINE

## 2023-10-06 PROCEDURE — 99999 PR PBB SHADOW E&M-EST. PATIENT-LVL III: CPT | Mod: PBBFAC,,, | Performed by: INTERNAL MEDICINE

## 2023-10-06 RX ORDER — FUROSEMIDE 40 MG/1
40 TABLET ORAL DAILY PRN
Qty: 30 TABLET | Refills: 11 | Status: SHIPPED | OUTPATIENT
Start: 2023-10-06 | End: 2024-01-11

## 2023-10-06 RX ORDER — AMIODARONE HYDROCHLORIDE 200 MG/1
400 TABLET ORAL DAILY
Qty: 60 TABLET | Refills: 11 | Status: SHIPPED | OUTPATIENT
Start: 2023-10-06 | End: 2023-10-19

## 2023-10-06 RX ORDER — AMIODARONE HYDROCHLORIDE 200 MG/1
400 TABLET ORAL DAILY
Qty: 60 TABLET | Refills: 11 | Status: SHIPPED | OUTPATIENT
Start: 2023-10-06 | End: 2023-10-06 | Stop reason: SDUPTHER

## 2023-10-06 RX ORDER — FUROSEMIDE 40 MG/1
40 TABLET ORAL DAILY PRN
Qty: 30 TABLET | Refills: 11 | Status: SHIPPED | OUTPATIENT
Start: 2023-10-06 | End: 2023-10-06 | Stop reason: SDUPTHER

## 2023-10-06 NOTE — PROGRESS NOTES
[facility]    Chief Complaint  Chief Complaint   Patient presents with    Atrial Fibrillation       HPI:    He yesterday, home health noticed an irregular rhythm.  He is asymptomatic.  No palpitations.  Just completed his course of diuresis as prescribed for an emergency department visit where he was overloaded.  Noted weight gain overnight.  No chest tightness or dyspnea.    Medications  Current Outpatient Medications   Medication Sig Dispense Refill    acetaminophen (TYLENOL) 500 MG tablet Take 2 tablets (1,000 mg total) by mouth every 8 (eight) hours as needed for Pain.  0    amiodarone (PACERONE) 200 MG Tab Take 2 tablets (400 mg total) by mouth once daily. 60 tablet 11    apixaban (ELIQUIS) 5 mg Tab Take 1 tablet (5 mg total) by mouth 2 (two) times daily. 90 tablet 1    aspirin 325 MG tablet Take 1 tablet (325 mg total) by mouth once daily. 360 tablet 0    atorvastatin (LIPITOR) 80 MG tablet Take 1 tablet (80 mg total) by mouth once daily. 90 tablet 3    furosemide (LASIX) 40 MG tablet Take 1 tablet (40 mg total) by mouth daily as needed (weight gain). 30 tablet 11    lansoprazole (PREVACID) 30 MG capsule TAKE 1 CAPSULE DAILY ONLY  AS NEEDED FOR HEARTBURN 90 capsule 0    metoprolol succinate (TOPROL-XL) 100 MG 24 hr tablet Take 1 tablet (100 mg total) by mouth once daily. 60 tablet 0    oxyCODONE (ROXICODONE) 5 MG immediate release tablet Take 1 tablet (5 mg total) by mouth every 4 (four) hours as needed for Pain. 30 tablet 0    polyethylene glycol (GLYCOLAX) 17 gram PwPk Take 17 g by mouth 2 (two) times daily as needed.  0     No current facility-administered medications for this visit.        History  Past Medical History:   Diagnosis Date    BPH (benign prostatic hyperplasia)     Elevated liver enzymes     Fatty infiltration of liver     Glucose intolerance (impaired glucose tolerance)     Hiatal hernia     Hyperlipidemia     Hypertension     Overweight     Reflux      Past Surgical History:    Procedure Laterality Date    AORTIC ROOT REPLACEMENT N/A 9/18/2023    Procedure: VALVE SPARING AORTIC ROOT REPLACEMENT;  Surgeon: Imtiaz Mooney MD;  Location: [facility] OR [address] [place] [address];  Service: Cardiovascular;  Laterality: N/A;  Valve sparing aortic root replacement, vs biobentall, hemiarch, CABGx2 under  hypothermic circ arrest    CORONARY ANGIOGRAPHY N/A 9/6/2023    Procedure: ANGIOGRAM, CORONARY ARTERY;  Surgeon: Chon Lopez MD;  Location: [facility] CATH LAB;  Service: Cardiology;  Laterality: N/A;  right radial    CORONARY ARTERY BYPASS GRAFT (CABG) N/A 9/18/2023    Procedure: CORONARY ARTERY BYPASS GRAFT (CABG);  Surgeon: Imtiaz Mooney MD;  Location: [facility] OR [address] [place] [address];  Service: Cardiovascular;  Laterality: N/A;  Vein [place] Start: 821  Vein [place] End: 838  Vein Preparation Start: 839  Vein Preparation End: 912    ENDOSCOPIC HARVEST OF VEIN Left 9/18/2023    Procedure: HARVEST-VEIN-ENDOVASCULAR;  Surgeon: Imtiaz Mooney MD;  Location: [address];  Service: Cardiovascular;  Laterality: Left;  Valve sparing aortic root replacement, vs biobentall, hemiarch, CABGx2 under    inguinal hernia      x2    REPAIR OF DIAPHRAGMATIC HERNIA  9/18/2023    Procedure: REPAIR, HERNIA, DIAPHRAGMATIC;  Surgeon: Imtiaz Mooney MD;  Location: [facility] OR [address] [place] [address];  Service: Cardiovascular;;    tonsillectomy       Social History     Socioeconomic History    Marital status:     Number of children: 2   Occupational History    Occupation: Works in insurance   Tobacco Use    Smoking status: Never    Smokeless tobacco: Never   Substance and Sexual Activity    Alcohol use: Yes     Comment: 2 glasses of wine most evenings.    Drug use: No     Social Determinants of Health     Financial Resource Strain: Low Risk  (9/19/2023)    Overall Financial Resource Strain (CARDIA)     Difficulty of Paying Living Expenses: Not hard at all   Food Insecurity: No Food Insecurity (9/19/2023)    Hunger Vital Sign     Worried About  Running Out of Food in the Last Year: Never true     Ran Out of Food in the Last Year: Never true   Transportation Needs: No Transportation Needs (9/19/2023)    PRAPARE - Transportation     Lack of Transportation (Medical): No     Lack of Transportation (Non-Medical): No   Physical Activity: Sufficiently Active (9/19/2023)    Exercise Vital Sign     Days of Exercise per Week: 3 days     Minutes of Exercise per Session: 60 min   Stress: Unknown (9/19/2023)    [place] [employer] - Occupational Stress Questionnaire     Feeling of Stress : Patient refused   Social Connections: Unknown (9/19/2023)    Social Connection and Isolation Panel [NHANES]     Frequency of Communication with Friends and Family: More than three times a week     Frequency of Social Gatherings with Friends and Family: More than three times a week     Attends Confucianism Services: Patient refused     Active Member of Clubs or Organizations: Patient refused     Attends Club or Organization Meetings: Patient refused     Marital Status:    Housing Stability: Low Risk  (9/19/2023)    Housing Stability Vital Sign     Unable to Pay for Housing in the Last Year: No     Number of Places Lived in the Last Year: 1     Unstable Housing in the Last Year: No     Family History   Problem Relation Age of Onset    Hypertension Mother     Macular degeneration Mother         - gets shots in her eye    Stroke Father     Heart disease Father     Aneurysm Father     Migraines Sister     Arthritis Brother         knees    Dementia Paternal Grandmother     Diabetes Paternal Grandmother     Heart disease Paternal Grandfather     Diabetes Brother     Hernia Brother     Cancer Paternal Aunt     Cancer Paternal Uncle     Cancer Paternal Aunt     Cancer Paternal Uncle     Colon cancer Neg Hx     Prostate cancer Neg Hx         Allergies  Review of patient's allergies indicates:   Allergen Reactions    Ace inhibitors Other (See Comments)     cough     Losartan      headache       Review of Systems   Review of Systems   Constitutional: Negative for malaise/fatigue, weight gain and weight loss.   Eyes:  Negative for visual disturbance.   Cardiovascular:  Negative for chest pain, claudication, cyanosis, dyspnea on exertion, irregular heartbeat, leg swelling, near-syncope, orthopnea, palpitations, paroxysmal nocturnal dyspnea and syncope.   Respiratory:  Negative for cough, hemoptysis, shortness of breath, sleep disturbances due to breathing and wheezing.    Hematologic/Lymphatic: Negative for bleeding problem. Does not bruise/bleed easily.   Skin:  Negative for poor wound healing.   Musculoskeletal:  Negative for muscle cramps and myalgias.   Gastrointestinal:  Negative for abdominal pain, anorexia, diarrhea, heartburn, hematemesis, hematochezia, melena, nausea and vomiting.   Genitourinary:  Negative for hematuria and nocturia.   Neurological:  Negative for excessive daytime sleepiness, dizziness, focal weakness, light-headedness and weakness.       Physical Exam  Vitals:    10/06/23 1239   BP: 110/82   Pulse: 102     Wt Readings from Last 1 Encounters:   10/06/23 84 kg (185 lb 3.2 oz)     Physical Exam  Vitals and nursing note reviewed.   Constitutional:       General: He is not in acute distress.     Appearance: He is not toxic-appearing or diaphoretic.   HENT:      Head: Normocephalic and atraumatic.      Mouth/Throat:      Lips: Pink.      Mouth: Mucous membranes are moist.   Eyes:      General: No scleral icterus.     Conjunctiva/sclera: Conjunctivae normal.   Neck:      Thyroid: No thyromegaly.      Vascular: Hepatojugular reflux present. No carotid bruit or JVD.      Trachea: Trachea normal.   Cardiovascular:      Rate and Rhythm: Tachycardia present. Rhythm irregularly irregular. No extrasystoles are present.     Chest Wall: PMI is not displaced.      Pulses:           Carotid pulses are 2+ on the right side and 2+ on the left side.       Radial pulses are  2+ on the right side and 2+ on the left side.        Dorsalis pedis pulses are 2+ on the right side and 2+ on the left side.        Posterior tibial pulses are 2+ on the right side and 2+ on the left side.      Heart sounds: S1 normal and S2 normal. No murmur heard.     No friction rub. No S3 or S4 sounds.   Pulmonary:      Effort: Pulmonary effort is normal. No tachypnea, bradypnea, accessory muscle usage or respiratory distress.      Breath sounds: Normal breath sounds and air entry. No decreased breath sounds, wheezing, rhonchi or rales.   Chest:      Comments: Median sternotomy healing well.  Abdominal:      General: Bowel sounds are normal. There is no distension or abdominal bruit.      Palpations: Abdomen is soft. There is no hepatomegaly, splenomegaly or pulsatile mass.      Tenderness: There is no abdominal tenderness.   Musculoskeletal:         General: No tenderness or deformity.      Right lower leg: No edema.      Left lower leg: No edema.   Skin:     General: Skin is warm and dry.      Capillary Refill: Capillary refill takes less than 2 seconds.      Coloration: Skin is not cyanotic or pale.      Nails: There is no clubbing.   Neurological:      General: No focal deficit present.      Mental Status: He is alert and oriented to person, place, and time.   Psychiatric:         Attention and Perception: Attention normal.         Mood and Affect: Mood normal.         Speech: Speech normal.         Behavior: Behavior normal. Behavior is cooperative.         Labs  Admission on 09/26/2023, Discharged on 09/27/2023   Component Date Value Ref Range Status    HIV 1/2 Ag/Ab 09/26/2023 Non-reactive  Non-reactive Final    Hepatitis C Ab 09/26/2023 Non-reactive  Non-reactive Final    WBC 09/26/2023 9.68  3.90 - 12.70 K/uL Final    RBC 09/26/2023 3.62 (L)  4.60 - 6.20 M/uL Final    Hemoglobin 09/26/2023 11.1 (L)  14.0 - 18.0 g/dL Final    Hematocrit 09/26/2023 33.3 (L)  40.0 - 54.0 % Final    MCV 09/26/2023 92  82 -  98 fL Final    MCH 09/26/2023 30.7  27.0 - 31.0 pg Final    MCHC 09/26/2023 33.3  32.0 - 36.0 g/dL Final    RDW 09/26/2023 12.8  11.5 - 14.5 % Final    Platelets 09/26/2023 244  150 - 450 K/uL Final    MPV 09/26/2023 9.8  9.2 - 12.9 fL Final    Immature Granulocytes 09/26/2023 1.7 (H)  0.0 - 0.5 % Final    Gran # (ANC) 09/26/2023 7.0  1.8 - 7.7 K/uL Final    Immature Grans (Abs) 09/26/2023 0.16 (H)  0.00 - 0.04 K/uL Final    Comment: Mild elevation in immature granulocytes is non specific and   can be seen in a variety of conditions including stress response,   acute inflammation, trauma and pregnancy. Correlation with other   laboratory and clinical findings is essential.      Lymph # 09/26/2023 1.0  1.0 - 4.8 K/uL Final    Mono # 09/26/2023 1.3 (H)  0.3 - 1.0 K/uL Final    Eos # 09/26/2023 0.2  0.0 - 0.5 K/uL Final    Baso # 09/26/2023 0.07  0.00 - 0.20 K/uL Final    nRBC 09/26/2023 0  0 /100 WBC Final    Gran % 09/26/2023 71.8  38.0 - 73.0 % Final    Lymph % 09/26/2023 10.6 (L)  18.0 - 48.0 % Final    Mono % 09/26/2023 13.1  4.0 - 15.0 % Final    Eosinophil % 09/26/2023 2.1  0.0 - 8.0 % Final    Basophil % 09/26/2023 0.7  0.0 - 1.9 % Final    Differential Method 09/26/2023 Automated   Final    Sodium 09/26/2023 141  136 - 145 mmol/L Final    Potassium 09/26/2023 4.0  3.5 - 5.1 mmol/L Final    Chloride 09/26/2023 106  95 - 110 mmol/L Final    CO2 09/26/2023 25  23 - 29 mmol/L Final    Glucose 09/26/2023 100  70 - 110 mg/dL Final    BUN 09/26/2023 19  8 - 23 mg/dL Final    Creatinine 09/26/2023 0.8  0.5 - 1.4 mg/dL Final    Calcium 09/26/2023 9.1  8.7 - 10.5 mg/dL Final    Total Protein 09/26/2023 6.8  6.0 - 8.4 g/dL Final    Albumin 09/26/2023 3.8  3.5 - 5.2 g/dL Final    Total Bilirubin 09/26/2023 0.9  0.1 - 1.0 mg/dL Final    Comment: For infants and newborns, interpretation of results should be based  on gestational age, weight and in agreement with clinical  observations.    Premature Infant recommended  reference ranges:  Up to 24 hours.............<8.0 mg/dL  Up to 48 hours............<12.0 mg/dL  3-5 days..................<15.0 mg/dL  6-29 days.................<15.0 mg/dL      Alkaline Phosphatase 09/26/2023 68  55 - 135 U/L Final    AST 09/26/2023 41 (H)  10 - 40 U/L Final    ALT 09/26/2023 81 (H)  10 - 44 U/L Final    eGFR 09/26/2023 >60.0  >60 mL/min/1.73 m^2 Final    Anion Gap 09/26/2023 10  8 - 16 mmol/L Final    Troponin I 09/26/2023 0.332 (H)  0.000 - 0.026 ng/mL Final    Comment: The reference interval for Troponin I represents the 99th percentile   cutoff   for our facility and is consistent with 3rd generation assay   performance.      BNP 09/26/2023 534 (H)  0 - 99 pg/mL Final    Values of less than 100 pg/ml are consistent with non-CHF populations.    Ascending aorta 09/26/2023 3.2  cm Final    STJ 09/26/2023 3.2  cm Final    AV mean gradient 09/26/2023 4  mmHg Final    Ao peak arias 09/26/2023 1.40  m/s Final    Ao VTI 09/26/2023 25.18  cm Final    IVRT 09/26/2023 48.53  msec Final    IVS 09/26/2023 0.84  0.6 - 1.1 cm Final    LA size 09/26/2023 3.64  cm Final    Left Atrium Major Axis 09/26/2023 6.04  cm Final    Left Atrium Minor Axis 09/26/2023 5.97  cm Final    LVIDd 09/26/2023 4.85  3.5 - 6.0 cm Final    LVIDs 09/26/2023 3.14  2.1 - 4.0 cm Final    LVOT diameter 09/26/2023 2.03  cm Final    LVOT peak VTI 09/26/2023 22.45  cm Final    Posterior Wall 09/26/2023 0.99  0.6 - 1.1 cm Final    MV Peak A Arias 09/26/2023 0.50  m/s Final    E wave deceleration time 09/26/2023 231.09  msec Final    MV Peak E Arias 09/26/2023 1.09  m/s Final    PV Peak D Arias 09/26/2023 0.55  m/s Final    PV Peak S Arias 09/26/2023 0.38  m/s Final    RA Major Axis 09/26/2023 5.72  cm Final    RA Width 09/26/2023 4.34  cm Final    RVDD 09/26/2023 3.82  cm Final    Sinus 09/26/2023 3.5  cm Final    TAPSE 09/26/2023 0.81  cm Final    TR Max Arias 09/26/2023 2.90  m/s Final    LA WIDTH 09/26/2023 4.27  cm Final    MV stenosis pressure  "1/2 time 09/26/2023 67.02  ms Final    LV Diastolic Volume 09/26/2023 110.20  mL Final    LV Systolic Volume 09/26/2023 38.99  mL Final    LVOT peak bernie 09/26/2023 1.18  m/s Final    TDI LATERAL 09/26/2023 0.17  m/s Final    TDI SEPTAL 09/26/2023 0.12  m/s Final    LA volume (mod) 09/26/2023 50.00  cm3 Final    MV "A" wave duration 09/26/2023 11.42  msec Final    LV LATERAL E/E' RATIO 09/26/2023 6.41  m/s Final    LV SEPTAL E/E' RATIO 09/26/2023 9.08  m/s Final    FS 09/26/2023 35  % Final    LA volume 09/26/2023 79.33  cm3 Final    LV mass 09/26/2023 153.68  g Final    ZLVIDD 09/26/2023 -3.10   Final    ZLVIDS 09/26/2023 -1.99   Final    Left Ventricle Relative Wall Thick* 09/26/2023 0.41  cm Final    AV valve area 09/26/2023 2.88  cm² Final    AV Velocity Ratio 09/26/2023 0.84   Final    AV index (prosthetic) 09/26/2023 0.89   Final    MV valve area p 1/2 method 09/26/2023 3.28  cm2 Final    E/A ratio 09/26/2023 2.18   Final    Mean e' 09/26/2023 0.15  m/s Final    Pulm vein S/D ratio 09/26/2023 0.69   Final    LVOT area 09/26/2023 3.2  cm2 Final    LVOT stroke volume 09/26/2023 72.62  cm3 Final    AV peak gradient 09/26/2023 8  mmHg Final    E/E' ratio 09/26/2023 7.52  m/s Final    LV Systolic Volume Index 09/26/2023 18.5  mL/m2 Final    LV Diastolic Volume Index 09/26/2023 52.23  mL/m2 Final    LA Volume Index 09/26/2023 37.6  mL/m2 Final    LV Mass Index 09/26/2023 73  g/m2 Final    Triscuspid Valve Regurgitation Pea* 09/26/2023 34  mmHg Final    LA Volume Index (Mod) 09/26/2023 23.7  mL/m2 Final    RUPA by Velocity Ratio 09/26/2023 2.73  cm² Final    BSA 09/26/2023 2.11  m2 Final    TV resting pulmonary artery pressu* 09/26/2023 37  mmHg Final    RV TB RVSP 09/26/2023 6  mmHg Final    Est. RA pres 09/26/2023 3  mmHg Final    IVC diameter 09/26/2023 2.4  cm Final    Troponin I 09/26/2023 0.280 (H)  0.000 - 0.026 ng/mL Final    Comment: The reference interval for Troponin I represents the 99th percentile   cutoff "   for our facility and is consistent with 3rd generation assay   performance.      WBC 09/27/2023 9.77  3.90 - 12.70 K/uL Final    RBC 09/27/2023 4.03 (L)  4.60 - 6.20 M/uL Final    Hemoglobin 09/27/2023 12.4 (L)  14.0 - 18.0 g/dL Final    Hematocrit 09/27/2023 37.4 (L)  40.0 - 54.0 % Final    MCV 09/27/2023 93  82 - 98 fL Final    MCH 09/27/2023 30.8  27.0 - 31.0 pg Final    MCHC 09/27/2023 33.2  32.0 - 36.0 g/dL Final    RDW 09/27/2023 13.1  11.5 - 14.5 % Final    Platelets 09/27/2023 328  150 - 450 K/uL Final    MPV 09/27/2023 10.5  9.2 - 12.9 fL Final    Immature Granulocytes 09/27/2023 1.8 (H)  0.0 - 0.5 % Final    Gran # (ANC) 09/27/2023 6.8  1.8 - 7.7 K/uL Final    Immature Grans (Abs) 09/27/2023 0.18 (H)  0.00 - 0.04 K/uL Final    Comment: Mild elevation in immature granulocytes is non specific and   can be seen in a variety of conditions including stress response,   acute inflammation, trauma and pregnancy. Correlation with other   laboratory and clinical findings is essential.      Lymph # 09/27/2023 1.2  1.0 - 4.8 K/uL Final    Mono # 09/27/2023 1.3 (H)  0.3 - 1.0 K/uL Final    Eos # 09/27/2023 0.2  0.0 - 0.5 K/uL Final    Baso # 09/27/2023 0.06  0.00 - 0.20 K/uL Final    nRBC 09/27/2023 0  0 /100 WBC Final    Gran % 09/27/2023 69.8  38.0 - 73.0 % Final    Lymph % 09/27/2023 12.2 (L)  18.0 - 48.0 % Final    Mono % 09/27/2023 13.1  4.0 - 15.0 % Final    Eosinophil % 09/27/2023 2.5  0.0 - 8.0 % Final    Basophil % 09/27/2023 0.6  0.0 - 1.9 % Final    Differential Method 09/27/2023 Automated   Final    Sodium 09/27/2023 143  136 - 145 mmol/L Final    Potassium 09/27/2023 3.7  3.5 - 5.1 mmol/L Final    Chloride 09/27/2023 106  95 - 110 mmol/L Final    CO2 09/27/2023 28  23 - 29 mmol/L Final    Glucose 09/27/2023 105  70 - 110 mg/dL Final    BUN 09/27/2023 19  8 - 23 mg/dL Final    Creatinine 09/27/2023 1.1  0.5 - 1.4 mg/dL Final    Calcium 09/27/2023 9.6  8.7 - 10.5 mg/dL Final    Total Protein 09/27/2023 7.5   6.0 - 8.4 g/dL Final    Albumin 09/27/2023 4.1  3.5 - 5.2 g/dL Final    Total Bilirubin 09/27/2023 1.0  0.1 - 1.0 mg/dL Final    Comment: For infants and newborns, interpretation of results should be based  on gestational age, weight and in agreement with clinical  observations.    Premature Infant recommended reference ranges:  Up to 24 hours.............<8.0 mg/dL  Up to 48 hours............<12.0 mg/dL  3-5 days..................<15.0 mg/dL  6-29 days.................<15.0 mg/dL      Alkaline Phosphatase 09/27/2023 74  55 - 135 U/L Final    AST 09/27/2023 35  10 - 40 U/L Final    ALT 09/27/2023 82 (H)  10 - 44 U/L Final    eGFR 09/27/2023 >60.0  >60 mL/min/1.73 m^2 Final    Anion Gap 09/27/2023 9  8 - 16 mmol/L Final    Phosphorus 09/27/2023 3.7  2.7 - 4.5 mg/dL Final   No results displayed because visit has over 200 results.      Lab Visit on 08/28/2023   Component Date Value Ref Range Status    WBC 08/28/2023 6.43  3.90 - 12.70 K/uL Final    RBC 08/28/2023 5.36  4.60 - 6.20 M/uL Final    Hemoglobin 08/28/2023 16.3  14.0 - 18.0 g/dL Final    Hematocrit 08/28/2023 46.6  40.0 - 54.0 % Final    MCV 08/28/2023 87  82 - 98 fL Final    MCH 08/28/2023 30.4  27.0 - 31.0 pg Final    MCHC 08/28/2023 35.0  32.0 - 36.0 g/dL Final    RDW 08/28/2023 12.5  11.5 - 14.5 % Final    Platelets 08/28/2023 191  150 - 450 K/uL Final    MPV 08/28/2023 10.9  9.2 - 12.9 fL Final    Immature Granulocytes 08/28/2023 0.5  0.0 - 0.5 % Final    Gran # (ANC) 08/28/2023 4.2  1.8 - 7.7 K/uL Final    Immature Grans (Abs) 08/28/2023 0.03  0.00 - 0.04 K/uL Final    Comment: Mild elevation in immature granulocytes is non specific and   can be seen in a variety of conditions including stress response,   acute inflammation, trauma and pregnancy. Correlation with other   laboratory and clinical findings is essential.      Lymph # 08/28/2023 1.3  1.0 - 4.8 K/uL Final    Mono # 08/28/2023 0.8  0.3 - 1.0 K/uL Final    Eos # 08/28/2023 0.1  0.0 - 0.5 K/uL  Final    Baso # 08/28/2023 0.06  0.00 - 0.20 K/uL Final    nRBC 08/28/2023 0  0 /100 WBC Final    Gran % 08/28/2023 64.9  38.0 - 73.0 % Final    Lymph % 08/28/2023 19.4  18.0 - 48.0 % Final    Mono % 08/28/2023 13.1  4.0 - 15.0 % Final    Eosinophil % 08/28/2023 1.2  0.0 - 8.0 % Final    Basophil % 08/28/2023 0.9  0.0 - 1.9 % Final    Differential Method 08/28/2023 Automated   Final    Sodium 08/28/2023 140  136 - 145 mmol/L Final    Potassium 08/28/2023 3.5  3.5 - 5.1 mmol/L Final    Chloride 08/28/2023 105  95 - 110 mmol/L Final    CO2 08/28/2023 25  23 - 29 mmol/L Final    Glucose 08/28/2023 130 (H)  70 - 110 mg/dL Final    BUN 08/28/2023 14  8 - 23 mg/dL Final    Creatinine 08/28/2023 1.0  0.5 - 1.4 mg/dL Final    Calcium 08/28/2023 9.2  8.7 - 10.5 mg/dL Final    Anion Gap 08/28/2023 10  8 - 16 mmol/L Final    eGFR 08/28/2023 >60  >60 mL/min/1.73 m^2 Final    Prothrombin Time 08/28/2023 10.9  9.0 - 12.5 sec Final    INR 08/28/2023 1.0  0.8 - 1.2 Final    Comment: Coumadin Therapy:  2.0 - 3.0 for INR for all indicators except mechanical heart valves  and antiphospholipid syndromes which should use 2.5 - 3.5.  LOT^040^PT Inn^[id number]     Hospital Outpatient Visit on 08/22/2023   Component Date Value Ref Range Status    BSA 08/22/2023 2.09  m2 Final    85% Max Predicted HR 08/22/2023 129   Final    Max Predicted HR 08/22/2023 152   Final    [facility] CV CPX PATIENT IS MALE 08/22/2023 1.0   Final    [facility] CV CPX PATIENT IS FEMALE 08/22/2023 0.0   Final    Systolic blood pressure 08/22/2023 122  mmHg Final    Diastolic blood pressure 08/22/2023 94  mmHg Final    HR at rest 08/22/2023 68  bpm Final    Exercise duration (min) 08/22/2023 8  minutes Final    Exercise duration (sec) 08/22/2023 4  seconds Final    Peak Systolic BP 08/22/2023 178  mmHg Final    Peak Diatolic BP 08/22/2023 96  mmHg Final    Peak HR 08/22/2023 144  bpm Final    Estimated METs 08/22/2023 13   Final    % Max HR Achieved 08/22/2023 95   Final    RPP  "08/22/2023 8,296   Final    Peak RPP 08/22/2023 25,632   Final    LVOT stroke volume 08/22/2023 101.82  cm3 Final    LVIDd 08/22/2023 4.50  3.5 - 6.0 cm Final    LV Systolic Volume 08/22/2023 32.99  mL Final    LV Systolic Volume Index 08/22/2023 15.9  mL/m2 Final    LVIDs 08/22/2023 2.93  2.1 - 4.0 cm Final    LV Diastolic Volume 08/22/2023 92.39  mL Final    LV Diastolic Volume Index 08/22/2023 44.42  mL/m2 Final    IVS 08/22/2023 0.8  0.6 - 1.1 cm Final    LVOT diameter 08/22/2023 2.13  cm Final    LVOT area 08/22/2023 3.6  cm2 Final    FS 08/22/2023 35  28 - 44 % Final    Left Ventricle Relative Wall Thick* 08/22/2023 0.31  cm Final    Posterior Wall 08/22/2023 0.7  0.6 - 1.1 cm Final    LV mass 08/22/2023 104.50  g Final    LV Mass Index 08/22/2023 50  g/m2 Final    MV Peak E Arias 08/22/2023 0.86  m/s Final    TDI LATERAL 08/22/2023 0.12  m/s Final    TDI SEPTAL 08/22/2023 0.07  m/s Final    E/E' ratio 08/22/2023 9.05  m/s Final    MV Peak A Arias 08/22/2023 0.99  m/s Final    TR Max Arias 08/22/2023 2.57  m/s Final    E/A ratio 08/22/2023 0.87   Final    IVRT 08/22/2023 114.18  msec Final    E wave deceleration time 08/22/2023 238.66  msec Final    MV "A" wave duration 08/22/2023 6.28  msec Final    LV SEPTAL E/E' RATIO 08/22/2023 12.29  m/s Final    LA Volume Index 08/22/2023 34.7  mL/m2 Final    LV LATERAL E/E' RATIO 08/22/2023 7.17  m/s Final    LA volume 08/22/2023 72.22  cm3 Final    PV Peak S Arias 08/22/2023 0.66  m/s Final    PV Peak D Arias 08/22/2023 0.38  m/s Final    Pulm vein S/D ratio 08/22/2023 1.74   Final    LVOT peak arias 08/22/2023 1.62  m/s Final    LA size 08/22/2023 4.17  cm Final    Left Atrium Major Axis 08/22/2023 5.57  cm Final    Left Atrium Minor Axis 08/22/2023 5.99  cm Final    LA WIDTH 08/22/2023 3.53  cm Final    RVDD 08/22/2023 3.20  cm Final    TAPSE 08/22/2023 1.96  cm Final    RA Major Axis 08/22/2023 5.59  cm Final    RA Width 08/22/2023 3.99  cm Final    AV mean gradient 08/22/2023 " 5  mmHg Final    AV peak gradient 08/22/2023 11  mmHg Final    Ao peak bernie 08/22/2023 1.63  m/s Final    Ao VTI 08/22/2023 31.75  cm Final    LVOT peak VTI 08/22/2023 28.59  cm Final    AV valve area 08/22/2023 3.21  cm² Final    AV Velocity Ratio 08/22/2023 0.99   Final    AV index (prosthetic) 08/22/2023 0.90   Final    RUPA by Velocity Ratio 08/22/2023 3.54  cm² Final    MV stenosis pressure 1/2 time 08/22/2023 69.21  ms Final    MV valve area p 1/2 method 08/22/2023 3.18  cm2 Final    Triscuspid Valve Regurgitation Pea* 08/22/2023 26  mmHg Final    Sinus 08/22/2023 4.2  cm Final    STJ 08/22/2023 4.2  cm Final    Ascending aorta 08/22/2023 5.0  cm Final    Mean e' 08/22/2023 0.10  m/s Final    ZLVIDS 08/22/2023 -2.25   Final    ZLVIDD 08/22/2023 -3.47   Final    1 Minute Recovery HR 08/22/2023 118  bpm Final    ST Depression (mm) 08/22/2023 1.0  mm Final    TV resting pulmonary artery pressu* 08/22/2023 29  mmHg Final    RV TB RVSP 08/22/2023 6  mmHg Final    Est. RA pres 08/22/2023 3  mmHg Final    Post-Stress Ejection Fraction 08/22/2023 60  % Final   Hospital Outpatient Visit on 06/29/2023   Component Date Value Ref Range Status    BSA 06/29/2023 2.11  m2 Final    TDI SEPTAL 06/29/2023 0.07  m/s Final    LV LATERAL E/E' RATIO 06/29/2023 5.18  m/s Final    LV SEPTAL E/E' RATIO 06/29/2023 8.14  m/s Final    LA WIDTH 06/29/2023 3.60  cm Final    IVC diameter 06/29/2023 1.55  cm Final    Left Ventricular Outflow Tract Laury* 06/29/2023 0.94  cm/s Final    Left Ventricular Outflow Tract Laury* 06/29/2023 4.11  mmHg Final    TDI LATERAL 06/29/2023 0.11  m/s Final    PV PEAK VELOCITY 06/29/2023 0.86  cm/s Final    LVIDd 06/29/2023 3.70  3.5 - 6.0 cm Final    IVS 06/29/2023 1.00  0.6 - 1.1 cm Final    Posterior Wall 06/29/2023 1.01  0.6 - 1.1 cm Final    LVIDs 06/29/2023 2.52  2.1 - 4.0 cm Final    FS 06/29/2023 32  28 - 44 % Final    LA volume 06/29/2023 41.37  cm3 Final    Sinus 06/29/2023 3.12  cm Final    STJ  06/29/2023 2.84  cm Final    Ascending aorta 06/29/2023 4.88  cm Final    LV mass 06/29/2023 113.35  g Final    LA size 06/29/2023 3.69  cm Final    TAPSE 06/29/2023 2.54  cm Final    RV S' 06/29/2023 13.77  cm/s Final    Left Ventricle Relative Wall Thick* 06/29/2023 0.55  cm Final    AV regurgitation pressure 1/2 time 06/29/2023 723.698506985529231  ms Final    AV mean gradient 06/29/2023 4  mmHg Final    AV valve area 06/29/2023 3.74  cm2 Final    AV Velocity Ratio 06/29/2023 1.06   Final    AV index (prosthetic) 06/29/2023 1.17   Final    MV mean gradient 06/29/2023 1  mmHg Final    MV valve area p 1/2 method 06/29/2023 2.23  cm2 Final    MV valve area by continuity eq 06/29/2023 3.13  cm2 Final    E/A ratio 06/29/2023 0.89   Final    Mean e' 06/29/2023 0.09  m/s Final    E wave deceleration time 06/29/2023 339.69  msec Final    LVOT diameter 06/29/2023 2.02  cm Final    LVOT area 06/29/2023 3.2  cm2 Final    LVOT peak arias 06/29/2023 1.42  m/s Final    LVOT peak VTI 06/29/2023 26.60  cm Final    Ao peak arias 06/29/2023 1.34  m/s Final    Ao VTI 06/29/2023 22.8  cm Final    LVOT stroke volume 06/29/2023 85.20  cm3 Final    AV peak gradient 06/29/2023 7  mmHg Final    MV peak gradient 06/29/2023 4  mmHg Final    E/E' ratio 06/29/2023 6.33  m/s Final    MV Peak E Arias 06/29/2023 0.57  m/s Final    AR Max Arias 06/29/2023 3.10  m/s Final    TR Max Arias 06/29/2023 2.68  m/s Final    MV VTI 06/29/2023 27.2  cm Final    MV stenosis pressure 1/2 time 06/29/2023 98.51  ms Final    MV Peak A Arias 06/29/2023 0.64  m/s Final    LV Systolic Volume 06/29/2023 22.73  mL Final    LV Systolic Volume Index 06/29/2023 10.8  mL/m2 Final    LV Diastolic Volume 06/29/2023 58.06  mL Final    LV Diastolic Volume Index 06/29/2023 27.65  mL/m2 Final    LA Volume Index 06/29/2023 19.7  mL/m2 Final    LV Mass Index 06/29/2023 54  g/m2 Final    RA Major Axis 06/29/2023 4.95  cm Final    Left Atrium Minor Axis 06/29/2023 3.52  cm Final    Left  Atrium Major Axis 06/29/2023 3.82  cm Final    Triscuspid Valve Regurgitation Pea* 06/29/2023 29  mmHg Final    RA Width 06/29/2023 3.80  cm Final    Right Atrial Pressure (from IVC) 06/29/2023 3  mmHg Final    EF 06/29/2023 65  % Final    TV resting pulmonary artery pressu* 06/29/2023 32  mmHg Final       Imaging  Echo    Result Date: 9/26/2023    Left Ventricle: The left ventricle is normal in size. Normal wall thickness. regional wall motion abnormalities present. See diagram for wall motion findings. There is normal systolic function with a visually estimated ejection fraction of 60 - 65%. There is normal diastolic function.   Right Ventricle: Normal right ventricular cavity size. Systolic function is normal.   Right Atrium: Right atrium is moderately dilated.   Aortic Valve: The aortic valve is a trileaflet valve. valve is well seated in the 30 mm Cardioroot aortic graft   Pulmonary Artery: The estimated pulmonary artery systolic pressure is 37 mmHg.   IVC/SVC: Normal venous pressure at 3 mmHg. 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 COMPARISON: 09/21/2023 FINDINGS: Postoperative changes are again identified in the thorax.  Interval removal of right-sided central venous catheter.  Cardiac silhouette is magnified by  portable AP technique.  The heart appears to be at the upper limits of normal in size and unchanged.  Tortuous thoracic aorta with atherosclerotic calcification in the wall of the aortic arch.  Pulmonary vascularity does not appear congested.  Lungs are satisfactorily expanded.  Improved aeration at both lung bases, particularly on the right.  Mild subsegmental atelectatic changes remain.  No significant pleural fluid and no pneumothorax.  Skeletal structures appear intact.     Interval removal of right-sided central venous catheter.  Improved aeration at both lung bases with mild residual bibasilar atelectasis. Electronically signed by: Aman Zavala MD Date:    09/26/2023 Time:    12:27    X-Ray Chest 1 View    Result Date: 9/21/2023  EXAMINATION: XR CHEST 1 VIEW CLINICAL HISTORY: increase O2 requirements; TECHNIQUE: Single frontal view of the chest was performed. COMPARISON: 09/21/2023 at 05:01 FINDINGS: Stably positioned right-sided central venous catheter.  Apparent interval removal of previously identified left-sided chest tube.  No evidence of new or enlarging pneumothorax.  The cardiomediastinal silhouette is unchanged in size and configuration noting postoperative change of prior sternotomy.  The lungs are symmetrically expanded with no significant interval detrimental change in lung aeration noting bibasilar atelectasis and pleural fluid, similar to prior study.     Please see above. Electronically signed by: Joceline Starkey MD Date:    09/21/2023 Time:    23:59    Echo    Result Date: 9/21/2023    Left Ventricle: The left ventricle is normal in size. Normal wall thickness. Septal motion is consistent with post-operative status. There is normal systolic function with a visually estimated ejection fraction of 55 - 60%. Unable to assess diastolic functionUnable to assess due to E-A fusion.   Right Ventricle: Mild right ventricular enlargement. Wall thickness is normal. Right ventricle wall motion  is normal.  Systolic function is mildly reduced.   IVC/SVC: Normal venous pressure at 3 mmHg.     X-Ray Chest AP Portable    Result Date: 9/21/2023  EXAMINATION: XR CHEST AP PORTABLE CLINICAL HISTORY: Post-op cardiac surgery; TECHNIQUE: Single frontal view of the chest was performed. COMPARISON: Nine//10 FINDINGS: Three [place]Trisha catheter is been removed.  Heart size is smaller than yesterday.  Vascular catheter seen in the superior vena cava.  Some atelectasis and loss of volume is seen at the lung bases a little pleural fluid but chest is significantly improved since yesterday     . see above Electronically signed by: Heriberto Styles MD Date:    09/21/2023 Time:    07:19    X-Ray Chest AP Portable    Result Date: 9/20/2023  EXAMINATION: XR CHEST AP PORTABLE CLINICAL HISTORY: Post-op cardiac surgery; TECHNIQUE: Single frontal view of the chest was performed. COMPARISON: No 09/19/2023 ne FINDINGS: Postoperative changes as before.  [place]Trisha catheter in the pulmonary outflow tract with its tip slightly coiled up similar to the previous study left-sided chest tube as before.  Opacification at the lung bases consistent with atelectatic changes ground-glass appearance of the right lower lung fields probably underlying pleural effusion.  The upper lung fields are clear.     See above Electronically signed by: Kyle Wilson MD Date:    09/20/2023 Time:    09:04    X-Ray Chest AP Portable    Result Date: 9/19/2023  EXAMINATION: XR CHEST AP PORTABLE CLINICAL HISTORY: Post-op cardiac surgery; TECHNIQUE: One view COMPARISON: Comparison is made to 09/18/2023 at 16:04. FINDINGS: Previously present endotracheal and enteric tubes have been removed.  Allowing for differences in patient positioning, no significant detrimental interval change in the appearance of the chest since 09/18/2023 at 16:04 is appreciated.  No pneumothorax.     As above Electronically signed by: Kyle Carranza MD Date:    09/19/2023 Time:    06:47    X-Ray Chest AP  Portable    Result Date: 9/18/2023  EXAMINATION: XR CHEST AP PORTABLE CLINICAL HISTORY: Post-op; TECHNIQUE: Single frontal view of the chest was performed. COMPARISON: 10/21/2019 FINDINGS: Endotracheal tube tip lies approximately 2.5 cm above the lluvia.  Right approach pulmonary arterial catheter tip is obscured, appears to be coiled along the right paramedian mediastinum, correlation is advised.  There is been interval sternotomy.  The cardiomediastinal silhouette is prominent, likely related to postsurgical edema.  There is calcification of the aorta..  There is obscuration of the left costophrenic angle suggesting effusion..  The trachea is midline.  The lungs are symmetrically expanded bilaterally with prominent interstitial attenuation bilaterally suggesting edema.  Cardial drainage catheters noted..  No large focal consolidation seen.  There is lucency along the left lateral chest wall, may reflect sequela of bowel in the region however pneumothorax not excluded.  Follow-up is advised.  The osseous structures are remarkable for degenerative changes and interval sternotomy..     This report was flagged in Epic as abnormal. As above Electronically signed by: Galdino Glez MD Date:    09/18/2023 Time:    16:56    VAS US Carotid Bilateral    Result Date: 9/13/2023  Indication ======== -Bilateral carotid artery stenosis Results ====== Right CCA prox PSV 67 cm/s Right CCA prox     ED          16 cm/s Right CCA distal PSV   76 cm/s Right CCA distal ED    19 cm/s Right ICA prox PSV 47 cm/s Right ICA prox EDV 16 cm/s Right ICA mid PSV  55 cm/s Right ICA mid EDV  18 cm/s Right ICA distal PSV   56 cm/s Right ICA distal EDV   19 cm/s Right ICA PSV / right CCA PSV  0.7 Right ICA EDV / right CCA EDV  1.0 Right ECA mid PSV  105 cm/s Right VERT PSV 30 cm/s Right VERT findings:   Retrograde flow Left CCA prox PSV  88 cm/s Left CCA prox ED   22 cm/s Left CCA distal PSV    78 cm/s Left CCA distal ED 19 cm/s Left ICA prox PSV   49 cm/s Left ICA prox EDV  15 cm/s Left ICA mid PSV   57 cm/s Left ICA mid EDV   19 cm/s Left ICA distal PSV    54 cm/s Left ICA distal EDV    19 cm/s Left ICA PSV / Left CCA PSV    0.7 Left ICA EDV / Left CCA EDV    1.0 Left ECA mid PSV   68 cm/s Left VERT PSV  35 cm/s Left VERT findings:    Antegrade flow Plaque presence:   ICA plaque identified Right ICA plaque:  Heterogeneous Left ICA plaque:   Heterogeneous Impression ========= RIGHT SIDE: 1-39% Right ICA stenosis. Heterogeneous plaque noted in the right internal carotid artery. Retrograde flow noted in the right vertebral artery. LEFT SIDE: 1-39% Left ICA stenosis. Heterogeneous plaque noted in the left internal carotid artery. Antegrade flow noted in the left vertebral artery. DATE OF SERVICE: 09/13/2023                                                   Sonographer: Manohar Jaime Electronically Signed by: Pradip Goins M.D. at 09/13/2023-09:39    CTA Cardiac TAVR_Partners (xpd)    Result Date: 9/8/2023  EXAMINATION: CTA CARDIAC TAVR_[facility]ERS (XPD) CLINICAL HISTORY: Essential (primary) hypertension TECHNIQUE: Initial noncontrast images of the chest, abdomen, and pelvis. Using 100 cc of Omnipaque 350 IV contrast material, and multi-detector helical CT technique, axial CT angiogram 0.625-mm images of the abdomen were obtained from just above the level of the aortic arch to the proximal femurs. The thoracic portion was done with ECG gating.  2D post-processing coronal and sagittal reconstructions of the heart, thoracic and abdominal aorta, as well as iliac arteries were performed. COMPARISON: CT calcium score 06/30/2023, abdominal ultrasound 11/17/2021 FINDINGS: Thoracic soft tissues: Subcentimeter right thyroid lobe nodule (series 4, image 16) Aorta: 2 vessel left-sided aortic arch with common origin of the right brachiocephalic and left common carotid arteries.  Ascending thoracic aortic aneurysm measuring up to 5.0 cm (series 605, image 36 and series 4,  image 54, similar compared to prior.  Note is made of prominence of sinus of Valsalva measuring up to 4.1 cm (series 9, image 311).  Mild calcific atherosclerosis. Heart: Bilateral calcification of coronary arteries versus stent.  Normal size.  No pericardial effusion. Janice/Mediastinum: Prominent non pathologically enlarged nodes. Lungs: No consolidation, pleural effusion, or pneumothorax.  Stable micronodule long the right minor fissure (series 5, image 242).  Stable triangular nodule along the left major fissure, likely an intrapulmonary lymph node (series 5, image 250).  Stable micronodule in the left upper lobe and superior segment of the right lower lobe (series 5, image 240 and series 5, image 217). Liver: Normal size and overall attenuation.  No definite focal lesions noting limited view on contrast enhanced images. Gallbladder: Contracted without calcified stones. Bile Ducts: No evidence of dilated ducts. Pancreas: No mass or peripancreatic fat stranding. Spleen: Unremarkable. Adrenals: No focal lesions. Kidneys/ Ureters: Normal enhancement.  Normal in size and location. No hydronephrosis or nephrolithiasis. No ureteral dilatation. Bladder: Mild bladder wall thickening. Reproductive organs: Mild prostatomegaly. GI Tract/Mesentery: Stomach is nondistended. There is no evidence of small-bowel obstruction. The appendix appears within normal limits.  Sigmoid diverticulosis. No evidence of bowel inflammation Peritoneal Space: No ascites. No free air. Retroperitoneum:  No significant adenopathy. Abdominal wall:  Small fat containing umbilical hernia.  Fat containing left inguinal hernia. Bones: No acute fracture. Age-appropriate degenerative changes. Vasculature:There is moderate calcific atherosclerotic plaque of the abdominal aorta. Celiac artery: Patent with moderate calcification at its origin SMA: Patent with mild calcification at its origin. Left renal artery: Patent with mild calcification at its origin.  Right renal artery: Patent with mild calcification at its origin. KIMANI: Patent with moderate calcification at its origin. TAVR measurements: Immediate infrarenal abdominal aorta: 1.6 cm Aortic bifurcation: 1.4 cm Left common iliac artery 0.7 cm Left external iliac artery 1.0 cm Left common femoral artery 0.8 cm Right common iliac artery 1.1 cm Right external iliac artery 1.0 cm Right common femoral artery 0.9 cm     TAVR measurements as above Ascending thoracic aortic aneurysm measuring up to 5.0 cm, similar to prior.  Note is made of a prominent sinus of Valsalva measuring up to 4.1 cm. Mild bladder wall thickening and in the context of prostatomegaly may reflect chronic outlet obstruction.  No surrounding inflammatory change. Sigmoid diverticulosis. Additional findings as above. Electronically signed by resident: Fran Ramachandran Date:    09/08/2023 Time:    15:57 Electronically signed by: Kael Agudelo MD Date:    09/08/2023 Time:    16:40      Assessment  1. Atherosclerosis of native coronary artery of native heart with stable angina pectoris  Angina resolved after bypass    2. Aneurysm of ascending aorta without rupture  Repaired    3. Primary hypertension  Controlled    4. Hypercholesterolemia  Reassess after he recovers from surgery    5. Atrial fibrillation, unspecified type  Postoperative atrial fibrillation.  Asymptomatic.  Rate controlled.  - IN OFFICE EKG 12-LEAD (to Muse)  - Comprehensive Metabolic Panel; Future  - TSH; Future  - CBC Auto Differential; Future      Plan and Discussion    Discussed that his atrial fibrillation will likely be self-limited.  Start amiodarone 400 mg daily.  After 2 weeks, reduce to 200 mg once daily.  Start Eliquis.  Reduce aspirin to 81 mg daily.  Gave a prescription for Lasix to be take as needed for weight gain.  Check blood work at his return in 2 weeks.    The 10-year ASCVD risk score (Rell DK, et al., 2019) is: 11.9%    Values used to calculate the score:      Age: 68  years      Sex: Male      Is Non- : No      Diabetic: No      Tobacco smoker: No      Systolic Blood Pressure: 110 mmHg      Is BP treated: Yes      HDL Cholesterol: 51 mg/dL      Total Cholesterol: 148 mg/dL     Follow Up  Follow up in about 2 weeks (around 10/20/2023).      Chon Lopez MD

## 2023-10-08 ENCOUNTER — PATIENT MESSAGE (OUTPATIENT)
Dept: CARDIOLOGY | Facility: CLINIC | Age: 68
End: 2023-10-08
Payer: COMMERCIAL

## 2023-10-12 ENCOUNTER — TELEPHONE (OUTPATIENT)
Dept: CARDIOTHORACIC SURGERY | Facility: CLINIC | Age: 68
End: 2023-10-12
Payer: COMMERCIAL

## 2023-10-12 RX ORDER — OXYCODONE HYDROCHLORIDE 5 MG/1
5 TABLET ORAL EVERY 4 HOURS PRN
Qty: 12 TABLET | Refills: 0 | Status: SHIPPED | OUTPATIENT
Start: 2023-10-12 | End: 2023-11-01 | Stop reason: ALTCHOICE

## 2023-10-12 NOTE — TELEPHONE ENCOUNTER
Returned call to pt and instructed him to refrain from drinking caffeinated coffee and informed him that he can drink decaf, which he verbalized understanding to.  Pt informed that he can take Melatonin to assist with sleep, which he verbalized understanding to.  Pt informed that Dr. Mooney's PA was informed of his request for a refill of Oxycodone, and she will prescribe 12 tablets of Oxycodone (pt prescribed 30 at discharge), and encouraged pt to alternate with Tylenol, which he verbalized understanding to.  Pt denied further questions at this time. Pt instructed to contact Dr. Mooney's office with any questions or concerns, which he verbalized understanding to.    ----- Message from Dara Bonds sent at 10/12/2023  9:33 AM CDT -----  Regarding: please advise/refill  Contact: [phone]  Sawyer Browning calling regarding is able to start drinking regular coffee, can he take Melatonin and he also need a refill oxyCODONE (ROXICODONE) 5 MG immediate release tablet          [id number] - [place], [place] - [address]  [address] [address]  Phone: [phone] Fax: [phone]

## 2023-10-13 ENCOUNTER — TELEPHONE (OUTPATIENT)
Dept: FAMILY MEDICINE | Facility: CLINIC | Age: 68
End: 2023-10-13
Payer: COMMERCIAL

## 2023-10-13 NOTE — TELEPHONE ENCOUNTER
----- Message from Aurora Graham sent at 10/13/2023  8:17 AM CDT -----  .Type: Patient Call Back    Who called: [facility]    What is the request in detail:level 2 severe interaction between Asprin and Eliquis    Can the clinic reply by [facility]? call    Would the patient rather a call back or a response via [facility]? call    Best call back number: [phone]    Additional Information:

## 2023-10-16 ENCOUNTER — PATIENT MESSAGE (OUTPATIENT)
Dept: CARDIOLOGY | Facility: CLINIC | Age: 68
End: 2023-10-16
Payer: COMMERCIAL

## 2023-10-19 ENCOUNTER — OFFICE VISIT (OUTPATIENT)
Dept: CARDIOLOGY | Facility: CLINIC | Age: 68
End: 2023-10-19
Payer: COMMERCIAL

## 2023-10-19 ENCOUNTER — LAB VISIT (OUTPATIENT)
Dept: LAB | Facility: OTHER | Age: 68
End: 2023-10-19
Attending: INTERNAL MEDICINE
Payer: COMMERCIAL

## 2023-10-19 VITALS — DIASTOLIC BLOOD PRESSURE: 92 MMHG | HEART RATE: 117 BPM | SYSTOLIC BLOOD PRESSURE: 136 MMHG

## 2023-10-19 DIAGNOSIS — E78.00 HYPERCHOLESTEROLEMIA: ICD-10-CM

## 2023-10-19 DIAGNOSIS — I25.10 ATHEROSCLEROSIS OF NATIVE CORONARY ARTERY OF NATIVE HEART WITHOUT ANGINA PECTORIS: Primary | ICD-10-CM

## 2023-10-19 DIAGNOSIS — I48.91 ATRIAL FIBRILLATION, UNSPECIFIED TYPE: ICD-10-CM

## 2023-10-19 DIAGNOSIS — I10 PRIMARY HYPERTENSION: ICD-10-CM

## 2023-10-19 LAB
ALBUMIN SERPL BCP-MCNC: 4.3 G/DL (ref 3.5–5.2)
ALP SERPL-CCNC: 105 U/L (ref 55–135)
ALT SERPL W/O P-5'-P-CCNC: 69 U/L (ref 10–44)
ANION GAP SERPL CALC-SCNC: 10 MMOL/L (ref 8–16)
AST SERPL-CCNC: 17 U/L (ref 10–40)
BASOPHILS # BLD AUTO: 0.05 K/UL (ref 0–0.2)
BASOPHILS NFR BLD: 0.7 % (ref 0–1.9)
BILIRUB SERPL-MCNC: 0.7 MG/DL (ref 0.1–1)
BUN SERPL-MCNC: 16 MG/DL (ref 8–23)
CALCIUM SERPL-MCNC: 9.5 MG/DL (ref 8.7–10.5)
CHLORIDE SERPL-SCNC: 106 MMOL/L (ref 95–110)
CO2 SERPL-SCNC: 26 MMOL/L (ref 23–29)
CREAT SERPL-MCNC: 1.3 MG/DL (ref 0.5–1.4)
DIFFERENTIAL METHOD: ABNORMAL
EOSINOPHIL # BLD AUTO: 0.2 K/UL (ref 0–0.5)
EOSINOPHIL NFR BLD: 2.6 % (ref 0–8)
ERYTHROCYTE [DISTWIDTH] IN BLOOD BY AUTOMATED COUNT: 14 % (ref 11.5–14.5)
EST. GFR  (NO RACE VARIABLE): 60 ML/MIN/1.73 M^2
GLUCOSE SERPL-MCNC: 85 MG/DL (ref 70–110)
HCT VFR BLD AUTO: 41.1 % (ref 40–54)
HGB BLD-MCNC: 13.5 G/DL (ref 14–18)
IMM GRANULOCYTES # BLD AUTO: 0.03 K/UL (ref 0–0.04)
IMM GRANULOCYTES NFR BLD AUTO: 0.4 % (ref 0–0.5)
LYMPHOCYTES # BLD AUTO: 1.1 K/UL (ref 1–4.8)
LYMPHOCYTES NFR BLD: 14.4 % (ref 18–48)
MCH RBC QN AUTO: 30.2 PG (ref 27–31)
MCHC RBC AUTO-ENTMCNC: 32.8 G/DL (ref 32–36)
MCV RBC AUTO: 92 FL (ref 82–98)
MONOCYTES # BLD AUTO: 1 K/UL (ref 0.3–1)
MONOCYTES NFR BLD: 12.9 % (ref 4–15)
NEUTROPHILS # BLD AUTO: 5.1 K/UL (ref 1.8–7.7)
NEUTROPHILS NFR BLD: 69 % (ref 38–73)
NRBC BLD-RTO: 0 /100 WBC
PLATELET # BLD AUTO: 211 K/UL (ref 150–450)
PMV BLD AUTO: 11 FL (ref 9.2–12.9)
POTASSIUM SERPL-SCNC: 3.7 MMOL/L (ref 3.5–5.1)
PROT SERPL-MCNC: 7.2 G/DL (ref 6–8.4)
RBC # BLD AUTO: 4.47 M/UL (ref 4.6–6.2)
SODIUM SERPL-SCNC: 142 MMOL/L (ref 136–145)
TSH SERPL DL<=0.005 MIU/L-ACNC: 2.02 UIU/ML (ref 0.4–4)
WBC # BLD AUTO: 7.34 K/UL (ref 3.9–12.7)

## 2023-10-19 PROCEDURE — 93010 ELECTROCARDIOGRAM REPORT: CPT | Mod: S$GLB,,, | Performed by: INTERNAL MEDICINE

## 2023-10-19 PROCEDURE — 99215 OFFICE O/P EST HI 40 MIN: CPT | Mod: S$GLB,,, | Performed by: INTERNAL MEDICINE

## 2023-10-19 PROCEDURE — 85025 COMPLETE CBC W/AUTO DIFF WBC: CPT | Performed by: INTERNAL MEDICINE

## 2023-10-19 PROCEDURE — 84443 ASSAY THYROID STIM HORMONE: CPT | Performed by: INTERNAL MEDICINE

## 2023-10-19 PROCEDURE — 36415 COLL VENOUS BLD VENIPUNCTURE: CPT | Performed by: INTERNAL MEDICINE

## 2023-10-19 PROCEDURE — 1160F RVW MEDS BY RX/DR IN RCRD: CPT | Mod: CPTII,S$GLB,, | Performed by: INTERNAL MEDICINE

## 2023-10-19 PROCEDURE — 1111F DSCHRG MED/CURRENT MED MERGE: CPT | Mod: CPTII,S$GLB,, | Performed by: INTERNAL MEDICINE

## 2023-10-19 PROCEDURE — 1159F PR MEDICATION LIST DOCUMENTED IN MEDICAL RECORD: ICD-10-PCS | Mod: CPTII,S$GLB,, | Performed by: INTERNAL MEDICINE

## 2023-10-19 PROCEDURE — 3075F PR MOST RECENT SYSTOLIC BLOOD PRESS GE 130-139MM HG: ICD-10-PCS | Mod: CPTII,S$GLB,, | Performed by: INTERNAL MEDICINE

## 2023-10-19 PROCEDURE — 93005 ELECTROCARDIOGRAM TRACING: CPT

## 2023-10-19 PROCEDURE — 3080F DIAST BP >= 90 MM HG: CPT | Mod: CPTII,S$GLB,, | Performed by: INTERNAL MEDICINE

## 2023-10-19 PROCEDURE — 99215 PR OFFICE/OUTPT VISIT, EST, LEVL V, 40-54 MIN: ICD-10-PCS | Mod: S$GLB,,, | Performed by: INTERNAL MEDICINE

## 2023-10-19 PROCEDURE — 80053 COMPREHEN METABOLIC PANEL: CPT | Performed by: INTERNAL MEDICINE

## 2023-10-19 PROCEDURE — 1111F PR DISCHARGE MEDS RECONCILED W/ CURRENT OUTPATIENT MED LIST: ICD-10-PCS | Mod: CPTII,S$GLB,, | Performed by: INTERNAL MEDICINE

## 2023-10-19 PROCEDURE — 99999 PR PBB SHADOW E&M-EST. PATIENT-LVL III: ICD-10-PCS | Mod: PBBFAC,,, | Performed by: INTERNAL MEDICINE

## 2023-10-19 PROCEDURE — 3080F PR MOST RECENT DIASTOLIC BLOOD PRESSURE >= 90 MM HG: ICD-10-PCS | Mod: CPTII,S$GLB,, | Performed by: INTERNAL MEDICINE

## 2023-10-19 PROCEDURE — 3075F SYST BP GE 130 - 139MM HG: CPT | Mod: CPTII,S$GLB,, | Performed by: INTERNAL MEDICINE

## 2023-10-19 PROCEDURE — 93010 EKG 12-LEAD: ICD-10-PCS | Mod: S$GLB,,, | Performed by: INTERNAL MEDICINE

## 2023-10-19 PROCEDURE — 1159F MED LIST DOCD IN RCRD: CPT | Mod: CPTII,S$GLB,, | Performed by: INTERNAL MEDICINE

## 2023-10-19 PROCEDURE — 1160F PR REVIEW ALL MEDS BY PRESCRIBER/CLIN PHARMACIST DOCUMENTED: ICD-10-PCS | Mod: CPTII,S$GLB,, | Performed by: INTERNAL MEDICINE

## 2023-10-19 PROCEDURE — 99999 PR PBB SHADOW E&M-EST. PATIENT-LVL III: CPT | Mod: PBBFAC,,, | Performed by: INTERNAL MEDICINE

## 2023-10-19 RX ORDER — ASPIRIN 81 MG/1
81 TABLET ORAL DAILY
COMMUNITY

## 2023-10-19 RX ORDER — AMIODARONE HYDROCHLORIDE 200 MG/1
200 TABLET ORAL DAILY
Qty: 30 TABLET | Refills: 11
Start: 2023-10-19 | End: 2024-03-14 | Stop reason: SDUPTHER

## 2023-10-19 RX ORDER — VALSARTAN 160 MG/1
160 TABLET ORAL DAILY
Qty: 30 TABLET | Refills: 11 | Status: ON HOLD | OUTPATIENT
Start: 2023-10-19 | End: 2023-11-08 | Stop reason: SDUPTHER

## 2023-10-19 NOTE — PROGRESS NOTES
[facility]    Chief Complaint  Chief Complaint   Patient presents with    Coronary Artery Disease       HPI:    Continues to improve.  Blood pressures are starting to creep up.  Being more active.  No palpitations.  No syncope or presyncope.  No bleeding.  Tolerating anticoagulation.    Medications  Current Outpatient Medications   Medication Sig Dispense Refill    acetaminophen (TYLENOL) 500 MG tablet Take 2 tablets (1,000 mg total) by mouth every 8 (eight) hours as needed for Pain.  0    amiodarone (PACERONE) 200 MG Tab Take 1 tablet (200 mg total) by mouth once daily. 30 tablet 11    apixaban (ELIQUIS) 5 mg Tab Take 1 tablet (5 mg total) by mouth 2 (two) times daily. 90 tablet 1    aspirin (ECOTRIN) 81 MG EC tablet Take 81 mg by mouth once daily.      atorvastatin (LIPITOR) 80 MG tablet Take 1 tablet (80 mg total) by mouth once daily. 90 tablet 3    furosemide (LASIX) 40 MG tablet Take 1 tablet (40 mg total) by mouth daily as needed (weight gain). 30 tablet 11    lansoprazole (PREVACID) 30 MG capsule TAKE 1 CAPSULE DAILY ONLY  AS NEEDED FOR HEARTBURN 90 capsule 0    metoprolol succinate (TOPROL-XL) 100 MG 24 hr tablet Take 1 tablet (100 mg total) by mouth once daily. 60 tablet 0    oxyCODONE (ROXICODONE) 5 MG immediate release tablet Take 1 tablet (5 mg total) by mouth every 4 (four) hours as needed for Pain. 30 tablet 0    oxyCODONE (ROXICODONE) 5 MG immediate release tablet Take 1 tablet (5 mg total) by mouth every 4 (four) hours as needed for Pain. 12 tablet 0    polyethylene glycol (GLYCOLAX) 17 gram PwPk Take 17 g by mouth 2 (two) times daily as needed.  0    valsartan (DIOVAN) 160 MG tablet Take 1 tablet (160 mg total) by mouth once daily. 30 tablet 11     No current facility-administered medications for this visit.        History  Past Medical History:   Diagnosis Date    BPH (benign prostatic hyperplasia)     Elevated liver enzymes     Fatty infiltration of liver     Glucose intolerance  (impaired glucose tolerance)     Hiatal hernia     Hyperlipidemia     Hypertension     Overweight     Reflux      Past Surgical History:   Procedure Laterality Date    AORTIC ROOT REPLACEMENT N/A 9/18/2023    Procedure: VALVE SPARING AORTIC ROOT REPLACEMENT;  Surgeon: Imtiaz Mooney MD;  Location: [address];  Service: Cardiovascular;  Laterality: N/A;  Valve sparing aortic root replacement, vs biobentall, hemiarch, CABGx2 under  hypothermic circ arrest    CORONARY ANGIOGRAPHY N/A 9/6/2023    Procedure: ANGIOGRAM, CORONARY ARTERY;  Surgeon: Chon Lopez MD;  Location: [facility] CATH LAB;  Service: Cardiology;  Laterality: N/A;  right radial    CORONARY ARTERY BYPASS GRAFT (CABG) N/A 9/18/2023    Procedure: CORONARY ARTERY BYPASS GRAFT (CABG);  Surgeon: Imtiaz Mooney MD;  Location: [address];  Service: Cardiovascular;  Laterality: N/A;  Vein Gentry Start: 821  Vein Gentry End: 838  Vein Preparation Start: 839  Vein Preparation End: 912    ENDOSCOPIC HARVEST OF VEIN Left 9/18/2023    Procedure: HARVEST-VEIN-ENDOVASCULAR;  Surgeon: Imtiaz Mooney MD;  Location: [address];  Service: Cardiovascular;  Laterality: Left;  Valve sparing aortic root replacement, vs biobentall, hemiarch, CABGx2 under    inguinal hernia      x2    REPAIR OF DIAPHRAGMATIC HERNIA  9/18/2023    Procedure: REPAIR, HERNIA, DIAPHRAGMATIC;  Surgeon: Imtiaz Mooney MD;  Location: [address];  Service: Cardiovascular;;    tonsillectomy       Social History     Socioeconomic History    Marital status:     Number of children: 2   Occupational History    Occupation: Works in insurance   Tobacco Use    Smoking status: Never    Smokeless tobacco: Never   Substance and Sexual Activity    Alcohol use: Yes     Comment: 2 glasses of wine most evenings.    Drug use: No     Social Determinants of Health     Financial Resource Strain: Low Risk  (9/19/2023)    Overall Financial Resource Strain (CARDIA)      Difficulty of Paying Living Expenses: Not hard at all   Food Insecurity: No Food Insecurity (9/19/2023)    Hunger Vital Sign     Worried About Running Out of Food in the Last Year: Never true     Ran Out of Food in the Last Year: Never true   Transportation Needs: No Transportation Needs (9/19/2023)    PRAPARE - Transportation     Lack of Transportation (Medical): No     Lack of Transportation (Non-Medical): No   Physical Activity: Sufficiently Active (9/19/2023)    Exercise Vital Sign     Days of Exercise per Week: 3 days     Minutes of Exercise per Session: 60 min   Stress: Unknown (9/19/2023)    [employer] - Occupational Stress Questionnaire     Feeling of Stress : Patient refused   Social Connections: Unknown (9/19/2023)    Social Connection and Isolation Panel [NHANES]     Frequency of Communication with Friends and Family: More than three times a week     Frequency of Social Gatherings with Friends and Family: More than three times a week     Attends Buddhist Services: Patient refused     Active Member of Clubs or Organizations: Patient refused     Attends Club or Organization Meetings: Patient refused     Marital Status:    Housing Stability: Low Risk  (9/19/2023)    Housing Stability Vital Sign     Unable to Pay for Housing in the Last Year: No     Number of Places Lived in the Last Year: 1     Unstable Housing in the Last Year: No     Family History   Problem Relation Age of Onset    Hypertension Mother     Macular degeneration Mother         - gets shots in her eye    Stroke Father     Heart disease Father     Aneurysm Father     Migraines Sister     Arthritis Brother         knees    Dementia Paternal Grandmother     Diabetes Paternal Grandmother     Heart disease Paternal Grandfather     Diabetes Brother     Hernia Brother     Cancer Paternal Aunt     Cancer Paternal Uncle     Cancer Paternal Aunt     Cancer Paternal Uncle     Colon cancer Neg Hx     Prostate cancer  Neg Hx         Allergies  Review of patient's allergies indicates:   Allergen Reactions    Ace inhibitors Other (See Comments)     cough    Losartan      headache       Review of Systems   Review of Systems   Constitutional: Negative for malaise/fatigue, weight gain and weight loss.   Eyes:  Negative for visual disturbance.   Cardiovascular:  Negative for chest pain, claudication, cyanosis, dyspnea on exertion, irregular heartbeat, leg swelling, near-syncope, orthopnea, palpitations, paroxysmal nocturnal dyspnea and syncope.   Respiratory:  Negative for cough, hemoptysis, shortness of breath, sleep disturbances due to breathing and wheezing.    Hematologic/Lymphatic: Negative for bleeding problem. Does not bruise/bleed easily.   Skin:  Negative for poor wound healing.   Musculoskeletal:  Negative for muscle cramps and myalgias.   Gastrointestinal:  Negative for abdominal pain, anorexia, diarrhea, heartburn, hematemesis, hematochezia, melena, nausea and vomiting.   Genitourinary:  Negative for hematuria and nocturia.   Neurological:  Negative for excessive daytime sleepiness, dizziness, focal weakness, light-headedness and weakness.       Physical Exam  Vitals:    10/19/23 1447   BP: (!) 136/92   Pulse: (!) 117     Wt Readings from Last 1 Encounters:   10/06/23 84 kg (185 lb 3.2 oz)     Physical Exam  Vitals and nursing note reviewed.   Constitutional:       General: He is not in acute distress.     Appearance: He is not toxic-appearing or diaphoretic.   HENT:      Head: Normocephalic and atraumatic.      Mouth/Throat:      Lips: Pink.      Mouth: Mucous membranes are moist.   Eyes:      General: No scleral icterus.     Conjunctiva/sclera: Conjunctivae normal.   Neck:      Thyroid: No thyromegaly.      Vascular: No carotid bruit, hepatojugular reflux or JVD.      Trachea: Trachea normal.   Cardiovascular:      Rate and Rhythm: Normal rate and regular rhythm. No extrasystoles are present.     Chest Wall: PMI is not  displaced.      Pulses:           Carotid pulses are 2+ on the right side and 2+ on the left side.       Radial pulses are 2+ on the right side and 2+ on the left side.        Dorsalis pedis pulses are 2+ on the right side and 2+ on the left side.        Posterior tibial pulses are 2+ on the right side and 2+ on the left side.      Heart sounds: S1 normal and S2 normal. No murmur heard.     No friction rub. No S3 or S4 sounds.   Pulmonary:      Effort: Pulmonary effort is normal. No tachypnea, bradypnea, accessory muscle usage or respiratory distress.      Breath sounds: Normal breath sounds and air entry. No decreased breath sounds, wheezing, rhonchi or rales.   Chest:      Comments: Median sternotomy healing well.  Sternum is stable.  Abdominal:      General: Bowel sounds are normal. There is no distension or abdominal bruit.      Palpations: Abdomen is soft. There is no hepatomegaly, splenomegaly or pulsatile mass.      Tenderness: There is no abdominal tenderness.   Musculoskeletal:         General: No tenderness or deformity.      Right lower leg: No edema.      Left lower leg: No edema.   Skin:     General: Skin is warm and dry.      Capillary Refill: Capillary refill takes less than 2 seconds.      Coloration: Skin is not cyanotic or pale.      Nails: There is no clubbing.   Neurological:      General: No focal deficit present.      Mental Status: He is alert and oriented to person, place, and time.   Psychiatric:         Attention and Perception: Attention normal.         Mood and Affect: Mood normal.         Speech: Speech normal.         Behavior: Behavior normal. Behavior is cooperative.         Labs  Admission on 09/26/2023, Discharged on 09/27/2023   Component Date Value Ref Range Status    HIV 1/2 Ag/Ab 09/26/2023 Non-reactive  Non-reactive Final    Hepatitis C Ab 09/26/2023 Non-reactive  Non-reactive Final    WBC 09/26/2023 9.68  3.90 - 12.70 K/uL Final    RBC 09/26/2023 3.62 (L)  4.60 - 6.20 M/uL  Final    Hemoglobin 09/26/2023 11.1 (L)  14.0 - 18.0 g/dL Final    Hematocrit 09/26/2023 33.3 (L)  40.0 - 54.0 % Final    MCV 09/26/2023 92  82 - 98 fL Final    MCH 09/26/2023 30.7  27.0 - 31.0 pg Final    MCHC 09/26/2023 33.3  32.0 - 36.0 g/dL Final    RDW 09/26/2023 12.8  11.5 - 14.5 % Final    Platelets 09/26/2023 244  150 - 450 K/uL Final    MPV 09/26/2023 9.8  9.2 - 12.9 fL Final    Immature Granulocytes 09/26/2023 1.7 (H)  0.0 - 0.5 % Final    Gran # (ANC) 09/26/2023 7.0  1.8 - 7.7 K/uL Final    Immature Grans (Abs) 09/26/2023 0.16 (H)  0.00 - 0.04 K/uL Final    Comment: Mild elevation in immature granulocytes is non specific and   can be seen in a variety of conditions including stress response,   acute inflammation, trauma and pregnancy. Correlation with other   laboratory and clinical findings is essential.      Lymph # 09/26/2023 1.0  1.0 - 4.8 K/uL Final    Mono # 09/26/2023 1.3 (H)  0.3 - 1.0 K/uL Final    Eos # 09/26/2023 0.2  0.0 - 0.5 K/uL Final    Baso # 09/26/2023 0.07  0.00 - 0.20 K/uL Final    nRBC 09/26/2023 0  0 /100 WBC Final    Gran % 09/26/2023 71.8  38.0 - 73.0 % Final    Lymph % 09/26/2023 10.6 (L)  18.0 - 48.0 % Final    Mono % 09/26/2023 13.1  4.0 - 15.0 % Final    Eosinophil % 09/26/2023 2.1  0.0 - 8.0 % Final    Basophil % 09/26/2023 0.7  0.0 - 1.9 % Final    Differential Method 09/26/2023 Automated   Final    Sodium 09/26/2023 141  136 - 145 mmol/L Final    Potassium 09/26/2023 4.0  3.5 - 5.1 mmol/L Final    Chloride 09/26/2023 106  95 - 110 mmol/L Final    CO2 09/26/2023 25  23 - 29 mmol/L Final    Glucose 09/26/2023 100  70 - 110 mg/dL Final    BUN 09/26/2023 19  8 - 23 mg/dL Final    Creatinine 09/26/2023 0.8  0.5 - 1.4 mg/dL Final    Calcium 09/26/2023 9.1  8.7 - 10.5 mg/dL Final    Total Protein 09/26/2023 6.8  6.0 - 8.4 g/dL Final    Albumin 09/26/2023 3.8  3.5 - 5.2 g/dL Final    Total Bilirubin 09/26/2023 0.9  0.1 - 1.0 mg/dL Final    Comment: For infants and newborns,  interpretation of results should be based  on gestational age, weight and in agreement with clinical  observations.    Premature Infant recommended reference ranges:  Up to 24 hours.............<8.0 mg/dL  Up to 48 hours............<12.0 mg/dL  3-5 days..................<15.0 mg/dL  6-29 days.................<15.0 mg/dL      Alkaline Phosphatase 09/26/2023 68  55 - 135 U/L Final    AST 09/26/2023 41 (H)  10 - 40 U/L Final    ALT 09/26/2023 81 (H)  10 - 44 U/L Final    eGFR 09/26/2023 >60.0  >60 mL/min/1.73 m^2 Final    Anion Gap 09/26/2023 10  8 - 16 mmol/L Final    Troponin I 09/26/2023 0.332 (H)  0.000 - 0.026 ng/mL Final    Comment: The reference interval for Troponin I represents the 99th percentile   cutoff   for our facility and is consistent with 3rd generation assay   performance.      BNP 09/26/2023 534 (H)  0 - 99 pg/mL Final    Values of less than 100 pg/ml are consistent with non-CHF populations.    Ascending aorta 09/26/2023 3.2  cm Final    STJ 09/26/2023 3.2  cm Final    AV mean gradient 09/26/2023 4  mmHg Final    Ao peak arias 09/26/2023 1.40  m/s Final    Ao VTI 09/26/2023 25.18  cm Final    IVRT 09/26/2023 48.53  msec Final    IVS 09/26/2023 0.84  0.6 - 1.1 cm Final    LA size 09/26/2023 3.64  cm Final    Left Atrium Major Axis 09/26/2023 6.04  cm Final    Left Atrium Minor Axis 09/26/2023 5.97  cm Final    LVIDd 09/26/2023 4.85  3.5 - 6.0 cm Final    LVIDs 09/26/2023 3.14  2.1 - 4.0 cm Final    LVOT diameter 09/26/2023 2.03  cm Final    LVOT peak VTI 09/26/2023 22.45  cm Final    Posterior Wall 09/26/2023 0.99  0.6 - 1.1 cm Final    MV Peak A Arias 09/26/2023 0.50  m/s Final    E wave deceleration time 09/26/2023 231.09  msec Final    MV Peak E Arias 09/26/2023 1.09  m/s Final    PV Peak D Arias 09/26/2023 0.55  m/s Final    PV Peak S Arias 09/26/2023 0.38  m/s Final    RA Major Axis 09/26/2023 5.72  cm Final    RA Width 09/26/2023 4.34  cm Final    RVDD 09/26/2023 3.82  cm Final    Sinus 09/26/2023 3.5   "cm Final    TAPSE 09/26/2023 0.81  cm Final    TR Max Arias 09/26/2023 2.90  m/s Final    LA WIDTH 09/26/2023 4.27  cm Final    MV stenosis pressure 1/2 time 09/26/2023 67.02  ms Final    LV Diastolic Volume 09/26/2023 110.20  mL Final    LV Systolic Volume 09/26/2023 38.99  mL Final    LVOT peak arias 09/26/2023 1.18  m/s Final    TDI LATERAL 09/26/2023 0.17  m/s Final    TDI SEPTAL 09/26/2023 0.12  m/s Final    LA volume (mod) 09/26/2023 50.00  cm3 Final    MV "A" wave duration 09/26/2023 11.42  msec Final    LV LATERAL E/E' RATIO 09/26/2023 6.41  m/s Final    LV SEPTAL E/E' RATIO 09/26/2023 9.08  m/s Final    FS 09/26/2023 35  % Final    LA volume 09/26/2023 79.33  cm3 Final    LV mass 09/26/2023 153.68  g Final    ZLVIDD 09/26/2023 -3.10   Final    ZLVIDS 09/26/2023 -1.99   Final    Left Ventricle Relative Wall Thick* 09/26/2023 0.41  cm Final    AV valve area 09/26/2023 2.88  cm² Final    AV Velocity Ratio 09/26/2023 0.84   Final    AV index (prosthetic) 09/26/2023 0.89   Final    MV valve area p 1/2 method 09/26/2023 3.28  cm2 Final    E/A ratio 09/26/2023 2.18   Final    Mean e' 09/26/2023 0.15  m/s Final    Pulm vein S/D ratio 09/26/2023 0.69   Final    LVOT area 09/26/2023 3.2  cm2 Final    LVOT stroke volume 09/26/2023 72.62  cm3 Final    AV peak gradient 09/26/2023 8  mmHg Final    E/E' ratio 09/26/2023 7.52  m/s Final    LV Systolic Volume Index 09/26/2023 18.5  mL/m2 Final    LV Diastolic Volume Index 09/26/2023 52.23  mL/m2 Final    LA Volume Index 09/26/2023 37.6  mL/m2 Final    LV Mass Index 09/26/2023 73  g/m2 Final    Triscuspid Valve Regurgitation Pea* 09/26/2023 34  mmHg Final    LA Volume Index (Mod) 09/26/2023 23.7  mL/m2 Final    RUPA by Velocity Ratio 09/26/2023 2.73  cm² Final    BSA 09/26/2023 2.11  m2 Final    TV resting pulmonary artery pressu* 09/26/2023 37  mmHg Final    RV TB RVSP 09/26/2023 6  mmHg Final    Est. RA pres 09/26/2023 3  mmHg Final    IVC diameter 09/26/2023 2.4  cm Final    " Troponin I 09/26/2023 0.280 (H)  0.000 - 0.026 ng/mL Final    Comment: The reference interval for Troponin I represents the 99th percentile   cutoff   for our facility and is consistent with 3rd generation assay   performance.      WBC 09/27/2023 9.77  3.90 - 12.70 K/uL Final    RBC 09/27/2023 4.03 (L)  4.60 - 6.20 M/uL Final    Hemoglobin 09/27/2023 12.4 (L)  14.0 - 18.0 g/dL Final    Hematocrit 09/27/2023 37.4 (L)  40.0 - 54.0 % Final    MCV 09/27/2023 93  82 - 98 fL Final    MCH 09/27/2023 30.8  27.0 - 31.0 pg Final    MCHC 09/27/2023 33.2  32.0 - 36.0 g/dL Final    RDW 09/27/2023 13.1  11.5 - 14.5 % Final    Platelets 09/27/2023 328  150 - 450 K/uL Final    MPV 09/27/2023 10.5  9.2 - 12.9 fL Final    Immature Granulocytes 09/27/2023 1.8 (H)  0.0 - 0.5 % Final    Gran # (ANC) 09/27/2023 6.8  1.8 - 7.7 K/uL Final    Immature Grans (Abs) 09/27/2023 0.18 (H)  0.00 - 0.04 K/uL Final    Comment: Mild elevation in immature granulocytes is non specific and   can be seen in a variety of conditions including stress response,   acute inflammation, trauma and pregnancy. Correlation with other   laboratory and clinical findings is essential.      Lymph # 09/27/2023 1.2  1.0 - 4.8 K/uL Final    Mono # 09/27/2023 1.3 (H)  0.3 - 1.0 K/uL Final    Eos # 09/27/2023 0.2  0.0 - 0.5 K/uL Final    Baso # 09/27/2023 0.06  0.00 - 0.20 K/uL Final    nRBC 09/27/2023 0  0 /100 WBC Final    Gran % 09/27/2023 69.8  38.0 - 73.0 % Final    Lymph % 09/27/2023 12.2 (L)  18.0 - 48.0 % Final    Mono % 09/27/2023 13.1  4.0 - 15.0 % Final    Eosinophil % 09/27/2023 2.5  0.0 - 8.0 % Final    Basophil % 09/27/2023 0.6  0.0 - 1.9 % Final    Differential Method 09/27/2023 Automated   Final    Sodium 09/27/2023 143  136 - 145 mmol/L Final    Potassium 09/27/2023 3.7  3.5 - 5.1 mmol/L Final    Chloride 09/27/2023 106  95 - 110 mmol/L Final    CO2 09/27/2023 28  23 - 29 mmol/L Final    Glucose 09/27/2023 105  70 - 110 mg/dL Final    BUN 09/27/2023 19  8 -  23 mg/dL Final    Creatinine 09/27/2023 1.1  0.5 - 1.4 mg/dL Final    Calcium 09/27/2023 9.6  8.7 - 10.5 mg/dL Final    Total Protein 09/27/2023 7.5  6.0 - 8.4 g/dL Final    Albumin 09/27/2023 4.1  3.5 - 5.2 g/dL Final    Total Bilirubin 09/27/2023 1.0  0.1 - 1.0 mg/dL Final    Comment: For infants and newborns, interpretation of results should be based  on gestational age, weight and in agreement with clinical  observations.    Premature Infant recommended reference ranges:  Up to 24 hours.............<8.0 mg/dL  Up to 48 hours............<12.0 mg/dL  3-5 days..................<15.0 mg/dL  6-29 days.................<15.0 mg/dL      Alkaline Phosphatase 09/27/2023 74  55 - 135 U/L Final    AST 09/27/2023 35  10 - 40 U/L Final    ALT 09/27/2023 82 (H)  10 - 44 U/L Final    eGFR 09/27/2023 >60.0  >60 mL/min/1.73 m^2 Final    Anion Gap 09/27/2023 9  8 - 16 mmol/L Final    Phosphorus 09/27/2023 3.7  2.7 - 4.5 mg/dL Final   No results displayed because visit has over 200 results.      Lab Visit on 08/28/2023   Component Date Value Ref Range Status    WBC 08/28/2023 6.43  3.90 - 12.70 K/uL Final    RBC 08/28/2023 5.36  4.60 - 6.20 M/uL Final    Hemoglobin 08/28/2023 16.3  14.0 - 18.0 g/dL Final    Hematocrit 08/28/2023 46.6  40.0 - 54.0 % Final    MCV 08/28/2023 87  82 - 98 fL Final    MCH 08/28/2023 30.4  27.0 - 31.0 pg Final    MCHC 08/28/2023 35.0  32.0 - 36.0 g/dL Final    RDW 08/28/2023 12.5  11.5 - 14.5 % Final    Platelets 08/28/2023 191  150 - 450 K/uL Final    MPV 08/28/2023 10.9  9.2 - 12.9 fL Final    Immature Granulocytes 08/28/2023 0.5  0.0 - 0.5 % Final    Gran # (ANC) 08/28/2023 4.2  1.8 - 7.7 K/uL Final    Immature Grans (Abs) 08/28/2023 0.03  0.00 - 0.04 K/uL Final    Comment: Mild elevation in immature granulocytes is non specific and   can be seen in a variety of conditions including stress response,   acute inflammation, trauma and pregnancy. Correlation with other   laboratory and clinical findings is  essential.      Lymph # 08/28/2023 1.3  1.0 - 4.8 K/uL Final    Mono # 08/28/2023 0.8  0.3 - 1.0 K/uL Final    Eos # 08/28/2023 0.1  0.0 - 0.5 K/uL Final    Baso # 08/28/2023 0.06  0.00 - 0.20 K/uL Final    nRBC 08/28/2023 0  0 /100 WBC Final    Gran % 08/28/2023 64.9  38.0 - 73.0 % Final    Lymph % 08/28/2023 19.4  18.0 - 48.0 % Final    Mono % 08/28/2023 13.1  4.0 - 15.0 % Final    Eosinophil % 08/28/2023 1.2  0.0 - 8.0 % Final    Basophil % 08/28/2023 0.9  0.0 - 1.9 % Final    Differential Method 08/28/2023 Automated   Final    Sodium 08/28/2023 140  136 - 145 mmol/L Final    Potassium 08/28/2023 3.5  3.5 - 5.1 mmol/L Final    Chloride 08/28/2023 105  95 - 110 mmol/L Final    CO2 08/28/2023 25  23 - 29 mmol/L Final    Glucose 08/28/2023 130 (H)  70 - 110 mg/dL Final    BUN 08/28/2023 14  8 - 23 mg/dL Final    Creatinine 08/28/2023 1.0  0.5 - 1.4 mg/dL Final    Calcium 08/28/2023 9.2  8.7 - 10.5 mg/dL Final    Anion Gap 08/28/2023 10  8 - 16 mmol/L Final    eGFR 08/28/2023 >60  >60 mL/min/1.73 m^2 Final    Prothrombin Time 08/28/2023 10.9  9.0 - 12.5 sec Final    INR 08/28/2023 1.0  0.8 - 1.2 Final    Comment: Coumadin Therapy:  2.0 - 3.0 for INR for all indicators except mechanical heart valves  and antiphospholipid syndromes which should use 2.5 - 3.5.  LOT^040^PT Inn^[id number]     Hospital Outpatient Visit on 08/22/2023   Component Date Value Ref Range Status    BSA 08/22/2023 2.09  m2 Final    85% Max Predicted HR 08/22/2023 129   Final    Max Predicted HR 08/22/2023 152   Final    [facility] CV CPX PATIENT IS MALE 08/22/2023 1.0   Final    [facility] CV CPX PATIENT IS FEMALE 08/22/2023 0.0   Final    Systolic blood pressure 08/22/2023 122  mmHg Final    Diastolic blood pressure 08/22/2023 94  mmHg Final    HR at rest 08/22/2023 68  bpm Final    Exercise duration (min) 08/22/2023 8  minutes Final    Exercise duration (sec) 08/22/2023 4  seconds Final    Peak Systolic BP 08/22/2023 178  mmHg Final    Peak Diatolic BP 08/22/2023  "96  mmHg Final    Peak HR 08/22/2023 144  bpm Final    Estimated METs 08/22/2023 13   Final    % Max HR Achieved 08/22/2023 95   Final    RPP 08/22/2023 8,296   Final    Peak RPP 08/22/2023 25,632   Final    LVOT stroke volume 08/22/2023 101.82  cm3 Final    LVIDd 08/22/2023 4.50  3.5 - 6.0 cm Final    LV Systolic Volume 08/22/2023 32.99  mL Final    LV Systolic Volume Index 08/22/2023 15.9  mL/m2 Final    LVIDs 08/22/2023 2.93  2.1 - 4.0 cm Final    LV Diastolic Volume 08/22/2023 92.39  mL Final    LV Diastolic Volume Index 08/22/2023 44.42  mL/m2 Final    IVS 08/22/2023 0.8  0.6 - 1.1 cm Final    LVOT diameter 08/22/2023 2.13  cm Final    LVOT area 08/22/2023 3.6  cm2 Final    FS 08/22/2023 35  28 - 44 % Final    Left Ventricle Relative Wall Thick* 08/22/2023 0.31  cm Final    Posterior Wall 08/22/2023 0.7  0.6 - 1.1 cm Final    LV mass 08/22/2023 104.50  g Final    LV Mass Index 08/22/2023 50  g/m2 Final    MV Peak E Arias 08/22/2023 0.86  m/s Final    TDI LATERAL 08/22/2023 0.12  m/s Final    TDI SEPTAL 08/22/2023 0.07  m/s Final    E/E' ratio 08/22/2023 9.05  m/s Final    MV Peak A Arias 08/22/2023 0.99  m/s Final    TR Max Arias 08/22/2023 2.57  m/s Final    E/A ratio 08/22/2023 0.87   Final    IVRT 08/22/2023 114.18  msec Final    E wave deceleration time 08/22/2023 238.66  msec Final    MV "A" wave duration 08/22/2023 6.28  msec Final    LV SEPTAL E/E' RATIO 08/22/2023 12.29  m/s Final    LA Volume Index 08/22/2023 34.7  mL/m2 Final    LV LATERAL E/E' RATIO 08/22/2023 7.17  m/s Final    LA volume 08/22/2023 72.22  cm3 Final    PV Peak S Arias 08/22/2023 0.66  m/s Final    PV Peak D Arias 08/22/2023 0.38  m/s Final    Pulm vein S/D ratio 08/22/2023 1.74   Final    LVOT peak arias 08/22/2023 1.62  m/s Final    LA size 08/22/2023 4.17  cm Final    Left Atrium Major Axis 08/22/2023 5.57  cm Final    Left Atrium Minor Axis 08/22/2023 5.99  cm Final    LA WIDTH 08/22/2023 3.53  cm Final    RVDD 08/22/2023 3.20  cm Final    " TAPSE 08/22/2023 1.96  cm Final    RA Major Axis 08/22/2023 5.59  cm Final    RA Width 08/22/2023 3.99  cm Final    AV mean gradient 08/22/2023 5  mmHg Final    AV peak gradient 08/22/2023 11  mmHg Final    Ao peak bernie 08/22/2023 1.63  m/s Final    Ao VTI 08/22/2023 31.75  cm Final    LVOT peak VTI 08/22/2023 28.59  cm Final    AV valve area 08/22/2023 3.21  cm² Final    AV Velocity Ratio 08/22/2023 0.99   Final    AV index (prosthetic) 08/22/2023 0.90   Final    RUPA by Velocity Ratio 08/22/2023 3.54  cm² Final    MV stenosis pressure 1/2 time 08/22/2023 69.21  ms Final    MV valve area p 1/2 method 08/22/2023 3.18  cm2 Final    Triscuspid Valve Regurgitation Pea* 08/22/2023 26  mmHg Final    Sinus 08/22/2023 4.2  cm Final    STJ 08/22/2023 4.2  cm Final    Ascending aorta 08/22/2023 5.0  cm Final    Mean e' 08/22/2023 0.10  m/s Final    ZLVIDS 08/22/2023 -2.25   Final    ZLVIDD 08/22/2023 -3.47   Final    1 Minute Recovery HR 08/22/2023 118  bpm Final    ST Depression (mm) 08/22/2023 1.0  mm Final    TV resting pulmonary artery pressu* 08/22/2023 29  mmHg Final    RV TB RVSP 08/22/2023 6  mmHg Final    Est. RA pres 08/22/2023 3  mmHg Final    Post-Stress Ejection Fraction 08/22/2023 60  % Final   Hospital Outpatient Visit on 06/29/2023   Component Date Value Ref Range Status    BSA 06/29/2023 2.11  m2 Final    TDI SEPTAL 06/29/2023 0.07  m/s Final    LV LATERAL E/E' RATIO 06/29/2023 5.18  m/s Final    LV SEPTAL E/E' RATIO 06/29/2023 8.14  m/s Final    LA WIDTH 06/29/2023 3.60  cm Final    IVC diameter 06/29/2023 1.55  cm Final    Left Ventricular Outflow Tract Laury* 06/29/2023 0.94  cm/s Final    Left Ventricular Outflow Tract Laury* 06/29/2023 4.11  mmHg Final    TDI LATERAL 06/29/2023 0.11  m/s Final    PV PEAK VELOCITY 06/29/2023 0.86  cm/s Final    LVIDd 06/29/2023 3.70  3.5 - 6.0 cm Final    IVS 06/29/2023 1.00  0.6 - 1.1 cm Final    Posterior Wall 06/29/2023 1.01  0.6 - 1.1 cm Final    LVIDs 06/29/2023 2.52  2.1  - 4.0 cm Final    FS 06/29/2023 32  28 - 44 % Final    LA volume 06/29/2023 41.37  cm3 Final    Sinus 06/29/2023 3.12  cm Final    STJ 06/29/2023 2.84  cm Final    Ascending aorta 06/29/2023 4.88  cm Final    LV mass 06/29/2023 113.35  g Final    LA size 06/29/2023 3.69  cm Final    TAPSE 06/29/2023 2.54  cm Final    RV S' 06/29/2023 13.77  cm/s Final    Left Ventricle Relative Wall Thick* 06/29/2023 0.55  cm Final    AV regurgitation pressure 1/2 time 06/29/2023 723.777819832891502  ms Final    AV mean gradient 06/29/2023 4  mmHg Final    AV valve area 06/29/2023 3.74  cm2 Final    AV Velocity Ratio 06/29/2023 1.06   Final    AV index (prosthetic) 06/29/2023 1.17   Final    MV mean gradient 06/29/2023 1  mmHg Final    MV valve area p 1/2 method 06/29/2023 2.23  cm2 Final    MV valve area by continuity eq 06/29/2023 3.13  cm2 Final    E/A ratio 06/29/2023 0.89   Final    Mean e' 06/29/2023 0.09  m/s Final    E wave deceleration time 06/29/2023 339.69  msec Final    LVOT diameter 06/29/2023 2.02  cm Final    LVOT area 06/29/2023 3.2  cm2 Final    LVOT peak arias 06/29/2023 1.42  m/s Final    LVOT peak VTI 06/29/2023 26.60  cm Final    Ao peak arias 06/29/2023 1.34  m/s Final    Ao VTI 06/29/2023 22.8  cm Final    LVOT stroke volume 06/29/2023 85.20  cm3 Final    AV peak gradient 06/29/2023 7  mmHg Final    MV peak gradient 06/29/2023 4  mmHg Final    E/E' ratio 06/29/2023 6.33  m/s Final    MV Peak E Arias 06/29/2023 0.57  m/s Final    AR Max Arias 06/29/2023 3.10  m/s Final    TR Max Arias 06/29/2023 2.68  m/s Final    MV VTI 06/29/2023 27.2  cm Final    MV stenosis pressure 1/2 time 06/29/2023 98.51  ms Final    MV Peak A Arias 06/29/2023 0.64  m/s Final    LV Systolic Volume 06/29/2023 22.73  mL Final    LV Systolic Volume Index 06/29/2023 10.8  mL/m2 Final    LV Diastolic Volume 06/29/2023 58.06  mL Final    LV Diastolic Volume Index 06/29/2023 27.65  mL/m2 Final    LA Volume Index 06/29/2023 19.7  mL/m2 Final    LV Mass  Index 06/29/2023 54  g/m2 Final    RA Major Axis 06/29/2023 4.95  cm Final    Left Atrium Minor Axis 06/29/2023 3.52  cm Final    Left Atrium Major Axis 06/29/2023 3.82  cm Final    Triscuspid Valve Regurgitation Pea* 06/29/2023 29  mmHg Final    RA Width 06/29/2023 3.80  cm Final    Right Atrial Pressure (from IVC) 06/29/2023 3  mmHg Final    EF 06/29/2023 65  % Final    TV resting pulmonary artery pressu* 06/29/2023 32  mmHg Final       Imaging  Echo    Result Date: 9/26/2023    Left Ventricle: The left ventricle is normal in size. Normal wall thickness. regional wall motion abnormalities present. See diagram for wall motion findings. There is normal systolic function with a visually estimated ejection fraction of 60 - 65%. There is normal diastolic function.   Right Ventricle: Normal right ventricular cavity size. Systolic function is normal.   Right Atrium: Right atrium is moderately dilated.   Aortic Valve: The aortic valve is a trileaflet valve. valve is well seated in the 30 mm Cardioroot aortic graft   Pulmonary Artery: The estimated pulmonary artery systolic pressure is 37 mmHg.   IVC/SVC: Normal venous pressure at 3 mmHg. 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 COMPARISON: 09/21/2023 FINDINGS: Postoperative changes  are again identified in the thorax.  Interval removal of right-sided central venous catheter.  Cardiac silhouette is magnified by portable AP technique.  The heart appears to be at the upper limits of normal in size and unchanged.  Tortuous thoracic aorta with atherosclerotic calcification in the wall of the aortic arch.  Pulmonary vascularity does not appear congested.  Lungs are satisfactorily expanded.  Improved aeration at both lung bases, particularly on the right.  Mild subsegmental atelectatic changes remain.  No significant pleural fluid and no pneumothorax.  Skeletal structures appear intact.     Interval removal of right-sided central venous catheter.  Improved aeration at both lung bases with mild residual bibasilar atelectasis. Electronically signed by: Aman Zavala MD Date:    09/26/2023 Time:    12:27    X-Ray Chest 1 View    Result Date: 9/21/2023  EXAMINATION: XR CHEST 1 VIEW CLINICAL HISTORY: increase O2 requirements; TECHNIQUE: Single frontal view of the chest was performed. COMPARISON: 09/21/2023 at 05:01 FINDINGS: Stably positioned right-sided central venous catheter.  Apparent interval removal of previously identified left-sided chest tube.  No evidence of new or enlarging pneumothorax.  The cardiomediastinal silhouette is unchanged in size and configuration noting postoperative change of prior sternotomy.  The lungs are symmetrically expanded with no significant interval detrimental change in lung aeration noting bibasilar atelectasis and pleural fluid, similar to prior study.     Please see above. Electronically signed by: Joceline Starkey MD Date:    09/21/2023 Time:    23:59    Echo    Result Date: 9/21/2023    Left Ventricle: The left ventricle is normal in size. Normal wall thickness. Septal motion is consistent with post-operative status. There is normal systolic function with a visually estimated ejection fraction of 55 - 60%. Unable to assess diastolic functionUnable to assess due to E-A  fusion.   Right Ventricle: Mild right ventricular enlargement. Wall thickness is normal. Right ventricle wall motion  is normal. Systolic function is mildly reduced.   IVC/SVC: Normal venous pressure at 3 mmHg.     X-Ray Chest AP Portable    Result Date: 9/21/2023  EXAMINATION: XR CHEST AP PORTABLE CLINICAL HISTORY: Post-op cardiac surgery; TECHNIQUE: Single frontal view of the chest was performed. COMPARISON: Nine//10 FINDINGS: Three Mulberry-Trisha catheter is been removed.  Heart size is smaller than yesterday.  Vascular catheter seen in the superior vena cava.  Some atelectasis and loss of volume is seen at the lung bases a little pleural fluid but chest is significantly improved since yesterday     . see above Electronically signed by: Heriberto Styles MD Date:    09/21/2023 Time:    07:19    X-Ray Chest AP Portable    Result Date: 9/20/2023  EXAMINATION: XR CHEST AP PORTABLE CLINICAL HISTORY: Post-op cardiac surgery; TECHNIQUE: Single frontal view of the chest was performed. COMPARISON: No 09/19/2023 ne FINDINGS: Postoperative changes as before.  Mulberry-Trisha catheter in the pulmonary outflow tract with its tip slightly coiled up similar to the previous study left-sided chest tube as before.  Opacification at the lung bases consistent with atelectatic changes ground-glass appearance of the right lower lung fields probably underlying pleural effusion.  The upper lung fields are clear.     See above Electronically signed by: Kyle Wilson MD Date:    09/20/2023 Time:    09:04      Assessment  1. Atherosclerosis of native coronary artery of native heart without angina pectoris  Stable after bypass    2. Atrial fibrillation, unspecified type  With amiodarone, has organized into atrial flutter  - IN OFFICE EKG 12-LEAD (to Muse)    3. Primary hypertension  Increasing.  Resume valsartan 160 mg daily    4. Hypercholesterolemia  Continue atorvastatin      Plan and Discussion    Decrease amiodarone to 200 mg once daily.   Valsartan 160 mg daily for blood pressure.  Check blood work today.  Back in 2 weeks.  If still in atrial flutter or atrial fibrillation, cardioversion.    The 10-year ASCVD risk score (Rell DK, et al., 2019) is: 16.9%    Values used to calculate the score:      Age: 68 years      Sex: Male      Is Non- : No      Diabetic: No      Tobacco smoker: No      Systolic Blood Pressure: 136 mmHg      Is BP treated: Yes      HDL Cholesterol: 51 mg/dL      Total Cholesterol: 148 mg/dL     Follow Up  Follow up in about 2 weeks (around 11/2/2023).      Chon Lopez MD

## 2023-10-23 ENCOUNTER — PATIENT MESSAGE (OUTPATIENT)
Dept: CARDIOLOGY | Facility: CLINIC | Age: 68
End: 2023-10-23
Payer: COMMERCIAL

## 2023-10-23 RX ORDER — METOPROLOL SUCCINATE 100 MG/1
100 TABLET, EXTENDED RELEASE ORAL DAILY
Qty: 90 TABLET | Refills: 3 | Status: SHIPPED | OUTPATIENT
Start: 2023-10-23 | End: 2023-11-20 | Stop reason: SDUPTHER

## 2023-10-26 NOTE — PROGRESS NOTES
Patient seen and examined. Patient is progressively increasing activity. No significant complaints.     Sternum: stable, incision CDI  EKG: atrial flutter     Assessment:  S/P the following procedures on 9/18/23  - Valve Sparing Aortic Root Replacement (Rafal Procedure) with Coronary Reimplantation with 30 mm Cardioroot bulged aortic root graft  - Transverse aortic arch replacement (yvonne arch replacement) with 30mm Cardioroot graft under hypothermic circulatory arrest  - Double vessel coronary artery bypass grafting  Left internal mammary artery to the distal left anterior descending artery  Saphenous vein graft to the posterior descending artery  - Endoscopic saphenous vein harvest from the left lower extremity  - Repair of hernia of Morgagni with patch of bovine pericardium        Plan:  Can begin driving as long as he has power steering  Can begin cardiac rehab in the next couple of weeks  We will refer to cardiology to assume care - Dr. John arndt DC potassium  Cards planning for cardioversion       No scheduled appointment, RTC prn    I have seen the patient and reviewed the physician assistant's note above. I have personally interviewed and examined the patient at bedside and agree with the findings.     Mr. Browning is doing well after the Rafal procedure, yvonne arch, CABGx2, and Morgagni hernia repair on September 18, 2023.  He is walking daily and feeling stronger.  He can see me in clinic as needed.      Imtiaz Mooney MD  Cardiothoracic Surgery  [facility]

## 2023-10-27 ENCOUNTER — TELEPHONE (OUTPATIENT)
Dept: CARDIOTHORACIC SURGERY | Facility: CLINIC | Age: 68
End: 2023-10-27
Payer: COMMERCIAL

## 2023-10-27 ENCOUNTER — TELEPHONE (OUTPATIENT)
Dept: CARDIAC REHAB | Facility: CLINIC | Age: 68
End: 2023-10-27
Payer: COMMERCIAL

## 2023-10-27 ENCOUNTER — PATIENT MESSAGE (OUTPATIENT)
Dept: CARDIOLOGY | Facility: CLINIC | Age: 68
End: 2023-10-27
Payer: COMMERCIAL

## 2023-10-27 DIAGNOSIS — I25.10 ATHEROSCLEROSIS OF NATIVE CORONARY ARTERY OF NATIVE HEART WITHOUT ANGINA PECTORIS: Primary | ICD-10-CM

## 2023-10-27 NOTE — LETTER
October 27, 2023    Sawyer Browning  [address] [facility] [address]             [facility]  [address] [facility]  [place] [address]  Phone: [phone]                                  [facility]   [address] [facility]   [place] [address]  [phone]         [facility]   [address] [place] [address] [phone]         [facility]    [address] [place] [address] [phone]   Re: Sawyer Browning  Clinic number: [id number]    Dear Mr. Browning:    You were recently admitted to an [facility] facility for cardiac (heart) problem.  Your physician has referred you to [facility].  Cardiac Rehab Phase 2 is an educational and exercise program, conducted in a outpatient setting, proven to help reduce your risk for recurrent heart events.    Cardiac rehab has two major parts:    1. Exercise training to help you achieve cardiovascular fitness while learning how to exercise safely and improve muscle strength and endurance.  Your exercise prescription will be based on the results of the cardiopulmonary stress test (CPX) which will be done before entering the program and at completion.  2. Education, counseling and training to help you understand your heart condition and find ways to reduce your risk of future heart problems.  The cardiac rehab team will help you learn how to cope with the stress of adjusting to a new lifestyle and to deal with your fears about the future.    Phase 2 is a 36-session program, meeting 3 times a week for 12 weeks.  Each session consists of an hour of exercise and half-hour dedicated to the educational topic of the day.  Class days vary per location.  Please contact your nearest facility for details.    Through cardiac rehab you will learn:  About your heart condition, medical therapies, and medication  Risk factors in y our lifestyle contributing to heart  disease  New strategies to modify your risk factors  About a healthy diet that can lower your blood cholesterol, control weight, help prevent or control high blood pressure, and diabetes  How to stop smoking  How to manage stress    If you are interested in getting started, call the [facility] of your choosing.     Sincerely,     [facility]

## 2023-10-27 NOTE — TELEPHONE ENCOUNTER
Returned call to pt. Pt states he would like additional HH visits and that he just recently up titrated on his BP  medication per Dr. Lopez. Advised pt I would call HH and authorize additional visits per MARSHALL Kwan. Waiting for return call from [facility]  .         ----- Message from Jimenez Merritt sent at 10/27/2023  1:35 PM CDT -----  Regarding: Return Call from Rachel Needed  Contact: [phone]  Pt requesting a call back from [facility] regarding Home Health Care Visits additional visits pending Dr. Mooney status needed.

## 2023-10-27 NOTE — LETTER
October 27, 2023    Sawyer Browning  [address] [facility] [address]             [facility]  [address] [facility]  [place] [address]  Phone: [phone]                                  [facility]   [address] [facility]   [place] [address]  [phone]         [facility]   [address] [place] [address] [phone]         [facility]    [address] [place] [address] [phone]   Re: Sawyer Browning  Clinic number: [id number]    Dear Mr. Browning:    You were recently admitted to an [facility] facility for cardiac (heart) problem.  Your physician has referred you to [facility].  Cardiac Rehab Phase 2 is an educational and exercise program, conducted in a outpatient setting, proven to help reduce your risk for recurrent heart events.    Cardiac rehab has two major parts:    1. Exercise training to help you achieve cardiovascular fitness while learning how to exercise safely and improve muscle strength and endurance.  Your exercise prescription will be based on the results of the cardiopulmonary stress test (CPX) which will be done before entering the program and at completion.  2. Education, counseling and training to help you understand your heart condition and find ways to reduce your risk of future heart problems.  The cardiac rehab team will help you learn how to cope with the stress of adjusting to a new lifestyle and to deal with your fears about the future.    Phase 2 is a 36-session program, meeting 3 times a week for 12 weeks.  Each session consists of an hour of exercise and half-hour dedicated to the educational topic of the day.  Class days vary per location.  Please contact your nearest facility for details.    Through cardiac rehab you will learn:  About your heart condition, medical therapies, and medication  Risk factors in y our lifestyle contributing to heart  disease  New strategies to modify your risk factors  About a healthy diet that can lower your blood cholesterol, control weight, help prevent or control high blood pressure, and diabetes  How to stop smoking  How to manage stress    If you are interested in getting started, call the [facility] of your choosing.     Sincerely,     [facility]

## 2023-10-27 NOTE — TELEPHONE ENCOUNTER
Letter regarding Phase II cardiac rehab was sent to patient.  Will contact patient in 2 weeks to see if interested.  Also, information letter sent to [facility].  Loren Marin RN  Cardiac Rehab Nurse

## 2023-10-29 ENCOUNTER — NURSE TRIAGE (OUTPATIENT)
Dept: ADMINISTRATIVE | Facility: CLINIC | Age: 68
End: 2023-10-29
Payer: COMMERCIAL

## 2023-10-29 NOTE — TELEPHONE ENCOUNTER
Reason for Disposition   History of heart disease (i.e., heart attack, bypass surgery, angina, angioplasty, CHF)  (Exception: Brief heartbeat symptoms that went away and now feels well.)    Additional Information   Negative: Passed out (i.e., lost consciousness, collapsed and was not responding)   Negative: Shock suspected (e.g., cold/pale/clammy skin, too weak to stand, low BP, rapid pulse)   Negative: Difficult to awaken or acting confused (e.g., disoriented, slurred speech)   Negative: Visible sweat on face or sweat dripping down face   Negative: Unable to walk, or can only walk with assistance (e.g., requires support)   Negative: [1] Received SHOCK from implantable cardiac defibrillator AND [2] persisting symptoms (i.e., palpitations, lightheadedness)   Negative: [1] Dizziness, lightheadedness, or weakness AND [2] heart beating very rapidly (e.g., > 140 / minute)   Negative: [1] Dizziness, lightheadedness, or weakness AND [2] heart beating very slowly (e.g., < 50 / minute)   Negative: Sounds like a life-threatening emergency to the triager   Negative: Difficulty breathing   Negative: Dizziness, lightheadedness, or weakness   Negative: [1] Heart beating very rapidly (e.g., > 140 / minute) AND [2] present now  (Exception: During exercise.)   Negative: Heart beating very slowly (e.g., < 50 / minute)  (Exception: Athlete and heart rate normal for caller.)   Negative: New or worsened shortness of breath with activity (dyspnea on exertion)   Negative: Patient sounds very sick or weak to the triager   Negative: [1] Heart beating very rapidly (e.g., > 140 / minute) AND [2] not present now  (Exception: During exercise.)   Negative: [1] Skipped or extra beat(s) AND [2] increases with exercise or exertion   Negative: [1] Skipped or extra beat(s) AND [2] occurs 4 or more times per minute   Negative: New or worsened ankle swelling    Protocols used: Heart Rate and Heartbeat Jyschasru-E-ME  Spoke with pt who reports that he  had bypass surgery almost 6 weeks ago. Reports that BP is 135/104  per monitor. Denies dizziness, SOB, and weakness. Pt advised to be seen in ED/UC. Verbalized understanding.

## 2023-10-30 ENCOUNTER — TELEPHONE (OUTPATIENT)
Dept: CARDIAC REHAB | Facility: CLINIC | Age: 68
End: 2023-10-30
Payer: COMMERCIAL

## 2023-10-30 ENCOUNTER — OFFICE VISIT (OUTPATIENT)
Dept: CARDIOLOGY | Facility: CLINIC | Age: 68
End: 2023-10-30
Payer: COMMERCIAL

## 2023-10-30 ENCOUNTER — OFFICE VISIT (OUTPATIENT)
Dept: UROLOGY | Facility: CLINIC | Age: 68
End: 2023-10-30
Payer: COMMERCIAL

## 2023-10-30 VITALS
BODY MASS INDEX: 24.22 KG/M2 | DIASTOLIC BLOOD PRESSURE: 98 MMHG | WEIGHT: 183.63 LBS | SYSTOLIC BLOOD PRESSURE: 134 MMHG | HEART RATE: 132 BPM

## 2023-10-30 VITALS
SYSTOLIC BLOOD PRESSURE: 159 MMHG | DIASTOLIC BLOOD PRESSURE: 109 MMHG | WEIGHT: 180.75 LBS | BODY MASS INDEX: 23.96 KG/M2 | HEART RATE: 139 BPM | HEIGHT: 73 IN

## 2023-10-30 DIAGNOSIS — N40.0 BENIGN NON-NODULAR PROSTATIC HYPERPLASIA WITHOUT LOWER URINARY TRACT SYMPTOMS: Primary | ICD-10-CM

## 2023-10-30 DIAGNOSIS — I25.10 ATHEROSCLEROSIS OF NATIVE CORONARY ARTERY OF NATIVE HEART WITHOUT ANGINA PECTORIS: ICD-10-CM

## 2023-10-30 DIAGNOSIS — I48.3 TYPICAL ATRIAL FLUTTER: Primary | ICD-10-CM

## 2023-10-30 DIAGNOSIS — E78.00 HYPERCHOLESTEROLEMIA: ICD-10-CM

## 2023-10-30 DIAGNOSIS — I10 PRIMARY HYPERTENSION: ICD-10-CM

## 2023-10-30 PROCEDURE — 1160F PR REVIEW ALL MEDS BY PRESCRIBER/CLIN PHARMACIST DOCUMENTED: ICD-10-PCS | Mod: CPTII,S$GLB,, | Performed by: INTERNAL MEDICINE

## 2023-10-30 PROCEDURE — 3075F SYST BP GE 130 - 139MM HG: CPT | Mod: CPTII,S$GLB,, | Performed by: INTERNAL MEDICINE

## 2023-10-30 PROCEDURE — 99214 OFFICE O/P EST MOD 30 MIN: CPT | Mod: 25,S$GLB,, | Performed by: INTERNAL MEDICINE

## 2023-10-30 PROCEDURE — 99999 PR PBB SHADOW E&M-EST. PATIENT-LVL III: ICD-10-PCS | Mod: PBBFAC,,, | Performed by: UROLOGY

## 2023-10-30 PROCEDURE — 3008F BODY MASS INDEX DOCD: CPT | Mod: CPTII,S$GLB,, | Performed by: INTERNAL MEDICINE

## 2023-10-30 PROCEDURE — 99499 UNLISTED E&M SERVICE: CPT | Mod: S$GLB,,, | Performed by: UROLOGY

## 2023-10-30 PROCEDURE — 99999 PR PBB SHADOW E&M-EST. PATIENT-LVL III: CPT | Mod: PBBFAC,,, | Performed by: INTERNAL MEDICINE

## 2023-10-30 PROCEDURE — 4010F ACE/ARB THERAPY RXD/TAKEN: CPT | Mod: CPTII,S$GLB,, | Performed by: INTERNAL MEDICINE

## 2023-10-30 PROCEDURE — 93010 ELECTROCARDIOGRAM REPORT: CPT | Mod: S$GLB,,, | Performed by: INTERNAL MEDICINE

## 2023-10-30 PROCEDURE — 1126F AMNT PAIN NOTED NONE PRSNT: CPT | Mod: CPTII,S$GLB,, | Performed by: INTERNAL MEDICINE

## 2023-10-30 PROCEDURE — 1126F PR PAIN SEVERITY QUANTIFIED, NO PAIN PRESENT: ICD-10-PCS | Mod: CPTII,S$GLB,, | Performed by: INTERNAL MEDICINE

## 2023-10-30 PROCEDURE — 99214 PR OFFICE/OUTPT VISIT, EST, LEVL IV, 30-39 MIN: ICD-10-PCS | Mod: 25,S$GLB,, | Performed by: INTERNAL MEDICINE

## 2023-10-30 PROCEDURE — 3080F DIAST BP >= 90 MM HG: CPT | Mod: CPTII,S$GLB,, | Performed by: INTERNAL MEDICINE

## 2023-10-30 PROCEDURE — 3080F PR MOST RECENT DIASTOLIC BLOOD PRESSURE >= 90 MM HG: ICD-10-PCS | Mod: CPTII,S$GLB,, | Performed by: INTERNAL MEDICINE

## 2023-10-30 PROCEDURE — 99499 NO LOS: ICD-10-PCS | Mod: S$GLB,,, | Performed by: UROLOGY

## 2023-10-30 PROCEDURE — 4010F PR ACE/ARB THEARPY RXD/TAKEN: ICD-10-PCS | Mod: CPTII,S$GLB,, | Performed by: INTERNAL MEDICINE

## 2023-10-30 PROCEDURE — 1159F PR MEDICATION LIST DOCUMENTED IN MEDICAL RECORD: ICD-10-PCS | Mod: CPTII,S$GLB,, | Performed by: INTERNAL MEDICINE

## 2023-10-30 PROCEDURE — 3008F PR BODY MASS INDEX (BMI) DOCUMENTED: ICD-10-PCS | Mod: CPTII,S$GLB,, | Performed by: INTERNAL MEDICINE

## 2023-10-30 PROCEDURE — 3075F PR MOST RECENT SYSTOLIC BLOOD PRESS GE 130-139MM HG: ICD-10-PCS | Mod: CPTII,S$GLB,, | Performed by: INTERNAL MEDICINE

## 2023-10-30 PROCEDURE — 93005 ELECTROCARDIOGRAM TRACING: CPT

## 2023-10-30 PROCEDURE — 99999 PR PBB SHADOW E&M-EST. PATIENT-LVL III: ICD-10-PCS | Mod: PBBFAC,,, | Performed by: INTERNAL MEDICINE

## 2023-10-30 PROCEDURE — 1159F MED LIST DOCD IN RCRD: CPT | Mod: CPTII,S$GLB,, | Performed by: INTERNAL MEDICINE

## 2023-10-30 PROCEDURE — 99999 PR PBB SHADOW E&M-EST. PATIENT-LVL III: CPT | Mod: PBBFAC,,, | Performed by: UROLOGY

## 2023-10-30 PROCEDURE — 1160F RVW MEDS BY RX/DR IN RCRD: CPT | Mod: CPTII,S$GLB,, | Performed by: INTERNAL MEDICINE

## 2023-10-30 PROCEDURE — 93010 EKG 12-LEAD: ICD-10-PCS | Mod: S$GLB,,, | Performed by: INTERNAL MEDICINE

## 2023-10-30 NOTE — PATIENT INSTRUCTIONS
Procedure: GWEN/cardioversion  Procedure date: 11/8/23  Procedure time: 8:00am    Arrive at the Outpatient Surgery Unit ([facility]) at 6:30am.  Nothing to eat or drink after midnight.  Take all morning medication with a sip of water.  If you are diabetic, do not take any diabetes medication the morning of the procedure.   Please make arrangements for a family member or friend to bring you home after the procedure. You will not be able to drive home.        If you have any questions, please call our office at [phone].

## 2023-10-30 NOTE — H&P (VIEW-ONLY)
[facility]    Chief Complaint  Chief Complaint   Patient presents with    Atrial Fibrillation       HPI:    Comes in for an early appointment because he is concerned that his blood pressure is still high.  Valsartan was just increased 3 days ago.  Otherwise feels well.  Admits to being very anxious.    Medications  Current Outpatient Medications   Medication Sig Dispense Refill    acetaminophen (TYLENOL) 500 MG tablet Take 2 tablets (1,000 mg total) by mouth every 8 (eight) hours as needed for Pain.  0    amiodarone (PACERONE) 200 MG Tab Take 1 tablet (200 mg total) by mouth once daily. 30 tablet 11    apixaban (ELIQUIS) 5 mg Tab Take 1 tablet (5 mg total) by mouth 2 (two) times daily. 90 tablet 1    aspirin (ECOTRIN) 81 MG EC tablet Take 81 mg by mouth once daily.      atorvastatin (LIPITOR) 80 MG tablet Take 1 tablet (80 mg total) by mouth once daily. 90 tablet 3    furosemide (LASIX) 40 MG tablet Take 1 tablet (40 mg total) by mouth daily as needed (weight gain). 30 tablet 11    lansoprazole (PREVACID) 30 MG capsule TAKE 1 CAPSULE DAILY ONLY  AS NEEDED FOR HEARTBURN 90 capsule 0    metoprolol succinate (TOPROL-XL) 100 MG 24 hr tablet Take 1 tablet (100 mg total) by mouth once daily. 90 tablet 3    oxyCODONE (ROXICODONE) 5 MG immediate release tablet Take 1 tablet (5 mg total) by mouth every 4 (four) hours as needed for Pain. 30 tablet 0    oxyCODONE (ROXICODONE) 5 MG immediate release tablet Take 1 tablet (5 mg total) by mouth every 4 (four) hours as needed for Pain. 12 tablet 0    polyethylene glycol (GLYCOLAX) 17 gram PwPk Take 17 g by mouth 2 (two) times daily as needed.  0    valsartan (DIOVAN) 160 MG tablet Take 1 tablet (160 mg total) by mouth once daily. (Patient taking differently: Take 160 mg by mouth 2 (two) times daily.) 30 tablet 11     No current facility-administered medications for this visit.        History  Past Medical History:   Diagnosis Date    BPH (benign prostatic  hyperplasia)     Elevated liver enzymes     Fatty infiltration of liver     Glucose intolerance (impaired glucose tolerance)     Hiatal hernia     Hyperlipidemia     Hypertension     Overweight     Reflux      Past Surgical History:   Procedure Laterality Date    AORTIC ROOT REPLACEMENT N/A 9/18/2023    Procedure: VALVE SPARING AORTIC ROOT REPLACEMENT;  Surgeon: Imtiaz Mooney MD;  Location: [facility] OR [address] [place] [address];  Service: Cardiovascular;  Laterality: N/A;  Valve sparing aortic root replacement, vs biobentall, hemiarch, CABGx2 under  hypothermic circ arrest    CORONARY ANGIOGRAPHY N/A 9/6/2023    Procedure: ANGIOGRAM, CORONARY ARTERY;  Surgeon: Chon Lopez MD;  Location: [facility], [place] operated by [facility] CATH LAB;  Service: Cardiology;  Laterality: N/A;  right radial    CORONARY ARTERY BYPASS GRAFT (CABG) N/A 9/18/2023    Procedure: CORONARY ARTERY BYPASS GRAFT (CABG);  Surgeon: Imtiaz Mooney MD;  Location: [facility] OR [address] [place] [address];  Service: Cardiovascular;  Laterality: N/A;  Vein [place] Start: 821  Vein [place] End: 838  Vein Preparation Start: 839  Vein Preparation End: 912    ENDOSCOPIC HARVEST OF VEIN Left 9/18/2023    Procedure: HARVEST-VEIN-ENDOVASCULAR;  Surgeon: Imtiaz Mooney MD;  Location: [facility] OR [address] [place] [address];  Service: Cardiovascular;  Laterality: Left;  Valve sparing aortic root replacement, vs biobentall, hemiarch, CABGx2 under    inguinal hernia      x2    REPAIR OF DIAPHRAGMATIC HERNIA  9/18/2023    Procedure: REPAIR, HERNIA, DIAPHRAGMATIC;  Surgeon: Imtiaz Mooney MD;  Location: [facility] OR [address] [place] [address];  Service: Cardiovascular;;    tonsillectomy       Social History     Socioeconomic History    Marital status:     Number of children: 2   Occupational History    Occupation: Works in insurance   Tobacco Use    Smoking status: Never    Smokeless tobacco: Never   Substance and Sexual Activity    Alcohol use: Yes     Comment: 2 glasses of wine most evenings.    Drug use: No     Social Determinants of Health      Financial Resource Strain: Low Risk  (9/19/2023)    Overall Financial Resource Strain (CARDIA)     Difficulty of Paying Living Expenses: Not hard at all   Food Insecurity: No Food Insecurity (9/19/2023)    Hunger Vital Sign     Worried About Running Out of Food in the Last Year: Never true     Ran Out of Food in the Last Year: Never true   Transportation Needs: No Transportation Needs (9/19/2023)    PRAPARE - Transportation     Lack of Transportation (Medical): No     Lack of Transportation (Non-Medical): No   Physical Activity: Sufficiently Active (9/19/2023)    Exercise Vital Sign     Days of Exercise per Week: 3 days     Minutes of Exercise per Session: 60 min   Stress: Unknown (9/19/2023)    [employer] - Occupational Stress Questionnaire     Feeling of Stress : Patient refused   Social Connections: Unknown (9/19/2023)    Social Connection and Isolation Panel [NHANES]     Frequency of Communication with Friends and Family: More than three times a week     Frequency of Social Gatherings with Friends and Family: More than three times a week     Attends Scientology Services: Patient refused     Active Member of Clubs or Organizations: Patient refused     Attends Club or Organization Meetings: Patient refused     Marital Status:    Housing Stability: Low Risk  (9/19/2023)    Housing Stability Vital Sign     Unable to Pay for Housing in the Last Year: No     Number of Places Lived in the Last Year: 1     Unstable Housing in the Last Year: No     Family History   Problem Relation Age of Onset    Hypertension Mother     Macular degeneration Mother         - gets shots in her eye    Stroke Father     Heart disease Father     Aneurysm Father     Migraines Sister     Arthritis Brother         knees    Dementia Paternal Grandmother     Diabetes Paternal Grandmother     Heart disease Paternal Grandfather     Diabetes Brother     Hernia Brother     Cancer Paternal Aunt     Cancer  Paternal Uncle     Cancer Paternal Aunt     Cancer Paternal Uncle     Colon cancer Neg Hx     Prostate cancer Neg Hx         Allergies  Review of patient's allergies indicates:   Allergen Reactions    Ace inhibitors Other (See Comments)     cough    Losartan      headache       Review of Systems   Review of Systems   Constitutional: Negative for malaise/fatigue, weight gain and weight loss.   Eyes:  Negative for visual disturbance.   Cardiovascular:  Negative for chest pain, claudication, cyanosis, dyspnea on exertion, irregular heartbeat, leg swelling, near-syncope, orthopnea, palpitations, paroxysmal nocturnal dyspnea and syncope.   Respiratory:  Negative for cough, hemoptysis, shortness of breath, sleep disturbances due to breathing and wheezing.    Hematologic/Lymphatic: Negative for bleeding problem. Does not bruise/bleed easily.   Skin:  Negative for poor wound healing.   Musculoskeletal:  Negative for muscle cramps and myalgias.   Gastrointestinal:  Negative for abdominal pain, anorexia, diarrhea, heartburn, hematemesis, hematochezia, melena, nausea and vomiting.   Genitourinary:  Negative for hematuria and nocturia.   Neurological:  Negative for excessive daytime sleepiness, dizziness, focal weakness, light-headedness and weakness.   Psychiatric/Behavioral:  The patient is nervous/anxious.        Physical Exam  Vitals:    10/30/23 1353   BP: (!) 134/98   Pulse: (!) 132     Wt Readings from Last 1 Encounters:   10/30/23 83.3 kg (183 lb 9.6 oz)     Physical Exam  Vitals and nursing note reviewed.   Constitutional:       General: He is not in acute distress.     Appearance: He is not toxic-appearing or diaphoretic.   HENT:      Head: Normocephalic and atraumatic.      Mouth/Throat:      Lips: Pink.      Mouth: Mucous membranes are moist.   Eyes:      General: No scleral icterus.     Conjunctiva/sclera: Conjunctivae normal.   Neck:      Thyroid: No thyromegaly.      Vascular: No carotid bruit, hepatojugular  reflux or JVD.      Trachea: Trachea normal.   Cardiovascular:      Rate and Rhythm: Tachycardia present. Rhythm irregular. No extrasystoles are present.     Chest Wall: PMI is not displaced.      Pulses:           Carotid pulses are 2+ on the right side and 2+ on the left side.       Radial pulses are 2+ on the right side and 2+ on the left side.        Dorsalis pedis pulses are 2+ on the right side and 2+ on the left side.        Posterior tibial pulses are 2+ on the right side and 2+ on the left side.      Heart sounds: S1 normal and S2 normal. No murmur heard.     No friction rub. No S3 or S4 sounds.   Pulmonary:      Effort: Pulmonary effort is normal. No tachypnea, bradypnea, accessory muscle usage or respiratory distress.      Breath sounds: Normal breath sounds and air entry. No decreased breath sounds, wheezing, rhonchi or rales.   Chest:      Comments: Median sternotomy healing well.  Sternum is stable.  Abdominal:      General: Bowel sounds are normal. There is no distension or abdominal bruit.      Palpations: Abdomen is soft. There is no hepatomegaly, splenomegaly or pulsatile mass.      Tenderness: There is no abdominal tenderness.   Musculoskeletal:         General: No tenderness or deformity.      Right lower leg: No edema.      Left lower leg: No edema.   Skin:     General: Skin is warm and dry.      Capillary Refill: Capillary refill takes less than 2 seconds.      Coloration: Skin is not cyanotic or pale.      Nails: There is no clubbing.   Neurological:      General: No focal deficit present.      Mental Status: He is alert and oriented to person, place, and time.   Psychiatric:         Attention and Perception: Attention normal.         Mood and Affect: Mood normal.         Speech: Speech normal.         Behavior: Behavior normal. Behavior is cooperative.         Labs  Lab Visit on 10/19/2023   Component Date Value Ref Range Status    Sodium 10/19/2023 142  136 - 145 mmol/L Final    Potassium  10/19/2023 3.7  3.5 - 5.1 mmol/L Final    Chloride 10/19/2023 106  95 - 110 mmol/L Final    CO2 10/19/2023 26  23 - 29 mmol/L Final    Glucose 10/19/2023 85  70 - 110 mg/dL Final    BUN 10/19/2023 16  8 - 23 mg/dL Final    Creatinine 10/19/2023 1.3  0.5 - 1.4 mg/dL Final    Calcium 10/19/2023 9.5  8.7 - 10.5 mg/dL Final    Total Protein 10/19/2023 7.2  6.0 - 8.4 g/dL Final    Albumin 10/19/2023 4.3  3.5 - 5.2 g/dL Final    Total Bilirubin 10/19/2023 0.7  0.1 - 1.0 mg/dL Final    Comment: For infants and newborns, interpretation of results should be based  on gestational age, weight and in agreement with clinical  observations.    Premature Infant recommended reference ranges:  Up to 24 hours.............<8.0 mg/dL  Up to 48 hours............<12.0 mg/dL  3-5 days..................<15.0 mg/dL  6-29 days.................<15.0 mg/dL      Alkaline Phosphatase 10/19/2023 105  55 - 135 U/L Final    AST 10/19/2023 17  10 - 40 U/L Final    ALT 10/19/2023 69 (H)  10 - 44 U/L Final    eGFR 10/19/2023 60  >60 mL/min/1.73 m^2 Final    Anion Gap 10/19/2023 10  8 - 16 mmol/L Final    TSH 10/19/2023 2.023  0.400 - 4.000 uIU/mL Final    WBC 10/19/2023 7.34  3.90 - 12.70 K/uL Final    RBC 10/19/2023 4.47 (L)  4.60 - 6.20 M/uL Final    Hemoglobin 10/19/2023 13.5 (L)  14.0 - 18.0 g/dL Final    Hematocrit 10/19/2023 41.1  40.0 - 54.0 % Final    MCV 10/19/2023 92  82 - 98 fL Final    MCH 10/19/2023 30.2  27.0 - 31.0 pg Final    MCHC 10/19/2023 32.8  32.0 - 36.0 g/dL Final    RDW 10/19/2023 14.0  11.5 - 14.5 % Final    Platelets 10/19/2023 211  150 - 450 K/uL Final    MPV 10/19/2023 11.0  9.2 - 12.9 fL Final    Immature Granulocytes 10/19/2023 0.4  0.0 - 0.5 % Final    Gran # (ANC) 10/19/2023 5.1  1.8 - 7.7 K/uL Final    Immature Grans (Abs) 10/19/2023 0.03  0.00 - 0.04 K/uL Final    Comment: Mild elevation in immature granulocytes is non specific and   can be seen in a variety of conditions including stress response,   acute  inflammation, trauma and pregnancy. Correlation with other   laboratory and clinical findings is essential.      Lymph # 10/19/2023 1.1  1.0 - 4.8 K/uL Final    Mono # 10/19/2023 1.0  0.3 - 1.0 K/uL Final    Eos # 10/19/2023 0.2  0.0 - 0.5 K/uL Final    Baso # 10/19/2023 0.05  0.00 - 0.20 K/uL Final    nRBC 10/19/2023 0  0 /100 WBC Final    Gran % 10/19/2023 69.0  38.0 - 73.0 % Final    Lymph % 10/19/2023 14.4 (L)  18.0 - 48.0 % Final    Mono % 10/19/2023 12.9  4.0 - 15.0 % Final    Eosinophil % 10/19/2023 2.6  0.0 - 8.0 % Final    Basophil % 10/19/2023 0.7  0.0 - 1.9 % Final    Differential Method 10/19/2023 Automated   Final   Admission on 09/26/2023, Discharged on 09/27/2023   Component Date Value Ref Range Status    HIV 1/2 Ag/Ab 09/26/2023 Non-reactive  Non-reactive Final    Hepatitis C Ab 09/26/2023 Non-reactive  Non-reactive Final    WBC 09/26/2023 9.68  3.90 - 12.70 K/uL Final    RBC 09/26/2023 3.62 (L)  4.60 - 6.20 M/uL Final    Hemoglobin 09/26/2023 11.1 (L)  14.0 - 18.0 g/dL Final    Hematocrit 09/26/2023 33.3 (L)  40.0 - 54.0 % Final    MCV 09/26/2023 92  82 - 98 fL Final    MCH 09/26/2023 30.7  27.0 - 31.0 pg Final    MCHC 09/26/2023 33.3  32.0 - 36.0 g/dL Final    RDW 09/26/2023 12.8  11.5 - 14.5 % Final    Platelets 09/26/2023 244  150 - 450 K/uL Final    MPV 09/26/2023 9.8  9.2 - 12.9 fL Final    Immature Granulocytes 09/26/2023 1.7 (H)  0.0 - 0.5 % Final    Gran # (ANC) 09/26/2023 7.0  1.8 - 7.7 K/uL Final    Immature Grans (Abs) 09/26/2023 0.16 (H)  0.00 - 0.04 K/uL Final    Comment: Mild elevation in immature granulocytes is non specific and   can be seen in a variety of conditions including stress response,   acute inflammation, trauma and pregnancy. Correlation with other   laboratory and clinical findings is essential.      Lymph # 09/26/2023 1.0  1.0 - 4.8 K/uL Final    Mono # 09/26/2023 1.3 (H)  0.3 - 1.0 K/uL Final    Eos # 09/26/2023 0.2  0.0 - 0.5 K/uL Final    Baso # 09/26/2023 0.07  0.00  - 0.20 K/uL Final    nRBC 09/26/2023 0  0 /100 WBC Final    Gran % 09/26/2023 71.8  38.0 - 73.0 % Final    Lymph % 09/26/2023 10.6 (L)  18.0 - 48.0 % Final    Mono % 09/26/2023 13.1  4.0 - 15.0 % Final    Eosinophil % 09/26/2023 2.1  0.0 - 8.0 % Final    Basophil % 09/26/2023 0.7  0.0 - 1.9 % Final    Differential Method 09/26/2023 Automated   Final    Sodium 09/26/2023 141  136 - 145 mmol/L Final    Potassium 09/26/2023 4.0  3.5 - 5.1 mmol/L Final    Chloride 09/26/2023 106  95 - 110 mmol/L Final    CO2 09/26/2023 25  23 - 29 mmol/L Final    Glucose 09/26/2023 100  70 - 110 mg/dL Final    BUN 09/26/2023 19  8 - 23 mg/dL Final    Creatinine 09/26/2023 0.8  0.5 - 1.4 mg/dL Final    Calcium 09/26/2023 9.1  8.7 - 10.5 mg/dL Final    Total Protein 09/26/2023 6.8  6.0 - 8.4 g/dL Final    Albumin 09/26/2023 3.8  3.5 - 5.2 g/dL Final    Total Bilirubin 09/26/2023 0.9  0.1 - 1.0 mg/dL Final    Comment: For infants and newborns, interpretation of results should be based  on gestational age, weight and in agreement with clinical  observations.    Premature Infant recommended reference ranges:  Up to 24 hours.............<8.0 mg/dL  Up to 48 hours............<12.0 mg/dL  3-5 days..................<15.0 mg/dL  6-29 days.................<15.0 mg/dL      Alkaline Phosphatase 09/26/2023 68  55 - 135 U/L Final    AST 09/26/2023 41 (H)  10 - 40 U/L Final    ALT 09/26/2023 81 (H)  10 - 44 U/L Final    eGFR 09/26/2023 >60.0  >60 mL/min/1.73 m^2 Final    Anion Gap 09/26/2023 10  8 - 16 mmol/L Final    Troponin I 09/26/2023 0.332 (H)  0.000 - 0.026 ng/mL Final    Comment: The reference interval for Troponin I represents the 99th percentile   cutoff   for our facility and is consistent with 3rd generation assay   performance.      BNP 09/26/2023 534 (H)  0 - 99 pg/mL Final    Values of less than 100 pg/ml are consistent with non-CHF populations.    Ascending aorta 09/26/2023 3.2  cm Final    STJ 09/26/2023 3.2  cm Final    AV mean  "gradient 09/26/2023 4  mmHg Final    Ao peak arias 09/26/2023 1.40  m/s Final    Ao VTI 09/26/2023 25.18  cm Final    IVRT 09/26/2023 48.53  msec Final    IVS 09/26/2023 0.84  0.6 - 1.1 cm Final    LA size 09/26/2023 3.64  cm Final    Left Atrium Major Axis 09/26/2023 6.04  cm Final    Left Atrium Minor Axis 09/26/2023 5.97  cm Final    LVIDd 09/26/2023 4.85  3.5 - 6.0 cm Final    LVIDs 09/26/2023 3.14  2.1 - 4.0 cm Final    LVOT diameter 09/26/2023 2.03  cm Final    LVOT peak VTI 09/26/2023 22.45  cm Final    Posterior Wall 09/26/2023 0.99  0.6 - 1.1 cm Final    MV Peak A Arias 09/26/2023 0.50  m/s Final    E wave deceleration time 09/26/2023 231.09  msec Final    MV Peak E Arias 09/26/2023 1.09  m/s Final    PV Peak D Arias 09/26/2023 0.55  m/s Final    PV Peak S Arias 09/26/2023 0.38  m/s Final    RA Major Axis 09/26/2023 5.72  cm Final    RA Width 09/26/2023 4.34  cm Final    RVDD 09/26/2023 3.82  cm Final    Sinus 09/26/2023 3.5  cm Final    TAPSE 09/26/2023 0.81  cm Final    TR Max Arias 09/26/2023 2.90  m/s Final    LA WIDTH 09/26/2023 4.27  cm Final    MV stenosis pressure 1/2 time 09/26/2023 67.02  ms Final    LV Diastolic Volume 09/26/2023 110.20  mL Final    LV Systolic Volume 09/26/2023 38.99  mL Final    LVOT peak arias 09/26/2023 1.18  m/s Final    TDI LATERAL 09/26/2023 0.17  m/s Final    TDI SEPTAL 09/26/2023 0.12  m/s Final    LA volume (mod) 09/26/2023 50.00  cm3 Final    MV "A" wave duration 09/26/2023 11.42  msec Final    LV LATERAL E/E' RATIO 09/26/2023 6.41  m/s Final    LV SEPTAL E/E' RATIO 09/26/2023 9.08  m/s Final    FS 09/26/2023 35  % Final    LA volume 09/26/2023 79.33  cm3 Final    LV mass 09/26/2023 153.68  g Final    ZLVIDD 09/26/2023 -3.10   Final    ZLVIDS 09/26/2023 -1.99   Final    Left Ventricle Relative Wall Thick* 09/26/2023 0.41  cm Final    AV valve area 09/26/2023 2.88  cm² Final    AV Velocity Ratio 09/26/2023 0.84   Final    AV index (prosthetic) 09/26/2023 0.89   Final    MV valve area " p 1/2 method 09/26/2023 3.28  cm2 Final    E/A ratio 09/26/2023 2.18   Final    Mean e' 09/26/2023 0.15  m/s Final    Pulm vein S/D ratio 09/26/2023 0.69   Final    LVOT area 09/26/2023 3.2  cm2 Final    LVOT stroke volume 09/26/2023 72.62  cm3 Final    AV peak gradient 09/26/2023 8  mmHg Final    E/E' ratio 09/26/2023 7.52  m/s Final    LV Systolic Volume Index 09/26/2023 18.5  mL/m2 Final    LV Diastolic Volume Index 09/26/2023 52.23  mL/m2 Final    LA Volume Index 09/26/2023 37.6  mL/m2 Final    LV Mass Index 09/26/2023 73  g/m2 Final    Triscuspid Valve Regurgitation Pea* 09/26/2023 34  mmHg Final    LA Volume Index (Mod) 09/26/2023 23.7  mL/m2 Final    RUPA by Velocity Ratio 09/26/2023 2.73  cm² Final    BSA 09/26/2023 2.11  m2 Final    TV resting pulmonary artery pressu* 09/26/2023 37  mmHg Final    RV TB RVSP 09/26/2023 6  mmHg Final    Est. RA pres 09/26/2023 3  mmHg Final    IVC diameter 09/26/2023 2.4  cm Final    Troponin I 09/26/2023 0.280 (H)  0.000 - 0.026 ng/mL Final    Comment: The reference interval for Troponin I represents the 99th percentile   cutoff   for our facility and is consistent with 3rd generation assay   performance.      WBC 09/27/2023 9.77  3.90 - 12.70 K/uL Final    RBC 09/27/2023 4.03 (L)  4.60 - 6.20 M/uL Final    Hemoglobin 09/27/2023 12.4 (L)  14.0 - 18.0 g/dL Final    Hematocrit 09/27/2023 37.4 (L)  40.0 - 54.0 % Final    MCV 09/27/2023 93  82 - 98 fL Final    MCH 09/27/2023 30.8  27.0 - 31.0 pg Final    MCHC 09/27/2023 33.2  32.0 - 36.0 g/dL Final    RDW 09/27/2023 13.1  11.5 - 14.5 % Final    Platelets 09/27/2023 328  150 - 450 K/uL Final    MPV 09/27/2023 10.5  9.2 - 12.9 fL Final    Immature Granulocytes 09/27/2023 1.8 (H)  0.0 - 0.5 % Final    Gran # (ANC) 09/27/2023 6.8  1.8 - 7.7 K/uL Final    Immature Grans (Abs) 09/27/2023 0.18 (H)  0.00 - 0.04 K/uL Final    Comment: Mild elevation in immature granulocytes is non specific and   can be seen in a variety of conditions  including stress response,   acute inflammation, trauma and pregnancy. Correlation with other   laboratory and clinical findings is essential.      Lymph # 09/27/2023 1.2  1.0 - 4.8 K/uL Final    Mono # 09/27/2023 1.3 (H)  0.3 - 1.0 K/uL Final    Eos # 09/27/2023 0.2  0.0 - 0.5 K/uL Final    Baso # 09/27/2023 0.06  0.00 - 0.20 K/uL Final    nRBC 09/27/2023 0  0 /100 WBC Final    Gran % 09/27/2023 69.8  38.0 - 73.0 % Final    Lymph % 09/27/2023 12.2 (L)  18.0 - 48.0 % Final    Mono % 09/27/2023 13.1  4.0 - 15.0 % Final    Eosinophil % 09/27/2023 2.5  0.0 - 8.0 % Final    Basophil % 09/27/2023 0.6  0.0 - 1.9 % Final    Differential Method 09/27/2023 Automated   Final    Sodium 09/27/2023 143  136 - 145 mmol/L Final    Potassium 09/27/2023 3.7  3.5 - 5.1 mmol/L Final    Chloride 09/27/2023 106  95 - 110 mmol/L Final    CO2 09/27/2023 28  23 - 29 mmol/L Final    Glucose 09/27/2023 105  70 - 110 mg/dL Final    BUN 09/27/2023 19  8 - 23 mg/dL Final    Creatinine 09/27/2023 1.1  0.5 - 1.4 mg/dL Final    Calcium 09/27/2023 9.6  8.7 - 10.5 mg/dL Final    Total Protein 09/27/2023 7.5  6.0 - 8.4 g/dL Final    Albumin 09/27/2023 4.1  3.5 - 5.2 g/dL Final    Total Bilirubin 09/27/2023 1.0  0.1 - 1.0 mg/dL Final    Comment: For infants and newborns, interpretation of results should be based  on gestational age, weight and in agreement with clinical  observations.    Premature Infant recommended reference ranges:  Up to 24 hours.............<8.0 mg/dL  Up to 48 hours............<12.0 mg/dL  3-5 days..................<15.0 mg/dL  6-29 days.................<15.0 mg/dL      Alkaline Phosphatase 09/27/2023 74  55 - 135 U/L Final    AST 09/27/2023 35  10 - 40 U/L Final    ALT 09/27/2023 82 (H)  10 - 44 U/L Final    eGFR 09/27/2023 >60.0  >60 mL/min/1.73 m^2 Final    Anion Gap 09/27/2023 9  8 - 16 mmol/L Final    Phosphorus 09/27/2023 3.7  2.7 - 4.5 mg/dL Final   No results displayed because visit has over 200 results.      Lab Visit on  08/28/2023   Component Date Value Ref Range Status    WBC 08/28/2023 6.43  3.90 - 12.70 K/uL Final    RBC 08/28/2023 5.36  4.60 - 6.20 M/uL Final    Hemoglobin 08/28/2023 16.3  14.0 - 18.0 g/dL Final    Hematocrit 08/28/2023 46.6  40.0 - 54.0 % Final    MCV 08/28/2023 87  82 - 98 fL Final    MCH 08/28/2023 30.4  27.0 - 31.0 pg Final    MCHC 08/28/2023 35.0  32.0 - 36.0 g/dL Final    RDW 08/28/2023 12.5  11.5 - 14.5 % Final    Platelets 08/28/2023 191  150 - 450 K/uL Final    MPV 08/28/2023 10.9  9.2 - 12.9 fL Final    Immature Granulocytes 08/28/2023 0.5  0.0 - 0.5 % Final    Gran # (ANC) 08/28/2023 4.2  1.8 - 7.7 K/uL Final    Immature Grans (Abs) 08/28/2023 0.03  0.00 - 0.04 K/uL Final    Comment: Mild elevation in immature granulocytes is non specific and   can be seen in a variety of conditions including stress response,   acute inflammation, trauma and pregnancy. Correlation with other   laboratory and clinical findings is essential.      Lymph # 08/28/2023 1.3  1.0 - 4.8 K/uL Final    Mono # 08/28/2023 0.8  0.3 - 1.0 K/uL Final    Eos # 08/28/2023 0.1  0.0 - 0.5 K/uL Final    Baso # 08/28/2023 0.06  0.00 - 0.20 K/uL Final    nRBC 08/28/2023 0  0 /100 WBC Final    Gran % 08/28/2023 64.9  38.0 - 73.0 % Final    Lymph % 08/28/2023 19.4  18.0 - 48.0 % Final    Mono % 08/28/2023 13.1  4.0 - 15.0 % Final    Eosinophil % 08/28/2023 1.2  0.0 - 8.0 % Final    Basophil % 08/28/2023 0.9  0.0 - 1.9 % Final    Differential Method 08/28/2023 Automated   Final    Sodium 08/28/2023 140  136 - 145 mmol/L Final    Potassium 08/28/2023 3.5  3.5 - 5.1 mmol/L Final    Chloride 08/28/2023 105  95 - 110 mmol/L Final    CO2 08/28/2023 25  23 - 29 mmol/L Final    Glucose 08/28/2023 130 (H)  70 - 110 mg/dL Final    BUN 08/28/2023 14  8 - 23 mg/dL Final    Creatinine 08/28/2023 1.0  0.5 - 1.4 mg/dL Final    Calcium 08/28/2023 9.2  8.7 - 10.5 mg/dL Final    Anion Gap 08/28/2023 10  8 - 16 mmol/L Final    eGFR 08/28/2023 >60  >60  mL/min/1.73 m^2 Final    Prothrombin Time 08/28/2023 10.9  9.0 - 12.5 sec Final    INR 08/28/2023 1.0  0.8 - 1.2 Final    Comment: Coumadin Therapy:  2.0 - 3.0 for INR for all indicators except mechanical heart valves  and antiphospholipid syndromes which should use 2.5 - 3.5.  LOT^040^PT Inn^[id number]     Hospital Outpatient Visit on 08/22/2023   Component Date Value Ref Range Status    BSA 08/22/2023 2.09  m2 Final    85% Max Predicted HR 08/22/2023 129   Final    Max Predicted HR 08/22/2023 152   Final    [facility] CV CPX PATIENT IS MALE 08/22/2023 1.0   Final    [facility] CV CPX PATIENT IS FEMALE 08/22/2023 0.0   Final    Systolic blood pressure 08/22/2023 122  mmHg Final    Diastolic blood pressure 08/22/2023 94  mmHg Final    HR at rest 08/22/2023 68  bpm Final    Exercise duration (min) 08/22/2023 8  minutes Final    Exercise duration (sec) 08/22/2023 4  seconds Final    Peak Systolic BP 08/22/2023 178  mmHg Final    Peak Diatolic BP 08/22/2023 96  mmHg Final    Peak HR 08/22/2023 144  bpm Final    Estimated METs 08/22/2023 13   Final    % Max HR Achieved 08/22/2023 95   Final    RPP 08/22/2023 8,296   Final    Peak RPP 08/22/2023 25,632   Final    LVOT stroke volume 08/22/2023 101.82  cm3 Final    LVIDd 08/22/2023 4.50  3.5 - 6.0 cm Final    LV Systolic Volume 08/22/2023 32.99  mL Final    LV Systolic Volume Index 08/22/2023 15.9  mL/m2 Final    LVIDs 08/22/2023 2.93  2.1 - 4.0 cm Final    LV Diastolic Volume 08/22/2023 92.39  mL Final    LV Diastolic Volume Index 08/22/2023 44.42  mL/m2 Final    IVS 08/22/2023 0.8  0.6 - 1.1 cm Final    LVOT diameter 08/22/2023 2.13  cm Final    LVOT area 08/22/2023 3.6  cm2 Final    FS 08/22/2023 35  28 - 44 % Final    Left Ventricle Relative Wall Thick* 08/22/2023 0.31  cm Final    Posterior Wall 08/22/2023 0.7  0.6 - 1.1 cm Final    LV mass 08/22/2023 104.50  g Final    LV Mass Index 08/22/2023 50  g/m2 Final    MV Peak E Arias 08/22/2023 0.86  m/s Final    TDI LATERAL 08/22/2023 0.12   "m/s Final    TDI SEPTAL 08/22/2023 0.07  m/s Final    E/E' ratio 08/22/2023 9.05  m/s Final    MV Peak A Arias 08/22/2023 0.99  m/s Final    TR Max Arias 08/22/2023 2.57  m/s Final    E/A ratio 08/22/2023 0.87   Final    IVRT 08/22/2023 114.18  msec Final    E wave deceleration time 08/22/2023 238.66  msec Final    MV "A" wave duration 08/22/2023 6.28  msec Final    LV SEPTAL E/E' RATIO 08/22/2023 12.29  m/s Final    LA Volume Index 08/22/2023 34.7  mL/m2 Final    LV LATERAL E/E' RATIO 08/22/2023 7.17  m/s Final    LA volume 08/22/2023 72.22  cm3 Final    PV Peak S Arias 08/22/2023 0.66  m/s Final    PV Peak D Arias 08/22/2023 0.38  m/s Final    Pulm vein S/D ratio 08/22/2023 1.74   Final    LVOT peak arias 08/22/2023 1.62  m/s Final    LA size 08/22/2023 4.17  cm Final    Left Atrium Major Axis 08/22/2023 5.57  cm Final    Left Atrium Minor Axis 08/22/2023 5.99  cm Final    LA WIDTH 08/22/2023 3.53  cm Final    RVDD 08/22/2023 3.20  cm Final    TAPSE 08/22/2023 1.96  cm Final    RA Major Axis 08/22/2023 5.59  cm Final    RA Width 08/22/2023 3.99  cm Final    AV mean gradient 08/22/2023 5  mmHg Final    AV peak gradient 08/22/2023 11  mmHg Final    Ao peak arias 08/22/2023 1.63  m/s Final    Ao VTI 08/22/2023 31.75  cm Final    LVOT peak VTI 08/22/2023 28.59  cm Final    AV valve area 08/22/2023 3.21  cm² Final    AV Velocity Ratio 08/22/2023 0.99   Final    AV index (prosthetic) 08/22/2023 0.90   Final    RUPA by Velocity Ratio 08/22/2023 3.54  cm² Final    MV stenosis pressure 1/2 time 08/22/2023 69.21  ms Final    MV valve area p 1/2 method 08/22/2023 3.18  cm2 Final    Triscuspid Valve Regurgitation Pea* 08/22/2023 26  mmHg Final    Sinus 08/22/2023 4.2  cm Final    STJ 08/22/2023 4.2  cm Final    Ascending aorta 08/22/2023 5.0  cm Final    Mean e' 08/22/2023 0.10  m/s Final    ZLVIDS 08/22/2023 -2.25   Final    ZLVIDD 08/22/2023 -3.47   Final    1 Minute Recovery HR 08/22/2023 118  bpm Final    ST Depression (mm) 08/22/2023 " 1.0  mm Final    TV resting pulmonary artery pressu* 08/22/2023 29  mmHg Final    RV TB RVSP 08/22/2023 6  mmHg Final    Est. RA pres 08/22/2023 3  mmHg Final    Post-Stress Ejection Fraction 08/22/2023 60  % Final   Hospital Outpatient Visit on 06/29/2023   Component Date Value Ref Range Status    BSA 06/29/2023 2.11  m2 Final    TDI SEPTAL 06/29/2023 0.07  m/s Final    LV LATERAL E/E' RATIO 06/29/2023 5.18  m/s Final    LV SEPTAL E/E' RATIO 06/29/2023 8.14  m/s Final    LA WIDTH 06/29/2023 3.60  cm Final    IVC diameter 06/29/2023 1.55  cm Final    Left Ventricular Outflow Tract Laury* 06/29/2023 0.94  cm/s Final    Left Ventricular Outflow Tract Laury* 06/29/2023 4.11  mmHg Final    TDI LATERAL 06/29/2023 0.11  m/s Final    PV PEAK VELOCITY 06/29/2023 0.86  cm/s Final    LVIDd 06/29/2023 3.70  3.5 - 6.0 cm Final    IVS 06/29/2023 1.00  0.6 - 1.1 cm Final    Posterior Wall 06/29/2023 1.01  0.6 - 1.1 cm Final    LVIDs 06/29/2023 2.52  2.1 - 4.0 cm Final    FS 06/29/2023 32  28 - 44 % Final    LA volume 06/29/2023 41.37  cm3 Final    Sinus 06/29/2023 3.12  cm Final    STJ 06/29/2023 2.84  cm Final    Ascending aorta 06/29/2023 4.88  cm Final    LV mass 06/29/2023 113.35  g Final    LA size 06/29/2023 3.69  cm Final    TAPSE 06/29/2023 2.54  cm Final    RV S' 06/29/2023 13.77  cm/s Final    Left Ventricle Relative Wall Thick* 06/29/2023 0.55  cm Final    AV regurgitation pressure 1/2 time 06/29/2023 723.329059981000152  ms Final    AV mean gradient 06/29/2023 4  mmHg Final    AV valve area 06/29/2023 3.74  cm2 Final    AV Velocity Ratio 06/29/2023 1.06   Final    AV index (prosthetic) 06/29/2023 1.17   Final    MV mean gradient 06/29/2023 1  mmHg Final    MV valve area p 1/2 method 06/29/2023 2.23  cm2 Final    MV valve area by continuity eq 06/29/2023 3.13  cm2 Final    E/A ratio 06/29/2023 0.89   Final    Mean e' 06/29/2023 0.09  m/s Final    E wave deceleration time 06/29/2023 339.69  msec Final    LVOT diameter  06/29/2023 2.02  cm Final    LVOT area 06/29/2023 3.2  cm2 Final    LVOT peak arias 06/29/2023 1.42  m/s Final    LVOT peak VTI 06/29/2023 26.60  cm Final    Ao peak arias 06/29/2023 1.34  m/s Final    Ao VTI 06/29/2023 22.8  cm Final    LVOT stroke volume 06/29/2023 85.20  cm3 Final    AV peak gradient 06/29/2023 7  mmHg Final    MV peak gradient 06/29/2023 4  mmHg Final    E/E' ratio 06/29/2023 6.33  m/s Final    MV Peak E Arias 06/29/2023 0.57  m/s Final    AR Max Arias 06/29/2023 3.10  m/s Final    TR Max Arias 06/29/2023 2.68  m/s Final    MV VTI 06/29/2023 27.2  cm Final    MV stenosis pressure 1/2 time 06/29/2023 98.51  ms Final    MV Peak A Arias 06/29/2023 0.64  m/s Final    LV Systolic Volume 06/29/2023 22.73  mL Final    LV Systolic Volume Index 06/29/2023 10.8  mL/m2 Final    LV Diastolic Volume 06/29/2023 58.06  mL Final    LV Diastolic Volume Index 06/29/2023 27.65  mL/m2 Final    LA Volume Index 06/29/2023 19.7  mL/m2 Final    LV Mass Index 06/29/2023 54  g/m2 Final    RA Major Axis 06/29/2023 4.95  cm Final    Left Atrium Minor Axis 06/29/2023 3.52  cm Final    Left Atrium Major Axis 06/29/2023 3.82  cm Final    Triscuspid Valve Regurgitation Pea* 06/29/2023 29  mmHg Final    RA Width 06/29/2023 3.80  cm Final    Right Atrial Pressure (from IVC) 06/29/2023 3  mmHg Final    EF 06/29/2023 65  % Final    TV resting pulmonary artery pressu* 06/29/2023 32  mmHg Final       Imaging  No results found.    Assessment  1. Typical atrial flutter  - Vital signs per unit routine; Standing  - Chlorhexidine (CHG) 2% Wipes; Standing  - Height and weight; Standing  - Verify Informed Consent; Standing  - Emergency Equipment at Bedside; Standing  - Patient to Void on Call Prior to Cardioversion; Standing  - Clip Chest and Back Hair if Necessary; Standing  - Insert Peripheral IV (18 Gauge Saline Lock); Standing  - NOTIFY PHYSICIAN PROC; Standing  - Diet NPO; Standing  - Oxygen PRN; Standing  - Pulse Oximetry Q4H; Standing  - Case  Request-Cath Lab: TRANSESOPHAGEAL ECHOCARDIOGRAM WITH POSSIBLE CARDIOVERSION (GWEN W/ POSS CARDIOVERSION)  - Transesophageal echo (GWEN) with possible cardioversion; Future  - Place in Outpatient; Standing  - Chlorhexidine (CHG) 2% Wipes  - Pulse Oximetry Q4H    2. Atherosclerosis of native coronary artery of native heart without angina pectoris  Stable and free of angina after bypass surgery    3. Primary hypertension  Control improving after dose adjustment 3 days ago    4. Hypercholesterolemia  Reassess once he has convalesced from surgery      Plan and Discussion    Will plan for GWEN cardioversion.  Has had an adequate amiodarone loading. The procedure including its risks, benefits and alternatives were discussed in detail.  All questions were answered.  After shared decision making, appropriate consents were signed.    The 10-year ASCVD risk score (Rell DK, et al., 2019) is: 16.5%    Values used to calculate the score:      Age: 68 years      Sex: Male      Is Non- : No      Diabetic: No      Tobacco smoker: No      Systolic Blood Pressure: 134 mmHg      Is BP treated: Yes      HDL Cholesterol: 51 mg/dL      Total Cholesterol: 148 mg/dL         Chon Lopez MD

## 2023-10-30 NOTE — TELEPHONE ENCOUNTER
----- Message from Kalyani Ortega MA sent at 10/30/2023  3:46 PM CDT -----  Loren the patient is returning your phone called please call [phone]. Thank you

## 2023-10-30 NOTE — PROGRESS NOTES
Subjective:       Patient ID: Sawyer Browning is a 68 y.o. male.    Chief Complaint: Follow-up (/Pt here for f/u. Pt yearly soto with nocturia. )    HPI   patient came by with the intentions of having a prostate check but he is recently postop open heart surgery.  We spent the appointment talking about his wife who is having surgery if she can get accepted at [facility].    Past Medical History:   Diagnosis Date    BPH (benign prostatic hyperplasia)     Elevated liver enzymes     Fatty infiltration of liver     Glucose intolerance (impaired glucose tolerance)     Hiatal hernia     Hyperlipidemia     Hypertension     Overweight     Reflux        Past Surgical History:   Procedure Laterality Date    AORTIC ROOT REPLACEMENT N/A 9/18/2023    Procedure: VALVE SPARING AORTIC ROOT REPLACEMENT;  Surgeon: Imtiaz Mooney MD;  Location: [facility] OR [address] [place] [address];  Service: Cardiovascular;  Laterality: N/A;  Valve sparing aortic root replacement, vs biobentall, hemiarch, CABGx2 under  hypothermic circ arrest    CORONARY ANGIOGRAPHY N/A 9/6/2023    Procedure: ANGIOGRAM, CORONARY ARTERY;  Surgeon: Chon Lopez MD;  Location: [facility] CATH LAB;  Service: Cardiology;  Laterality: N/A;  right radial    CORONARY ARTERY BYPASS GRAFT (CABG) N/A 9/18/2023    Procedure: CORONARY ARTERY BYPASS GRAFT (CABG);  Surgeon: Imtiaz Mooney MD;  Location: [facility] OR [address] [place] [address];  Service: Cardiovascular;  Laterality: N/A;  Vein [place] Start: 821  Vein [place] End: 838  Vein Preparation Start: 839  Vein Preparation End: 912    ENDOSCOPIC HARVEST OF VEIN Left 9/18/2023    Procedure: HARVEST-VEIN-ENDOVASCULAR;  Surgeon: Imtiaz Mooney MD;  Location: [address];  Service: Cardiovascular;  Laterality: Left;  Valve sparing aortic root replacement, vs biobentall, hemiarch, CABGx2 under    inguinal hernia      x2    REPAIR OF DIAPHRAGMATIC HERNIA  9/18/2023    Procedure: REPAIR, HERNIA, DIAPHRAGMATIC;  Surgeon: Imtiaz Mooney MD;  Location:  [facility] OR 2ND FLR;  Service: Cardiovascular;;    tonsillectomy         Family History   Problem Relation Age of Onset    Hypertension Mother     Macular degeneration Mother         - gets shots in her eye    Stroke Father     Heart disease Father     Aneurysm Father     Migraines Sister     Arthritis Brother         knees    Dementia Paternal Grandmother     Diabetes Paternal Grandmother     Heart disease Paternal Grandfather     Diabetes Brother     Hernia Brother     Cancer Paternal Aunt     Cancer Paternal Uncle     Cancer Paternal Aunt     Cancer Paternal Uncle     Colon cancer Neg Hx     Prostate cancer Neg Hx        Social History     Socioeconomic History    Marital status:     Number of children: 2   Occupational History    Occupation: Works in insurance   Tobacco Use    Smoking status: Never    Smokeless tobacco: Never   Substance and Sexual Activity    Alcohol use: Yes     Comment: 2 glasses of wine most evenings.    Drug use: No     Social Determinants of Health     Financial Resource Strain: Low Risk  (9/19/2023)    Overall Financial Resource Strain (CARDIA)     Difficulty of Paying Living Expenses: Not hard at all   Food Insecurity: No Food Insecurity (9/19/2023)    Hunger Vital Sign     Worried About Running Out of Food in the Last Year: Never true     Ran Out of Food in the Last Year: Never true   Transportation Needs: No Transportation Needs (9/19/2023)    PRAPARE - Transportation     Lack of Transportation (Medical): No     Lack of Transportation (Non-Medical): No   Physical Activity: Sufficiently Active (9/19/2023)    Exercise Vital Sign     Days of Exercise per Week: 3 days     Minutes of Exercise per Session: 60 min   Stress: Unknown (9/19/2023)    [employer] - Occupational Stress Questionnaire     Feeling of Stress : Patient refused   Social Connections: Unknown (9/19/2023)    Social Connection and Isolation Panel [NHANES]     Frequency of Communication with  Friends and Family: More than three times a week     Frequency of Social Gatherings with Friends and Family: More than three times a week     Attends [employer] Services: Patient refused     Active Member of Clubs or Organizations: Patient refused     Attends Club or Organization Meetings: Patient refused     Marital Status:    Housing Stability: Low Risk  (9/19/2023)    Housing Stability Vital Sign     Unable to Pay for Housing in the Last Year: No     Number of Places Lived in the Last Year: 1     Unstable Housing in the Last Year: No       Allergies:  Ace inhibitors and Losartan    Medications:    Current Outpatient Medications:     acetaminophen (TYLENOL) 500 MG tablet, Take 2 tablets (1,000 mg total) by mouth every 8 (eight) hours as needed for Pain., Disp: , Rfl: 0    amiodarone (PACERONE) 200 MG Tab, Take 1 tablet (200 mg total) by mouth once daily., Disp: 30 tablet, Rfl: 11    apixaban (ELIQUIS) 5 mg Tab, Take 1 tablet (5 mg total) by mouth 2 (two) times daily., Disp: 90 tablet, Rfl: 1    aspirin (ECOTRIN) 81 MG EC tablet, Take 81 mg by mouth once daily., Disp: , Rfl:     atorvastatin (LIPITOR) 80 MG tablet, Take 1 tablet (80 mg total) by mouth once daily., Disp: 90 tablet, Rfl: 3    furosemide (LASIX) 40 MG tablet, Take 1 tablet (40 mg total) by mouth daily as needed (weight gain)., Disp: 30 tablet, Rfl: 11    lansoprazole (PREVACID) 30 MG capsule, TAKE 1 CAPSULE DAILY ONLY  AS NEEDED FOR HEARTBURN, Disp: 90 capsule, Rfl: 0    metoprolol succinate (TOPROL-XL) 100 MG 24 hr tablet, Take 1 tablet (100 mg total) by mouth once daily., Disp: 90 tablet, Rfl: 3    oxyCODONE (ROXICODONE) 5 MG immediate release tablet, Take 1 tablet (5 mg total) by mouth every 4 (four) hours as needed for Pain., Disp: 30 tablet, Rfl: 0    oxyCODONE (ROXICODONE) 5 MG immediate release tablet, Take 1 tablet (5 mg total) by mouth every 4 (four) hours as needed for Pain., Disp: 12 tablet, Rfl: 0    polyethylene glycol (GLYCOLAX) 17  gram PwPk, Take 17 g by mouth 2 (two) times daily as needed., Disp: , Rfl: 0    valsartan (DIOVAN) 160 MG tablet, Take 1 tablet (160 mg total) by mouth once daily., Disp: 30 tablet, Rfl: 11    Review of Systems   Constitutional:  Negative for activity change, appetite change, chills, diaphoresis, fatigue, fever and unexpected weight change.   HENT:  Negative for congestion, dental problem, hearing loss, mouth sores, postnasal drip, rhinorrhea, sinus pressure and trouble swallowing.    Eyes:  Negative for pain, discharge and itching.   Respiratory:  Negative for apnea, cough, choking, chest tightness, shortness of breath and wheezing.    Cardiovascular:  Negative for chest pain, palpitations and leg swelling.   Gastrointestinal:  Negative for abdominal distention, abdominal pain, anal bleeding, blood in stool, constipation, diarrhea, nausea, rectal pain and vomiting.   Endocrine: Negative for polydipsia and polyuria.   Genitourinary:  Negative for decreased urine volume, difficulty urinating, dysuria, enuresis, flank pain, frequency, genital sores, hematuria, penile discharge, penile pain, penile swelling, scrotal swelling, testicular pain and urgency.   Musculoskeletal:  Negative for arthralgias, back pain and myalgias.   Skin:  Negative for color change, rash and wound.   Neurological:  Negative for dizziness, syncope, speech difficulty, light-headedness and headaches.   Hematological:  Negative for adenopathy. Does not bruise/bleed easily.   Psychiatric/Behavioral:  Negative for behavioral problems, confusion, hallucinations and sleep disturbance.        Objective:      Physical Exam  Constitutional:       Appearance: He is well-developed.   HENT:      Head: Normocephalic.   Cardiovascular:      Rate and Rhythm: Normal rate.   Pulmonary:      Effort: Pulmonary effort is normal.   Abdominal:      Palpations: Abdomen is soft.   Skin:     General: Skin is warm.   Neurological:      Mental Status: He is alert.          Assessment:       1. Benign non-nodular prostatic hyperplasia without lower urinary tract symptoms        Plan:       Sawyer was seen today for follow-up.    Diagnoses and all orders for this visit:    Benign non-nodular prostatic hyperplasia without lower urinary tract symptoms       We mainly spent the time talking about his wife in I do not put him through a rectal exam today and I will not charge for today's visit he will reschedule

## 2023-10-30 NOTE — PROGRESS NOTES
[facility]    Chief Complaint  Chief Complaint   Patient presents with    Atrial Fibrillation       HPI:    Comes in for an early appointment because he is concerned that his blood pressure is still high.  Valsartan was just increased 3 days ago.  Otherwise feels well.  Admits to being very anxious.    Medications  Current Outpatient Medications   Medication Sig Dispense Refill    acetaminophen (TYLENOL) 500 MG tablet Take 2 tablets (1,000 mg total) by mouth every 8 (eight) hours as needed for Pain.  0    amiodarone (PACERONE) 200 MG Tab Take 1 tablet (200 mg total) by mouth once daily. 30 tablet 11    apixaban (ELIQUIS) 5 mg Tab Take 1 tablet (5 mg total) by mouth 2 (two) times daily. 90 tablet 1    aspirin (ECOTRIN) 81 MG EC tablet Take 81 mg by mouth once daily.      atorvastatin (LIPITOR) 80 MG tablet Take 1 tablet (80 mg total) by mouth once daily. 90 tablet 3    furosemide (LASIX) 40 MG tablet Take 1 tablet (40 mg total) by mouth daily as needed (weight gain). 30 tablet 11    lansoprazole (PREVACID) 30 MG capsule TAKE 1 CAPSULE DAILY ONLY  AS NEEDED FOR HEARTBURN 90 capsule 0    metoprolol succinate (TOPROL-XL) 100 MG 24 hr tablet Take 1 tablet (100 mg total) by mouth once daily. 90 tablet 3    oxyCODONE (ROXICODONE) 5 MG immediate release tablet Take 1 tablet (5 mg total) by mouth every 4 (four) hours as needed for Pain. 30 tablet 0    oxyCODONE (ROXICODONE) 5 MG immediate release tablet Take 1 tablet (5 mg total) by mouth every 4 (four) hours as needed for Pain. 12 tablet 0    polyethylene glycol (GLYCOLAX) 17 gram PwPk Take 17 g by mouth 2 (two) times daily as needed.  0    valsartan (DIOVAN) 160 MG tablet Take 1 tablet (160 mg total) by mouth once daily. (Patient taking differently: Take 160 mg by mouth 2 (two) times daily.) 30 tablet 11     No current facility-administered medications for this visit.        History  Past Medical History:   Diagnosis Date    BPH (benign prostatic  hyperplasia)     Elevated liver enzymes     Fatty infiltration of liver     Glucose intolerance (impaired glucose tolerance)     Hiatal hernia     Hyperlipidemia     Hypertension     Overweight     Reflux      Past Surgical History:   Procedure Laterality Date    AORTIC ROOT REPLACEMENT N/A 9/18/2023    Procedure: VALVE SPARING AORTIC ROOT REPLACEMENT;  Surgeon: Imtiaz Mooney MD;  Location: [facility] OR [address] [place] [address];  Service: Cardiovascular;  Laterality: N/A;  Valve sparing aortic root replacement, vs biobentall, hemiarch, CABGx2 under  hypothermic circ arrest    CORONARY ANGIOGRAPHY N/A 9/6/2023    Procedure: ANGIOGRAM, CORONARY ARTERY;  Surgeon: Chon Lopez MD;  Location: [facility] CATH LAB;  Service: Cardiology;  Laterality: N/A;  right radial    CORONARY ARTERY BYPASS GRAFT (CABG) N/A 9/18/2023    Procedure: CORONARY ARTERY BYPASS GRAFT (CABG);  Surgeon: Imtiaz Mooney MD;  Location: [facility] OR [address] [place] [address];  Service: Cardiovascular;  Laterality: N/A;  Vein Troy Start: 821  Vein Troy End: 838  Vein Preparation Start: 839  Vein Preparation End: 912    ENDOSCOPIC HARVEST OF VEIN Left 9/18/2023    Procedure: HARVEST-VEIN-ENDOVASCULAR;  Surgeon: Imtiaz Mooney MD;  Location: [facility] OR [address] [place] [address];  Service: Cardiovascular;  Laterality: Left;  Valve sparing aortic root replacement, vs biobentall, hemiarch, CABGx2 under    inguinal hernia      x2    REPAIR OF DIAPHRAGMATIC HERNIA  9/18/2023    Procedure: REPAIR, HERNIA, DIAPHRAGMATIC;  Surgeon: Imtiaz Mooney MD;  Location: [facility] OR [address] [place] [address];  Service: Cardiovascular;;    tonsillectomy       Social History     Socioeconomic History    Marital status:     Number of children: 2   Occupational History    Occupation: Works in insurance   Tobacco Use    Smoking status: Never    Smokeless tobacco: Never   Substance and Sexual Activity    Alcohol use: Yes     Comment: 2 glasses of wine most evenings.    Drug use: No     Social Determinants of Health      Financial Resource Strain: Low Risk  (9/19/2023)    Overall Financial Resource Strain (CARDIA)     Difficulty of Paying Living Expenses: Not hard at all   Food Insecurity: No Food Insecurity (9/19/2023)    Hunger Vital Sign     Worried About Running Out of Food in the Last Year: Never true     Ran Out of Food in the Last Year: Never true   Transportation Needs: No Transportation Needs (9/19/2023)    PRAPARE - Transportation     Lack of Transportation (Medical): No     Lack of Transportation (Non-Medical): No   Physical Activity: Sufficiently Active (9/19/2023)    Exercise Vital Sign     Days of Exercise per Week: 3 days     Minutes of Exercise per Session: 60 min   Stress: Unknown (9/19/2023)    [employer] [place] [employer] - Occupational Stress Questionnaire     Feeling of Stress : Patient refused   Social Connections: Unknown (9/19/2023)    Social Connection and Isolation Panel [NHANES]     Frequency of Communication with Friends and Family: More than three times a week     Frequency of Social Gatherings with Friends and Family: More than three times a week     Attends [employer] Services: Patient refused     Active Member of Clubs or Organizations: Patient refused     Attends Club or Organization Meetings: Patient refused     Marital Status:    Housing Stability: Low Risk  (9/19/2023)    Housing Stability Vital Sign     Unable to Pay for Housing in the Last Year: No     Number of Places Lived in the Last Year: 1     Unstable Housing in the Last Year: No     Family History   Problem Relation Age of Onset    Hypertension Mother     Macular degeneration Mother         - gets shots in her eye    Stroke Father     Heart disease Father     Aneurysm Father     Migraines Sister     Arthritis Brother         knees    Dementia Paternal Grandmother     Diabetes Paternal Grandmother     Heart disease Paternal Grandfather     Diabetes Brother     Hernia Brother     Cancer Paternal Aunt     Cancer  Paternal Uncle     Cancer Paternal Aunt     Cancer Paternal Uncle     Colon cancer Neg Hx     Prostate cancer Neg Hx         Allergies  Review of patient's allergies indicates:   Allergen Reactions    Ace inhibitors Other (See Comments)     cough    Losartan      headache       Review of Systems   Review of Systems   Constitutional: Negative for malaise/fatigue, weight gain and weight loss.   Eyes:  Negative for visual disturbance.   Cardiovascular:  Negative for chest pain, claudication, cyanosis, dyspnea on exertion, irregular heartbeat, leg swelling, near-syncope, orthopnea, palpitations, paroxysmal nocturnal dyspnea and syncope.   Respiratory:  Negative for cough, hemoptysis, shortness of breath, sleep disturbances due to breathing and wheezing.    Hematologic/Lymphatic: Negative for bleeding problem. Does not bruise/bleed easily.   Skin:  Negative for poor wound healing.   Musculoskeletal:  Negative for muscle cramps and myalgias.   Gastrointestinal:  Negative for abdominal pain, anorexia, diarrhea, heartburn, hematemesis, hematochezia, melena, nausea and vomiting.   Genitourinary:  Negative for hematuria and nocturia.   Neurological:  Negative for excessive daytime sleepiness, dizziness, focal weakness, light-headedness and weakness.   Psychiatric/Behavioral:  The patient is nervous/anxious.        Physical Exam  Vitals:    10/30/23 1353   BP: (!) 134/98   Pulse: (!) 132     Wt Readings from Last 1 Encounters:   10/30/23 83.3 kg (183 lb 9.6 oz)     Physical Exam  Vitals and nursing note reviewed.   Constitutional:       General: He is not in acute distress.     Appearance: He is not toxic-appearing or diaphoretic.   HENT:      Head: Normocephalic and atraumatic.      Mouth/Throat:      Lips: Pink.      Mouth: Mucous membranes are moist.   Eyes:      General: No scleral icterus.     Conjunctiva/sclera: Conjunctivae normal.   Neck:      Thyroid: No thyromegaly.      Vascular: No carotid bruit, hepatojugular  reflux or JVD.      Trachea: Trachea normal.   Cardiovascular:      Rate and Rhythm: Tachycardia present. Rhythm irregular. No extrasystoles are present.     Chest Wall: PMI is not displaced.      Pulses:           Carotid pulses are 2+ on the right side and 2+ on the left side.       Radial pulses are 2+ on the right side and 2+ on the left side.        Dorsalis pedis pulses are 2+ on the right side and 2+ on the left side.        Posterior tibial pulses are 2+ on the right side and 2+ on the left side.      Heart sounds: S1 normal and S2 normal. No murmur heard.     No friction rub. No S3 or S4 sounds.   Pulmonary:      Effort: Pulmonary effort is normal. No tachypnea, bradypnea, accessory muscle usage or respiratory distress.      Breath sounds: Normal breath sounds and air entry. No decreased breath sounds, wheezing, rhonchi or rales.   Chest:      Comments: Median sternotomy healing well.  Sternum is stable.  Abdominal:      General: Bowel sounds are normal. There is no distension or abdominal bruit.      Palpations: Abdomen is soft. There is no hepatomegaly, splenomegaly or pulsatile mass.      Tenderness: There is no abdominal tenderness.   Musculoskeletal:         General: No tenderness or deformity.      Right lower leg: No edema.      Left lower leg: No edema.   Skin:     General: Skin is warm and dry.      Capillary Refill: Capillary refill takes less than 2 seconds.      Coloration: Skin is not cyanotic or pale.      Nails: There is no clubbing.   Neurological:      General: No focal deficit present.      Mental Status: He is alert and oriented to person, place, and time.   Psychiatric:         Attention and Perception: Attention normal.         Mood and Affect: Mood normal.         Speech: Speech normal.         Behavior: Behavior normal. Behavior is cooperative.         Labs  Lab Visit on 10/19/2023   Component Date Value Ref Range Status    Sodium 10/19/2023 142  136 - 145 mmol/L Final    Potassium  10/19/2023 3.7  3.5 - 5.1 mmol/L Final    Chloride 10/19/2023 106  95 - 110 mmol/L Final    CO2 10/19/2023 26  23 - 29 mmol/L Final    Glucose 10/19/2023 85  70 - 110 mg/dL Final    BUN 10/19/2023 16  8 - 23 mg/dL Final    Creatinine 10/19/2023 1.3  0.5 - 1.4 mg/dL Final    Calcium 10/19/2023 9.5  8.7 - 10.5 mg/dL Final    Total Protein 10/19/2023 7.2  6.0 - 8.4 g/dL Final    Albumin 10/19/2023 4.3  3.5 - 5.2 g/dL Final    Total Bilirubin 10/19/2023 0.7  0.1 - 1.0 mg/dL Final    Comment: For infants and newborns, interpretation of results should be based  on gestational age, weight and in agreement with clinical  observations.    Premature Infant recommended reference ranges:  Up to 24 hours.............<8.0 mg/dL  Up to 48 hours............<12.0 mg/dL  3-5 days..................<15.0 mg/dL  6-29 days.................<15.0 mg/dL      Alkaline Phosphatase 10/19/2023 105  55 - 135 U/L Final    AST 10/19/2023 17  10 - 40 U/L Final    ALT 10/19/2023 69 (H)  10 - 44 U/L Final    eGFR 10/19/2023 60  >60 mL/min/1.73 m^2 Final    Anion Gap 10/19/2023 10  8 - 16 mmol/L Final    TSH 10/19/2023 2.023  0.400 - 4.000 uIU/mL Final    WBC 10/19/2023 7.34  3.90 - 12.70 K/uL Final    RBC 10/19/2023 4.47 (L)  4.60 - 6.20 M/uL Final    Hemoglobin 10/19/2023 13.5 (L)  14.0 - 18.0 g/dL Final    Hematocrit 10/19/2023 41.1  40.0 - 54.0 % Final    MCV 10/19/2023 92  82 - 98 fL Final    MCH 10/19/2023 30.2  27.0 - 31.0 pg Final    MCHC 10/19/2023 32.8  32.0 - 36.0 g/dL Final    RDW 10/19/2023 14.0  11.5 - 14.5 % Final    Platelets 10/19/2023 211  150 - 450 K/uL Final    MPV 10/19/2023 11.0  9.2 - 12.9 fL Final    Immature Granulocytes 10/19/2023 0.4  0.0 - 0.5 % Final    Gran # (ANC) 10/19/2023 5.1  1.8 - 7.7 K/uL Final    Immature Grans (Abs) 10/19/2023 0.03  0.00 - 0.04 K/uL Final    Comment: Mild elevation in immature granulocytes is non specific and   can be seen in a variety of conditions including stress response,   acute  inflammation, trauma and pregnancy. Correlation with other   laboratory and clinical findings is essential.      Lymph # 10/19/2023 1.1  1.0 - 4.8 K/uL Final    Mono # 10/19/2023 1.0  0.3 - 1.0 K/uL Final    Eos # 10/19/2023 0.2  0.0 - 0.5 K/uL Final    Baso # 10/19/2023 0.05  0.00 - 0.20 K/uL Final    nRBC 10/19/2023 0  0 /100 WBC Final    Gran % 10/19/2023 69.0  38.0 - 73.0 % Final    Lymph % 10/19/2023 14.4 (L)  18.0 - 48.0 % Final    Mono % 10/19/2023 12.9  4.0 - 15.0 % Final    Eosinophil % 10/19/2023 2.6  0.0 - 8.0 % Final    Basophil % 10/19/2023 0.7  0.0 - 1.9 % Final    Differential Method 10/19/2023 Automated   Final   Admission on 09/26/2023, Discharged on 09/27/2023   Component Date Value Ref Range Status    HIV 1/2 Ag/Ab 09/26/2023 Non-reactive  Non-reactive Final    Hepatitis C Ab 09/26/2023 Non-reactive  Non-reactive Final    WBC 09/26/2023 9.68  3.90 - 12.70 K/uL Final    RBC 09/26/2023 3.62 (L)  4.60 - 6.20 M/uL Final    Hemoglobin 09/26/2023 11.1 (L)  14.0 - 18.0 g/dL Final    Hematocrit 09/26/2023 33.3 (L)  40.0 - 54.0 % Final    MCV 09/26/2023 92  82 - 98 fL Final    MCH 09/26/2023 30.7  27.0 - 31.0 pg Final    MCHC 09/26/2023 33.3  32.0 - 36.0 g/dL Final    RDW 09/26/2023 12.8  11.5 - 14.5 % Final    Platelets 09/26/2023 244  150 - 450 K/uL Final    MPV 09/26/2023 9.8  9.2 - 12.9 fL Final    Immature Granulocytes 09/26/2023 1.7 (H)  0.0 - 0.5 % Final    Gran # (ANC) 09/26/2023 7.0  1.8 - 7.7 K/uL Final    Immature Grans (Abs) 09/26/2023 0.16 (H)  0.00 - 0.04 K/uL Final    Comment: Mild elevation in immature granulocytes is non specific and   can be seen in a variety of conditions including stress response,   acute inflammation, trauma and pregnancy. Correlation with other   laboratory and clinical findings is essential.      Lymph # 09/26/2023 1.0  1.0 - 4.8 K/uL Final    Mono # 09/26/2023 1.3 (H)  0.3 - 1.0 K/uL Final    Eos # 09/26/2023 0.2  0.0 - 0.5 K/uL Final    Baso # 09/26/2023 0.07  0.00  - 0.20 K/uL Final    nRBC 09/26/2023 0  0 /100 WBC Final    Gran % 09/26/2023 71.8  38.0 - 73.0 % Final    Lymph % 09/26/2023 10.6 (L)  18.0 - 48.0 % Final    Mono % 09/26/2023 13.1  4.0 - 15.0 % Final    Eosinophil % 09/26/2023 2.1  0.0 - 8.0 % Final    Basophil % 09/26/2023 0.7  0.0 - 1.9 % Final    Differential Method 09/26/2023 Automated   Final    Sodium 09/26/2023 141  136 - 145 mmol/L Final    Potassium 09/26/2023 4.0  3.5 - 5.1 mmol/L Final    Chloride 09/26/2023 106  95 - 110 mmol/L Final    CO2 09/26/2023 25  23 - 29 mmol/L Final    Glucose 09/26/2023 100  70 - 110 mg/dL Final    BUN 09/26/2023 19  8 - 23 mg/dL Final    Creatinine 09/26/2023 0.8  0.5 - 1.4 mg/dL Final    Calcium 09/26/2023 9.1  8.7 - 10.5 mg/dL Final    Total Protein 09/26/2023 6.8  6.0 - 8.4 g/dL Final    Albumin 09/26/2023 3.8  3.5 - 5.2 g/dL Final    Total Bilirubin 09/26/2023 0.9  0.1 - 1.0 mg/dL Final    Comment: For infants and newborns, interpretation of results should be based  on gestational age, weight and in agreement with clinical  observations.    Premature Infant recommended reference ranges:  Up to 24 hours.............<8.0 mg/dL  Up to 48 hours............<12.0 mg/dL  3-5 days..................<15.0 mg/dL  6-29 days.................<15.0 mg/dL      Alkaline Phosphatase 09/26/2023 68  55 - 135 U/L Final    AST 09/26/2023 41 (H)  10 - 40 U/L Final    ALT 09/26/2023 81 (H)  10 - 44 U/L Final    eGFR 09/26/2023 >60.0  >60 mL/min/1.73 m^2 Final    Anion Gap 09/26/2023 10  8 - 16 mmol/L Final    Troponin I 09/26/2023 0.332 (H)  0.000 - 0.026 ng/mL Final    Comment: The reference interval for Troponin I represents the 99th percentile   cutoff   for our facility and is consistent with 3rd generation assay   performance.      BNP 09/26/2023 534 (H)  0 - 99 pg/mL Final    Values of less than 100 pg/ml are consistent with non-CHF populations.    Ascending aorta 09/26/2023 3.2  cm Final    STJ 09/26/2023 3.2  cm Final    AV mean  "gradient 09/26/2023 4  mmHg Final    Ao peak arias 09/26/2023 1.40  m/s Final    Ao VTI 09/26/2023 25.18  cm Final    IVRT 09/26/2023 48.53  msec Final    IVS 09/26/2023 0.84  0.6 - 1.1 cm Final    LA size 09/26/2023 3.64  cm Final    Left Atrium Major Axis 09/26/2023 6.04  cm Final    Left Atrium Minor Axis 09/26/2023 5.97  cm Final    LVIDd 09/26/2023 4.85  3.5 - 6.0 cm Final    LVIDs 09/26/2023 3.14  2.1 - 4.0 cm Final    LVOT diameter 09/26/2023 2.03  cm Final    LVOT peak VTI 09/26/2023 22.45  cm Final    Posterior Wall 09/26/2023 0.99  0.6 - 1.1 cm Final    MV Peak A Arias 09/26/2023 0.50  m/s Final    E wave deceleration time 09/26/2023 231.09  msec Final    MV Peak E Airas 09/26/2023 1.09  m/s Final    PV Peak D Arias 09/26/2023 0.55  m/s Final    PV Peak S Arias 09/26/2023 0.38  m/s Final    RA Major Axis 09/26/2023 5.72  cm Final    RA Width 09/26/2023 4.34  cm Final    RVDD 09/26/2023 3.82  cm Final    Sinus 09/26/2023 3.5  cm Final    TAPSE 09/26/2023 0.81  cm Final    TR Max Arias 09/26/2023 2.90  m/s Final    LA WIDTH 09/26/2023 4.27  cm Final    MV stenosis pressure 1/2 time 09/26/2023 67.02  ms Final    LV Diastolic Volume 09/26/2023 110.20  mL Final    LV Systolic Volume 09/26/2023 38.99  mL Final    LVOT peak arias 09/26/2023 1.18  m/s Final    TDI LATERAL 09/26/2023 0.17  m/s Final    TDI SEPTAL 09/26/2023 0.12  m/s Final    LA volume (mod) 09/26/2023 50.00  cm3 Final    MV "A" wave duration 09/26/2023 11.42  msec Final    LV LATERAL E/E' RATIO 09/26/2023 6.41  m/s Final    LV SEPTAL E/E' RATIO 09/26/2023 9.08  m/s Final    FS 09/26/2023 35  % Final    LA volume 09/26/2023 79.33  cm3 Final    LV mass 09/26/2023 153.68  g Final    ZLVIDD 09/26/2023 -3.10   Final    ZLVIDS 09/26/2023 -1.99   Final    Left Ventricle Relative Wall Thick* 09/26/2023 0.41  cm Final    AV valve area 09/26/2023 2.88  cm² Final    AV Velocity Ratio 09/26/2023 0.84   Final    AV index (prosthetic) 09/26/2023 0.89   Final    MV valve area " p 1/2 method 09/26/2023 3.28  cm2 Final    E/A ratio 09/26/2023 2.18   Final    Mean e' 09/26/2023 0.15  m/s Final    Pulm vein S/D ratio 09/26/2023 0.69   Final    LVOT area 09/26/2023 3.2  cm2 Final    LVOT stroke volume 09/26/2023 72.62  cm3 Final    AV peak gradient 09/26/2023 8  mmHg Final    E/E' ratio 09/26/2023 7.52  m/s Final    LV Systolic Volume Index 09/26/2023 18.5  mL/m2 Final    LV Diastolic Volume Index 09/26/2023 52.23  mL/m2 Final    LA Volume Index 09/26/2023 37.6  mL/m2 Final    LV Mass Index 09/26/2023 73  g/m2 Final    Triscuspid Valve Regurgitation Pea* 09/26/2023 34  mmHg Final    LA Volume Index (Mod) 09/26/2023 23.7  mL/m2 Final    RUPA by Velocity Ratio 09/26/2023 2.73  cm² Final    BSA 09/26/2023 2.11  m2 Final    TV resting pulmonary artery pressu* 09/26/2023 37  mmHg Final    RV TB RVSP 09/26/2023 6  mmHg Final    Est. RA pres 09/26/2023 3  mmHg Final    IVC diameter 09/26/2023 2.4  cm Final    Troponin I 09/26/2023 0.280 (H)  0.000 - 0.026 ng/mL Final    Comment: The reference interval for Troponin I represents the 99th percentile   cutoff   for our facility and is consistent with 3rd generation assay   performance.      WBC 09/27/2023 9.77  3.90 - 12.70 K/uL Final    RBC 09/27/2023 4.03 (L)  4.60 - 6.20 M/uL Final    Hemoglobin 09/27/2023 12.4 (L)  14.0 - 18.0 g/dL Final    Hematocrit 09/27/2023 37.4 (L)  40.0 - 54.0 % Final    MCV 09/27/2023 93  82 - 98 fL Final    MCH 09/27/2023 30.8  27.0 - 31.0 pg Final    MCHC 09/27/2023 33.2  32.0 - 36.0 g/dL Final    RDW 09/27/2023 13.1  11.5 - 14.5 % Final    Platelets 09/27/2023 328  150 - 450 K/uL Final    MPV 09/27/2023 10.5  9.2 - 12.9 fL Final    Immature Granulocytes 09/27/2023 1.8 (H)  0.0 - 0.5 % Final    Gran # (ANC) 09/27/2023 6.8  1.8 - 7.7 K/uL Final    Immature Grans (Abs) 09/27/2023 0.18 (H)  0.00 - 0.04 K/uL Final    Comment: Mild elevation in immature granulocytes is non specific and   can be seen in a variety of conditions  including stress response,   acute inflammation, trauma and pregnancy. Correlation with other   laboratory and clinical findings is essential.      Lymph # 09/27/2023 1.2  1.0 - 4.8 K/uL Final    Mono # 09/27/2023 1.3 (H)  0.3 - 1.0 K/uL Final    Eos # 09/27/2023 0.2  0.0 - 0.5 K/uL Final    Baso # 09/27/2023 0.06  0.00 - 0.20 K/uL Final    nRBC 09/27/2023 0  0 /100 WBC Final    Gran % 09/27/2023 69.8  38.0 - 73.0 % Final    Lymph % 09/27/2023 12.2 (L)  18.0 - 48.0 % Final    Mono % 09/27/2023 13.1  4.0 - 15.0 % Final    Eosinophil % 09/27/2023 2.5  0.0 - 8.0 % Final    Basophil % 09/27/2023 0.6  0.0 - 1.9 % Final    Differential Method 09/27/2023 Automated   Final    Sodium 09/27/2023 143  136 - 145 mmol/L Final    Potassium 09/27/2023 3.7  3.5 - 5.1 mmol/L Final    Chloride 09/27/2023 106  95 - 110 mmol/L Final    CO2 09/27/2023 28  23 - 29 mmol/L Final    Glucose 09/27/2023 105  70 - 110 mg/dL Final    BUN 09/27/2023 19  8 - 23 mg/dL Final    Creatinine 09/27/2023 1.1  0.5 - 1.4 mg/dL Final    Calcium 09/27/2023 9.6  8.7 - 10.5 mg/dL Final    Total Protein 09/27/2023 7.5  6.0 - 8.4 g/dL Final    Albumin 09/27/2023 4.1  3.5 - 5.2 g/dL Final    Total Bilirubin 09/27/2023 1.0  0.1 - 1.0 mg/dL Final    Comment: For infants and newborns, interpretation of results should be based  on gestational age, weight and in agreement with clinical  observations.    Premature Infant recommended reference ranges:  Up to 24 hours.............<8.0 mg/dL  Up to 48 hours............<12.0 mg/dL  3-5 days..................<15.0 mg/dL  6-29 days.................<15.0 mg/dL      Alkaline Phosphatase 09/27/2023 74  55 - 135 U/L Final    AST 09/27/2023 35  10 - 40 U/L Final    ALT 09/27/2023 82 (H)  10 - 44 U/L Final    eGFR 09/27/2023 >60.0  >60 mL/min/1.73 m^2 Final    Anion Gap 09/27/2023 9  8 - 16 mmol/L Final    Phosphorus 09/27/2023 3.7  2.7 - 4.5 mg/dL Final   No results displayed because visit has over 200 results.      Lab Visit on  08/28/2023   Component Date Value Ref Range Status    WBC 08/28/2023 6.43  3.90 - 12.70 K/uL Final    RBC 08/28/2023 5.36  4.60 - 6.20 M/uL Final    Hemoglobin 08/28/2023 16.3  14.0 - 18.0 g/dL Final    Hematocrit 08/28/2023 46.6  40.0 - 54.0 % Final    MCV 08/28/2023 87  82 - 98 fL Final    MCH 08/28/2023 30.4  27.0 - 31.0 pg Final    MCHC 08/28/2023 35.0  32.0 - 36.0 g/dL Final    RDW 08/28/2023 12.5  11.5 - 14.5 % Final    Platelets 08/28/2023 191  150 - 450 K/uL Final    MPV 08/28/2023 10.9  9.2 - 12.9 fL Final    Immature Granulocytes 08/28/2023 0.5  0.0 - 0.5 % Final    Gran # (ANC) 08/28/2023 4.2  1.8 - 7.7 K/uL Final    Immature Grans (Abs) 08/28/2023 0.03  0.00 - 0.04 K/uL Final    Comment: Mild elevation in immature granulocytes is non specific and   can be seen in a variety of conditions including stress response,   acute inflammation, trauma and pregnancy. Correlation with other   laboratory and clinical findings is essential.      Lymph # 08/28/2023 1.3  1.0 - 4.8 K/uL Final    Mono # 08/28/2023 0.8  0.3 - 1.0 K/uL Final    Eos # 08/28/2023 0.1  0.0 - 0.5 K/uL Final    Baso # 08/28/2023 0.06  0.00 - 0.20 K/uL Final    nRBC 08/28/2023 0  0 /100 WBC Final    Gran % 08/28/2023 64.9  38.0 - 73.0 % Final    Lymph % 08/28/2023 19.4  18.0 - 48.0 % Final    Mono % 08/28/2023 13.1  4.0 - 15.0 % Final    Eosinophil % 08/28/2023 1.2  0.0 - 8.0 % Final    Basophil % 08/28/2023 0.9  0.0 - 1.9 % Final    Differential Method 08/28/2023 Automated   Final    Sodium 08/28/2023 140  136 - 145 mmol/L Final    Potassium 08/28/2023 3.5  3.5 - 5.1 mmol/L Final    Chloride 08/28/2023 105  95 - 110 mmol/L Final    CO2 08/28/2023 25  23 - 29 mmol/L Final    Glucose 08/28/2023 130 (H)  70 - 110 mg/dL Final    BUN 08/28/2023 14  8 - 23 mg/dL Final    Creatinine 08/28/2023 1.0  0.5 - 1.4 mg/dL Final    Calcium 08/28/2023 9.2  8.7 - 10.5 mg/dL Final    Anion Gap 08/28/2023 10  8 - 16 mmol/L Final    eGFR 08/28/2023 >60  >60  mL/min/1.73 m^2 Final    Prothrombin Time 08/28/2023 10.9  9.0 - 12.5 sec Final    INR 08/28/2023 1.0  0.8 - 1.2 Final    Comment: Coumadin Therapy:  2.0 - 3.0 for INR for all indicators except mechanical heart valves  and antiphospholipid syndromes which should use 2.5 - 3.5.  LOT^040^PT Inn^[id number]     Hospital Outpatient Visit on 08/22/2023   Component Date Value Ref Range Status    BSA 08/22/2023 2.09  m2 Final    85% Max Predicted HR 08/22/2023 129   Final    Max Predicted HR 08/22/2023 152   Final    [facility] CV CPX PATIENT IS MALE 08/22/2023 1.0   Final    [facility] CV CPX PATIENT IS FEMALE 08/22/2023 0.0   Final    Systolic blood pressure 08/22/2023 122  mmHg Final    Diastolic blood pressure 08/22/2023 94  mmHg Final    HR at rest 08/22/2023 68  bpm Final    Exercise duration (min) 08/22/2023 8  minutes Final    Exercise duration (sec) 08/22/2023 4  seconds Final    Peak Systolic BP 08/22/2023 178  mmHg Final    Peak Diatolic BP 08/22/2023 96  mmHg Final    Peak HR 08/22/2023 144  bpm Final    Estimated METs 08/22/2023 13   Final    % Max HR Achieved 08/22/2023 95   Final    RPP 08/22/2023 8,296   Final    Peak RPP 08/22/2023 25,632   Final    LVOT stroke volume 08/22/2023 101.82  cm3 Final    LVIDd 08/22/2023 4.50  3.5 - 6.0 cm Final    LV Systolic Volume 08/22/2023 32.99  mL Final    LV Systolic Volume Index 08/22/2023 15.9  mL/m2 Final    LVIDs 08/22/2023 2.93  2.1 - 4.0 cm Final    LV Diastolic Volume 08/22/2023 92.39  mL Final    LV Diastolic Volume Index 08/22/2023 44.42  mL/m2 Final    IVS 08/22/2023 0.8  0.6 - 1.1 cm Final    LVOT diameter 08/22/2023 2.13  cm Final    LVOT area 08/22/2023 3.6  cm2 Final    FS 08/22/2023 35  28 - 44 % Final    Left Ventricle Relative Wall Thick* 08/22/2023 0.31  cm Final    Posterior Wall 08/22/2023 0.7  0.6 - 1.1 cm Final    LV mass 08/22/2023 104.50  g Final    LV Mass Index 08/22/2023 50  g/m2 Final    MV Peak E Arias 08/22/2023 0.86  m/s Final    TDI LATERAL 08/22/2023 0.12   "m/s Final    TDI SEPTAL 08/22/2023 0.07  m/s Final    E/E' ratio 08/22/2023 9.05  m/s Final    MV Peak A Arias 08/22/2023 0.99  m/s Final    TR Max Arias 08/22/2023 2.57  m/s Final    E/A ratio 08/22/2023 0.87   Final    IVRT 08/22/2023 114.18  msec Final    E wave deceleration time 08/22/2023 238.66  msec Final    MV "A" wave duration 08/22/2023 6.28  msec Final    LV SEPTAL E/E' RATIO 08/22/2023 12.29  m/s Final    LA Volume Index 08/22/2023 34.7  mL/m2 Final    LV LATERAL E/E' RATIO 08/22/2023 7.17  m/s Final    LA volume 08/22/2023 72.22  cm3 Final    PV Peak S Arias 08/22/2023 0.66  m/s Final    PV Peak D Arias 08/22/2023 0.38  m/s Final    Pulm vein S/D ratio 08/22/2023 1.74   Final    LVOT peak arias 08/22/2023 1.62  m/s Final    LA size 08/22/2023 4.17  cm Final    Left Atrium Major Axis 08/22/2023 5.57  cm Final    Left Atrium Minor Axis 08/22/2023 5.99  cm Final    LA WIDTH 08/22/2023 3.53  cm Final    RVDD 08/22/2023 3.20  cm Final    TAPSE 08/22/2023 1.96  cm Final    RA Major Axis 08/22/2023 5.59  cm Final    RA Width 08/22/2023 3.99  cm Final    AV mean gradient 08/22/2023 5  mmHg Final    AV peak gradient 08/22/2023 11  mmHg Final    Ao peak arias 08/22/2023 1.63  m/s Final    Ao VTI 08/22/2023 31.75  cm Final    LVOT peak VTI 08/22/2023 28.59  cm Final    AV valve area 08/22/2023 3.21  cm² Final    AV Velocity Ratio 08/22/2023 0.99   Final    AV index (prosthetic) 08/22/2023 0.90   Final    RUPA by Velocity Ratio 08/22/2023 3.54  cm² Final    MV stenosis pressure 1/2 time 08/22/2023 69.21  ms Final    MV valve area p 1/2 method 08/22/2023 3.18  cm2 Final    Triscuspid Valve Regurgitation Pea* 08/22/2023 26  mmHg Final    Sinus 08/22/2023 4.2  cm Final    STJ 08/22/2023 4.2  cm Final    Ascending aorta 08/22/2023 5.0  cm Final    Mean e' 08/22/2023 0.10  m/s Final    ZLVIDS 08/22/2023 -2.25   Final    ZLVIDD 08/22/2023 -3.47   Final    1 Minute Recovery HR 08/22/2023 118  bpm Final    ST Depression (mm) 08/22/2023 " 1.0  mm Final    TV resting pulmonary artery pressu* 08/22/2023 29  mmHg Final    RV TB RVSP 08/22/2023 6  mmHg Final    Est. RA pres 08/22/2023 3  mmHg Final    Post-Stress Ejection Fraction 08/22/2023 60  % Final   Hospital Outpatient Visit on 06/29/2023   Component Date Value Ref Range Status    BSA 06/29/2023 2.11  m2 Final    TDI SEPTAL 06/29/2023 0.07  m/s Final    LV LATERAL E/E' RATIO 06/29/2023 5.18  m/s Final    LV SEPTAL E/E' RATIO 06/29/2023 8.14  m/s Final    LA WIDTH 06/29/2023 3.60  cm Final    IVC diameter 06/29/2023 1.55  cm Final    Left Ventricular Outflow Tract Laury* 06/29/2023 0.94  cm/s Final    Left Ventricular Outflow Tract Laury* 06/29/2023 4.11  mmHg Final    TDI LATERAL 06/29/2023 0.11  m/s Final    PV PEAK VELOCITY 06/29/2023 0.86  cm/s Final    LVIDd 06/29/2023 3.70  3.5 - 6.0 cm Final    IVS 06/29/2023 1.00  0.6 - 1.1 cm Final    Posterior Wall 06/29/2023 1.01  0.6 - 1.1 cm Final    LVIDs 06/29/2023 2.52  2.1 - 4.0 cm Final    FS 06/29/2023 32  28 - 44 % Final    LA volume 06/29/2023 41.37  cm3 Final    Sinus 06/29/2023 3.12  cm Final    STJ 06/29/2023 2.84  cm Final    Ascending aorta 06/29/2023 4.88  cm Final    LV mass 06/29/2023 113.35  g Final    LA size 06/29/2023 3.69  cm Final    TAPSE 06/29/2023 2.54  cm Final    RV S' 06/29/2023 13.77  cm/s Final    Left Ventricle Relative Wall Thick* 06/29/2023 0.55  cm Final    AV regurgitation pressure 1/2 time 06/29/2023 723.097863380022551  ms Final    AV mean gradient 06/29/2023 4  mmHg Final    AV valve area 06/29/2023 3.74  cm2 Final    AV Velocity Ratio 06/29/2023 1.06   Final    AV index (prosthetic) 06/29/2023 1.17   Final    MV mean gradient 06/29/2023 1  mmHg Final    MV valve area p 1/2 method 06/29/2023 2.23  cm2 Final    MV valve area by continuity eq 06/29/2023 3.13  cm2 Final    E/A ratio 06/29/2023 0.89   Final    Mean e' 06/29/2023 0.09  m/s Final    E wave deceleration time 06/29/2023 339.69  msec Final    LVOT diameter  06/29/2023 2.02  cm Final    LVOT area 06/29/2023 3.2  cm2 Final    LVOT peak arias 06/29/2023 1.42  m/s Final    LVOT peak VTI 06/29/2023 26.60  cm Final    Ao peak arias 06/29/2023 1.34  m/s Final    Ao VTI 06/29/2023 22.8  cm Final    LVOT stroke volume 06/29/2023 85.20  cm3 Final    AV peak gradient 06/29/2023 7  mmHg Final    MV peak gradient 06/29/2023 4  mmHg Final    E/E' ratio 06/29/2023 6.33  m/s Final    MV Peak E Arias 06/29/2023 0.57  m/s Final    AR Max Arias 06/29/2023 3.10  m/s Final    TR Max Arias 06/29/2023 2.68  m/s Final    MV VTI 06/29/2023 27.2  cm Final    MV stenosis pressure 1/2 time 06/29/2023 98.51  ms Final    MV Peak A Arias 06/29/2023 0.64  m/s Final    LV Systolic Volume 06/29/2023 22.73  mL Final    LV Systolic Volume Index 06/29/2023 10.8  mL/m2 Final    LV Diastolic Volume 06/29/2023 58.06  mL Final    LV Diastolic Volume Index 06/29/2023 27.65  mL/m2 Final    LA Volume Index 06/29/2023 19.7  mL/m2 Final    LV Mass Index 06/29/2023 54  g/m2 Final    RA Major Axis 06/29/2023 4.95  cm Final    Left Atrium Minor Axis 06/29/2023 3.52  cm Final    Left Atrium Major Axis 06/29/2023 3.82  cm Final    Triscuspid Valve Regurgitation Pea* 06/29/2023 29  mmHg Final    RA Width 06/29/2023 3.80  cm Final    Right Atrial Pressure (from IVC) 06/29/2023 3  mmHg Final    EF 06/29/2023 65  % Final    TV resting pulmonary artery pressu* 06/29/2023 32  mmHg Final       Imaging  No results found.    Assessment  1. Typical atrial flutter  - Vital signs per unit routine; Standing  - Chlorhexidine (CHG) 2% Wipes; Standing  - Height and weight; Standing  - Verify Informed Consent; Standing  - Emergency Equipment at Bedside; Standing  - Patient to Void on Call Prior to Cardioversion; Standing  - Clip Chest and Back Hair if Necessary; Standing  - Insert Peripheral IV (18 Gauge Saline Lock); Standing  - NOTIFY PHYSICIAN PROC; Standing  - Diet NPO; Standing  - Oxygen PRN; Standing  - Pulse Oximetry Q4H; Standing  - Case  Request-Cath Lab: TRANSESOPHAGEAL ECHOCARDIOGRAM WITH POSSIBLE CARDIOVERSION (GWEN W/ POSS CARDIOVERSION)  - Transesophageal echo (GWEN) with possible cardioversion; Future  - Place in Outpatient; Standing  - Chlorhexidine (CHG) 2% Wipes  - Pulse Oximetry Q4H    2. Atherosclerosis of native coronary artery of native heart without angina pectoris  Stable and free of angina after bypass surgery    3. Primary hypertension  Control improving after dose adjustment 3 days ago    4. Hypercholesterolemia  Reassess once he has convalesced from surgery      Plan and Discussion    Will plan for GWEN cardioversion.  Has had an adequate amiodarone loading. The procedure including its risks, benefits and alternatives were discussed in detail.  All questions were answered.  After shared decision making, appropriate consents were signed.    The 10-year ASCVD risk score (Rell DK, et al., 2019) is: 16.5%    Values used to calculate the score:      Age: 68 years      Sex: Male      Is Non- : No      Diabetic: No      Tobacco smoker: No      Systolic Blood Pressure: 134 mmHg      Is BP treated: Yes      HDL Cholesterol: 51 mg/dL      Total Cholesterol: 148 mg/dL         Chon Lopez MD

## 2023-10-31 ENCOUNTER — PATIENT MESSAGE (OUTPATIENT)
Dept: CARDIOTHORACIC SURGERY | Facility: CLINIC | Age: 68
End: 2023-10-31
Payer: COMMERCIAL

## 2023-10-31 ENCOUNTER — EXTERNAL HOME HEALTH (OUTPATIENT)
Dept: HOME HEALTH SERVICES | Facility: HOSPITAL | Age: 68
End: 2023-10-31
Payer: COMMERCIAL

## 2023-11-01 ENCOUNTER — OFFICE VISIT (OUTPATIENT)
Dept: CARDIOTHORACIC SURGERY | Facility: CLINIC | Age: 68
End: 2023-11-01
Payer: COMMERCIAL

## 2023-11-01 ENCOUNTER — DOCUMENTATION ONLY (OUTPATIENT)
Dept: CARDIOTHORACIC SURGERY | Facility: CLINIC | Age: 68
End: 2023-11-01

## 2023-11-01 VITALS
WEIGHT: 187.25 LBS | HEART RATE: 125 BPM | OXYGEN SATURATION: 100 % | BODY MASS INDEX: 24.71 KG/M2 | RESPIRATION RATE: 16 BRPM | DIASTOLIC BLOOD PRESSURE: 112 MMHG | SYSTOLIC BLOOD PRESSURE: 151 MMHG

## 2023-11-01 DIAGNOSIS — I25.10 ATHEROSCLEROSIS OF NATIVE CORONARY ARTERY OF NATIVE HEART WITHOUT ANGINA PECTORIS: ICD-10-CM

## 2023-11-01 DIAGNOSIS — Z95.1 POSTSURGICAL AORTOCORONARY BYPASS STATUS: Primary | ICD-10-CM

## 2023-11-01 DIAGNOSIS — Z95.1 S/P CABG (CORONARY ARTERY BYPASS GRAFT): Primary | ICD-10-CM

## 2023-11-01 PROCEDURE — 3288F PR FALLS RISK ASSESSMENT DOCUMENTED: ICD-10-PCS | Mod: CPTII,S$GLB,, | Performed by: THORACIC SURGERY (CARDIOTHORACIC VASCULAR SURGERY)

## 2023-11-01 PROCEDURE — 3288F FALL RISK ASSESSMENT DOCD: CPT | Mod: CPTII,S$GLB,, | Performed by: THORACIC SURGERY (CARDIOTHORACIC VASCULAR SURGERY)

## 2023-11-01 PROCEDURE — 99999 PR PBB SHADOW E&M-EST. PATIENT-LVL III: CPT | Mod: PBBFAC,,, | Performed by: THORACIC SURGERY (CARDIOTHORACIC VASCULAR SURGERY)

## 2023-11-01 PROCEDURE — 99024 POSTOP FOLLOW-UP VISIT: CPT | Mod: S$GLB,,, | Performed by: THORACIC SURGERY (CARDIOTHORACIC VASCULAR SURGERY)

## 2023-11-01 PROCEDURE — 3080F DIAST BP >= 90 MM HG: CPT | Mod: CPTII,S$GLB,, | Performed by: THORACIC SURGERY (CARDIOTHORACIC VASCULAR SURGERY)

## 2023-11-01 PROCEDURE — 3077F SYST BP >= 140 MM HG: CPT | Mod: CPTII,S$GLB,, | Performed by: THORACIC SURGERY (CARDIOTHORACIC VASCULAR SURGERY)

## 2023-11-01 PROCEDURE — 1101F PT FALLS ASSESS-DOCD LE1/YR: CPT | Mod: CPTII,S$GLB,, | Performed by: THORACIC SURGERY (CARDIOTHORACIC VASCULAR SURGERY)

## 2023-11-01 PROCEDURE — 1101F PR PT FALLS ASSESS DOC 0-1 FALLS W/OUT INJ PAST YR: ICD-10-PCS | Mod: CPTII,S$GLB,, | Performed by: THORACIC SURGERY (CARDIOTHORACIC VASCULAR SURGERY)

## 2023-11-01 PROCEDURE — 4010F ACE/ARB THERAPY RXD/TAKEN: CPT | Mod: CPTII,S$GLB,, | Performed by: THORACIC SURGERY (CARDIOTHORACIC VASCULAR SURGERY)

## 2023-11-01 PROCEDURE — 99999 PR PBB SHADOW E&M-EST. PATIENT-LVL III: ICD-10-PCS | Mod: PBBFAC,,, | Performed by: THORACIC SURGERY (CARDIOTHORACIC VASCULAR SURGERY)

## 2023-11-01 PROCEDURE — 4010F PR ACE/ARB THEARPY RXD/TAKEN: ICD-10-PCS | Mod: CPTII,S$GLB,, | Performed by: THORACIC SURGERY (CARDIOTHORACIC VASCULAR SURGERY)

## 2023-11-01 PROCEDURE — 3080F PR MOST RECENT DIASTOLIC BLOOD PRESSURE >= 90 MM HG: ICD-10-PCS | Mod: CPTII,S$GLB,, | Performed by: THORACIC SURGERY (CARDIOTHORACIC VASCULAR SURGERY)

## 2023-11-01 PROCEDURE — 1126F PR PAIN SEVERITY QUANTIFIED, NO PAIN PRESENT: ICD-10-PCS | Mod: CPTII,S$GLB,, | Performed by: THORACIC SURGERY (CARDIOTHORACIC VASCULAR SURGERY)

## 2023-11-01 PROCEDURE — 1126F AMNT PAIN NOTED NONE PRSNT: CPT | Mod: CPTII,S$GLB,, | Performed by: THORACIC SURGERY (CARDIOTHORACIC VASCULAR SURGERY)

## 2023-11-01 PROCEDURE — 3077F PR MOST RECENT SYSTOLIC BLOOD PRESSURE >= 140 MM HG: ICD-10-PCS | Mod: CPTII,S$GLB,, | Performed by: THORACIC SURGERY (CARDIOTHORACIC VASCULAR SURGERY)

## 2023-11-01 PROCEDURE — 99024 PR POST-OP FOLLOW-UP VISIT: ICD-10-PCS | Mod: S$GLB,,, | Performed by: THORACIC SURGERY (CARDIOTHORACIC VASCULAR SURGERY)

## 2023-11-01 NOTE — PROGRESS NOTES
Pt instructed to follow-up with his cardiologist, Dr. Lopez  .      Explained to pt he is still under a 20lb  lifting restriction from 6 weeks to 12 weeks.  After 12 weeks he will have no further restrictions.    Pt reminded to continue washing incisions everyday with soap and water.  Pt was provided with a copy of AVS and instructed on medication changes.  Pt verbalized understanding of all instructions.    Julie Haase RN  [facility] Nurse Navigator [phone]

## 2023-11-01 NOTE — PATIENT INSTRUCTIONS
Please make appointments to check in with your PCP and Cardiologist in the next 2 to 6 weeks.    Continue walking during cooler parts of the day or early evening to build up your endurance.     You may drive, if you have power steering.    Continue to clean your wound daily with soap and water.    Your lifting restriction is still in place until you are 12 weeks out from your surgery:     5 pounds first 6 weeks after surgery   20 pounds for weeks 7 - 12 after surgery     COVID Precautions:    Continue to take precautions against COVID. Mask up when around unknown people, wash / sanitize hands frequently. Avoid large groups of people until at least 12 weeks post op.    Cardiac Rehab:    We will send over orders for your Cardiac Rehab referral. It may take 7-10 business days to get authorization from your insurance company. The facility will call you to set up your first appointment once they have insurance authorization.    Thank you for trusting [facility] and Dr Mooney with your care.  We are honored that you entrusted us with your healthcare needs. Your satisfaction is very important to us and we hope you have been very pleased with your experience at [facility]. After your clinic visit you may receive a survey asking you to rate your clinic experience. We encourage you to take the time to complete the survey as your feedback allows us to identify areas for improvement as well as recognize our staff for their care. We hope that you have received the very best care possible at [facility], as your satisfaction is our top priority.

## 2023-11-06 ENCOUNTER — TELEPHONE (OUTPATIENT)
Dept: CARDIOLOGY | Facility: CLINIC | Age: 68
End: 2023-11-06
Payer: COMMERCIAL

## 2023-11-06 ENCOUNTER — NURSE TRIAGE (OUTPATIENT)
Dept: ADMINISTRATIVE | Facility: CLINIC | Age: 68
End: 2023-11-06
Payer: COMMERCIAL

## 2023-11-06 ENCOUNTER — PATIENT MESSAGE (OUTPATIENT)
Dept: ADMINISTRATIVE | Facility: HOSPITAL | Age: 68
End: 2023-11-06
Payer: COMMERCIAL

## 2023-11-06 DIAGNOSIS — R73.03 PRE-DIABETES: Primary | ICD-10-CM

## 2023-11-06 NOTE — TELEPHONE ENCOUNTER
Spoke to pt. Appointment was made today per nurse triage protocol. Pt is scheduled for a cardioversion on 11/8 with Dr. Lopez. Informed pt he does not need to be seen today. Pt in agreement, will cancel appt with Dr. Lopez.

## 2023-11-06 NOTE — PRE-PROCEDURE INSTRUCTIONS
Pre admit phone call completed.    Instructions given to patient about NPO status given by Molly at Dr. Lopez's office.      The evening before surgery do not eat anything after 9 p.m. ( this includes hard candy, chewing gum and mints).  You may only have GATORADE, POWERADE AND WATER from 9 p.m. until you leave your home. DO NOT  DRINK ANY LIQUIDS ON THE WAY TO THE HOSPITAL.      Patient was also instructed on the below information:    Park in the Parking lot behind the hospital or in the [facility] across the street from the parking lot.  Parking is complimentary.  If you will be discharged the same day as your procedure, please arrange for a responsible adult to drive you home or  to accompany you if traveling by taxi.  YOU WILL NOT BE PERMITTED TO DRIVE OR TO LEAVE THE HOSPITAL ALONE AFTER SURGERY.  It is strongly recommended that you arrange for someone to remain with you for the first 24 hrs following your surgery.    Patient verbalized understanding of above instructions.

## 2023-11-06 NOTE — TELEPHONE ENCOUNTER
Reason for Disposition   History of heart disease (i.e., heart attack, bypass surgery, angina, angioplasty)  (Exception: Brief heartbeat symptoms that went away and now feels well.)    Additional Information   Negative: Passed out (i.e., lost consciousness, collapsed and was not responding)   Negative: Shock suspected (e.g., cold/pale/clammy skin, too weak to stand, low BP, rapid pulse)   Negative: Difficult to awaken or acting confused (e.g., disoriented, slurred speech)   Negative: Visible sweat on face or sweat dripping down face   Negative: Unable to walk, or can only walk with assistance (e.g., requires support)   Negative: Received SHOCK from implantable cardiac defibrillator and has persisting symptoms (i.e., palpitations, lightheadedness)   Negative: Dizziness, lightheadedness, or weakness and heart beating very rapidly (e.g., > 140 / minute)   Negative: Dizziness, lightheadedness, or weakness and heart beating very slowly (e.g., < 50 / minute)   Negative: Sounds like a life-threatening emergency to the triager   Negative: Difficulty breathing   Negative: Dizziness, lightheadedness, or weakness   Negative: Heart beating very rapidly (e.g., > 140 / minute) and present now  (Exception: During exercise.)   Negative: Heart beating very slowly (e.g., < 50 / minute)  (Exception: Athlete and heart rate normal for caller.)   Negative: New or worsened shortness of breath with activity (dyspnea on exertion)   Negative: Patient sounds very sick or weak to the triager   Negative: Wearing a 'Holter monitor' or 'cardiac event monitor'   Negative: Received SHOCK from implantable cardiac defibrillator (and now feels well)   Negative: Heart beating very rapidly (e.g., > 140 / minute) and not present now  (Exception: During exercise.)   Negative: Skipped or extra beat(s) and increases with exercise or exertion   Negative: Skipped or extra beat(s) and occurs 4 or more times per minute    Protocols used: Heart Rate and  Heartbeat [id number]  Spoke with home health nurse Camden who is with pt.States she is calling in to reports VS /100, and HR of 136. States pt has trending elevated HR. Denies SOB, dizziness. Hx of CABG Advised to be seen in office today. Appointment scheduled today.

## 2023-11-08 ENCOUNTER — HOSPITAL ENCOUNTER (OUTPATIENT)
Facility: OTHER | Age: 68
Discharge: HOME | End: 2023-11-08
Attending: INTERNAL MEDICINE | Admitting: INTERNAL MEDICINE
Payer: COMMERCIAL

## 2023-11-08 ENCOUNTER — NURSE TRIAGE (OUTPATIENT)
Dept: ADMINISTRATIVE | Facility: CLINIC | Age: 68
End: 2023-11-08
Payer: COMMERCIAL

## 2023-11-08 ENCOUNTER — ANESTHESIA EVENT (OUTPATIENT)
Dept: CARDIOLOGY | Facility: OTHER | Age: 68
End: 2023-11-08
Payer: COMMERCIAL

## 2023-11-08 ENCOUNTER — ANESTHESIA (OUTPATIENT)
Dept: CARDIOLOGY | Facility: OTHER | Age: 68
End: 2023-11-08
Payer: COMMERCIAL

## 2023-11-08 ENCOUNTER — HOSPITAL ENCOUNTER (OUTPATIENT)
Dept: CARDIOLOGY | Facility: OTHER | Age: 68
Discharge: HOME | End: 2023-11-08
Attending: INTERNAL MEDICINE | Admitting: INTERNAL MEDICINE
Payer: COMMERCIAL

## 2023-11-08 VITALS
BODY MASS INDEX: 24.38 KG/M2 | WEIGHT: 180 LBS | SYSTOLIC BLOOD PRESSURE: 115 MMHG | RESPIRATION RATE: 16 BRPM | HEART RATE: 69 BPM | HEIGHT: 72 IN | TEMPERATURE: 98 F | OXYGEN SATURATION: 95 % | DIASTOLIC BLOOD PRESSURE: 81 MMHG

## 2023-11-08 DIAGNOSIS — I48.91 AFIB: ICD-10-CM

## 2023-11-08 DIAGNOSIS — I48.3 TYPICAL ATRIAL FLUTTER: ICD-10-CM

## 2023-11-08 PROCEDURE — 93312 TRANSESOPHAGEAL ECHO (TEE) W/ POSSIBLE CARDIOVERSION: ICD-10-PCS | Mod: 26,,, | Performed by: INTERNAL MEDICINE

## 2023-11-08 PROCEDURE — 93325 DOPPLER ECHO COLOR FLOW MAPG: CPT

## 2023-11-08 PROCEDURE — 93010 ELECTROCARDIOGRAM REPORT: CPT | Mod: ,,, | Performed by: INTERNAL MEDICINE

## 2023-11-08 PROCEDURE — 93005 ELECTROCARDIOGRAM TRACING: CPT

## 2023-11-08 PROCEDURE — 93010 EKG 12-LEAD: ICD-10-PCS | Mod: ,,, | Performed by: INTERNAL MEDICINE

## 2023-11-08 PROCEDURE — 93312 ECHO TRANSESOPHAGEAL: CPT | Mod: 26,,, | Performed by: INTERNAL MEDICINE

## 2023-11-08 PROCEDURE — 93325 DOPPLER ECHO COLOR FLOW MAPG: CPT | Mod: 26,,, | Performed by: INTERNAL MEDICINE

## 2023-11-08 PROCEDURE — 25000003 PHARM REV CODE 250: Performed by: NURSE ANESTHETIST, CERTIFIED REGISTERED

## 2023-11-08 PROCEDURE — 93320 DOPPLER ECHO COMPLETE: CPT | Mod: 26,,, | Performed by: INTERNAL MEDICINE

## 2023-11-08 PROCEDURE — D9220A PRA ANESTHESIA: ICD-10-PCS | Mod: ,,, | Performed by: NURSE ANESTHETIST, CERTIFIED REGISTERED

## 2023-11-08 PROCEDURE — 63600175 PHARM REV CODE 636 W HCPCS: Performed by: NURSE ANESTHETIST, CERTIFIED REGISTERED

## 2023-11-08 PROCEDURE — 93325 TRANSESOPHAGEAL ECHO (TEE) W/ POSSIBLE CARDIOVERSION: ICD-10-PCS | Mod: 26,,, | Performed by: INTERNAL MEDICINE

## 2023-11-08 PROCEDURE — 37000008 HC ANESTHESIA 1ST 15 MINUTES: Performed by: INTERNAL MEDICINE

## 2023-11-08 PROCEDURE — 93320 TRANSESOPHAGEAL ECHO (TEE) W/ POSSIBLE CARDIOVERSION: ICD-10-PCS | Mod: 26,,, | Performed by: INTERNAL MEDICINE

## 2023-11-08 PROCEDURE — 92960 CARDIOVERSION ELECTRIC EXT: CPT | Mod: ,,, | Performed by: INTERNAL MEDICINE

## 2023-11-08 PROCEDURE — D9220A PRA ANESTHESIA: Mod: ,,, | Performed by: NURSE ANESTHETIST, CERTIFIED REGISTERED

## 2023-11-08 PROCEDURE — 92960 TRANSESOPHAGEAL ECHO (TEE) W/ POSSIBLE CARDIOVERSION: ICD-10-PCS | Mod: ,,, | Performed by: INTERNAL MEDICINE

## 2023-11-08 PROCEDURE — 37000009 HC ANESTHESIA EA ADD 15 MINS: Performed by: INTERNAL MEDICINE

## 2023-11-08 RX ORDER — POLYETHYLENE GLYCOL 3350 17 G/17G
POWDER, FOR SOLUTION ORAL
COMMUNITY
Start: 2023-10-01

## 2023-11-08 RX ORDER — ATORVASTATIN CALCIUM 80 MG/1
1 TABLET, FILM COATED ORAL DAILY
COMMUNITY
Start: 2023-10-01

## 2023-11-08 RX ORDER — AMIODARONE HYDROCHLORIDE 200 MG/1
TABLET ORAL
COMMUNITY
Start: 2023-10-12 | End: 2023-11-20 | Stop reason: SDUPTHER

## 2023-11-08 RX ORDER — LANSOPRAZOLE 30 MG/1
1 CAPSULE, DELAYED RELEASE ORAL DAILY
COMMUNITY
Start: 2023-10-01

## 2023-11-08 RX ORDER — ASPIRIN 325 MG
1 TABLET ORAL DAILY
COMMUNITY
Start: 2023-10-01 | End: 2023-11-21

## 2023-11-08 RX ORDER — ACETAMINOPHEN 500 MG
TABLET ORAL
COMMUNITY
Start: 2023-10-01

## 2023-11-08 RX ORDER — METOPROLOL SUCCINATE 100 MG/1
1 TABLET, EXTENDED RELEASE ORAL DAILY
COMMUNITY
Start: 2023-10-01 | End: 2023-12-11

## 2023-11-08 RX ORDER — OXYCODONE HYDROCHLORIDE 5 MG/1
5 TABLET ORAL
Status: DISCONTINUED | OUTPATIENT
Start: 2023-11-08 | End: 2023-11-08 | Stop reason: HOSPADM

## 2023-11-08 RX ORDER — HYDROMORPHONE HYDROCHLORIDE 2 MG/ML
0.4 INJECTION, SOLUTION INTRAMUSCULAR; INTRAVENOUS; SUBCUTANEOUS EVERY 5 MIN PRN
Status: DISCONTINUED | OUTPATIENT
Start: 2023-11-08 | End: 2023-11-08 | Stop reason: HOSPADM

## 2023-11-08 RX ORDER — OXYCODONE HYDROCHLORIDE 5 MG/1
TABLET ORAL
COMMUNITY
Start: 2023-10-01

## 2023-11-08 RX ORDER — FENTANYL CITRATE 50 UG/ML
INJECTION, SOLUTION INTRAMUSCULAR; INTRAVENOUS
Status: DISCONTINUED | OUTPATIENT
Start: 2023-11-08 | End: 2023-11-08

## 2023-11-08 RX ORDER — PROPOFOL 10 MG/ML
VIAL (ML) INTRAVENOUS
Status: DISCONTINUED | OUTPATIENT
Start: 2023-11-08 | End: 2023-11-08

## 2023-11-08 RX ORDER — SODIUM CHLORIDE 0.9 % (FLUSH) 0.9 %
3 SYRINGE (ML) INJECTION
Status: DISCONTINUED | OUTPATIENT
Start: 2023-11-08 | End: 2023-11-08 | Stop reason: HOSPADM

## 2023-11-08 RX ORDER — LIDOCAINE HYDROCHLORIDE 20 MG/ML
INJECTION INTRAVENOUS
Status: DISCONTINUED | OUTPATIENT
Start: 2023-11-08 | End: 2023-11-08

## 2023-11-08 RX ORDER — VALSARTAN 160 MG/1
160 TABLET ORAL 2 TIMES DAILY
Qty: 60 TABLET | Refills: 11 | Status: SHIPPED | OUTPATIENT
Start: 2023-11-08 | End: 2023-12-04 | Stop reason: SDUPTHER

## 2023-11-08 RX ORDER — MEPERIDINE HYDROCHLORIDE 25 MG/ML
12.5 INJECTION INTRAMUSCULAR; INTRAVENOUS; SUBCUTANEOUS ONCE AS NEEDED
Status: DISCONTINUED | OUTPATIENT
Start: 2023-11-08 | End: 2023-11-08 | Stop reason: HOSPADM

## 2023-11-08 RX ORDER — PROCHLORPERAZINE EDISYLATE 5 MG/ML
5 INJECTION INTRAMUSCULAR; INTRAVENOUS EVERY 30 MIN PRN
Status: DISCONTINUED | OUTPATIENT
Start: 2023-11-08 | End: 2023-11-08 | Stop reason: HOSPADM

## 2023-11-08 RX ORDER — MIDAZOLAM HYDROCHLORIDE 1 MG/ML
INJECTION INTRAMUSCULAR; INTRAVENOUS
Status: DISCONTINUED | OUTPATIENT
Start: 2023-11-08 | End: 2023-11-08

## 2023-11-08 RX ADMIN — MIDAZOLAM HYDROCHLORIDE 2 MG: 1 INJECTION, SOLUTION INTRAMUSCULAR; INTRAVENOUS at 08:11

## 2023-11-08 RX ADMIN — PROPOFOL 10 MG: 10 INJECTION, EMULSION INTRAVENOUS at 08:11

## 2023-11-08 RX ADMIN — LIDOCAINE HYDROCHLORIDE 40 MG: 20 INJECTION, SOLUTION INTRAVENOUS at 08:11

## 2023-11-08 RX ADMIN — SODIUM CHLORIDE: 0.9 INJECTION, SOLUTION INTRAVENOUS at 07:11

## 2023-11-08 RX ADMIN — PROPOFOL 50 MG: 10 INJECTION, EMULSION INTRAVENOUS at 08:11

## 2023-11-08 RX ADMIN — FENTANYL CITRATE 50 MCG: 50 INJECTION, SOLUTION INTRAMUSCULAR; INTRAVENOUS at 08:11

## 2023-11-08 NOTE — TELEPHONE ENCOUNTER
Reason for Disposition   Caller has URGENT question and triager unable to answer question    Additional Information   Negative: Sounds like a life-threatening emergency to the triager   Negative: Bright red, wide-spread, sunburn-like rash   Negative: SEVERE headache and after spinal (epidural) anesthesia   Negative: Vomiting and persists > 4 hours   Negative: Vomiting and abdomen looks much more swollen than usual   Negative: Drinking very little and dehydration suspected (e.g., no urine > 12 hours, very dry mouth, very lightheaded)   Negative: Patient sounds very sick or weak to the triager   Negative: Sounds like a serious complication to the triager   Negative: Fever > 100.4 F (38.0 C)    Protocols used: Post-Op Symptoms and [id number]  Spoke with pt who reports that he had procedure with Dr. Lopez today. States that he is having intermittent abdominal pain. Denies pain at this time. Advised will send message to provider, and should have call back. Verbalized understanding    3:34 Secure chat message sent to Dr. Lopez    Pt ask for call to be transferred to office.    Spoke with Molly in office who states will call pt back,since pt no longer on triage nurse line.

## 2023-11-08 NOTE — ANESTHESIA PREPROCEDURE EVALUATION
11/08/2023  Sawyer Browning is a 68 y.o., male.      Pre-op Assessment    I have reviewed the Patient Summary Reports.     I have reviewed the Nursing Notes. I have reviewed the NPO Status.   I have reviewed the Medications.     Review of Systems  Social:  Non-Smoker    Hematology/Oncology:  Hematology Normal   Oncology Normal     EENT/Dental:EENT/Dental Normal   Cardiovascular:   Hypertension CAD  Dysrhythmias atrial fibrillation    Pulmonary:   Shortness of breath    Hepatic/GI:   Hiatal Hernia, GERD, well controlled Liver Disease,    Musculoskeletal:  Musculoskeletal Normal    Neurological:   Neuromuscular Disease,    Endocrine:  Obesity / BMI > 30  Psych:  Psychiatric Normal           Physical Exam  General: Cooperative and Alert    Airway:  Mallampati: II   Mouth Opening: Normal  TM Distance: Normal  Tongue: Normal  Neck ROM: Normal ROM    Dental:  Intact        Anesthesia Plan  Type of Anesthesia, risks & benefits discussed:    Anesthesia Type: Gen Natural Airway  Intra-op Monitoring Plan: Standard ASA Monitors  Post Op Pain Control Plan: multimodal analgesia  Induction:  IV  Informed Consent: Informed consent signed with the Patient and all parties understand the risks and agree with anesthesia plan.  All questions answered.   ASA Score: 3    Ready For Surgery From Anesthesia Perspective.     .

## 2023-11-08 NOTE — ANESTHESIA POSTPROCEDURE EVALUATION
Anesthesia Post Evaluation    Patient: Sawyer Browning    Procedure(s) Performed: Procedure(s) (LRB):  TRANSESOPHAGEAL ECHOCARDIOGRAM WITH POSSIBLE CARDIOVERSION (GWEN W/ POSS CARDIOVERSION) (N/A)    Final Anesthesia Type: general      Patient location during evaluation: [facility]  Patient participation: Yes- Able to Participate  Level of consciousness: awake and alert and oriented  Post-procedure vital signs: reviewed and stable  Pain management: adequate  Airway patency: patent    PONV status at discharge: No PONV  Anesthetic complications: no      Cardiovascular status: stable, hemodynamically stable and blood pressure returned to baseline  Respiratory status: unassisted, spontaneous ventilation and room air  Hydration status: euvolemic  Follow-up not needed.          Vitals Value Taken Time   /129 11/08/23 0706   Temp 36.7 °C (98.1 °F) 11/08/23 0706   Pulse 138 11/08/23 0706   Resp 18 11/08/23 0706   SpO2 96 % 11/08/23 0706         No case tracking events are documented in the log.      Pain/Phillip Score: Phillip Score: 10 (11/8/2023  8:00 AM)

## 2023-11-08 NOTE — DISCHARGE SUMMARY
[facility] ([facility])  Discharge Note  Short Stay    Procedure(s) (LRB):  TRANSESOPHAGEAL ECHOCARDIOGRAM WITH POSSIBLE CARDIOVERSION (GWEN W/ POSS CARDIOVERSION) (N/A)      OUTCOME: Patient tolerated treatment/procedure well without complication and is now ready for discharge.    DISPOSITION: Home or Self Care    FINAL DIAGNOSIS:  Persistent atrial fibrillation    FOLLOWUP: In clinic    DISCHARGE INSTRUCTIONS:    Discharge Procedure Orders   Diet general        TIME SPENT ON DISCHARGE: 11 minutes

## 2023-11-09 ENCOUNTER — PATIENT MESSAGE (OUTPATIENT)
Dept: FAMILY MEDICINE | Facility: CLINIC | Age: 68
End: 2023-11-09
Payer: COMMERCIAL

## 2023-11-09 ENCOUNTER — PATIENT MESSAGE (OUTPATIENT)
Dept: CARDIOLOGY | Facility: CLINIC | Age: 68
End: 2023-11-09
Payer: COMMERCIAL

## 2023-11-10 DIAGNOSIS — Z12.5 PROSTATE CANCER SCREENING: Primary | ICD-10-CM

## 2023-11-10 PROBLEM — I48.3 TYPICAL ATRIAL FLUTTER: Status: ACTIVE | Noted: 2023-11-10

## 2023-11-10 PROBLEM — R73.9 TRANSIENT HYPERGLYCEMIA POST PROCEDURE: Status: RESOLVED | Noted: 2023-09-18 | Resolved: 2023-11-10

## 2023-11-13 RX ORDER — HYDROCHLOROTHIAZIDE 25 MG/1
25 TABLET ORAL DAILY
Qty: 90 TABLET | Refills: 3 | Status: SHIPPED | OUTPATIENT
Start: 2023-11-13 | End: 2024-11-12

## 2023-11-16 ENCOUNTER — DOCUMENT SCAN (OUTPATIENT)
Dept: HOME HEALTH SERVICES | Facility: HOSPITAL | Age: 68
End: 2023-11-16
Payer: COMMERCIAL

## 2023-11-20 ENCOUNTER — OFFICE VISIT (OUTPATIENT)
Dept: FAMILY MEDICINE | Facility: CLINIC | Age: 68
End: 2023-11-20
Payer: COMMERCIAL

## 2023-11-20 VITALS
OXYGEN SATURATION: 97 % | TEMPERATURE: 98 F | HEIGHT: 72 IN | HEART RATE: 60 BPM | DIASTOLIC BLOOD PRESSURE: 92 MMHG | SYSTOLIC BLOOD PRESSURE: 168 MMHG | BODY MASS INDEX: 24.71 KG/M2 | WEIGHT: 182.44 LBS

## 2023-11-20 DIAGNOSIS — Z00.00 ROUTINE MEDICAL EXAM: Primary | ICD-10-CM

## 2023-11-20 DIAGNOSIS — R05.3 CHRONIC COUGH: ICD-10-CM

## 2023-11-20 DIAGNOSIS — I48.3 TYPICAL ATRIAL FLUTTER: ICD-10-CM

## 2023-11-20 DIAGNOSIS — I25.10 CORONARY ARTERY DISEASE, UNSPECIFIED VESSEL OR LESION TYPE, UNSPECIFIED WHETHER ANGINA PRESENT, UNSPECIFIED WHETHER NATIVE OR TRANSPLANTED HEART: ICD-10-CM

## 2023-11-20 DIAGNOSIS — K21.9 GASTROESOPHAGEAL REFLUX DISEASE, UNSPECIFIED WHETHER ESOPHAGITIS PRESENT: ICD-10-CM

## 2023-11-20 DIAGNOSIS — Z23 NEED FOR SHINGLES VACCINE: ICD-10-CM

## 2023-11-20 DIAGNOSIS — R73.02 GLUCOSE INTOLERANCE (IMPAIRED GLUCOSE TOLERANCE): ICD-10-CM

## 2023-11-20 DIAGNOSIS — I10 ESSENTIAL HYPERTENSION: ICD-10-CM

## 2023-11-20 DIAGNOSIS — E78.49 OTHER HYPERLIPIDEMIA: ICD-10-CM

## 2023-11-20 DIAGNOSIS — Z23 FLU VACCINE NEED: ICD-10-CM

## 2023-11-20 DIAGNOSIS — R51.9 NONINTRACTABLE HEADACHE, UNSPECIFIED CHRONICITY PATTERN, UNSPECIFIED HEADACHE TYPE: ICD-10-CM

## 2023-11-20 DIAGNOSIS — I77.1 TORTUOUS AORTA: ICD-10-CM

## 2023-11-20 DIAGNOSIS — Z23 NEED FOR TDAP VACCINATION: ICD-10-CM

## 2023-11-20 PROCEDURE — 1159F MED LIST DOCD IN RCRD: CPT | Mod: CPTII,S$GLB,, | Performed by: INTERNAL MEDICINE

## 2023-11-20 PROCEDURE — 3077F PR MOST RECENT SYSTOLIC BLOOD PRESSURE >= 140 MM HG: ICD-10-PCS | Mod: CPTII,S$GLB,, | Performed by: INTERNAL MEDICINE

## 2023-11-20 PROCEDURE — 1101F PT FALLS ASSESS-DOCD LE1/YR: CPT | Mod: CPTII,S$GLB,, | Performed by: INTERNAL MEDICINE

## 2023-11-20 PROCEDURE — 3080F PR MOST RECENT DIASTOLIC BLOOD PRESSURE >= 90 MM HG: ICD-10-PCS | Mod: CPTII,S$GLB,, | Performed by: INTERNAL MEDICINE

## 2023-11-20 PROCEDURE — 99397 PER PM REEVAL EST PAT 65+ YR: CPT | Mod: S$GLB,,, | Performed by: INTERNAL MEDICINE

## 2023-11-20 PROCEDURE — 99999 PR PBB SHADOW E&M-EST. PATIENT-LVL IV: ICD-10-PCS | Mod: PBBFAC,,, | Performed by: INTERNAL MEDICINE

## 2023-11-20 PROCEDURE — 1160F PR REVIEW ALL MEDS BY PRESCRIBER/CLIN PHARMACIST DOCUMENTED: ICD-10-PCS | Mod: CPTII,S$GLB,, | Performed by: INTERNAL MEDICINE

## 2023-11-20 PROCEDURE — 1159F PR MEDICATION LIST DOCUMENTED IN MEDICAL RECORD: ICD-10-PCS | Mod: CPTII,S$GLB,, | Performed by: INTERNAL MEDICINE

## 2023-11-20 PROCEDURE — 3288F FALL RISK ASSESSMENT DOCD: CPT | Mod: CPTII,S$GLB,, | Performed by: INTERNAL MEDICINE

## 2023-11-20 PROCEDURE — 99397 PR PREVENTIVE VISIT,EST,65 & OVER: ICD-10-PCS | Mod: S$GLB,,, | Performed by: INTERNAL MEDICINE

## 2023-11-20 PROCEDURE — 4010F PR ACE/ARB THEARPY RXD/TAKEN: ICD-10-PCS | Mod: CPTII,S$GLB,, | Performed by: INTERNAL MEDICINE

## 2023-11-20 PROCEDURE — 1126F AMNT PAIN NOTED NONE PRSNT: CPT | Mod: CPTII,S$GLB,, | Performed by: INTERNAL MEDICINE

## 2023-11-20 PROCEDURE — 3008F PR BODY MASS INDEX (BMI) DOCUMENTED: ICD-10-PCS | Mod: CPTII,S$GLB,, | Performed by: INTERNAL MEDICINE

## 2023-11-20 PROCEDURE — 3288F PR FALLS RISK ASSESSMENT DOCUMENTED: ICD-10-PCS | Mod: CPTII,S$GLB,, | Performed by: INTERNAL MEDICINE

## 2023-11-20 PROCEDURE — 3008F BODY MASS INDEX DOCD: CPT | Mod: CPTII,S$GLB,, | Performed by: INTERNAL MEDICINE

## 2023-11-20 PROCEDURE — 3080F DIAST BP >= 90 MM HG: CPT | Mod: CPTII,S$GLB,, | Performed by: INTERNAL MEDICINE

## 2023-11-20 PROCEDURE — 4010F ACE/ARB THERAPY RXD/TAKEN: CPT | Mod: CPTII,S$GLB,, | Performed by: INTERNAL MEDICINE

## 2023-11-20 PROCEDURE — 1101F PR PT FALLS ASSESS DOC 0-1 FALLS W/OUT INJ PAST YR: ICD-10-PCS | Mod: CPTII,S$GLB,, | Performed by: INTERNAL MEDICINE

## 2023-11-20 PROCEDURE — 1160F RVW MEDS BY RX/DR IN RCRD: CPT | Mod: CPTII,S$GLB,, | Performed by: INTERNAL MEDICINE

## 2023-11-20 PROCEDURE — 99999 PR PBB SHADOW E&M-EST. PATIENT-LVL IV: CPT | Mod: PBBFAC,,, | Performed by: INTERNAL MEDICINE

## 2023-11-20 PROCEDURE — 3077F SYST BP >= 140 MM HG: CPT | Mod: CPTII,S$GLB,, | Performed by: INTERNAL MEDICINE

## 2023-11-20 PROCEDURE — 1126F PR PAIN SEVERITY QUANTIFIED, NO PAIN PRESENT: ICD-10-PCS | Mod: CPTII,S$GLB,, | Performed by: INTERNAL MEDICINE

## 2023-11-20 NOTE — PROGRESS NOTES
CHIEF COMPLAINT:   Chief Complaint   Patient presents with    Annual Exam          HISTORY OF PRESENT ILLNESS:  Sawyer Browning is a 68 y.o. male who presents to the clinic today for a routine medical physical exam. His last physical exam was approximately 1 years(s) ago.    Subjective    PAST MEDICAL HISTORY:  Past Medical History:   Diagnosis Date    BPH (benign prostatic hyperplasia)     Elevated liver enzymes     Fatty infiltration of liver     Glucose intolerance (impaired glucose tolerance)     Hiatal hernia     Hyperlipidemia     Hypertension     Overweight     Reflux     Snores        PAST SURGICAL HISTORY:  Past Surgical History:   Procedure Laterality Date    AORTIC ROOT REPLACEMENT N/A 9/18/2023    Procedure: VALVE SPARING AORTIC ROOT REPLACEMENT;  Surgeon: Imtiaz Mooney MD;  Location: [facility] OR [address] [place] [address];  Service: Cardiovascular;  Laterality: N/A;  Valve sparing aortic root replacement, vs biobentall, hemiarch, CABGx2 under  hypothermic circ arrest    CORONARY ANGIOGRAPHY N/A 9/6/2023    Procedure: ANGIOGRAM, CORONARY ARTERY;  Surgeon: Chon Lopez MD;  Location: [facility] CATH LAB;  Service: Cardiology;  Laterality: N/A;  right radial    CORONARY ARTERY BYPASS GRAFT (CABG) N/A 9/18/2023    Procedure: CORONARY ARTERY BYPASS GRAFT (CABG);  Surgeon: Imtiaz Mooney MD;  Location: [facility] OR [address] [place] [address];  Service: Cardiovascular;  Laterality: N/A;  Vein [place] Start: 821  Vein [place] End: 838  Vein Preparation Start: 839  Vein Preparation End: 912    ENDOSCOPIC HARVEST OF VEIN Left 9/18/2023    Procedure: HARVEST-VEIN-ENDOVASCULAR;  Surgeon: Imtiaz Mooney MD;  Location: [facility] OR [address] [place] [address];  Service: Cardiovascular;  Laterality: Left;  Valve sparing aortic root replacement, vs biobentall, hemiarch, CABGx2 under    inguinal hernia      x2    REPAIR OF DIAPHRAGMATIC HERNIA  9/18/2023    Procedure: REPAIR, HERNIA, DIAPHRAGMATIC;  Surgeon: Imtiaz Mooney MD;  Location: [facility] OR [address] [place] [address];  Service:  Cardiovascular;;    tonsillectomy      TRANSESOPHAGEAL ECHOCARDIOGRAM WITH POSSIBLE CARDIOVERSION (GWEN W/ POSS CARDIOVERSION) N/A 11/8/2023    Procedure: TRANSESOPHAGEAL ECHOCARDIOGRAM WITH POSSIBLE CARDIOVERSION (GWEN W/ POSS CARDIOVERSION);  Surgeon: Chon Lopez MD;  Location: [facility] CATH LAB;  Service: Cardiology;  Laterality: N/A;       SOCIAL HISTORY:  Social History     Socioeconomic History    Marital status:     Number of children: 2   Occupational History    Occupation: Works in insurance   Tobacco Use    Smoking status: Never    Smokeless tobacco: Never   Substance and Sexual Activity    Alcohol use: Yes     Comment: 2 glasses of wine most evenings.    Drug use: No     Social Determinants of Health     Financial Resource Strain: Low Risk  (9/19/2023)    Overall Financial Resource Strain (CARDIA)     Difficulty of Paying Living Expenses: Not hard at all   Food Insecurity: No Food Insecurity (9/19/2023)    Hunger Vital Sign     Worried About Running Out of Food in the Last Year: Never true     Ran Out of Food in the Last Year: Never true   Transportation Needs: No Transportation Needs (9/19/2023)    PRAPARE - Transportation     Lack of Transportation (Medical): No     Lack of Transportation (Non-Medical): No   Physical Activity: Sufficiently Active (9/19/2023)    Exercise Vital Sign     Days of Exercise per Week: 3 days     Minutes of Exercise per Session: 60 min   Stress: Unknown (9/19/2023)    [employer] - Occupational Stress Questionnaire     Feeling of Stress : Patient refused   Social Connections: Unknown (9/19/2023)    Social Connection and Isolation Panel [NHANES]     Frequency of Communication with Friends and Family: More than three times a week     Frequency of Social Gatherings with Friends and Family: More than three times a week     Attends Voodoo Services: Patient refused     Active Member of Clubs or Organizations: Patient refused     Attends Club or  Organization Meetings: Patient refused     Marital Status:    Housing Stability: Low Risk  (9/19/2023)    Housing Stability Vital Sign     Unable to Pay for Housing in the Last Year: No     Number of Places Lived in the Last Year: 1     Unstable Housing in the Last Year: No       FAMILY HISTORY:  Family History   Problem Relation Age of Onset    Hypertension Mother     Macular degeneration Mother         - gets shots in her eye    Stroke Father     Heart disease Father     Aneurysm Father     Migraines Sister     Arthritis Brother         knees    Dementia Paternal Grandmother     Diabetes Paternal Grandmother     Heart disease Paternal Grandfather     Diabetes Brother     Hernia Brother     Cancer Paternal Aunt     Cancer Paternal Uncle     Cancer Paternal Aunt     Cancer Paternal Uncle     Colon cancer Neg Hx     Prostate cancer Neg Hx        ALLERGIES AND MEDICATIONS: updated and reviewed.  Review of patient's allergies indicates:   Allergen Reactions    Ace inhibitors Other (See Comments)     cough    Losartan      headache     Medication List with Changes/Refills   Current Medications    ACETAMINOPHEN (TYLENOL EXTRA STRENGTH) 500 MG TABLET    2 tablet EVERY 8 HOURS (route: oral)    AMIODARONE (PACERONE) 200 MG TAB    Take 1 tablet (200 mg total) by mouth once daily.    APIXABAN (ELIQUIS) 5 MG TAB    Take 1 tablet by mouth 2 (two) times daily.    ASPIRIN (ECOTRIN) 81 MG EC TABLET    Take 81 mg by mouth once daily.    ASPIRIN 325 MG TABLET    Take 1 tablet by mouth once daily.    ATORVASTATIN (LIPITOR) 80 MG TABLET    Take 1 tablet by mouth once daily.    FUROSEMIDE (LASIX) 40 MG TABLET    Take 1 tablet (40 mg total) by mouth daily as needed (weight gain).    HYDROCHLOROTHIAZIDE (HYDRODIURIL) 25 MG TABLET    Take 1 tablet (25 mg total) by mouth once daily.    LANSOPRAZOLE (PREVACID) 30 MG CAPSULE    Take 1 capsule by mouth once daily.    METOPROLOL SUCCINATE (TOPROL-XL) 100 MG 24 HR TABLET    Take 1 tablet  by mouth once daily.    OXYCODONE (ROXICODONE) 5 MG IMMEDIATE RELEASE TABLET    1 tablet EVERY 4 HOURS (route: oral)    POLYETHYLENE GLYCOL (GLYCOLAX) 17 GRAM/DOSE POWDER    17 gram DAILY (route: oral)    VALSARTAN (DIOVAN) 160 MG TABLET    Take 1 tablet (160 mg total) by mouth 2 (two) times daily.   Discontinued Medications    ACETAMINOPHEN (TYLENOL) 500 MG TABLET    Take 2 tablets (1,000 mg total) by mouth every 8 (eight) hours as needed for Pain.    AMIODARONE (PACERONE) 200 MG TAB    2 tablet DAILY (route: oral)    APIXABAN (ELIQUIS) 5 MG TAB    Take 1 tablet (5 mg total) by mouth 2 (two) times daily.    ATORVASTATIN (LIPITOR) 80 MG TABLET    Take 1 tablet (80 mg total) by mouth once daily.    LANSOPRAZOLE (PREVACID) 30 MG CAPSULE    TAKE 1 CAPSULE DAILY ONLY  AS NEEDED FOR HEARTBURN    METOPROLOL SUCCINATE (TOPROL-XL) 100 MG 24 HR TABLET    Take 1 tablet (100 mg total) by mouth once daily.         CARE TEAM:  Patient Care Team:  Mary Kay Haines MD as PCP - General (Internal Medicine)  IvaNadege LPN as Care Coordinator         SCREENING HISTORY:  Health Maintenance         Date Due Completion Date    TETANUS VACCINE Never done ---    Shingles Vaccine (1 of 2) Never done ---    RSV Vaccine (Age 60+ and Pregnant patients) (1 - 1-dose 60+ series) Never done ---    COVID-19 Vaccine (3 - 2023-24 season) 09/01/2023 2/22/2021    PROSTATE-SPECIFIC ANTIGEN 11/03/2023 11/3/2022    Hemoglobin A1c (Prediabetes) 11/03/2023 11/3/2022    High Dose Statin 11/20/2024 11/20/2023    Colorectal Cancer Screening 01/21/2025 1/21/2022    Override on 5/2/2007: Done (normal)    Lipid Panel 11/03/2027 11/3/2022              REVIEW OF SYSTEMS:  The patient reports : good dietary habits.  The patient reports  : that they exercise regularly - currently scaled back due to recent CABG  Review of Systems   Constitutional:  Negative for chills and fever.   HENT:  Negative for congestion.    Respiratory:  Positive for cough  (chronic). Negative for shortness of breath.    Cardiovascular:  Negative for chest pain and leg swelling.   Gastrointestinal:  Negative for abdominal pain.   Genitourinary:  Negative for dysuria.   Musculoskeletal:  Negative for arthralgias.   Skin:  Negative for rash.   Neurological:  Positive for headaches (since CABG (under some stress); slowly getting better; sometimes feels 'woozy' but not dizzy).   Psychiatric/Behavioral:  Positive for dysphoric mood (some decreased mood since recent CABG; also wife with health concerns).      ROS : patient denies: difficulty initiating urination          Objective    PHYSICAL EXAMINATION/VITALS:  Vitals:    11/20/23 1520   BP: (!) 168/92   Pulse: 60   Temp: 98 °F (36.7 °C)   TempSrc: Oral   SpO2: 97%   Weight: 82.7 kg (182 lb 6.9 oz)   Height: 6' (1.829 m)       Body mass index is 24.74 kg/m².    General appearance - alert, well appearing, and in no distress, normal appearing weight  Psychiatric - alert, oriented to person, place, and time, normal behavior, speech, dress, motor activity and thought process, mildly depressed mood  Eyes - pupils equal and reactive, extraocular eye movements intact, sclera anicteric  Neck - supple, no significant adenopathy, carotids upstroke normal bilaterally, no bruits  Lymphatics - no palpable cervical lymphadenopathy  Chest - clear to auscultation, no wheezes, rales or rhonchi, symmetric air entry  Heart - normal rate and regular rhythm  Neurological - alert, normal speech, no focal findings; cranial nerves II through XII intact  Musculoskeletal - no joint tenderness, deformity or swelling, no muscular tenderness noted  Extremities - no pedal edema noted  Skin - normal coloration, no suspicious skin lesions      LABS:  Ordered/Scheduled          ASSESSMENT AND PLAN:   1. Routine medical exam  Counseled on age appropriate medical preventative services including age appropriate cancer screenings, age appropriate eye and dental exams, over  all nutritional health, need for a consistent exercise regimen, and an over all push towards maintaining a vigorous and active lifestyle.  Counseled on age appropriate vaccines and discussed upcoming health care needs based on age/gender. Discussed good sleep hygiene and stress management.    2. Essential hypertension  Blood pressures have been elevated since his recent cardiac bypass.  Defer medication changes/treatment to Cardiology.    3. Coronary artery disease, unspecified vessel or lesion type, unspecified whether angina present, unspecified whether native or transplanted heart  Doing well. Hopes to start cardiac rehab soon.  Overview:  S/p CABG 9/2023      4. Typical atrial flutter  Resolved with recent cardioversion.    5. Other hyperlipidemia  Lab Results   Component Value Date    CHOL 148 11/03/2022     Lab Results   Component Value Date    HDL 51 11/03/2022     Lab Results   Component Value Date    LDLCALC 78.0 11/03/2022     Lab Results   Component Value Date    TRIG 95 11/03/2022     Lab Results   Component Value Date    LDLCALC 78.0 11/03/2022     We discussed low fat diet and regular exercise.The current medical regimen is effective;  continue present plan and medications.     6. Tortuous aorta  Patient with tortuous/ectatic Aorta.  Stable/asymptomatic. Currently stable on lipid lowering medication and blood pressure monitoring.   Overview:  - noted on CXR 10/21/2019      7. Glucose intolerance (impaired glucose tolerance)  Lab Results   Component Value Date    HGBA1C 5.5 11/03/2022     Stable. We discussed low sugar/low carbohydrate diet and regular exercise to prevent progression. No need for prescription medication at this time.  Check A1c yearly.    8. Gastroesophageal reflux disease, unspecified whether esophagitis present  Symptoms controlled: yes - takes medication occasionally as needed.   Reflux precautions discussed (eliminate tobacco if a smoker; minimize caffeine, chocolate and red/white  peppermint intake; avoid heavy and spicy meals; don't lay down within 2-3 hours after eating; minimize the intake of NSAIDs). Medication as needed.   Patient asked to take medication breaks, if possible - discussed chronic use can limit calcium absorption (which can lead to osteopenia/osteoporosis), increases the risk for intestinal infections, and can cause kidney damage. There are also some newer studies that show possible increased risk of mortality.    9. Flu vaccine need/10. Need for Tdap vaccination/11. Need for shingles vaccine  Declined.    12. Chronic cough  Cough is chronic.  He has had some imaging done of his chest in the last several months that did not show anything worrisome.  I suspect it may be from postnasal drip.  He will try saline nasal rinse with Flonase.  Consider CT scan of sinuses if symptoms persist.  Could be related to reflux.  He takes PPI occasionally.  Consider further evaluation by ENT or pulmonology if no etiology can be found.    13. Nonintractable headache, unspecified chronicity pattern, unspecified headache type  Symptoms slowly improving.  Not sure if this is related to stress or his recent surgery or his elevated blood pressure.  He can continue with Tylenol as needed.  Observe for now.           No orders of the defined types were placed in this encounter.      FOLLOW UP: Follow up in about 1 year (around 11/20/2024), or if symptoms worsen or fail to improve, for annual exam. or sooner as needed.

## 2023-11-21 ENCOUNTER — OFFICE VISIT (OUTPATIENT)
Dept: CARDIOLOGY | Facility: CLINIC | Age: 68
End: 2023-11-21
Payer: COMMERCIAL

## 2023-11-21 VITALS
DIASTOLIC BLOOD PRESSURE: 90 MMHG | BODY MASS INDEX: 24.58 KG/M2 | WEIGHT: 181.19 LBS | SYSTOLIC BLOOD PRESSURE: 152 MMHG | HEART RATE: 55 BPM

## 2023-11-21 DIAGNOSIS — I10 PRIMARY HYPERTENSION: ICD-10-CM

## 2023-11-21 DIAGNOSIS — I25.10 ATHEROSCLEROSIS OF NATIVE CORONARY ARTERY OF NATIVE HEART WITHOUT ANGINA PECTORIS: Primary | ICD-10-CM

## 2023-11-21 DIAGNOSIS — E78.00 HYPERCHOLESTEROLEMIA: ICD-10-CM

## 2023-11-21 DIAGNOSIS — I48.3 TYPICAL ATRIAL FLUTTER: ICD-10-CM

## 2023-11-21 PROCEDURE — 93005 ELECTROCARDIOGRAM TRACING: CPT

## 2023-11-21 PROCEDURE — 3080F PR MOST RECENT DIASTOLIC BLOOD PRESSURE >= 90 MM HG: ICD-10-PCS | Mod: CPTII,S$GLB,, | Performed by: INTERNAL MEDICINE

## 2023-11-21 PROCEDURE — 4010F PR ACE/ARB THEARPY RXD/TAKEN: ICD-10-PCS | Mod: CPTII,S$GLB,, | Performed by: INTERNAL MEDICINE

## 2023-11-21 PROCEDURE — 3077F SYST BP >= 140 MM HG: CPT | Mod: CPTII,S$GLB,, | Performed by: INTERNAL MEDICINE

## 2023-11-21 PROCEDURE — 3080F DIAST BP >= 90 MM HG: CPT | Mod: CPTII,S$GLB,, | Performed by: INTERNAL MEDICINE

## 2023-11-21 PROCEDURE — 3008F PR BODY MASS INDEX (BMI) DOCUMENTED: ICD-10-PCS | Mod: CPTII,S$GLB,, | Performed by: INTERNAL MEDICINE

## 2023-11-21 PROCEDURE — 93010 EKG 12-LEAD: ICD-10-PCS | Mod: S$GLB,,, | Performed by: INTERNAL MEDICINE

## 2023-11-21 PROCEDURE — 99215 PR OFFICE/OUTPT VISIT, EST, LEVL V, 40-54 MIN: ICD-10-PCS | Mod: 25,S$GLB,, | Performed by: INTERNAL MEDICINE

## 2023-11-21 PROCEDURE — 99215 OFFICE O/P EST HI 40 MIN: CPT | Mod: 25,S$GLB,, | Performed by: INTERNAL MEDICINE

## 2023-11-21 PROCEDURE — 1159F PR MEDICATION LIST DOCUMENTED IN MEDICAL RECORD: ICD-10-PCS | Mod: CPTII,S$GLB,, | Performed by: INTERNAL MEDICINE

## 2023-11-21 PROCEDURE — 1101F PT FALLS ASSESS-DOCD LE1/YR: CPT | Mod: CPTII,S$GLB,, | Performed by: INTERNAL MEDICINE

## 2023-11-21 PROCEDURE — 99999 PR PBB SHADOW E&M-EST. PATIENT-LVL IV: ICD-10-PCS | Mod: PBBFAC,,, | Performed by: INTERNAL MEDICINE

## 2023-11-21 PROCEDURE — 3288F PR FALLS RISK ASSESSMENT DOCUMENTED: ICD-10-PCS | Mod: CPTII,S$GLB,, | Performed by: INTERNAL MEDICINE

## 2023-11-21 PROCEDURE — 3008F BODY MASS INDEX DOCD: CPT | Mod: CPTII,S$GLB,, | Performed by: INTERNAL MEDICINE

## 2023-11-21 PROCEDURE — 93010 ELECTROCARDIOGRAM REPORT: CPT | Mod: S$GLB,,, | Performed by: INTERNAL MEDICINE

## 2023-11-21 PROCEDURE — 1160F RVW MEDS BY RX/DR IN RCRD: CPT | Mod: CPTII,S$GLB,, | Performed by: INTERNAL MEDICINE

## 2023-11-21 PROCEDURE — 1160F PR REVIEW ALL MEDS BY PRESCRIBER/CLIN PHARMACIST DOCUMENTED: ICD-10-PCS | Mod: CPTII,S$GLB,, | Performed by: INTERNAL MEDICINE

## 2023-11-21 PROCEDURE — 4010F ACE/ARB THERAPY RXD/TAKEN: CPT | Mod: CPTII,S$GLB,, | Performed by: INTERNAL MEDICINE

## 2023-11-21 PROCEDURE — 1101F PR PT FALLS ASSESS DOC 0-1 FALLS W/OUT INJ PAST YR: ICD-10-PCS | Mod: CPTII,S$GLB,, | Performed by: INTERNAL MEDICINE

## 2023-11-21 PROCEDURE — 3288F FALL RISK ASSESSMENT DOCD: CPT | Mod: CPTII,S$GLB,, | Performed by: INTERNAL MEDICINE

## 2023-11-21 PROCEDURE — 3077F PR MOST RECENT SYSTOLIC BLOOD PRESSURE >= 140 MM HG: ICD-10-PCS | Mod: CPTII,S$GLB,, | Performed by: INTERNAL MEDICINE

## 2023-11-21 PROCEDURE — 1126F AMNT PAIN NOTED NONE PRSNT: CPT | Mod: CPTII,S$GLB,, | Performed by: INTERNAL MEDICINE

## 2023-11-21 PROCEDURE — 1159F MED LIST DOCD IN RCRD: CPT | Mod: CPTII,S$GLB,, | Performed by: INTERNAL MEDICINE

## 2023-11-21 PROCEDURE — 99999 PR PBB SHADOW E&M-EST. PATIENT-LVL IV: CPT | Mod: PBBFAC,,, | Performed by: INTERNAL MEDICINE

## 2023-11-21 PROCEDURE — 1126F PR PAIN SEVERITY QUANTIFIED, NO PAIN PRESENT: ICD-10-PCS | Mod: CPTII,S$GLB,, | Performed by: INTERNAL MEDICINE

## 2023-11-21 NOTE — PROGRESS NOTES
[facility]    Chief Complaint  Chief Complaint   Patient presents with    Coronary Artery Disease       HPI:    Making progress.  Walking a mi at least 5 days per week.  No problems with exertional dyspnea or chest discomfort.  Just started prescribed hydrochlorothiazide 2 days ago.  Not much change in his blood pressure yet.  Will be going to cardiac rehab intake soon.    Medications  Current Outpatient Medications   Medication Sig Dispense Refill    acetaminophen (TYLENOL EXTRA STRENGTH) 500 MG tablet 2 tablet EVERY 8 HOURS (route: oral)      amiodarone (PACERONE) 200 MG Tab Take 1 tablet (200 mg total) by mouth once daily. 30 tablet 11    apixaban (ELIQUIS) 5 mg Tab Take 1 tablet by mouth 2 (two) times daily.      aspirin (ECOTRIN) 81 MG EC tablet Take 81 mg by mouth once daily.      atorvastatin (LIPITOR) 80 MG tablet Take 1 tablet by mouth once daily.      furosemide (LASIX) 40 MG tablet Take 1 tablet (40 mg total) by mouth daily as needed (weight gain). 30 tablet 11    hydroCHLOROthiazide (HYDRODIURIL) 25 MG tablet Take 1 tablet (25 mg total) by mouth once daily. 90 tablet 3    lansoprazole (PREVACID) 30 MG capsule Take 1 capsule by mouth once daily.      metoprolol succinate (TOPROL-XL) 100 MG 24 hr tablet Take 1 tablet by mouth once daily.      oxyCODONE (ROXICODONE) 5 MG immediate release tablet 1 tablet EVERY 4 HOURS (route: oral)      polyethylene glycol (GLYCOLAX) 17 gram/dose powder 17 gram DAILY (route: oral)      valsartan (DIOVAN) 160 MG tablet Take 1 tablet (160 mg total) by mouth 2 (two) times daily. 60 tablet 11     No current facility-administered medications for this visit.        History  Past Medical History:   Diagnosis Date    BPH (benign prostatic hyperplasia)     Elevated liver enzymes     Fatty infiltration of liver     Glucose intolerance (impaired glucose tolerance)     Hiatal hernia     Hyperlipidemia     Hypertension     Overweight     Reflux     Snores      Past  Surgical History:   Procedure Laterality Date    AORTIC ROOT REPLACEMENT N/A 9/18/2023    Procedure: VALVE SPARING AORTIC ROOT REPLACEMENT;  Surgeon: Imtiaz Mooney MD;  Location: [facility] OR [address] [place] [address];  Service: Cardiovascular;  Laterality: N/A;  Valve sparing aortic root replacement, vs biobentall, hemiarch, CABGx2 under  hypothermic circ arrest    CORONARY ANGIOGRAPHY N/A 9/6/2023    Procedure: ANGIOGRAM, CORONARY ARTERY;  Surgeon: Chon Lopez MD;  Location: [facility] CATH LAB;  Service: Cardiology;  Laterality: N/A;  right radial    CORONARY ARTERY BYPASS GRAFT (CABG) N/A 9/18/2023    Procedure: CORONARY ARTERY BYPASS GRAFT (CABG);  Surgeon: Imtiaz Mooney MD;  Location: [facility] OR [address] [place] [address];  Service: Cardiovascular;  Laterality: N/A;  Vein [place] Start: 821  Vein [place] End: 838  Vein Preparation Start: 839  Vein Preparation End: 912    ENDOSCOPIC HARVEST OF VEIN Left 9/18/2023    Procedure: HARVEST-VEIN-ENDOVASCULAR;  Surgeon: Imtiaz Mooney MD;  Location: [facility] OR [address] [place] [address];  Service: Cardiovascular;  Laterality: Left;  Valve sparing aortic root replacement, vs biobentall, hemiarch, CABGx2 under    inguinal hernia      x2    REPAIR OF DIAPHRAGMATIC HERNIA  9/18/2023    Procedure: REPAIR, HERNIA, DIAPHRAGMATIC;  Surgeon: Imtiaz Mooney MD;  Location: [facility] OR [address] [place] [address];  Service: Cardiovascular;;    tonsillectomy      TRANSESOPHAGEAL ECHOCARDIOGRAM WITH POSSIBLE CARDIOVERSION (GWEN W/ POSS CARDIOVERSION) N/A 11/8/2023    Procedure: TRANSESOPHAGEAL ECHOCARDIOGRAM WITH POSSIBLE CARDIOVERSION (GWEN W/ POSS CARDIOVERSION);  Surgeon: Chon Lopez MD;  Location: [facility] CATH LAB;  Service: Cardiology;  Laterality: N/A;     Social History     Socioeconomic History    Marital status:     Number of children: 2   Occupational History    Occupation: Works in insurance   Tobacco Use    Smoking status: Never    Smokeless tobacco: Never   Substance and Sexual Activity    Alcohol use: Yes     Comment: 2  glasses of wine most evenings.    Drug use: No     Social Determinants of Health     Financial Resource Strain: Low Risk  (9/19/2023)    Overall Financial Resource Strain (CARDIA)     Difficulty of Paying Living Expenses: Not hard at all   Food Insecurity: No Food Insecurity (9/19/2023)    Hunger Vital Sign     Worried About Running Out of Food in the Last Year: Never true     Ran Out of Food in the Last Year: Never true   Transportation Needs: No Transportation Needs (9/19/2023)    PRAPARE - Transportation     Lack of Transportation (Medical): No     Lack of Transportation (Non-Medical): No   Physical Activity: Sufficiently Active (9/19/2023)    Exercise Vital Sign     Days of Exercise per Week: 3 days     Minutes of Exercise per Session: 60 min   Stress: Unknown (9/19/2023)    [employer] [place] [employer] - Occupational Stress Questionnaire     Feeling of Stress : Patient refused   Social Connections: Unknown (9/19/2023)    Social Connection and Isolation Panel [NHANES]     Frequency of Communication with Friends and Family: More than three times a week     Frequency of Social Gatherings with Friends and Family: More than three times a week     Attends Restorationism Services: Patient refused     Active Member of Clubs or Organizations: Patient refused     Attends Club or Organization Meetings: Patient refused     Marital Status:    Housing Stability: Low Risk  (9/19/2023)    Housing Stability Vital Sign     Unable to Pay for Housing in the Last Year: No     Number of Places Lived in the Last Year: 1     Unstable Housing in the Last Year: No     Family History   Problem Relation Age of Onset    Hypertension Mother     Macular degeneration Mother         - gets shots in her eye    Stroke Father     Heart disease Father     Aneurysm Father     Migraines Sister     Arthritis Brother         knees    Dementia Paternal Grandmother     Diabetes Paternal Grandmother     Heart disease Paternal Grandfather      Diabetes Brother     Hernia Brother     Cancer Paternal Aunt     Cancer Paternal Uncle     Cancer Paternal Aunt     Cancer Paternal Uncle     Colon cancer Neg Hx     Prostate cancer Neg Hx         Allergies  Review of patient's allergies indicates:   Allergen Reactions    Ace inhibitors Other (See Comments)     cough    Losartan      headache       Review of Systems   Review of Systems   Constitutional: Negative for malaise/fatigue, weight gain and weight loss.   Eyes:  Negative for visual disturbance.   Cardiovascular:  Negative for chest pain, claudication, cyanosis, dyspnea on exertion, irregular heartbeat, leg swelling, near-syncope, orthopnea, palpitations, paroxysmal nocturnal dyspnea and syncope.   Respiratory:  Negative for cough, hemoptysis, shortness of breath, sleep disturbances due to breathing and wheezing.    Hematologic/Lymphatic: Negative for bleeding problem. Does not bruise/bleed easily.   Skin:  Negative for poor wound healing.   Musculoskeletal:  Negative for muscle cramps and myalgias.   Gastrointestinal:  Negative for abdominal pain, anorexia, diarrhea, heartburn, hematemesis, hematochezia, melena, nausea and vomiting.   Genitourinary:  Negative for hematuria and nocturia.   Neurological:  Negative for excessive daytime sleepiness, dizziness, focal weakness, light-headedness and weakness.       Physical Exam  Vitals:    11/21/23 1143   BP: (!) 152/90   Pulse: (!) 55     Wt Readings from Last 1 Encounters:   11/21/23 82.2 kg (181 lb 3.2 oz)     Physical Exam  Vitals and nursing note reviewed.   Constitutional:       General: He is not in acute distress.     Appearance: He is not toxic-appearing or diaphoretic.   HENT:      Head: Normocephalic and atraumatic.      Mouth/Throat:      Lips: Pink.      Mouth: Mucous membranes are moist.   Eyes:      General: No scleral icterus.     Conjunctiva/sclera: Conjunctivae normal.   Neck:      Thyroid: No thyromegaly.      Vascular: No carotid bruit,  hepatojugular reflux or JVD.      Trachea: Trachea normal.   Cardiovascular:      Rate and Rhythm: Regular rhythm. Bradycardia present. No extrasystoles are present.     Chest Wall: PMI is not displaced.      Pulses:           Carotid pulses are 2+ on the right side and 2+ on the left side.       Radial pulses are 2+ on the right side and 2+ on the left side.        Dorsalis pedis pulses are 2+ on the right side and 2+ on the left side.        Posterior tibial pulses are 2+ on the right side and 2+ on the left side.      Heart sounds: S1 normal and S2 normal. No murmur heard.     No friction rub. No S3 or S4 sounds.   Pulmonary:      Effort: Pulmonary effort is normal. No tachypnea, bradypnea, accessory muscle usage or respiratory distress.      Breath sounds: Normal breath sounds and air entry. No decreased breath sounds, wheezing, rhonchi or rales.   Chest:      Comments: Well-healed median sternotomy..  Abdominal:      General: Bowel sounds are normal. There is no distension or abdominal bruit.      Palpations: Abdomen is soft. There is no hepatomegaly, splenomegaly or pulsatile mass.      Tenderness: There is no abdominal tenderness.   Musculoskeletal:         General: No tenderness or deformity.      Right lower leg: No edema.      Left lower leg: No edema.   Skin:     General: Skin is warm and dry.      Capillary Refill: Capillary refill takes less than 2 seconds.      Coloration: Skin is not cyanotic or pale.      Nails: There is no clubbing.   Neurological:      General: No focal deficit present.      Mental Status: He is alert and oriented to person, place, and time.   Psychiatric:         Attention and Perception: Attention normal.         Mood and Affect: Mood normal.         Speech: Speech normal.         Behavior: Behavior normal. Behavior is cooperative.         Labs  Lab Visit on 10/19/2023   Component Date Value Ref Range Status    Sodium 10/19/2023 142  136 - 145 mmol/L Final    Potassium 10/19/2023  3.7  3.5 - 5.1 mmol/L Final    Chloride 10/19/2023 106  95 - 110 mmol/L Final    CO2 10/19/2023 26  23 - 29 mmol/L Final    Glucose 10/19/2023 85  70 - 110 mg/dL Final    BUN 10/19/2023 16  8 - 23 mg/dL Final    Creatinine 10/19/2023 1.3  0.5 - 1.4 mg/dL Final    Calcium 10/19/2023 9.5  8.7 - 10.5 mg/dL Final    Total Protein 10/19/2023 7.2  6.0 - 8.4 g/dL Final    Albumin 10/19/2023 4.3  3.5 - 5.2 g/dL Final    Total Bilirubin 10/19/2023 0.7  0.1 - 1.0 mg/dL Final    Comment: For infants and newborns, interpretation of results should be based  on gestational age, weight and in agreement with clinical  observations.    Premature Infant recommended reference ranges:  Up to 24 hours.............<8.0 mg/dL  Up to 48 hours............<12.0 mg/dL  3-5 days..................<15.0 mg/dL  6-29 days.................<15.0 mg/dL      Alkaline Phosphatase 10/19/2023 105  55 - 135 U/L Final    AST 10/19/2023 17  10 - 40 U/L Final    ALT 10/19/2023 69 (H)  10 - 44 U/L Final    eGFR 10/19/2023 60  >60 mL/min/1.73 m^2 Final    Anion Gap 10/19/2023 10  8 - 16 mmol/L Final    TSH 10/19/2023 2.023  0.400 - 4.000 uIU/mL Final    WBC 10/19/2023 7.34  3.90 - 12.70 K/uL Final    RBC 10/19/2023 4.47 (L)  4.60 - 6.20 M/uL Final    Hemoglobin 10/19/2023 13.5 (L)  14.0 - 18.0 g/dL Final    Hematocrit 10/19/2023 41.1  40.0 - 54.0 % Final    MCV 10/19/2023 92  82 - 98 fL Final    MCH 10/19/2023 30.2  27.0 - 31.0 pg Final    MCHC 10/19/2023 32.8  32.0 - 36.0 g/dL Final    RDW 10/19/2023 14.0  11.5 - 14.5 % Final    Platelets 10/19/2023 211  150 - 450 K/uL Final    MPV 10/19/2023 11.0  9.2 - 12.9 fL Final    Immature Granulocytes 10/19/2023 0.4  0.0 - 0.5 % Final    Gran # (ANC) 10/19/2023 5.1  1.8 - 7.7 K/uL Final    Immature Grans (Abs) 10/19/2023 0.03  0.00 - 0.04 K/uL Final    Comment: Mild elevation in immature granulocytes is non specific and   can be seen in a variety of conditions including stress response,   acute inflammation, trauma  and pregnancy. Correlation with other   laboratory and clinical findings is essential.      Lymph # 10/19/2023 1.1  1.0 - 4.8 K/uL Final    Mono # 10/19/2023 1.0  0.3 - 1.0 K/uL Final    Eos # 10/19/2023 0.2  0.0 - 0.5 K/uL Final    Baso # 10/19/2023 0.05  0.00 - 0.20 K/uL Final    nRBC 10/19/2023 0  0 /100 WBC Final    Gran % 10/19/2023 69.0  38.0 - 73.0 % Final    Lymph % 10/19/2023 14.4 (L)  18.0 - 48.0 % Final    Mono % 10/19/2023 12.9  4.0 - 15.0 % Final    Eosinophil % 10/19/2023 2.6  0.0 - 8.0 % Final    Basophil % 10/19/2023 0.7  0.0 - 1.9 % Final    Differential Method 10/19/2023 Automated   Final   Admission on 09/26/2023, Discharged on 09/27/2023   Component Date Value Ref Range Status    HIV 1/2 Ag/Ab 09/26/2023 Non-reactive  Non-reactive Final    Hepatitis C Ab 09/26/2023 Non-reactive  Non-reactive Final    WBC 09/26/2023 9.68  3.90 - 12.70 K/uL Final    RBC 09/26/2023 3.62 (L)  4.60 - 6.20 M/uL Final    Hemoglobin 09/26/2023 11.1 (L)  14.0 - 18.0 g/dL Final    Hematocrit 09/26/2023 33.3 (L)  40.0 - 54.0 % Final    MCV 09/26/2023 92  82 - 98 fL Final    MCH 09/26/2023 30.7  27.0 - 31.0 pg Final    MCHC 09/26/2023 33.3  32.0 - 36.0 g/dL Final    RDW 09/26/2023 12.8  11.5 - 14.5 % Final    Platelets 09/26/2023 244  150 - 450 K/uL Final    MPV 09/26/2023 9.8  9.2 - 12.9 fL Final    Immature Granulocytes 09/26/2023 1.7 (H)  0.0 - 0.5 % Final    Gran # (ANC) 09/26/2023 7.0  1.8 - 7.7 K/uL Final    Immature Grans (Abs) 09/26/2023 0.16 (H)  0.00 - 0.04 K/uL Final    Comment: Mild elevation in immature granulocytes is non specific and   can be seen in a variety of conditions including stress response,   acute inflammation, trauma and pregnancy. Correlation with other   laboratory and clinical findings is essential.      Lymph # 09/26/2023 1.0  1.0 - 4.8 K/uL Final    Mono # 09/26/2023 1.3 (H)  0.3 - 1.0 K/uL Final    Eos # 09/26/2023 0.2  0.0 - 0.5 K/uL Final    Baso # 09/26/2023 0.07  0.00 - 0.20 K/uL Final     nRBC 09/26/2023 0  0 /100 WBC Final    Gran % 09/26/2023 71.8  38.0 - 73.0 % Final    Lymph % 09/26/2023 10.6 (L)  18.0 - 48.0 % Final    Mono % 09/26/2023 13.1  4.0 - 15.0 % Final    Eosinophil % 09/26/2023 2.1  0.0 - 8.0 % Final    Basophil % 09/26/2023 0.7  0.0 - 1.9 % Final    Differential Method 09/26/2023 Automated   Final    Sodium 09/26/2023 141  136 - 145 mmol/L Final    Potassium 09/26/2023 4.0  3.5 - 5.1 mmol/L Final    Chloride 09/26/2023 106  95 - 110 mmol/L Final    CO2 09/26/2023 25  23 - 29 mmol/L Final    Glucose 09/26/2023 100  70 - 110 mg/dL Final    BUN 09/26/2023 19  8 - 23 mg/dL Final    Creatinine 09/26/2023 0.8  0.5 - 1.4 mg/dL Final    Calcium 09/26/2023 9.1  8.7 - 10.5 mg/dL Final    Total Protein 09/26/2023 6.8  6.0 - 8.4 g/dL Final    Albumin 09/26/2023 3.8  3.5 - 5.2 g/dL Final    Total Bilirubin 09/26/2023 0.9  0.1 - 1.0 mg/dL Final    Comment: For infants and newborns, interpretation of results should be based  on gestational age, weight and in agreement with clinical  observations.    Premature Infant recommended reference ranges:  Up to 24 hours.............<8.0 mg/dL  Up to 48 hours............<12.0 mg/dL  3-5 days..................<15.0 mg/dL  6-29 days.................<15.0 mg/dL      Alkaline Phosphatase 09/26/2023 68  55 - 135 U/L Final    AST 09/26/2023 41 (H)  10 - 40 U/L Final    ALT 09/26/2023 81 (H)  10 - 44 U/L Final    eGFR 09/26/2023 >60.0  >60 mL/min/1.73 m^2 Final    Anion Gap 09/26/2023 10  8 - 16 mmol/L Final    Troponin I 09/26/2023 0.332 (H)  0.000 - 0.026 ng/mL Final    Comment: The reference interval for Troponin I represents the 99th percentile   cutoff   for our facility and is consistent with 3rd generation assay   performance.      BNP 09/26/2023 534 (H)  0 - 99 pg/mL Final    Values of less than 100 pg/ml are consistent with non-CHF populations.    Ascending aorta 09/26/2023 3.2  cm Final    STJ 09/26/2023 3.2  cm Final    AV mean gradient 09/26/2023 4  mmHg  "Final    Ao peak arias 09/26/2023 1.40  m/s Final    Ao VTI 09/26/2023 25.18  cm Final    IVRT 09/26/2023 48.53  msec Final    IVS 09/26/2023 0.84  0.6 - 1.1 cm Final    LA size 09/26/2023 3.64  cm Final    Left Atrium Major Axis 09/26/2023 6.04  cm Final    Left Atrium Minor Axis 09/26/2023 5.97  cm Final    LVIDd 09/26/2023 4.85  3.5 - 6.0 cm Final    LVIDs 09/26/2023 3.14  2.1 - 4.0 cm Final    LVOT diameter 09/26/2023 2.03  cm Final    LVOT peak VTI 09/26/2023 22.45  cm Final    Posterior Wall 09/26/2023 0.99  0.6 - 1.1 cm Final    MV Peak A Arias 09/26/2023 0.50  m/s Final    E wave deceleration time 09/26/2023 231.09  msec Final    MV Peak E Arias 09/26/2023 1.09  m/s Final    PV Peak D Arias 09/26/2023 0.55  m/s Final    PV Peak S Arias 09/26/2023 0.38  m/s Final    RA Major Axis 09/26/2023 5.72  cm Final    RA Width 09/26/2023 4.34  cm Final    RVDD 09/26/2023 3.82  cm Final    Sinus 09/26/2023 3.5  cm Final    TAPSE 09/26/2023 0.81  cm Final    TR Max Arias 09/26/2023 2.90  m/s Final    LA WIDTH 09/26/2023 4.27  cm Final    MV stenosis pressure 1/2 time 09/26/2023 67.02  ms Final    LV Diastolic Volume 09/26/2023 110.20  mL Final    LV Systolic Volume 09/26/2023 38.99  mL Final    LVOT peak arias 09/26/2023 1.18  m/s Final    TDI LATERAL 09/26/2023 0.17  m/s Final    TDI SEPTAL 09/26/2023 0.12  m/s Final    LA volume (mod) 09/26/2023 50.00  cm3 Final    MV "A" wave duration 09/26/2023 11.42  msec Final    LV LATERAL E/E' RATIO 09/26/2023 6.41  m/s Final    LV SEPTAL E/E' RATIO 09/26/2023 9.08  m/s Final    FS 09/26/2023 35  % Final    LA volume 09/26/2023 79.33  cm3 Final    LV mass 09/26/2023 153.68  g Final    ZLVIDD 09/26/2023 -3.10   Final    ZLVIDS 09/26/2023 -1.99   Final    Left Ventricle Relative Wall Thick* 09/26/2023 0.41  cm Final    AV valve area 09/26/2023 2.88  cm² Final    AV Velocity Ratio 09/26/2023 0.84   Final    AV index (prosthetic) 09/26/2023 0.89   Final    MV valve area p 1/2 method 09/26/2023 " 3.28  cm2 Final    E/A ratio 09/26/2023 2.18   Final    Mean e' 09/26/2023 0.15  m/s Final    Pulm vein S/D ratio 09/26/2023 0.69   Final    LVOT area 09/26/2023 3.2  cm2 Final    LVOT stroke volume 09/26/2023 72.62  cm3 Final    AV peak gradient 09/26/2023 8  mmHg Final    E/E' ratio 09/26/2023 7.52  m/s Final    LV Systolic Volume Index 09/26/2023 18.5  mL/m2 Final    LV Diastolic Volume Index 09/26/2023 52.23  mL/m2 Final    LA Volume Index 09/26/2023 37.6  mL/m2 Final    LV Mass Index 09/26/2023 73  g/m2 Final    Triscuspid Valve Regurgitation Pea* 09/26/2023 34  mmHg Final    LA Volume Index (Mod) 09/26/2023 23.7  mL/m2 Final    RUPA by Velocity Ratio 09/26/2023 2.73  cm² Final    BSA 09/26/2023 2.11  m2 Final    TV resting pulmonary artery pressu* 09/26/2023 37  mmHg Final    RV TB RVSP 09/26/2023 6  mmHg Final    Est. RA pres 09/26/2023 3  mmHg Final    IVC diameter 09/26/2023 2.4  cm Final    Troponin I 09/26/2023 0.280 (H)  0.000 - 0.026 ng/mL Final    Comment: The reference interval for Troponin I represents the 99th percentile   cutoff   for our facility and is consistent with 3rd generation assay   performance.      WBC 09/27/2023 9.77  3.90 - 12.70 K/uL Final    RBC 09/27/2023 4.03 (L)  4.60 - 6.20 M/uL Final    Hemoglobin 09/27/2023 12.4 (L)  14.0 - 18.0 g/dL Final    Hematocrit 09/27/2023 37.4 (L)  40.0 - 54.0 % Final    MCV 09/27/2023 93  82 - 98 fL Final    MCH 09/27/2023 30.8  27.0 - 31.0 pg Final    MCHC 09/27/2023 33.2  32.0 - 36.0 g/dL Final    RDW 09/27/2023 13.1  11.5 - 14.5 % Final    Platelets 09/27/2023 328  150 - 450 K/uL Final    MPV 09/27/2023 10.5  9.2 - 12.9 fL Final    Immature Granulocytes 09/27/2023 1.8 (H)  0.0 - 0.5 % Final    Gran # (ANC) 09/27/2023 6.8  1.8 - 7.7 K/uL Final    Immature Grans (Abs) 09/27/2023 0.18 (H)  0.00 - 0.04 K/uL Final    Comment: Mild elevation in immature granulocytes is non specific and   can be seen in a variety of conditions including stress response,    acute inflammation, trauma and pregnancy. Correlation with other   laboratory and clinical findings is essential.      Lymph # 09/27/2023 1.2  1.0 - 4.8 K/uL Final    Mono # 09/27/2023 1.3 (H)  0.3 - 1.0 K/uL Final    Eos # 09/27/2023 0.2  0.0 - 0.5 K/uL Final    Baso # 09/27/2023 0.06  0.00 - 0.20 K/uL Final    nRBC 09/27/2023 0  0 /100 WBC Final    Gran % 09/27/2023 69.8  38.0 - 73.0 % Final    Lymph % 09/27/2023 12.2 (L)  18.0 - 48.0 % Final    Mono % 09/27/2023 13.1  4.0 - 15.0 % Final    Eosinophil % 09/27/2023 2.5  0.0 - 8.0 % Final    Basophil % 09/27/2023 0.6  0.0 - 1.9 % Final    Differential Method 09/27/2023 Automated   Final    Sodium 09/27/2023 143  136 - 145 mmol/L Final    Potassium 09/27/2023 3.7  3.5 - 5.1 mmol/L Final    Chloride 09/27/2023 106  95 - 110 mmol/L Final    CO2 09/27/2023 28  23 - 29 mmol/L Final    Glucose 09/27/2023 105  70 - 110 mg/dL Final    BUN 09/27/2023 19  8 - 23 mg/dL Final    Creatinine 09/27/2023 1.1  0.5 - 1.4 mg/dL Final    Calcium 09/27/2023 9.6  8.7 - 10.5 mg/dL Final    Total Protein 09/27/2023 7.5  6.0 - 8.4 g/dL Final    Albumin 09/27/2023 4.1  3.5 - 5.2 g/dL Final    Total Bilirubin 09/27/2023 1.0  0.1 - 1.0 mg/dL Final    Comment: For infants and newborns, interpretation of results should be based  on gestational age, weight and in agreement with clinical  observations.    Premature Infant recommended reference ranges:  Up to 24 hours.............<8.0 mg/dL  Up to 48 hours............<12.0 mg/dL  3-5 days..................<15.0 mg/dL  6-29 days.................<15.0 mg/dL      Alkaline Phosphatase 09/27/2023 74  55 - 135 U/L Final    AST 09/27/2023 35  10 - 40 U/L Final    ALT 09/27/2023 82 (H)  10 - 44 U/L Final    eGFR 09/27/2023 >60.0  >60 mL/min/1.73 m^2 Final    Anion Gap 09/27/2023 9  8 - 16 mmol/L Final    Phosphorus 09/27/2023 3.7  2.7 - 4.5 mg/dL Final   No results displayed because visit has over 200 results.      Lab Visit on 08/28/2023   Component  Date Value Ref Range Status    WBC 08/28/2023 6.43  3.90 - 12.70 K/uL Final    RBC 08/28/2023 5.36  4.60 - 6.20 M/uL Final    Hemoglobin 08/28/2023 16.3  14.0 - 18.0 g/dL Final    Hematocrit 08/28/2023 46.6  40.0 - 54.0 % Final    MCV 08/28/2023 87  82 - 98 fL Final    MCH 08/28/2023 30.4  27.0 - 31.0 pg Final    MCHC 08/28/2023 35.0  32.0 - 36.0 g/dL Final    RDW 08/28/2023 12.5  11.5 - 14.5 % Final    Platelets 08/28/2023 191  150 - 450 K/uL Final    MPV 08/28/2023 10.9  9.2 - 12.9 fL Final    Immature Granulocytes 08/28/2023 0.5  0.0 - 0.5 % Final    Gran # (ANC) 08/28/2023 4.2  1.8 - 7.7 K/uL Final    Immature Grans (Abs) 08/28/2023 0.03  0.00 - 0.04 K/uL Final    Comment: Mild elevation in immature granulocytes is non specific and   can be seen in a variety of conditions including stress response,   acute inflammation, trauma and pregnancy. Correlation with other   laboratory and clinical findings is essential.      Lymph # 08/28/2023 1.3  1.0 - 4.8 K/uL Final    Mono # 08/28/2023 0.8  0.3 - 1.0 K/uL Final    Eos # 08/28/2023 0.1  0.0 - 0.5 K/uL Final    Baso # 08/28/2023 0.06  0.00 - 0.20 K/uL Final    nRBC 08/28/2023 0  0 /100 WBC Final    Gran % 08/28/2023 64.9  38.0 - 73.0 % Final    Lymph % 08/28/2023 19.4  18.0 - 48.0 % Final    Mono % 08/28/2023 13.1  4.0 - 15.0 % Final    Eosinophil % 08/28/2023 1.2  0.0 - 8.0 % Final    Basophil % 08/28/2023 0.9  0.0 - 1.9 % Final    Differential Method 08/28/2023 Automated   Final    Sodium 08/28/2023 140  136 - 145 mmol/L Final    Potassium 08/28/2023 3.5  3.5 - 5.1 mmol/L Final    Chloride 08/28/2023 105  95 - 110 mmol/L Final    CO2 08/28/2023 25  23 - 29 mmol/L Final    Glucose 08/28/2023 130 (H)  70 - 110 mg/dL Final    BUN 08/28/2023 14  8 - 23 mg/dL Final    Creatinine 08/28/2023 1.0  0.5 - 1.4 mg/dL Final    Calcium 08/28/2023 9.2  8.7 - 10.5 mg/dL Final    Anion Gap 08/28/2023 10  8 - 16 mmol/L Final    eGFR 08/28/2023 >60  >60 mL/min/1.73 m^2 Final     Prothrombin Time 08/28/2023 10.9  9.0 - 12.5 sec Final    INR 08/28/2023 1.0  0.8 - 1.2 Final    Comment: Coumadin Therapy:  2.0 - 3.0 for INR for all indicators except mechanical heart valves  and antiphospholipid syndromes which should use 2.5 - 3.5.  LOT^040^PT Inn^[id number]     Hospital Outpatient Visit on 08/22/2023   Component Date Value Ref Range Status    BSA 08/22/2023 2.09  m2 Final    85% Max Predicted HR 08/22/2023 129   Final    Max Predicted HR 08/22/2023 152   Final    [facility] CV CPX PATIENT IS MALE 08/22/2023 1.0   Final    [facility] CV CPX PATIENT IS FEMALE 08/22/2023 0.0   Final    Systolic blood pressure 08/22/2023 122  mmHg Final    Diastolic blood pressure 08/22/2023 94  mmHg Final    HR at rest 08/22/2023 68  bpm Final    Exercise duration (min) 08/22/2023 8  minutes Final    Exercise duration (sec) 08/22/2023 4  seconds Final    Peak Systolic BP 08/22/2023 178  mmHg Final    Peak Diatolic BP 08/22/2023 96  mmHg Final    Peak HR 08/22/2023 144  bpm Final    Estimated METs 08/22/2023 13   Final    % Max HR Achieved 08/22/2023 95   Final    RPP 08/22/2023 8,296   Final    Peak RPP 08/22/2023 25,632   Final    LVOT stroke volume 08/22/2023 101.82  cm3 Final    LVIDd 08/22/2023 4.50  3.5 - 6.0 cm Final    LV Systolic Volume 08/22/2023 32.99  mL Final    LV Systolic Volume Index 08/22/2023 15.9  mL/m2 Final    LVIDs 08/22/2023 2.93  2.1 - 4.0 cm Final    LV Diastolic Volume 08/22/2023 92.39  mL Final    LV Diastolic Volume Index 08/22/2023 44.42  mL/m2 Final    IVS 08/22/2023 0.8  0.6 - 1.1 cm Final    LVOT diameter 08/22/2023 2.13  cm Final    LVOT area 08/22/2023 3.6  cm2 Final    FS 08/22/2023 35  28 - 44 % Final    Left Ventricle Relative Wall Thick* 08/22/2023 0.31  cm Final    Posterior Wall 08/22/2023 0.7  0.6 - 1.1 cm Final    LV mass 08/22/2023 104.50  g Final    LV Mass Index 08/22/2023 50  g/m2 Final    MV Peak E Arias 08/22/2023 0.86  m/s Final    TDI LATERAL 08/22/2023 0.12  m/s Final    TDI SEPTAL  "08/22/2023 0.07  m/s Final    E/E' ratio 08/22/2023 9.05  m/s Final    MV Peak A Arias 08/22/2023 0.99  m/s Final    TR Max Arias 08/22/2023 2.57  m/s Final    E/A ratio 08/22/2023 0.87   Final    IVRT 08/22/2023 114.18  msec Final    E wave deceleration time 08/22/2023 238.66  msec Final    MV "A" wave duration 08/22/2023 6.28  msec Final    LV SEPTAL E/E' RATIO 08/22/2023 12.29  m/s Final    LA Volume Index 08/22/2023 34.7  mL/m2 Final    LV LATERAL E/E' RATIO 08/22/2023 7.17  m/s Final    LA volume 08/22/2023 72.22  cm3 Final    PV Peak S Arias 08/22/2023 0.66  m/s Final    PV Peak D Arias 08/22/2023 0.38  m/s Final    Pulm vein S/D ratio 08/22/2023 1.74   Final    LVOT peak arias 08/22/2023 1.62  m/s Final    LA size 08/22/2023 4.17  cm Final    Left Atrium Major Axis 08/22/2023 5.57  cm Final    Left Atrium Minor Axis 08/22/2023 5.99  cm Final    LA WIDTH 08/22/2023 3.53  cm Final    RVDD 08/22/2023 3.20  cm Final    TAPSE 08/22/2023 1.96  cm Final    RA Major Axis 08/22/2023 5.59  cm Final    RA Width 08/22/2023 3.99  cm Final    AV mean gradient 08/22/2023 5  mmHg Final    AV peak gradient 08/22/2023 11  mmHg Final    Ao peak arias 08/22/2023 1.63  m/s Final    Ao VTI 08/22/2023 31.75  cm Final    LVOT peak VTI 08/22/2023 28.59  cm Final    AV valve area 08/22/2023 3.21  cm² Final    AV Velocity Ratio 08/22/2023 0.99   Final    AV index (prosthetic) 08/22/2023 0.90   Final    RUPA by Velocity Ratio 08/22/2023 3.54  cm² Final    MV stenosis pressure 1/2 time 08/22/2023 69.21  ms Final    MV valve area p 1/2 method 08/22/2023 3.18  cm2 Final    Triscuspid Valve Regurgitation Pea* 08/22/2023 26  mmHg Final    Sinus 08/22/2023 4.2  cm Final    STJ 08/22/2023 4.2  cm Final    Ascending aorta 08/22/2023 5.0  cm Final    Mean e' 08/22/2023 0.10  m/s Final    ZLVIDS 08/22/2023 -2.25   Final    ZLVIDD 08/22/2023 -3.47   Final    1 Minute Recovery HR 08/22/2023 118  bpm Final    ST Depression (mm) 08/22/2023 1.0  mm Final    TV " resting pulmonary artery pressu* 08/22/2023 29  mmHg Final    RV TB RVSP 08/22/2023 6  mmHg Final    Est. RA pres 08/22/2023 3  mmHg Final    Post-Stress Ejection Fraction 08/22/2023 60  % Final   Hospital Outpatient Visit on 06/29/2023   Component Date Value Ref Range Status    BSA 06/29/2023 2.11  m2 Final    TDI SEPTAL 06/29/2023 0.07  m/s Final    LV LATERAL E/E' RATIO 06/29/2023 5.18  m/s Final    LV SEPTAL E/E' RATIO 06/29/2023 8.14  m/s Final    LA WIDTH 06/29/2023 3.60  cm Final    IVC diameter 06/29/2023 1.55  cm Final    Left Ventricular Outflow Tract Laury* 06/29/2023 0.94  cm/s Final    Left Ventricular Outflow Tract Laury* 06/29/2023 4.11  mmHg Final    TDI LATERAL 06/29/2023 0.11  m/s Final    PV PEAK VELOCITY 06/29/2023 0.86  cm/s Final    LVIDd 06/29/2023 3.70  3.5 - 6.0 cm Final    IVS 06/29/2023 1.00  0.6 - 1.1 cm Final    Posterior Wall 06/29/2023 1.01  0.6 - 1.1 cm Final    LVIDs 06/29/2023 2.52  2.1 - 4.0 cm Final    FS 06/29/2023 32  28 - 44 % Final    LA volume 06/29/2023 41.37  cm3 Final    Sinus 06/29/2023 3.12  cm Final    STJ 06/29/2023 2.84  cm Final    Ascending aorta 06/29/2023 4.88  cm Final    LV mass 06/29/2023 113.35  g Final    LA size 06/29/2023 3.69  cm Final    TAPSE 06/29/2023 2.54  cm Final    RV S' 06/29/2023 13.77  cm/s Final    Left Ventricle Relative Wall Thick* 06/29/2023 0.55  cm Final    AV regurgitation pressure 1/2 time 06/29/2023 723.329220288075907  ms Final    AV mean gradient 06/29/2023 4  mmHg Final    AV valve area 06/29/2023 3.74  cm2 Final    AV Velocity Ratio 06/29/2023 1.06   Final    AV index (prosthetic) 06/29/2023 1.17   Final    MV mean gradient 06/29/2023 1  mmHg Final    MV valve area p 1/2 method 06/29/2023 2.23  cm2 Final    MV valve area by continuity eq 06/29/2023 3.13  cm2 Final    E/A ratio 06/29/2023 0.89   Final    Mean e' 06/29/2023 0.09  m/s Final    E wave deceleration time 06/29/2023 339.69  msec Final    LVOT diameter 06/29/2023 2.02  cm Final     LVOT area 06/29/2023 3.2  cm2 Final    LVOT peak arias 06/29/2023 1.42  m/s Final    LVOT peak VTI 06/29/2023 26.60  cm Final    Ao peak arias 06/29/2023 1.34  m/s Final    Ao VTI 06/29/2023 22.8  cm Final    LVOT stroke volume 06/29/2023 85.20  cm3 Final    AV peak gradient 06/29/2023 7  mmHg Final    MV peak gradient 06/29/2023 4  mmHg Final    E/E' ratio 06/29/2023 6.33  m/s Final    MV Peak E Arias 06/29/2023 0.57  m/s Final    AR Max Arias 06/29/2023 3.10  m/s Final    TR Max Arias 06/29/2023 2.68  m/s Final    MV VTI 06/29/2023 27.2  cm Final    MV stenosis pressure 1/2 time 06/29/2023 98.51  ms Final    MV Peak A Arias 06/29/2023 0.64  m/s Final    LV Systolic Volume 06/29/2023 22.73  mL Final    LV Systolic Volume Index 06/29/2023 10.8  mL/m2 Final    LV Diastolic Volume 06/29/2023 58.06  mL Final    LV Diastolic Volume Index 06/29/2023 27.65  mL/m2 Final    LA Volume Index 06/29/2023 19.7  mL/m2 Final    LV Mass Index 06/29/2023 54  g/m2 Final    RA Major Axis 06/29/2023 4.95  cm Final    Left Atrium Minor Axis 06/29/2023 3.52  cm Final    Left Atrium Major Axis 06/29/2023 3.82  cm Final    Triscuspid Valve Regurgitation Pea* 06/29/2023 29  mmHg Final    RA Width 06/29/2023 3.80  cm Final    Right Atrial Pressure (from IVC) 06/29/2023 3  mmHg Final    EF 06/29/2023 65  % Final    TV resting pulmonary artery pressu* 06/29/2023 32  mmHg Final       Imaging  Intra-Procedure Documentation    Result Date: 11/8/2023    The estimated blood loss was none.     Transesophageal echo ([facility]) with possible cardioversion    Result Date: 11/8/2023    Left Ventricle: There is low normal systolic function with a visually estimated ejection fraction of 50 - 55%. Unable to assess diastolic function due to atrial fibrillation.   Right Ventricle: Normal right ventricular cavity size. Systolic function is normal.   Left Atrium: There is no thrombus in the cavity.   Mitral Valve: There is mild regurgitation with a centrally directed jet.    Successful cardioversion with resotration of sinus rhythm.       Assessment  1. Atherosclerosis of native coronary artery of native heart without angina pectoris  Stable after bypass    2. Typical atrial flutter  Maintaining sinus rhythm after cardioversion with amiodarone  - EKG 12-lead    3. Primary hypertension  Too soon to see an effect from hydrochlorothiazide which was just started.  If remains high, probably switch metoprolol to carvedilol    4. Hypercholesterolemia  Reassess      Plan and Discussion    Encouraged to continue with his efforts at increasing his activity.  Plan for blood pressure as above.  Otherwise, no change in his present management.    The 10-year ASCVD risk score (Rell DK, et al., 2019) is: 20.2%    Values used to calculate the score:      Age: 68 years      Sex: Male      Is Non- : No      Diabetic: No      Tobacco smoker: No      Systolic Blood Pressure: 152 mmHg      Is BP treated: Yes      HDL Cholesterol: 51 mg/dL      Total Cholesterol: 148 mg/dL     Follow Up  Follow up in about 3 months (around 2/21/2024).      Chon Lopez MD

## 2023-11-24 ENCOUNTER — PATIENT MESSAGE (OUTPATIENT)
Dept: CARDIOLOGY | Facility: CLINIC | Age: 68
End: 2023-11-24
Payer: COMMERCIAL

## 2023-11-27 ENCOUNTER — TELEPHONE (OUTPATIENT)
Dept: CARDIAC REHAB | Facility: CLINIC | Age: 68
End: 2023-11-27
Payer: COMMERCIAL

## 2023-11-27 ENCOUNTER — HOSPITAL ENCOUNTER (OUTPATIENT)
Dept: CARDIOLOGY | Facility: HOSPITAL | Age: 68
Discharge: HOME | End: 2023-11-27
Attending: INTERNAL MEDICINE
Payer: COMMERCIAL

## 2023-11-27 VITALS
WEIGHT: 183 LBS | BODY MASS INDEX: 24.79 KG/M2 | SYSTOLIC BLOOD PRESSURE: 142 MMHG | HEIGHT: 72 IN | HEART RATE: 56 BPM | DIASTOLIC BLOOD PRESSURE: 86 MMHG

## 2023-11-27 DIAGNOSIS — Z95.1 POSTSURGICAL AORTOCORONARY BYPASS STATUS: ICD-10-CM

## 2023-11-27 DIAGNOSIS — I25.10 ATHEROSCLEROSIS OF NATIVE CORONARY ARTERY OF NATIVE HEART WITHOUT ANGINA PECTORIS: ICD-10-CM

## 2023-11-27 LAB
CV STRESS BASE HR: 56 BPM
DIASTOLIC BLOOD PRESSURE: 86 MMHG
OHS CV CPX 1 MINUTE RECOVERY HEART RATE: 91 BPM
OHS CV CPX 85 PERCENT MAX PREDICTED HEART RATE MALE: 129
OHS CV CPX ABDOMINAL GIRTH: 39.2 CM
OHS CV CPX DATA GRADE - PEAK: 12.2
OHS CV CPX DATA O2 SAT - PEAK: 96
OHS CV CPX DATA O2 SAT - REST: 96
OHS CV CPX DATA SPEED - PEAK: 3.4
OHS CV CPX DATA TIME - PEAK: 5.92
OHS CV CPX DATA VE/VCO2 - PEAK: 36
OHS CV CPX DATA VE/VO2 - PEAK: 32
OHS CV CPX DATA VO2 - PEAK: 21.9
OHS CV CPX DATA VO2 - REST: 5
OHS CV CPX ESTIMATED METS: 10
OHS CV CPX FEV1/FVC: 0.83
OHS CV CPX FORCED EXPIRATORY VOLUME: 2.62
OHS CV CPX FORCED VITAL CAPACITY (FVC): 3.16
OHS CV CPX HIGHEST VO: 33.1
OHS CV CPX MAX PREDICTED HEART RATE: 152
OHS CV CPX MAXIMAL VOLUNTARY VENTILATION (MVV) PREDICTED: 104.8
OHS CV CPX MAXIMAL VOLUNTARY VENTILATION (MVV): 71
OHS CV CPX MAXIUMUM EXERCISE VENTILATION (VE MAX): 64.6
OHS CV CPX PATIENT AGE: 68
OHS CV CPX PATIENT HEIGHT IN: 72
OHS CV CPX PATIENT IS FEMALE AGE 11-19: 0
OHS CV CPX PATIENT IS FEMALE AGE GREATER THAN 19: 0
OHS CV CPX PATIENT IS FEMALE AGE LESS THAN 11: 0
OHS CV CPX PATIENT IS FEMALE: 0
OHS CV CPX PATIENT IS MALE AGE 11-25: 0
OHS CV CPX PATIENT IS MALE AGE GREATER THAN 25: 1
OHS CV CPX PATIENT IS MALE AGE LESS THAN 11: 0
OHS CV CPX PATIENT IS MALE GREATER THAN 18: 1
OHS CV CPX PATIENT IS MALE LESS THAN OR EQUAL TO 18: 0
OHS CV CPX PATIENT IS MALE: 1
OHS CV CPX PATIENT WEIGHT RETURNED IN OZ: 2928
OHS CV CPX PEAK DIASTOLIC BLOOD PRESSURE: 86 MMHG
OHS CV CPX PEAK HEAR RATE: 96 BPM
OHS CV CPX PEAK RATE PRESSURE PRODUCT: NORMAL
OHS CV CPX PEAK SYSTOLIC BLOOD PRESSURE: 135 MMHG
OHS CV CPX PERCENT BODY FAT: 12
OHS CV CPX PERCENT MAX PREDICTED HEART RATE ACHIEVED: 63
OHS CV CPX PREDICTED VO2: 33.1 ML/KG/MIN
OHS CV CPX RATE PRESSURE PRODUCT PRESENTING: 7952
OHS CV CPX REST PET CO2: 30
OHS CV CPX VE/VCO2 SLOPE: 29
STRESS ECHO POST EXERCISE DUR MIN: 5 MINUTES
STRESS ECHO POST EXERCISE DUR SEC: 55 SECONDS
STRESS ST DEPRESSION: 0.5 MM
SYSTOLIC BLOOD PRESSURE: 142 MMHG

## 2023-11-27 PROCEDURE — 94621 CARDIOPULM EXERCISE TESTING: CPT

## 2023-11-27 PROCEDURE — 94621 CARDIOPULMONARY EXERCISE TESTING (CUPID ONLY): ICD-10-PCS | Mod: 26,,, | Performed by: INTERNAL MEDICINE

## 2023-11-27 PROCEDURE — 94621 CARDIOPULM EXERCISE TESTING: CPT | Mod: 26,,, | Performed by: INTERNAL MEDICINE

## 2023-11-28 ENCOUNTER — TELEPHONE (OUTPATIENT)
Dept: CARDIAC REHAB | Facility: CLINIC | Age: 68
End: 2023-11-28
Payer: COMMERCIAL

## 2023-11-28 NOTE — PROGRESS NOTES
HISTORY: S/P CABG (9-18-23), CAD, HTN, HLP, EF=60-65%(9-26-23)    ANTHROPOMETRICS:     PRE   Height (in) 72   Weight (lb) 183   BMI 24.8   Abdominal Girth (in) 39.2   % Body Fat 12.0%       LAB RESULTS:    Lab Results   Component Value Date    HGB 14.9 11/27/2023     Lab Results   Component Value Date    HCT 45.3 11/27/2023     Lab Results   Component Value Date    MPV 11.4 11/27/2023       Lab Results   Component Value Date    CHOL 129 11/27/2023     Lab Results   Component Value Date    HDL 46 11/27/2023     Lab Results   Component Value Date    LDLCALC 64.6 11/27/2023     Lab Results   Component Value Date    TRIG 92 11/27/2023     Lab Results   Component Value Date    CHOLHDL 35.7 11/27/2023       Lab Results   Component Value Date    GLUF 120 (H) 11/27/2023     Lab Results   Component Value Date    HGBA1C 5.5 11/27/2023        Lab Results   Component Value Date    HSCRP 0.55 11/27/2023         STEPHANIE SCORES:     PRE   Anxiety 0   Depression 0   Somatic 4   Hostility 0     SF-36 SCORES:     PRE   Physical Function 27   Social Function 11   Mental Health 28   Pain 11   Change in Health 4   Physical Role Limitation 3   Mental Role Limitation 3   Energy/Fatigue 12   Health Perceptions 23   Total Score 122     PHQ-9:      11/30/2023     7:09 AM   PHQ-9 Depression Patient Health Questionnaire   Over the last two weeks how often have you been bothered by little interest or pleasure in doing things 0   Over the last two weeks how often have you been bothered by feeling down, depressed or hopeless 0   Over the last two weeks how often have you been bothered by trouble falling or staying asleep, or sleeping too much 0   Over the last two weeks how often have you been bothered by feeling tired or having little energy 1   Over the last two weeks how often have you been bothered by a poor appetite or overeating 0   Over the last two weeks how often have you been bothered by feeling bad about yourself - or that you are a  failure or have let yourself or your family down 0   Over the last two weeks how often have you been bothered by trouble concentrating on things, such as reading the newspaper or watching television 0   Over the last two weeks how often have you been bothered by moving or speaking so slowly that other people could have noticed. 0   Over the last two weeks how often have you been bothered by thoughts that you would be better off dead, or of hurting yourself 0   If you checked off any problems, how difficult have these problems made it for you to do your work, take care of things at home or get along with other people? Not difficult at all   PHQ-9 Score 1             EDUCATION SCORES:     PRE   Education Score 60

## 2023-11-28 NOTE — PROGRESS NOTES
Session: Orientation   Cardiac Rehab Individual Treatment Plan - Initial Assessment      Patient Name: Sawyer Browning MRN: [id number]   : 1955   Age: 68 y.o.   Primary Diagnosis: CABG  Date of Event: 23  EF: 60-65%  Risk Stratification: moderate  Referring Physician: AARON   Exercise Assessment:     CPX/TM Date: 23 Results   RHR 56   Max HR 96   Peak VO2 (CPX only) 21.9   Actual METS (CPX only) 6.3   Estimated METS 10.0     Anthropometrics    Height 72 inches   Weight 183 lbs   BMI 24.8   Abdominal Girth 39.2   Body Composition 12.0%     ST Depression noted on Stress Test?:No  Angina with exercise?: No   Fall Risk: No   Assistive Devices:  independent   Currently exercising? Yes: Walking; Frequency: average 4 days per week; Duration 23 minutes  Mr. Banegas stated there were no limitations to exercise.     Exercise Plan:   Goals:  CR Exercise Goals: Attend Cardiac Rehab 3 times/week: In Progress  Home Aerobic Exercise: 2 additional days/week for 30-60 minutes: In Progress  Intensity of 12-15 on the Rate of Perceived Exertion (RPE) scale: In Progress  30% increase in entry estimated METS: 13.0 : In Progress  5 days/week for 30-60 minutes: In Progress  Demonstrate proper pulse taking technique: In Progress    Intervention:   Discussed importance of regular attendance to cardiac rehab class    Exercise Prescription:  THR Range 80-90   Mode: Treadmill  Recumbent Bike  Upright Bike  Nustep  Elliptical   Frequency:  3 days/week   Duration:  30 - 60 minutes   Intensity:  12 - 15 RPE   Resistance Training:  Yes: 5 lb weights with 10-15 reps based on strength and range of motion assessment     Home Prescription:  Mode Aerobic   Frequency: 2- 3 days/week   Duration: 30-60 minutes   Resistance Training: None        Education:  Orientation to Equipment; verbalizes understanding; Date: 23  Exercise Recommendations; verbalizes understanding; Date: 23  Exercise Safety; verbalizes understanding; Date:  11-30-23  Class Preparation: verbalizes understanding; Date: 11-30-23  Signs and symptoms to report: verbalizes understanding; Date: 11-30-23  Caffeine/Hydration: verbalizes understanding; Date: 11-30-23  Exercise Terminology: verbalizes understanding; Date: 11-30-23  Resistance Training: verbalizes understanding; Date: 11-30-23    Comments:  I encouraged Mr. Banegas to continue walking at least 2 non-rehab days per week for at least 30 minutes in addition to attending Phase II cardiac rehab classes 3 days per week.  He stated understanding.    All consent forms were signed, proper attire and shoes were discussed.       Mr. Banegas will begin Cardiac Rehab on Monday, December 4 at 10:00 am.    The exercise prescription will be adjusted based on tolerance of exercise intensity by patient.    Vero Whitt., CEP

## 2023-11-30 ENCOUNTER — CLINICAL SUPPORT (OUTPATIENT)
Dept: CARDIAC REHAB | Facility: CLINIC | Age: 68
End: 2023-11-30
Payer: COMMERCIAL

## 2023-11-30 VITALS
OXYGEN SATURATION: 96 % | SYSTOLIC BLOOD PRESSURE: 180 MMHG | DIASTOLIC BLOOD PRESSURE: 114 MMHG | RESPIRATION RATE: 16 BRPM | HEART RATE: 51 BPM

## 2023-11-30 DIAGNOSIS — I25.10 ATHEROSCLEROSIS OF NATIVE CORONARY ARTERY OF NATIVE HEART WITHOUT ANGINA PECTORIS: ICD-10-CM

## 2023-11-30 DIAGNOSIS — Z95.1 POSTSURGICAL AORTOCORONARY BYPASS STATUS: Primary | ICD-10-CM

## 2023-11-30 PROCEDURE — 99999 PR PBB SHADOW E&M-EST. PATIENT-LVL III: ICD-10-PCS | Mod: PBBFAC,,,

## 2023-11-30 PROCEDURE — 93798 PHYS/QHP OP CAR RHAB W/ECG: CPT | Mod: S$GLB,,, | Performed by: INTERNAL MEDICINE

## 2023-11-30 PROCEDURE — G0179 MD RECERTIFICATION HHA PT: HCPCS | Mod: ,,, | Performed by: THORACIC SURGERY (CARDIOTHORACIC VASCULAR SURGERY)

## 2023-11-30 PROCEDURE — 99999 PR PBB SHADOW E&M-EST. PATIENT-LVL III: CPT | Mod: PBBFAC,,,

## 2023-11-30 PROCEDURE — 93798 PR CARDIAC REHAB/MONITOR: ICD-10-PCS | Mod: S$GLB,,, | Performed by: INTERNAL MEDICINE

## 2023-11-30 NOTE — PROGRESS NOTES
Session: Orientation   Cardiac Rehab Individual Treatment Plan - Initial Assessment      Patient Name: Sawyer Browning MRN: [id number]   : 1955   Age: 68 y.o.   Date of Event: 2023   Primary Diagnosis: S/P CABG & Aortic root replacement    EF: 60-65%    Physical Assessment:   BP (!) 180/114 (BP Location: Right arm)   Pulse (!) 51   Resp 16   SpO2 96%     ASSESSMENT:  Heart Sounds: regular rate and rhythm  Prosthetic Valve: No  Lung Sounds: normal air entry, lungs clear to auscultation  Capillary Refill: normal  Left Radial Pulse: Normal (+2)  Right Radial Pulse: Normal (+2)  Left Pedal Pulse: Normal (+2)  Right Pedal Pulse: Normal (+2)  Right Edema: none  Left Edema none  Strength: good  Range of Motion: full range of motion  Existing Limitations:      Site   [x] Arthritis, bursitis Bilateral knees   [] Amputation, atrophy    [] Other:    []       Diabetic patient's foot examination comments: None -  Neither  Incisional site: healing well  Special needs: N/A    Psychosocial Assessment:   Outcome Survey Tools:    STEPHANIE SCORES:   PRE   Anxiety 0   Depression 0   Somatic 4   Hostility 0     SF-36 SCORES:   PRE   Physical Function 27   Social Function 11   Mental Health 28   Pain 11   Change in Health 4   Physical Role Limitation 3   Mental Role Limitation 3   Energy/Fatigue 12   Health Perceptions 23   Total Score 122     PHQ-9:      2023     7:09 AM   PHQ-9 Depression Patient Health Questionnaire   Over the last two weeks how often have you been bothered by little interest or pleasure in doing things 0   Over the last two weeks how often have you been bothered by feeling down, depressed or hopeless 0   Over the last two weeks how often have you been bothered by trouble falling or staying asleep, or sleeping too much 0   Over the last two weeks how often have you been bothered by feeling tired or having little energy 1   Over the last two weeks how often have you been bothered by a poor appetite or  overeating 0   Over the last two weeks how often have you been bothered by feeling bad about yourself - or that you are a failure or have let yourself or your family down 0   Over the last two weeks how often have you been bothered by trouble concentrating on things, such as reading the newspaper or watching television 0   Over the last two weeks how often have you been bothered by moving or speaking so slowly that other people could have noticed. 0   Over the last two weeks how often have you been bothered by thoughts that you would be better off dead, or of hurting yourself 0   If you checked off any problems, how difficult have these problems made it for you to do your work, take care of things at home or get along with other people? Not difficult at all   PHQ-9 Score 1              Living Arrangements: Lives with spouse  Family Support: children, grandchildren, and spouse  Self Reported: Anxiety, Stress, and Caregiver Role Strain  Displays: smiles often and calmness  Medication: not applicable    Psychosocial Plan:   Goals:  Verbalizes coping mechanisms  Maintain positive support system  Maintain positive outlook  Improve overall quality of life    Interventions/Recommendations:  Discussed Results of Surveys  Patient to Self Report Emotional Changes at Session Check In  Recommend Physical Activity  Recommend Attending Education Lectures  Notify MD: No  Program Referral: No  Pharmaceutical Intervention/Therapy: No  Other Needs: not applicable  Stage of Readiness to Change: Preparation    Education:  Stress; verbalizes understanding; Date: 11/29/2023  Risk factors; verbalizes understanding; Date: 11/29/2023    Comments:  Pt states he is under stress due to wife is ill and is having to cope with a lot of things alone. Pt verbalizes that he also is claustrophobic and has some anxieties about this. Pt denies any overwhelming stress or anxiety so is coping well.  Patient has been instructed to notify staff in the  event that circumstances worsen.  Patient verbalizes understanding.    Other Core Components/Risk Factors Assessment:   RISK FACTORS:  hyperlipidemia, hypertension, positive family history, stress    Learning Barriers: None    Education Level:  Attended College/Technical School    Pre-test Score: 60    Medication Compliance: has been compliant with taking medications    Other Core Components/Risk Factors Plan:   Goals:  Increase knowledge of CAD: In Progress  Decrease blood pressure: In Progress  Demonstrate accurate pulse taking: In Progress  Learn more about healthy eating: In Progress    Interventions/Recommendations:  Recommend regular attendance for Cardiac Rehab: Exercise and Education Lectures  Encourage medication compliance  Individual Education/ Counseling: Yes  Physician Referral: No    Education:    hypertension, verbalizes understanding; Date: 11/29/2023  risk factors, verbalizes understanding; Date: 11/29/2023         Education method adapted to patients education level and preferred method of learning.  Method: explanation    Comments:  Pt has been exercising by walking on his own. Pt is motivated to be more vigilant about his cardiovascular risk factors like B/P, diet, exercise. Pt has a positive attitude toward exercise and diet changes. He would like to increase his intake of vegetables and fruit. Pt will also seek a referral to the digital hypertensive program at [facility] with either his PCP or cardiologist.    Other Core Components/Hypertension Assessment:   Resting BP: 180/104  BP Readings from Last 1 Encounters:   11/30/23 (!) 180/114         BP Diagnosis: Hypertensive  Patient reported symptoms: none    Other Core Components/Hypertension Plan:   Goals:  Blood Pressure <130/80    Interventions/Recommendations:  Med Card Reconciled: Yes  Encourage medication compliance  Encourage sodium reduction  Encourage weight loss  Recommend physical activity  Educate on contributory factors  Reduce stress,  anxiety, anger, depression, and/or chronic pain  Encourage home blood pressure monitoring  Recommend daily weights  MD notified/Physician Referral: No    Education:    Hypertension; verbalizes understanding; Date: 11/29/2023  Risk Factors; verbalizes understanding; Date: 11/29/2023         Comments:  Pt states he monitors his blood pressure daily at home and keeps a log for his providers. Pt would like to be referred to digital hypertensive program. He states he doesn't normally have elevated readings at home.      Does the patient have Heart Failure? No    Other Core Components/Tobacco Cessation Assessment:   Smoking Status: lifetime non-smoker  Smoking Cessation Barriers:  N/A  Stage of Readiness to Change: Maintenance    Other Core Components/Tobacco Cessation Plan:   Goals:  Maintain non-smoking status    Interventions:  Maintains non-smoking status    Education:    Risk Factors; verbalizes understanding; Date: 11/29/2023  Benefits of Cardiac Rehab; verbalizes understanding; Date: 11/29/2023         Comments:  Pt verbalizes no desire to use tobacco products at this time or in the future.    Discussed Cardiac Rehab program in depth with patient.  Medication list updated per patient & marked as reviewed.  Patient has been instructed to notify staff of any problems while attending rehab (ie: chest pain, shortness of breath, lightheadedness, dizziness).  Patient has been instructed to monitor blood pressure readings outside of rehab & to keep a daily log of the readings.  Patient verbalizes understanding.    Rl Gibbs RN

## 2023-11-30 NOTE — PROGRESS NOTES
Orientation   Cardiac Rehab Individual Treatment Plan - Initial Assessment      Patient Name: Sawyer Browning MRN: [id number]   : 1955   Age: 68 y.o.   Primary Diagnosis: s/p CABG, CAD, HTN, HLD    Nutrition Assessment:     Anthropometrics    Height 72 inches   Weight 183 lbs   BMI 24.8   Abdominal Girth 39.2   Body Composition 12       Drug Allergies and Intolerances:  Review of patient's allergies indicates:   Allergen Reactions    Ace inhibitors Other (See Comments)     cough    Losartan      headache       Food Allergies and Intolerances:  NA    Past Medical History:  Past Medical History:   Diagnosis Date    BPH (benign prostatic hyperplasia)     Elevated liver enzymes     Fatty infiltration of liver     Glucose intolerance (impaired glucose tolerance)     Hiatal hernia     Hyperlipidemia     Hypertension     Overweight     Reflux     Snores        Past Surgical History:  Past Surgical History:   Procedure Laterality Date    AORTIC ROOT REPLACEMENT N/A 2023    Procedure: VALVE SPARING AORTIC ROOT REPLACEMENT;  Surgeon: Imtiaz Mooney MD;  Location: [address];  Service: Cardiovascular;  Laterality: N/A;  Valve sparing aortic root replacement, vs biobentall, hemiarch, CABGx2 under  hypothermic circ arrest    CORONARY ANGIOGRAPHY N/A 2023    Procedure: ANGIOGRAM, CORONARY ARTERY;  Surgeon: Chon Lopez MD;  Location: [facility] CATH LAB;  Service: Cardiology;  Laterality: N/A;  right radial    CORONARY ARTERY BYPASS GRAFT (CABG) N/A 2023    Procedure: CORONARY ARTERY BYPASS GRAFT (CABG);  Surgeon: Imtiaz Mooney MD;  Location: [facility] OR [address] [place] [address];  Service: Cardiovascular;  Laterality: N/A;  Vein [place] Start: 821  Vein [place] End: 838  Vein Preparation Start: 839  Vein Preparation End: 912    ENDOSCOPIC HARVEST OF VEIN Left 2023    Procedure: HARVEST-VEIN-ENDOVASCULAR;  Surgeon: Imtiaz Mooney MD;  Location: [facility] OR [address] [place] [address];  Service: Cardiovascular;  Laterality: Left;  Valve  sparing aortic root replacement, vs biobentall, hemiarch, CABGx2 under    inguinal hernia      x2    REPAIR OF DIAPHRAGMATIC HERNIA  9/18/2023    Procedure: REPAIR, HERNIA, DIAPHRAGMATIC;  Surgeon: Imtiaz Mooney MD;  Location: [facility] OR [address] [place] [address];  Service: Cardiovascular;;    tonsillectomy      TRANSESOPHAGEAL ECHOCARDIOGRAM WITH POSSIBLE CARDIOVERSION (GWEN W/ POSS CARDIOVERSION) N/A 11/8/2023    Procedure: TRANSESOPHAGEAL ECHOCARDIOGRAM WITH POSSIBLE CARDIOVERSION (GWEN W/ POSS CARDIOVERSION);  Surgeon: Chon Lopez MD;  Location: [facility] CATH LAB;  Service: Cardiology;  Laterality: N/A;       Medications:  Current Outpatient Medications   Medication Sig    acetaminophen (TYLENOL EXTRA STRENGTH) 500 MG tablet 2 tablet EVERY 8 HOURS (route: oral)    amiodarone (PACERONE) 200 MG Tab Take 1 tablet (200 mg total) by mouth once daily.    apixaban (ELIQUIS) 5 mg Tab Take 1 tablet by mouth 2 (two) times daily.    aspirin (ECOTRIN) 81 MG EC tablet Take 81 mg by mouth once daily.    atorvastatin (LIPITOR) 80 MG tablet Take 1 tablet by mouth once daily.    furosemide (LASIX) 40 MG tablet Take 1 tablet (40 mg total) by mouth daily as needed (weight gain).    hydroCHLOROthiazide (HYDRODIURIL) 25 MG tablet Take 1 tablet (25 mg total) by mouth once daily.    lansoprazole (PREVACID) 30 MG capsule Take 1 capsule by mouth once daily.    metoprolol succinate (TOPROL-XL) 100 MG 24 hr tablet Take 1 tablet by mouth once daily.    oxyCODONE (ROXICODONE) 5 MG immediate release tablet 1 tablet EVERY 4 HOURS (route: oral)    polyethylene glycol (GLYCOLAX) 17 gram/dose powder 17 gram DAILY (route: oral)    valsartan (DIOVAN) 160 MG tablet Take 1 tablet (160 mg total) by mouth 2 (two) times daily.     No current facility-administered medications for this visit.       Vitamins and Supplements:  NA    Labs:  Patient confirms he is taking lipitor 80mg for cholesterol control.    Lab Results   Component Value Date    CHOL 129 11/27/2023      Lab Results   Component Value Date    HDL 46 11/27/2023     Lab Results   Component Value Date    LDLCALC 64.6 11/27/2023     Lab Results   Component Value Date    TRIG 92 11/27/2023     Lab Results   Component Value Date    CHOLHDL 35.7 11/27/2023         Lab Results   Component Value Date    GLUF 120 (H) 11/27/2023     Lab Results   Component Value Date    HGBA1C 5.5 11/27/2023       Nutrition/Diet History:  Patient eats 3 meals daily.    Seasons food with nothing.  Patient denies use of a salt shaker at the table on prepared foods.   Dines out 1-2 per week at restaurants.    Chooses fried foods rarely  Chooses fish 1-2 time(s) per week.   Beverages:  water, diet soda, and coffee without sugar  Alcohol: glass of wine with dinner    24 Hour Recall:  Breakfast: bowl cheerios with cranberries/whole milk. Coffee with stevia 1-2 cups  Lunch:chopped salad with champagne vinaigrette with tortilla soup  Dinner:lasagna  Other: pecan pie    Difficulty Chewing or Swallowing: No  Current Exercise: See Exercise Physiologist Note  Food Safety/Food Preparation: spouse  Living Arrangements/Family Support: Lives with spouse  Cultural/Spiritual/Personal Preferences: not applicable   Barriers to Education: none identified  Stage of Change Related to Diet Habits: Maintenance    Nutrition Diagnosis:  Food and nutrition related knowledge deficit related to the lack of prior nutrition education as evidenced by diet history and 24 hour recall    Nutrition Plan:   Goals:  LDL-C < 70 (for high risk patients)  Hgb A1c < 7%  BMI < 25 and abdominal girth < 40M/<35 F  2 gram sodium, Mediterranean diet  Rehab weight goal: maintenance   Fish intake (non-fried varieties) to a goal of 2-3 servings per week.   Increase fruit and vegetable intake    Interventions/Recommendations:  Lab results reviewed and discussed  Nutrition Prescription:  Total Energy Estimated Needs: 4262-5452 Kcal/d for weight maintenance  Method for Estimating Needs:  25-30kcal/kg  Total Protein Estimated Needs:  g/d  Method for Estimating Needs: 0.8-1.2 g/Kg BW  Total Fluid Estimated Needs: 1 mL/Kcal  Dietitian Consult: No  Patient to participate in Cardiac Rehab sessions three times a week  Weekly Dietitian Weight Check  Encouraged patient to complete 3 day food diary  Follow Up Plan for Ongoing Self-Management Support    Education:  Mediterranean Diet; verbalizes understanding; Date: 11/20/23  Person taught: patient  Preferred Learning Method: Verbal, Written  Education Needed/Provided: Nutrition counseling and education related to cardiac rehabilitation  Education Method: Weekly nutrition lectures on the Mediterranean diet, cooking, shopping, and dining out  Written Materials Provided: 3 Day Food Record, Introduction to Mediterranean Diet  Strategies Implemented: Motivational interviewing, Goal setting, Self-Monitoring, and Problem Solving    Comments:   Discussed ways to incorporate healthy snacks, eating on a schedule, and monitoring sodium intake for heart health.    Diabetes  Is the patient diabetic? No      RD contact information provided.      Juliet Alcocer MS, RDN/LDN

## 2023-12-01 ENCOUNTER — PATIENT MESSAGE (OUTPATIENT)
Dept: CARDIOLOGY | Facility: CLINIC | Age: 68
End: 2023-12-01
Payer: COMMERCIAL

## 2023-12-04 ENCOUNTER — CLINICAL SUPPORT (OUTPATIENT)
Dept: CARDIAC REHAB | Facility: CLINIC | Age: 68
End: 2023-12-04
Payer: COMMERCIAL

## 2023-12-04 DIAGNOSIS — Z95.1 POSTSURGICAL AORTOCORONARY BYPASS STATUS: Primary | ICD-10-CM

## 2023-12-04 DIAGNOSIS — I25.10 CORONARY ATHEROSCLEROSIS OF NATIVE CORONARY ARTERY: ICD-10-CM

## 2023-12-04 PROCEDURE — 93798 PR CARDIAC REHAB/MONITOR: ICD-10-PCS | Mod: S$GLB,,, | Performed by: INTERNAL MEDICINE

## 2023-12-04 PROCEDURE — 93798 PHYS/QHP OP CAR RHAB W/ECG: CPT | Mod: S$GLB,,, | Performed by: INTERNAL MEDICINE

## 2023-12-04 RX ORDER — VALSARTAN 160 MG/1
160 TABLET ORAL 2 TIMES DAILY
Qty: 180 TABLET | Refills: 3 | Status: SHIPPED | OUTPATIENT
Start: 2023-12-04 | End: 2024-12-03

## 2023-12-05 ENCOUNTER — OFFICE VISIT (OUTPATIENT)
Dept: UROLOGY | Facility: CLINIC | Age: 68
End: 2023-12-05
Payer: COMMERCIAL

## 2023-12-05 VITALS — WEIGHT: 183 LBS | HEIGHT: 72 IN | BODY MASS INDEX: 24.79 KG/M2

## 2023-12-05 DIAGNOSIS — N40.1 BPH WITH OBSTRUCTION/LOWER URINARY TRACT SYMPTOMS: Primary | ICD-10-CM

## 2023-12-05 DIAGNOSIS — N13.8 BPH WITH OBSTRUCTION/LOWER URINARY TRACT SYMPTOMS: Primary | ICD-10-CM

## 2023-12-05 PROCEDURE — 1126F PR PAIN SEVERITY QUANTIFIED, NO PAIN PRESENT: ICD-10-PCS | Mod: CPTII,S$GLB,, | Performed by: UROLOGY

## 2023-12-05 PROCEDURE — 3288F PR FALLS RISK ASSESSMENT DOCUMENTED: ICD-10-PCS | Mod: CPTII,S$GLB,, | Performed by: UROLOGY

## 2023-12-05 PROCEDURE — 99213 PR OFFICE/OUTPT VISIT, EST, LEVL III, 20-29 MIN: ICD-10-PCS | Mod: S$GLB,,, | Performed by: UROLOGY

## 2023-12-05 PROCEDURE — 3008F BODY MASS INDEX DOCD: CPT | Mod: CPTII,S$GLB,, | Performed by: UROLOGY

## 2023-12-05 PROCEDURE — 4010F ACE/ARB THERAPY RXD/TAKEN: CPT | Mod: CPTII,S$GLB,, | Performed by: UROLOGY

## 2023-12-05 PROCEDURE — 1159F MED LIST DOCD IN RCRD: CPT | Mod: CPTII,S$GLB,, | Performed by: UROLOGY

## 2023-12-05 PROCEDURE — 4010F PR ACE/ARB THEARPY RXD/TAKEN: ICD-10-PCS | Mod: CPTII,S$GLB,, | Performed by: UROLOGY

## 2023-12-05 PROCEDURE — 1159F PR MEDICATION LIST DOCUMENTED IN MEDICAL RECORD: ICD-10-PCS | Mod: CPTII,S$GLB,, | Performed by: UROLOGY

## 2023-12-05 PROCEDURE — 99213 OFFICE O/P EST LOW 20 MIN: CPT | Mod: S$GLB,,, | Performed by: UROLOGY

## 2023-12-05 PROCEDURE — 3044F PR MOST RECENT HEMOGLOBIN A1C LEVEL <7.0%: ICD-10-PCS | Mod: CPTII,S$GLB,, | Performed by: UROLOGY

## 2023-12-05 PROCEDURE — 3044F HG A1C LEVEL LT 7.0%: CPT | Mod: CPTII,S$GLB,, | Performed by: UROLOGY

## 2023-12-05 PROCEDURE — 99999 PR PBB SHADOW E&M-EST. PATIENT-LVL III: ICD-10-PCS | Mod: PBBFAC,,, | Performed by: UROLOGY

## 2023-12-05 PROCEDURE — 3288F FALL RISK ASSESSMENT DOCD: CPT | Mod: CPTII,S$GLB,, | Performed by: UROLOGY

## 2023-12-05 PROCEDURE — 1126F AMNT PAIN NOTED NONE PRSNT: CPT | Mod: CPTII,S$GLB,, | Performed by: UROLOGY

## 2023-12-05 PROCEDURE — 3008F PR BODY MASS INDEX (BMI) DOCUMENTED: ICD-10-PCS | Mod: CPTII,S$GLB,, | Performed by: UROLOGY

## 2023-12-05 PROCEDURE — 99999 PR PBB SHADOW E&M-EST. PATIENT-LVL III: CPT | Mod: PBBFAC,,, | Performed by: UROLOGY

## 2023-12-05 PROCEDURE — 1101F PT FALLS ASSESS-DOCD LE1/YR: CPT | Mod: CPTII,S$GLB,, | Performed by: UROLOGY

## 2023-12-05 PROCEDURE — 1101F PR PT FALLS ASSESS DOC 0-1 FALLS W/OUT INJ PAST YR: ICD-10-PCS | Mod: CPTII,S$GLB,, | Performed by: UROLOGY

## 2023-12-05 NOTE — ASSESSMENT & PLAN NOTE
-stable, discussed with patient about avoiding potential dietary triggers  -avoid spicy/greasy/sour/acidic foods, as well as tea/coffee/chocolate if possible  -Tylenol as needed for pain, avoid NSAIDs  -Keep food diary       Patient called to get results of MRI. Please advise.

## 2023-12-05 NOTE — PROGRESS NOTES
Subjective:       Patient ID: Sawyer Browning is a 68 y.o. male.    Chief Complaint: Follow-up (Yearly ck psa 3.0/ no complaints/)    HPI  Patient is here for rectal exam and prostate check he is voiding well his PSA is down to normal  Past Medical History:   Diagnosis Date    BPH (benign prostatic hyperplasia)     Elevated liver enzymes     Fatty infiltration of liver     Glucose intolerance (impaired glucose tolerance)     Hiatal hernia     Hyperlipidemia     Hypertension     Overweight     Reflux     Snores        Past Surgical History:   Procedure Laterality Date    AORTIC ROOT REPLACEMENT N/A 9/18/2023    Procedure: VALVE SPARING AORTIC ROOT REPLACEMENT;  Surgeon: Imtiaz Mooney MD;  Location: [facility] OR [address] [place] [address];  Service: Cardiovascular;  Laterality: N/A;  Valve sparing aortic root replacement, vs biobentall, hemiarch, CABGx2 under  hypothermic circ arrest    CORONARY ANGIOGRAPHY N/A 9/6/2023    Procedure: ANGIOGRAM, CORONARY ARTERY;  Surgeon: Chon Lopez MD;  Location: [facility] CATH LAB;  Service: Cardiology;  Laterality: N/A;  right radial    CORONARY ARTERY BYPASS GRAFT (CABG) N/A 9/18/2023    Procedure: CORONARY ARTERY BYPASS GRAFT (CABG);  Surgeon: Imtiaz Mooney MD;  Location: [facility] OR [address] [place] [address];  Service: Cardiovascular;  Laterality: N/A;  Vein [place] Start: 821  Vein [place] End: 838  Vein Preparation Start: 839  Vein Preparation End: 912    ENDOSCOPIC HARVEST OF VEIN Left 9/18/2023    Procedure: HARVEST-VEIN-ENDOVASCULAR;  Surgeon: Imtiaz Mooney MD;  Location: [facility] OR [address] [place] [address];  Service: Cardiovascular;  Laterality: Left;  Valve sparing aortic root replacement, vs biobentall, hemiarch, CABGx2 under    inguinal hernia      x2    REPAIR OF DIAPHRAGMATIC HERNIA  9/18/2023    Procedure: REPAIR, HERNIA, DIAPHRAGMATIC;  Surgeon: Imtiaz Mooney MD;  Location: [facility] OR [address] [place] [address];  Service: Cardiovascular;;    tonsillectomy      TRANSESOPHAGEAL ECHOCARDIOGRAM WITH POSSIBLE CARDIOVERSION (GWEN W/  POSS CARDIOVERSION) N/A 11/8/2023    Procedure: TRANSESOPHAGEAL ECHOCARDIOGRAM WITH POSSIBLE CARDIOVERSION (GWEN W/ POSS CARDIOVERSION);  Surgeon: Chon Lopez MD;  Location: [facility] CATH LAB;  Service: Cardiology;  Laterality: N/A;       Family History   Problem Relation Age of Onset    Hypertension Mother     Macular degeneration Mother         - gets shots in her eye    Stroke Father     Heart disease Father     Aneurysm Father     Migraines Sister     Arthritis Brother         knees    Dementia Paternal Grandmother     Diabetes Paternal Grandmother     Heart disease Paternal Grandfather     Diabetes Brother     Hernia Brother     Cancer Paternal Aunt     Cancer Paternal Uncle     Cancer Paternal Aunt     Cancer Paternal Uncle     Colon cancer Neg Hx     Prostate cancer Neg Hx        Social History     Socioeconomic History    Marital status:     Number of children: 2   Occupational History    Occupation: Works in insurance   Tobacco Use    Smoking status: Never    Smokeless tobacco: Never   Substance and Sexual Activity    Alcohol use: Yes     Comment: 2 glasses of wine most evenings.    Drug use: No     Social Determinants of Health     Financial Resource Strain: Low Risk  (9/19/2023)    Overall Financial Resource Strain (CARDIA)     Difficulty of Paying Living Expenses: Not hard at all   Food Insecurity: No Food Insecurity (9/19/2023)    Hunger Vital Sign     Worried About Running Out of Food in the Last Year: Never true     Ran Out of Food in the Last Year: Never true   Transportation Needs: No Transportation Needs (9/19/2023)    PRAPARE - Transportation     Lack of Transportation (Medical): No     Lack of Transportation (Non-Medical): No   Physical Activity: Sufficiently Active (9/19/2023)    Exercise Vital Sign     Days of Exercise per Week: 3 days     Minutes of Exercise per Session: 60 min   Stress: Unknown (9/19/2023)    [employer] - Occupational Stress Questionnaire      Feeling of Stress : Patient refused   Social Connections: Unknown (9/19/2023)    Social Connection and Isolation Panel [NHANES]     Frequency of Communication with Friends and Family: More than three times a week     Frequency of Social Gatherings with Friends and Family: More than three times a week     Attends [employer] Services: Patient refused     Active Member of Clubs or Organizations: Patient refused     Attends Club or Organization Meetings: Patient refused     Marital Status:    Housing Stability: Low Risk  (9/19/2023)    Housing Stability Vital Sign     Unable to Pay for Housing in the Last Year: No     Number of Places Lived in the Last Year: 1     Unstable Housing in the Last Year: No       Allergies:  Ace inhibitors and Losartan    Medications:    Current Outpatient Medications:     acetaminophen (TYLENOL EXTRA STRENGTH) 500 MG tablet, 2 tablet EVERY 8 HOURS (route: oral), Disp: , Rfl:     amiodarone (PACERONE) 200 MG Tab, Take 1 tablet (200 mg total) by mouth once daily., Disp: 30 tablet, Rfl: 11    apixaban (ELIQUIS) 5 mg Tab, Take 1 tablet by mouth 2 (two) times daily., Disp: , Rfl:     aspirin (ECOTRIN) 81 MG EC tablet, Take 81 mg by mouth once daily., Disp: , Rfl:     atorvastatin (LIPITOR) 80 MG tablet, Take 1 tablet by mouth once daily., Disp: , Rfl:     hydroCHLOROthiazide (HYDRODIURIL) 25 MG tablet, Take 1 tablet (25 mg total) by mouth once daily., Disp: 90 tablet, Rfl: 3    lansoprazole (PREVACID) 30 MG capsule, Take 1 capsule by mouth once daily., Disp: , Rfl:     metoprolol succinate (TOPROL-XL) 100 MG 24 hr tablet, Take 1 tablet by mouth once daily., Disp: , Rfl:     valsartan (DIOVAN) 160 MG tablet, Take 1 tablet (160 mg total) by mouth 2 (two) times daily., Disp: 180 tablet, Rfl: 3    furosemide (LASIX) 40 MG tablet, Take 1 tablet (40 mg total) by mouth daily as needed (weight gain). (Patient not taking: Reported on 12/5/2023), Disp: 30 tablet, Rfl: 11    oxyCODONE (ROXICODONE)  5 MG immediate release tablet, 1 tablet EVERY 4 HOURS (route: oral), Disp: , Rfl:     polyethylene glycol (GLYCOLAX) 17 gram/dose powder, 17 gram DAILY (route: oral), Disp: , Rfl:     Review of Systems    Objective:      Physical Exam  Genitourinary:     Prostate: Normal.         Assessment:       1. BPH with obstruction/lower urinary tract symptoms        Plan:       Sawyer was seen today for follow-up.    Diagnoses and all orders for this visit:    BPH with obstruction/lower urinary tract symptoms  -     Prostate Specific Antigen, Diagnostic; Future    Return to clinic 1 year with PSA or sooner p.r.n.

## 2023-12-06 ENCOUNTER — CLINICAL SUPPORT (OUTPATIENT)
Dept: CARDIAC REHAB | Facility: CLINIC | Age: 68
End: 2023-12-06
Payer: COMMERCIAL

## 2023-12-06 DIAGNOSIS — Z95.1 POSTSURGICAL AORTOCORONARY BYPASS STATUS: Primary | ICD-10-CM

## 2023-12-06 DIAGNOSIS — I25.10 CORONARY ARTERY DISEASE, UNSPECIFIED VESSEL OR LESION TYPE, UNSPECIFIED WHETHER ANGINA PRESENT, UNSPECIFIED WHETHER NATIVE OR TRANSPLANTED HEART: ICD-10-CM

## 2023-12-06 PROCEDURE — 93798 PHYS/QHP OP CAR RHAB W/ECG: CPT | Mod: S$GLB,,, | Performed by: INTERNAL MEDICINE

## 2023-12-06 PROCEDURE — 93798 PR CARDIAC REHAB/MONITOR: ICD-10-PCS | Mod: S$GLB,,, | Performed by: INTERNAL MEDICINE

## 2023-12-08 ENCOUNTER — CLINICAL SUPPORT (OUTPATIENT)
Dept: CARDIAC REHAB | Facility: CLINIC | Age: 68
End: 2023-12-08
Payer: COMMERCIAL

## 2023-12-08 ENCOUNTER — TELEPHONE (OUTPATIENT)
Dept: CARDIAC REHAB | Facility: CLINIC | Age: 68
End: 2023-12-08

## 2023-12-08 DIAGNOSIS — I25.10 ATHEROSCLEROSIS OF NATIVE CORONARY ARTERY OF NATIVE HEART WITHOUT ANGINA PECTORIS: ICD-10-CM

## 2023-12-08 DIAGNOSIS — Z95.1 POSTSURGICAL AORTOCORONARY BYPASS STATUS: Primary | ICD-10-CM

## 2023-12-08 PROCEDURE — 93798 PHYS/QHP OP CAR RHAB W/ECG: CPT | Mod: S$GLB,,, | Performed by: INTERNAL MEDICINE

## 2023-12-08 PROCEDURE — 93798 PR CARDIAC REHAB/MONITOR: ICD-10-PCS | Mod: S$GLB,,, | Performed by: INTERNAL MEDICINE

## 2023-12-08 NOTE — PROGRESS NOTES
Patient arrived for cardiac rehab session. Pt reports to have eaten prior to exercise session.  The patient was on continuous EKG monitoring throughout the session. Patient has B/P 142/90 upon arrival to rehab while pt exercising on the elliptical B/P 158/108. B/P checked when off of elliptical was 176/110. The patient tolerated exercise session well with no complaints. Dr. Lopez notified of readings.

## 2023-12-11 ENCOUNTER — CLINICAL SUPPORT (OUTPATIENT)
Dept: CARDIAC REHAB | Facility: CLINIC | Age: 68
End: 2023-12-11
Payer: COMMERCIAL

## 2023-12-11 ENCOUNTER — PATIENT MESSAGE (OUTPATIENT)
Dept: CARDIOLOGY | Facility: CLINIC | Age: 68
End: 2023-12-11
Payer: COMMERCIAL

## 2023-12-11 DIAGNOSIS — I25.10 CORONARY ARTERY DISEASE, UNSPECIFIED VESSEL OR LESION TYPE, UNSPECIFIED WHETHER ANGINA PRESENT, UNSPECIFIED WHETHER NATIVE OR TRANSPLANTED HEART: ICD-10-CM

## 2023-12-11 DIAGNOSIS — Z95.1 POSTSURGICAL AORTOCORONARY BYPASS STATUS: Primary | ICD-10-CM

## 2023-12-11 DIAGNOSIS — I10 ESSENTIAL HYPERTENSION: Primary | ICD-10-CM

## 2023-12-11 PROCEDURE — 93798 PR CARDIAC REHAB/MONITOR: ICD-10-PCS | Mod: S$GLB,,, | Performed by: INTERNAL MEDICINE

## 2023-12-11 PROCEDURE — 93798 PHYS/QHP OP CAR RHAB W/ECG: CPT | Mod: S$GLB,,, | Performed by: INTERNAL MEDICINE

## 2023-12-11 RX ORDER — CARVEDILOL 25 MG/1
25 TABLET ORAL 2 TIMES DAILY WITH MEALS
Qty: 180 TABLET | Refills: 3 | Status: SHIPPED | OUTPATIENT
Start: 2023-12-11 | End: 2023-12-12 | Stop reason: SDUPTHER

## 2023-12-12 RX ORDER — CARVEDILOL 25 MG/1
25 TABLET ORAL 2 TIMES DAILY WITH MEALS
Qty: 180 TABLET | Refills: 3 | Status: SHIPPED | OUTPATIENT
Start: 2023-12-12 | End: 2024-12-11

## 2023-12-13 ENCOUNTER — CLINICAL SUPPORT (OUTPATIENT)
Dept: CARDIAC REHAB | Facility: CLINIC | Age: 68
End: 2023-12-13
Payer: COMMERCIAL

## 2023-12-13 ENCOUNTER — DOCUMENT SCAN (OUTPATIENT)
Dept: HOME HEALTH SERVICES | Facility: HOSPITAL | Age: 68
End: 2023-12-13
Payer: COMMERCIAL

## 2023-12-13 DIAGNOSIS — Z95.1 POSTSURGICAL AORTOCORONARY BYPASS STATUS: Primary | ICD-10-CM

## 2023-12-13 DIAGNOSIS — I25.10 ATHEROSCLEROSIS OF NATIVE CORONARY ARTERY OF NATIVE HEART, UNSPECIFIED WHETHER ANGINA PRESENT: ICD-10-CM

## 2023-12-13 PROCEDURE — 93798 PR CARDIAC REHAB/MONITOR: ICD-10-PCS | Mod: S$GLB,,, | Performed by: INTERNAL MEDICINE

## 2023-12-13 PROCEDURE — 93798 PHYS/QHP OP CAR RHAB W/ECG: CPT | Mod: S$GLB,,, | Performed by: INTERNAL MEDICINE

## 2023-12-15 ENCOUNTER — CLINICAL SUPPORT (OUTPATIENT)
Dept: CARDIAC REHAB | Facility: CLINIC | Age: 68
End: 2023-12-15
Payer: COMMERCIAL

## 2023-12-15 DIAGNOSIS — I25.10 CORONARY ARTERY DISEASE, UNSPECIFIED VESSEL OR LESION TYPE, UNSPECIFIED WHETHER ANGINA PRESENT, UNSPECIFIED WHETHER NATIVE OR TRANSPLANTED HEART: ICD-10-CM

## 2023-12-15 DIAGNOSIS — Z95.1 POSTSURGICAL AORTOCORONARY BYPASS STATUS: Primary | ICD-10-CM

## 2023-12-15 PROCEDURE — 93798 PR CARDIAC REHAB/MONITOR: ICD-10-PCS | Mod: S$GLB,,, | Performed by: INTERNAL MEDICINE

## 2023-12-15 PROCEDURE — 93798 PHYS/QHP OP CAR RHAB W/ECG: CPT | Mod: S$GLB,,, | Performed by: INTERNAL MEDICINE

## 2023-12-18 ENCOUNTER — CLINICAL SUPPORT (OUTPATIENT)
Dept: CARDIAC REHAB | Facility: CLINIC | Age: 68
End: 2023-12-18
Payer: COMMERCIAL

## 2023-12-18 DIAGNOSIS — I25.10 CORONARY ARTERY DISEASE, UNSPECIFIED VESSEL OR LESION TYPE, UNSPECIFIED WHETHER ANGINA PRESENT, UNSPECIFIED WHETHER NATIVE OR TRANSPLANTED HEART: ICD-10-CM

## 2023-12-18 DIAGNOSIS — Z95.1 POSTSURGICAL AORTOCORONARY BYPASS STATUS: Primary | ICD-10-CM

## 2023-12-18 PROCEDURE — 93798 PHYS/QHP OP CAR RHAB W/ECG: CPT | Mod: S$GLB,,, | Performed by: INTERNAL MEDICINE

## 2023-12-18 PROCEDURE — 93798 PR CARDIAC REHAB/MONITOR: ICD-10-PCS | Mod: S$GLB,,, | Performed by: INTERNAL MEDICINE

## 2023-12-20 ENCOUNTER — CLINICAL SUPPORT (OUTPATIENT)
Dept: CARDIAC REHAB | Facility: CLINIC | Age: 68
End: 2023-12-20
Payer: COMMERCIAL

## 2023-12-20 DIAGNOSIS — I25.10 CORONARY ARTERY DISEASE, UNSPECIFIED VESSEL OR LESION TYPE, UNSPECIFIED WHETHER ANGINA PRESENT, UNSPECIFIED WHETHER NATIVE OR TRANSPLANTED HEART: ICD-10-CM

## 2023-12-20 DIAGNOSIS — Z95.1 POSTSURGICAL AORTOCORONARY BYPASS STATUS: Primary | ICD-10-CM

## 2023-12-20 PROCEDURE — 93798 PR CARDIAC REHAB/MONITOR: ICD-10-PCS | Mod: S$GLB,,, | Performed by: INTERNAL MEDICINE

## 2023-12-20 PROCEDURE — 93798 PHYS/QHP OP CAR RHAB W/ECG: CPT | Mod: S$GLB,,, | Performed by: INTERNAL MEDICINE

## 2023-12-21 ENCOUNTER — DOCUMENTATION ONLY (OUTPATIENT)
Dept: CARDIAC REHAB | Facility: CLINIC | Age: 68
End: 2023-12-21
Payer: COMMERCIAL

## 2023-12-21 NOTE — PROGRESS NOTES
Mr. Banegas has completed 9 out of 36 exercise session of Phase II cardiac rehab.  A follow up reassessment will be completed at 12 sessions.    Session: Orientation   Cardiac Rehab Individual Treatment Plan - Initial Assessment      Patient Name: Sawyer Browning MRN: [id number]   : 1955   Age: 68 y.o.   Primary Diagnosis: CABG  Date of Event: 23  EF: 60-65%  Risk Stratification: moderate  Referring Physician: AARON   Exercise Assessment:     CPX/TM Date: 23 Results   RHR 56   Max HR 96   Peak VO2 (CPX only) 21.9   Actual METS (CPX only) 6.3   Estimated METS 10.0     Anthropometrics    Height 72 inches   Weight 183 lbs   BMI 24.8   Abdominal Girth 39.2   Body Composition 12.0%     ST Depression noted on Stress Test?:No  Angina with exercise?: No   Fall Risk: No   Assistive Devices:  independent   Currently exercising? Yes: Walking; Frequency: average 4 days per week; Duration 23 minutes  Mr. Banegas stated there were no limitations to exercise.     Exercise Plan:   Goals:  CR Exercise Goals: Attend Cardiac Rehab 3 times/week: In Progress  Home Aerobic Exercise: 2 additional days/week for 30-60 minutes: In Progress  Intensity of 12-15 on the Rate of Perceived Exertion (RPE) scale: In Progress  30% increase in entry estimated METS: 13.0 : In Progress  5 days/week for 30-60 minutes: In Progress  Demonstrate proper pulse taking technique: In Progress    Intervention:   Discussed importance of regular attendance to cardiac rehab class    Exercise Prescription:  THR Range 80-90   Mode: Treadmill  Recumbent Bike  Upright Bike  Nustep  Elliptical   Frequency:  3 days/week   Duration:  30 - 60 minutes   Intensity:  12 - 15 RPE   Resistance Training:  Yes: 5 lb weights with 10-15 reps based on strength and range of motion assessment     Home Prescription:  Mode Aerobic   Frequency: 2- 3 days/week   Duration: 30-60 minutes   Resistance Training: None        Education:  Orientation to Equipment; verbalizes understanding;  Date: 23  Exercise Recommendations; verbalizes understanding; Date: 23  Exercise Safety; verbalizes understanding; Date: 23  Class Preparation: verbalizes understanding; Date: 23  Signs and symptoms to report: verbalizes understanding; Date: 23  Caffeine/Hydration: verbalizes understanding; Date: 23  Exercise Terminology: verbalizes understanding; Date: 23  Resistance Training: verbalizes understanding; Date: 23    Comments:  I encouraged Mr. Banegas to continue walking at least 2 non-rehab days per week for at least 30 minutes in addition to attending Phase II cardiac rehab classes 3 days per week.  He stated understanding.    All consent forms were signed, proper attire and shoes were discussed.       Mr. Banegas will begin Cardiac Rehab on  at 10:00 am.    The exercise prescription will be adjusted based on tolerance of exercise intensity by patient.    Troy Whitt, CEP    Orientation   Cardiac Rehab Individual Treatment Plan - Initial Assessment      Patient Name: Sawyer Browning MRN: [id number]   : 1955   Age: 68 y.o.   Primary Diagnosis: s/p CABG, CAD, HTN, HLD    Nutrition Assessment:     Anthropometrics    Height 72 inches   Weight 183 lbs   BMI 24.8   Abdominal Girth 39.2   Body Composition 12       Drug Allergies and Intolerances:  Review of patient's allergies indicates:   Allergen Reactions    Ace inhibitors Other (See Comments)     cough    Losartan      headache       Food Allergies and Intolerances:  NA    Past Medical History:  Past Medical History:   Diagnosis Date    BPH (benign prostatic hyperplasia)     Elevated liver enzymes     Fatty infiltration of liver     Glucose intolerance (impaired glucose tolerance)     Hiatal hernia     Hyperlipidemia     Hypertension     Overweight     Reflux     Snores        Past Surgical History:  Past Surgical History:   Procedure Laterality Date    AORTIC ROOT REPLACEMENT N/A 2023     Procedure: VALVE SPARING AORTIC ROOT REPLACEMENT;  Surgeon: Imtiaz Mooney MD;  [facility];  Service: Cardiovascular;  Laterality: N/A;  Valve sparing aortic root replacement, vs biobentall, hemiarch, CABGx2 under  hypothermic circ arrest    CORONARY ANGIOGRAPHY N/A 9/6/2023    Procedure: ANGIOGRAM, CORONARY ARTERY;  Surgeon: Chon Lopez MD;  Location: [facility] CATH LAB;  Service: Cardiology;  Laterality: N/A;  right radial    CORONARY ARTERY BYPASS GRAFT (CABG) N/A 9/18/2023    Procedure: CORONARY ARTERY BYPASS GRAFT (CABG);  Surgeon: Imtiaz Mooney MD;  [facility];  Service: Cardiovascular;  Laterality: N/A;  Vein [place] Start: 821  Vein [place] End: 838  Vein Preparation Start: 839  Vein Preparation End: 912    ENDOSCOPIC HARVEST OF VEIN Left 9/18/2023    Procedure: HARVEST-VEIN-ENDOVASCULAR;  Surgeon: Imtiaz Mooney MD;  [facility];  Service: Cardiovascular;  Laterality: Left;  Valve sparing aortic root replacement, vs biobentall, hemiarch, CABGx2 under    inguinal hernia      x2    REPAIR OF DIAPHRAGMATIC HERNIA  9/18/2023    Procedure: REPAIR, HERNIA, DIAPHRAGMATIC;  Surgeon: Imtiaz Mooney MD;  [facility];  Service: Cardiovascular;;    tonsillectomy      TRANSESOPHAGEAL ECHOCARDIOGRAM WITH POSSIBLE CARDIOVERSION (GWEN W/ POSS CARDIOVERSION) N/A 11/8/2023    Procedure: TRANSESOPHAGEAL ECHOCARDIOGRAM WITH POSSIBLE CARDIOVERSION (GWEN W/ POSS CARDIOVERSION);  Surgeon: Chon Lopez MD;  Location: [facility] CATH LAB;  Service: Cardiology;  Laterality: N/A;       Medications:  Current Outpatient Medications   Medication Sig    acetaminophen (TYLENOL EXTRA STRENGTH) 500 MG tablet 2 tablet EVERY 8 HOURS (route: oral)    amiodarone (PACERONE) 200 MG Tab Take 1 tablet (200 mg total) by mouth once daily.    apixaban (ELIQUIS) 5 mg Tab Take 1 tablet by mouth 2 (two) times daily.    aspirin (ECOTRIN) 81 MG EC tablet Take 81 mg by mouth once  daily.    atorvastatin (LIPITOR) 80 MG tablet Take 1 tablet by mouth once daily.    carvediloL (COREG) 25 MG tablet Take 1 tablet (25 mg total) by mouth 2 (two) times daily with meals.    furosemide (LASIX) 40 MG tablet Take 1 tablet (40 mg total) by mouth daily as needed (weight gain). (Patient not taking: Reported on 12/5/2023)    hydroCHLOROthiazide (HYDRODIURIL) 25 MG tablet Take 1 tablet (25 mg total) by mouth once daily.    lansoprazole (PREVACID) 30 MG capsule Take 1 capsule by mouth once daily.    oxyCODONE (ROXICODONE) 5 MG immediate release tablet 1 tablet EVERY 4 HOURS (route: oral)    polyethylene glycol (GLYCOLAX) 17 gram/dose powder 17 gram DAILY (route: oral)    valsartan (DIOVAN) 160 MG tablet Take 1 tablet (160 mg total) by mouth 2 (two) times daily.     No current facility-administered medications for this visit.       Vitamins and Supplements:  NA    Labs:  Patient confirms he is taking lipitor 80mg for cholesterol control.    Lab Results   Component Value Date    CHOL 129 11/27/2023     Lab Results   Component Value Date    HDL 46 11/27/2023     Lab Results   Component Value Date    LDLCALC 64.6 11/27/2023     Lab Results   Component Value Date    TRIG 92 11/27/2023     Lab Results   Component Value Date    CHOLHDL 35.7 11/27/2023         Lab Results   Component Value Date    GLUF 120 (H) 11/27/2023     Lab Results   Component Value Date    HGBA1C 5.5 11/27/2023       Nutrition/Diet History:  Patient eats 3 meals daily.    Seasons food with nothing.  Patient denies use of a salt shaker at the table on prepared foods.   Dines out 1-2 per week at restaurants.    Chooses fried foods rarely  Chooses fish 1-2 time(s) per week.   Beverages:  water, diet soda, and coffee without sugar  Alcohol: glass of wine with dinner    24 Hour Recall:  Breakfast: bowl cheerios with cranberries/whole milk. Coffee with stevia 1-2 cups  Lunch:chopped salad with champagne vinaigrette with tortilla  soup  Dinner:lasagna  Other: pecan pie    Difficulty Chewing or Swallowing: No  Current Exercise: See Exercise Physiologist Note  Food Safety/Food Preparation: spouse  Living Arrangements/Family Support: Lives with spouse  Cultural/Spiritual/Personal Preferences: not applicable   Barriers to Education: none identified  Stage of Change Related to Diet Habits: Maintenance    Nutrition Diagnosis:  Food and nutrition related knowledge deficit related to the lack of prior nutrition education as evidenced by diet history and 24 hour recall    Nutrition Plan:   Goals:  LDL-C < 70 (for high risk patients)  Hgb A1c < 7%  BMI < 25 and abdominal girth < 40M/<35 F  2 gram sodium, Mediterranean diet  Rehab weight goal: maintenance   Fish intake (non-fried varieties) to a goal of 2-3 servings per week.   Increase fruit and vegetable intake    Interventions/Recommendations:  Lab results reviewed and discussed  Nutrition Prescription:  Total Energy Estimated Needs: 0100-3376 Kcal/d for weight maintenance  Method for Estimating Needs: 25-30kcal/kg  Total Protein Estimated Needs:  g/d  Method for Estimating Needs: 0.8-1.2 g/Kg BW  Total Fluid Estimated Needs: 1 mL/Kcal  Dietitian Consult: No  Patient to participate in Cardiac Rehab sessions three times a week  Weekly Dietitian Weight Check  Encouraged patient to complete 3 day food diary  Follow Up Plan for Ongoing Self-Management Support    Education:  Mediterranean Diet; verbalizes understanding; Date: 11/20/23  Person taught: patient  Preferred Learning Method: Verbal, Written  Education Needed/Provided: Nutrition counseling and education related to cardiac rehabilitation  Education Method: Weekly nutrition lectures on the Mediterranean diet, cooking, shopping, and dining out  Written Materials Provided: 3 Day Food Record, Introduction to Mediterranean Diet  Strategies Implemented: Motivational interviewing, Goal setting, Self-Monitoring, and Problem Solving    Comments:    Discussed ways to incorporate healthy snacks, eating on a schedule, and monitoring sodium intake for heart health.    Diabetes  Is the patient diabetic? No      RD contact information provided.      Juliet Alcocer MS, RDN/LDN    Session: Orientation   Cardiac Rehab Individual Treatment Plan - Initial Assessment      Patient Name: Sawyer Browning MRN: [id number]   : 1955   Age: 68 y.o.   Date of Event: 2023   Primary Diagnosis: S/P CABG & Aortic root replacement    EF: 60-65%    Physical Assessment:   There were no vitals taken for this visit.    ASSESSMENT:  Heart Sounds: regular rate and rhythm  Prosthetic Valve: No  Lung Sounds: normal air entry, lungs clear to auscultation  Capillary Refill: normal  Left Radial Pulse: Normal (+2)  Right Radial Pulse: Normal (+2)  Left Pedal Pulse: Normal (+2)  Right Pedal Pulse: Normal (+2)  Right Edema: none  Left Edema none  Strength: good  Range of Motion: full range of motion  Existing Limitations:      Site   [x] Arthritis, bursitis Bilateral knees   [] Amputation, atrophy    [] Other:    []       Diabetic patient's foot examination comments: None -  Neither  Incisional site: healing well  Special needs: N/A    Psychosocial Assessment:   Outcome Survey Tools:    STEPHANIE SCORES:   PRE   Anxiety 0   Depression 0   Somatic 4   Hostility 0     SF-36 SCORES:   PRE   Physical Function 27   Social Function 11   Mental Health 28   Pain 11   Change in Health 4   Physical Role Limitation 3   Mental Role Limitation 3   Energy/Fatigue 12   Health Perceptions 23   Total Score 122     PHQ-9:      2023     7:09 AM   PHQ-9 Depression Patient Health Questionnaire   Over the last two weeks how often have you been bothered by little interest or pleasure in doing things 0   Over the last two weeks how often have you been bothered by feeling down, depressed or hopeless 0   Over the last two weeks how often have you been bothered by trouble falling or staying asleep, or sleeping too  much 0   Over the last two weeks how often have you been bothered by feeling tired or having little energy 1   Over the last two weeks how often have you been bothered by a poor appetite or overeating 0   Over the last two weeks how often have you been bothered by feeling bad about yourself - or that you are a failure or have let yourself or your family down 0   Over the last two weeks how often have you been bothered by trouble concentrating on things, such as reading the newspaper or watching television 0   Over the last two weeks how often have you been bothered by moving or speaking so slowly that other people could have noticed. 0   Over the last two weeks how often have you been bothered by thoughts that you would be better off dead, or of hurting yourself 0   If you checked off any problems, how difficult have these problems made it for you to do your work, take care of things at home or get along with other people? Not difficult at all   PHQ-9 Score 1              Living Arrangements: Lives with spouse  Family Support: children, grandchildren, and spouse  Self Reported: Anxiety, Stress, and Caregiver Role Strain  Displays: smiles often and calmness  Medication: not applicable    Psychosocial Plan:   Goals:  Verbalizes coping mechanisms  Maintain positive support system  Maintain positive outlook  Improve overall quality of life    Interventions/Recommendations:  Discussed Results of Surveys  Patient to Self Report Emotional Changes at Session Check In  Recommend Physical Activity  Recommend Attending Education Lectures  Notify MD: No  Program Referral: No  Pharmaceutical Intervention/Therapy: No  Other Needs: not applicable  Stage of Readiness to Change: Preparation    Education:  Stress; verbalizes understanding; Date: 11/29/2023  Risk factors; verbalizes understanding; Date: 11/29/2023    Comments:  Pt states he is under stress due to wife is ill and is having to cope with a lot of things alone. Pt  verbalizes that he also is claustrophobic and has some anxieties about this. Pt denies any overwhelming stress or anxiety so is coping well.  Patient has been instructed to notify staff in the event that circumstances worsen.  Patient verbalizes understanding.    Other Core Components/Risk Factors Assessment:   RISK FACTORS:  hyperlipidemia, hypertension, positive family history, stress    Learning Barriers: None    Education Level:  Attended College/Technical School    Pre-test Score: 60    Medication Compliance: has been compliant with taking medications    Other Core Components/Risk Factors Plan:   Goals:  Increase knowledge of CAD: In Progress  Decrease blood pressure: In Progress  Demonstrate accurate pulse taking: In Progress  Learn more about healthy eating: In Progress    Interventions/Recommendations:  Recommend regular attendance for Cardiac Rehab: Exercise and Education Lectures  Encourage medication compliance  Individual Education/ Counseling: Yes  Physician Referral: No    Education:    hypertension, verbalizes understanding; Date: 11/29/2023  risk factors, verbalizes understanding; Date: 11/29/2023         Education method adapted to patients education level and preferred method of learning.  Method: explanation    Comments:  Pt has been exercising by walking on his own. Pt is motivated to be more vigilant about his cardiovascular risk factors like B/P, diet, exercise. Pt has a positive attitude toward exercise and diet changes. He would like to increase his intake of vegetables and fruit. Pt will also seek a referral to the digital hypertensive program at [facility] with either his PCP or cardiologist.    Other Core Components/Hypertension Assessment:   Resting BP: 180/104  BP Readings from Last 1 Encounters:   11/30/23 (!) 180/114         BP Diagnosis: Hypertensive  Patient reported symptoms: none    Other Core Components/Hypertension Plan:   Goals:  Blood Pressure  <130/80    Interventions/Recommendations:  Med Card Reconciled: Yes  Encourage medication compliance  Encourage sodium reduction  Encourage weight loss  Recommend physical activity  Educate on contributory factors  Reduce stress, anxiety, anger, depression, and/or chronic pain  Encourage home blood pressure monitoring  Recommend daily weights  MD notified/Physician Referral: No    Education:    Hypertension; verbalizes understanding; Date: 11/29/2023  Risk Factors; verbalizes understanding; Date: 11/29/2023         Comments:  Pt states he monitors his blood pressure daily at home and keeps a log for his providers. Pt would like to be referred to digital hypertensive program. He states he doesn't normally have elevated readings at home.      Does the patient have Heart Failure? No    Other Core Components/Tobacco Cessation Assessment:   Smoking Status: lifetime non-smoker  Smoking Cessation Barriers:  N/A  Stage of Readiness to Change: Maintenance    Other Core Components/Tobacco Cessation Plan:   Goals:  Maintain non-smoking status    Interventions:  Maintains non-smoking status    Education:    Risk Factors; verbalizes understanding; Date: 11/29/2023  Benefits of Cardiac Rehab; verbalizes understanding; Date: 11/29/2023         Comments:  Pt verbalizes no desire to use tobacco products at this time or in the future.    Discussed Cardiac Rehab program in depth with patient.  Medication list updated per patient & marked as reviewed.  Patient has been instructed to notify staff of any problems while attending rehab (ie: chest pain, shortness of breath, lightheadedness, dizziness).  Patient has been instructed to monitor blood pressure readings outside of rehab & to keep a daily log of the readings.  Patient verbalizes understanding.    Rl Gibbs RN

## 2023-12-22 ENCOUNTER — CLINICAL SUPPORT (OUTPATIENT)
Dept: CARDIAC REHAB | Facility: CLINIC | Age: 68
End: 2023-12-22
Payer: COMMERCIAL

## 2023-12-22 DIAGNOSIS — I25.10 ATHEROSCLEROSIS OF NATIVE CORONARY ARTERY OF NATIVE HEART, UNSPECIFIED WHETHER ANGINA PRESENT: ICD-10-CM

## 2023-12-22 DIAGNOSIS — Z95.1 POSTSURGICAL AORTOCORONARY BYPASS STATUS: Primary | ICD-10-CM

## 2023-12-22 PROCEDURE — 93798 PHYS/QHP OP CAR RHAB W/ECG: CPT | Mod: S$GLB,,, | Performed by: INTERNAL MEDICINE

## 2023-12-22 PROCEDURE — 93798 PR CARDIAC REHAB/MONITOR: ICD-10-PCS | Mod: S$GLB,,, | Performed by: INTERNAL MEDICINE

## 2023-12-28 ENCOUNTER — PATIENT MESSAGE (OUTPATIENT)
Dept: FAMILY MEDICINE | Facility: CLINIC | Age: 68
End: 2023-12-28
Payer: COMMERCIAL

## 2023-12-29 ENCOUNTER — CLINICAL SUPPORT (OUTPATIENT)
Dept: CARDIAC REHAB | Facility: CLINIC | Age: 68
End: 2023-12-29
Payer: COMMERCIAL

## 2023-12-29 ENCOUNTER — PATIENT MESSAGE (OUTPATIENT)
Dept: CARDIOLOGY | Facility: CLINIC | Age: 68
End: 2023-12-29
Payer: COMMERCIAL

## 2023-12-29 ENCOUNTER — EXTERNAL HOME HEALTH (OUTPATIENT)
Dept: HOME HEALTH SERVICES | Facility: HOSPITAL | Age: 68
End: 2023-12-29
Payer: COMMERCIAL

## 2023-12-29 DIAGNOSIS — Z95.1 POSTSURGICAL AORTOCORONARY BYPASS STATUS: Primary | ICD-10-CM

## 2023-12-29 DIAGNOSIS — I25.10 CORONARY ATHEROSCLEROSIS OF NATIVE CORONARY ARTERY: ICD-10-CM

## 2023-12-29 DIAGNOSIS — I25.10 ATHEROSCLEROSIS OF NATIVE CORONARY ARTERY OF NATIVE HEART, UNSPECIFIED WHETHER ANGINA PRESENT: ICD-10-CM

## 2023-12-29 PROCEDURE — 93798 PR CARDIAC REHAB/MONITOR: ICD-10-PCS | Mod: S$GLB,,, | Performed by: INTERNAL MEDICINE

## 2023-12-29 PROCEDURE — 93798 PHYS/QHP OP CAR RHAB W/ECG: CPT | Mod: S$GLB,,, | Performed by: INTERNAL MEDICINE

## 2024-01-02 ENCOUNTER — TELEPHONE (OUTPATIENT)
Dept: CARDIOLOGY | Facility: CLINIC | Age: 69
End: 2024-01-02
Payer: COMMERCIAL

## 2024-01-02 DIAGNOSIS — I48.3 TYPICAL ATRIAL FLUTTER: Primary | ICD-10-CM

## 2024-01-02 RX ORDER — APIXABAN 5 MG/1
5 TABLET, FILM COATED ORAL 2 TIMES DAILY
Qty: 60 TABLET | Refills: 2 | Status: SHIPPED | OUTPATIENT
Start: 2024-01-02 | End: 2024-01-02 | Stop reason: SDUPTHER

## 2024-01-02 NOTE — TELEPHONE ENCOUNTER
----- Message from Molly Carrillo sent at 1/2/2024  1:02 PM CST -----  Regarding: refill  Name of caller: Sawyer       What is the requesting detail: requesting a refill for   apixaban (ELIQUIS) 5 mg Tab. Please advise     Can the clinic reply by [facility]:       What number to call back:[phone]

## 2024-01-03 ENCOUNTER — CLINICAL SUPPORT (OUTPATIENT)
Dept: CARDIAC REHAB | Facility: CLINIC | Age: 69
End: 2024-01-03
Payer: COMMERCIAL

## 2024-01-03 ENCOUNTER — PATIENT MESSAGE (OUTPATIENT)
Dept: CARDIOLOGY | Facility: CLINIC | Age: 69
End: 2024-01-03
Payer: COMMERCIAL

## 2024-01-03 ENCOUNTER — DOCUMENTATION ONLY (OUTPATIENT)
Dept: CARDIAC REHAB | Facility: CLINIC | Age: 69
End: 2024-01-03

## 2024-01-03 DIAGNOSIS — I25.10 CORONARY ATHEROSCLEROSIS OF NATIVE CORONARY ARTERY: ICD-10-CM

## 2024-01-03 DIAGNOSIS — Z95.1 POSTSURGICAL AORTOCORONARY BYPASS STATUS: Primary | ICD-10-CM

## 2024-01-03 PROCEDURE — 93798 PHYS/QHP OP CAR RHAB W/ECG: CPT | Mod: S$GLB,,, | Performed by: INTERNAL MEDICINE

## 2024-01-03 NOTE — PROGRESS NOTES
12 Session Follow Up   Cardiac Rehab Individual Treatment Plan - Reassessment    Patient Name: Sawyer Browning MRN: [id number]   : 1955   Age: 68 y.o.   Primary Diagnosis: s/p CABG    Nutrition Assessment:     Anthropometrics    Height 72 inches   Weight 187 lbs   BMI 25.4     Patient confirms he is taking lipitor 80mg for cholesterol control.  Difficulty Chewing or Swallowing: No  Current Exercise: See Exercise Physiologist Note  Food Safety/Food Preparation: spouse  Living Arrangements/Family Support: Lives with spouse  Cultural/Spiritual/Personal Preferences: not applicable   Barriers to Education: none identified  Stage of Change Related to Diet Habits: Action  Recent Changes to Diet: No  Food Diary: Given      Nutrition Plan:   Goals:  LDL-C < 70 (for high risk patients)  Hgb A1c < 7%  BMI < 25 and abdominal girth < 40M/<35 F  2 gram sodium, Mediterranean diet: In Progress  Increase fruit and vegetable intake: In Progress    Comments on Goal Progression:  Pt states he feels well, eats fish multiple times weekly, more careful to read labels to limit sodium and saturated fat    Interventions:  Dietitian Consult: No  Patient to participate in Cardiac Rehab sessions three times a week  Weekly Dietitian Weight Check  Nutrition Recommendations Provided: Verbal, Reviewed  Follow Up Plan for Ongoing Self-Management Support    Education:  Food Labels; verbalizes understanding; Date: 1/3/24  Recipe Modication; verbalizes understanding; Date: 23  Vegetarianism; verbalizes understanding; Date: 23  Weight Management; verbalizes understanding; Date: 23    Comments:   Discussed ways to incorporate healthy snacks, eating on a schedule, and monitoring sodium intake for heart health.    Diabetes  Is the patient diabetic? No      Juliet Alcocer MS, RDN/LDN

## 2024-01-03 NOTE — PROGRESS NOTES
12 Session Follow Up   Cardiac Rehab Individual Treatment Plan - Reassessment      Patient Name: Sawyer Browning MRN: [id number]   : 1955   Age: 68 y.o.   Primary Diagnosis: CABG Date of Event: 23   EF: 60-65%  Risk Stratification: moderate  Referring Physician: AARON   Exercise Assessment:     Angina with exercise?: No   ST Depression with Exercise?: No  Fall Risk: Yes   Assistive Devices:  independent  Mr. Colbert stated at orientation there were no limitations to exercise.  Comments on Progression: Mr. Colbert is progressing well and his workloads will continue to be increased as he tolerates exercise intensity.    Exercise Plan:   Goals:  CR Exercise Goals: Attend Cardiac Rehab 3 times/week: In Progress  Home Aerobic Exercise: 2 additional days/week for 30-60 minutes: In Progress  Intensity of 12-15 on the Rate of Perceived Exertion (RPE) scale: In Progress  30% increase in entry estimated METS: 13.0 : In Progress  5 days/week for 30-60 minutes: In Progress  Demonstrate proper pulse taking technique: In Progress    Comments on Goal Progression:  Patient Consistency: consistent with attendance  Home exercise? Yes: Treadmill, Elliptical, and Walking; Frequency: 1 to 2 non-rehab days per week; Duration 30 minutes  Patient reports intensity rate 11-13 on RPE scale  Patient is progressing steadily  Patient is Able to demonstrate proper pulse taking technique by using a fitness tracker.      Intervention/Recommendations:   Discussed importance of regular attendance to cardiac rehab class    Exercise Prescription:  THR Range 80-90   Mode: Treadmill  Elliptical   Frequency:  3 days/week   Duration:  30-60 minutes   Intensity:  12-15 RPE   Resistance Training:  Yes: 10 lb weights with 10-15 reps based on strength and range of motion and adjusted accordingly     Home Prescription:  Mode Aerobic exercise   Frequency: 2- 3 days/week   Duration: 30-60 minutes   Resistance Training: None     Education:  Muscular Anatomy;  verbalizes understanding; Date: 12-4-23  Resistance Training; verbalizes understanding; Date: 12-18-23  The Exercise Program; verbalizes understanding; Date: 12-11-23    Comments:  I reviewed exercise recommendations with Mr. Colbert.  I encouraged him to continue exercising but suggested he schedule exercise time to become more compliant with exercise on non-rehab days.  He stated understanding.     The exercise prescription will continue to be adjusted based on tolerance of exercise intensity by patient.    Vero Whitt., CEP

## 2024-01-05 ENCOUNTER — CLINICAL SUPPORT (OUTPATIENT)
Dept: CARDIAC REHAB | Facility: CLINIC | Age: 69
End: 2024-01-05
Payer: COMMERCIAL

## 2024-01-05 DIAGNOSIS — I25.10 ATHEROSCLEROSIS OF NATIVE CORONARY ARTERY OF NATIVE HEART, UNSPECIFIED WHETHER ANGINA PRESENT: ICD-10-CM

## 2024-01-05 DIAGNOSIS — Z95.1 POSTSURGICAL AORTOCORONARY BYPASS STATUS: Primary | ICD-10-CM

## 2024-01-05 PROCEDURE — 93798 PHYS/QHP OP CAR RHAB W/ECG: CPT | Mod: S$GLB,,, | Performed by: INTERNAL MEDICINE

## 2024-01-05 NOTE — PROGRESS NOTES
Session: 12 Session Follow Up   Cardiac Rehab Individual Treatment Plan - Reassessment      Patient Name: Sawyer Browning MRN: [id number]   : 1955   Age: 68 y.o.   Primary Diagnosis: S/P CABG 2023      Psychosocial Assessment:   Living Arrangements: Lives with spouse  Family Support: family and spouse  Self Reported: no change Effective Coping Skills and Caregiver Role Strain  Displays: happiness and calmness  Medication: not applicable    Psychosocial Plan:   Goals:  Verbalizes coping mechanisms: Met  Maintain positive support system: Met  Maintain positive outlook: Met  Improve overall quality of life: In Progress    Comments on Goal Progression:  Pt's stress level is unchanged due to dealing with wife's illness and elderly mother in nursing home.    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  Patient has been instructed to notify staff in the event that circumstances worsen.  Patient verbalizes understanding.    Other Core Components/Risk Factors Assessment:   RISK FACTORS:  hyperlipidemia, hypertension, positive family history, stress    Learning Barriers: None    Medication Compliance: has been compliant with taking medications    Other Core Components/Risk Factors Plan:   Goals:  Increase knowledge of CAD: In Progress  Decrease blood pressure: Not Met: pt has been corresponding with his cardiologist re: his elevated readings  Demonstrate accurate pulse taking: Met  Learn more about healthy eating: In  Progress    Interventions:  Individual Education/ Counseling: Yes  Physician Referral: No    Education:    fluid overload/CHF, verbalizes understanding; Date: 12/15/2023  hypertension, verbalizes understanding; Date: 12/15/2023  risk factors, verbalizes understanding; Date: 12/22/2023         Education method adapted to patients education level and preferred method of learning.  Method: explanation  demonstration  handouts    Comments:  Pt is continuing to exercise at home and attending regularly to cardiac rehab. Pt's wife has changed some meals and is trying to cook more vegetables. Pt is reducing his sodium intake with the exception of eating out occasionally.    Other Core Components/Hypertension Assessment:   BP Range: 140-180 systolic;  diastolic  BP at Goal: No  Patient reported symptoms: headache and dizziness    Other Core Components/Hypertension Plan:   Goals:  Blood Pressure <130/80    Comments on Goal Progression:  Pt is aware of his elevated readings and brought his home blood pressure cuff to have it checked. Pt states his blood pressure readings are lower at home than here at rehab.    Interventions:  Med Card Reconciled: Yes  Encourage medication compliance  Encourage sodium reduction  Encourage weight loss  Recommend physical activity  Educate on contributory factors  Reduce stress, anxiety, anger, depression, and/or chronic pain  Encourage home blood pressure monitoring  Recommend daily weights  MD notified/Physician Referral: No    Education:    Hypertension; verbalizes understanding; Date: 12/29/2023  Congestive Heart Failure; verbalizes understanding; Date: 12/29/2023         Comments:  Pt's blood pressure medications changed per MD after sending message re: elevated readings. Carvedilol 25 mg twice a day added 3 weeks ago. Pt will continue to monitor his blood pressure daily at home and keep a log.  Will be notified if pt has continued elevated readings.    Does the patient have Heart  Failure? No      Other Core Components/Tobacco Cessation Assessment:   Smoking Status: lifetime non-smoker  Smoking Cessation Barriers:  N/A  Stage of Readiness to Change: Maintenance    Other Core Components/Tobacco Cessation Plan:   Goals:  Maintain non-smoking status    Interventions:  Maintains non-smoking status    Education:    Risk Factors; verbalizes understanding; Date: 12/22/2023         Comments:  Pt verbalizes no desire to use tobacco products.    Rl Gibbs RN

## 2024-01-10 ENCOUNTER — CLINICAL SUPPORT (OUTPATIENT)
Dept: CARDIAC REHAB | Facility: CLINIC | Age: 69
End: 2024-01-10
Payer: COMMERCIAL

## 2024-01-10 DIAGNOSIS — I25.10 CORONARY ARTERY DISEASE, UNSPECIFIED VESSEL OR LESION TYPE, UNSPECIFIED WHETHER ANGINA PRESENT, UNSPECIFIED WHETHER NATIVE OR TRANSPLANTED HEART: ICD-10-CM

## 2024-01-10 DIAGNOSIS — Z95.1 POSTSURGICAL AORTOCORONARY BYPASS STATUS: Primary | ICD-10-CM

## 2024-01-10 PROCEDURE — 93798 PHYS/QHP OP CAR RHAB W/ECG: CPT | Mod: S$GLB,,, | Performed by: INTERNAL MEDICINE

## 2024-01-11 ENCOUNTER — OFFICE VISIT (OUTPATIENT)
Dept: CARDIOLOGY | Facility: CLINIC | Age: 69
End: 2024-01-11
Payer: COMMERCIAL

## 2024-01-11 ENCOUNTER — DOCUMENTATION ONLY (OUTPATIENT)
Dept: CARDIAC REHAB | Facility: CLINIC | Age: 69
End: 2024-01-11
Payer: COMMERCIAL

## 2024-01-11 VITALS
HEART RATE: 50 BPM | WEIGHT: 189.63 LBS | SYSTOLIC BLOOD PRESSURE: 180 MMHG | HEIGHT: 72 IN | DIASTOLIC BLOOD PRESSURE: 94 MMHG | BODY MASS INDEX: 25.68 KG/M2

## 2024-01-11 DIAGNOSIS — I10 ESSENTIAL HYPERTENSION: ICD-10-CM

## 2024-01-11 DIAGNOSIS — I71.21 AORTIC ROOT ANEURYSM: Primary | ICD-10-CM

## 2024-01-11 DIAGNOSIS — I77.1 TORTUOUS AORTA: ICD-10-CM

## 2024-01-11 DIAGNOSIS — I25.118 ATHEROSCLEROSIS OF NATIVE CORONARY ARTERY OF NATIVE HEART WITH STABLE ANGINA PECTORIS: ICD-10-CM

## 2024-01-11 DIAGNOSIS — I71.21 ANEURYSM OF ASCENDING AORTA WITHOUT RUPTURE: ICD-10-CM

## 2024-01-11 DIAGNOSIS — I48.3 TYPICAL ATRIAL FLUTTER: ICD-10-CM

## 2024-01-11 DIAGNOSIS — I25.10 CORONARY ARTERY DISEASE WITHOUT ANGINA PECTORIS, UNSPECIFIED VESSEL OR LESION TYPE, UNSPECIFIED WHETHER NATIVE OR TRANSPLANTED HEART: ICD-10-CM

## 2024-01-11 DIAGNOSIS — I25.10 ATHEROSCLEROSIS OF NATIVE CORONARY ARTERY OF NATIVE HEART WITHOUT ANGINA PECTORIS: Primary | ICD-10-CM

## 2024-01-11 DIAGNOSIS — E78.49 OTHER HYPERLIPIDEMIA: ICD-10-CM

## 2024-01-11 DIAGNOSIS — I25.10 ATHEROSCLEROSIS OF NATIVE CORONARY ARTERY OF NATIVE HEART WITHOUT ANGINA PECTORIS: ICD-10-CM

## 2024-01-11 DIAGNOSIS — Z95.1 S/P CABG (CORONARY ARTERY BYPASS GRAFT): ICD-10-CM

## 2024-01-11 PROCEDURE — 3008F BODY MASS INDEX DOCD: CPT | Mod: CPTII,S$GLB,, | Performed by: INTERNAL MEDICINE

## 2024-01-11 PROCEDURE — 93000 ELECTROCARDIOGRAM COMPLETE: CPT | Mod: S$GLB,,, | Performed by: INTERNAL MEDICINE

## 2024-01-11 PROCEDURE — 99999 PR PBB SHADOW E&M-EST. PATIENT-LVL III: CPT | Mod: PBBFAC,,, | Performed by: INTERNAL MEDICINE

## 2024-01-11 PROCEDURE — 1126F AMNT PAIN NOTED NONE PRSNT: CPT | Mod: CPTII,S$GLB,, | Performed by: INTERNAL MEDICINE

## 2024-01-11 PROCEDURE — 1160F RVW MEDS BY RX/DR IN RCRD: CPT | Mod: CPTII,S$GLB,, | Performed by: INTERNAL MEDICINE

## 2024-01-11 PROCEDURE — 99215 OFFICE O/P EST HI 40 MIN: CPT | Mod: S$GLB,,, | Performed by: INTERNAL MEDICINE

## 2024-01-11 PROCEDURE — 3077F SYST BP >= 140 MM HG: CPT | Mod: CPTII,S$GLB,, | Performed by: INTERNAL MEDICINE

## 2024-01-11 PROCEDURE — 3080F DIAST BP >= 90 MM HG: CPT | Mod: CPTII,S$GLB,, | Performed by: INTERNAL MEDICINE

## 2024-01-11 PROCEDURE — 1159F MED LIST DOCD IN RCRD: CPT | Mod: CPTII,S$GLB,, | Performed by: INTERNAL MEDICINE

## 2024-01-11 RX ORDER — AMLODIPINE BESYLATE 10 MG/1
10 TABLET ORAL DAILY
Qty: 90 TABLET | Refills: 3 | Status: SHIPPED | OUTPATIENT
Start: 2024-01-11

## 2024-01-11 RX ORDER — AMLODIPINE BESYLATE 10 MG/1
10 TABLET ORAL DAILY
Qty: 90 TABLET | Refills: 3 | Status: SHIPPED | OUTPATIENT
Start: 2024-01-11 | End: 2024-01-11

## 2024-01-11 NOTE — PROGRESS NOTES
HISTORY:    69-year-old male with a history of CAD and thoracic aortic aneurysm status post valve sparing aortic root replacement and transverse aortic arch replacement with LIMA to LAD and SVG to PDA September 2023, paroxysmal atrial fibrillation-flutter, hypertension, hyperlipidemia presenting for initial evaluation by me.      Incidental TAA found in '23 after coronary calcium score was ordered w additional dx of CAD and resultant surgery. Post op patient feeling okay. Had a quick admission for volume ol that is no longer an issue. Had afib-flutter, asymptomatic, requiring GWEN/CV. No palpitations since. Post op was having headaches that have improved. Does have some light headedness.    No CP, SOB, or LOPEZ since.     Participating in cardiac rehab and doing okay.    Pre-op had normal Bps on amlodipine and valsartan-hctz. Has struggled with high Bps post-op. Has a log with pressures in the 120-140/80s on average.     Tolerates aspirin 81 x 1, amiodarone 200 x 1, carvedilol 25 x 2, hydrochlorothiazide 25 x 1, valsartan 160 x 2, atorvastatin 80x1, and apixaban 5 x 2.    PHYSICAL EXAM:    Vitals:    01/11/24 1419   BP: (!) 180/94   Pulse: (!) 50       NAD, A+Ox3.  No jvd, no bruit.  RRR nml s1,s2. No murmurs.  CTA B no wheezes or crackles.  No edema.    LABS/STUDIES (imaging reviewed during clinic visit):    November 2023 CBC normal.  October 2023 CMP normal.  LDL 64 and HDL 46.  Triglycerides 92.  A1c normal.  TSH normal.    ECG November 2023 sinus rhythm with an anterior infarct pattern.  No ST changes.  October 2023 ECGs demonstrate atrial fibrillation and atrial flutter.  CPX November 2023 5 minutes and 55 seconds on a high ramp protocol.  No ischemic changes.  TTE September 2023 normal LV size and function with EF 60-65%.  Normal diastology.  Patent aortic root graft with no significant aortic valve disease.  CVP 3.   GWEN November 2023 normal LV size and function.  Patent appendage status post successful  cardioversion restoration of sinus rhythm.    Cardiac catheterization September 2023 mid LAD .  Patent D1 and left circ system. 90% focal distal RCA.  Carotid 2023 Atherosclerosis without significant disease.    ASSESSMENT & PLAN:    1. Aortic root aneurysm    2. Atherosclerosis of native coronary artery of native heart without angina pectoris    3. Typical atrial flutter    4. Tortuous aorta    5. Atherosclerosis of native coronary artery of native heart with stable angina pectoris    6. Coronary artery disease without angina pectoris, unspecified vessel or lesion type, unspecified whether native or transplanted heart    7. Essential hypertension    8. Other hyperlipidemia    9. S/P CABG (coronary artery bypass graft)    10. Aneurysm of ascending aorta without rupture        Orders Placed This Encounter    Holter monitor - 24 hour    amLODIPine (NORVASC) 10 MG tablet        SIHD post LIMA-LAD/SVG-PDA w nml LVEF. On asa 81x1.     LDL at goal on atorvastatin 80x1.    Bps above goal on carvedilol 25 x 2, hydrochlorothiazide 25 x 1, valsartan 160 x 2. Not as elevated on home log as they are today. Pt believes there maybe an aspect of white coat hypertension. Will re-introduce amlodipine 10x1.    pAfib-flutter s/p CV on amiodarone 200x1 and apixaban 5 x 2. HR in the 50s and acceptable. Check holter. Don't plan on long term amio use.     Patient likely had a variety of symptoms related to going on pump that appear to be improving.     Follow up in about 3 months (around 4/11/2024).      Liane Dawn MD

## 2024-01-11 NOTE — PROGRESS NOTES
Mr. Banegas has completed 14 out of 36 exercise session of Phase II cardiac rehab.  A follow up reassessment will be completed at 24 sessions.    12 Session Follow Up   Cardiac Rehab Individual Treatment Plan - Reassessment      Patient Name: Sawyer Browning MRN: [id number]   : 1955   Age: 69 y.o.   Primary Diagnosis: CABG Date of Event: 23   EF: 60-65%  Risk Stratification: moderate  Referring Physician: AARON   Exercise Assessment:     Angina with exercise?: No   ST Depression with Exercise?: No  Fall Risk: Yes   Assistive Devices:  independent  Mr. Colbert stated at orientation there were no limitations to exercise.  Comments on Progression: Mr. Colbert is progressing well and his workloads will continue to be increased as he tolerates exercise intensity.    Exercise Plan:   Goals:  CR Exercise Goals: Attend Cardiac Rehab 3 times/week: In Progress  Home Aerobic Exercise: 2 additional days/week for 30-60 minutes: In Progress  Intensity of 12-15 on the Rate of Perceived Exertion (RPE) scale: In Progress  30% increase in entry estimated METS: 13.0 : In Progress  5 days/week for 30-60 minutes: In Progress  Demonstrate proper pulse taking technique: In Progress    Comments on Goal Progression:  Patient Consistency: consistent with attendance  Home exercise? Yes: Treadmill, Elliptical, and Walking; Frequency: 1 to 2 non-rehab days per week; Duration 30 minutes  Patient reports intensity rate 11-13 on RPE scale  Patient is progressing steadily  Patient is Able to demonstrate proper pulse taking technique by using a fitness tracker.      Intervention/Recommendations:   Discussed importance of regular attendance to cardiac rehab class    Exercise Prescription:  THR Range 80-90   Mode: Treadmill  Elliptical   Frequency:  3 days/week   Duration:  30-60 minutes   Intensity:  12-15 RPE   Resistance Training:  Yes: 10 lb weights with 10-15 reps based on strength and range of motion and adjusted accordingly     Home  Prescription:  Mode Aerobic exercise   Frequency: 2- 3 days/week   Duration: 30-60 minutes   Resistance Training: None     Education:  Muscular Anatomy; verbalizes understanding; Date: 23  Resistance Training; verbalizes understanding; Date: 23  The Exercise Program; verbalizes understanding; Date: 23    Comments:  I reviewed exercise recommendations with Mr. Colbert.  I encouraged him to continue exercising but suggested he schedule exercise time to become more compliant with exercise on non-rehab days.  He stated understanding.     The exercise prescription will continue to be adjusted based on tolerance of exercise intensity by patient.    Troy Whitt, CEP    12 Session Follow Up   Cardiac Rehab Individual Treatment Plan - Reassessment    Patient Name: Sawyer Browning MRN: [id number]   : 1955   Age: 69 y.o.   Primary Diagnosis: s/p CABG    Nutrition Assessment:     Anthropometrics    Height 72 inches   Weight 187 lbs   BMI 25.4     Patient confirms he is taking lipitor 80mg for cholesterol control.  Difficulty Chewing or Swallowing: No  Current Exercise: See Exercise Physiologist Note  Food Safety/Food Preparation: spouse  Living Arrangements/Family Support: Lives with spouse  Cultural/Spiritual/Personal Preferences: not applicable   Barriers to Education: none identified  Stage of Change Related to Diet Habits: Action  Recent Changes to Diet: No  Food Diary: Given      Nutrition Plan:   Goals:  LDL-C < 70 (for high risk patients)  Hgb A1c < 7%  BMI < 25 and abdominal girth < 40M/<35 F  2 gram sodium, Mediterranean diet: In Progress  Increase fruit and vegetable intake: In Progress    Comments on Goal Progression:  Pt states he feels well, eats fish multiple times weekly, more careful to read labels to limit sodium and saturated fat    Interventions:  Dietitian Consult: No  Patient to participate in Cardiac Rehab sessions three times a week  Weekly Dietitian Weight Check  Nutrition  Recommendations Provided: Verbal, Reviewed  Follow Up Plan for Ongoing Self-Management Support    Education:  Food Labels; verbalizes understanding; Date: 1/3/24  Recipe Modication; verbalizes understanding; Date: 23  Vegetarianism; verbalizes understanding; Date: 23  Weight Management; verbalizes understanding; Date: 23    Comments:   Discussed ways to incorporate healthy snacks, eating on a schedule, and monitoring sodium intake for heart health.    Diabetes  Is the patient diabetic? No      Juliet Alcocer MS, RDN/LDN    Session: 12 Session Follow Up   Cardiac Rehab Individual Treatment Plan - Reassessment      Patient Name: Sawyer Browning MRN: [id number]   : 1955   Age: 69 y.o.   Primary Diagnosis: S/P CABG 2023      Psychosocial Assessment:   Living Arrangements: Lives with spouse  Family Support: family and spouse  Self Reported: no change Effective Coping Skills and Caregiver Role Strain  Displays: happiness and calmness  Medication: not applicable    Psychosocial Plan:   Goals:  Verbalizes coping mechanisms: Met  Maintain positive support system: Met  Maintain positive outlook: Met  Improve overall quality of life: In Progress    Comments on Goal Progression:  Pt's stress level is unchanged due to dealing with wife's illness and elderly mother in nursing home.    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  Patient has been instructed to notify staff in the event that circumstances worsen.  Patient verbalizes  understanding.    Other Core Components/Risk Factors Assessment:   RISK FACTORS:  hyperlipidemia, hypertension, positive family history, stress    Learning Barriers: None    Medication Compliance: has been compliant with taking medications    Other Core Components/Risk Factors Plan:   Goals:  Increase knowledge of CAD: In Progress  Decrease blood pressure: Not Met: pt has been corresponding with his cardiologist re: his elevated readings  Demonstrate accurate pulse taking: Met  Learn more about healthy eating: In Progress    Interventions:  Individual Education/ Counseling: Yes  Physician Referral: No    Education:    fluid overload/CHF, verbalizes understanding; Date: 12/15/2023  hypertension, verbalizes understanding; Date: 12/15/2023  risk factors, verbalizes understanding; Date: 12/22/2023         Education method adapted to patients education level and preferred method of learning.  Method: explanation  demonstration  handouts    Comments:  Pt is continuing to exercise at home and attending regularly to cardiac rehab. Pt's wife has changed some meals and is trying to cook more vegetables. Pt is reducing his sodium intake with the exception of eating out occasionally.    Other Core Components/Hypertension Assessment:   BP Range: 140-180 systolic;  diastolic  BP at Goal: No  Patient reported symptoms: headache and dizziness    Other Core Components/Hypertension Plan:   Goals:  Blood Pressure <130/80    Comments on Goal Progression:  Pt is aware of his elevated readings and brought his home blood pressure cuff to have it checked. Pt states his blood pressure readings are lower at home than here at rehab.    Interventions:  Med Card Reconciled: Yes  Encourage medication compliance  Encourage sodium reduction  Encourage weight loss  Recommend physical activity  Educate on contributory factors  Reduce stress, anxiety, anger, depression, and/or chronic pain  Encourage home blood pressure monitoring  Recommend  daily weights  MD notified/Physician Referral: No    Education:    Hypertension; verbalizes understanding; Date: 12/29/2023  Congestive Heart Failure; verbalizes understanding; Date: 12/29/2023         Comments:  Pt's blood pressure medications changed per MD after sending message re: elevated readings. Carvedilol 25 mg twice a day added 3 weeks ago. Pt will continue to monitor his blood pressure daily at home and keep a log.  Will be notified if pt has continued elevated readings.    Does the patient have Heart Failure? No      Other Core Components/Tobacco Cessation Assessment:   Smoking Status: lifetime non-smoker  Smoking Cessation Barriers:  N/A  Stage of Readiness to Change: Maintenance    Other Core Components/Tobacco Cessation Plan:   Goals:  Maintain non-smoking status    Interventions:  Maintains non-smoking status    Education:    Risk Factors; verbalizes understanding; Date: 12/22/2023         Comments:  Pt verbalizes no desire to use tobacco products.    Rl Gibbs RN

## 2024-01-16 ENCOUNTER — DOCUMENT SCAN (OUTPATIENT)
Dept: HOME HEALTH SERVICES | Facility: HOSPITAL | Age: 69
End: 2024-01-16
Payer: COMMERCIAL

## 2024-01-17 ENCOUNTER — CLINICAL SUPPORT (OUTPATIENT)
Dept: CARDIAC REHAB | Facility: CLINIC | Age: 69
End: 2024-01-17
Payer: COMMERCIAL

## 2024-01-17 DIAGNOSIS — Z95.1 POSTSURGICAL AORTOCORONARY BYPASS STATUS: Primary | ICD-10-CM

## 2024-01-17 DIAGNOSIS — I25.10 ATHEROSCLEROSIS OF NATIVE CORONARY ARTERY OF NATIVE HEART, UNSPECIFIED WHETHER ANGINA PRESENT: ICD-10-CM

## 2024-01-17 PROCEDURE — 93798 PHYS/QHP OP CAR RHAB W/ECG: CPT | Mod: S$GLB,,, | Performed by: INTERNAL MEDICINE

## 2024-01-19 ENCOUNTER — CLINICAL SUPPORT (OUTPATIENT)
Dept: CARDIAC REHAB | Facility: CLINIC | Age: 69
End: 2024-01-19
Payer: COMMERCIAL

## 2024-01-19 DIAGNOSIS — Z95.1 POSTSURGICAL AORTOCORONARY BYPASS STATUS: Primary | ICD-10-CM

## 2024-01-19 DIAGNOSIS — I25.10 ATHEROSCLEROSIS OF NATIVE CORONARY ARTERY OF NATIVE HEART WITHOUT ANGINA PECTORIS: ICD-10-CM

## 2024-01-19 PROCEDURE — 93798 PHYS/QHP OP CAR RHAB W/ECG: CPT | Mod: S$GLB,,, | Performed by: INTERNAL MEDICINE

## 2024-01-24 ENCOUNTER — CLINICAL SUPPORT (OUTPATIENT)
Dept: CARDIAC REHAB | Facility: CLINIC | Age: 69
End: 2024-01-24
Payer: COMMERCIAL

## 2024-01-24 DIAGNOSIS — I25.10 ATHEROSCLEROSIS OF NATIVE CORONARY ARTERY OF NATIVE HEART WITHOUT ANGINA PECTORIS: ICD-10-CM

## 2024-01-24 DIAGNOSIS — Z95.1 POSTSURGICAL AORTOCORONARY BYPASS STATUS: Primary | ICD-10-CM

## 2024-01-24 PROCEDURE — 93798 PHYS/QHP OP CAR RHAB W/ECG: CPT | Mod: S$GLB,,, | Performed by: INTERNAL MEDICINE

## 2024-01-26 ENCOUNTER — CLINICAL SUPPORT (OUTPATIENT)
Dept: CARDIAC REHAB | Facility: CLINIC | Age: 69
End: 2024-01-26
Payer: COMMERCIAL

## 2024-01-26 DIAGNOSIS — I25.10 ATHEROSCLEROSIS OF NATIVE CORONARY ARTERY OF NATIVE HEART WITHOUT ANGINA PECTORIS: ICD-10-CM

## 2024-01-26 DIAGNOSIS — Z95.1 POSTSURGICAL AORTOCORONARY BYPASS STATUS: Primary | ICD-10-CM

## 2024-01-26 PROCEDURE — 93798 PHYS/QHP OP CAR RHAB W/ECG: CPT | Mod: S$GLB,,, | Performed by: INTERNAL MEDICINE

## 2024-01-29 ENCOUNTER — CLINICAL SUPPORT (OUTPATIENT)
Dept: CARDIAC REHAB | Facility: CLINIC | Age: 69
End: 2024-01-29
Payer: COMMERCIAL

## 2024-01-29 DIAGNOSIS — I25.10 CORONARY ARTERY DISEASE, UNSPECIFIED VESSEL OR LESION TYPE, UNSPECIFIED WHETHER ANGINA PRESENT, UNSPECIFIED WHETHER NATIVE OR TRANSPLANTED HEART: ICD-10-CM

## 2024-01-29 DIAGNOSIS — Z95.1 POSTSURGICAL AORTOCORONARY BYPASS STATUS: Primary | ICD-10-CM

## 2024-01-29 PROCEDURE — 93798 PHYS/QHP OP CAR RHAB W/ECG: CPT | Mod: S$GLB,,, | Performed by: INTERNAL MEDICINE

## 2024-01-30 ENCOUNTER — PATIENT MESSAGE (OUTPATIENT)
Dept: FAMILY MEDICINE | Facility: CLINIC | Age: 69
End: 2024-01-30
Payer: COMMERCIAL

## 2024-01-31 ENCOUNTER — CLINICAL SUPPORT (OUTPATIENT)
Dept: CARDIAC REHAB | Facility: CLINIC | Age: 69
End: 2024-01-31
Payer: COMMERCIAL

## 2024-01-31 DIAGNOSIS — I25.10 CORONARY ATHEROSCLEROSIS OF NATIVE CORONARY ARTERY: ICD-10-CM

## 2024-01-31 DIAGNOSIS — Z95.1 POSTSURGICAL AORTOCORONARY BYPASS STATUS: Primary | ICD-10-CM

## 2024-01-31 PROCEDURE — 93798 PHYS/QHP OP CAR RHAB W/ECG: CPT | Mod: S$GLB,,, | Performed by: INTERNAL MEDICINE

## 2024-02-01 ENCOUNTER — DOCUMENTATION ONLY (OUTPATIENT)
Dept: CARDIAC REHAB | Facility: CLINIC | Age: 69
End: 2024-02-01
Payer: COMMERCIAL

## 2024-02-01 NOTE — PROGRESS NOTES
Mr. Banegas has completed 20 out of 36 exercise session of Phase II cardiac rehab.  A follow up reassessment will be completed at 24 sessions.    12 Session Follow Up   Cardiac Rehab Individual Treatment Plan - Reassessment      Patient Name: Sawyer Browning MRN: [id number]   : 1955   Age: 69 y.o.   Primary Diagnosis: CABG Date of Event: 23   EF: 60-65%  Risk Stratification: moderate  Referring Physician: AARON   Exercise Assessment:     Angina with exercise?: No   ST Depression with Exercise?: No  Fall Risk: Yes   Assistive Devices:  independent  Mr. Colbert stated at orientation there were no limitations to exercise.  Comments on Progression: Mr. Colbert is progressing well and his workloads will continue to be increased as he tolerates exercise intensity.    Exercise Plan:   Goals:  CR Exercise Goals: Attend Cardiac Rehab 3 times/week: In Progress  Home Aerobic Exercise: 2 additional days/week for 30-60 minutes: In Progress  Intensity of 12-15 on the Rate of Perceived Exertion (RPE) scale: In Progress  30% increase in entry estimated METS: 13.0 : In Progress  5 days/week for 30-60 minutes: In Progress  Demonstrate proper pulse taking technique: In Progress    Comments on Goal Progression:  Patient Consistency: consistent with attendance  Home exercise? Yes: Treadmill, Elliptical, and Walking; Frequency: 1 to 2 non-rehab days per week; Duration 30 minutes  Patient reports intensity rate 11-13 on RPE scale  Patient is progressing steadily  Patient is Able to demonstrate proper pulse taking technique by using a fitness tracker.      Intervention/Recommendations:   Discussed importance of regular attendance to cardiac rehab class    Exercise Prescription:  THR Range 80-90   Mode: Treadmill  Elliptical   Frequency:  3 days/week   Duration:  30-60 minutes   Intensity:  12-15 RPE   Resistance Training:  Yes: 10 lb weights with 10-15 reps based on strength and range of motion and adjusted accordingly     Home  Prescription:  Mode Aerobic exercise   Frequency: 2- 3 days/week   Duration: 30-60 minutes   Resistance Training: None     Education:  Muscular Anatomy; verbalizes understanding; Date: 23  Resistance Training; verbalizes understanding; Date: 23  The Exercise Program; verbalizes understanding; Date: 23    Comments:  I reviewed exercise recommendations with Mr. Colbert.  I encouraged him to continue exercising but suggested he schedule exercise time to become more compliant with exercise on non-rehab days.  He stated understanding.     The exercise prescription will continue to be adjusted based on tolerance of exercise intensity by patient.    Troy Whitt, CEP    12 Session Follow Up   Cardiac Rehab Individual Treatment Plan - Reassessment    Patient Name: Sawyer Browning MRN: [id number]   : 1955   Age: 69 y.o.   Primary Diagnosis: s/p CABG    Nutrition Assessment:     Anthropometrics    Height 72 inches   Weight 187 lbs   BMI 25.4     Patient confirms he is taking lipitor 80mg for cholesterol control.  Difficulty Chewing or Swallowing: No  Current Exercise: See Exercise Physiologist Note  Food Safety/Food Preparation: spouse  Living Arrangements/Family Support: Lives with spouse  Cultural/Spiritual/Personal Preferences: not applicable   Barriers to Education: none identified  Stage of Change Related to Diet Habits: Action  Recent Changes to Diet: No  Food Diary: Given      Nutrition Plan:   Goals:  LDL-C < 70 (for high risk patients)  Hgb A1c < 7%  BMI < 25 and abdominal girth < 40M/<35 F  2 gram sodium, Mediterranean diet: In Progress  Increase fruit and vegetable intake: In Progress    Comments on Goal Progression:  Pt states he feels well, eats fish multiple times weekly, more careful to read labels to limit sodium and saturated fat    Interventions:  Dietitian Consult: No  Patient to participate in Cardiac Rehab sessions three times a week  Weekly Dietitian Weight Check  Nutrition  Recommendations Provided: Verbal, Reviewed  Follow Up Plan for Ongoing Self-Management Support    Education:  Food Labels; verbalizes understanding; Date: 1/3/24  Recipe Modication; verbalizes understanding; Date: 23  Vegetarianism; verbalizes understanding; Date: 23  Weight Management; verbalizes understanding; Date: 23    Comments:   Discussed ways to incorporate healthy snacks, eating on a schedule, and monitoring sodium intake for heart health.    Diabetes  Is the patient diabetic? No      Juliet Alcocer MS, RDN/LDN    Session: 12 Session Follow Up   Cardiac Rehab Individual Treatment Plan - Reassessment      Patient Name: Sawyer Browning MRN: [id number]   : 1955   Age: 69 y.o.   Primary Diagnosis: S/P CABG 2023      Psychosocial Assessment:   Living Arrangements: Lives with spouse  Family Support: family and spouse  Self Reported: no change Effective Coping Skills and Caregiver Role Strain  Displays: happiness and calmness  Medication: not applicable    Psychosocial Plan:   Goals:  Verbalizes coping mechanisms: Met  Maintain positive support system: Met  Maintain positive outlook: Met  Improve overall quality of life: In Progress    Comments on Goal Progression:  Pt's stress level is unchanged due to dealing with wife's illness and elderly mother in nursing home.    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  Patient has been instructed to notify staff in the event that circumstances worsen.  Patient verbalizes  understanding.    Other Core Components/Risk Factors Assessment:   RISK FACTORS:  hyperlipidemia, hypertension, positive family history, stress    Learning Barriers: None    Medication Compliance: has been compliant with taking medications    Other Core Components/Risk Factors Plan:   Goals:  Increase knowledge of CAD: In Progress  Decrease blood pressure: Not Met: pt has been corresponding with his cardiologist re: his elevated readings  Demonstrate accurate pulse taking: Met  Learn more about healthy eating: In Progress    Interventions:  Individual Education/ Counseling: Yes  Physician Referral: No    Education:    fluid overload/CHF, verbalizes understanding; Date: 12/15/2023  hypertension, verbalizes understanding; Date: 12/15/2023  risk factors, verbalizes understanding; Date: 12/22/2023         Education method adapted to patients education level and preferred method of learning.  Method: explanation  demonstration  handouts    Comments:  Pt is continuing to exercise at home and attending regularly to cardiac rehab. Pt's wife has changed some meals and is trying to cook more vegetables. Pt is reducing his sodium intake with the exception of eating out occasionally.    Other Core Components/Hypertension Assessment:   BP Range: 140-180 systolic;  diastolic  BP at Goal: No  Patient reported symptoms: headache and dizziness    Other Core Components/Hypertension Plan:   Goals:  Blood Pressure <130/80    Comments on Goal Progression:  Pt is aware of his elevated readings and brought his home blood pressure cuff to have it checked. Pt states his blood pressure readings are lower at home than here at rehab.    Interventions:  Med Card Reconciled: Yes  Encourage medication compliance  Encourage sodium reduction  Encourage weight loss  Recommend physical activity  Educate on contributory factors  Reduce stress, anxiety, anger, depression, and/or chronic pain  Encourage home blood pressure monitoring  Recommend  daily weights  MD notified/Physician Referral: No    Education:    Hypertension; verbalizes understanding; Date: 12/29/2023  Congestive Heart Failure; verbalizes understanding; Date: 12/29/2023         Comments:  Pt's blood pressure medications changed per MD after sending message re: elevated readings. Carvedilol 25 mg twice a day added 3 weeks ago. Pt will continue to monitor his blood pressure daily at home and keep a log.  Will be notified if pt has continued elevated readings.    Does the patient have Heart Failure? No      Other Core Components/Tobacco Cessation Assessment:   Smoking Status: lifetime non-smoker  Smoking Cessation Barriers:  N/A  Stage of Readiness to Change: Maintenance    Other Core Components/Tobacco Cessation Plan:   Goals:  Maintain non-smoking status    Interventions:  Maintains non-smoking status    Education:    Risk Factors; verbalizes understanding; Date: 12/22/2023         Comments:  Pt verbalizes no desire to use tobacco products.    Rl Gibbs RN

## 2024-02-02 ENCOUNTER — CLINICAL SUPPORT (OUTPATIENT)
Dept: CARDIAC REHAB | Facility: CLINIC | Age: 69
End: 2024-02-02
Payer: COMMERCIAL

## 2024-02-02 DIAGNOSIS — I25.10 CORONARY ATHEROSCLEROSIS OF NATIVE CORONARY ARTERY: ICD-10-CM

## 2024-02-02 DIAGNOSIS — Z95.1 POSTSURGICAL AORTOCORONARY BYPASS STATUS: Primary | ICD-10-CM

## 2024-02-02 PROCEDURE — 93798 PHYS/QHP OP CAR RHAB W/ECG: CPT | Mod: S$GLB,,, | Performed by: INTERNAL MEDICINE

## 2024-02-05 ENCOUNTER — CLINICAL SUPPORT (OUTPATIENT)
Dept: CARDIAC REHAB | Facility: CLINIC | Age: 69
End: 2024-02-05
Payer: COMMERCIAL

## 2024-02-05 DIAGNOSIS — I25.10 CORONARY ARTERY DISEASE, UNSPECIFIED VESSEL OR LESION TYPE, UNSPECIFIED WHETHER ANGINA PRESENT, UNSPECIFIED WHETHER NATIVE OR TRANSPLANTED HEART: ICD-10-CM

## 2024-02-05 DIAGNOSIS — Z95.1 POSTSURGICAL AORTOCORONARY BYPASS STATUS: Primary | ICD-10-CM

## 2024-02-05 PROCEDURE — 93798 PHYS/QHP OP CAR RHAB W/ECG: CPT | Mod: S$GLB,,, | Performed by: INTERNAL MEDICINE

## 2024-02-07 ENCOUNTER — CLINICAL SUPPORT (OUTPATIENT)
Dept: CARDIAC REHAB | Facility: CLINIC | Age: 69
End: 2024-02-07
Payer: COMMERCIAL

## 2024-02-07 DIAGNOSIS — Z95.1 POSTSURGICAL AORTOCORONARY BYPASS STATUS: Primary | ICD-10-CM

## 2024-02-07 DIAGNOSIS — I25.10 CORONARY ARTERY DISEASE, UNSPECIFIED VESSEL OR LESION TYPE, UNSPECIFIED WHETHER ANGINA PRESENT, UNSPECIFIED WHETHER NATIVE OR TRANSPLANTED HEART: ICD-10-CM

## 2024-02-07 PROCEDURE — 93798 PHYS/QHP OP CAR RHAB W/ECG: CPT | Mod: S$GLB,,, | Performed by: INTERNAL MEDICINE

## 2024-02-09 ENCOUNTER — DOCUMENTATION ONLY (OUTPATIENT)
Dept: CARDIAC REHAB | Facility: CLINIC | Age: 69
End: 2024-02-09

## 2024-02-09 ENCOUNTER — CLINICAL SUPPORT (OUTPATIENT)
Dept: CARDIAC REHAB | Facility: CLINIC | Age: 69
End: 2024-02-09
Payer: COMMERCIAL

## 2024-02-09 DIAGNOSIS — I25.10 CORONARY ARTERY DISEASE, UNSPECIFIED VESSEL OR LESION TYPE, UNSPECIFIED WHETHER ANGINA PRESENT, UNSPECIFIED WHETHER NATIVE OR TRANSPLANTED HEART: ICD-10-CM

## 2024-02-09 DIAGNOSIS — Z95.1 POSTSURGICAL AORTOCORONARY BYPASS STATUS: Primary | ICD-10-CM

## 2024-02-09 PROCEDURE — 93798 PHYS/QHP OP CAR RHAB W/ECG: CPT | Mod: S$GLB,,, | Performed by: INTERNAL MEDICINE

## 2024-02-09 NOTE — PROGRESS NOTES
24 Session Follow Up   Cardiac Rehab Individual Treatment Plan - Reassessment    Patient Name: Sawyer Browning MRN: [id number]   : 1955   Age: 69 y.o.   Primary Diagnosis: s/p CABG    Nutrition Assessment:     Anthropometrics    Height 72 inches   Weight 185 lbs   BMI 25.1     Patient confirms he is taking lipitor 80mg for cholesterol control.  Difficulty Chewing or Swallowing: No  Current Exercise: See Exercise Physiologist Note  Food Safety/Food Preparation: spouse  Living Arrangements/Family Support: Lives with spouse  Cultural/Spiritual/Personal Preferences: not applicable   Barriers to Education: none identified  Stage of Change Related to Diet Habits: Action  Recent Changes to Diet: No  Food Diary: Completed      Nutrition Plan:   Goals:  LDL-C < 70 (for high risk patients)  Hgb A1c < 7%  BMI < 25 and abdominal girth < 40M/<35 F  2 gram sodium, Mediterranean diet: In Progress  Fish intake (non-fried varieties) to a goal of 2-3 servings per week: In Progress  Increase fruit and vegetable intake: In Progress    Comments on Goal Progression:  Pt states he feels well, is working on improving produce intake. Discussed improving hydration    Interventions:  Dietitian Consult: No  Patient to participate in Cardiac Rehab sessions three times a week  Weekly Dietitian Weight Check  Nutrition Recommendations Provided: Verbal and Written, Reviewed  Follow Up Plan for Ongoing Self-Management Support    Education:  Cholesterol and Fat; verbalizes understanding; Date: 24  Protein; verbalizes understanding; Date: 24  Seafood; verbalizes understanding; Date: 24  Food Additives; verbalizes understanding; Date: 1/10/24    Comments:   Discussed ways to incorporate healthy snacks, eating on a schedule, and monitoring sodium intake for heart health.    Diabetes  Is the patient diabetic? No      Juliet Alcocer MS, RDN/LDN

## 2024-02-09 NOTE — PROGRESS NOTES
Mr. Colbert has completed 24 out of 36 exercise session of Phase II cardiac rehab.  A follow up reassessment will be completed at exit interview.    24 Session Follow Up   Cardiac Rehab Individual Treatment Plan - Reassessment      Patient Name: Sawyer Browning MRN: [id number]   : 1955   Age: 69 y.o.   Primary Diagnosis: CABG Date of Event: 23   EF: 60-65%  Risk Stratification: moderate  Referring Physician: LARON   Exercise Assessment:     Angina with exercise?: No   ST Depression with Exercise?: No  Fall Risk: Yes   Assistive Devices:  independent  Mr. Colbert stated at orientation there were no limitations to exercise.  Comments on Progression: Mr. Colbert is progressing well and his workloads will continue to be increased as he tolerates exercise intensity.    Exercise Plan:   Goals:  CR Exercise Goals: Attend Cardiac Rehab 3 times/week: In Progress  Home Aerobic Exercise: 2 additional days/week for 30-60 minutes: In Progress  Intensity of 12-15 on the Rate of Perceived Exertion (RPE) scale: In Progress  30% increase in entry estimated METS: 13.0 : In Progress  5 days/week for 30-60 minutes: In Progress  Demonstrate proper pulse taking technique: In Progress    Comments on Goal Progression:  Patient Consistency: consistent with attendance  Home exercise? Yes: Walking; Frequency: 1 non-rehab days per week; Duration 30 minutes  Patient reports intensity rate 11-13 on RPE scale  Patient is progressing steadily  Patient is Able to demonstrate proper pulse taking technique with or without a fitness tracker.      Intervention/Recommendations:   Discussed importance of regular attendance to cardiac rehab class    Exercise Prescription:  THR Range Rest + 20   Mode: Treadmill  Elliptical   Frequency:  3 days/week   Duration:  30-60 minutes   Intensity:  12-15 RPE   Resistance Training:  Yes: 7 to 10 lb weights with 10-15 reps based on strength and range of motion and adjusted accordingly     Home Prescription:  Mode Aerobic  exercise   Frequency: 2- 3 days/week   Duration: 30-60 minutes   Resistance Training: None     Education:  Arthritis/Osteporosis; verbalizes understanding; Date: 1-29-24  Exercise and Rehydration; verbalizes understanding; Date: 2-5-24  Flexibility/Stretching; verbalizes understanding; Date: 1-17-24    Comments:  I reviewed exercise recommendations with Mr. Colbert.  I encouraged him to continue exercising but to increase to at least 2 days outside of rehab class.  He stated understanding.     The exercise prescription will continue to be adjusted based on tolerance of exercise intensity by patient.    Vero Whitt., CEP

## 2024-02-12 ENCOUNTER — CLINICAL SUPPORT (OUTPATIENT)
Dept: CARDIAC REHAB | Facility: CLINIC | Age: 69
End: 2024-02-12
Payer: COMMERCIAL

## 2024-02-12 ENCOUNTER — PATIENT MESSAGE (OUTPATIENT)
Dept: CARDIOLOGY | Facility: CLINIC | Age: 69
End: 2024-02-12
Payer: COMMERCIAL

## 2024-02-12 DIAGNOSIS — Z95.1 POSTSURGICAL AORTOCORONARY BYPASS STATUS: Primary | ICD-10-CM

## 2024-02-12 DIAGNOSIS — I25.10 ATHEROSCLEROSIS OF NATIVE CORONARY ARTERY OF NATIVE HEART WITHOUT ANGINA PECTORIS: ICD-10-CM

## 2024-02-12 PROCEDURE — 93798 PHYS/QHP OP CAR RHAB W/ECG: CPT | Mod: S$GLB,,, | Performed by: INTERNAL MEDICINE

## 2024-02-14 ENCOUNTER — CLINICAL SUPPORT (OUTPATIENT)
Dept: CARDIAC REHAB | Facility: CLINIC | Age: 69
End: 2024-02-14
Payer: COMMERCIAL

## 2024-02-14 DIAGNOSIS — Z95.1 POSTSURGICAL AORTOCORONARY BYPASS STATUS: Primary | ICD-10-CM

## 2024-02-14 DIAGNOSIS — I25.10 CORONARY ARTERY DISEASE, UNSPECIFIED VESSEL OR LESION TYPE, UNSPECIFIED WHETHER ANGINA PRESENT, UNSPECIFIED WHETHER NATIVE OR TRANSPLANTED HEART: ICD-10-CM

## 2024-02-14 PROCEDURE — 93798 PHYS/QHP OP CAR RHAB W/ECG: CPT | Mod: S$GLB,,, | Performed by: INTERNAL MEDICINE

## 2024-02-16 ENCOUNTER — CLINICAL SUPPORT (OUTPATIENT)
Dept: CARDIAC REHAB | Facility: CLINIC | Age: 69
End: 2024-02-16
Payer: COMMERCIAL

## 2024-02-16 DIAGNOSIS — Z95.1 POSTSURGICAL AORTOCORONARY BYPASS STATUS: Primary | ICD-10-CM

## 2024-02-16 DIAGNOSIS — I25.10 ATHEROSCLEROSIS OF NATIVE CORONARY ARTERY OF NATIVE HEART WITHOUT ANGINA PECTORIS: ICD-10-CM

## 2024-02-16 PROCEDURE — 93798 PHYS/QHP OP CAR RHAB W/ECG: CPT | Mod: S$GLB,,, | Performed by: INTERNAL MEDICINE

## 2024-02-19 ENCOUNTER — CLINICAL SUPPORT (OUTPATIENT)
Dept: CARDIAC REHAB | Facility: CLINIC | Age: 69
End: 2024-02-19
Payer: COMMERCIAL

## 2024-02-19 DIAGNOSIS — Z95.1 POSTSURGICAL AORTOCORONARY BYPASS STATUS: Primary | ICD-10-CM

## 2024-02-19 DIAGNOSIS — I25.10 ATHEROSCLEROSIS OF NATIVE CORONARY ARTERY OF NATIVE HEART WITHOUT ANGINA PECTORIS: ICD-10-CM

## 2024-02-19 PROCEDURE — 93798 PHYS/QHP OP CAR RHAB W/ECG: CPT | Mod: S$GLB,,, | Performed by: INTERNAL MEDICINE

## 2024-02-20 NOTE — PROGRESS NOTES
Session: 24 Session Follow Up   Cardiac Rehab Individual Treatment Plan - Reassessment      Patient Name: Sawyer Browning MRN: [id number]   : 1955   Age: 69 y.o.   Primary Diagnosis: S/P CABG 2023      Psychosocial Assessment:   Living Arrangements: Lives with spouse  Family Support: children, grandchildren, and spouse  Self Reported: no change Stress  Displays: happiness and calmness  Medication: not applicable    Psychosocial Plan:   Goals:  Verbalizes coping mechanisms: Met  Maintain positive support system: Met  Maintain positive outlook: In Progress  Improve overall quality of life: In Progress    Interventions:  Patient to Self Report Emotional Changes at Session Check In  Recommend Physical Activity  Recommend Attending Education Lectures  Notify MD: No  Program Referral: No  Pharmaceutical Intervention/Therapy: No  Other Needs: not applicable  Stage of Readiness to Change: Action    Education:  Stress; verbalizes understanding; Date: 2024  Benefits of cardiac rehab; verbalizes understanding; Date: 2024    Comments:  Pt is dealing with wife who is chronically ill and has a moderate amount of stress with that. Pt also has some worry about his own physical issues. Pt denies overwhelmng stress or anxiety.   Patient has been instructed to notify staff in the event that circumstances worsen.  Patient verbalizes understanding.    Other Core Components/Risk Factors Assessment:   RISK FACTORS:  hyperlipidemia, hypertension, positive family history, stress    Learning Barriers: None    Medication Compliance: has been compliant with taking medications    Other Core Components/Risk Factors Plan:   Goals:  Increase knowledge of CAD: Met  Decrease blood pressure: Met  Demonstrate accurate pulse taking: Met  Learn more about healthy eating: In Progress    Interventions:  Individual Education/ Counseling: Yes  Physician Referral: No    Education:    cholesterol meds, verbalizes understanding; Date:  1/19/2024  hypertension, verbalizes understanding; Date: 12/29/2024  risk factors, verbalizes understanding; Date: 12/22/2023  stress, verbalizes understanding; Date: 01/05/2024         Education method adapted to patients education level and preferred method of learning.  Method: explanation  demonstration  handouts    Comments:  Pt is exercising regularly 5 times per week. He has lower blood pressure and maintained his weight. Pt encouraged to watch his sodium intake.    Other Core Components/Hypertension Assessment:   BP Range: 108-160 systolic; 70-98 diastolic  BP at Goal: making progress and has been brought to Dr's attention.  Patient reported symptoms:  dizziness    Medications:  Medication Prescribed? Adherent? Exception   Beta-blocker [x]Yes  []No  []Unknown [x]Yes  []No  []Unknown    ACEI/ARB [x]Yes  []No  []Unknown [x]Yes  []No  []Unknown    Calcium Channel Blocker [x]Yes  []No  []Unknown [x]Yes  []No  []Unknown    Diuretic [x]Yes  []No  []Unknown [x]Yes  []No  []Unknown  Taking every other day       Other Core Components/Hypertension Plan:   Goals:  Blood Pressure <130/80    Comments on Goal Progression:  Blood pressure readings have improved since mid January.    Interventions:  Med Card Reconciled: Yes  Encourage medication compliance  Encourage sodium reduction  Reduce alcohol consumption  Encourage weight loss  Recommend physical activity  Educate on contributory factors  Reduce stress, anxiety, anger, depression, and/or chronic pain  Encourage home blood pressure monitoring  Recommend daily weights  Enroll in Digital Hypertension Program  MD notified/Physician Referral: Yes    Education:    Risk Factors; verbalizes understanding; Date: 12/22/2023  Stroke; verbalizes understanding; Date: 2/02/2024         Comments:  Pt is monitoring his blood pressure at home and keeping a log. He is reducing his sodium intake. His doctor addressed his dizziness by reducing his diuretic to every other day. Pt says  he experiences some orthostatic hypotension like symptoms when getting up from sitting position too quickly.    Does the patient have Heart Failure? No      Other Core Components/Tobacco Cessation Assessment:   Smoking Status: Lifetime Non-smoker  Primary Tobacco Type: N/A  Tobacco Usage: no  Smoking Cessation Barriers:  N/A  Stage of Readiness to Change: Maintenance    Other Core Components/Tobacco Cessation Plan:   Goals:  Maintain non-smoking status    Interventions:  Maintains non-smoking status    Education:    Risk Factors; verbalizes understanding; Date: 12/22/2023         Comments:  Pt verbalizes no desire to use tobacco products.    Rl Gibbs, RN

## 2024-02-21 ENCOUNTER — CLINICAL SUPPORT (OUTPATIENT)
Dept: CARDIAC REHAB | Facility: CLINIC | Age: 69
End: 2024-02-21
Payer: COMMERCIAL

## 2024-02-21 DIAGNOSIS — I25.10 ATHEROSCLEROSIS OF NATIVE CORONARY ARTERY OF NATIVE HEART, UNSPECIFIED WHETHER ANGINA PRESENT: ICD-10-CM

## 2024-02-21 DIAGNOSIS — Z95.1 POSTSURGICAL AORTOCORONARY BYPASS STATUS: Primary | ICD-10-CM

## 2024-02-21 PROCEDURE — 93798 PHYS/QHP OP CAR RHAB W/ECG: CPT | Mod: S$GLB,,, | Performed by: INTERNAL MEDICINE

## 2024-02-22 ENCOUNTER — DOCUMENTATION ONLY (OUTPATIENT)
Dept: CARDIAC REHAB | Facility: CLINIC | Age: 69
End: 2024-02-22
Payer: COMMERCIAL

## 2024-02-22 NOTE — PROGRESS NOTES
Mr. Colbert has completed 29 out of 36 exercise session of Phase II cardiac rehab.  A follow up reassessment will be completed at exit interview.    24 Session Follow Up   Cardiac Rehab Individual Treatment Plan - Reassessment      Patient Name: aSwyer Browning MRN: [id number]   : 1955   Age: 69 y.o.   Primary Diagnosis: CABG Date of Event: 23   EF: 60-65%  Risk Stratification: moderate  Referring Physician: LARON   Exercise Assessment:     Angina with exercise?: No   ST Depression with Exercise?: No  Fall Risk: Yes   Assistive Devices:  independent  Mr. Colbert stated at orientation there were no limitations to exercise.  Comments on Progression: Mr. Colbert is progressing well and his workloads will continue to be increased as he tolerates exercise intensity.    Exercise Plan:   Goals:  CR Exercise Goals: Attend Cardiac Rehab 3 times/week: In Progress  Home Aerobic Exercise: 2 additional days/week for 30-60 minutes: In Progress  Intensity of 12-15 on the Rate of Perceived Exertion (RPE) scale: In Progress  30% increase in entry estimated METS: 13.0 : In Progress  5 days/week for 30-60 minutes: In Progress  Demonstrate proper pulse taking technique: In Progress    Comments on Goal Progression:  Patient Consistency: consistent with attendance  Home exercise? Yes: Walking; Frequency: 1 non-rehab days per week; Duration 30 minutes  Patient reports intensity rate 11-13 on RPE scale  Patient is progressing steadily  Patient is Able to demonstrate proper pulse taking technique with or without a fitness tracker.      Intervention/Recommendations:   Discussed importance of regular attendance to cardiac rehab class    Exercise Prescription:  THR Range Rest + 20   Mode: Treadmill  Elliptical   Frequency:  3 days/week   Duration:  30-60 minutes   Intensity:  12-15 RPE   Resistance Training:  Yes: 7 to 10 lb weights with 10-15 reps based on strength and range of motion and adjusted accordingly     Home Prescription:  Mode Aerobic  exercise   Frequency: 2- 3 days/week   Duration: 30-60 minutes   Resistance Training: None     Education:  Arthritis/Osteporosis; verbalizes understanding; Date: 24  Exercise and Rehydration; verbalizes understanding; Date: 24  Flexibility/Stretching; verbalizes understanding; Date: 24    Comments:  I reviewed exercise recommendations with Mr. Colbert.  I encouraged him to continue exercising but to increase to at least 2 days outside of rehab class.  He stated understanding.     The exercise prescription will continue to be adjusted based on tolerance of exercise intensity by patient.    Troy Whitt, CEP    24 Session Follow Up   Cardiac Rehab Individual Treatment Plan - Reassessment    Patient Name: Sawyer Browning MRN: [id number]   : 1955   Age: 69 y.o.   Primary Diagnosis: s/p CABG    Nutrition Assessment:     Anthropometrics    Height 72 inches   Weight 185 lbs   BMI 25.1     Patient confirms he is taking lipitor 80mg for cholesterol control.  Difficulty Chewing or Swallowing: No  Current Exercise: See Exercise Physiologist Note  Food Safety/Food Preparation: spouse  Living Arrangements/Family Support: Lives with spouse  Cultural/Spiritual/Personal Preferences: not applicable   Barriers to Education: none identified  Stage of Change Related to Diet Habits: Action  Recent Changes to Diet: No  Food Diary: Completed      Nutrition Plan:   Goals:  LDL-C < 70 (for high risk patients)  Hgb A1c < 7%  BMI < 25 and abdominal girth < 40M/<35 F  2 gram sodium, Mediterranean diet: In Progress  Fish intake (non-fried varieties) to a goal of 2-3 servings per week: In Progress  Increase fruit and vegetable intake: In Progress    Comments on Goal Progression:  Pt states he feels well, is working on improving produce intake. Discussed improving hydration    Interventions:  Dietitian Consult: No  Patient to participate in Cardiac Rehab sessions three times a week  Weekly Dietitian Weight Check  Nutrition  Recommendations Provided: Verbal and Written, Reviewed  Follow Up Plan for Ongoing Self-Management Support    Education:  Cholesterol and Fat; verbalizes understanding; Date: 24  Protein; verbalizes understanding; Date: 24  Seafood; verbalizes understanding; Date: 24  Food Additives; verbalizes understanding; Date: 1/10/24    Comments:   Discussed ways to incorporate healthy snacks, eating on a schedule, and monitoring sodium intake for heart health.    Diabetes  Is the patient diabetic? No      Juliet Alcocer MS, RDN/LDN    Session: 24 Session Follow Up   Cardiac Rehab Individual Treatment Plan - Reassessment      Patient Name: Sawyer Browning MRN: [id number]   : 1955   Age: 69 y.o.   Primary Diagnosis: S/P CABG 2023      Psychosocial Assessment:   Living Arrangements: Lives with spouse  Family Support: children, grandchildren, and spouse  Self Reported: no change Stress  Displays: happiness and calmness  Medication: not applicable    Psychosocial Plan:   Goals:  Verbalizes coping mechanisms: Met  Maintain positive support system: Met  Maintain positive outlook: In Progress  Improve overall quality of life: In Progress    Interventions:  Patient to Self Report Emotional Changes at Session Check In  Recommend Physical Activity  Recommend Attending Education Lectures  Notify MD: No  Program Referral: No  Pharmaceutical Intervention/Therapy: No  Other Needs: not applicable  Stage of Readiness to Change: Action    Education:  Stress; verbalizes understanding; Date: 2024  Benefits of cardiac rehab; verbalizes understanding; Date: 2024    Comments:  Pt is dealing with wife who is chronically ill and has a moderate amount of stress with that. Pt also has some worry about his own physical issues. Pt denies overwhelmng stress or anxiety.   Patient has been instructed to notify staff in the event that circumstances worsen.  Patient verbalizes understanding.    Other Core Components/Risk Factors  Assessment:   RISK FACTORS:  hyperlipidemia, hypertension, positive family history, stress    Learning Barriers: None    Medication Compliance: has been compliant with taking medications    Other Core Components/Risk Factors Plan:   Goals:  Increase knowledge of CAD: Met  Decrease blood pressure: Met  Demonstrate accurate pulse taking: Met  Learn more about healthy eating: In Progress    Interventions:  Individual Education/ Counseling: Yes  Physician Referral: No    Education:    cholesterol meds, verbalizes understanding; Date: 1/19/2024  hypertension, verbalizes understanding; Date: 12/29/2024  risk factors, verbalizes understanding; Date: 12/22/2023  stress, verbalizes understanding; Date: 01/05/2024         Education method adapted to patients education level and preferred method of learning.  Method: explanation  demonstration  handouts    Comments:  Pt is exercising regularly 5 times per week. He has lower blood pressure and maintained his weight. Pt encouraged to watch his sodium intake.    Other Core Components/Hypertension Assessment:   BP Range: 108-160 systolic; 70-98 diastolic  BP at Goal: making progress and has been brought to 's attention.  Patient reported symptoms:  dizziness    Medications:  Medication Prescribed? Adherent? Exception   Beta-blocker [x]Yes  []No  []Unknown [x]Yes  []No  []Unknown    ACEI/ARB [x]Yes  []No  []Unknown [x]Yes  []No  []Unknown    Calcium Channel Blocker [x]Yes  []No  []Unknown [x]Yes  []No  []Unknown    Diuretic [x]Yes  []No  []Unknown [x]Yes  []No  []Unknown  Taking every other day       Other Core Components/Hypertension Plan:   Goals:  Blood Pressure <130/80    Comments on Goal Progression:  Blood pressure readings have improved since mid January.    Interventions:  Med Card Reconciled: Yes  Encourage medication compliance  Encourage sodium reduction  Reduce alcohol consumption  Encourage weight loss  Recommend physical activity  Educate on contributory  factors  Reduce stress, anxiety, anger, depression, and/or chronic pain  Encourage home blood pressure monitoring  Recommend daily weights  Enroll in Digital Hypertension Program  MD notified/Physician Referral: Yes    Education:    Risk Factors; verbalizes understanding; Date: 12/22/2023  Stroke; verbalizes understanding; Date: 2/02/2024         Comments:  Pt is monitoring his blood pressure at home and keeping a log. He is reducing his sodium intake. His doctor addressed his dizziness by reducing his diuretic to every other day. Pt says he experiences some orthostatic hypotension like symptoms when getting up from sitting position too quickly.    Does the patient have Heart Failure? No      Other Core Components/Tobacco Cessation Assessment:   Smoking Status: Lifetime Non-smoker  Primary Tobacco Type: N/A  Tobacco Usage: no  Smoking Cessation Barriers:  N/A  Stage of Readiness to Change: Maintenance    Other Core Components/Tobacco Cessation Plan:   Goals:  Maintain non-smoking status    Interventions:  Maintains non-smoking status    Education:    Risk Factors; verbalizes understanding; Date: 12/22/2023         Comments:  Pt verbalizes no desire to use tobacco products.    Rl Gibbs RN

## 2024-02-23 ENCOUNTER — CLINICAL SUPPORT (OUTPATIENT)
Dept: CARDIAC REHAB | Facility: CLINIC | Age: 69
End: 2024-02-23
Payer: COMMERCIAL

## 2024-02-23 DIAGNOSIS — Z95.1 POSTSURGICAL AORTOCORONARY BYPASS STATUS: Primary | ICD-10-CM

## 2024-02-23 DIAGNOSIS — I25.10 ATHEROSCLEROSIS OF NATIVE CORONARY ARTERY OF NATIVE HEART WITHOUT ANGINA PECTORIS: ICD-10-CM

## 2024-02-23 PROCEDURE — 93798 PHYS/QHP OP CAR RHAB W/ECG: CPT | Mod: S$GLB,,, | Performed by: INTERNAL MEDICINE

## 2024-02-26 ENCOUNTER — CLINICAL SUPPORT (OUTPATIENT)
Dept: CARDIAC REHAB | Facility: CLINIC | Age: 69
End: 2024-02-26
Payer: COMMERCIAL

## 2024-02-26 DIAGNOSIS — Z95.1 POSTSURGICAL AORTOCORONARY BYPASS STATUS: Primary | ICD-10-CM

## 2024-02-26 DIAGNOSIS — I25.10 ATHEROSCLEROSIS OF NATIVE CORONARY ARTERY OF NATIVE HEART, UNSPECIFIED WHETHER ANGINA PRESENT: ICD-10-CM

## 2024-02-26 PROCEDURE — 93798 PHYS/QHP OP CAR RHAB W/ECG: CPT | Mod: S$GLB,,, | Performed by: INTERNAL MEDICINE

## 2024-02-28 ENCOUNTER — CLINICAL SUPPORT (OUTPATIENT)
Dept: CARDIAC REHAB | Facility: CLINIC | Age: 69
End: 2024-02-28
Payer: COMMERCIAL

## 2024-02-28 DIAGNOSIS — I25.10 CORONARY ARTERY DISEASE, UNSPECIFIED VESSEL OR LESION TYPE, UNSPECIFIED WHETHER ANGINA PRESENT, UNSPECIFIED WHETHER NATIVE OR TRANSPLANTED HEART: ICD-10-CM

## 2024-02-28 DIAGNOSIS — Z95.1 POSTSURGICAL AORTOCORONARY BYPASS STATUS: Primary | ICD-10-CM

## 2024-02-28 PROCEDURE — 93798 PHYS/QHP OP CAR RHAB W/ECG: CPT | Mod: S$GLB,,, | Performed by: INTERNAL MEDICINE

## 2024-03-01 ENCOUNTER — CLINICAL SUPPORT (OUTPATIENT)
Dept: CARDIAC REHAB | Facility: CLINIC | Age: 69
End: 2024-03-01
Payer: COMMERCIAL

## 2024-03-01 DIAGNOSIS — Z95.1 POSTSURGICAL AORTOCORONARY BYPASS STATUS: Primary | ICD-10-CM

## 2024-03-01 DIAGNOSIS — I25.10 CORONARY ARTERY DISEASE, UNSPECIFIED VESSEL OR LESION TYPE, UNSPECIFIED WHETHER ANGINA PRESENT, UNSPECIFIED WHETHER NATIVE OR TRANSPLANTED HEART: ICD-10-CM

## 2024-03-01 PROCEDURE — 93798 PHYS/QHP OP CAR RHAB W/ECG: CPT | Mod: S$GLB,,, | Performed by: INTERNAL MEDICINE

## 2024-03-04 ENCOUNTER — CLINICAL SUPPORT (OUTPATIENT)
Dept: CARDIAC REHAB | Facility: CLINIC | Age: 69
End: 2024-03-04
Payer: COMMERCIAL

## 2024-03-04 ENCOUNTER — PATIENT MESSAGE (OUTPATIENT)
Dept: ADMINISTRATIVE | Facility: HOSPITAL | Age: 69
End: 2024-03-04
Payer: COMMERCIAL

## 2024-03-04 DIAGNOSIS — I25.10 CORONARY ARTERY DISEASE, UNSPECIFIED VESSEL OR LESION TYPE, UNSPECIFIED WHETHER ANGINA PRESENT, UNSPECIFIED WHETHER NATIVE OR TRANSPLANTED HEART: ICD-10-CM

## 2024-03-04 DIAGNOSIS — Z95.1 POSTSURGICAL AORTOCORONARY BYPASS STATUS: Primary | ICD-10-CM

## 2024-03-04 PROCEDURE — 93798 PHYS/QHP OP CAR RHAB W/ECG: CPT | Mod: S$GLB,,, | Performed by: INTERNAL MEDICINE

## 2024-03-06 RX ORDER — METOPROLOL SUCCINATE 100 MG/1
100 TABLET, EXTENDED RELEASE ORAL
COMMUNITY
Start: 2024-01-18 | End: 2024-04-11

## 2024-03-06 RX ORDER — VALSARTAN 160 MG/1
1 TABLET ORAL DAILY
COMMUNITY
Start: 2023-11-06 | End: 2024-04-11

## 2024-03-08 ENCOUNTER — CLINICAL SUPPORT (OUTPATIENT)
Dept: CARDIAC REHAB | Facility: CLINIC | Age: 69
End: 2024-03-08
Payer: COMMERCIAL

## 2024-03-08 DIAGNOSIS — I25.10 ATHEROSCLEROSIS OF NATIVE CORONARY ARTERY OF NATIVE HEART, UNSPECIFIED WHETHER ANGINA PRESENT: ICD-10-CM

## 2024-03-08 DIAGNOSIS — Z95.1 POSTSURGICAL AORTOCORONARY BYPASS STATUS: Primary | ICD-10-CM

## 2024-03-08 PROCEDURE — 93798 PHYS/QHP OP CAR RHAB W/ECG: CPT | Mod: S$GLB,,, | Performed by: INTERNAL MEDICINE

## 2024-03-11 ENCOUNTER — HOSPITAL ENCOUNTER (OUTPATIENT)
Dept: CARDIOLOGY | Facility: HOSPITAL | Age: 69
Discharge: HOME | End: 2024-03-11
Attending: INTERNAL MEDICINE
Payer: COMMERCIAL

## 2024-03-11 VITALS
HEIGHT: 72 IN | SYSTOLIC BLOOD PRESSURE: 127 MMHG | WEIGHT: 185 LBS | BODY MASS INDEX: 25.06 KG/M2 | DIASTOLIC BLOOD PRESSURE: 78 MMHG | HEART RATE: 51 BPM

## 2024-03-11 DIAGNOSIS — I25.10 ATHEROSCLEROSIS OF NATIVE CORONARY ARTERY OF NATIVE HEART, UNSPECIFIED WHETHER ANGINA PRESENT: ICD-10-CM

## 2024-03-11 DIAGNOSIS — Z95.1 POSTSURGICAL AORTOCORONARY BYPASS STATUS: ICD-10-CM

## 2024-03-11 LAB
CV STRESS BASE HR: 51 BPM
DIASTOLIC BLOOD PRESSURE: 78 MMHG
OHS CV CPX 1 MINUTE RECOVERY HEART RATE: 100 BPM
OHS CV CPX 85 PERCENT MAX PREDICTED HEART RATE MALE: 128
OHS CV CPX ABDOMINAL GIRTH: 39 CM
OHS CV CPX ANAEROBIC THRESHOLD DIASTOLIC BLOOD PRESSURE: 104 MMHG
OHS CV CPX ANAEROBIC THRESHOLD HEART RATE: 73
OHS CV CPX ANAEROBIC THRESHOLD RATE PRESSURE PRODUCT: 8760
OHS CV CPX ANAEROBIC THRESHOLD SYSTOLIC BLOOD PRESSURE: 120
OHS CV CPX DATA GRADE - AT: 8.4
OHS CV CPX DATA GRADE - PEAK: 14.9
OHS CV CPX DATA O2 SAT - PEAK: 96
OHS CV CPX DATA O2 SAT - REST: 97
OHS CV CPX DATA SPEED - AT: 2.5
OHS CV CPX DATA SPEED - PEAK: 3.9
OHS CV CPX DATA TIME - AT: 4.15
OHS CV CPX DATA TIME - PEAK: 7.1
OHS CV CPX DATA VE/VCO2 - AT: 31
OHS CV CPX DATA VE/VCO2 - PEAK: 31
OHS CV CPX DATA VE/VO2 - AT: 22
OHS CV CPX DATA VE/VO2 - PEAK: 30
OHS CV CPX DATA VO2 - AT: 15.6
OHS CV CPX DATA VO2 - PEAK: 24.5
OHS CV CPX DATA VO2 - REST: 4.9
OHS CV CPX ESTIMATED METS: 12
OHS CV CPX FEV1/FVC: 0.75
OHS CV CPX FORCED EXPIRATORY VOLUME: 2.77
OHS CV CPX FORCED VITAL CAPACITY (FVC): 3.67
OHS CV CPX HIGHEST VO: 33.1
OHS CV CPX MAX PREDICTED HEART RATE: 151
OHS CV CPX MAXIMAL VOLUNTARY VENTILATION (MVV) PREDICTED: 110.8
OHS CV CPX MAXIMAL VOLUNTARY VENTILATION (MVV): 59
OHS CV CPX MAXIUMUM EXERCISE VENTILATION (VE MAX): 62.8
OHS CV CPX PATIENT AGE: 69
OHS CV CPX PATIENT HEIGHT IN: 72
OHS CV CPX PATIENT IS FEMALE AGE 11-19: 0
OHS CV CPX PATIENT IS FEMALE AGE GREATER THAN 19: 0
OHS CV CPX PATIENT IS FEMALE AGE LESS THAN 11: 0
OHS CV CPX PATIENT IS FEMALE: 0
OHS CV CPX PATIENT IS MALE AGE 11-25: 0
OHS CV CPX PATIENT IS MALE AGE GREATER THAN 25: 1
OHS CV CPX PATIENT IS MALE AGE LESS THAN 11: 0
OHS CV CPX PATIENT IS MALE GREATER THAN 18: 1
OHS CV CPX PATIENT IS MALE LESS THAN OR EQUAL TO 18: 0
OHS CV CPX PATIENT IS MALE: 1
OHS CV CPX PATIENT WEIGHT RETURNED IN OZ: 2960
OHS CV CPX PEAK DIASTOLIC BLOOD PRESSURE: 106 MMHG
OHS CV CPX PEAK HEAR RATE: 108 BPM
OHS CV CPX PEAK RATE PRESSURE PRODUCT: NORMAL
OHS CV CPX PEAK SYSTOLIC BLOOD PRESSURE: 146 MMHG
OHS CV CPX PERCENT BODY FAT: 13.1
OHS CV CPX PERCENT MAX PREDICTED HEART RATE ACHIEVED: 72
OHS CV CPX PREDICTED VO2: 33.1 ML/KG/MIN
OHS CV CPX RATE PRESSURE PRODUCT PRESENTING: 6477
OHS CV CPX REST PET CO2: 31
OHS CV CPX VE/VCO2 SLOPE: 26
STRESS ECHO POST EXERCISE DUR MIN: 7 MINUTES
STRESS ECHO POST EXERCISE DUR SEC: 6 SECONDS
SYSTOLIC BLOOD PRESSURE: 127 MMHG

## 2024-03-11 PROCEDURE — 94621 CARDIOPULM EXERCISE TESTING: CPT | Mod: 26,,, | Performed by: INTERNAL MEDICINE

## 2024-03-11 PROCEDURE — 94621 CARDIOPULM EXERCISE TESTING: CPT

## 2024-03-13 NOTE — PROGRESS NOTES
HISTORY: S/P CABG & A-ROOT REPLACEMENT (9-18-23), CAD, HTN, HLP, EF=60-65%(9-26-23)    ANTHROPOMETRICS:     PRE POST   Abdominal girth (in) 39.2 39.0   Height (in) 72 72   Weight (lbs) 183 185   BMI 24.8 25.2   % Body Fat 12.0% 13.1%     EXERCISE RESULTS:     PRE POST   Peak VO2 (CPX only) 21.9 24.5   Actual METS (CPX only) 6.3 7.0   Estimated METS 10.0 12.0       HOME PRESCRIPTION: Sawyer Shah.  You completed Cardiac Rehab.  Aerobic exercise frequency is recommended 5 to 6 times per week with a duration of 30 to 60 minutes.  Intensity should keep you in your target heart range of 68 to 100 beats per minute.  Include a 3 to 5 minute warm up as well as cool down with stretches.  Resistance training is recommended 2 to 3 days per week on non-consecutive days.  Good luck to you.  Keep up the hard work, and it has been a pleasure working with you.      LAB RESULTS:    Lab Results   Component Value Date    HGB 14.7 03/11/2024    HGB 14.9 11/27/2023    HGB 13.5 (L) 10/19/2023     Lab Results   Component Value Date    HCT 43.0 03/11/2024    HCT 45.3 11/27/2023    HCT 41.1 10/19/2023     Lab Results   Component Value Date    MPV 10.6 03/11/2024    MPV 11.4 11/27/2023    MPV 11.0 10/19/2023       Lab Results   Component Value Date    CHOL 132 03/11/2024    CHOL 129 11/27/2023    CHOL 148 11/03/2022     Lab Results   Component Value Date    HDL 49 03/11/2024    HDL 46 11/27/2023    HDL 51 11/03/2022     Lab Results   Component Value Date    LDLCALC 70.2 03/11/2024    LDLCALC 64.6 11/27/2023    LDLCALC 78.0 11/03/2022     Lab Results   Component Value Date    TRIG 64 03/11/2024    TRIG 92 11/27/2023    TRIG 95 11/03/2022     Lab Results   Component Value Date    CHOLHDL 37.1 03/11/2024    CHOLHDL 35.7 11/27/2023    CHOLHDL 34.5 11/03/2022       Lab Results   Component Value Date    GLUF 122 (H) 03/11/2024    GLUF 120 (H) 11/27/2023     Lab Results   Component Value Date    HGBA1C 5.5 11/27/2023    HGBA1C 5.5  11/03/2022    HGBA1C 5.5 11/02/2021        Lab Results   Component Value Date    HSCRP <0.30 03/11/2024    HSCRP 0.55 11/27/2023         STEPHANIE SCORES:     PRE POST   Anxiety 0 0   Depression 0 1   Somatic 4 1   Hostility 0 0     SF-36 SCORES:     PRE POST   Physical Function 27 25   Social Function 11 11   Mental Health 28 28   Pain 11 11   Change in Health 4 4   Physical Role Limitation 3 4   Mental Role Limitation 3 3   Energy/Fatigue 12 21   Health Perceptions 23 24   Total Score 122 131     PHQ-9:        6/28/2023     9:52 AM 11/30/2023     7:09 AM 3/19/2024     9:39 AM   PHQ-9 Depression Patient Health Questionnaire   Over the last two weeks how often have you been bothered by little interest or pleasure in doing things 0 0 0   Over the last two weeks how often have you been bothered by feeling down, depressed or hopeless 0 0 0   Over the last two weeks how often have you been bothered by trouble falling or staying asleep, or sleeping too much  0 0   Over the last two weeks how often have you been bothered by feeling tired or having little energy  1 0   Over the last two weeks how often have you been bothered by a poor appetite or overeating  0 0   Over the last two weeks how often have you been bothered by feeling bad about yourself - or that you are a failure or have let yourself or your family down  0 0   Over the last two weeks how often have you been bothered by trouble concentrating on things, such as reading the newspaper or watching television  0 0   Over the last two weeks how often have you been bothered by moving or speaking so slowly that other people could have noticed.  0 0   Over the last two weeks how often have you been bothered by thoughts that you would be better off dead, or of hurting yourself  0 0   If you checked off any problems, how difficult have these problems made it for you to do your work, take care of things at home or get along with other people?  Not difficult at all Not  difficult at all   PHQ-9 Score  1 0                  EDUCATION SCORES:     PRE POST   Education Score 60 90

## 2024-03-13 NOTE — PROGRESS NOTES
Session: Exit   Cardiac Rehab Individual Treatment Plan - Discharge Assessment      Patient Name: Sawyer Browning MRN: [id number]   : 1955   Age: 69 y.o.   Primary Diagnosis: CABG  Date of Event: 23  EF: 60-65%  Risk Stratification: low  Referring Physician: LARON   Exercise Assessment:     CPX/TM: Entry Results Exit Results    Date: 23 Date: 3-11-24   RHR 56 51   Max HR 96 108   Peak VO2 (CPX only) 21.9 24.5   Actual METS (CPX only) 6.3 7.0   Estimated METS 10.0 12.0     Anthropometrics Entry Results Exit Results   Height 72 inches 72 inches   Weight 183 lbs 185 lbs   BMI 24.8  25.2   Abdominal Girth 39.2 39.0   Body Composition 12.0% 13.1%     Angina with exercise?: No   ST Depression with Exercise?: No  Currently exercising at home? no    Education:  Exercise Terminology; verbalizes understanding; Date: 3-4-24  Diabetes; verbalizes understanding; Date: 24  Exercise Q&A; verbalizes understanding; Date: 24     Rehab Exercise Goals:  Attend Cardiac Rehab 3 times/week: Met  Home Aerobic Exercise: 2 additional days/week for 30-60 minutes: Not Met: only exercised 1 non-rehab day  Intensity of 12-15 on the Rate of Perceived Exertion (RPE) scale: Met  30% increase in entry estimated METS: 13.0 : Not Met: 12.0  Demonstrate proper pulse taking technique: Met    Comments: Sayra attended classes regularly but did not exercise much on non-rehab days.  He had some improvement in cardiovascular fitness.      Exercise Discharge Plan:     Intervention/Recommendations:       Recommended Home Prescription:  THR Range:    Mode: Aerobic   Frequency: 5-6 times per week   Duration: 30-60 minutes each day   Other Recs: 3-5 minute warm up and cool down/stretches   Resistance Trainin-3 days per week on non-consecutive days       Comments:    I met with Sayra for an exit interview from the Phase II cardiac rehab program.  The entry and exit stress testing results were discussed as well as current and future  exercise programs.     Patient's plan: Mr. Colbert stated he is thinking about joining a gym to mirror what he was doing in rehab class but next month he is planning to return to his exercise group 3 days per week.     I reviewed exercise recommendations with Mr. Colbert.  I strongly suggested he return to aerobic exercise in conjunction with the interval training group he attends.  I explained the difference between the two types of exercise and the importance of continuing with aerobic exercise.  I also suggested he continue walking if that is what he likes.  I asked him to call if he has any questions.  He stated understanding.    Vero Whitt., CEP

## 2024-03-14 ENCOUNTER — DOCUMENTATION ONLY (OUTPATIENT)
Dept: CARDIAC REHAB | Facility: CLINIC | Age: 69
End: 2024-03-14
Payer: COMMERCIAL

## 2024-03-14 NOTE — PROGRESS NOTES
Mr. Colbert has completed 35 out of 36 exercise session of Phase II cardiac rehab.  A follow up reassessment will be completed at exit interview.    24 Session Follow Up   Cardiac Rehab Individual Treatment Plan - Reassessment      Patient Name: Sawyer Browning MRN: [id number]   : 1955   Age: 69 y.o.   Primary Diagnosis: CABG Date of Event: 23   EF: 60-65%  Risk Stratification: moderate  Referring Physician: LARON   Exercise Assessment:     Angina with exercise?: No   ST Depression with Exercise?: No  Fall Risk: Yes   Assistive Devices:  independent  Mr. Colbert stated at orientation there were no limitations to exercise.  Comments on Progression: Mr. Colbert is progressing well and his workloads will continue to be increased as he tolerates exercise intensity.    Exercise Plan:   Goals:  CR Exercise Goals: Attend Cardiac Rehab 3 times/week: In Progress  Home Aerobic Exercise: 2 additional days/week for 30-60 minutes: In Progress  Intensity of 12-15 on the Rate of Perceived Exertion (RPE) scale: In Progress  30% increase in entry estimated METS: 13.0 : In Progress  5 days/week for 30-60 minutes: In Progress  Demonstrate proper pulse taking technique: In Progress    Comments on Goal Progression:  Patient Consistency: consistent with attendance  Home exercise? Yes: Walking; Frequency: 1 non-rehab days per week; Duration 30 minutes  Patient reports intensity rate 11-13 on RPE scale  Patient is progressing steadily  Patient is Able to demonstrate proper pulse taking technique with or without a fitness tracker.      Intervention/Recommendations:   Discussed importance of regular attendance to cardiac rehab class    Exercise Prescription:  THR Range Rest + 20   Mode: Treadmill  Elliptical   Frequency:  3 days/week   Duration:  30-60 minutes   Intensity:  12-15 RPE   Resistance Training:  Yes: 7 to 10 lb weights with 10-15 reps based on strength and range of motion and adjusted accordingly     Home Prescription:  Mode Aerobic  exercise   Frequency: 2- 3 days/week   Duration: 30-60 minutes   Resistance Training: None     Education:  Arthritis/Osteporosis; verbalizes understanding; Date: 24  Exercise and Rehydration; verbalizes understanding; Date: 24  Flexibility/Stretching; verbalizes understanding; Date: 24    Comments:  I reviewed exercise recommendations with Mr. Colbert.  I encouraged him to continue exercising but to increase to at least 2 days outside of rehab class.  He stated understanding.     The exercise prescription will continue to be adjusted based on tolerance of exercise intensity by patient.    Troy Whitt, CEP    24 Session Follow Up   Cardiac Rehab Individual Treatment Plan - Reassessment    Patient Name: Sawyer Browning MRN: [id number]   : 1955   Age: 69 y.o.   Primary Diagnosis: s/p CABG    Nutrition Assessment:     Anthropometrics    Height 72 inches   Weight 185 lbs   BMI 25.1     Patient confirms he is taking lipitor 80mg for cholesterol control.  Difficulty Chewing or Swallowing: No  Current Exercise: See Exercise Physiologist Note  Food Safety/Food Preparation: spouse  Living Arrangements/Family Support: Lives with spouse  Cultural/Spiritual/Personal Preferences: not applicable   Barriers to Education: none identified  Stage of Change Related to Diet Habits: Action  Recent Changes to Diet: No  Food Diary: Completed      Nutrition Plan:   Goals:  LDL-C < 70 (for high risk patients)  Hgb A1c < 7%  BMI < 25 and abdominal girth < 40M/<35 F  2 gram sodium, Mediterranean diet: In Progress  Fish intake (non-fried varieties) to a goal of 2-3 servings per week: In Progress  Increase fruit and vegetable intake: In Progress    Comments on Goal Progression:  Pt states he feels well, is working on improving produce intake. Discussed improving hydration    Interventions:  Dietitian Consult: No  Patient to participate in Cardiac Rehab sessions three times a week  Weekly Dietitian Weight Check  Nutrition  Recommendations Provided: Verbal and Written, Reviewed  Follow Up Plan for Ongoing Self-Management Support    Education:  Cholesterol and Fat; verbalizes understanding; Date: 24  Protein; verbalizes understanding; Date: 24  Seafood; verbalizes understanding; Date: 24  Food Additives; verbalizes understanding; Date: 1/10/24    Comments:   Discussed ways to incorporate healthy snacks, eating on a schedule, and monitoring sodium intake for heart health.    Diabetes  Is the patient diabetic? No      Juliet Alcocer MS, RDN/LDN    Session: 24 Session Follow Up   Cardiac Rehab Individual Treatment Plan - Reassessment      Patient Name: Sawyer Browning MRN: [id number]   : 1955   Age: 69 y.o.   Primary Diagnosis: S/P CABG 2023      Psychosocial Assessment:   Living Arrangements: Lives with spouse  Family Support: children, grandchildren, and spouse  Self Reported: no change Stress  Displays: happiness and calmness  Medication: not applicable    Psychosocial Plan:   Goals:  Verbalizes coping mechanisms: Met  Maintain positive support system: Met  Maintain positive outlook: In Progress  Improve overall quality of life: In Progress    Interventions:  Patient to Self Report Emotional Changes at Session Check In  Recommend Physical Activity  Recommend Attending Education Lectures  Notify MD: No  Program Referral: No  Pharmaceutical Intervention/Therapy: No  Other Needs: not applicable  Stage of Readiness to Change: Action    Education:  Stress; verbalizes understanding; Date: 2024  Benefits of cardiac rehab; verbalizes understanding; Date: 2024    Comments:  Pt is dealing with wife who is chronically ill and has a moderate amount of stress with that. Pt also has some worry about his own physical issues. Pt denies overwhelmng stress or anxiety.   Patient has been instructed to notify staff in the event that circumstances worsen.  Patient verbalizes understanding.    Other Core Components/Risk Factors  Assessment:   RISK FACTORS:  hyperlipidemia, hypertension, positive family history, stress    Learning Barriers: None    Medication Compliance: has been compliant with taking medications    Other Core Components/Risk Factors Plan:   Goals:  Increase knowledge of CAD: Met  Decrease blood pressure: Met  Demonstrate accurate pulse taking: Met  Learn more about healthy eating: In Progress    Interventions:  Individual Education/ Counseling: Yes  Physician Referral: No    Education:    cholesterol meds, verbalizes understanding; Date: 1/19/2024  hypertension, verbalizes understanding; Date: 12/29/2024  risk factors, verbalizes understanding; Date: 12/22/2023  stress, verbalizes understanding; Date: 01/05/2024         Education method adapted to patients education level and preferred method of learning.  Method: explanation  demonstration  handouts    Comments:  Pt is exercising regularly 5 times per week. He has lower blood pressure and maintained his weight. Pt encouraged to watch his sodium intake.    Other Core Components/Hypertension Assessment:   BP Range: 108-160 systolic; 70-98 diastolic  BP at Goal: making progress and has been brought to 's attention.  Patient reported symptoms:  dizziness    Medications:  Medication Prescribed? Adherent? Exception   Beta-blocker [x]Yes  []No  []Unknown [x]Yes  []No  []Unknown    ACEI/ARB [x]Yes  []No  []Unknown [x]Yes  []No  []Unknown    Calcium Channel Blocker [x]Yes  []No  []Unknown [x]Yes  []No  []Unknown    Diuretic [x]Yes  []No  []Unknown [x]Yes  []No  []Unknown  Taking every other day       Other Core Components/Hypertension Plan:   Goals:  Blood Pressure <130/80    Comments on Goal Progression:  Blood pressure readings have improved since mid January.    Interventions:  Med Card Reconciled: Yes  Encourage medication compliance  Encourage sodium reduction  Reduce alcohol consumption  Encourage weight loss  Recommend physical activity  Educate on contributory  factors  Reduce stress, anxiety, anger, depression, and/or chronic pain  Encourage home blood pressure monitoring  Recommend daily weights  Enroll in Digital Hypertension Program  MD notified/Physician Referral: Yes    Education:    Risk Factors; verbalizes understanding; Date: 12/22/2023  Stroke; verbalizes understanding; Date: 2/02/2024         Comments:  Pt is monitoring his blood pressure at home and keeping a log. He is reducing his sodium intake. His doctor addressed his dizziness by reducing his diuretic to every other day. Pt says he experiences some orthostatic hypotension like symptoms when getting up from sitting position too quickly.    Does the patient have Heart Failure? No      Other Core Components/Tobacco Cessation Assessment:   Smoking Status: Lifetime Non-smoker  Primary Tobacco Type: N/A  Tobacco Usage: no  Smoking Cessation Barriers:  N/A  Stage of Readiness to Change: Maintenance    Other Core Components/Tobacco Cessation Plan:   Goals:  Maintain non-smoking status    Interventions:  Maintains non-smoking status    Education:    Risk Factors; verbalizes understanding; Date: 12/22/2023         Comments:  Pt verbalizes no desire to use tobacco products.    Rl Gibbs RN

## 2024-03-15 RX ORDER — AMIODARONE HYDROCHLORIDE 200 MG/1
200 TABLET ORAL DAILY
Qty: 90 TABLET | Refills: 3
Start: 2024-03-15 | End: 2024-03-17 | Stop reason: SDUPTHER

## 2024-03-18 ENCOUNTER — TELEPHONE (OUTPATIENT)
Dept: CARDIAC REHAB | Facility: CLINIC | Age: 69
End: 2024-03-18
Payer: COMMERCIAL

## 2024-03-18 RX ORDER — AMIODARONE HYDROCHLORIDE 200 MG/1
200 TABLET ORAL DAILY
Qty: 90 TABLET | Refills: 3
Start: 2024-03-18 | End: 2024-04-11

## 2024-03-19 ENCOUNTER — CLINICAL SUPPORT (OUTPATIENT)
Dept: CARDIAC REHAB | Facility: CLINIC | Age: 69
End: 2024-03-19
Payer: COMMERCIAL

## 2024-03-19 DIAGNOSIS — Z95.1 POSTSURGICAL AORTOCORONARY BYPASS STATUS: Primary | ICD-10-CM

## 2024-03-19 DIAGNOSIS — I25.10 CORONARY ARTERY DISEASE, UNSPECIFIED VESSEL OR LESION TYPE, UNSPECIFIED WHETHER ANGINA PRESENT, UNSPECIFIED WHETHER NATIVE OR TRANSPLANTED HEART: ICD-10-CM

## 2024-03-19 PROCEDURE — 93798 PHYS/QHP OP CAR RHAB W/ECG: CPT | Mod: S$GLB,,, | Performed by: INTERNAL MEDICINE

## 2024-03-19 PROCEDURE — 99999 PR PBB SHADOW E&M-EST. PATIENT-LVL II: CPT | Mod: PBBFAC,,,

## 2024-03-19 NOTE — PROGRESS NOTES
Exit   Cardiac Rehab Individual Treatment Plan - Discharge Assessment      Patient Name: Sawyer Browning MRN: [id number]   : 1955   Age: 69 y.o.   Primary Diagnosis: CABG    Nutrition Assessment:     Anthropometrics Pre Post   Height 72 inches 72 inches   Weight 183 lbs 185 lbs   BMI 24.8 25.1   Abdominal Girth 39.2 39   Body Composition 12 13.1       Labs:  Patient confirms he is taking lipitor 80mg for cholesterol control.    Lab Results   Component Value Date    CHOL 132 2024    CHOL 129 2023    CHOL 148 2022     Lab Results   Component Value Date    HDL 49 2024    HDL 46 2023    HDL 51 2022     Lab Results   Component Value Date    LDLCALC 70.2 2024    LDLCALC 64.6 2023    LDLCALC 78.0 2022     Lab Results   Component Value Date    TRIG 64 2024    TRIG 92 2023    TRIG 95 2022     Lab Results   Component Value Date    CHOLHDL 37.1 2024    CHOLHDL 35.7 2023    CHOLHDL 34.5 2022       Lab Results   Component Value Date    GLUF 122 (H) 2024     Lab Results   Component Value Date    HGBA1C 5.5 2023       Nutrition/Diet History:  Patient eats 3 meals daily.    Seasons food with nothing.  Patient denies use of a salt shaker at the table on prepared foods.   Dines out 1-2 per week.  Chooses fried foods rarely  Chooses fish 1-2 time(s) per week.   Beverages:  water and diet soda  Alcohol: glass of wine with dinner  Current Exercise: See Exercise Physiologist Note  Food Safety/Food Preparation: spouse  Living Arrangements/Family Support: Lives with spouse  Stage of Change Related to Diet Habits: Maintenance  Recent Changes to Diet: No  Food Diary: Completed      Nutrition Plan:   Goals:  2 gram sodium, Mediterranean diet: In Progress  Increase fish intake (non-fried varieties) to a goal of 2-3 servings per week: In Progress  Increase fruit and vegetable intake: Met    Comments of Goal Progression:  Pt working on improving  "fish intake and water intake. He was previously conscious of diet to incorporate produce and limit sodium and fried foods. States he learned more than he thought he would and "feels great"     Interventions/Recommendations:  Reviewed Anthropometric Progress  Reviewed Pre and Post Labs  Dietitian Consult: No  Nutrition Recommendations Provided: Verbal and Written, Reviewed  Discussed follow up plan for ongoing self-management support    Education:  Sodium; verbalizes understanding; Date: 2/21/24  Vitamins; verbalizes understanding; Date: 2/28/24  Fiber; verbalizes understanding; Date: 2/14/24    Comments:   Discussed ways to continue to incorporate healthy snacks, eating on a schedule, and monitoring sodium intake for heart health.    Diabetes  Is the patient diabetic? No      Juliet Alcocer MS, RDN/LDN  "

## 2024-03-19 NOTE — PROGRESS NOTES
Session: Exit   Cardiac Rehab Individual Treatment Plan - Discharge Assessment      Patient Name: Sawyer Browning MRN: [id number]   : 1955   Age: 69 y.o.   Diagnosis: S/P CABG    Psychosocial Assessment:   Outcome Survey Tools:    STEPHANIE SCORES:   PRE POST   Anxiety 0 0   Depression 0 1   Somatic 4 1   Hostility 0 0     SF-36 SCORES:   PRE POST   Physical Function 27 25   Social Function 11 11   Mental Health 28 28   Pain 11 11   Change in Health 4 4   Physical Role Limitation 3 4   Mental Role Limitation 3 3   Energy/Fatigue 12 21   Health Perceptions 23 24   Total Score 122 131     PHQ 9:      2023     9:52 AM 2023     7:09 AM 3/19/2024     9:39 AM   PHQ-9 Depression Patient Health Questionnaire   Over the last two weeks how often have you been bothered by little interest or pleasure in doing things 0 0 0   Over the last two weeks how often have you been bothered by feeling down, depressed or hopeless 0 0 0   Over the last two weeks how often have you been bothered by trouble falling or staying asleep, or sleeping too much  0 0   Over the last two weeks how often have you been bothered by feeling tired or having little energy  1 0   Over the last two weeks how often have you been bothered by a poor appetite or overeating  0 0   Over the last two weeks how often have you been bothered by feeling bad about yourself - or that you are a failure or have let yourself or your family down  0 0   Over the last two weeks how often have you been bothered by trouble concentrating on things, such as reading the newspaper or watching television  0 0   Over the last two weeks how often have you been bothered by moving or speaking so slowly that other people could have noticed.  0 0   Over the last two weeks how often have you been bothered by thoughts that you would be better off dead, or of hurting yourself  0 0   If you checked off any problems, how difficult have these problems made it for you to do your work, take  care of things at home or get along with other people?  Not difficult at all Not difficult at all   PHQ-9 Score  1 0            Family Support: children, grandchildren, and spouse  Self Reported: Anxiety, Stress, and Caregiver Role Strain  Displays: happiness and calmness    Psychosocial Plan:   Goals:  Verbalizes coping mechanisms: Met  Reduce manifestation of stress: Met  Maintain positive support system: Met  Maintain positive outlook: Met  Improve overall quality of life: Met    Comments on Goal Progression:  Patient has met all goals. He is feeling better; his wife recently discharged from hospital and doing better.  He reports less stress.    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 He denies any issues with anxiety/depression at this time. Patient has been instructed to seek attention via PCP/Psychiatry in the event that circumstances change. Patient verbalizes understanding.     Other Core Components/Risk Factors Assessment:   RISK FACTORS:  hyperlipidemia, hypertension, positive family history, stress    Learning Barriers: None    Education Level:  Attended College/Technical School    Pre-Test Score Post-Test Score   60 90     Medication Compliance: has been compliant with taking medications    Other Core Components/Risk Factors Plan:  "  Goals:  Increase knowledge of CAD: Met  Decrease blood pressure: Met  Demonstrate accurate pulse taking: Met  Learn more about healthy eating: Met    Comments on Goal Progression:  Patient has learned more about Mediterranean Diet, healthy choices, portion control.     Interventions/Recommendations:  Individual Education/Counseling: Yes  Physician Referral: No    Education:    diabetes, verbalizes understanding; Date: 2/19/24  hypertension, verbalizes understanding; Date: 12/29/23  nutrition, verbalizes understanding; Date: 1/3/24  physical activity, verbalizes understanding; Date: 12/11/23  risk factors, verbalizes understanding; Date: 12/22/23  sodium, verbalizes understanding; Date: 2/21/24  stress, verbalizes understanding; Date: 1/5/24           Other Core Components/Hypertension Assessment:   BP Range: 100-130/60-82  Patient reported symptoms: none    Medications:  Medication Prescribed? Adherent? Exception   Beta-blocker [x]Yes  []No  []Unknown [x]Yes  []No  []Unknown    ACEI/ARB [x]Yes  []No  []Unknown [x]Yes  []No  []Unknown    Calcium Channel Blocker [x]Yes  []No  []Unknown [x]Yes  []No  []Unknown    Diuretic []Yes  []No  []Unknown []Yes  []No  []Unknown        Other Core Components/Hypertension Plan:   Goals:  Blood Pressure <130/80: Met    Comments on Goal Progression:  Blood pressure at goal.     Interventions/Recommendations:  Med Card Reconciled: Yes  Encourage medication compliance  Encourage sodium reduction  Reduce alcohol consumption  Encourage weight loss  Recommend physical activity  Educate on contributory factors  Reduce stress, anxiety, anger, depression, and/or chronic pain  Encourage home blood pressure monitoring  Recommend daily weights    Comments on Goal Progression:  See monthly report in Epic for blood pressure trends  Information given to patient for [facility]: No  Patient is member at gym and plans to return to his "workout group". Discussed importance of " exercise to benefit his heart. Patient verbalized understanding. Discussed importance of maintaining heart rate in his target heart range for 30-60 minutes (aerobic exercise) vs. Interval training patient verbalized understanding.    Education:    Hypertension; verbalizes understanding; Date: 12/29/24  Coronary Artery Disease; verbalizes understanding; Date: 3/1/24  Congestive Heart Failure; verbalizes understanding; Date: 3/8/24  Risk Factors; verbalizes understanding; Date: 12/22/24  Medication II; verbalizes understanding; Date: 1/19/24  Stroke; verbalizes understanding; Date: 2/2/24  Stress; verbalizes understanding; Date: 1/5/24         Does the patient have Heart Failure? No    Other Core Components/Tobacco Cessation Assessment:   Smoking Status: Lifetime Non-smoker  Primary Tobacco Type: N/A  Tobacco Usage: no  Smoking Cessation Barriers:  NA  Stage of Readiness to Change: Maintenance    Other Core Components/Tobacco Cessation Plan:   Goals:  Maintain non-smoking status: Met    Comments on Goal Progression:  Maintain nonsmoker status    Interventions/Recommendations:  Maintains non-smoking status    Education:    Heart and Lung Anatomy; verbalizes understanding; Date: 2/23/24  Coronary Artery Disease; verbalizes understanding; Date: 3/1/24  Risk Factors; verbalizes understanding; Date: 12/22/23  Hypertension; verbalizes understanding; Date: 12/29/23  Medications II; verbalizes understanding; Date: 1/19/24  Stroke; verbalizes understanding; Date: 2/2/24  Benefits of Cardiac Rehab; verbalizes understanding; Date: 2/9/24           Comments:   Discussed screening tools and improved results. Reviewed labs - lipids, glucose, A1C, CRP. Patient verbalized understanding and all questions answered to patient satisfaction. Discussed importance of exercise within target heart rate range for 30-60 minutes vs interval training. Discussed importance of exercising for his heart patient verbalized understanding. He will  continue to exercise at his gym. Patient has been instructed to follow up with Cardiologist for any further cardiac issues.     Riki Blackwood RN  Cardiac Rehab Nurse

## 2024-03-21 ENCOUNTER — PATIENT MESSAGE (OUTPATIENT)
Dept: CARDIOLOGY | Facility: CLINIC | Age: 69
End: 2024-03-21
Payer: COMMERCIAL

## 2024-04-11 ENCOUNTER — OFFICE VISIT (OUTPATIENT)
Dept: CARDIOLOGY | Facility: CLINIC | Age: 69
End: 2024-04-11
Payer: COMMERCIAL

## 2024-04-11 VITALS
HEIGHT: 72 IN | BODY MASS INDEX: 25.33 KG/M2 | DIASTOLIC BLOOD PRESSURE: 82 MMHG | WEIGHT: 187 LBS | HEART RATE: 48 BPM | SYSTOLIC BLOOD PRESSURE: 137 MMHG

## 2024-04-11 DIAGNOSIS — I48.3 TYPICAL ATRIAL FLUTTER: ICD-10-CM

## 2024-04-11 DIAGNOSIS — I10 ESSENTIAL HYPERTENSION: ICD-10-CM

## 2024-04-11 DIAGNOSIS — Z95.1 S/P CABG (CORONARY ARTERY BYPASS GRAFT): ICD-10-CM

## 2024-04-11 DIAGNOSIS — I25.10 ATHEROSCLEROSIS OF NATIVE CORONARY ARTERY OF NATIVE HEART WITHOUT ANGINA PECTORIS: ICD-10-CM

## 2024-04-11 DIAGNOSIS — E78.49 OTHER HYPERLIPIDEMIA: ICD-10-CM

## 2024-04-11 DIAGNOSIS — I71.21 AORTIC ROOT ANEURYSM: Primary | ICD-10-CM

## 2024-04-11 PROCEDURE — 99214 OFFICE O/P EST MOD 30 MIN: CPT | Mod: S$GLB,,, | Performed by: INTERNAL MEDICINE

## 2024-04-11 PROCEDURE — 3008F BODY MASS INDEX DOCD: CPT | Mod: CPTII,S$GLB,, | Performed by: INTERNAL MEDICINE

## 2024-04-11 PROCEDURE — 3079F DIAST BP 80-89 MM HG: CPT | Mod: CPTII,S$GLB,, | Performed by: INTERNAL MEDICINE

## 2024-04-11 PROCEDURE — 3075F SYST BP GE 130 - 139MM HG: CPT | Mod: CPTII,S$GLB,, | Performed by: INTERNAL MEDICINE

## 2024-04-11 PROCEDURE — 1126F AMNT PAIN NOTED NONE PRSNT: CPT | Mod: CPTII,S$GLB,, | Performed by: INTERNAL MEDICINE

## 2024-04-11 PROCEDURE — 1159F MED LIST DOCD IN RCRD: CPT | Mod: CPTII,S$GLB,, | Performed by: INTERNAL MEDICINE

## 2024-04-11 PROCEDURE — 99999 PR PBB SHADOW E&M-EST. PATIENT-LVL III: CPT | Mod: PBBFAC,,, | Performed by: INTERNAL MEDICINE

## 2024-04-11 PROCEDURE — 4010F ACE/ARB THERAPY RXD/TAKEN: CPT | Mod: CPTII,S$GLB,, | Performed by: INTERNAL MEDICINE

## 2024-04-11 PROCEDURE — 1101F PT FALLS ASSESS-DOCD LE1/YR: CPT | Mod: CPTII,S$GLB,, | Performed by: INTERNAL MEDICINE

## 2024-04-11 PROCEDURE — 3288F FALL RISK ASSESSMENT DOCD: CPT | Mod: CPTII,S$GLB,, | Performed by: INTERNAL MEDICINE

## 2024-04-11 PROCEDURE — 1160F RVW MEDS BY RX/DR IN RCRD: CPT | Mod: CPTII,S$GLB,, | Performed by: INTERNAL MEDICINE

## 2024-04-11 NOTE — PROGRESS NOTES
HISTORY:    69-year-old male with a history of CAD and thoracic aortic aneurysm status post valve sparing aortic root replacement and transverse aortic arch replacement with LIMA to LAD and SVG to PDA September 2023, paroxysmal atrial fibrillation-flutter, hypertension, hyperlipidemia presenting for 2nd visit with me.    Incidental TAA found in '23 after coronary calcium score was ordered w additional dx of CAD and resultant surgery.     No CP, SOB, or LOPEZ since. No edema or orthopnea. No palpitations.     Exercising without issue. Just completed cardiac rehab.     Tolerates aspirin 81 x 1, amiodarone 200 x 1, carvedilol 25 x 2, hydrochlorothiazide 25 x qod, valsartan 160 x 2, atorvastatin 80x1, and apixaban 5 x 2. Bps controlled. Some light headedness when standing.     PHYSICAL EXAM:    Vitals:    04/11/24 1309   BP: 137/82   Pulse: (!) 48     NAD, A+Ox3.  No jvd, no bruit.  RRR nml s1,s2. No murmurs.  CTA B no wheezes or crackles.  No edema.    LABS/STUDIES (imaging reviewed during clinic visit):    March 2024 CBC normal.  October 2023 CMP normal.  March 2024 /HDL 49/LDL 70/TG 64.  A1c normal.  TSH normal.    ECG March 2024 sinus rhythm with an anterior infarct pattern.  No ST changes.  October 2023 ECGs demonstrate atrial fibrillation and atrial flutter.  CPX March 2024 7 minutes on a high ramp protocol No ischemic changes.  TTE September 2023 normal LV size and function with EF 60-65%.  Normal diastology.  Patent aortic root graft with no significant aortic valve disease.  CVP 3.   GWEN November 2023 normal LV size and function.  Patent appendage status post successful cardioversion restoration of sinus rhythm.    Cardiac catheterization September 2023 mid LAD .  Patent D1 and left circ system. 90% focal distal RCA.  Carotid 2023 Atherosclerosis without significant disease.    ASSESSMENT & PLAN:    1. Aortic root aneurysm    2. Atherosclerosis of native coronary artery of native heart without angina  pectoris    3. Essential hypertension    4. Other hyperlipidemia    5. S/P CABG (coronary artery bypass graft)    6. Typical atrial flutter                SIHD post LIMA-LAD/SVG-PDA w nml LVEF. Okay to stop asa as pt is on NOAC.      LDL at goal on atorvastatin 80x1.    Bps better controlled on carvedilol 25 x 2, hydrochlorothiazide 25 x qod, valsartan 160 x 2, and amlodipine 10x1. Some orthostatic symptoms. Okay to stop hctz. No more volume issue.    pAfib-flutter post-op s/p CV on amiodarone 200x1 and apixaban 5 x 2. HR in the 50s with some light headedness. Okay to stop amiodarone at this time.      Follow up in about 6 months (around 10/11/2024).      Liane Dawn MD

## 2024-04-30 ENCOUNTER — PATIENT MESSAGE (OUTPATIENT)
Dept: CARDIOLOGY | Facility: CLINIC | Age: 69
End: 2024-04-30
Payer: COMMERCIAL

## 2024-06-19 ENCOUNTER — OFFICE VISIT (OUTPATIENT)
Dept: FAMILY MEDICINE | Facility: CLINIC | Age: 69
End: 2024-06-19
Payer: COMMERCIAL

## 2024-06-19 DIAGNOSIS — R09.81 SINUS CONGESTION: ICD-10-CM

## 2024-06-19 DIAGNOSIS — R05.9 COUGH, UNSPECIFIED TYPE: Primary | ICD-10-CM

## 2024-06-19 PROCEDURE — 99212 OFFICE O/P EST SF 10 MIN: CPT | Mod: 95,,,

## 2024-06-19 PROCEDURE — 4010F ACE/ARB THERAPY RXD/TAKEN: CPT | Mod: CPTII,95,,

## 2024-06-19 RX ORDER — BENZONATATE 200 MG/1
200 CAPSULE ORAL 3 TIMES DAILY PRN
Qty: 30 CAPSULE | Refills: 0 | Status: SHIPPED | OUTPATIENT
Start: 2024-06-19

## 2024-06-19 RX ORDER — PROMETHAZINE HYDROCHLORIDE AND DEXTROMETHORPHAN HYDROBROMIDE 6.25; 15 MG/5ML; MG/5ML
5 SYRUP ORAL EVERY 6 HOURS PRN
Qty: 240 ML | Refills: 0 | Status: SHIPPED | OUTPATIENT
Start: 2024-06-19

## 2024-06-19 RX ORDER — AZELASTINE 1 MG/ML
1 SPRAY, METERED NASAL 2 TIMES DAILY
Qty: 30 ML | Refills: 0 | Status: SHIPPED | OUTPATIENT
Start: 2024-06-19

## 2024-06-19 NOTE — PROGRESS NOTES
The patient location is:  Patient Home  The chief complaint leading to consultation is: as below  Visit type: Virtual visit with synchronous audio and video  Total time spent with patient: 15 minutes  Each patient to whom he or she provides medical services by telemedicine is:  (1) informed of the relationship between the physician and patient and the respective role of any other health care provider with respect to management of the patient; and (2) notified that he may decline to receive medical services by telemedicine and may withdraw from such care at any time.      HPI     Chief Complaint:  No chief complaint on file.      Sawyer Browning is a 69 y.o. male with multiple medical diagnoses as listed in the medical history and problem list that presents for cough.  Pt is new to me but is known to this clinic with his last appointment being Visit date not found.      Pt presents for cough.  Cough  This is a new problem. The current episode started 1 to 4 weeks ago. The problem has been unchanged. The problem occurs constantly. The cough is Productive of sputum. Associated symptoms include postnasal drip. Pertinent negatives include no chest pain, chills, ear congestion, ear pain, fever, headaches, heartburn, hemoptysis, myalgias, nasal congestion, rash, rhinorrhea, sore throat, shortness of breath, sweats, weight loss or wheezing. The symptoms are aggravated by lying down. He has tried OTC cough suppressant, OTC inhaler, body position changes, oral steroids and prescription cough suppressant for the symptoms. The treatment provided mild relief. His past medical history is significant for bronchitis. There is no history of asthma, bronchiectasis, COPD, emphysema, environmental allergies or pneumonia.     Seen in urgent care about 1.5 weeks ago, treated with prednisone and decadron injection. Prescribed Amoxicillin, completed last dose today. Continue with cough and congestion despite treatment.      Assessment & Plan      Problem List Items Addressed This Visit    None  Visit Diagnoses       Cough, unspecified type    -  Primary  Lingering productive cough. Completed Amoxicillin today. Will order promethazine DM, tessalon perles and Astelin. Follow up in person if symptoms worsened or unresolved.    Relevant Medications    promethazine-dextromethorphan (PROMETHAZINE-DM) 6.25-15 mg/5 mL Syrp    benzonatate (TESSALON) 200 MG capsule    azelastine (ASTELIN) 137 mcg (0.1 %) nasal spray    Sinus congestion      Continues with sinus congestion despite antibiotics and steroids. Continue flonase, will add astelin nasal spray.            --------------------------------------------      Health Maintenance:  Health Maintenance         Date Due Completion Date    TETANUS VACCINE Never done ---    Shingles Vaccine (1 of 2) Never done ---    RSV Vaccine (Age 60+ and Pregnant patients) (1 - 1-dose 60+ series) Never done ---    COVID-19 Vaccine (3 - 2023-24 season) 09/01/2023 2/22/2021    PROSTATE-SPECIFIC ANTIGEN 11/27/2024 11/27/2023    Hemoglobin A1c (Prediabetes) 11/27/2024 11/27/2023    Colorectal Cancer Screening 01/21/2025 1/21/2022    Override on 5/2/2007: Done (normal)    High Dose Statin 04/11/2025 4/11/2024    Lipid Panel 03/11/2029 3/11/2024            Health maintenance reviewed    Follow Up:  No follow-ups on file.    Exam     Review of Systems:  (as noted above)  Review of Systems   Constitutional:  Negative for chills, fever and weight loss.   HENT:  Positive for postnasal drip. Negative for ear pain, rhinorrhea and sore throat.    Respiratory:  Positive for cough. Negative for hemoptysis, shortness of breath and wheezing.    Cardiovascular:  Negative for chest pain.   Gastrointestinal:  Negative for heartburn.   Musculoskeletal:  Negative for myalgias.   Skin:  Negative for rash.   Allergic/Immunologic: Negative for environmental allergies.   Neurological:  Negative for headaches.       Physical Exam:   Physical Exam  There  were no vitals filed for this visit.   There is no height or weight on file to calculate BMI.        History     Past Medical History:  Past Medical History:   Diagnosis Date    BPH (benign prostatic hyperplasia)     Elevated liver enzymes     Fatty infiltration of liver     Glucose intolerance (impaired glucose tolerance)     Hiatal hernia     Hyperlipidemia     Hypertension     Overweight     Reflux     Snores        Past Surgical History:  Past Surgical History:   Procedure Laterality Date    AORTIC ROOT REPLACEMENT N/A 9/18/2023    Procedure: VALVE SPARING AORTIC ROOT REPLACEMENT;  Surgeon: Imtiaz Mooney MD;  Location[facility] OR [address] [place] [address];  Service: Cardiovascular;  Laterality: N/A;  Valve sparing aortic root replacement, vs biobentall, hemiarch, CABGx2 under  hypothermic circ arrest    CORONARY ANGIOGRAPHY N/A 9/6/2023    Procedure: ANGIOGRAM, CORONARY ARTERY;  Surgeon: Chon Lopez MD;  Location: [facility] CATH LAB;  Service: Cardiology;  Laterality: N/A;  right radial    CORONARY ARTERY BYPASS GRAFT (CABG) N/A 9/18/2023    Procedure: CORONARY ARTERY BYPASS GRAFT (CABG);  Surgeon: Imtiaz Mooney MD;  Location: [address];  Service: Cardiovascular;  Laterality: N/A;  Vein [place] Start: 821  Vein [place] End: 838  Vein Preparation Start: 839  Vein Preparation End: 912    ENDOSCOPIC HARVEST OF VEIN Left 9/18/2023    Procedure: HARVEST-VEIN-ENDOVASCULAR;  Surgeon: Imtiaz Mooney MD;  Location: [address];  Service: Cardiovascular;  Laterality: Left;  Valve sparing aortic root replacement, vs biobentall, hemiarch, CABGx2 under    inguinal hernia      x2    REPAIR OF DIAPHRAGMATIC HERNIA  9/18/2023    Procedure: REPAIR, HERNIA, DIAPHRAGMATIC;  Surgeon: Imtiaz Mooney MD;  Location: [address];  Service: Cardiovascular;;    tonsillectomy      TRANSESOPHAGEAL ECHOCARDIOGRAM WITH POSSIBLE CARDIOVERSION (GWEN W/ POSS CARDIOVERSION) N/A 11/8/2023    Procedure: TRANSESOPHAGEAL  ECHOCARDIOGRAM WITH POSSIBLE CARDIOVERSION (GWEN W/ POSS CARDIOVERSION);  Surgeon: Chon Lopez MD;  Location: [facility] CATH LAB;  Service: Cardiology;  Laterality: N/A;       Social History:  Social History     Socioeconomic History    Marital status:     Number of children: 2   Occupational History    Occupation: Works in insurance   Tobacco Use    Smoking status: Never    Smokeless tobacco: Never   Substance and Sexual Activity    Alcohol use: Yes     Comment: 2 glasses of wine most evenings.    Drug use: No     Social Determinants of Health     Financial Resource Strain: Patient Declined (6/19/2024)    Overall Financial Resource Strain (CARDIA)     Difficulty of Paying Living Expenses: Patient declined   Food Insecurity: Patient Declined (6/19/2024)    Hunger Vital Sign     Worried About Running Out of Food in the Last Year: Patient declined     Ran Out of Food in the Last Year: Patient declined   Transportation Needs: No Transportation Needs (9/19/2023)    PRAPARE - Transportation     Lack of Transportation (Medical): No     Lack of Transportation (Non-Medical): No   Physical Activity: Insufficiently Active (6/19/2024)    Exercise Vital Sign     Days of Exercise per Week: 3 days     Minutes of Exercise per Session: 30 min   Stress: Patient Declined (6/19/2024)    [employer] [place] [employer] - Occupational Stress Questionnaire     Feeling of Stress : Patient declined   Housing Stability: Unknown (6/19/2024)    Housing Stability Vital Sign     Unable to Pay for Housing in the Last Year: Patient declined       Family History:  Family History   Problem Relation Name Age of Onset    Hypertension Mother      Macular degeneration Mother          - gets shots in her eye    Stroke Father      Heart disease Father      Aneurysm Father      Migraines Sister Radha     Arthritis Brother Jaison         knees    Dementia Paternal Grandmother      Diabetes Paternal Grandmother      Heart disease Paternal  Grandfather      Diabetes Brother Zheng     Hernia Brother Kael     Cancer Paternal Aunt      Cancer Paternal Uncle      Cancer Paternal Aunt      Cancer Paternal Uncle      Colon cancer Neg Hx      Prostate cancer Neg Hx         Allergies and Medications: (updated and reviewed)  Review of patient's allergies indicates:   Allergen Reactions    Ace inhibitors Other (See Comments)     cough    Losartan      headache     Current Outpatient Medications   Medication Sig Dispense Refill    acetaminophen (TYLENOL EXTRA STRENGTH) 500 MG tablet 2 tablet EVERY 8 HOURS (route: oral)      amLODIPine (NORVASC) 10 MG tablet Take 1 tablet (10 mg total) by mouth once daily. 90 tablet 3    apixaban (ELIQUIS) 5 mg Tab Take 1 tablet (5 mg total) by mouth 2 (two) times daily. 180 tablet 3    atorvastatin (LIPITOR) 80 MG tablet Take 1 tablet by mouth once daily.      azelastine (ASTELIN) 137 mcg (0.1 %) nasal spray 1 spray (137 mcg total) by Nasal route 2 (two) times daily. 30 mL 0    benzonatate (TESSALON) 200 MG capsule Take 1 capsule (200 mg total) by mouth 3 (three) times daily as needed for Cough. 30 capsule 0    carvediloL (COREG) 25 MG tablet Take 1 tablet (25 mg total) by mouth 2 (two) times daily with meals. 180 tablet 3    lansoprazole (PREVACID) 30 MG capsule Take 1 capsule by mouth once daily.      oxyCODONE (ROXICODONE) 5 MG immediate release tablet 1 tablet EVERY 4 HOURS (route: oral)      promethazine-dextromethorphan (PROMETHAZINE-DM) 6.25-15 mg/5 mL Syrp Take 5 mLs by mouth every 6 (six) hours as needed (cough). 240 mL 0    valsartan (DIOVAN) 160 MG tablet Take 1 tablet (160 mg total) by mouth 2 (two) times daily. 180 tablet 3     No current facility-administered medications for this visit.       Patient Care Team:  Mary Kay Haines MD as PCP - General (Internal Medicine)  Nadege Suresh LPN as Care Coordinator       - The patient was sent an After Visit Summary virtually that lists all medications with  directions, allergies, education, orders placed during this encounter and follow-up instructions.      - I have reviewed the patient's medical information including past medical, family, and social history sections including the medications and allergies.      - We discussed the patient's current medications.     This note was created by combination of typed  and MModal dictation.  Transcription errors may be present.  If there are any questions, please contact me.  Pao Serrano NP

## 2024-06-25 ENCOUNTER — PATIENT MESSAGE (OUTPATIENT)
Dept: FAMILY MEDICINE | Facility: CLINIC | Age: 69
End: 2024-06-25
Payer: COMMERCIAL

## 2024-06-25 DIAGNOSIS — R09.81 NASAL CONGESTION: Primary | ICD-10-CM

## 2024-07-09 ENCOUNTER — PATIENT MESSAGE (OUTPATIENT)
Dept: OTOLARYNGOLOGY | Facility: CLINIC | Age: 69
End: 2024-07-09
Payer: COMMERCIAL

## 2024-07-15 RX ORDER — ATORVASTATIN CALCIUM 80 MG/1
80 TABLET, FILM COATED ORAL
Qty: 90 TABLET | Refills: 0 | Status: SHIPPED | OUTPATIENT
Start: 2024-07-15

## 2024-08-05 ENCOUNTER — PATIENT MESSAGE (OUTPATIENT)
Dept: FAMILY MEDICINE | Facility: CLINIC | Age: 69
End: 2024-08-05
Payer: COMMERCIAL

## 2024-08-14 ENCOUNTER — PATIENT MESSAGE (OUTPATIENT)
Dept: FAMILY MEDICINE | Facility: CLINIC | Age: 69
End: 2024-08-14
Payer: COMMERCIAL

## 2024-08-14 DIAGNOSIS — S76.309S HAMSTRING INJURY, UNSPECIFIED LATERALITY, SEQUELA: Primary | ICD-10-CM

## 2024-08-17 ENCOUNTER — PATIENT MESSAGE (OUTPATIENT)
Dept: UROLOGY | Facility: CLINIC | Age: 69
End: 2024-08-17
Payer: COMMERCIAL

## 2024-08-20 ENCOUNTER — PATIENT MESSAGE (OUTPATIENT)
Dept: CARDIOLOGY | Facility: CLINIC | Age: 69
End: 2024-08-20
Payer: COMMERCIAL

## 2024-08-20 DIAGNOSIS — M79.662 BILATERAL CALF PAIN: Primary | ICD-10-CM

## 2024-08-20 DIAGNOSIS — M79.661 BILATERAL CALF PAIN: Primary | ICD-10-CM

## 2024-08-23 DIAGNOSIS — M25.562 PAIN IN BOTH KNEES, UNSPECIFIED CHRONICITY: Primary | ICD-10-CM

## 2024-08-23 DIAGNOSIS — M25.561 PAIN IN BOTH KNEES, UNSPECIFIED CHRONICITY: Primary | ICD-10-CM

## 2024-08-26 ENCOUNTER — OFFICE VISIT (OUTPATIENT)
Dept: ORTHOPEDICS | Facility: CLINIC | Age: 69
End: 2024-08-26
Attending: ORTHOPAEDIC SURGERY
Payer: COMMERCIAL

## 2024-08-26 VITALS — HEIGHT: 72 IN | BODY MASS INDEX: 25.32 KG/M2 | WEIGHT: 186.94 LBS

## 2024-08-26 DIAGNOSIS — M79.661 BILATERAL CALF PAIN: ICD-10-CM

## 2024-08-26 DIAGNOSIS — S76.319A HAMSTRING STRAIN, UNSPECIFIED LATERALITY, INITIAL ENCOUNTER: Primary | ICD-10-CM

## 2024-08-26 DIAGNOSIS — M76.899 HAMSTRING TENDINITIS: ICD-10-CM

## 2024-08-26 DIAGNOSIS — M79.661 BILATERAL CALF PAIN: Primary | ICD-10-CM

## 2024-08-26 DIAGNOSIS — M79.662 BILATERAL CALF PAIN: ICD-10-CM

## 2024-08-26 DIAGNOSIS — M79.662 BILATERAL CALF PAIN: Primary | ICD-10-CM

## 2024-08-26 PROCEDURE — 3008F BODY MASS INDEX DOCD: CPT | Mod: CPTII,S$GLB,, | Performed by: ORTHOPAEDIC SURGERY

## 2024-08-26 PROCEDURE — 4010F ACE/ARB THERAPY RXD/TAKEN: CPT | Mod: CPTII,S$GLB,, | Performed by: ORTHOPAEDIC SURGERY

## 2024-08-26 PROCEDURE — 1159F MED LIST DOCD IN RCRD: CPT | Mod: CPTII,S$GLB,, | Performed by: ORTHOPAEDIC SURGERY

## 2024-08-26 PROCEDURE — 3288F FALL RISK ASSESSMENT DOCD: CPT | Mod: CPTII,S$GLB,, | Performed by: ORTHOPAEDIC SURGERY

## 2024-08-26 PROCEDURE — 1126F AMNT PAIN NOTED NONE PRSNT: CPT | Mod: CPTII,S$GLB,, | Performed by: ORTHOPAEDIC SURGERY

## 2024-08-26 PROCEDURE — 99203 OFFICE O/P NEW LOW 30 MIN: CPT | Mod: S$GLB,,, | Performed by: ORTHOPAEDIC SURGERY

## 2024-08-26 PROCEDURE — 99999 PR PBB SHADOW E&M-EST. PATIENT-LVL IV: CPT | Mod: PBBFAC,,, | Performed by: ORTHOPAEDIC SURGERY

## 2024-08-26 PROCEDURE — 1101F PT FALLS ASSESS-DOCD LE1/YR: CPT | Mod: CPTII,S$GLB,, | Performed by: ORTHOPAEDIC SURGERY

## 2024-08-26 NOTE — PROGRESS NOTES
NEW PATIENT ORTHOPAEDIC: HIP    PRIMARY CARE PHYSICIAN: Mary Kay Haines MD   REFERRING PROVIDER: Medardo Skelton MD  [address] [facility]  [place]  [place] [address]     ASSESSMENT & PLAN:    Impression:  Bilateral Hamstring Strain    Follow Up Plan: PRN      Non operative care:    Sawyer Browning has physical exam evidence of above and wishes to pursue an non-operative care. I am recommending the following: PT    Patient comes in with a three-week history of having bilateral right worse than left posterior thigh pain.  No specific injury.  Reports morning discomfort and generalized ache in the posterior thigh.  There is some radiation in the hamstring distribution without focal radiation into his lower leg.  He has discussed care with other providers who have stopped statin based medications as a attempt to help resolve some of this pain.  He has stopped that a few days ago without significant improvement.  He remains very active and motivated.  He denied radiographs today.  I clinically can not reproduce his pain.  He maintains good flexibility of his hamstrings.  He does not have any ischial bursitis.  No other focal points of tenderness.  He has good internal and external rotation of his hip which does not reproduce his pain.  I do not have a clear diagnosis for him today aside from some residual hamstring weakness or tightness.  He does not report any lower back pain.  This isn't a true radiculopathy I feel.  Ultimately discussed treatment with anti-inflammatory medicines, muscle relaxers, physical therapy.  He is electing for physical therapy today.    The patient has been ordered:  Physical Therapy    CONSULTS:   None    ACTIVE PROBLEM LIST  Patient Active Problem List   Diagnosis    Essential hypertension    Other hyperlipidemia    GERD (gastroesophageal reflux disease)    Glucose intolerance (impaired glucose tolerance)    BPH (benign prostatic hyperplasia)    Tortuous aorta    Fatty liver    Elevated alanine  aminotransferase (ALT) level    Bilateral carotid artery stenosis    Thoracic aortic aneurysm without rupture    Aortic root aneurysm    S/P CABG (coronary artery bypass graft)    SOB (shortness of breath)    Typical atrial flutter    Atherosclerosis of native coronary artery of native heart           SUBJECTIVE    CHIEF COMPLAINT: Hip Pain    HPI:   Sawyer Browning is a 69 y.o. male here for evaluation and management of bilateral hip pain. There is not a specific incident that brought about this pain. he has had progressive problems with the hip(s) starting 2 weeks ago and is progressing which is now interfering with activities which include: enjoying hobbies, exercise, and rising from a sitting position    Currently the pain in the joint is mild with activity.  The pain is intermittent and is located in buttocks and thigh.  The pain is described as aching and radiating. Relieving factors include rest and repositioning. No associated Catching, Clicking, and Popping.     They do not report leg length inequality.     Sawyer Browning has no additional complaints.     PROGRESSIVE SYMPTOMS:  Pain effecting living situation  Pain limiting ability to stay fit and healthy    FUNCTIONAL STATUS:   Participate in recreational activities     PREVIOUS TREATMENTS:  Medical: None  Physical Therapy: Activities Modified   Previous Orthopaedic Surgery: None    REVIEW OF SYSTEMS:  PAIN ASSESSMENT:  See HPI.  MUSCULOSKELETAL: See HPI.  OTHER 10 point review of systems is negative except as stated in HPI above    PAST MEDICAL HISTORY   has a past medical history of BPH (benign prostatic hyperplasia), Elevated liver enzymes, Fatty infiltration of liver, Glucose intolerance (impaired glucose tolerance), Hiatal hernia, Hyperlipidemia, Hypertension, Overweight, Reflux, and Snores.     PAST SURGICAL HISTORY   has a past surgical history that includes inguinal hernia; tonsillectomy; Coronary angiography (N/A, 9/6/2023); Aortic root replacement (N/A,  9/18/2023); Coronary Artery Bypass Graft (CABG) (N/A, 9/18/2023); Endoscopic harvest of vein (Left, 9/18/2023); Repair of diaphragmatic hernia (9/18/2023); and transesophageal echocardiogram with possible cardioversion (alea w/ poss cardioversion) (N/A, 11/8/2023).     FAMILY HISTORY  family history includes Aneurysm in his father; Arthritis in his brother; Cancer in his paternal aunt, paternal aunt, paternal uncle, and paternal uncle; Dementia in his paternal grandmother; Diabetes in his brother and paternal grandmother; Heart disease in his father and paternal grandfather; Hernia in his brother; Hypertension in his mother; Macular degeneration in his mother; Migraines in his sister; Stroke in his father.     SOCIAL HISTORY   reports that he has never smoked. He has never used smokeless tobacco. He reports current alcohol use. He reports that he does not use drugs.     ALLERGIES   Review of patient's allergies indicates:   Allergen Reactions    Ace inhibitors Other (See Comments)     cough    Losartan      headache        MEDICATIONS   Current Outpatient Medications on File Prior to Visit   Medication Sig Dispense Refill    acetaminophen (TYLENOL EXTRA STRENGTH) 500 MG tablet 2 tablet EVERY 8 HOURS (route: oral)      amLODIPine (NORVASC) 10 MG tablet Take 1 tablet (10 mg total) by mouth once daily. 90 tablet 3    apixaban (ELIQUIS) 5 mg Tab Take 1 tablet (5 mg total) by mouth 2 (two) times daily. 180 tablet 3    atorvastatin (LIPITOR) 80 MG tablet TAKE 1 TABLET ONCE DAILY 90 tablet 0    azelastine (ASTELIN) 137 mcg (0.1 %) nasal spray 1 spray (137 mcg total) by Nasal route 2 (two) times daily. 30 mL 0    benzonatate (TESSALON) 200 MG capsule Take 1 capsule (200 mg total) by mouth 3 (three) times daily as needed for Cough. 30 capsule 0    carvediloL (COREG) 25 MG tablet Take 1 tablet (25 mg total) by mouth 2 (two) times daily with meals. 180 tablet 3    lansoprazole (PREVACID) 30 MG capsule Take 1 capsule by mouth  once daily.      promethazine-dextromethorphan (PROMETHAZINE-DM) 6.25-15 mg/5 mL Syrp Take 5 mLs by mouth every 6 (six) hours as needed (cough). 240 mL 0    valsartan (DIOVAN) 160 MG tablet Take 1 tablet (160 mg total) by mouth 2 (two) times daily. 180 tablet 3    oxyCODONE (ROXICODONE) 5 MG immediate release tablet 1 tablet EVERY 4 HOURS (route: oral)       No current facility-administered medications on file prior to visit.          PHYSICAL EXAM   height is 6' (1.829 m) and weight is 84.8 kg (186 lb 15.2 oz).   Body mass index is 25.35 kg/m².      All other systems deferred.  GENERAL:  No acute distress  HABITUS: Normal  GAIT: Non-antalgic  SKIN: Normal     HIP EXAM:    bilateral:   ROM:   Flexion: 120 degrees    Internal Rotation: 30 degrees    External Rotation: 30 degrees    Abduction: 45 degrees    Adduction: 20 degrees  No apparent leg length discrepancy    Palpation: No tenderness over greater trochanter, SI joint, ilioposas, IT band, gluteal musculature   Pain is not reproduced with IR or ER of the hip  Strength: 5/5 hip flexion, abduction, knee flexion and extension   Straight leg raise: Negative   Neurovascular Status: Sensation intact to light touch in Sural, saphenous, SPN, DPN, Tibial nerve distribution  2+ pulse DP/PT, normal capillary refill, foot has normal warmth    DATA:  Diagnostic tests reviewed for today's visit:   None

## 2024-09-04 ENCOUNTER — CLINICAL SUPPORT (OUTPATIENT)
Dept: REHABILITATION | Facility: OTHER | Age: 69
End: 2024-09-04
Attending: ORTHOPAEDIC SURGERY
Payer: COMMERCIAL

## 2024-09-04 ENCOUNTER — PATIENT MESSAGE (OUTPATIENT)
Dept: CARDIOLOGY | Facility: CLINIC | Age: 69
End: 2024-09-04
Payer: COMMERCIAL

## 2024-09-04 DIAGNOSIS — S76.311A STRAIN OF RIGHT HAMSTRING MUSCLE, INITIAL ENCOUNTER: Primary | ICD-10-CM

## 2024-09-04 DIAGNOSIS — M79.661 BILATERAL CALF PAIN: ICD-10-CM

## 2024-09-04 DIAGNOSIS — R68.89 DECREASED STRENGTH, ENDURANCE, AND MOBILITY: ICD-10-CM

## 2024-09-04 DIAGNOSIS — M79.662 BILATERAL CALF PAIN: ICD-10-CM

## 2024-09-04 DIAGNOSIS — R53.1 DECREASED STRENGTH, ENDURANCE, AND MOBILITY: ICD-10-CM

## 2024-09-04 DIAGNOSIS — M76.899 HAMSTRING TENDINITIS: ICD-10-CM

## 2024-09-04 DIAGNOSIS — Z74.09 DECREASED STRENGTH, ENDURANCE, AND MOBILITY: ICD-10-CM

## 2024-09-04 PROCEDURE — 97140 MANUAL THERAPY 1/> REGIONS: CPT | Mod: PN

## 2024-09-04 PROCEDURE — 97530 THERAPEUTIC ACTIVITIES: CPT | Mod: PN

## 2024-09-04 PROCEDURE — 97161 PT EVAL LOW COMPLEX 20 MIN: CPT | Mod: PN

## 2024-09-05 PROBLEM — R53.1 DECREASED STRENGTH, ENDURANCE, AND MOBILITY: Status: ACTIVE | Noted: 2024-09-05

## 2024-09-05 PROBLEM — R68.89 DECREASED STRENGTH, ENDURANCE, AND MOBILITY: Status: ACTIVE | Noted: 2024-09-05

## 2024-09-05 PROBLEM — S76.311A STRAIN OF RIGHT HAMSTRING MUSCLE: Status: ACTIVE | Noted: 2024-09-05

## 2024-09-05 PROBLEM — Z74.09 DECREASED STRENGTH, ENDURANCE, AND MOBILITY: Status: ACTIVE | Noted: 2024-09-05

## 2024-09-05 NOTE — PLAN OF CARE
"[facility]   Physical Therapy Initial Evaluation      Name: Sawyer Browning  Clinic Number: [id number]    Therapy Diagnosis:   Encounter Diagnoses   Name Primary?    Bilateral calf pain     Hamstring tendinitis     Strain of right hamstring muscle, initial encounter Yes    Decreased strength, endurance, and mobility         Physician: Medardo Skelton MD    Physician Orders: PT Eval and Treat - hip, knees  Medical Diagnosis from Referral: M79.661,M79.662 (ICD-10-CM) - Bilateral calf pain M76.899 (ICD-10-CM) - Hamstring tendinitis  Evaluation Date: 9/4/2024  Authorization Period Expiration: 12/31/2024  Plan of Care Expiration: 11/4/2024  Progress Note Due: 10/4/2024  Date of Surgery: n/a  Visit # / Visits authorized: 1/ 1   FOTO: 1/ 3    Precautions: Standard     Time In: 215pm  Time Out: 325pm  Total Billable Time: 70 minutes 1:1    Subjective     Date of onset: acute    History of current condition - Sawyer reports: "I'm here for my hamstring pain, not my knees or my hips. I do not have pain there." Says that his c/c today is bilateral hamstring pain (R>L) that started 3 weeks ago without TANISHA or trauma. Says that he has always had a nagging hamstring pain for several years, but says that acute pain started around the time he was doing an exercise class that he has been attending for several years. Says that at this time they were doing an exercise routine and had done a lot of repetitions that caused him to feel more tightness in his hamstrings than before. Denies bruising, muscle deformities following incident.    Since then, he has taken a break from exercise (elliptical, group exercises), tried modalities (ice>heat), increasing water intake ("to reduce risk of dehydration and hamstring cramping") without relief. Has also discussed with cardiology getting off his statins for the past 2 weeks due to Hx of mm pain, but says he has not round a change with not taking medication. He says he " started doing exercises he found on the internet with temporary relief, which includes long sit HS stretch, quad stretch, supine HS stretch w/ towel, ITB stretch; LAX ball to R HS in the car also occas to R glute.     C/c is AM tightness, loosens as the day goes on, then PM tightness resumes. No difficulty with sitting, sleeping. Occasional tension with prolonged walking or stair climbing, bending over and lifting. Experiencing tightness > than pain at proximal half of R HS; notes only mild tension in L HS.     Pt denies drop foot, change of B/B control, saddle paresthesias, unexplained weight gain/loss, fever, chills or night sweats.       Falls: none    Imaging: none in EPIC    Prior Therapy: none for c/c  Social History: 2 story home, steps to front door, elevator to 2nd floor for his wife's needs   Occupation: retired  Recreation: Works out at [employer] over the past 6-7 years -- group weight lifting, elliptical  Prior Level of Function: Indep  Current Level of Function: occas pain and dysfunction with ADLs    Pain:  Current 5/10, worst 8/10, best 0/10   Location: R > L hamstring  Description: Tight  Aggravating Factors: bending over, lifting, stair climbing, prolonged walking  Easing Factors: hamstring stretches    Patients goals: resolve sx     Medical History:   Past Medical History:   Diagnosis Date    BPH (benign prostatic hyperplasia)     Elevated liver enzymes     Fatty infiltration of liver     Glucose intolerance (impaired glucose tolerance)     Hiatal hernia     Hyperlipidemia     Hypertension     Overweight     Reflux     Snores        Surgical History:   Sawyer Browning  has a past surgical history that includes inguinal hernia; tonsillectomy; Coronary angiography (N/A, 9/6/2023); Aortic root replacement (N/A, 9/18/2023); Coronary Artery Bypass Graft (CABG) (N/A, 9/18/2023); Endoscopic harvest of vein (Left, 9/18/2023); Repair of diaphragmatic hernia (9/18/2023); and transesophageal echocardiogram with  "possible cardioversion (alea w/ poss cardioversion) (N/A, 11/8/2023).    Medications:   Sawyer has a current medication list which includes the following prescription(s): acetaminophen, amlodipine, apixaban, atorvastatin, azelastine, benzonatate, carvedilol, lansoprazole, oxycodone, promethazine-dextromethorphan, and valsartan.    Allergies:   Review of patient's allergies indicates:   Allergen Reactions    Ace inhibitors Other (See Comments)     cough    Losartan      headache        Objective      Observation: decreased glute tone  Palpation: mild increase in mm tension of R HS with decreased MFM of R>L HS    Hip  Right   Left  Pain/Dysfunction with Movement    AROM PROM MMT AROM PROM MMT    Flexion 100 120 5 100 120 5    Extension - knee ext 0 10 4-/5 0 10 5/5    Extension - knee flex - - 3+/5 - - 4+/5    Abduction WFL - 4-/5 WFL - 4/5    Adduction Past midline - 4+/5 Past midline - 5/5    Internal rotation 10 15 4/5 40 40 4/5    External rotation 30 35 4+/5 30 40 5/5      Knee ROM: R = 0-5-120, L = 0-125  Knee strength: Right Left  Quads   5/5 5/5  Hamstrings  4/5 5/5    Ankle ROM: grossly WFL  Ankle strength: DF = 5/5, PF = 5/5    Flexibility:  Jorgito test: Hip flexors: min hypo R  Ely's: Quads: R = 95 deg, L = 110 deg  Hannah test: ITB: WFL  WALI: WNL  FADIR: WNL  Hamstring (SLR): R = 80 deg w/ difficulty maintaining TKE RLE + increased tension at post thigh; L = 90 deg    Special tests:  Quadrant test: (--)  Scour test: (--)  ASIS compression: (--)  ASIS distraction: (--)  SLR: (--)  Contralateral SLR: (--)  NTT: (--)      Functional Movement Analysis:   Gait: I  Sit<>stand: I  Bed mobility: I  Stair climbing: I  DL squat: I  SL squat: mod I with HHAx2, decreased hip/knee flex R>L  SL HR: x25     Balance:  SLS EO: 30" B with mild LE valgus RLE  SLS EC: <10" with LOB R      Intake Outcome Measure for FOTO knee Survey    Therapist reviewed FOTO scores for Sawyer Browning on 9/4/2024.   FOTO report - see Media section " "or FOTO account episode details.             Treatment     Total Treatment time (time-based codes) separate from Evaluation: 40 minutes     Sawyer received the treatments listed below:      therapeutic exercises to develop strength, endurance, ROM, flexibility, posture, and core stabilization for 00 minutes including:  None today    manual therapy techniques: Joint mobilizations, Myofacial release, and Soft tissue Mobilization were applied to the: R HS for 10 minutes, including:  10 min x vacuum/cupping STM with manual therapy techniques was performed to R hamstring to decrease muscle tightness, increase circulation and promote healing process. The pt's skin was monitored for redness adjusting pressure as needed. The pt was instructed in possible side effects of bruising and/or soreness. Pt verbalized understanding.  Consider dry needling in the future if MPT does not give complete relief.    neuromuscular re-education activities to improve: Balance, Coordination, Kinesthetic, Sense, Proprioception, and Posture for 00 minutes. The following activities were included:  None today    therapeutic activities to improve functional performance for 30  minutes, including:  Pt edu on dx, pathogenesis, prognosis, PT POC - see below.  Pt edu on HEP, importance of compliance and consistency with stretches daily, and importance of strength and stabilization of hip/pelvis to reduce excessive compensatory use of B HS during ADLs:  Supine quad stretch x3x30" with strap  Supine HS stretch x3x30" w/ strap  SL bridge x 20  Sustained bridge with marches x 20  SL hip abd x10x10" holds  SL clams x10x10" holds  SL reverse clams x10x10" holds  Prone hip ext x10x10" holds  Prone donkey kicks x10x10" holds      Patient Education and Home Exercises     Education provided:   - - Patient educated regarding pathogenesis, diagnosis, protocol, prognosis, POC, and HEP, including use of visual assistance for understanding of anatomy and dysfunction. " Written Home Exercises Provided with written and verbal instructions for frequency and duration of the following exercises: see list above. Pt educated on HEP and activity modifications to reduce c/o pain and improve overall function.   - Pt was educated in posture and body mechanics.  Use of a lumbar roll was recommended and demonstrated here today.  Purchase information provided.   - Pt also educated on use of modalities prn to reduce c/o pain and dysfunction.   - Pt educated on clinic's cancellation/no-show policy of missing 3 consecutive PT appointments, which will result in an automatic discharge from therapy services 2* to non-compliance, unless otherwise stated.   - Patient demo good understanding of the education provided. Patient demo good return demo of skill of exercises.      Written Home Exercises Provided: Yes. Exercises were reviewed and Sawyer was able to demonstrate them prior to the end of the session.  Sawyer demonstrated good  understanding of the education provided. See EMR under Patient Instructions for exercises provided during therapy sessions.    Assessment     Sawyer is a 69 y.o. male referred to outpatient Physical Therapy with a medical diagnosis of M79.661,M79.662 (ICD-10-CM) - Bilateral calf pain M76.899 (ICD-10-CM) - Hamstring tendinitis. Patient presents with marked limitations in ROM, joint and myofascial mobility, flexibility, strength, postural awareness/endurance, motor control and coordination. S/s associated with referring diagnosis, with s/s associated with compensatory overuse of hamstrings and, therefore strained, due to moderate hip/glute weakness, decreased overall motor control/coordination for stabilization, and decreased hip IR. Impairments limit pt with all functional activities including ADLs, HHCs, and recreational activities.      Patient prognosis is Good.   Patient will benefit from skilled outpatient Physical Therapy to address the deficits stated above and in the  chart below, provide patient /family education, and to maximize patientt's level of independence.     Plan of care discussed with patient: Yes  Patient's spiritual, cultural and educational needs considered and patient is agreeable to the plan of care and goals as stated below:     Anticipated Barriers for therapy: standard    Medical Necessity is demonstrated by the following  History  Co-morbidities and personal factors that may impact the plan of care [x] LOW: no personal factors / co-morbidities  [] MODERATE: 1-2 personal factors / co-morbidities  [] HIGH: 3+ personal factors / co-morbidities    Moderate / High Support Documentation:   Co-morbidities affecting plan of care: none    Personal Factors:   Cares for disabled wife     Examination  Body Structures and Functions, activity limitations and participation restrictions that may impact the plan of care [] LOW: addressing 1-2 elements  [] MODERATE: 3+ elements  [x] HIGH: 4+ elements (please support below)    Moderate / High Support Documentation: ROM, joint and myofascial mobility, flexibility, strength, endurance, motor control/coordination, balance, community amb and participation, stair climb at home, HHCs and taking care of disabled wife     Clinical Presentation [x] LOW: stable  [] MODERATE: Evolving  [] HIGH: Unstable     Decision Making/ Complexity Score: low       Goals:  Short Term Goals (4 Weeks):  1. Pt able to walk >=30 minutes with household chores with <4/10 pain. (Not met, progressing)  2.  Pt able to transfer in/out of chairs of various heights with <4/10 pain. (Not met, progressing)  3. Pt able to ascend/descend curb, independently, 1 handrail, with <4/10 pain. (Not met, progressing)    Long Term Goals (8 Weeks):  1. Pt able to walk >=1 hour with household chores with <2/10 pain. (Not met, progressing)  2.  Pt able to squat/lift >/=20# from floor to waist with <2/10 pain. (Not met, progressing)  3. Pt able to ascend/descend 1 flight of stairs,  independently, 1 handrail, with <2/10 pain. (Not met, progressing)  4. Pt able to return to full work / recreational activities with min difficulty and pain <2/10. (Not met, progressing)  5. Pt will be independent with HEP and self management of symptoms.  (Not met, progressing)    Plan     Plan of care Certification: 9/4/2024 to 11/4/2024.    Outpatient Physical Therapy 2 times weekly for 8 weeks to include the following interventions: Aquatic Therapy, Cervical/Lumbar Traction, Electrical Stimulation prn, Gait Training, Iontophoresis (with dexamethasone prn), Manual Therapy, Moist Heat/ Ice, Neuromuscular Re-ed, Patient Education, Self Care, Therapeutic Activities, and Therapeutic Exercise. Progress HEP towards D/C. Recommend F/U with MD if symptoms worsen or do not resolve. Patient may be seen by a PTA for treatment to carry out their plan of care.  Face-to-face conferences will be held.      Merary Fox, PT        Physician's Signature: _________________________________________ Date: ________________

## 2024-09-06 ENCOUNTER — PATIENT MESSAGE (OUTPATIENT)
Dept: REHABILITATION | Facility: OTHER | Age: 69
End: 2024-09-06
Payer: COMMERCIAL

## 2024-09-06 RX ORDER — AMIODARONE HYDROCHLORIDE 200 MG/1
200 TABLET ORAL DAILY
COMMUNITY

## 2024-09-09 DIAGNOSIS — N40.1 BPH WITH OBSTRUCTION/LOWER URINARY TRACT SYMPTOMS: Primary | ICD-10-CM

## 2024-09-09 DIAGNOSIS — N13.8 BPH WITH OBSTRUCTION/LOWER URINARY TRACT SYMPTOMS: Primary | ICD-10-CM

## 2024-09-09 DIAGNOSIS — N52.01 ERECTILE DYSFUNCTION DUE TO ARTERIAL INSUFFICIENCY: ICD-10-CM

## 2024-09-09 RX ORDER — TADALAFIL 20 MG/1
20 TABLET ORAL DAILY PRN
Qty: 10 TABLET | Refills: 10 | Status: SHIPPED | OUTPATIENT
Start: 2024-09-09 | End: 2025-09-09

## 2024-09-10 ENCOUNTER — CLINICAL SUPPORT (OUTPATIENT)
Dept: REHABILITATION | Facility: OTHER | Age: 69
End: 2024-09-10
Payer: COMMERCIAL

## 2024-09-10 DIAGNOSIS — S76.311A STRAIN OF RIGHT HAMSTRING MUSCLE, INITIAL ENCOUNTER: Primary | ICD-10-CM

## 2024-09-10 DIAGNOSIS — R68.89 DECREASED STRENGTH, ENDURANCE, AND MOBILITY: ICD-10-CM

## 2024-09-10 DIAGNOSIS — R53.1 DECREASED STRENGTH, ENDURANCE, AND MOBILITY: ICD-10-CM

## 2024-09-10 DIAGNOSIS — Z74.09 DECREASED STRENGTH, ENDURANCE, AND MOBILITY: ICD-10-CM

## 2024-09-10 PROCEDURE — 97140 MANUAL THERAPY 1/> REGIONS: CPT | Mod: PN

## 2024-09-10 PROCEDURE — 97112 NEUROMUSCULAR REEDUCATION: CPT | Mod: PN

## 2024-09-10 PROCEDURE — 97530 THERAPEUTIC ACTIVITIES: CPT | Mod: PN

## 2024-09-10 NOTE — PROGRESS NOTES
"[facility]   Physical Therapy Treatment Note      Name: Sawyer Browning  Clinic Number: [id number]    Therapy Diagnosis:   Encounter Diagnoses   Name Primary?    Strain of right hamstring muscle, initial encounter Yes    Decreased strength, endurance, and mobility      Physician: Medardo Skelton MD    Visit Date: 9/10/2024    Physician Orders: PT Eval and Treat - hip, knees  Medical Diagnosis from Referral: M79.661,M79.662 (ICD-10-CM) - Bilateral calf pain M76.899 (ICD-10-CM) - Hamstring tendinitis  Evaluation Date: 2024  Authorization Period Expiration: 2024  Plan of Care Expiration: 2024  Progress Note Due: 10/4/2024  Date of Surgery: n/a  Visit # / Visits authorized:    FOTO: 1/ 3     Precautions: Standard      Time In: 500pm  Time Out: 600pm  Total Billable Time: 60 minutes 1:1       PTA Visit #: 0/5       Subjective     Patient reports: cont to have AM tightness, but sx loosen up as the day goes on. "I'm not in a lot of pain throughout the day."  He was compliant with home exercise program.  Response to previous treatment: good  Functional change: reduced pain in PM    Pain: 3/10  Location: R > L hamstring    Objective      Objective Measures updated at progress report unless specified.     Treatment     Sawyer received the treatments listed below:      therapeutic exercises to develop strength, endurance, ROM, flexibility, posture, and core stabilization for 00 minutes including:  None today     manual therapy techniques: Joint mobilizations, Myofacial release, and Soft tissue Mobilization were applied to the: R HS for 25 minutes, includin min x manual TrP release to lateral HS  8 min x IASTYM to R HS, ITB  10 min x vacuum/cupping STM with manual therapy techniques was performed to R hamstring to decrease muscle tightness, increase circulation and promote healing process. The pt's skin was monitored for redness adjusting pressure as needed. The pt was instructed in " "possible side effects of bruising and/or soreness. Pt verbalized understanding.  Consider dry needling in the future if MPT does not give complete relief.     neuromuscular re-education activities to improve: Balance, Coordination, Kinesthetic, Sense, Proprioception, and Posture for 20 minutes. The following activities were included:  Supine quad stretch x3x30" with strap  Supine HS stretch x3x30" w/ strap  Reviewed remaining HEP for understanding and technique  +stool crawls x2x40 ft  +standing 3-way hip x 15 ea direction x medium loop at ankles       therapeutic activities to improve functional performance for 15 minutes, including:  +side stepping x2x 40 ft x medium loop at ankles  +hip thrust x 20 x 5" holds x 18" plyo box  +step ups x 2x10x 8" step        Patient Education and Home Exercises       Education provided:   - none today    Written Home Exercises Provided: Pt instructed to continue prior HEP. Exercises were reviewed and Sawyer was able to demonstrate them prior to the end of the session.  Sawyer demonstrated good  understanding of the education provided. See Electronic Medical Record under Patient Instructions for exercises provided during therapy sessions    Assessment     Progressed MPT today to promote MFM and pain modulation to R posterior leg. Pt presents with decreased MFM to distal lateral HS and ITB, but with good tolerance to MPT and notable improvement in HS soft tissue quality. Reviewed HEP for understanding and technique; good return technique with several exercises indicating good compliance at home. Initiated glute and quad strengthening today, with good tolerance and notable fatigue by end of session.    Sawyer Is progressing well towards his goals.   Patient prognosis is Good.     Patient will continue to benefit from skilled outpatient physical therapy to address the deficits listed in the problem list box on initial evaluation, provide pt/family education and to maximize pt's level of " independence in the home and community environment.     Patient's spiritual, cultural and educational needs considered and pt agreeable to plan of care and goals.     Anticipated barriers to physical therapy: standard    Goals: updated 9/12/2024   Short Term Goals (4 Weeks):  1. Pt able to walk >=30 minutes with household chores with <4/10 pain. (Not met, progressing)  2.  Pt able to transfer in/out of chairs of various heights with <4/10 pain. (Not met, progressing)  3. Pt able to ascend/descend curb, independently, 1 handrail, with <4/10 pain. (Not met, progressing)     Long Term Goals (8 Weeks):  1. Pt able to walk >=1 hour with household chores with <2/10 pain. (Not met, progressing)  2.  Pt able to squat/lift >/=20# from floor to waist with <2/10 pain. (Not met, progressing)  3. Pt able to ascend/descend 1 flight of stairs, independently, 1 handrail, with <2/10 pain. (Not met, progressing)  4. Pt able to return to full work / recreational activities with min difficulty and pain <2/10. (Not met, progressing)  5. Pt will be independent with HEP and self management of symptoms.  (Not met, progressing)  Plan     Cont with POC for strength, mobility.    Merary Fox, PT

## 2024-09-16 ENCOUNTER — CLINICAL SUPPORT (OUTPATIENT)
Dept: REHABILITATION | Facility: OTHER | Age: 69
End: 2024-09-16
Payer: COMMERCIAL

## 2024-09-16 DIAGNOSIS — S76.311A STRAIN OF RIGHT HAMSTRING MUSCLE, INITIAL ENCOUNTER: Primary | ICD-10-CM

## 2024-09-16 DIAGNOSIS — Z74.09 DECREASED STRENGTH, ENDURANCE, AND MOBILITY: ICD-10-CM

## 2024-09-16 DIAGNOSIS — R53.1 DECREASED STRENGTH, ENDURANCE, AND MOBILITY: ICD-10-CM

## 2024-09-16 DIAGNOSIS — R68.89 DECREASED STRENGTH, ENDURANCE, AND MOBILITY: ICD-10-CM

## 2024-09-16 PROCEDURE — 97140 MANUAL THERAPY 1/> REGIONS: CPT | Mod: PN

## 2024-09-16 PROCEDURE — 97112 NEUROMUSCULAR REEDUCATION: CPT | Mod: PN

## 2024-09-16 PROCEDURE — 97530 THERAPEUTIC ACTIVITIES: CPT | Mod: PN

## 2024-09-16 NOTE — PROGRESS NOTES
[facility]   Physical Therapy Treatment Note      Name: Sawyer Browning  Clinic Number: [id number]    Therapy Diagnosis:   Encounter Diagnoses   Name Primary?    Strain of right hamstring muscle, initial encounter Yes    Decreased strength, endurance, and mobility      Physician: Medarod Skelton MD    Visit Date: 2024    Physician Orders: PT Eval and Treat - hip, knees  Medical Diagnosis from Referral: M79.661,M79.662 (ICD-10-CM) - Bilateral calf pain M76.899 (ICD-10-CM) - Hamstring tendinitis  Evaluation Date: 2024  Authorization Period Expiration: 2024  Plan of Care Expiration: 2024  Progress Note Due: 10/4/2024  Date of Surgery: n/a  Visit # / Visits authorized:  +  (eval)  FOTO: 1/ 3     Precautions: Standard      Time In: 1:15pm  Time Out: 2:15pm  Total Billable Time: 60 minutes 1:1       PTA Visit #: 0/5       Subjective     Patient reports: tension in R>L HS is intermittently tight throughout the day.   He was compliant with home exercise program.  Response to previous treatment: good  Functional change: reduced pain in PM    Pain: 3/10  Location: R > L hamstring    Objective      Objective Measures updated at progress report unless specified.     Treatment     Sawyer received the treatments listed below:      therapeutic exercises to develop strength, endurance, ROM, flexibility, posture, and core stabilization for 00 minutes including:  None today     manual therapy techniques: Joint mobilizations, Myofacial release, and Soft tissue Mobilization were applied to the: R HS for 25 minutes, includin min x manual TrP release to lateral HS  8 min x IASTYM to R HS, ITB  10 min x vacuum/cupping STM with manual therapy techniques was performed to R hamstring to decrease muscle tightness, increase circulation and promote healing process. The pt's skin was monitored for redness adjusting pressure as needed. The pt was instructed in possible side effects of  "bruising and/or soreness. Pt verbalized understanding.  Consider dry needling in the future if MPT does not give complete relief.     neuromuscular re-education activities to improve: Balance, Coordination, Kinesthetic, Sense, Proprioception, and Posture for 20 minutes. The following activities were included:  +Prone HS curls (emphasis on SLOW lowering) x15 B x GTB  Supine quad stretch x3x30" with strap  Supine HS stretch x3x30" w/ strap    stool crawls x2x60 ft    +matrix leg ext (SL) - add nv  +matrix leg curls (2 down, 1 up) - add nv    standing 3-way hip x 15 ea direction x medium loop at ankles - defer today, resume nv       therapeutic activities to improve functional performance for 15 minutes, including:  Discussed procedure, benefits, s/e of dry needling as an alternative to MPT but pt declined today.  +straight leg deadlifts x2x8 - HHAx1 on chair  +TRX assisted squats x1x10  step ups x 1x10x 12" step - HHAx1  side stepping x2x 40 ft x medium loop at ankles - defer today, resume nv  hip thrust x 20 x 5" holds x 18" plyo box - defer today, resume nv          Patient Education and Home Exercises       Education provided:   - none today    Written Home Exercises Provided: Pt instructed to continue prior HEP. Exercises were reviewed and Sawyer was able to demonstrate them prior to the end of the session.  Sawyer demonstrated good  understanding of the education provided. See Electronic Medical Record under Patient Instructions for exercises provided during therapy sessions    Assessment     Discussed benefits of dry needling but pt declined today, opting to cont with MPT in therapy and considering a massage outside of clinic.  Attempted to progress there program with emphasis on eccentric loading and elongation of the hamstrings (dionne) today. Fair tolerance overall with good improvements in c/o tension during therex but noted tension returned by end of session. Plan to progress HS strengthening nv as " denton Jacques Is progressing well towards his goals.   Patient prognosis is Good.     Patient will continue to benefit from skilled outpatient physical therapy to address the deficits listed in the problem list box on initial evaluation, provide pt/family education and to maximize pt's level of independence in the home and community environment.     Patient's spiritual, cultural and educational needs considered and pt agreeable to plan of care and goals.     Anticipated barriers to physical therapy: standard    Goals: updated 9/16/2024   Short Term Goals (4 Weeks):  1. Pt able to walk >=30 minutes with household chores with <4/10 pain. (Not met, progressing)  2.  Pt able to transfer in/out of chairs of various heights with <4/10 pain. (Not met, progressing)  3. Pt able to ascend/descend curb, independently, 1 handrail, with <4/10 pain. (Not met, progressing)     Long Term Goals (8 Weeks):  1. Pt able to walk >=1 hour with household chores with <2/10 pain. (Not met, progressing)  2.  Pt able to squat/lift >/=20# from floor to waist with <2/10 pain. (Not met, progressing)  3. Pt able to ascend/descend 1 flight of stairs, independently, 1 handrail, with <2/10 pain. (Not met, progressing)  4. Pt able to return to full work / recreational activities with min difficulty and pain <2/10. (Not met, progressing)  5. Pt will be independent with HEP and self management of symptoms.  (Not met, progressing)  Plan     Cont with POC for strength, mobility.    Merary Fox, PT

## 2024-09-18 ENCOUNTER — CLINICAL SUPPORT (OUTPATIENT)
Dept: REHABILITATION | Facility: OTHER | Age: 69
End: 2024-09-18
Payer: COMMERCIAL

## 2024-09-18 DIAGNOSIS — R53.1 DECREASED STRENGTH, ENDURANCE, AND MOBILITY: ICD-10-CM

## 2024-09-18 DIAGNOSIS — Z74.09 DECREASED STRENGTH, ENDURANCE, AND MOBILITY: ICD-10-CM

## 2024-09-18 DIAGNOSIS — S76.311A STRAIN OF RIGHT HAMSTRING MUSCLE, INITIAL ENCOUNTER: Primary | ICD-10-CM

## 2024-09-18 DIAGNOSIS — R68.89 DECREASED STRENGTH, ENDURANCE, AND MOBILITY: ICD-10-CM

## 2024-09-18 PROCEDURE — 97112 NEUROMUSCULAR REEDUCATION: CPT | Mod: PN

## 2024-09-18 PROCEDURE — 97530 THERAPEUTIC ACTIVITIES: CPT | Mod: PN

## 2024-09-18 NOTE — PROGRESS NOTES
"[facility]   Physical Therapy Treatment Note      Name: Sawyer Browning  Clinic Number: [id number]    Therapy Diagnosis:   Encounter Diagnoses   Name Primary?    Strain of right hamstring muscle, initial encounter Yes    Decreased strength, endurance, and mobility      Physician: Medardo Skelton MD    Visit Date: 2024    Physician Orders: PT Eval and Treat - hip, knees  Medical Diagnosis from Referral: M79.661,M79.662 (ICD-10-CM) - Bilateral calf pain M76.899 (ICD-10-CM) - Hamstring tendinitis  Evaluation Date: 2024  Authorization Period Expiration: 2024  Plan of Care Expiration: 2024  Progress Note Due: 10/4/2024  Date of Surgery: n/a  Visit # / Visits authorized: 3/20 +  (eval)   FOTO: 1/ 3     Precautions: Standard      Time In: 1:15pm  Time Out: 2:25pm  Total Billable Time: 60 minutes 1:1 (70 min total)       PTA Visit #: 0/5       Subjective     Patient reports: "it still feels tight. I wake up with tightness, it works itself out for a little while and then it's back."  He was compliant with home exercise program.  Response to previous treatment: good  Functional change: reduced pain in PM    Pain: 3/10  Location: R > L hamstring    Objective      Objective Measures updated at progress report unless specified.     Treatment     Sawyer received the treatments listed below:      therapeutic exercises to develop strength, endurance, ROM, flexibility, posture, and core stabilization for 00 minutes including:  None today     manual therapy techniques: Joint mobilizations, Myofacial release, and Soft tissue Mobilization were applied to the: R HS for 10 minutes, includin min x manual TrP release to lateral HS  00 min x IASTYM to R HS, ITB  10 min x vacuum/cupping STM with manual therapy techniques was performed to R hamstring to decrease muscle tightness, increase circulation and promote healing process. The pt's skin was monitored for redness adjusting pressure as " "needed. The pt was instructed in possible side effects of bruising and/or soreness. Pt verbalized understanding.  Consider dry needling in the future if MPT does not give complete relief.     neuromuscular re-education activities to improve: Balance, Coordination, Kinesthetic, Sense, Proprioception, and Posture for 30 minutes. The following activities were included:  standing 3-way hip x 15 ea direction x medium loop at ankles  Prone HS curls (emphasis on SLOW lowering) x20 B x GTB  Supine quad stretch x3x30" with strap -- consider resuming in the future  standing HS stretch @ stairs x3x30"    stool crawls x4x60 ft (= 2 down<>back laps)  +matrix leg ext (SL) x 3x10 x 15#  +matrix leg curls (2 down, 1 up) x 3x10 x 15#      therapeutic activities to improve functional performance for 30 minutes, including:  side stepping x2x 40 ft x medium loop at ankles  straight leg deadlifts x3x8 - HHAx1 on chair  TRX assisted squats x1x10  step ups x 1x10x 12" step - HHAx1  hip thrust x 1x10 x 5" holds x 18" plyo box   +nordic HS curls x3x5      Patient Education and Home Exercises       Education provided:   - none today    Written Home Exercises Provided: Pt instructed to continue prior HEP. Exercises were reviewed and Sawyer was able to demonstrate them prior to the end of the session.  Sawyer demonstrated good  understanding of the education provided. See Electronic Medical Record under Patient Instructions for exercises provided during therapy sessions    Assessment     Progressed therex program today, with heavy emphasis on eccentric loading of hamstrings in various hip angles and quad strength. Good tolerance with moderate fatigue by end of session but notes good reduction of HS tightness by end of session. Required cupping again today for further tension relief at end of session; MPT applied by PT today but was unsupervised care today 2* to time constraints.      Sawyer Is progressing well towards his goals.   Patient " prognosis is Good.     Patient will continue to benefit from skilled outpatient physical therapy to address the deficits listed in the problem list box on initial evaluation, provide pt/family education and to maximize pt's level of independence in the home and community environment.     Patient's spiritual, cultural and educational needs considered and pt agreeable to plan of care and goals.     Anticipated barriers to physical therapy: standard    Goals: updated 9/19/2024   Short Term Goals (4 Weeks):  1. Pt able to walk >=30 minutes with household chores with <4/10 pain. (Not met, progressing)  2.  Pt able to transfer in/out of chairs of various heights with <4/10 pain. (Not met, progressing)  3. Pt able to ascend/descend curb, independently, 1 handrail, with <4/10 pain. (Not met, progressing)     Long Term Goals (8 Weeks):  1. Pt able to walk >=1 hour with household chores with <2/10 pain. (Not met, progressing)  2.  Pt able to squat/lift >/=20# from floor to waist with <2/10 pain. (Not met, progressing)  3. Pt able to ascend/descend 1 flight of stairs, independently, 1 handrail, with <2/10 pain. (Not met, progressing)  4. Pt able to return to full work / recreational activities with min difficulty and pain <2/10. (Not met, progressing)  5. Pt will be independent with HEP and self management of symptoms.  (Not met, progressing)    Plan     Cont with POC for strength, mobility.    Merary Fox, PT

## 2024-09-23 ENCOUNTER — CLINICAL SUPPORT (OUTPATIENT)
Dept: REHABILITATION | Facility: OTHER | Age: 69
End: 2024-09-23
Payer: COMMERCIAL

## 2024-09-23 DIAGNOSIS — Z74.09 DECREASED STRENGTH, ENDURANCE, AND MOBILITY: ICD-10-CM

## 2024-09-23 DIAGNOSIS — R68.89 DECREASED STRENGTH, ENDURANCE, AND MOBILITY: ICD-10-CM

## 2024-09-23 DIAGNOSIS — R53.1 DECREASED STRENGTH, ENDURANCE, AND MOBILITY: ICD-10-CM

## 2024-09-23 DIAGNOSIS — S76.311A STRAIN OF RIGHT HAMSTRING MUSCLE, INITIAL ENCOUNTER: Primary | ICD-10-CM

## 2024-09-23 PROCEDURE — 97530 THERAPEUTIC ACTIVITIES: CPT | Mod: PN,CQ

## 2024-09-23 PROCEDURE — 97110 THERAPEUTIC EXERCISES: CPT | Mod: PN,CQ

## 2024-09-23 PROCEDURE — 97112 NEUROMUSCULAR REEDUCATION: CPT | Mod: PN,CQ

## 2024-09-23 NOTE — PROGRESS NOTES
[facility]   Physical Therapy Treatment Note      Name: Sawyer Browning  Clinic Number: [id number]    Therapy Diagnosis:   Encounter Diagnoses   Name Primary?    Strain of right hamstring muscle, initial encounter Yes    Decreased strength, endurance, and mobility      Physician: Medardo Skelton MD    Visit Date: 2024    Physician Orders: PT Eval and Treat - hip, knees  Medical Diagnosis from Referral: M79.661,M79.662 (ICD-10-CM) - Bilateral calf pain M76.899 (ICD-10-CM) - Hamstring tendinitis  Evaluation Date: 2024  Authorization Period Expiration: 2024  Plan of Care Expiration: 2024  Progress Note Due: 10/4/2024  Date of Surgery: n/a  Visit # / Visits authorized:  +  (eval)   FOTO: 2/ 3 (last taken 2024)      Precautions: Standard      Time In: 1:15 pm   Time Out: 2:10 pm   Total Billable Time: 55 minutes 1:1      PTA Visit #:        Subjective     Patient reports: tightness has improved, but was tight this morning. Every morning it is tight, and when he walks around the house it helps loosen it. Has moved exercise routine from first thing in the morning to later on in the day, which has helped. States he had a deep tissue massage on Friday at [facility], which was great and helped improve his tightness.     He was compliant with home exercise program.  Response to previous treatment: good response   Functional change: reduced pain in PM     Pain: 0/10   Location: R > L hamstring    Objective      Objective Measures updated at progress report unless specified.     Treatment     Sawyer received the treatments listed below:      therapeutic exercises to develop strength, endurance, ROM, flexibility, posture, and core stabilization for 00 minutes including:  None today     manual therapy techniques: Joint mobilizations, Myofacial release, and Soft tissue Mobilization were applied to the: R HS for 08 minutes, includin min x manual TrP  "release to lateral HS  00 min x IASTYM to R HS, ITB  08 min x vacuum/cupping STM with manual therapy techniques was performed to R hamstring to decrease muscle tightness, increase circulation and promote healing process. The pt's skin was monitored for redness adjusting pressure as needed. The pt was instructed in possible side effects of bruising and/or soreness. Pt verbalized understanding.  Consider dry needling in the future if MPT does not give complete relief.     neuromuscular re-education activities to improve: Balance, Coordination, Kinesthetic, Sense, Proprioception, and Posture for 24 minutes. The following activities were included:  standing 3-way hip x 15 ea direction x heavy loop at ankles  Prone HS curls (emphasis on SLOW lowering) x20 B x GTB  Supine quad stretch x3x30" with strap -- consider resuming in the future  standing HS stretch @ stairs x3x30"     stool crawls x4x60 ft (= 2 down<>back laps)   matrix leg ext (SL) x 3x10 x 15#   matrix leg curls (2 down, 1 up) x 3x10 x 15#       therapeutic activities to improve functional performance for 23 minutes, including:  side stepping x2x 40 ft x medium loop at ankles   straight leg deadlifts x3x8 - HHAx1 on chair   TRX assisted squats x2x15  step ups x 2x10x 12" step plyo box - HHAx1   hip thrust x 1x10 x 5" holds x 18" plyo box   +nordic HS curls x3x5   + educated pt on seated HS stretch, and duration       Patient Education and Home Exercises       Education provided:   - none today    Written Home Exercises Provided: Pt instructed to continue prior HEP. Exercises were reviewed and Sawyer was able to demonstrate them prior to the end of the session.  Sawyer demonstrated good  understanding of the education provided. See Electronic Medical Record under Patient Instructions for exercises provided during therapy sessions    Assessment     Overall good tolerance to treatment with no adverse effects. Pt required intermittent verbal cues for slow and " controlled eccentric lowering. Pt noted increased HS tightness with strengthening today, and therefore initiated MPT again today. Pt had good redness and noted improved muscle tightness and tension relief afterwards. Good fatigue with intervention progressions of sets today. Continue to monitor and progress pt as tolerated, and continue manual PT as needed.     Sawyer Is progressing well towards his goals.   Patient prognosis is Good.     Patient will continue to benefit from skilled outpatient physical therapy to address the deficits listed in the problem list box on initial evaluation, provide pt/family education and to maximize pt's level of independence in the home and community environment.     Patient's spiritual, cultural and educational needs considered and pt agreeable to plan of care and goals.     Anticipated barriers to physical therapy: standard    Goals: updated 9/23/2024   Short Term Goals (4 Weeks):  1. Pt able to walk >=30 minutes with household chores with <4/10 pain. (Not met, progressing)  2.  Pt able to transfer in/out of chairs of various heights with <4/10 pain. (Not met, progressing)  3. Pt able to ascend/descend curb, independently, 1 handrail, with <4/10 pain. (Not met, progressing)     Long Term Goals (8 Weeks):  1. Pt able to walk >=1 hour with household chores with <2/10 pain. (Not met, progressing)  2.  Pt able to squat/lift >/=20# from floor to waist with <2/10 pain. (Not met, progressing)  3. Pt able to ascend/descend 1 flight of stairs, independently, 1 handrail, with <2/10 pain. (Not met, progressing)  4. Pt able to return to full work / recreational activities with min difficulty and pain <2/10. (Not met, progressing)  5. Pt will be independent with HEP and self management of symptoms.  (Not met, progressing)    Plan     Cont with POC for strength, mobility.    Tammy Robles, PTA

## 2024-09-24 ENCOUNTER — DOCUMENTATION ONLY (OUTPATIENT)
Dept: REHABILITATION | Facility: OTHER | Age: 69
End: 2024-09-24
Payer: COMMERCIAL

## 2024-09-24 DIAGNOSIS — R68.89 DECREASED STRENGTH, ENDURANCE, AND MOBILITY: ICD-10-CM

## 2024-09-24 DIAGNOSIS — R53.1 DECREASED STRENGTH, ENDURANCE, AND MOBILITY: ICD-10-CM

## 2024-09-24 DIAGNOSIS — Z74.09 DECREASED STRENGTH, ENDURANCE, AND MOBILITY: ICD-10-CM

## 2024-09-24 DIAGNOSIS — S76.311A STRAIN OF RIGHT HAMSTRING MUSCLE, INITIAL ENCOUNTER: Primary | ICD-10-CM

## 2024-09-24 NOTE — PROGRESS NOTES
Physical Therapist and Physical Therapist Assistant met face to face to discuss patient's treatment plan and progress towards established goals. Pt will be seen by a physical therapist minimally every 6th visit or every 30 days.    Tammy Robles, RUBEN  09/24/2024

## 2024-09-25 ENCOUNTER — CLINICAL SUPPORT (OUTPATIENT)
Dept: REHABILITATION | Facility: OTHER | Age: 69
End: 2024-09-25
Payer: COMMERCIAL

## 2024-09-25 DIAGNOSIS — S76.311A STRAIN OF RIGHT HAMSTRING MUSCLE, INITIAL ENCOUNTER: Primary | ICD-10-CM

## 2024-09-25 DIAGNOSIS — Z74.09 DECREASED STRENGTH, ENDURANCE, AND MOBILITY: ICD-10-CM

## 2024-09-25 DIAGNOSIS — R53.1 DECREASED STRENGTH, ENDURANCE, AND MOBILITY: ICD-10-CM

## 2024-09-25 DIAGNOSIS — R68.89 DECREASED STRENGTH, ENDURANCE, AND MOBILITY: ICD-10-CM

## 2024-09-25 PROCEDURE — 97140 MANUAL THERAPY 1/> REGIONS: CPT | Mod: PN,CQ

## 2024-09-25 PROCEDURE — 97530 THERAPEUTIC ACTIVITIES: CPT | Mod: PN,CQ

## 2024-09-25 PROCEDURE — 97112 NEUROMUSCULAR REEDUCATION: CPT | Mod: PN,CQ

## 2024-09-25 NOTE — PROGRESS NOTES
[facility]   Physical Therapy Treatment Note      Name: Sawyer Browning  Clinic Number: [id number]    Therapy Diagnosis:   Encounter Diagnoses   Name Primary?    Strain of right hamstring muscle, initial encounter Yes    Decreased strength, endurance, and mobility        Physician: Medardo Skelton MD    Visit Date: 2024    Physician Orders: PT Eval and Treat - hip, knees  Medical Diagnosis from Referral: M79.661,M79.662 (ICD-10-CM) - Bilateral calf pain M76.899 (ICD-10-CM) - Hamstring tendinitis   Evaluation Date: 2024   Authorization Period Expiration: 2024   Plan of Care Expiration: 2024   Progress Note Due: 10/4/2024   Date of Surgery: n/a   Visit # / Visits authorized:  +  (eval)   FOTO: 2/ 3 (last taken 2024)      Precautions: Standard      Time In: 4:15 pm   Time Out: 5:00 pm   Total Billable Time: 45 minutes     PTA Visit #:      Subjective     Patient reports: he is feeling good today. States he feels a little tight in the morning, and feels tighter when he bends down to pick something up. Elliptical machine for 15 minutes (which he usually does 30), difficult to move around and tight yesterday.     He was compliant with home exercise program.   Response to previous treatment: good response   Functional change: reduced pain in PM     Pain: 0/10   Location: R > L hamstring    Objective      Objective Measures updated at progress report unless specified.     Treatment     Sawyer received the treatments listed below:      therapeutic exercises to develop strength, endurance, ROM, flexibility, posture, and core stabilization for 00 minutes including:  None today      manual therapy techniques: Joint mobilizations, Myofacial release, and Soft tissue Mobilization were applied to the: R HS for 14 minutes, includin min x manual TrP release to lateral HS   00 min x IASTYM to R HS, ITB   14 min x vacuum/cupping STM with manual therapy techniques was  "performed to R hamstring to decrease muscle tightness, increase circulation and promote healing process. The pt's skin was monitored for redness adjusting pressure as needed. The pt was instructed in possible side effects of bruising and/or soreness. Pt verbalized understanding.   Consider dry needling in the future if MPT does not give complete relief.     neuromuscular re-education activities to improve: Balance, Coordination, Kinesthetic, Sense, Proprioception, and Posture for 23 minutes. The following activities were included:  standing 3-way hip x 15 ea direction x heavy loop at ankles   Prone HS curls (emphasis on SLOW lowering) x4x10 B x GTB   Supine quad stretch x3x30" with strap -- consider resuming in the future  standing HS stretch @ stairs x3x30"     stool crawls x4x60 ft (= 2 down<>back laps)   matrix leg ext (SL) x 3x10 x 15#   matrix leg curls (2 down, 1 up) x 3x10 x 15#       therapeutic activities to improve functional performance for 08 minutes, including:  + Education provided on weaning into normal workout routine throughout recovery. Educated pt on cupping technique and purpose of intervention as well as side effects.   side stepping x2x 40 ft x medium loop at ankles   straight leg deadlifts x3x8 - HHAx1 on chair   TRX assisted squats x2x15   step ups x 2x10x 12" step plyo box - HHAx1   hip thrust x 1x10 x 5" holds x 18" plyo box   +nordic HS curls x3x5   + educated pt on seated HS stretch, and duration       Patient Education and Home Exercises       Education provided:   - none today    Written Home Exercises Provided: Pt instructed to continue prior HEP. Exercises were reviewed and Sawyer was able to demonstrate them prior to the end of the session.  Sawyer demonstrated good  understanding of the education provided. See Electronic Medical Record under Patient Instructions for exercises provided during therapy sessions    Assessment     Overall good tolerance to treatment with no adverse " effects. Pt had good response with manual therapy, and had good redness and petechiae. Good mm fatigue with stool scoots, and required rest breaks for mm recovery. Good mm fatigue at end of session. Continue to montior and progress pt as tolerated.     Sawyer Is progressing well towards his goals.   Patient prognosis is Good.     Patient will continue to benefit from skilled outpatient physical therapy to address the deficits listed in the problem list box on initial evaluation, provide pt/family education and to maximize pt's level of independence in the home and community environment.     Patient's spiritual, cultural and educational needs considered and pt agreeable to plan of care and goals.     Anticipated barriers to physical therapy: standard    Goals: updated 9/25/2024   Short Term Goals (4 Weeks):  1. Pt able to walk >=30 minutes with household chores with <4/10 pain. (Not met, progressing)  2.  Pt able to transfer in/out of chairs of various heights with <4/10 pain. (Not met, progressing)  3. Pt able to ascend/descend curb, independently, 1 handrail, with <4/10 pain. (Not met, progressing)     Long Term Goals (8 Weeks):  1. Pt able to walk >=1 hour with household chores with <2/10 pain. (Not met, progressing)  2.  Pt able to squat/lift >/=20# from floor to waist with <2/10 pain. (Not met, progressing)  3. Pt able to ascend/descend 1 flight of stairs, independently, 1 handrail, with <2/10 pain. (Not met, progressing)  4. Pt able to return to full work / recreational activities with min difficulty and pain <2/10. (Not met, progressing)  5. Pt will be independent with HEP and self management of symptoms.  (Not met, progressing)    Plan     Cont with POC for strength, mobility.    Tammy Robles, PTA

## 2024-09-30 ENCOUNTER — CLINICAL SUPPORT (OUTPATIENT)
Dept: REHABILITATION | Facility: OTHER | Age: 69
End: 2024-09-30
Payer: COMMERCIAL

## 2024-09-30 DIAGNOSIS — S76.311A STRAIN OF RIGHT HAMSTRING MUSCLE, INITIAL ENCOUNTER: Primary | ICD-10-CM

## 2024-09-30 DIAGNOSIS — R68.89 DECREASED STRENGTH, ENDURANCE, AND MOBILITY: ICD-10-CM

## 2024-09-30 DIAGNOSIS — R53.1 DECREASED STRENGTH, ENDURANCE, AND MOBILITY: ICD-10-CM

## 2024-09-30 DIAGNOSIS — Z74.09 DECREASED STRENGTH, ENDURANCE, AND MOBILITY: ICD-10-CM

## 2024-09-30 PROCEDURE — 97110 THERAPEUTIC EXERCISES: CPT | Mod: PN,CQ

## 2024-09-30 PROCEDURE — 97530 THERAPEUTIC ACTIVITIES: CPT | Mod: PN,CQ

## 2024-09-30 PROCEDURE — 97112 NEUROMUSCULAR REEDUCATION: CPT | Mod: PN,CQ

## 2024-09-30 NOTE — PROGRESS NOTES
"[facility]   Physical Therapy Treatment Note      Name: Sawyer Browning  Clinic Number: [id number]    Therapy Diagnosis:   Encounter Diagnoses   Name Primary?    Strain of right hamstring muscle, initial encounter Yes    Decreased strength, endurance, and mobility        Physician: Medardo Skelton MD    Visit Date: 9/30/2024    Physician Orders: PT Eval and Treat - hip, knees  Medical Diagnosis from Referral: M79.661,M79.662 (ICD-10-CM) - Bilateral calf pain M76.899 (ICD-10-CM) - Hamstring tendinitis   Evaluation Date: 9/4/2024   Authorization Period Expiration: 12/31/2024   Plan of Care Expiration: 11/4/2024   Progress Note Due: 10/4/2024   Date of Surgery: n/a   Visit # / Visits authorized: 7/20 + 1/ 1 (eval)   FOTO: 2/ 3 (last taken 9/23/2024)      Precautions: Standard      Time In: 1:15 pm   Time Out: 2:15 pm   Total Billable Time: 60 minutes     PTA Visit #: 3/5     Subjective     Patient reports: in the morning is when it is the stiffest. States he feels it the most when he bends down to pick something up, but is feeling good today. "The last few nights, it has been hurting. Just laying in bed, it starts to relax. Where typically it would feel tighter when laying down before bed." States he did PT exercises prior to elliptical for 10 minutes, which seemed to help.     He was compliant with home exercise program.   Response to previous treatment: good response   Functional change: reduced pain in PM     Pain: 0/10   Location: R > L hamstring     Objective      Objective Measures updated at progress report unless specified.     Treatment     Sawyer received the treatments listed below:      therapeutic exercises to develop strength, endurance, ROM, flexibility, posture, and core stabilization for 08 minutes including:  matrix leg ext (DL) x 3x10 x 25# -- performed at end of session for passive stretch      manual therapy techniques: Joint mobilizations, Myofacial release, and Soft tissue " "Mobilization were applied to the: R HS for 00 minutes, includin min x manual TrP release to lateral HS   00 min x IASTYM to R HS, ITB   00 min x vacuum/cupping STM with manual therapy techniques was performed to R hamstring to decrease muscle tightness, increase circulation and promote healing process. The pt's skin was monitored for redness adjusting pressure as needed. The pt was instructed in possible side effects of bruising and/or soreness. Pt verbalized understanding.   Consider dry needling in the future if MPT does not give complete relief.     neuromuscular re-education activities to improve: Balance, Coordination, Kinesthetic, Sense, Proprioception, and Posture for 25 minutes. The following activities were included:  standing 3-way hip x 15 ea direction x medium loop at ankles (2" step for CL LE)   Prone HS curls (emphasis on SLOW lowering) x3x10 B x BlueTB   Supine quad stretch x3x30" with strap -- consider resuming in the future  standing HS stretch @ stairs x3x30"     stool crawls x4x60 ft (= 2 down<>back laps)   matrix leg curls (2 down, 1 up) x 3x10 x 15#       therapeutic activities to improve functional performance for 27 minutes, including:  side stepping x2x 40 ft x medium loop at ankles   +monster walking 2 x 40 ft x medium loop at ankles   + drinking bird with yellow stick for balance x 10 ea   straight leg deadlifts x3x8 - HHAx1 on chair   TRX assisted squats x2x15   step ups x 2x10x 12" step plyo box - HHAx1   hip thrust x 1x10 x 5" holds x 18" plyo box   nordic HS curls (with elevated HOB, decreasing 1 notch every set) x3x5   educated pt on seated HS stretch, and duration       Patient Education and Home Exercises       Education provided:   - none today    Written Home Exercises Provided: Pt instructed to continue prior HEP. Exercises were reviewed and Sawyer was able to demonstrate them prior to the end of the session.  Sawyer demonstrated good  understanding of the education provided. " See Electronic Medical Record under Patient Instructions for exercises provided during therapy sessions    Assessment     Overall good tolerance to treatment with no adverse effects. Required verbal cues for proper technique with hip strengthening exercises to decrease anterior hip substitutions. Educated pt on passive HS stretching with quad activation, which pt verbalized understanding. Pt noted myofascial tightness after drinking birds, and initiated knee extension for passive HS stretch. Good training effects achieved at end of session. Continue to monitor and progress pt as tolerated.     Sawyer Is progressing well towards his goals.   Patient prognosis is Good.     Patient will continue to benefit from skilled outpatient physical therapy to address the deficits listed in the problem list box on initial evaluation, provide pt/family education and to maximize pt's level of independence in the home and community environment.     Patient's spiritual, cultural and educational needs considered and pt agreeable to plan of care and goals.     Anticipated barriers to physical therapy: standard    Goals: updated 9/30/2024   Short Term Goals (4 Weeks):  1. Pt able to walk >=30 minutes with household chores with <4/10 pain. (Not met, progressing)  2.  Pt able to transfer in/out of chairs of various heights with <4/10 pain. (Not met, progressing)  3. Pt able to ascend/descend curb, independently, 1 handrail, with <4/10 pain. (Not met, progressing)     Long Term Goals (8 Weeks):  1. Pt able to walk >=1 hour with household chores with <2/10 pain. (Not met, progressing)  2.  Pt able to squat/lift >/=20# from floor to waist with <2/10 pain. (Not met, progressing)  3. Pt able to ascend/descend 1 flight of stairs, independently, 1 handrail, with <2/10 pain. (Not met, progressing)  4. Pt able to return to full work / recreational activities with min difficulty and pain <2/10. (Not met, progressing)  5. Pt will be independent with  HEP and self management of symptoms.  (Not met, progressing)    Plan     Cont with POC for strength, mobility.    Tammy Robles, PTA

## 2024-10-02 ENCOUNTER — CLINICAL SUPPORT (OUTPATIENT)
Dept: REHABILITATION | Facility: OTHER | Age: 69
End: 2024-10-02
Payer: COMMERCIAL

## 2024-10-02 DIAGNOSIS — R68.89 DECREASED STRENGTH, ENDURANCE, AND MOBILITY: ICD-10-CM

## 2024-10-02 DIAGNOSIS — Z74.09 DECREASED STRENGTH, ENDURANCE, AND MOBILITY: ICD-10-CM

## 2024-10-02 DIAGNOSIS — S76.311A STRAIN OF RIGHT HAMSTRING MUSCLE, INITIAL ENCOUNTER: Primary | ICD-10-CM

## 2024-10-02 DIAGNOSIS — R53.1 DECREASED STRENGTH, ENDURANCE, AND MOBILITY: ICD-10-CM

## 2024-10-02 PROCEDURE — 97112 NEUROMUSCULAR REEDUCATION: CPT | Mod: PN,CQ

## 2024-10-02 PROCEDURE — 97110 THERAPEUTIC EXERCISES: CPT | Mod: PN,CQ

## 2024-10-02 PROCEDURE — 97530 THERAPEUTIC ACTIVITIES: CPT | Mod: PN,CQ

## 2024-10-02 NOTE — PROGRESS NOTES
"[facility]   Physical Therapy Treatment Note      Name: Sawyer Browning  Clinic Number: [id number]    Therapy Diagnosis:   Encounter Diagnoses   Name Primary?    Strain of right hamstring muscle, initial encounter Yes    Decreased strength, endurance, and mobility        Physician: Medardo Skelton MD    Visit Date: 10/2/2024    Physician Orders: PT Eval and Treat - hip, knees  Medical Diagnosis from Referral: M79.661,M79.662 (ICD-10-CM) - Bilateral calf pain M76.899 (ICD-10-CM) - Hamstring tendinitis   Evaluation Date: 9/4/2024   Authorization Period Expiration: 12/31/2024   Plan of Care Expiration: 11/4/2024   Progress Note Due: 10/4/2024   Date of Surgery: n/a   Visit # / Visits authorized: 8/20 + 1/ 1 (eval)   FOTO: 2/ 3 (last taken 9/23/2024)      Precautions: Standard      Time In: 1:15 pm   Time Out: 2:15 pm   Total Billable Time: 60 minutes     PTA Visit #: 4/5     Subjective     Patient reports: he was feeling a lot better yesterday, and was really satisfied with his response to last session. States he is still having that tightness in the morning. Reported during exercise: "I am feeling wooziness. I know what dizziness is, and is more like wooziness."   Reports his normal BP in the morning is 135/80 mmHg, and this morning it was 150/80 mmHg. Reports he took his blood pressure medication this morning.     He was compliant with home exercise program.   Response to previous treatment: good response to last visit   Functional change: reduced pain in PM     Pain: 0/10   Location: R > L hamstring     Objective      Objective Measures updated at progress report unless specified.   10/2/2024   Blood pressure taken at end of session due to c/o "wooziness, not dizziness": 122/80 mmHg, pulse 62 bpm     Treatment     Sawyer received the treatments listed below:      therapeutic exercises to develop strength, endurance, ROM, flexibility, posture, and core stabilization for 08 minutes " "including:  matrix leg ext (DL) x 3x10 x 25# -- performed at end of session for passive stretch      manual therapy techniques: Joint mobilizations, Myofacial release, and Soft tissue Mobilization were applied to the: R HS for 00 minutes, includin min x manual TrP release to lateral HS   00 min x IASTYM to R HS, ITB   00 min x vacuum/cupping STM with manual therapy techniques was performed to R hamstring to decrease muscle tightness, increase circulation and promote healing process. The pt's skin was monitored for redness adjusting pressure as needed. The pt was instructed in possible side effects of bruising and/or soreness. Pt verbalized understanding.   Consider dry needling in the future if MPT does not give complete relief.     neuromuscular re-education activities to improve: Balance, Coordination, Kinesthetic, Sense, Proprioception, and Posture for 25 minutes. The following activities were included:  standing 3-way hip x 20 ea direction x heavy loop at knees (2" step for CL LE)   Prone HS curls (emphasis on SLOW lowering) x3x10 B x BlueTB   Supine quad stretch x3x30" with strap -- consider resuming in the future  standing HS stretch @ stairs x3x30"     stool crawls x4x60 ft (= 2 down<>back laps)   matrix leg curls (2 down, 1 up) x 3x10 x 15#       therapeutic activities to improve functional performance for 27 minutes, including:  + objective measurements taken   side stepping x4x 40 ft x medium loop at ankles   monster walking 2 x 40 ft x medium loop at ankles   + drinking bird with yellow stick for balance x 10 ea -- resume nv   straight leg deadlifts x3x8 - HHAx1 on chair   TRX assisted squats x2x15   step ups x 2x10x 12" step plyo box - HHAx1   hip thrust x 1x10 x 5" holds x 18" plyo box   nordic HS curls (with elevated HOB, decreasing 1 notch every set) x3x5 -- resume nv   educated pt on seated HS stretch, and duration       Patient Education and Home Exercises       Education provided:   - none " "today    Written Home Exercises Provided: Pt instructed to continue prior HEP. Exercises were reviewed and Sawyer was able to demonstrate them prior to the end of the session.  Sawyer demonstrated good  understanding of the education provided. See Electronic Medical Record under Patient Instructions for exercises provided during therapy sessions    Assessment     Overall good tolerance to treatment today. Pt noted increased symptoms of "wooziness" with lateral stepping, and required seated rest break. Dissipation of symptoms occurred with rest, and continued with monster walks and leg extension. However, pt noted c/o symptoms again after continued interventions. Ceased exercise, and blood pressure objective measurement taken afterwards. Pt advised to monitor and contact MD if symptoms worsen. FTF conference held with Lead PT regarding this. Pt noted 0/10 pain/tightness at end of session in B hamstrings. Continue to monitor and progress pt as tolerated.     Sawyer Is progressing well towards his goals.   Patient prognosis is Good.     Patient will continue to benefit from skilled outpatient physical therapy to address the deficits listed in the problem list box on initial evaluation, provide pt/family education and to maximize pt's level of independence in the home and community environment.     Patient's spiritual, cultural and educational needs considered and pt agreeable to plan of care and goals.     Anticipated barriers to physical therapy: standard    Goals: updated 10/2/2024   Short Term Goals (4 Weeks):  1. Pt able to walk >=30 minutes with household chores with <4/10 pain. (Not met, progressing)  2.  Pt able to transfer in/out of chairs of various heights with <4/10 pain. (Not met, progressing)  3. Pt able to ascend/descend curb, independently, 1 handrail, with <4/10 pain. (Not met, progressing)     Long Term Goals (8 Weeks):  1. Pt able to walk >=1 hour with household chores with <2/10 pain. (Not met, " progressing)  2.  Pt able to squat/lift >/=20# from floor to waist with <2/10 pain. (Not met, progressing)  3. Pt able to ascend/descend 1 flight of stairs, independently, 1 handrail, with <2/10 pain. (Not met, progressing)  4. Pt able to return to full work / recreational activities with min difficulty and pain <2/10. (Not met, progressing)  5. Pt will be independent with HEP and self management of symptoms.  (Not met, progressing)    Plan     Cont with POC for strength, mobility.    Tammy Robles, PTA

## 2024-10-03 ENCOUNTER — DOCUMENTATION ONLY (OUTPATIENT)
Dept: REHABILITATION | Facility: OTHER | Age: 69
End: 2024-10-03
Payer: COMMERCIAL

## 2024-10-03 DIAGNOSIS — R68.89 DECREASED STRENGTH, ENDURANCE, AND MOBILITY: ICD-10-CM

## 2024-10-03 DIAGNOSIS — R53.1 DECREASED STRENGTH, ENDURANCE, AND MOBILITY: ICD-10-CM

## 2024-10-03 DIAGNOSIS — Z74.09 DECREASED STRENGTH, ENDURANCE, AND MOBILITY: ICD-10-CM

## 2024-10-03 DIAGNOSIS — S76.311A STRAIN OF RIGHT HAMSTRING MUSCLE, INITIAL ENCOUNTER: Primary | ICD-10-CM

## 2024-10-03 NOTE — PROGRESS NOTES
Physical Therapist and Physical Therapist Assistant met face to face to discuss patient's treatment plan and progress towards established goals. Pt will be seen by a physical therapist minimally every 6th visit or every 30 days.    Tammy Robles PTA  10/03/2024

## 2024-10-07 ENCOUNTER — CLINICAL SUPPORT (OUTPATIENT)
Dept: REHABILITATION | Facility: OTHER | Age: 69
End: 2024-10-07
Payer: COMMERCIAL

## 2024-10-07 DIAGNOSIS — R68.89 DECREASED STRENGTH, ENDURANCE, AND MOBILITY: ICD-10-CM

## 2024-10-07 DIAGNOSIS — Z74.09 DECREASED STRENGTH, ENDURANCE, AND MOBILITY: ICD-10-CM

## 2024-10-07 DIAGNOSIS — R53.1 DECREASED STRENGTH, ENDURANCE, AND MOBILITY: ICD-10-CM

## 2024-10-07 DIAGNOSIS — S76.311A STRAIN OF RIGHT HAMSTRING MUSCLE, INITIAL ENCOUNTER: Primary | ICD-10-CM

## 2024-10-07 PROCEDURE — 97530 THERAPEUTIC ACTIVITIES: CPT | Mod: PN

## 2024-10-07 PROCEDURE — 97112 NEUROMUSCULAR REEDUCATION: CPT | Mod: PN

## 2024-10-07 NOTE — PROGRESS NOTES
[facility]   Physical Therapy Treatment Note      Name: Sawyer Browning  Clinic Number: [id number]    Therapy Diagnosis:   Encounter Diagnoses   Name Primary?    Strain of right hamstring muscle, initial encounter Yes    Decreased strength, endurance, and mobility        Physician: Medardo Skelton MD    Visit Date: 10/7/2024    Physician Orders: PT Eval and Treat - hip, knees  Medical Diagnosis from Referral: M79.661,M79.662 (ICD-10-CM) - Bilateral calf pain M76.899 (ICD-10-CM) - Hamstring tendinitis   Evaluation Date: 9/4/2024   Authorization Period Expiration: 12/31/2024   Plan of Care Expiration: 11/4/2024   Progress Note Due: 10/4/2024   Date of Surgery: n/a   Visit # / Visits authorized: 9/20 + 1/ 1 (eval)   FOTO: 2/ 3 (last taken 9/23/2024)      Precautions: Standard      Time In: 1:15 pm   Time Out: 2:15 pm   Total Billable Time: 60 minutes     PTA Visit #: 4/5     Subjective     Patient reports: Continued AM tightness and occasional bouts of tightness throughout the day. UA to return to full activity, including exercising and yard work, without increased sx. Bending over cont to cause tension posteriorly. Feeling frustrated at continued intermittent stiffness and difficulty performing ADLs.    He was compliant with home exercise program. - does them after doing 15 min on the elliptical - does this in place of going to the gym currently (denies return to gym despite PT edu)  Response to previous treatment: good response to last visit   Functional change: reduced pain in PM     Pain: 0/10   Location: R > L hamstring     Objective      Objective Measures updated at progress report unless specified.     10/7/2024  Palpation: mild increase in mm tension of R HS (biceps fem > semimem./semiten.) with decreased MFM of R>L HS     Hip  Right  Left Pain/Dysfunction with Movement     MMT MMT     Flexion 4+/5 4+/5     Extension - knee ext 4/5 5/5  in prone   Extension - knee flex 4/5 4+/5  in  "prone, c/o cramping in HS (R)   Abduction 5-/5 5/5     Adduction 5-/5 5/5     Internal rotation 4+/5 - seated  4/5 - SL 4+/5 - seated  4/5 - SL  R IR ROM <20 deg   External rotation 4+/5 - seated  4+/5 - SL 5/5 - seated  4+/5 - SL        Knee ROM: R = 0-5-120, L = 0-125  Knee strength:          Right   Left  Quads                          5/5       5/5 Performed 90-90  Hamstrings                  4/5       5/5  Supine, hip/knee flex 90-90               4-/5       5/5  Prone knee flex to 80 deg - noted cramping in R HS                3+/5       5/5  Prone knee flex to 20 deg    Flexibility:  Jorgito test: Hip flexors: min hypo R  Ely's: Quads: R = 95 deg, L = 110 deg  Hannah test: ITB: WFL  Hamstring 90-90: Right Left  Active   -30 -28 In decrees  Passive  -15 -15 In degrees    Special tests:  Long leg bridge DL x15 - increased fatigue  Long leg bridge SL <5x with cramping    Balance:  SLS EO: 30" B with mild LE valgus RLE  SLS EC: <10" with LOB R    FOTO:      Treatment     Sawyer received the treatments listed below:      therapeutic exercises to develop strength, endurance, ROM, flexibility, posture, and core stabilization for 00 minutes including:  matrix leg ext (DL) x 3x10 x 25# -- performed at end of session for passive stretch      manual therapy techniques: Joint mobilizations, Myofacial release, and Soft tissue Mobilization were applied to the: R HS for 00 minutes, including:  Consider dry needling in the future if MPT does not give complete relief.     neuromuscular re-education activities to improve: Balance, Coordination, Kinesthetic, Sense, Proprioception, and Posture for 25 minutes. The following activities were included:  +3D hamstring x 15 ea  +HL HS isometrics x3x45" holds B  +sustained HL bridge with HS walkouts x10  +active HS stretch x10 B w/ 10 x ankle pumps at 10th rep for additional sciatic nerve glide  +seated HS isometrics (trunk flexed, leg extended with slight knee flex) x3x45" holds B    Plan " "to resume nv:  standing 3-way hip x 20 ea direction x heavy loop at knees (2" step for CL LE)   Prone HS curls (emphasis on SLOW lowering) x3x10 B x BlueTB -- change to prone with Freemotion machine  Supine quad stretch x3x30" with strap  stool crawls x4x60 ft (= 2 down<>back laps) -- add resistance to increase drag  matrix leg curls (2 down, 1 up) x 3x10 x 15# -- progress to increased hip flexion (w/ increased fwd trunk lean)      therapeutic activities to improve functional performance for 35 minutes, including:  + Reassessment for PN  +pt edu on purpose of dry needling, benefits, s/e; pt declined MPT modality today.  +pt edu on updated HEP, importance of compliance/consistency, frequency and duration to promote HS strength, LE stabilization: active HSS followed by sciatic nerve glides (10x ea), HL and seated HS isometrics x5x45" ea, Sustained HL bridge with HS walkouts x10  +also discussed dx, pathogenesis, prognosis, and PT POC as pt expressed concerns for lack of progression, and nature of healing following HS strain and importance of compliance/consistency with strengthening program to prevent recurrence. Encouraged pt to return to local gym for normal routine and to modify as needed with LE strengthening to promote compliance/consistency with exercise program as well as prevent global mm atrophy, frustration over slow return to function.     +Standing hamstring hinge (contralat leg on wall for Abd activation/pelvic support) x2x15  nordic HS curls (with elevated HOB, decreasing 1 notch every set) x4x5     Plan to resume nv:  side stepping x4x 40 ft x medium loop at ankles   step ups x 2x10x 12" step plyo box - HHAx1  hip thrust x 1x10 x 5" holds x 18" plyo box -- increase reps or progress to single leg    Plan to add next visit:  +anti rotation with stationary lunge (red power band)  +[place] deadlift      Patient Education and Home Exercises       Education provided:   - 10/7/24 - updated HEP to include " "(heavy HS activation): active HSS followed by sciatic nerve glides (10x ea), HL and seated HS isometrics x5x45" ea, Sustained HL bridge with HS walkouts x10      Written Home Exercises Provided: Pt instructed to continue prior HEP. Exercises were reviewed and Sawyer was able to demonstrate them prior to the end of the session.  Sawyer demonstrated good  understanding of the education provided. See Electronic Medical Record under Patient Instructions for exercises provided during therapy sessions    Assessment     Monthly reassessment performed today. Pt presents with marked improvement in global hip and quad strength today, with marked improvement in active/passive hamstring flexibility (R=L), and improvements in myofascial mobility of hamstring groups bilaterally since beginning PT. However, pt presents with continued limitations in quad flexibility and hamstring strength, more so in the lengthened vs shortened positions (see prone MMT testing), with poor tolerance during functional eccentric loading (Nordic HS curls). Discussed benefits of dry needling to improve overall c/o chronic tension, but pt declined. Progressed HS strengthening program, both isometric and isotonic (concentric and eccentric). Quick to fatigue with HL bridge walkouts, moderate c/o "stiffness" with nordic HS curls, but good improvements in overall sx relief with addition of active HS stretch followed by sciatic nerve glides. Progressed HEP to reflect progressions in clinic; pt verbalized understanding and demo'd good return technique.  Decline in perceived functional mobility (see FOTO score change) reflects pts frustration at slow progression and incomplete return to PLOF. Edu on dx, pathogenesis, and prognosis, as well as importance of compliance and consistency with HS strengthening to achieve RTP.    Sawyer Is progressing well towards his goals.   Patient prognosis is Good.     Patient will continue to benefit from skilled outpatient " physical therapy to address the deficits listed in the problem list box on initial evaluation, provide pt/family education and to maximize pt's level of independence in the home and community environment.     Patient's spiritual, cultural and educational needs considered and pt agreeable to plan of care and goals.     Anticipated barriers to physical therapy: standard    Goals: updated 10/7/2024   Short Term Goals (4 Weeks):  1. Pt able to walk >=30 minutes with household chores with <4/10 pain. (Not met, progressing)  2.  Pt able to transfer in/out of chairs of various heights with <4/10 pain. (Not met, progressing)  3. Pt able to ascend/descend curb, independently, 1 handrail, with <4/10 pain. (Not met, progressing)     Long Term Goals (8 Weeks):  1. Pt able to walk >=1 hour with household chores with <2/10 pain. (Not met, progressing)  2.  Pt able to squat/lift >/=20# from floor to waist with <2/10 pain. (Not met, progressing)  3. Pt able to ascend/descend 1 flight of stairs, independently, 1 handrail, with <2/10 pain. (Not met, progressing)  4. Pt able to return to full work / recreational activities with min difficulty and pain <2/10. (Not met, progressing)  5. Pt will be independent with HEP and self management of symptoms.  (Not met, progressing)    Plan     Cont with POC for strength, mobility.    Merary Fox, PT

## 2024-10-09 ENCOUNTER — CLINICAL SUPPORT (OUTPATIENT)
Dept: REHABILITATION | Facility: OTHER | Age: 69
End: 2024-10-09
Payer: COMMERCIAL

## 2024-10-09 DIAGNOSIS — R53.1 DECREASED STRENGTH, ENDURANCE, AND MOBILITY: ICD-10-CM

## 2024-10-09 DIAGNOSIS — Z74.09 DECREASED STRENGTH, ENDURANCE, AND MOBILITY: ICD-10-CM

## 2024-10-09 DIAGNOSIS — R68.89 DECREASED STRENGTH, ENDURANCE, AND MOBILITY: ICD-10-CM

## 2024-10-09 DIAGNOSIS — S76.311A STRAIN OF RIGHT HAMSTRING MUSCLE, INITIAL ENCOUNTER: Primary | ICD-10-CM

## 2024-10-09 PROCEDURE — 97112 NEUROMUSCULAR REEDUCATION: CPT | Mod: PN

## 2024-10-09 PROCEDURE — 97530 THERAPEUTIC ACTIVITIES: CPT | Mod: PN

## 2024-10-09 NOTE — PROGRESS NOTES
"[facility]   Physical Therapy Treatment Note      Name: Sawyer Browning  Clinic Number: [id number]    Therapy Diagnosis:   Encounter Diagnoses   Name Primary?    Strain of right hamstring muscle, initial encounter Yes    Decreased strength, endurance, and mobility        Physician: Medardo Skelton MD    Visit Date: 10/9/2024    Physician Orders: PT Eval and Treat - hip, knees  Medical Diagnosis from Referral: M79.661,M79.662 (ICD-10-CM) - Bilateral calf pain M76.899 (ICD-10-CM) - Hamstring tendinitis   Evaluation Date: 9/4/2024   Authorization Period Expiration: 12/31/2024   Plan of Care Expiration: 11/4/2024   Progress Note Due: 10/4/2024   Date of Surgery: n/a   Visit # / Visits authorized: 10/20 + 1/ 1 (eval)   FOTO: 2/ 3 (last taken 9/23/2024)      Precautions: Standard      Time In: 2:15 pm   Time Out: 3:15 pm   Total Billable Time: 60 minutes     PTA Visit #: 4/5     Subjective     Patient reports: "it works itself out when I'm up and moving, but when I stop it tights up later."    He was compliant with home exercise program. - does them after doing 15 min on the elliptical - does this in place of going to the gym currently (denies return to gym despite PT edu)  Response to previous treatment: good response to last visit   Functional change: reduced pain in PM     Pain: 0/10   Location: R > L hamstring     Objective      Objective Measures updated at progress report unless specified.     10/7/2024  Palpation: mild increase in mm tension of R HS (biceps fem > semimem./semiten.) with decreased MFM of R>L HS     Hip  Right  Left Pain/Dysfunction with Movement     MMT MMT     Flexion 4+/5 4+/5     Extension - knee ext 4/5 5/5  in prone   Extension - knee flex 4/5 4+/5  in prone, c/o cramping in HS (R)   Abduction 5-/5 5/5     Adduction 5-/5 5/5     Internal rotation 4+/5 - seated  4/5 - SL 4+/5 - seated  4/5 - SL  R IR ROM <20 deg   External rotation 4+/5 - seated  4+/5 - SL 5/5 - " "seated  4+/5 - SL        Knee ROM: R = 0-5-120, L = 0-125  Knee strength:          Right   Left  Quads                          5/5       5/5 Performed 90-90  Hamstrings                  4/5       5/5  Supine, hip/knee flex 90-90               4-/5       5/5  Prone knee flex to 80 deg - noted cramping in R HS                3+/5       5/5  Prone knee flex to 20 deg    Flexibility:  Jorgito test: Hip flexors: min hypo R  Ely's: Quads: R = 95 deg, L = 110 deg  Hannah test: ITB: WFL  Hamstring 90-90: Right Left  Active   -30 -28 In decrees  Passive  -15 -15 In degrees    Special tests:  Long leg bridge DL x15 - increased fatigue  Long leg bridge SL <5x with cramping    Balance:  SLS EO: 30" B with mild LE valgus RLE  SLS EC: <10" with LOB R    FOTO:      Treatment     Sawyer received the treatments listed below:      therapeutic exercises to develop strength, endurance, ROM, flexibility, posture, and core stabilization for 00 minutes including:  matrix leg ext (DL) x 3x10 x 25# -- performed at end of session for passive stretch      manual therapy techniques: Joint mobilizations, Myofacial release, and Soft tissue Mobilization were applied to the: R HS for 00 minutes, including:  Consider dry needling in the future if MPT does not give complete relief.     neuromuscular re-education activities to improve: Balance, Coordination, Kinesthetic, Sense, Proprioception, and Posture for 25 minutes. The following activities were included:  active HS stretch x10 B w/ 10 x ankle pumps at 10th rep for additional sciatic nerve glide  3D hamstring x 10 ea  HL HS isometrics x3x45" holds B - NP today  sustained HL bridge with HS walkouts x10  seated HS isometrics x5x45" holds B using green PB at EOM    Plan to resume nv:  standing 3-way hip x 20 ea direction x heavy loop at knees (2" step for CL LE)   Prone HS curls (emphasis on SLOW lowering) x3x10 B x BlueTB -- change to prone with Freemotion machine  Supine quad stretch x3x30" with " "strap  stool crawls x4x60 ft (= 2 down<>back laps) -- add resistance to increase drag  matrix leg curls (2 down, 1 up) x 3x10 x 15# -- progress to increased hip flexion (w/ increased fwd trunk lean)      therapeutic activities to improve functional performance for 35 minutes, including:    Standing hamstring hinge (contralat leg on wall for Abd activation/pelvic support) x2x10  nordic HS curls (with elevated HOB, decreasing 1 notch every set) x4x5     +Dynamic warm up::: 2x20 ft ea   Hamstring scoops   Side stepping (heavy loop at ankles)   Monster walks (heavy loop at ankles)    step ups x 2x10x 12" step plyo box - HHAx1    hip thrust x 1x10 x 5" holds x 18" plyo box -- increase reps or progress to single leg    Plan to add next visit:  +anti rotation with stationary lunge (red power band)  +[place] deadlift      Patient Education and Home Exercises       Education provided:   - 10/7/24 - updated HEP to include (heavy HS activation): active HSS followed by sciatic nerve glides (10x ea), HL and seated HS isometrics x5x45" ea, Sustained HL bridge with HS walkouts x10      Written Home Exercises Provided: Pt instructed to continue prior HEP. Exercises were reviewed and Sawyer was able to demonstrate them prior to the end of the session.  Sawyer demonstrated good  understanding of the education provided. See Electronic Medical Record under Patient Instructions for exercises provided during therapy sessions    Assessment     Added dynamic warn up to include side stepping and dynamic hamstring stretching to promote length and mobility, as well as multidirectional pelvic stabilization. Good tolerance with overall therex program with moderate fatigue by end of session.    Sawyer Is progressing well towards his goals.   Patient prognosis is Good.     Patient will continue to benefit from skilled outpatient physical therapy to address the deficits listed in the problem list box on initial evaluation, provide pt/family " education and to maximize pt's level of independence in the home and community environment.     Patient's spiritual, cultural and educational needs considered and pt agreeable to plan of care and goals.     Anticipated barriers to physical therapy: standard    Goals: updated 10/14/2024   Short Term Goals (4 Weeks):  1. Pt able to walk >=30 minutes with household chores with <4/10 pain. (Not met, progressing)  2.  Pt able to transfer in/out of chairs of various heights with <4/10 pain. (Not met, progressing)  3. Pt able to ascend/descend curb, independently, 1 handrail, with <4/10 pain. (Not met, progressing)     Long Term Goals (8 Weeks):  1. Pt able to walk >=1 hour with household chores with <2/10 pain. (Not met, progressing)  2.  Pt able to squat/lift >/=20# from floor to waist with <2/10 pain. (Not met, progressing)  3. Pt able to ascend/descend 1 flight of stairs, independently, 1 handrail, with <2/10 pain. (Not met, progressing)  4. Pt able to return to full work / recreational activities with min difficulty and pain <2/10. (Not met, progressing)  5. Pt will be independent with HEP and self management of symptoms.  (Not met, progressing)    Plan     Cont with POC for strength, mobility.    Merary Fox, PT

## 2024-10-13 ENCOUNTER — PATIENT MESSAGE (OUTPATIENT)
Dept: FAMILY MEDICINE | Facility: CLINIC | Age: 69
End: 2024-10-13
Payer: COMMERCIAL

## 2024-10-14 ENCOUNTER — CLINICAL SUPPORT (OUTPATIENT)
Dept: REHABILITATION | Facility: OTHER | Age: 69
End: 2024-10-14
Payer: COMMERCIAL

## 2024-10-14 DIAGNOSIS — Z74.09 DECREASED STRENGTH, ENDURANCE, AND MOBILITY: ICD-10-CM

## 2024-10-14 DIAGNOSIS — S76.311A STRAIN OF RIGHT HAMSTRING MUSCLE, INITIAL ENCOUNTER: Primary | ICD-10-CM

## 2024-10-14 DIAGNOSIS — R53.1 DECREASED STRENGTH, ENDURANCE, AND MOBILITY: ICD-10-CM

## 2024-10-14 DIAGNOSIS — R68.89 DECREASED STRENGTH, ENDURANCE, AND MOBILITY: ICD-10-CM

## 2024-10-14 PROCEDURE — 97530 THERAPEUTIC ACTIVITIES: CPT | Mod: PN

## 2024-10-14 PROCEDURE — 97112 NEUROMUSCULAR REEDUCATION: CPT | Mod: PN

## 2024-10-14 NOTE — PROGRESS NOTES
"[facility]   Physical Therapy Treatment Note      Name: Sawyer Browning  Clinic Number: [id number]    Therapy Diagnosis:   Encounter Diagnoses   Name Primary?    Strain of right hamstring muscle, initial encounter Yes    Decreased strength, endurance, and mobility        Physician: Medardo Skelton MD    Visit Date: 10/14/2024    Physician Orders: PT Eval and Treat - hip, knees  Medical Diagnosis from Referral: M79.661,M79.662 (ICD-10-CM) - Bilateral calf pain M76.899 (ICD-10-CM) - Hamstring tendinitis   Evaluation Date: 9/4/2024   Authorization Period Expiration: 12/31/2024   Plan of Care Expiration: 11/4/2024   Progress Note Due: 10/4/2024   Date of Surgery: n/a   Visit # / Visits authorized: 10/20 + 1/ 1 (eval)   FOTO: 2/ 3 (last taken 9/23/2024)      Precautions: Standard      Time In: 2:15 pm   Time Out: 3:15 pm   Total Billable Time: 60 minutes     PTA Visit #: 4/5     Subjective     Patient reports: "I had a really good day yesterday. I didn't feel it much at all. I'm starting to feel encouraged."    He was compliant with home exercise program. - does them after doing 15 min on the elliptical - does this in place of going to the gym currently (denies return to gym despite PT edu)  Response to previous treatment: good response to last visit   Functional change: reduced pain in PM     Pain: 0/10   Location: R > L hamstring     Objective      Objective Measures updated at progress report unless specified.     10/7/2024  Palpation: mild increase in mm tension of R HS (biceps fem > semimem./semiten.) with decreased MFM of R>L HS     Hip  Right  Left Pain/Dysfunction with Movement     MMT MMT     Flexion 4+/5 4+/5     Extension - knee ext 4/5 5/5  in prone   Extension - knee flex 4/5 4+/5  in prone, c/o cramping in HS (R)   Abduction 5-/5 5/5     Adduction 5-/5 5/5     Internal rotation 4+/5 - seated  4/5 - SL 4+/5 - seated  4/5 - SL  R IR ROM <20 deg   External rotation 4+/5 - seated  4+/5 - " "SL 5/5 - seated  4+/5 - SL        Knee ROM: R = 0-5-120, L = 0-125  Knee strength:          Right   Left  Quads                          5/5       5/5 Performed 90-90  Hamstrings                  4/5       5/5  Supine, hip/knee flex 90-90               4-/5       5/5  Prone knee flex to 80 deg - noted cramping in R HS                3+/5       5/5  Prone knee flex to 20 deg    Flexibility:  Jorgito test: Hip flexors: min hypo R  Ely's: Quads: R = 95 deg, L = 110 deg  Hannah test: ITB: WFL  Hamstring 90-90: Right Left  Active   -30 -28 In decrees  Passive  -15 -15 In degrees    Special tests:  Long leg bridge DL x15 - increased fatigue  Long leg bridge SL <5x with cramping    Balance:  SLS EO: 30" B with mild LE valgus RLE  SLS EC: <10" with LOB R    FOTO:      Treatment     Sawyer received the treatments listed below:      therapeutic exercises to develop strength, endurance, ROM, flexibility, posture, and core stabilization for 00 minutes including:  matrix leg ext (DL) x 3x10 x 25# -- performed at end of session for passive stretch      manual therapy techniques: Joint mobilizations, Myofacial release, and Soft tissue Mobilization were applied to the: R HS for 00 minutes, including:  Consider dry needling in the future if MPT does not give complete relief.     neuromuscular re-education activities to improve: Balance, Coordination, Kinesthetic, Sense, Proprioception, and Posture for 35 minutes. The following activities were included:  active HS stretch x10 B w/ 10 x ankle pumps at 10th rep for additional sciatic nerve glide  3D hamstring x 15 ea  HL HS isometrics x3x45" holds B - NP today  sustained HL bridge with HS walkouts x10  seated HS isometrics x5x45" holds B using green PB at EOM  standing 3-way hip x 15 ea direction x heavy loop at ankles    +Matrix leg extensions x3x10x 15#  matrix leg curls (single leg) x 2x10 x 19# -- progress to increased hip flexion (w/ increased fwd trunk lean)      therapeutic " "activities to improve functional performance for 25 minutes, including:  Standing hamstring hinge (contralat leg on wall for Abd activation/pelvic support) x1x15 AROM, 2x8 x 10# KB  nordic HS curls (with elevated HOB, decreasing 1 notch every set) x4x5     Dynamic warm up::: 2x20 ft ea   Hamstring scoops   Side stepping (heavy loop at ankles)    step ups x 2x10x 12" step plyo box - HHAx1      Plan to add next visit:  +Reverse hip thrust x 3x7 x 5" holds x 18" plyo box   +anti rotation with stationary lunge (red power band)  +Yoruba deadlift      Patient Education and Home Exercises       Education provided:   - 10/7/24 - updated HEP to include (heavy HS activation): active HSS followed by sciatic nerve glides (10x ea), HL and seated HS isometrics x5x45" ea, Sustained HL bridge with HS walkouts x10      Written Home Exercises Provided: Pt instructed to continue prior HEP. Exercises were reviewed and Sawyer was able to demonstrate them prior to the end of the session.  Sawyer demonstrated good  understanding of the education provided. See Electronic Medical Record under Patient Instructions for exercises provided during therapy sessions    Assessment     Modified program today with added resistance during single leg deadlifts to promote elongation and eccentric strength. Initially noted tension during single leg deadlifts with added resistance but noted improvement with increased reps. Resumed Matrix for quad strength and hamstring eccentric loading. Pt notes fatigue but good relief by end of session.     Sawyer Is progressing well towards his goals.   Patient prognosis is Good.     Patient will continue to benefit from skilled outpatient physical therapy to address the deficits listed in the problem list box on initial evaluation, provide pt/family education and to maximize pt's level of independence in the home and community environment.     Patient's spiritual, cultural and educational needs considered and pt " agreeable to plan of care and goals.     Anticipated barriers to physical therapy: standard    Goals: updated 10/14/2024   Short Term Goals (4 Weeks):  1. Pt able to walk >=30 minutes with household chores with <4/10 pain. (Not met, progressing)  2.  Pt able to transfer in/out of chairs of various heights with <4/10 pain. (Not met, progressing)  3. Pt able to ascend/descend curb, independently, 1 handrail, with <4/10 pain. (Not met, progressing)     Long Term Goals (8 Weeks):  1. Pt able to walk >=1 hour with household chores with <2/10 pain. (Not met, progressing)  2.  Pt able to squat/lift >/=20# from floor to waist with <2/10 pain. (Not met, progressing)  3. Pt able to ascend/descend 1 flight of stairs, independently, 1 handrail, with <2/10 pain. (Not met, progressing)  4. Pt able to return to full work / recreational activities with min difficulty and pain <2/10. (Not met, progressing)  5. Pt will be independent with HEP and self management of symptoms.  (Not met, progressing)    Plan     Cont with POC for strength, mobility.    Merary Fox, PT

## 2024-10-16 ENCOUNTER — CLINICAL SUPPORT (OUTPATIENT)
Dept: REHABILITATION | Facility: OTHER | Age: 69
End: 2024-10-16
Payer: COMMERCIAL

## 2024-10-16 DIAGNOSIS — Z74.09 DECREASED STRENGTH, ENDURANCE, AND MOBILITY: ICD-10-CM

## 2024-10-16 DIAGNOSIS — S76.311A STRAIN OF RIGHT HAMSTRING MUSCLE, INITIAL ENCOUNTER: Primary | ICD-10-CM

## 2024-10-16 DIAGNOSIS — R53.1 DECREASED STRENGTH, ENDURANCE, AND MOBILITY: ICD-10-CM

## 2024-10-16 DIAGNOSIS — R68.89 DECREASED STRENGTH, ENDURANCE, AND MOBILITY: ICD-10-CM

## 2024-10-16 PROCEDURE — 97530 THERAPEUTIC ACTIVITIES: CPT | Mod: PN

## 2024-10-22 ENCOUNTER — CLINICAL SUPPORT (OUTPATIENT)
Dept: REHABILITATION | Facility: OTHER | Age: 69
End: 2024-10-22
Payer: COMMERCIAL

## 2024-10-22 DIAGNOSIS — S76.311A STRAIN OF RIGHT HAMSTRING MUSCLE, INITIAL ENCOUNTER: Primary | ICD-10-CM

## 2024-10-22 DIAGNOSIS — R53.1 DECREASED STRENGTH, ENDURANCE, AND MOBILITY: ICD-10-CM

## 2024-10-22 DIAGNOSIS — Z74.09 DECREASED STRENGTH, ENDURANCE, AND MOBILITY: ICD-10-CM

## 2024-10-22 DIAGNOSIS — R68.89 DECREASED STRENGTH, ENDURANCE, AND MOBILITY: ICD-10-CM

## 2024-10-22 PROCEDURE — 97112 NEUROMUSCULAR REEDUCATION: CPT | Mod: PN

## 2024-10-22 PROCEDURE — 97530 THERAPEUTIC ACTIVITIES: CPT | Mod: PN

## 2024-10-22 NOTE — PROGRESS NOTES
[facility]   Physical Therapy Treatment Note      Name: Sawyer Browning  Clinic Number: [id number]    Therapy Diagnosis:   Encounter Diagnoses   Name Primary?    Strain of right hamstring muscle, initial encounter Yes    Decreased strength, endurance, and mobility        Physician: Medardo Skelton MD    Visit Date: 10/16/2024    Physician Orders: PT Eval and Treat - hip, knees  Medical Diagnosis from Referral: M79.661,M79.662 (ICD-10-CM) - Bilateral calf pain M76.899 (ICD-10-CM) - Hamstring tendinitis   Evaluation Date: 9/4/2024   Authorization Period Expiration: 12/31/2024   Plan of Care Expiration: 11/4/2024   Progress Note Due: 10/4/2024   Date of Surgery: n/a   Visit # / Visits authorized: 10/20 + 1/ 1 (eval)   FOTO: 2/ 3 (last taken 9/23/2024)      Precautions: Standard      Time In: 1:15 pm   Time Out: 2:15 pm   Total Billable Time: 30 minutes 1;1 (60 min total)    PTA Visit #: 4/5     Subjective     Patient reports: no new complaints today.    He was compliant with home exercise program. - does them after doing 15 min on the elliptical - does this in place of going to the gym currently (denies return to gym despite PT edu)  Response to previous treatment: good response to last visit   Functional change: reduced pain in PM     Pain: 0/10   Location: R > L hamstring     Objective      Objective Measures updated at progress report unless specified.     10/7/2024  Palpation: mild increase in mm tension of R HS (biceps fem > semimem./semiten.) with decreased MFM of R>L HS     Hip  Right  Left Pain/Dysfunction with Movement     MMT MMT     Flexion 4+/5 4+/5     Extension - knee ext 4/5 5/5  in prone   Extension - knee flex 4/5 4+/5  in prone, c/o cramping in HS (R)   Abduction 5-/5 5/5     Adduction 5-/5 5/5     Internal rotation 4+/5 - seated  4/5 - SL 4+/5 - seated  4/5 - SL  R IR ROM <20 deg   External rotation 4+/5 - seated  4+/5 - SL 5/5 - seated  4+/5 - SL        Knee ROM: R = 0-5-120,  "L = 0-125  Knee strength:          Right   Left  Quads                          5/5       5/5 Performed 90-90  Hamstrings                  4/5       5/5  Supine, hip/knee flex 90-90               4-/5       5/5  Prone knee flex to 80 deg - noted cramping in R HS                3+/5       5/5  Prone knee flex to 20 deg    Flexibility:  Jorgito test: Hip flexors: min hypo R  Ely's: Quads: R = 95 deg, L = 110 deg  Hannah test: ITB: WFL  Hamstring 90-90: Right Left  Active   -30 -28 In decrees  Passive  -15 -15 In degrees    Special tests:  Long leg bridge DL x15 - increased fatigue  Long leg bridge SL <5x with cramping    Balance:  SLS EO: 30" B with mild LE valgus RLE  SLS EC: <10" with LOB R    FOTO:      Treatment     Sawyer received the treatments listed below:      therapeutic exercises to develop strength, endurance, ROM, flexibility, posture, and core stabilization for 00 minutes including:  matrix leg ext (DL) x 3x10 x 25# -- performed at end of session for passive stretch      manual therapy techniques: Joint mobilizations, Myofacial release, and Soft tissue Mobilization were applied to the: R HS for 00 minutes, including:  Consider dry needling in the future if MPT does not give complete relief.     neuromuscular re-education activities to improve: Balance, Coordination, Kinesthetic, Sense, Proprioception, and Posture for 35 minutes. The following activities were included:  active HS stretch x10 B w/ 10 x ankle pumps at 10th rep for additional sciatic nerve glide  3D hamstring x 15 ea  HL HS isometrics x3x45" holds B - NP today  sustained HL bridge with HS walkouts x10  seated HS isometrics x5x45" holds B using green PB at EOM  standing 3-way hip x 15 ea direction x heavy loop at ankles    +Matrix leg extensions x3x10x 15#  matrix leg curls (single leg) x 2x10 x 19# -- progress to increased hip flexion (w/ increased fwd trunk lean)      therapeutic activities to improve functional performance for 25 minutes, " "including:  Standing hamstring hinge (contralat leg on wall for Abd activation/pelvic support) x1x15 AROM, 2x8 x 10# KB  nordic HS curls (with elevated HOB, decreasing 1 notch every set) x4x5     Dynamic warm up::: 2x20 ft ea   Hamstring scoops   Side stepping (heavy loop at ankles)    step ups x 2x10x 12" step plyo box - HHAx1      Plan to add next visit:  +Reverse hip thrust x 3x7 x 5" holds x 18" plyo box   +anti rotation with stationary lunge (red power band)  +[place]aroese deadlift      Patient Education and Home Exercises       Education provided:   - 10/7/24 - updated HEP to include (heavy HS activation): active HSS followed by sciatic nerve glides (10x ea), HL and seated HS isometrics x5x45" ea, Sustained HL bridge with HS walkouts x10      Written Home Exercises Provided: Pt instructed to continue prior HEP. Exercises were reviewed and Sawyer was able to demonstrate them prior to the end of the session.  Sawyer demonstrated good  understanding of the education provided. See Electronic Medical Record under Patient Instructions for exercises provided during therapy sessions    Assessment     Resumed leg extensions today to promote passive elongation of hamstrings during simultaneous quad strengthening. Good tolerance without increased sx.  Sawyer Is progressing well towards his goals.   Patient prognosis is Good.     Patient will continue to benefit from skilled outpatient physical therapy to address the deficits listed in the problem list box on initial evaluation, provide pt/family education and to maximize pt's level of independence in the home and community environment.     Patient's spiritual, cultural and educational needs considered and pt agreeable to plan of care and goals.     Anticipated barriers to physical therapy: standard    Goals: updated 10/22/2024   Short Term Goals (4 Weeks):  1. Pt able to walk >=30 minutes with household chores with <4/10 pain. (Not met, progressing)  2.  Pt able to transfer " in/out of chairs of various heights with <4/10 pain. (Not met, progressing)  3. Pt able to ascend/descend curb, independently, 1 handrail, with <4/10 pain. (Not met, progressing)     Long Term Goals (8 Weeks):  1. Pt able to walk >=1 hour with household chores with <2/10 pain. (Not met, progressing)  2.  Pt able to squat/lift >/=20# from floor to waist with <2/10 pain. (Not met, progressing)  3. Pt able to ascend/descend 1 flight of stairs, independently, 1 handrail, with <2/10 pain. (Not met, progressing)  4. Pt able to return to full work / recreational activities with min difficulty and pain <2/10. (Not met, progressing)  5. Pt will be independent with HEP and self management of symptoms.  (Not met, progressing)    Plan     Cont with POC for strength, mobility.    Merary Fox, PT

## 2024-10-23 ENCOUNTER — PATIENT MESSAGE (OUTPATIENT)
Dept: FAMILY MEDICINE | Facility: CLINIC | Age: 69
End: 2024-10-23
Payer: COMMERCIAL

## 2024-10-23 DIAGNOSIS — I10 ESSENTIAL HYPERTENSION: ICD-10-CM

## 2024-10-24 ENCOUNTER — CLINICAL SUPPORT (OUTPATIENT)
Dept: REHABILITATION | Facility: OTHER | Age: 69
End: 2024-10-24
Payer: COMMERCIAL

## 2024-10-24 ENCOUNTER — OFFICE VISIT (OUTPATIENT)
Dept: CARDIOLOGY | Facility: CLINIC | Age: 69
End: 2024-10-24
Payer: COMMERCIAL

## 2024-10-24 VITALS
HEART RATE: 56 BPM | BODY MASS INDEX: 26.31 KG/M2 | WEIGHT: 194 LBS | SYSTOLIC BLOOD PRESSURE: 124 MMHG | DIASTOLIC BLOOD PRESSURE: 76 MMHG

## 2024-10-24 DIAGNOSIS — Z74.09 DECREASED STRENGTH, ENDURANCE, AND MOBILITY: ICD-10-CM

## 2024-10-24 DIAGNOSIS — E78.49 OTHER HYPERLIPIDEMIA: ICD-10-CM

## 2024-10-24 DIAGNOSIS — I48.3 TYPICAL ATRIAL FLUTTER: ICD-10-CM

## 2024-10-24 DIAGNOSIS — R53.1 DECREASED STRENGTH, ENDURANCE, AND MOBILITY: ICD-10-CM

## 2024-10-24 DIAGNOSIS — R68.89 DECREASED STRENGTH, ENDURANCE, AND MOBILITY: ICD-10-CM

## 2024-10-24 DIAGNOSIS — Z95.1 S/P CABG (CORONARY ARTERY BYPASS GRAFT): ICD-10-CM

## 2024-10-24 DIAGNOSIS — S76.311A STRAIN OF RIGHT HAMSTRING MUSCLE, INITIAL ENCOUNTER: Primary | ICD-10-CM

## 2024-10-24 DIAGNOSIS — I10 ESSENTIAL HYPERTENSION: ICD-10-CM

## 2024-10-24 DIAGNOSIS — I25.10 ATHEROSCLEROSIS OF NATIVE CORONARY ARTERY OF NATIVE HEART WITHOUT ANGINA PECTORIS: Primary | ICD-10-CM

## 2024-10-24 PROCEDURE — 3074F SYST BP LT 130 MM HG: CPT | Mod: CPTII,S$GLB,, | Performed by: INTERNAL MEDICINE

## 2024-10-24 PROCEDURE — 97112 NEUROMUSCULAR REEDUCATION: CPT | Mod: PN

## 2024-10-24 PROCEDURE — 4010F ACE/ARB THERAPY RXD/TAKEN: CPT | Mod: CPTII,S$GLB,, | Performed by: INTERNAL MEDICINE

## 2024-10-24 PROCEDURE — 97530 THERAPEUTIC ACTIVITIES: CPT | Mod: PN

## 2024-10-24 PROCEDURE — 1101F PT FALLS ASSESS-DOCD LE1/YR: CPT | Mod: CPTII,S$GLB,, | Performed by: INTERNAL MEDICINE

## 2024-10-24 PROCEDURE — 3288F FALL RISK ASSESSMENT DOCD: CPT | Mod: CPTII,S$GLB,, | Performed by: INTERNAL MEDICINE

## 2024-10-24 PROCEDURE — 3008F BODY MASS INDEX DOCD: CPT | Mod: CPTII,S$GLB,, | Performed by: INTERNAL MEDICINE

## 2024-10-24 PROCEDURE — 3078F DIAST BP <80 MM HG: CPT | Mod: CPTII,S$GLB,, | Performed by: INTERNAL MEDICINE

## 2024-10-24 PROCEDURE — 1159F MED LIST DOCD IN RCRD: CPT | Mod: CPTII,S$GLB,, | Performed by: INTERNAL MEDICINE

## 2024-10-24 PROCEDURE — 1160F RVW MEDS BY RX/DR IN RCRD: CPT | Mod: CPTII,S$GLB,, | Performed by: INTERNAL MEDICINE

## 2024-10-24 PROCEDURE — 99999 PR PBB SHADOW E&M-EST. PATIENT-LVL III: CPT | Mod: PBBFAC,,, | Performed by: INTERNAL MEDICINE

## 2024-10-24 PROCEDURE — 99214 OFFICE O/P EST MOD 30 MIN: CPT | Mod: S$GLB,,, | Performed by: INTERNAL MEDICINE

## 2024-10-24 RX ORDER — VALSARTAN 160 MG/1
160 TABLET ORAL DAILY
Qty: 90 TABLET | Refills: 3 | Status: SHIPPED | OUTPATIENT
Start: 2024-10-24 | End: 2025-10-24

## 2024-10-24 RX ORDER — ATORVASTATIN CALCIUM 80 MG/1
80 TABLET, FILM COATED ORAL NIGHTLY
Qty: 90 TABLET | Refills: 3 | Status: SHIPPED | OUTPATIENT
Start: 2024-10-24

## 2024-10-24 RX ORDER — AMLODIPINE BESYLATE 10 MG/1
10 TABLET ORAL DAILY
Qty: 90 TABLET | Refills: 3 | Status: SHIPPED | OUTPATIENT
Start: 2024-10-24

## 2024-10-24 RX ORDER — CARVEDILOL 25 MG/1
25 TABLET ORAL 2 TIMES DAILY WITH MEALS
Qty: 180 TABLET | Refills: 3 | Status: SHIPPED | OUTPATIENT
Start: 2024-10-24 | End: 2025-10-24

## 2024-10-24 NOTE — PROGRESS NOTES
HISTORY:    69-year-old male with a history of CAD and thoracic aortic aneurysm status post valve sparing aortic root replacement and transverse aortic arch replacement with LIMA to LAD and SVG to PDA September 2023, paroxysmal atrial fibrillation-flutter, hypertension, hyperlipidemia presenting for follow-up.    Incidental TAA found in '23 after coronary calcium score was ordered w additional dx of CAD and resultant surgery.     No CP, SOB, or LOPEZ since. No edema or orthopnea. No palpitations.     Exercising without issue.     Some orthostatic symptoms. Stopped hctz, but symptoms have persisted. Told to stop amio, but he continued.     Some muscular back discomfort. Held statin for a period of time w no change. Restarted.     Tolerates aspirin 81 x 1, amiodarone 200 x 1, carvedilol 25 x 2, hydrochlorothiazide 25 x qod, valsartan 160 x 2, atorvastatin 80x1, and apixaban 5 x 2. Bps controlled. Some light headedness when standing.     PHYSICAL EXAM:    Vitals:    10/24/24 1037   BP: 124/76   Pulse: (!) 56       NAD, A+Ox3.  No jvd, no bruit.  RRR nml s1,s2. No murmurs.  CTA B no wheezes or crackles.  No edema.    LABS/STUDIES (imaging reviewed during clinic visit):    March 2024 CBC normal.  October 2023 CMP normal.  March 2024 /HDL 49/LDL 70/TG 64.  A1c normal.  TSH normal.    ECG March 2024 sinus rhythm with an anterior infarct pattern.  No ST changes.  October 2023 ECGs demonstrate atrial fibrillation and atrial flutter.  CPX March 2024 7 minutes on a high ramp protocol No ischemic changes.  TTE September 2023 normal LV size and function with EF 60-65%.  Normal diastology.  Patent aortic root graft with no significant aortic valve disease.  CVP 3.   GWEN November 2023 normal LV size and function.  Patent appendage status post successful cardioversion restoration of sinus rhythm.    Cardiac catheterization September 2023 mid LAD .  Patent D1 and left circ system. 90% focal distal RCA.  Carotid 2023  Atherosclerosis without significant disease.    ASSESSMENT & PLAN:    1. Atherosclerosis of native coronary artery of native heart without angina pectoris    2. Essential hypertension    3. Other hyperlipidemia    4. S/P CABG (coronary artery bypass graft)    5. Typical atrial flutter            Orders Placed This Encounter    valsartan (DIOVAN) 160 MG tablet    amLODIPine (NORVASC) 10 MG tablet    apixaban (ELIQUIS) 5 mg Tab    atorvastatin (LIPITOR) 80 MG tablet    carvediloL (COREG) 25 MG tablet        SIHD post LIMA-LAD/SVG-PDA w nml LVEF. Off asa as pt is on NOAC.      LDL at goal on atorvastatin 80x1.    Bps controlled on carvedilol 25 x 2, valsartan 160 x 2, and amlodipine 10x1. Some orthostatic symptoms. Okay to decrease valsartan to 160x1.     pAfib-flutter post-op s/p CV on amiodarone 200x1 and apixaban 5 x 2. HR in the 50s with some light headedness. Okay to stop amiodarone at this time.      Follow up in about 1 year (around 10/24/2025).      Liane Dawn MD

## 2024-10-29 ENCOUNTER — CLINICAL SUPPORT (OUTPATIENT)
Dept: REHABILITATION | Facility: OTHER | Age: 69
End: 2024-10-29
Payer: COMMERCIAL

## 2024-10-29 DIAGNOSIS — R53.1 DECREASED STRENGTH, ENDURANCE, AND MOBILITY: ICD-10-CM

## 2024-10-29 DIAGNOSIS — Z74.09 DECREASED STRENGTH, ENDURANCE, AND MOBILITY: ICD-10-CM

## 2024-10-29 DIAGNOSIS — S76.311A STRAIN OF RIGHT HAMSTRING MUSCLE, INITIAL ENCOUNTER: Primary | ICD-10-CM

## 2024-10-29 DIAGNOSIS — R68.89 DECREASED STRENGTH, ENDURANCE, AND MOBILITY: ICD-10-CM

## 2024-10-29 PROCEDURE — 97530 THERAPEUTIC ACTIVITIES: CPT | Mod: PN | Performed by: PHYSICAL THERAPIST

## 2024-10-29 PROCEDURE — 97112 NEUROMUSCULAR REEDUCATION: CPT | Mod: PN | Performed by: PHYSICAL THERAPIST

## 2024-11-05 ENCOUNTER — PATIENT MESSAGE (OUTPATIENT)
Dept: FAMILY MEDICINE | Facility: CLINIC | Age: 69
End: 2024-11-05
Payer: COMMERCIAL

## 2024-11-05 ENCOUNTER — CLINICAL SUPPORT (OUTPATIENT)
Dept: REHABILITATION | Facility: OTHER | Age: 69
End: 2024-11-05
Payer: COMMERCIAL

## 2024-11-05 ENCOUNTER — LAB VISIT (OUTPATIENT)
Dept: LAB | Facility: HOSPITAL | Age: 69
End: 2024-11-05
Attending: INTERNAL MEDICINE
Payer: COMMERCIAL

## 2024-11-05 DIAGNOSIS — R68.89 DECREASED STRENGTH, ENDURANCE, AND MOBILITY: ICD-10-CM

## 2024-11-05 DIAGNOSIS — R53.1 DECREASED STRENGTH, ENDURANCE, AND MOBILITY: ICD-10-CM

## 2024-11-05 DIAGNOSIS — S76.311A STRAIN OF RIGHT HAMSTRING MUSCLE, INITIAL ENCOUNTER: Primary | ICD-10-CM

## 2024-11-05 DIAGNOSIS — I10 ESSENTIAL HYPERTENSION: ICD-10-CM

## 2024-11-05 DIAGNOSIS — Z74.09 DECREASED STRENGTH, ENDURANCE, AND MOBILITY: ICD-10-CM

## 2024-11-05 LAB
ALBUMIN SERPL BCP-MCNC: 4 G/DL (ref 3.5–5.2)
ALP SERPL-CCNC: 98 U/L (ref 40–150)
ALT SERPL W/O P-5'-P-CCNC: 28 U/L (ref 10–44)
ANION GAP SERPL CALC-SCNC: 8 MMOL/L (ref 8–16)
AST SERPL-CCNC: 21 U/L (ref 10–40)
BILIRUB SERPL-MCNC: 0.7 MG/DL (ref 0.1–1)
BUN SERPL-MCNC: 14 MG/DL (ref 8–23)
CALCIUM SERPL-MCNC: 9 MG/DL (ref 8.7–10.5)
CHLORIDE SERPL-SCNC: 107 MMOL/L (ref 95–110)
CO2 SERPL-SCNC: 26 MMOL/L (ref 23–29)
CREAT SERPL-MCNC: 0.9 MG/DL (ref 0.5–1.4)
EST. GFR  (NO RACE VARIABLE): >60 ML/MIN/1.73 M^2
GLUCOSE SERPL-MCNC: 124 MG/DL (ref 70–110)
POTASSIUM SERPL-SCNC: 3.8 MMOL/L (ref 3.5–5.1)
PROT SERPL-MCNC: 7 G/DL (ref 6–8.4)
SODIUM SERPL-SCNC: 141 MMOL/L (ref 136–145)

## 2024-11-05 PROCEDURE — 80053 COMPREHEN METABOLIC PANEL: CPT | Performed by: INTERNAL MEDICINE

## 2024-11-05 PROCEDURE — 97112 NEUROMUSCULAR REEDUCATION: CPT | Mod: PN

## 2024-11-05 PROCEDURE — 97530 THERAPEUTIC ACTIVITIES: CPT | Mod: PN

## 2024-11-05 PROCEDURE — 36415 COLL VENOUS BLD VENIPUNCTURE: CPT | Mod: PN | Performed by: INTERNAL MEDICINE

## 2024-11-05 NOTE — PATIENT INSTRUCTIONS
[facility]   Physical Therapy Treatment Note      Name: Sawyer Browning  Clinic Number: [id number]    Therapy Diagnosis:   Encounter Diagnoses   Name Primary?    Strain of right hamstring muscle, initial encounter Yes    Decreased strength, endurance, and mobility        Physician: Medardo Skelton MD    Visit Date: 11/5/2024    Physician Orders: PT Eval and Treat - hip, knees  Medical Diagnosis from Referral: M79.661,M79.662 (ICD-10-CM) - Bilateral calf pain M76.899 (ICD-10-CM) - Hamstring tendinitis   Evaluation Date: 9/4/2024   Authorization Period Expiration: 12/31/2024   Plan of Care Expiration: 11/4/2024   Progress Note Due: 10/4/2024   Date of Surgery: n/a   Visit # / Visits authorized: 14/20 + 1/ 1 (eval)   FOTO: 2/ 3 (last taken 9/23/2024)      Precautions: Standard      Time In: 1320   Time Out: 1415  Total Billable Time: 55 minutes     PTA Visit #: 0/5     Subjective     Patient reports: still has tightness on waking in AM, but improves with stretches and movement.     He was compliant with home exercise program. - does them after doing 15 min on the elliptical - does this in place of going to the gym currently (denies return to gym despite PT edu)  Response to previous treatment: good response to last visit   Functional change: reduced pain in PM     Pain: 6/10   Location: R > L hamstring     Objective      Objective Measures updated at progress report unless specified.     10/7/2024  Palpation: mild increase in mm tension of R HS (biceps fem > semimem./semiten.) with decreased MFM of R>L HS     Hip  Right  Left Pain/Dysfunction with Movement     MMT MMT     Flexion 4+/5 4+/5     Extension - knee ext 4/5 5/5  in prone   Extension - knee flex 4/5 4+/5  in prone, c/o cramping in HS (R)   Abduction 5-/5 5/5     Adduction 5-/5 5/5     Internal rotation 4+/5 - seated  4/5 - SL 4+/5 - seated  4/5 - SL  R IR ROM <20 deg   External rotation 4+/5 - seated  4+/5 - SL 5/5 - seated  4+/5 - SL    "     Knee ROM: R = 0-5-120, L = 0-125  Knee strength:          Right   Left  Quads                          5/5       5/5 Performed 90-90  Hamstrings                  4/5       5/5  Supine, hip/knee flex 90-90               4-/5       5/5  Prone knee flex to 80 deg - noted cramping in R HS                3+/5       5/5  Prone knee flex to 20 deg    Flexibility:  Jorgito test: Hip flexors: min hypo R  Ely's: Quads: R = 95 deg, L = 110 deg  Hannah test: ITB: WFL  Hamstring 90-90: Right Left  Active   -30 -28 In decrees  Passive  -15 -15 In degrees    Special tests:  Long leg bridge DL x15 - increased fatigue  Long leg bridge SL <5x with cramping    Balance:  SLS EO: 30" B with mild LE valgus RLE  SLS EC: <10" with LOB R    FOTO:      Treatment     Sawyer received the treatments listed below:      therapeutic exercises to develop strength, endurance, ROM, flexibility, posture, and core stabilization for 00 minutes including:  matrix leg ext (DL) x 3x10 x 25# -- performed at end of session for passive stretch      manual therapy techniques: Joint mobilizations, Myofacial release, and Soft tissue Mobilization were applied to the: R HS for 00 minutes, including:  Consider dry needling in the future if MPT does not give complete relief.     neuromuscular re-education activities to improve: Balance, Coordination, Kinesthetic, Sense, Proprioception, and Posture for 30 minutes. The following activities were included:  active HS stretch x10 B w/ 10 x ankle pumps at 10th rep for additional sciatic nerve glide  3D hamstring x 15 ea   HL HS isometrics x3x45" holds B - NP today  sustained HL bridge with HS walkouts x10  seated HS isometrics x5x45" holds B using green PB at EOM  standing 3-way hip x 15 ea direction x heavy loop at ankles    Matrix leg extensions x3x10x 25#  matrix leg curls (single leg) x 3x10 x 25#       therapeutic activities to improve functional performance for 25 minutes, including:  Standing hamstring hinge " "(contralat leg on wall for Abd activation/pelvic support) x3x7 x 20# KB  nordic HS curls (with elevated HOB, decreasing 1 notch every set) x4x5     Dynamic warm up::: 2x20 ft ea   Hamstring scoops   Side stepping (heavy loop at knees)    box squats x2x10 x heavy band at knees, holding 10# KB  standing wall clams x2x10 B x heavy band at ankles - HHAx2    Freemotion: prone HS curls [id number] -- Matt  Reverse hip thrust x 3x7 x 5" holds x 24" plyo box   +Anti-rotation in lunge with teal power band x 15     +RDL x 25# bar x 2 x 10     step ups x 2x10x 12" step plyo box - HHAx1      Plan to add next visit:        Patient Education and Home Exercises       Education provided:   - 10/7/24 - updated HEP to include (heavy HS activation): active HSS followed by sciatic nerve glides (10x ea), HL and seated HS isometrics x5x45" ea, Sustained HL bridge with HS walkouts x10      Written Home Exercises Provided: Pt instructed to continue prior HEP. Exercises were reviewed and Sawyer was able to demonstrate them prior to the end of the session.  Sawyer demonstrated good  understanding of the education provided. See Electronic Medical Record under Patient Instructions for exercises provided during therapy sessions    Assessment     Good tolerance to progression of therex today. Increased weight with Matrix and Freemotion today with good training effect noted. Initiated anti-rotations and Kazakh dead lifts with good technique following cuing.     Sawyer Is progressing well towards his goals.   Patient prognosis is Good.     Patient will continue to benefit from skilled outpatient physical therapy to address the deficits listed in the problem list box on initial evaluation, provide pt/family education and to maximize pt's level of independence in the home and community environment.     Patient's spiritual, cultural and educational needs considered and pt agreeable to plan of care and goals.     Anticipated barriers to physical therapy: " standard    Goals: updated 11/5/2024   Short Term Goals (4 Weeks):  1. Pt able to walk >=30 minutes with household chores with <4/10 pain. (Not met, progressing)  2.  Pt able to transfer in/out of chairs of various heights with <4/10 pain. (Not met, progressing)  3. Pt able to ascend/descend curb, independently, 1 handrail, with <4/10 pain. (Not met, progressing)     Long Term Goals (8 Weeks):  1. Pt able to walk >=1 hour with household chores with <2/10 pain. (Not met, progressing)  2.  Pt able to squat/lift >/=20# from floor to waist with <2/10 pain. (Not met, progressing)  3. Pt able to ascend/descend 1 flight of stairs, independently, 1 handrail, with <2/10 pain. (Not met, progressing)  4. Pt able to return to full work / recreational activities with min difficulty and pain <2/10. (Not met, progressing)  5. Pt will be independent with HEP and self management of symptoms.  (Not met, progressing)    Plan     Cont with POC for strength, mobility.    Merary Fox, PT

## 2024-11-05 NOTE — PLAN OF CARE
"[facility]  Physical Therapy Plan of Care Note     Name: Sawyer Browning  Clinic Number: [id number]    Therapy Diagnosis:   Encounter Diagnoses   Name Primary?    Strain of right hamstring muscle, initial encounter Yes    Decreased strength, endurance, and mobility      Physician: Medardo Skelton MD    Visit Date: 11/5/2024    Physician Orders: PT Eval and Treat - hip, knees  Medical Diagnosis from Referral: M79.661,M79.662 (ICD-10-CM) - Bilateral calf pain M76.899 (ICD-10-CM) - Hamstring tendinitis   Evaluation Date: 9/4/2024   Authorization Period Expiration: 12/31/2024   Plan of Care Expiration: updated to 11/25/2024   Progress Note Due: 11/25/2024   Date of Surgery: n/a   Visit # / Visits authorized: 15/20 + 1/ 1 (eval)   FOTO: 2/ 3 (last taken 9/23/2024) - d/c on 11/5/24     Precautions: Standard   Functional Level Prior to Evaluation:  indep    SUBJECTIVE     Update: Patient reports: still has tightness on waking in AM, but improves with stretches and movement. Pain returns as the day goes on either with bending forwards or with prolonged walking, stair climbing. Reports that overall things feel "about the same." Says that after the taping from the other visit he noticed a bruise on the back of his right leg.    He was compliant with home exercise program. - does them after doing 15 min on the elliptical - does this in place of going to the gym currently (denies return to gym despite PT edu)  Response to previous treatment: good response to last visit   Functional change: reduced pain in PM     Pain: 6/10   Location: R > L hamstring     OBJECTIVE     Update: 11/5/2024  Palpation: mild increase in mm tension of R HS (biceps fem > semimem./semiten.) with decreased MFM of R>L HS     Hip  Right  Left Pain/Dysfunction with Movement     MMT MMT     Flexion 4+/5 4+/5     Extension - knee ext 4/5 5/5  in prone   Extension - knee flex 4/5 4+/5  in prone, c/o cramping in HS (R)   Abduction 5-/5 " "5/5     Adduction 5-/5 5/5     Internal rotation 4+/5 - seated  4/5 - SL 4+/5 - seated  4/5 - SL  R IR ROM <20 deg   External rotation 4+/5 - seated  4+/5 - SL 5/5 - seated  4+/5 - SL        Knee ROM: R = 0-5-120, L = 0-125  Knee strength:          Right   Left  Quads                          5/5       5/5 Performed 90-90  Hamstrings                  4/5       5/5  Supine, hip/knee flex 90-90               4-/5       5/5  Prone knee flex to 80 deg - noted cramping in R HS                3+/5       5/5  Prone knee flex to 20 deg    Flexibility:  Jorgito test: Hip flexors: min hypo R  Ely's: Quads: R = 95 deg, L = 110 deg  Hannah test: ITB: WFL  Hamstring 90-90: Right Left  Active   -30 -28 In decrees  Passive  -15 -15 In degrees    Special tests:  Long leg bridge DL x15 - increased fatigue  Long leg bridge SL <5x with cramping    Balance:  SLS EO: 30" B with mild LE valgus RLE  SLS EC: <10" with LOB R    ASSESSMENT     Update: pt cont with Matt hamstring pain that waxes/wanes throughout the day but increases with increased strain or prolonged rest. Pt with good improvements in overall strength and hamstring flexibility. However continued hamstring weakness at beginning ranges of knee flex, with early fatigue during therex program and increased pain with attempts and eccentric and slow moderate to heavy resistance loading. As pt presents with abnormal knee joint alignment, quad flexibility deficits, and core instability that might also contribute to c/c.    Previous Short Term Goals Status:   0/3 met  New Short Term Goals Status:   cont  Long Term Goal Status: continue per initial plan of care.  Reasons for Recertification of Therapy:   continuation of care x 3 weeks and progress pt towards HEP, d/c. As pt has not tried dry needling, consider extending POC to attempt modalities not attempted durign previous sessions.    GOALS  Short Term Goals (4 Weeks):  1. Pt able to walk >=30 minutes with household chores with <4/10 " pain. (Not met, progressing)  2.  Pt able to transfer in/out of chairs of various heights with <4/10 pain. (Not met, progressing)  3. Pt able to ascend/descend curb, independently, 1 handrail, with <4/10 pain. (Not met, progressing)     Long Term Goals (8 Weeks):  1. Pt able to walk >=1 hour with household chores with <2/10 pain. (Not met, progressing)  2.  Pt able to squat/lift >/=20# from floor to waist with <2/10 pain. (Not met, progressing)  3. Pt able to ascend/descend 1 flight of stairs, independently, 1 handrail, with <2/10 pain. (Not met, progressing)  4. Pt able to return to full work / recreational activities with min difficulty and pain <2/10. (Not met, progressing)  5. Pt will be independent with HEP and self management of symptoms.  (Not met, progressing)    PLAN     Updated Certification Period: 11/4/2024 to 11/25/2024   Recommended Treatment Plan: 2 times per week for 3 weeks:  Aquatic Therapy, Cervical/Lumbar Traction, Electrical Stimulation prn, Gait Training, Iontophoresis (with dexamethasone prn), Manual Therapy, Moist Heat/ Ice, Neuromuscular Re-ed, Patient Education, Self Care, Therapeutic Activities, and Therapeutic Exercise   Other Recommendations: Progress HEP towards D/C. Recommend F/U with MD if symptoms worsen or do not resolve. Patient may be seen by a PTA for treatment to carry out their plan of care.  Face-to-face conferences will be held.     Merary Fox, PT

## 2024-11-05 NOTE — PROGRESS NOTES
"[facility]   Physical Therapy Treatment Note      Name: Sawyer Browning  Clinic Number: [id number]    Therapy Diagnosis:   Encounter Diagnoses   Name Primary?    Strain of right hamstring muscle, initial encounter Yes    Decreased strength, endurance, and mobility        Physician: Medardo Skelton MD    Visit Date: 11/5/2024    Physician Orders: PT Eval and Treat - hip, knees  Medical Diagnosis from Referral: M79.661,M79.662 (ICD-10-CM) - Bilateral calf pain M76.899 (ICD-10-CM) - Hamstring tendinitis   Evaluation Date: 9/4/2024   Authorization Period Expiration: 12/31/2024   Plan of Care Expiration: updated to 11/25/2024   Progress Note Due: 11/25/2024   Date of Surgery: n/a   Visit # / Visits authorized: 15/20 + 1/ 1 (eval)   FOTO: 2/ 3 (last taken 9/23/2024) - d/c on 11/5/24     Precautions: Standard      Time In: 1:00p  Time Out: 2:10p  Total Billable Time: 70 minutes     PTA Visit #: 0/5     Subjective     Patient reports: still has tightness on waking in AM, but improves with stretches and movement. Pain returns as the day goes on either with bending forwards or with prolonged walking, stair climbing. Reports that overall things feel "about the same." Says that after the taping from the other visit he noticed a bruise on the back of his right leg.    He was compliant with home exercise program. - does them after doing 15 min on the elliptical - does this in place of going to the gym currently (denies return to gym despite PT edu)  Response to previous treatment: good response to last visit   Functional change: reduced pain in PM     Pain: 6/10   Location: R > L hamstring     Objective      Objective Measures updated at progress report unless specified.     11/5/2024  Palpation: mild increase in mm tension of R HS (biceps fem > semimem./semiten.) with decreased MFM of R>L HS     Hip  Right  Left Pain/Dysfunction with Movement     MMT MMT     Flexion 4+/5 4+/5     Extension - knee ext 4/5 " "5/5  in prone   Extension - knee flex 4/5 4+/5  in prone, c/o cramping in HS (R)   Abduction 5-/5 5/5     Adduction 5-/5 5/5     Internal rotation 4+/5 - seated  4/5 - SL 4+/5 - seated  4/5 - SL  R IR ROM <20 deg   External rotation 4+/5 - seated  4+/5 - SL 5/5 - seated  4+/5 - SL        Knee ROM: R = 0-5-120, L = 0-125  Knee strength:          Right   Left  Quads                          5/5       5/5 Performed 90-90  Hamstrings                  4/5       5/5  Supine, hip/knee flex 90-90               4-/5       5/5  Prone knee flex to 80 deg - noted cramping in R HS                3+/5       5/5  Prone knee flex to 20 deg    Flexibility:  Jorgito test: Hip flexors: min hypo R  Ely's: Quads: R = 95 deg, L = 110 deg  Hannah test: ITB: WFL  Hamstring 90-90: Right Left  Active   -30 -28 In decrees  Passive  -15 -15 In degrees    Special tests:  Long leg bridge DL x15 - increased fatigue  Long leg bridge SL <5x with cramping    Balance:  SLS EO: 30" B with mild LE valgus RLE  SLS EC: <10" with LOB R    FOTO:      Treatment     Sawyer received the treatments listed below:      therapeutic exercises to develop strength, endurance, ROM, flexibility, posture, and core stabilization for 00 minutes including:  matrix leg ext (DL) x 3x10 x 25# -- performed at end of session for passive stretch      manual therapy techniques: Joint mobilizations, Myofacial release, and Soft tissue Mobilization were applied to the: R HS for 00 minutes, including:  Consider dry needling in the future if MPT does not give complete relief.     neuromuscular re-education activities to improve: Balance, Coordination, Kinesthetic, Sense, Proprioception, and Posture for 25 minutes. The following activities were included:  +SLR with pilates ring x1x10 AROM, x2x10 x 3# Kath  +elevated clams x1x10 AROM, x2x10 x RTB at knees    3D hamstring x 15 ea   active HS stretch x10 B w/ 10 x ankle pumps at 10th rep for additional sciatic nerve glide  HL HS isometrics " "x3x45" holds B - NP today  sustained HL bridge with HS walkouts x10  seated HS isometrics x5x45" holds B using green PB at EOM  standing 3-way hip x 15 ea direction x heavy loop at ankles    Matrix leg extensions x3x10x 25#  matrix leg curls (single leg) x 3x10 x 25#       therapeutic activities to improve functional performance for 45 minutes, including:  Reassessment for updated POC  Discussed benefits, s/e, purpose, and prognosis w/ dry needling. Pt opted to try another day.  Edu on current POC, and possibly following up with ortho MD for further diagnostic care as well as to screen knees, hips, or low back for possible radicular origin of sx.    Standing hamstring hinge (contralat leg on wall for Abd activation/pelvic support) x3x7 x 10# KB  +wall sit x3x30"  RDL x 25# bar x 2 x 10 -- worsening HS pain    nordic HS curls (with elevated HOB, decreasing 1 notch every set) x4x5   Dynamic warm up::: 2x20 ft ea   Hamstring scoops  Side stepping (heavy loop at knees)  box squats x2x10 x heavy band at knees, holding 10# KB  standing wall clams x2x10 B x heavy band at ankles - HHAx2  Freemotion: prone HS curls [id number] -- Matt  Reverse hip thrust x 3x7 x 5" holds x 24" plyo box   Anti-rotation in lunge with teal power band x 15     Patient Education and Home Exercises       Education provided:   - 10/7/24 - updated HEP to include (heavy HS activation): active HSS followed by sciatic nerve glides (10x ea), HL and seated HS isometrics x5x45" ea, Sustained HL bridge with HS walkouts x10      Written Home Exercises Provided: Pt instructed to continue prior HEP. Exercises were reviewed and Sawyer was able to demonstrate them prior to the end of the session.  Sawyer demonstrated good  understanding of the education provided. See Electronic Medical Record under Patient Instructions for exercises provided during therapy sessions    Assessment     Reassessment for updated POC today. Heavy edu on dx prognosis vs ddx (ie. lumbar " radiculopathy, HS tear) and benefits to F/U with an orthopedist for further diagnostic care. Consider continuing PT to manage remaining visits and then progress to d/c.    Sawyer Is progressing well towards his goals.   Patient prognosis is Good.     Patient will continue to benefit from skilled outpatient physical therapy to address the deficits listed in the problem list box on initial evaluation, provide pt/family education and to maximize pt's level of independence in the home and community environment.     Patient's spiritual, cultural and educational needs considered and pt agreeable to plan of care and goals.     Anticipated barriers to physical therapy: standard    Goals: updated 11/5/2024   Short Term Goals (4 Weeks):  1. Pt able to walk >=30 minutes with household chores with <4/10 pain. (Not met, progressing)  2.  Pt able to transfer in/out of chairs of various heights with <4/10 pain. (Not met, progressing)  3. Pt able to ascend/descend curb, independently, 1 handrail, with <4/10 pain. (Not met, progressing)     Long Term Goals (8 Weeks):  1. Pt able to walk >=1 hour with household chores with <2/10 pain. (Not met, progressing)  2.  Pt able to squat/lift >/=20# from floor to waist with <2/10 pain. (Not met, progressing)  3. Pt able to ascend/descend 1 flight of stairs, independently, 1 handrail, with <2/10 pain. (Not met, progressing)  4. Pt able to return to full work / recreational activities with min difficulty and pain <2/10. (Not met, progressing)  5. Pt will be independent with HEP and self management of symptoms.  (Not met, progressing)    Plan     Cont with POC for strength, mobility.    Merary Fox, PT

## 2024-11-07 ENCOUNTER — TELEPHONE (OUTPATIENT)
Dept: FAMILY MEDICINE | Facility: CLINIC | Age: 69
End: 2024-11-07
Payer: COMMERCIAL

## 2024-11-07 ENCOUNTER — CLINICAL SUPPORT (OUTPATIENT)
Dept: REHABILITATION | Facility: OTHER | Age: 69
End: 2024-11-07
Payer: COMMERCIAL

## 2024-11-07 DIAGNOSIS — S76.311A STRAIN OF RIGHT HAMSTRING MUSCLE, INITIAL ENCOUNTER: Primary | ICD-10-CM

## 2024-11-07 DIAGNOSIS — Z74.09 DECREASED STRENGTH, ENDURANCE, AND MOBILITY: ICD-10-CM

## 2024-11-07 DIAGNOSIS — R53.1 DECREASED STRENGTH, ENDURANCE, AND MOBILITY: ICD-10-CM

## 2024-11-07 DIAGNOSIS — R68.89 DECREASED STRENGTH, ENDURANCE, AND MOBILITY: ICD-10-CM

## 2024-11-07 PROCEDURE — 97530 THERAPEUTIC ACTIVITIES: CPT | Mod: PN

## 2024-11-07 PROCEDURE — 97112 NEUROMUSCULAR REEDUCATION: CPT | Mod: PN

## 2024-11-07 NOTE — TELEPHONE ENCOUNTER
Spoke with patient. Patient declines scheduling appointment with NP. Patient scheduled for 12/11/24 with pcp.

## 2024-11-07 NOTE — TELEPHONE ENCOUNTER
----- Message from Mary Kay Haines MD sent at 11/5/2024 10:51 AM CST -----  Patient needs to be seen in November of this year for annual - please schedule. Please address health maintenance, if applicable.

## 2024-11-07 NOTE — PROGRESS NOTES
"[facility]   Physical Therapy Treatment Note      Name: Sawyer Browning  Clinic Number: [id number]    Therapy Diagnosis:   Encounter Diagnoses   Name Primary?    Strain of right hamstring muscle, initial encounter Yes    Decreased strength, endurance, and mobility        Physician: Medardo Skelton MD    Visit Date: 11/7/2024    Physician Orders: PT Eval and Treat - hip, knees  Medical Diagnosis from Referral: M79.661,M79.662 (ICD-10-CM) - Bilateral calf pain M76.899 (ICD-10-CM) - Hamstring tendinitis   Evaluation Date: 9/4/2024   Authorization Period Expiration: 12/31/2024   Plan of Care Expiration: updated to 11/25/2024   Progress Note Due: 11/25/2024   Date of Surgery: n/a   Visit # / Visits authorized: 15/20 + 1/ 1 (eval)   FOTO: 2/ 3 (last taken 9/23/2024) - d/c on 11/5/24     Precautions: Standard      Time In: 1:15p  Time Out: 2:15p  Total Billable Time: 60 minutes     PTA Visit #: 0/5     Subjective     Patient reports: "today is the best I've felt in a while. I didn't have as much pain getting up and going down stairs this morning."    He was compliant with home exercise program. - does them after doing 15 min on the elliptical - does this in place of going to the gym currently (denies return to gym despite PT edu)  Response to previous treatment: good response to last visit   Functional change: reduced pain in PM     Pain: 2/10   Location: R > L hamstring     Objective      Objective Measures updated at progress report unless specified.     11/5/2024    Treatment     Sawyer received the treatments listed below:      therapeutic exercises to develop strength, endurance, ROM, flexibility, posture, and core stabilization for 00 minutes including:  None today     manual therapy techniques: Joint mobilizations, Myofacial release, and Soft tissue Mobilization were applied to the: R HS for 00 minutes, including:  Consider dry needling in the future if MPT does not give complete relief.   " "  neuromuscular re-education activities to improve: Balance, Coordination, Kinesthetic, Sense, Proprioception, and Posture for 50 minutes. The following activities were included:  SLR with pilates ring x3x10 x 3# Kath  +resisted LTR x20 x pink cook band  elevated clams x3x10 B x RTB at knees  3D hamstring x 10 ea   active HS stretch x10 B w/ 10 x ankle pumps at 10th rep for additional sciatic nerve glide  +SLS + rows x2x10 x gray TB - a LE per set  +tandem stance (on airex) + SALPD x2x10 x redTB - a LE per set  Matrix leg extensions x3x10x 25#  matrix leg curls x 3x10 x 25#       therapeutic activities to improve functional performance for 10 minutes, including:  Standing hamstring hinge (contralat leg on wall for Abd activation/pelvic support) x3x7 x 10# KB  wall sit x3x30"  RDL x 25# bar x 2 x 10 -- worsening HS pain - defer today    Patient Education and Home Exercises       Education provided:   - 10/7/24 - updated HEP to include (heavy HS activation): active HSS followed by sciatic nerve glides (10x ea), HL and seated HS isometrics x5x45" ea, Sustained HL bridge with HS walkouts x10      Written Home Exercises Provided: Pt instructed to continue prior HEP. Exercises were reviewed and Sawyer was able to demonstrate them prior to the end of the session.  Sawyer demonstrated good  understanding of the education provided. See Electronic Medical Record under Patient Instructions for exercises provided during therapy sessions    Assessment     Attempted to progress trunk and pelvic girdle strength and motor control/coordination necessary for lower leg stabilization with ADLs. Good tolerance overall with noted recreation of HS tension during wall sits, which reduced with leg extensions/curls. Consider progression of core/trunk stabilization as tolerated nv.    Sawyer Is progressing well towards his goals.   Patient prognosis is Good.     Patient will continue to benefit from skilled outpatient physical therapy to address " the deficits listed in the problem list box on initial evaluation, provide pt/family education and to maximize pt's level of independence in the home and community environment.     Patient's spiritual, cultural and educational needs considered and pt agreeable to plan of care and goals.     Anticipated barriers to physical therapy: standard    Goals: updated 11/7/2024   Short Term Goals (4 Weeks):  1. Pt able to walk >=30 minutes with household chores with <4/10 pain. (Not met, progressing)  2.  Pt able to transfer in/out of chairs of various heights with <4/10 pain. (Not met, progressing)  3. Pt able to ascend/descend curb, independently, 1 handrail, with <4/10 pain. (Not met, progressing)     Long Term Goals (8 Weeks):  1. Pt able to walk >=1 hour with household chores with <2/10 pain. (Not met, progressing)  2.  Pt able to squat/lift >/=20# from floor to waist with <2/10 pain. (Not met, progressing)  3. Pt able to ascend/descend 1 flight of stairs, independently, 1 handrail, with <2/10 pain. (Not met, progressing)  4. Pt able to return to full work / recreational activities with min difficulty and pain <2/10. (Not met, progressing)  5. Pt will be independent with HEP and self management of symptoms.  (Not met, progressing)    Plan     Cont with POC for strength, mobility.    Merary Fox, PT

## 2024-11-12 ENCOUNTER — CLINICAL SUPPORT (OUTPATIENT)
Dept: REHABILITATION | Facility: OTHER | Age: 69
End: 2024-11-12
Payer: COMMERCIAL

## 2024-11-12 DIAGNOSIS — R53.1 DECREASED STRENGTH, ENDURANCE, AND MOBILITY: ICD-10-CM

## 2024-11-12 DIAGNOSIS — S76.311A STRAIN OF RIGHT HAMSTRING MUSCLE, INITIAL ENCOUNTER: Primary | ICD-10-CM

## 2024-11-12 DIAGNOSIS — R68.89 DECREASED STRENGTH, ENDURANCE, AND MOBILITY: ICD-10-CM

## 2024-11-12 DIAGNOSIS — Z74.09 DECREASED STRENGTH, ENDURANCE, AND MOBILITY: ICD-10-CM

## 2024-11-12 PROCEDURE — 97112 NEUROMUSCULAR REEDUCATION: CPT | Mod: PN

## 2024-11-12 NOTE — PROGRESS NOTES
"[facility]   Physical Therapy Treatment Note      Name: Sawyer Browning  Clinic Number: [id number]    Therapy Diagnosis:   Encounter Diagnoses   Name Primary?    Strain of right hamstring muscle, initial encounter Yes    Decreased strength, endurance, and mobility        Physician: Medardo Skelton MD    Visit Date: 11/12/2024    Physician Orders: PT Eval and Treat - hip, knees  Medical Diagnosis from Referral: M79.661,M79.662 (ICD-10-CM) - Bilateral calf pain M76.899 (ICD-10-CM) - Hamstring tendinitis   Evaluation Date: 9/4/2024   Authorization Period Expiration: 12/31/2024   Plan of Care Expiration: updated to 11/25/2024   Progress Note Due: 11/25/2024   Date of Surgery: n/a   Visit # / Visits authorized: 16/20 + 1/ 1 (eval)   FOTO: 2/ 3 (last taken 9/23/2024) - d/c on 11/5/24     Precautions: Standard      Time In: 1:10p  Time Out: 2:10p  Total Billable Time: 45 minutes 1:1 (60 min total)    PTA Visit #: 0/5     Subjective     Patient reports: "today is the best I've felt in a while. I didn't have as much pain getting up and going down stairs this morning."    He was compliant with home exercise program. - does them after doing 15 min on the elliptical - does this in place of going to the gym currently (denies return to gym despite PT edu)  Response to previous treatment: good response to last visit   Functional change: reduced pain in PM     Pain: 2/10   Location: R > L hamstring     Objective      Objective Measures updated at progress report unless specified.     11/5/2024    Treatment     Sawyer received the treatments listed below:      therapeutic exercises to develop strength, endurance, ROM, flexibility, posture, and core stabilization for 00 minutes including:  None today     manual therapy techniques: Joint mobilizations, Myofacial release, and Soft tissue Mobilization were applied to the: R HS for 00 minutes, including:  Consider dry needling in the future if MPT does not give " "complete relief.     neuromuscular re-education activities to improve: Balance, Coordination, Kinesthetic, Sense, Proprioception, and Posture for 45 minutes. The following activities were included:  SLR with pilates ring x3x10 x 3# Kath  resisted LTR x20 x maroon cook band  elevated clams x3x10 B x RTB at knees  3D hamstring x 15 ea     active HS stretch x10 B w/ 10 x ankle pumps at 10th rep for additional sciatic nerve glide  SLS + rows x2x10 x gray TB - a LE per set  tandem stance (on airex) + SALPD x2x10 x redTB - a LE per set  Matrix leg extensions x3x10x 25#  matrix leg curls x 3x10 x 25#   +supine deep hip flex <> ext (knee ext) x3x10x 17#    therapeutic activities to improve functional performance for 15 minutes, including:  Standing hamstring hinge (contralat leg on wall for Abd activation/pelvic support) x3x7 x 10# KB  wall sit x3x30"  RDL x 25# bar x 2 x 10 -- worsening HS pain - defer today    Patient Education and Home Exercises       Education provided:   - 10/7/24 - updated HEP to include (heavy HS activation): active HSS followed by sciatic nerve glides (10x ea), HL and seated HS isometrics x5x45" ea, Sustained HL bridge with HS walkouts x10      Written Home Exercises Provided: Pt instructed to continue prior HEP. Exercises were reviewed and Sawyer was able to demonstrate them prior to the end of the session.  Sawyer demonstrated good  understanding of the education provided. See Electronic Medical Record under Patient Instructions for exercises provided during therapy sessions    Assessment     Good tolerance with overall session without c/o increased sx, appropriate training effect noted. Added supine hip ext from elevated flexed position for endrange hamstring strength.  Sawyer Is progressing well towards his goals.   Patient prognosis is Good.     Patient will continue to benefit from skilled outpatient physical therapy to address the deficits listed in the problem list box on initial evaluation, " provide pt/family education and to maximize pt's level of independence in the home and community environment.     Patient's spiritual, cultural and educational needs considered and pt agreeable to plan of care and goals.     Anticipated barriers to physical therapy: standard    Goals: updated 11/14/2024   Short Term Goals (4 Weeks):  1. Pt able to walk >=30 minutes with household chores with <4/10 pain. (Not met, progressing)  2.  Pt able to transfer in/out of chairs of various heights with <4/10 pain. (Not met, progressing)  3. Pt able to ascend/descend curb, independently, 1 handrail, with <4/10 pain. (Not met, progressing)     Long Term Goals (8 Weeks):  1. Pt able to walk >=1 hour with household chores with <2/10 pain. (Not met, progressing)  2.  Pt able to squat/lift >/=20# from floor to waist with <2/10 pain. (Not met, progressing)  3. Pt able to ascend/descend 1 flight of stairs, independently, 1 handrail, with <2/10 pain. (Not met, progressing)  4. Pt able to return to full work / recreational activities with min difficulty and pain <2/10. (Not met, progressing)  5. Pt will be independent with HEP and self management of symptoms.  (Not met, progressing)    Plan     Cont with POC for strength, mobility.    Merary Fox, PT

## 2024-11-20 ENCOUNTER — PATIENT MESSAGE (OUTPATIENT)
Dept: REHABILITATION | Facility: OTHER | Age: 69
End: 2024-11-20
Payer: COMMERCIAL

## 2024-11-21 ENCOUNTER — PATIENT MESSAGE (OUTPATIENT)
Dept: FAMILY MEDICINE | Facility: CLINIC | Age: 69
End: 2024-11-21
Payer: COMMERCIAL

## 2024-11-21 DIAGNOSIS — R73.02 GLUCOSE INTOLERANCE (IMPAIRED GLUCOSE TOLERANCE): Primary | ICD-10-CM

## 2024-11-21 PROBLEM — R53.1 DECREASED STRENGTH, ENDURANCE, AND MOBILITY: Status: RESOLVED | Noted: 2024-09-05 | Resolved: 2024-11-21

## 2024-11-21 PROBLEM — S76.311A STRAIN OF RIGHT HAMSTRING MUSCLE: Status: RESOLVED | Noted: 2024-09-05 | Resolved: 2024-11-21

## 2024-11-21 PROBLEM — Z74.09 DECREASED STRENGTH, ENDURANCE, AND MOBILITY: Status: RESOLVED | Noted: 2024-09-05 | Resolved: 2024-11-21

## 2024-11-21 PROBLEM — R68.89 DECREASED STRENGTH, ENDURANCE, AND MOBILITY: Status: RESOLVED | Noted: 2024-09-05 | Resolved: 2024-11-21

## 2024-11-21 NOTE — TELEPHONE ENCOUNTER
It looks like CMP was ordered as a bulk order. He is also due for A1c to follow up on the elevated blood sugar.  PSA order from his urologist can also be done at this time - results will go to his urologist and they will need to discuss results with him.  Cholesterol is done once a year by cardiology (unless a medication change is done) and this will not be due until March next year.

## 2024-12-05 ENCOUNTER — PATIENT OUTREACH (OUTPATIENT)
Dept: ADMINISTRATIVE | Facility: HOSPITAL | Age: 69
End: 2024-12-05
Payer: COMMERCIAL

## 2024-12-06 ENCOUNTER — LAB VISIT (OUTPATIENT)
Dept: LAB | Facility: HOSPITAL | Age: 69
End: 2024-12-06
Attending: INTERNAL MEDICINE
Payer: COMMERCIAL

## 2024-12-06 DIAGNOSIS — R73.02 GLUCOSE INTOLERANCE (IMPAIRED GLUCOSE TOLERANCE): ICD-10-CM

## 2024-12-06 LAB
ESTIMATED AVG GLUCOSE: 117 MG/DL (ref 68–131)
HBA1C MFR BLD: 5.7 % (ref 4–5.6)

## 2024-12-06 PROCEDURE — 36415 COLL VENOUS BLD VENIPUNCTURE: CPT | Mod: PO | Performed by: INTERNAL MEDICINE

## 2024-12-06 PROCEDURE — 83036 HEMOGLOBIN GLYCOSYLATED A1C: CPT | Performed by: INTERNAL MEDICINE

## 2024-12-09 ENCOUNTER — PATIENT MESSAGE (OUTPATIENT)
Dept: FAMILY MEDICINE | Facility: CLINIC | Age: 69
End: 2024-12-09

## 2024-12-09 ENCOUNTER — OFFICE VISIT (OUTPATIENT)
Dept: FAMILY MEDICINE | Facility: CLINIC | Age: 69
End: 2024-12-09
Payer: COMMERCIAL

## 2024-12-09 VITALS
SYSTOLIC BLOOD PRESSURE: 126 MMHG | TEMPERATURE: 99 F | HEIGHT: 72 IN | OXYGEN SATURATION: 96 % | WEIGHT: 192.69 LBS | BODY MASS INDEX: 26.1 KG/M2 | HEART RATE: 58 BPM | DIASTOLIC BLOOD PRESSURE: 78 MMHG

## 2024-12-09 DIAGNOSIS — I10 ESSENTIAL HYPERTENSION: ICD-10-CM

## 2024-12-09 DIAGNOSIS — E66.3 OVERWEIGHT (BMI 25.0-29.9): ICD-10-CM

## 2024-12-09 DIAGNOSIS — I48.3 TYPICAL ATRIAL FLUTTER: ICD-10-CM

## 2024-12-09 DIAGNOSIS — S76.319D HAMSTRING MUSCLE STRAIN, UNSPECIFIED LATERALITY, SUBSEQUENT ENCOUNTER: ICD-10-CM

## 2024-12-09 DIAGNOSIS — K76.0 FATTY LIVER: ICD-10-CM

## 2024-12-09 DIAGNOSIS — Z00.00 ROUTINE MEDICAL EXAM: Primary | ICD-10-CM

## 2024-12-09 DIAGNOSIS — Z12.12 ENCOUNTER FOR COLORECTAL CANCER SCREENING: ICD-10-CM

## 2024-12-09 DIAGNOSIS — R73.03 PREDIABETES: ICD-10-CM

## 2024-12-09 DIAGNOSIS — N40.0 BENIGN PROSTATIC HYPERPLASIA, UNSPECIFIED WHETHER LOWER URINARY TRACT SYMPTOMS PRESENT: ICD-10-CM

## 2024-12-09 DIAGNOSIS — I77.1 TORTUOUS AORTA: ICD-10-CM

## 2024-12-09 DIAGNOSIS — E78.49 OTHER HYPERLIPIDEMIA: ICD-10-CM

## 2024-12-09 DIAGNOSIS — I65.23 BILATERAL CAROTID ARTERY STENOSIS: ICD-10-CM

## 2024-12-09 DIAGNOSIS — I25.10 ATHEROSCLEROSIS OF NATIVE CORONARY ARTERY OF NATIVE HEART WITHOUT ANGINA PECTORIS: ICD-10-CM

## 2024-12-09 DIAGNOSIS — K21.9 GASTROESOPHAGEAL REFLUX DISEASE, UNSPECIFIED WHETHER ESOPHAGITIS PRESENT: ICD-10-CM

## 2024-12-09 DIAGNOSIS — Z12.11 ENCOUNTER FOR COLORECTAL CANCER SCREENING: ICD-10-CM

## 2024-12-09 PROBLEM — Z79.01 LONG TERM (CURRENT) USE OF ANTICOAGULANTS: Status: ACTIVE | Noted: 2023-11-30

## 2024-12-09 PROBLEM — R06.02 SOB (SHORTNESS OF BREATH): Status: RESOLVED | Noted: 2023-09-26 | Resolved: 2024-12-09

## 2024-12-09 PROCEDURE — 1160F RVW MEDS BY RX/DR IN RCRD: CPT | Mod: CPTII,S$GLB,, | Performed by: INTERNAL MEDICINE

## 2024-12-09 PROCEDURE — 3074F SYST BP LT 130 MM HG: CPT | Mod: CPTII,S$GLB,, | Performed by: INTERNAL MEDICINE

## 2024-12-09 PROCEDURE — 3288F FALL RISK ASSESSMENT DOCD: CPT | Mod: CPTII,S$GLB,, | Performed by: INTERNAL MEDICINE

## 2024-12-09 PROCEDURE — 3008F BODY MASS INDEX DOCD: CPT | Mod: CPTII,S$GLB,, | Performed by: INTERNAL MEDICINE

## 2024-12-09 PROCEDURE — 1159F MED LIST DOCD IN RCRD: CPT | Mod: CPTII,S$GLB,, | Performed by: INTERNAL MEDICINE

## 2024-12-09 PROCEDURE — 3044F HG A1C LEVEL LT 7.0%: CPT | Mod: CPTII,S$GLB,, | Performed by: INTERNAL MEDICINE

## 2024-12-09 PROCEDURE — 4010F ACE/ARB THERAPY RXD/TAKEN: CPT | Mod: CPTII,S$GLB,, | Performed by: INTERNAL MEDICINE

## 2024-12-09 PROCEDURE — 99999 PR PBB SHADOW E&M-EST. PATIENT-LVL IV: CPT | Mod: PBBFAC,,, | Performed by: INTERNAL MEDICINE

## 2024-12-09 PROCEDURE — 3078F DIAST BP <80 MM HG: CPT | Mod: CPTII,S$GLB,, | Performed by: INTERNAL MEDICINE

## 2024-12-09 PROCEDURE — 99397 PER PM REEVAL EST PAT 65+ YR: CPT | Mod: S$GLB,,, | Performed by: INTERNAL MEDICINE

## 2024-12-09 PROCEDURE — 1126F AMNT PAIN NOTED NONE PRSNT: CPT | Mod: CPTII,S$GLB,, | Performed by: INTERNAL MEDICINE

## 2024-12-09 PROCEDURE — 1101F PT FALLS ASSESS-DOCD LE1/YR: CPT | Mod: CPTII,S$GLB,, | Performed by: INTERNAL MEDICINE

## 2024-12-09 NOTE — PROGRESS NOTES
CHIEF COMPLAINT: No chief complaint on file.         HISTORY OF PRESENT ILLNESS:  Sawyer Browning is a 69 y.o. male who presents to the clinic today for a routine medical physical exam. His last physical exam was approximately 1 years(s) ago.          Patient reports ongoing hamstring rehabilitation for both legs for approximately 2 months, with some improvement noted, particularly when sitting. He has morning tightness upon waking. The right hamstring was more severely strained. Patient speculates the cause may be related to his thrice-weekly workout routine.    Patient discusses cardiac concerns post-bypass surgery. He has dizziness when exiting vehicles, necessitating a pause for symptom resolution. Cardiology has discontinued his morning dose of valsartan 160mg and another medication (?name).    Patient reports consistently low heart rates since November of last year, typically in the high 50s and occasionally in the high 60s. He monitors his blood pressure daily.    Regarding exercise, the patient can use an elliptical machine for 30 minutes without dyspnea, which he attributes to improved cardiac perfusion post-bypass. He aims to exercise thrice weekly, stating it is hard to complete exercise 5 times per week as per cardiac rehabilitation recommendations.    He notes a family history of diabetes, including his 60-year-old brother and paternal grandmother.             Subjective    PAST MEDICAL HISTORY:  Past Medical History:   Diagnosis Date    BPH (benign prostatic hyperplasia)     Elevated liver enzymes     Fatty infiltration of liver     Glucose intolerance (impaired glucose tolerance)     Hiatal hernia     Hyperlipidemia     Hypertension     Overweight     Reflux     Snores        PAST SURGICAL HISTORY:  Past Surgical History:   Procedure Laterality Date    AORTIC ROOT REPLACEMENT N/A 9/18/2023    Procedure: VALVE SPARING AORTIC ROOT REPLACEMENT;  Surgeon: Imtiaz Mooney MD;  Location: [facility] OR [address] [place] [address];   Service: Cardiovascular;  Laterality: N/A;  Valve sparing aortic root replacement, vs biobentall, hemiarch, CABGx2 under  hypothermic circ arrest    CORONARY ANGIOGRAPHY N/A 9/6/2023    Procedure: ANGIOGRAM, CORONARY ARTERY;  Surgeon: Chon Lopez MD;  Location: [facility] CATH LAB;  Service: Cardiology;  Laterality: N/A;  right radial    CORONARY ARTERY BYPASS GRAFT (CABG) N/A 9/18/2023    Procedure: CORONARY ARTERY BYPASS GRAFT (CABG);  Surgeon: Imtiaz Mooney MD;  Location: [facility] OR [address] [place] [address];  Service: Cardiovascular;  Laterality: N/A;  Vein Lyford Start: 821  Vein Lyford End: 838  Vein Preparation Start: 839  Vein Preparation End: 912    ENDOSCOPIC HARVEST OF VEIN Left 9/18/2023    Procedure: HARVEST-VEIN-ENDOVASCULAR;  Surgeon: Imtiaz Mooney MD;  Location: [facility] OR [address] [place] [address];  Service: Cardiovascular;  Laterality: Left;  Valve sparing aortic root replacement, vs biobentall, hemiarch, CABGx2 under    inguinal hernia      x2    REPAIR OF DIAPHRAGMATIC HERNIA  9/18/2023    Procedure: REPAIR, HERNIA, DIAPHRAGMATIC;  Surgeon: Imtiaz Mooney MD;  Location: [facility] OR [address] [place] [address];  Service: Cardiovascular;;    tonsillectomy      TRANSESOPHAGEAL ECHOCARDIOGRAM WITH POSSIBLE CARDIOVERSION (GWEN W/ POSS CARDIOVERSION) N/A 11/8/2023    Procedure: TRANSESOPHAGEAL ECHOCARDIOGRAM WITH POSSIBLE CARDIOVERSION (GWEN W/ POSS CARDIOVERSION);  Surgeon: Chon Lopez MD;  Location: [facility] CATH LAB;  Service: Cardiology;  Laterality: N/A;       SOCIAL HISTORY:  Social History     Socioeconomic History    Marital status:     Number of children: 2   Occupational History    Occupation: Works in insurance   Tobacco Use    Smoking status: Never    Smokeless tobacco: Never   Substance and Sexual Activity    Alcohol use: Yes     Comment: 2 glasses of wine most evenings.    Drug use: No     Social Drivers of Health     Financial Resource Strain: Patient Declined (6/19/2024)    Overall Financial Resource Strain (CARDIA)      Difficulty of Paying Living Expenses: Patient declined   Food Insecurity: Patient Declined (6/19/2024)    Hunger Vital Sign     Worried About Running Out of Food in the Last Year: Patient declined     Ran Out of Food in the Last Year: Patient declined   Transportation Needs: No Transportation Needs (9/19/2023)    PRAPARE - Transportation     Lack of Transportation (Medical): No     Lack of Transportation (Non-Medical): No   Physical Activity: Insufficiently Active (6/19/2024)    Exercise Vital Sign     Days of Exercise per Week: 3 days     Minutes of Exercise per Session: 30 min   Stress: Patient Declined (6/19/2024)    [employer] - Occupational Stress Questionnaire     Feeling of Stress : Patient declined   Housing Stability: Unknown (6/19/2024)    Housing Stability Vital Sign     Unable to Pay for Housing in the Last Year: Patient declined       FAMILY HISTORY:  Family History   Problem Relation Name Age of Onset    Hypertension Mother      Macular degeneration Mother          - gets shots in her eye    Stroke Father      Heart disease Father      Aneurysm Father      Migraines Sister Radha     Arthritis Brother Jaison         knees    Dementia Paternal Grandmother      Diabetes Paternal Grandmother      Heart disease Paternal Grandfather      Diabetes Brother Zheng     Hernia Brother Keal     Cancer Paternal Aunt      Cancer Paternal Uncle      Cancer Paternal Aunt      Cancer Paternal Uncle      Colon cancer Neg Hx      Prostate cancer Neg Hx         ALLERGIES AND MEDICATIONS: updated and reviewed.  Review of patient's allergies indicates:   Allergen Reactions    Ace inhibitors Other (See Comments)     cough    Losartan      headache     Medication List with Changes/Refills   Current Medications    ACETAMINOPHEN (TYLENOL EXTRA STRENGTH) 500 MG TABLET    2 tablet EVERY 8 HOURS (route: oral)    AMLODIPINE (NORVASC) 10 MG TABLET    Take 1 tablet (10 mg total) by mouth once  daily.    APIXABAN (ELIQUIS) 5 MG TAB    Take 1 tablet (5 mg total) by mouth 2 (two) times daily.    ATORVASTATIN (LIPITOR) 80 MG TABLET    Take 1 tablet (80 mg total) by mouth every evening.    AZELASTINE (ASTELIN) 137 MCG (0.1 %) NASAL SPRAY    1 spray (137 mcg total) by Nasal route 2 (two) times daily.    BENZONATATE (TESSALON) 200 MG CAPSULE    Take 1 capsule (200 mg total) by mouth 3 (three) times daily as needed for Cough.    CARVEDILOL (COREG) 25 MG TABLET    Take 1 tablet (25 mg total) by mouth 2 (two) times daily with meals.    LANSOPRAZOLE (PREVACID) 30 MG CAPSULE    Take 1 capsule by mouth once daily.    OXYCODONE (ROXICODONE) 5 MG IMMEDIATE RELEASE TABLET    1 tablet EVERY 4 HOURS (route: oral)    PROMETHAZINE-DEXTROMETHORPHAN (PROMETHAZINE-DM) 6.25-15 MG/5 ML SYRP    Take 5 mLs by mouth every 6 (six) hours as needed (cough).    TADALAFIL (CIALIS) 20 MG TAB    Take 1 tablet (20 mg total) by mouth daily as needed (take on an empty stomach one hour before needed).    VALSARTAN (DIOVAN) 160 MG TABLET    Take 1 tablet (160 mg total) by mouth once daily.         CARE TEAM:  Patient Care Team:  Mary Kay Haines MD as PCP - General (Internal Medicine)  Nadege Suresh LPN as Care Coordinator         SCREENING HISTORY:  Health Maintenance         Date Due Completion Date    TETANUS VACCINE Never done ---    Shingles Vaccine (1 of 2) Never done ---    RSV Vaccine (Age 60+ and Pregnant patients) (1 - Risk 60-74 years 1-dose series) Never done ---    COVID-19 Vaccine (3 - 2024-25 season) 09/01/2024 2/22/2021    PROSTATE-SPECIFIC ANTIGEN 11/27/2024 11/27/2023    Colorectal Cancer Screening 01/21/2025 1/21/2022    Override on 5/2/2007: Done (normal)    Lipid Panel 03/11/2025 3/11/2024    Hemoglobin A1c (Prediabetes) 12/06/2025 12/6/2024    High Dose Statin 12/09/2025 12/9/2024              REVIEW OF SYSTEMS:   The patient reports : good dietary habits.  The patient reports  : that they exercise  regularly.  Review of Systems   Constitutional:  Negative for chills and fever.   Respiratory:  Positive for cough (occasional/mild). Negative for shortness of breath.    Cardiovascular:  Negative for leg swelling.   Gastrointestinal:  Negative for abdominal pain.   Genitourinary:  Negative for dysuria.   Musculoskeletal:  Positive for myalgias (see HPI).      ROS : patient denies: difficulty initiating urination          Objective    PHYSICAL EXAMINATION/VITALS:  Vitals:    12/09/24 1050   BP: 126/78   BP Location: Right arm   Patient Position: Sitting   Pulse: (!) 58   Temp: 98.5 °F (36.9 °C)   TempSrc: Oral   SpO2: 96%   Weight: 87.4 kg (192 lb 10.9 oz)   Height: 6' (1.829 m)       Body mass index is 26.13 kg/m².    General appearance - alert, well appearing, and in no distress, overweight  Psychiatric - alert, oriented to person, place, and time, normal behavior, speech, dress, motor activity and thought process  Eyes - pupils equal and reactive, extraocular eye movements intact, sclera anicteric  Neck - supple, no significant adenopathy, carotids upstroke normal bilaterally, no bruits  Lymphatics - no palpable cervical lymphadenopathy  Chest - clear to auscultation, no wheezes, rales or rhonchi, symmetric air entry  Heart - normal rate and regular rhythm, no gallops noted  Neurological - alert, normal speech, no focal findings; cranial nerves II through XII intact  Musculoskeletal - not examined  Extremities - no pedal edema noted  Skin - normal coloration, no suspicious skin lesions      LABS:  Results for orders placed or performed in visit on 12/06/24   Hemoglobin A1C    Collection Time: 12/06/24 11:01 AM   Result Value Ref Range    Hemoglobin A1C 5.7 (H) 4.0 - 5.6 %    Estimated Avg Glucose 117 68 - 131 mg/dL              ASSESSMENT AND PLAN:   1. Routine medical exam  Counseled on age appropriate medical preventative services including age appropriate cancer screenings, age appropriate eye and dental  exams, over all nutritional health, need for a consistent exercise regimen, and an over all push towards maintaining a vigorous and active lifestyle.  Counseled on age appropriate vaccines and discussed upcoming health care needs based on age/gender. Discussed good sleep hygiene and stress management.    2. Essential hypertension  BP Readings from Last 1 Encounters:   12/09/24 126/78      The current medical regimen is effective;  continue present plan and medications. Recommended patient to check home readings to monitor and see me for followup as scheduled or sooner as needed.   Discussed sodium restriction, maintaining ideal body weight and regular exercise program as physiologic means to continue to achieve blood pressure control in addition to medication compliance.  Patient was educated that both decongestant and anti-inflammatory medication may raise blood pressure.  The patient is not active on the digital hypertension program.    3. Other hyperlipidemia  Lab Results   Component Value Date    CHOL 132 03/11/2024     Lab Results   Component Value Date    HDL 49 03/11/2024     Lab Results   Component Value Date    LDLCALC 70.2 03/11/2024     Lab Results   Component Value Date    TRIG 64 03/11/2024     Lab Results   Component Value Date    LDLCALC 70.2 03/11/2024     We discussed low fat diet and regular exercise.The current medical regimen is effective;  continue present plan and medications.     4. Tortuous aorta  Patient with tortuous/ectatic Aorta.  Stable/asymptomatic. Currently stable on lipid lowering medication and blood pressure monitoring.   Overview:  - noted on CXR 10/21/2019      5. Bilateral carotid artery stenosis/6. Typical atrial flutter/7. Atherosclerosis of native coronary artery of native heart without angina pectoris  The current medical regimen is effective;  continue present plan and medications.   Followed by: Cardiology.   Recommended he discuss with Cardiology his symptoms that could  be related to some mild orthostatic hypotension and his general low heart rate - he may need a decrease in his Coreg dosing.    8. Benign prostatic hyperplasia, unspecified whether lower urinary tract symptoms present  He is due for follow up with urology - he is trying to schedule this before the end of the year.  Overview:  - followed by urology, Dr. Lloyd  - 4/2015 - TR ultrasound with hbx - bx negative x 6 (for elevated PSA)      9. Prediabetes  Lab Results   Component Value Date    HGBA1C 5.7 (H) 12/06/2024     He has progressed to prediabetes. We discussed low sugar/low carbohydrate diet and regular exercise to prevent progression. No need for prescription medication at this time.  Check A1c yearly.    10. Gastroesophageal reflux disease, unspecified whether esophagitis present  Symptoms controlled: yes - takes medication occasionally as needed.   Reflux precautions discussed (eliminate tobacco if a smoker; minimize caffeine, chocolate and red/white peppermint intake; avoid heavy and spicy meals; don't lay down within 2-3 hours after eating; minimize the intake of NSAIDs). Medication as needed.   Patient asked to take medication breaks, if possible - discussed chronic use can limit calcium absorption (which can lead to osteopenia/osteoporosis), increases the risk for intestinal infections, and can cause kidney damage. There are also some newer studies that show possible increased risk of mortality.    11. Fatty liver  Lab Results   Component Value Date    ALT 28 11/05/2024    AST 21 11/05/2024    GGT 29 03/18/2009    ALKPHOS 98 11/05/2024    BILITOT 0.7 11/05/2024     Asymptomatic. We discussed the need for weight loss to prevent progression to cirrhosis of the liver.   LFTs normal.  Patient does not require further evaluation at this time.    12. Overweight (BMI 25.0-29.9)  BMI Readings from Last 3 Encounters:   12/09/24 26.13 kg/m²   10/24/24 26.31 kg/m²   08/26/24 25.35 kg/m²     The patient is asked to  make an attempt to improve diet and exercise patterns to aid in medical management of this problem.  We discussed documenting daily calorie intake and limiting daily added sugar intake (5g/serving of any product; 20 g/day total).    13. Hamstring muscle strain, unspecified laterality, subsequent encounter  Improving. Continue PT and HEP    14. Encounter for colorectal cancer screening  Due in January 2025.  -     Cologuard Screening (Multitarget Stool DNA); Future; Expected date: 01/22/2025               PATIENT EDUCATION:  Explained pre-diabetes diagnosis and its implications  Discussed the importance of limiting added sugars in diet, recommending no more than 30 g per day for men  Provided information on RSV vaccine, noting it is a one-time vaccination unlike flu or COVID vaccines  Educated on whooping cough and its potential severity    ACTION ITEMS/LIFESTYLE:  Continue performing prescribed hamstring exercises regularly  Monitor and limit added sugar intake, particularly in treats like ice cream  Watch sugars and starches in diet due to pre-diabetes diagnosis          Orders Placed This Encounter   Procedures    Cologuard Screening (Multitarget Stool DNA)       FOLLOW UP: Follow up in about 1 year (around 12/9/2025), or if symptoms worsen or fail to improve, for annual exam. or sooner as needed.    This note was generated with the assistance of ambient listening technology. Verbal consent was obtained by the patient and accompanying visitor(s) for the recording of patient appointment to facilitate this note. I attest to having reviewed and edited the generated note for accuracy, though some syntax or spelling errors may persist. Please contact the author of this note for any clarification.

## 2024-12-16 ENCOUNTER — OFFICE VISIT (OUTPATIENT)
Dept: UROLOGY | Facility: CLINIC | Age: 69
End: 2024-12-16
Payer: COMMERCIAL

## 2024-12-16 VITALS — BODY MASS INDEX: 26.07 KG/M2 | WEIGHT: 192.25 LBS

## 2024-12-16 DIAGNOSIS — N13.8 BPH WITH OBSTRUCTION/LOWER URINARY TRACT SYMPTOMS: Primary | ICD-10-CM

## 2024-12-16 DIAGNOSIS — N52.01 ERECTILE DYSFUNCTION DUE TO ARTERIAL INSUFFICIENCY: ICD-10-CM

## 2024-12-16 DIAGNOSIS — N40.1 BPH WITH OBSTRUCTION/LOWER URINARY TRACT SYMPTOMS: Primary | ICD-10-CM

## 2024-12-16 DIAGNOSIS — R35.1 NOCTURIA: ICD-10-CM

## 2024-12-16 PROCEDURE — 4010F ACE/ARB THERAPY RXD/TAKEN: CPT | Mod: CPTII,S$GLB,, | Performed by: UROLOGY

## 2024-12-16 PROCEDURE — 3288F FALL RISK ASSESSMENT DOCD: CPT | Mod: CPTII,S$GLB,, | Performed by: UROLOGY

## 2024-12-16 PROCEDURE — 3008F BODY MASS INDEX DOCD: CPT | Mod: CPTII,S$GLB,, | Performed by: UROLOGY

## 2024-12-16 PROCEDURE — 1159F MED LIST DOCD IN RCRD: CPT | Mod: CPTII,S$GLB,, | Performed by: UROLOGY

## 2024-12-16 PROCEDURE — 99999 PR PBB SHADOW E&M-EST. PATIENT-LVL III: CPT | Mod: PBBFAC,,, | Performed by: UROLOGY

## 2024-12-16 PROCEDURE — 3044F HG A1C LEVEL LT 7.0%: CPT | Mod: CPTII,S$GLB,, | Performed by: UROLOGY

## 2024-12-16 PROCEDURE — 99214 OFFICE O/P EST MOD 30 MIN: CPT | Mod: S$GLB,,, | Performed by: UROLOGY

## 2024-12-16 PROCEDURE — 1101F PT FALLS ASSESS-DOCD LE1/YR: CPT | Mod: CPTII,S$GLB,, | Performed by: UROLOGY

## 2024-12-16 PROCEDURE — 1160F RVW MEDS BY RX/DR IN RCRD: CPT | Mod: CPTII,S$GLB,, | Performed by: UROLOGY

## 2024-12-16 NOTE — PROGRESS NOTES
Subjective:       Patient ID: Sawyer Browning is a 69 y.o. male who was referred by Self, Aaareferral    Chief Complaint:   Chief Complaint   Patient presents with    Initial Visit     Annual f/u   Patient of Dr. Lloyd        Benign Prostatic Hypertrophy  Patient complains of lower urinary tract symptoms. He reports frequency, nocturia three times a night, and weak stream. He denies straining. Patient states symptoms are of mild severity. Onset of symptoms was several years ago and was gradual in onset.  He has no personal history and no family history of prostate cancer. He reports a history of no complicating symptoms. He denies flank pain, gross hematuria, kidney stones, and recurrent UTI.    ACTIVE MEDICAL ISSUES:  Patient Active Problem List   Diagnosis    Essential hypertension    Other hyperlipidemia    GERD (gastroesophageal reflux disease)    Overweight (BMI 25.0-29.9)    Prediabetes    BPH (benign prostatic hyperplasia)    Tortuous aorta    Fatty liver    Elevated alanine aminotransferase (ALT) level    Bilateral carotid artery stenosis    Thoracic aortic aneurysm without rupture    Aortic root aneurysm    S/P CABG (coronary artery bypass graft)    Typical atrial flutter    Atherosclerosis of native coronary artery of native heart    Long term (current) use of anticoagulants       PAST MEDICAL HISTORY  Past Medical History:   Diagnosis Date    BPH (benign prostatic hyperplasia)     Elevated liver enzymes     Fatty infiltration of liver     Glucose intolerance (impaired glucose tolerance)     Hiatal hernia     Hyperlipidemia     Hypertension     Overweight     Reflux     Snores        PAST SURGICAL HISTORY:  Past Surgical History:   Procedure Laterality Date    AORTIC ROOT REPLACEMENT N/A 9/18/2023    Procedure: VALVE SPARING AORTIC ROOT REPLACEMENT;  Surgeon: Imtiaz Mooney MD;  Location: [facility] OR [address] [place] [address];  Service: Cardiovascular;  Laterality: N/A;  Valve sparing aortic root replacement, vs biobentall,  hemiarch, CABGx2 under  hypothermic circ arrest    CORONARY ANGIOGRAPHY N/A 9/6/2023    Procedure: ANGIOGRAM, CORONARY ARTERY;  Surgeon: Chon Lopez MD;  Location: [facility] CATH LAB;  Service: Cardiology;  Laterality: N/A;  right radial    CORONARY ARTERY BYPASS GRAFT (CABG) N/A 9/18/2023    Procedure: CORONARY ARTERY BYPASS GRAFT (CABG);  Surgeon: Imtiaz Mooney MD;  Location: [facility] OR [address] [place] [address];  Service: Cardiovascular;  Laterality: N/A;  Vein Longbranch Start: 821  Vein [place] End: 838  Vein Preparation Start: 839  Vein Preparation End: 912    ENDOSCOPIC HARVEST OF VEIN Left 9/18/2023    Procedure: HARVEST-VEIN-ENDOVASCULAR;  Surgeon: Imtiaz Mooney MD;  Location: [facility] OR [address] [place] [address];  Service: Cardiovascular;  Laterality: Left;  Valve sparing aortic root replacement, vs biobentall, hemiarch, CABGx2 under    inguinal hernia      x2    REPAIR OF DIAPHRAGMATIC HERNIA  9/18/2023    Procedure: REPAIR, HERNIA, DIAPHRAGMATIC;  Surgeon: Imtiaz Mooney MD;  Location: [facility] OR [address] [place] [address];  Service: Cardiovascular;;    tonsillectomy      TRANSESOPHAGEAL ECHOCARDIOGRAM WITH POSSIBLE CARDIOVERSION (GWEN W/ POSS CARDIOVERSION) N/A 11/8/2023    Procedure: TRANSESOPHAGEAL ECHOCARDIOGRAM WITH POSSIBLE CARDIOVERSION (GWEN W/ POSS CARDIOVERSION);  Surgeon: Chon Lopez MD;  Location: [facility] CATH LAB;  Service: Cardiology;  Laterality: N/A;       SOCIAL HISTORY:  Social History     Tobacco Use    Smoking status: Never    Smokeless tobacco: Never   Substance Use Topics    Alcohol use: Yes     Comment: 2 glasses of wine most evenings.    Drug use: No       FAMILY HISTORY:  Family History   Problem Relation Name Age of Onset    Hypertension Mother      Macular degeneration Mother          - gets shots in her eye    Stroke Father      Heart disease Father      Aneurysm Father      Migraines Sister Radha     Arthritis Brother Jaison         knees    Dementia Paternal Grandmother      Diabetes Paternal Grandmother      Heart  disease Paternal Grandfather      Diabetes Brother Zheng     Hernia Brother Kael     Cancer Paternal Aunt      Cancer Paternal Uncle      Cancer Paternal Aunt      Cancer Paternal Uncle      Colon cancer Neg Hx      Prostate cancer Neg Hx         ALLERGIES AND MEDICATIONS: updated and reviewed.  Review of patient's allergies indicates:   Allergen Reactions    Ace inhibitors Other (See Comments)     cough    Losartan      headache     Current Outpatient Medications   Medication Sig    acetaminophen (TYLENOL EXTRA STRENGTH) 500 MG tablet 2 tablet EVERY 8 HOURS (route: oral)    amLODIPine (NORVASC) 10 MG tablet Take 1 tablet (10 mg total) by mouth once daily.    apixaban (ELIQUIS) 5 mg Tab Take 1 tablet (5 mg total) by mouth 2 (two) times daily.    atorvastatin (LIPITOR) 80 MG tablet Take 1 tablet (80 mg total) by mouth every evening.    azelastine (ASTELIN) 137 mcg (0.1 %) nasal spray 1 spray (137 mcg total) by Nasal route 2 (two) times daily.    benzonatate (TESSALON) 200 MG capsule Take 1 capsule (200 mg total) by mouth 3 (three) times daily as needed for Cough.    carvediloL (COREG) 25 MG tablet Take 1 tablet (25 mg total) by mouth 2 (two) times daily with meals.    lansoprazole (PREVACID) 30 MG capsule Take 1 capsule by mouth once daily.    oxyCODONE (ROXICODONE) 5 MG immediate release tablet 1 tablet EVERY 4 HOURS (route: oral)    promethazine-dextromethorphan (PROMETHAZINE-DM) 6.25-15 mg/5 mL Syrp Take 5 mLs by mouth every 6 (six) hours as needed (cough).    tadalafiL (CIALIS) 20 MG Tab Take 1 tablet (20 mg total) by mouth daily as needed (take on an empty stomach one hour before needed).    valsartan (DIOVAN) 160 MG tablet Take 1 tablet (160 mg total) by mouth once daily.     No current facility-administered medications for this visit.       Review of Systems   Constitutional:  Negative for chills and fever.   HENT:  Negative for congestion.    Respiratory:  Negative for chest tightness and shortness of  breath.    Cardiovascular:  Negative for chest pain and palpitations.   Gastrointestinal:  Negative for abdominal pain, constipation, diarrhea, nausea and vomiting.   Genitourinary:  Negative for difficulty urinating, dysuria, flank pain, hematuria and urgency.   Musculoskeletal:  Negative for arthralgias.   Neurological:  Negative for dizziness.   Psychiatric/Behavioral:  Negative for confusion.        Objective:      Vitals:    12/16/24 1359   Weight: 87.2 kg (192 lb 3.9 oz)     Physical Exam  Vitals and nursing note reviewed.   Constitutional:       Appearance: He is well-developed.   HENT:      Head: Normocephalic.   Eyes:      Conjunctiva/sclera: Conjunctivae normal.   Neck:      Thyroid: No thyromegaly.      Trachea: No tracheal deviation.   Cardiovascular:      Rate and Rhythm: Normal rate.      Heart sounds: Normal heart sounds.   Pulmonary:      Effort: Pulmonary effort is normal. No respiratory distress.      Breath sounds: Normal breath sounds. No wheezing.   Abdominal:      General: Bowel sounds are normal.      Palpations: Abdomen is soft.      Tenderness: There is no abdominal tenderness. There is no rebound.      Hernia: No hernia is present.   Genitourinary:     Prostate: Enlarged. Not tender.      Rectum: Normal.      Comments: 40 gm smooth  Musculoskeletal:         General: No tenderness. Normal range of motion.      Cervical back: Normal range of motion and neck supple.   Lymphadenopathy:      Cervical: No cervical adenopathy.   Skin:     General: Skin is warm and dry.      Findings: No erythema or rash.   Neurological:      Mental Status: He is alert and oriented to person, place, and time.   Psychiatric:         Behavior: Behavior normal.         Thought Content: Thought content normal.         Judgment: Judgment normal.         Urine dipstick shows negative for all components.  Micro exam: negative for WBC's or RBC's.               Component Ref Range & Units 1 yr ago 2 yr ago 4 yr ago 5 yr ago  6 yr ago 7 yr ago 8 yr ago   PSA, Screen 0.00 - 4.00 ng/mL 3.0 4.3 High  CM 2.6 CM 2.9 CM 2.8 CM 2.7 CM 3.8 CM   Comment: The testing method is a chemiluminescent microparticle immunoassay  manufactured by [employer] and performed on the [employer]  or  [employer] system. Values obtained with different assay manufacturers  for  methods may be different and cannot be used interchangeably.  PSA Expected levels:  Hormonal Therapy: <0.05 ng/ml  Prostatectomy: <0.01 ng/ml  Radiation Therapy: <1.00 ng/ml   Resulting Agency  [facility]             Specimen Collected: 11/27/23 08:42 CST       Assessment:       1. BPH with obstruction/lower urinary tract symptoms    2. Erectile dysfunction due to arterial insufficiency    3. Nocturia          Plan:       1. BPH with obstruction/lower urinary tract symptoms    - Prostate Specific Antigen, Diagnostic; Future    2. Erectile dysfunction due to arterial insufficiency  Cialis    3. Nocturia              Follow up in about 1 year (around 12/16/2025) for Follow up Established, Review PSA.

## 2024-12-19 ENCOUNTER — PATIENT OUTREACH (OUTPATIENT)
Dept: ADMINISTRATIVE | Facility: HOSPITAL | Age: 69
End: 2024-12-19
Payer: COMMERCIAL

## 2024-12-19 ENCOUNTER — LAB VISIT (OUTPATIENT)
Dept: LAB | Facility: HOSPITAL | Age: 69
End: 2024-12-19
Attending: UROLOGY
Payer: COMMERCIAL

## 2024-12-19 DIAGNOSIS — N40.1 BPH WITH OBSTRUCTION/LOWER URINARY TRACT SYMPTOMS: ICD-10-CM

## 2024-12-19 DIAGNOSIS — N13.8 BPH WITH OBSTRUCTION/LOWER URINARY TRACT SYMPTOMS: ICD-10-CM

## 2024-12-19 LAB — COMPLEXED PSA SERPL-MCNC: 4.2 NG/ML (ref 0–4)

## 2024-12-19 PROCEDURE — 84153 ASSAY OF PSA TOTAL: CPT | Performed by: UROLOGY

## 2024-12-19 PROCEDURE — 36415 COLL VENOUS BLD VENIPUNCTURE: CPT | Mod: PO | Performed by: UROLOGY

## 2025-01-22 DIAGNOSIS — I48.3 TYPICAL ATRIAL FLUTTER: ICD-10-CM

## 2025-01-22 RX ORDER — APIXABAN 5 MG/1
5 TABLET, FILM COATED ORAL 2 TIMES DAILY
Qty: 180 TABLET | Refills: 3 | Status: SHIPPED | OUTPATIENT
Start: 2025-01-22

## 2025-03-02 DIAGNOSIS — I10 ESSENTIAL HYPERTENSION: ICD-10-CM

## 2025-03-03 RX ORDER — CARVEDILOL 25 MG/1
25 TABLET ORAL 2 TIMES DAILY WITH MEALS
Qty: 180 TABLET | Refills: 3 | Status: SHIPPED | OUTPATIENT
Start: 2025-03-03 | End: 2026-03-03

## 2025-03-03 RX ORDER — ATORVASTATIN CALCIUM 80 MG/1
80 TABLET, FILM COATED ORAL NIGHTLY
Qty: 90 TABLET | Refills: 3 | Status: SHIPPED | OUTPATIENT
Start: 2025-03-03

## 2025-03-04 ENCOUNTER — PATIENT MESSAGE (OUTPATIENT)
Dept: CARDIOLOGY | Facility: CLINIC | Age: 70
End: 2025-03-04
Payer: COMMERCIAL

## 2025-03-04 ENCOUNTER — PATIENT MESSAGE (OUTPATIENT)
Dept: FAMILY MEDICINE | Facility: CLINIC | Age: 70
End: 2025-03-04
Payer: COMMERCIAL

## 2025-03-04 DIAGNOSIS — K21.9 GASTROESOPHAGEAL REFLUX DISEASE, UNSPECIFIED WHETHER ESOPHAGITIS PRESENT: Primary | ICD-10-CM

## 2025-03-05 ENCOUNTER — PATIENT MESSAGE (OUTPATIENT)
Dept: FAMILY MEDICINE | Facility: CLINIC | Age: 70
End: 2025-03-05
Payer: COMMERCIAL

## 2025-03-05 DIAGNOSIS — E78.49 OTHER HYPERLIPIDEMIA: Primary | ICD-10-CM

## 2025-03-05 RX ORDER — LANSOPRAZOLE 30 MG/1
30 CAPSULE, DELAYED RELEASE ORAL DAILY
Qty: 90 CAPSULE | Refills: 1 | Status: SHIPPED | OUTPATIENT
Start: 2025-03-05

## 2025-03-05 NOTE — TELEPHONE ENCOUNTER
No care due was identified.  [facility] Embedded Care Due Messages. Reference number: [id number].   3/05/2025 4:57:29 PM CST

## 2025-03-18 ENCOUNTER — PATIENT MESSAGE (OUTPATIENT)
Dept: FAMILY MEDICINE | Facility: CLINIC | Age: 70
End: 2025-03-18
Payer: COMMERCIAL

## 2025-03-20 ENCOUNTER — RESULTS FOLLOW-UP (OUTPATIENT)
Dept: FAMILY MEDICINE | Facility: CLINIC | Age: 70
End: 2025-03-20

## 2025-03-20 ENCOUNTER — LAB VISIT (OUTPATIENT)
Dept: LAB | Facility: HOSPITAL | Age: 70
End: 2025-03-20
Attending: INTERNAL MEDICINE
Payer: COMMERCIAL

## 2025-03-20 DIAGNOSIS — E78.49 OTHER HYPERLIPIDEMIA: ICD-10-CM

## 2025-03-20 LAB
CHOLEST SERPL-MCNC: 140 MG/DL (ref 120–199)
CHOLEST/HDLC SERPL: 3.2 {RATIO} (ref 2–5)
HDLC SERPL-MCNC: 44 MG/DL (ref 40–75)
HDLC SERPL: 31.4 % (ref 20–50)
LDLC SERPL CALC-MCNC: 72.2 MG/DL (ref 63–159)
NONHDLC SERPL-MCNC: 96 MG/DL
TRIGL SERPL-MCNC: 119 MG/DL (ref 30–150)

## 2025-03-20 PROCEDURE — 36415 COLL VENOUS BLD VENIPUNCTURE: CPT | Mod: PN | Performed by: INTERNAL MEDICINE

## 2025-03-20 PROCEDURE — 80061 LIPID PANEL: CPT | Performed by: INTERNAL MEDICINE

## 2025-03-21 ENCOUNTER — PATIENT MESSAGE (OUTPATIENT)
Dept: FAMILY MEDICINE | Facility: CLINIC | Age: 70
End: 2025-03-21
Payer: COMMERCIAL

## 2025-04-08 ENCOUNTER — PATIENT MESSAGE (OUTPATIENT)
Dept: FAMILY MEDICINE | Facility: CLINIC | Age: 70
End: 2025-04-08
Payer: COMMERCIAL

## 2025-05-14 RX ORDER — AMLODIPINE BESYLATE 10 MG/1
10 TABLET ORAL DAILY
Qty: 90 TABLET | Refills: 3 | Status: SHIPPED | OUTPATIENT
Start: 2025-05-14

## 2025-06-12 ENCOUNTER — PATIENT MESSAGE (OUTPATIENT)
Dept: CARDIOLOGY | Facility: CLINIC | Age: 70
End: 2025-06-12
Payer: COMMERCIAL

## 2025-06-19 ENCOUNTER — TELEPHONE (OUTPATIENT)
Dept: CARDIOLOGY | Facility: CLINIC | Age: 70
End: 2025-06-19
Payer: COMMERCIAL

## 2025-06-19 ENCOUNTER — PATIENT MESSAGE (OUTPATIENT)
Dept: CARDIOLOGY | Facility: CLINIC | Age: 70
End: 2025-06-19
Payer: COMMERCIAL

## 2025-07-29 RX ORDER — VALSARTAN 160 MG/1
160 TABLET ORAL DAILY
Qty: 90 TABLET | Refills: 3 | Status: SHIPPED | OUTPATIENT
Start: 2025-07-29 | End: 2026-07-29

## 2025-08-14 ENCOUNTER — PATIENT MESSAGE (OUTPATIENT)
Dept: FAMILY MEDICINE | Facility: CLINIC | Age: 70
End: 2025-08-14
Payer: COMMERCIAL

## 2025-08-21 DIAGNOSIS — K21.9 GASTROESOPHAGEAL REFLUX DISEASE, UNSPECIFIED WHETHER ESOPHAGITIS PRESENT: ICD-10-CM

## 2025-08-21 RX ORDER — LANSOPRAZOLE 30 MG/1
30 CAPSULE, DELAYED RELEASE ORAL DAILY
Qty: 90 CAPSULE | Refills: 1 | Status: SHIPPED | OUTPATIENT
Start: 2025-08-21

## (undated) DEVICE — DRAPE SLUSH WARMER WITH DISC

## (undated) DEVICE — DRESSING TRANS 4X4 TEGADERM

## (undated) DEVICE — PUNCH AORTIC 4.8MM

## (undated) DEVICE — HEMOSTAT VASC BAND REG 24CM

## (undated) DEVICE — TOURNIQUET TOURNIKWIK 2 TB 6IN

## (undated) DEVICE — APPLIER LIGACLIP SM 9.38IN

## (undated) DEVICE — KIT SAHARA DRAPE DRAW/LIFT

## (undated) DEVICE — Device

## (undated) DEVICE — CANNULA VESSEL FREE FLOW

## (undated) DEVICE — BANDAGE ELAS SOFTWRAP ST 4X5YD

## (undated) DEVICE — SUT SILK BLK BR. 2 2-60

## (undated) DEVICE — GOWN POLY REINF BRTH SLV XL

## (undated) DEVICE — BIOGLUE 10ML

## (undated) DEVICE — KIT PROBE COVER WITH GEL

## (undated) DEVICE — WIRE GUIDE SAFE-T-J .035 260CM

## (undated) DEVICE — SUT 2/0 30IN SILK BLK BRAI

## (undated) DEVICE — NDL BOX COUNTER

## (undated) DEVICE — SUT 2/0 30IN ETHIBOND

## (undated) DEVICE — SUT SILK 2-0 SH 18IN BLACK

## (undated) DEVICE — SYS VIRTUOSAPH PLUS EVM

## (undated) DEVICE — SUT 6/0 4-24IN PROLENE

## (undated) DEVICE — DRAIN CHEST DRY SUCTION

## (undated) DEVICE — KIT GLIDESHEATH SLEND 6FR 10CM

## (undated) DEVICE — DRESSING NON-ADHERENT 3 X 4 ST

## (undated) DEVICE — OMNIPAQUE CONTRAST 350MG/100ML

## (undated) DEVICE — COVER PROXIMA MAYO STAND

## (undated) DEVICE — BLOWER MISTER

## (undated) DEVICE — INSERTS STEALTH FIBRA SIZE 5

## (undated) DEVICE — CONNECTOR Y 3/8X3/8X3/8

## (undated) DEVICE — TRAY HEART OMC

## (undated) DEVICE — DRAPE CARDIOVASCULAR PERI-GROI

## (undated) DEVICE — GUIDEWIRE EMERALD 150CM PTFE

## (undated) DEVICE — SYR 50CC LL

## (undated) DEVICE — SUT 4-0 12-18IN SILK BLACK

## (undated) DEVICE — RETRACTOR OCTOBASE INSERT HOLD

## (undated) DEVICE — PLEDGET SUT SOFT 3/8X3/16X1/16

## (undated) DEVICE — BLADE 4IN EDGE INSULATED

## (undated) DEVICE — SUT CTD VICRYL 0 UND CR/CTX

## (undated) DEVICE — KIT URINARY CATH URINE METER

## (undated) DEVICE — SUT 6 18IN STEEL MONO CCS

## (undated) DEVICE — SUT PROLENE 7-0 BV-1 30

## (undated) DEVICE — TIP YANKAUERS BULB NO VENT

## (undated) DEVICE — DRESSING ADH ISLAND 3.6 X 14

## (undated) DEVICE — NDL 18GA X1 1/2 REG BEVEL

## (undated) DEVICE — GUIDEWIRE ANGIO 1.5MM .035X180

## (undated) DEVICE — SUT PROLENE 3-0 30 RB-1 BL

## (undated) DEVICE — DRAPE ANGIO BRACH 38X44IN

## (undated) DEVICE — DRESSING ABSRBNT ISLAND 3.6X8

## (undated) DEVICE — MARKER SKIN STND TIP BLUE BARR

## (undated) DEVICE — BLADE SAW STERNAL 5/BX

## (undated) DEVICE — TOWEL OR DISP STRL BLUE 4/PK

## (undated) DEVICE — CATH DXTERITY JR40 100CM 6FR

## (undated) DEVICE — PAD RADIOLUCENT STAT ADULT

## (undated) DEVICE — SYR 3CC LUER LOC

## (undated) DEVICE — GAUZE AVANT SPNG 4PLY STRL 4X4

## (undated) DEVICE — SUT PROLENE 4-0 RB-1 BL MO

## (undated) DEVICE — DRAPE CVMAX SPLIT ANES SCRN

## (undated) DEVICE — SPONGE COTTON TRAY 4X4IN

## (undated) DEVICE — DRESSING AQUACEL SACRAL 9 X 9

## (undated) DEVICE — PAD CURAD NONADH 3X4IN

## (undated) DEVICE — SUT MONOCRYL 4-0 PS-1 UND

## (undated) DEVICE — SYR ONLY LUER LOCK 20CC

## (undated) DEVICE — GLOVE BIOGEL PI MICRO SZ 7.5

## (undated) DEVICE — DECANTER FLUID TRNSF WHITE 9IN

## (undated) DEVICE — BANDAGE ELAS SOFTWRAP ST 6X5YD

## (undated) DEVICE — CLIP SPRING 6MM

## (undated) DEVICE — TRAY ANGIO BAPTIST

## (undated) DEVICE — COVERS PROBE NR-48 STERILE

## (undated) DEVICE — SUT PROLENE 4-0 SH BLU 36IN

## (undated) DEVICE — BLADE ELECTRO EDGE INSULATED

## (undated) DEVICE — TUBING HF INSUFFLATION W/ FLTR

## (undated) DEVICE — BLADE SAW STERN .076MM 6.1MM L

## (undated) DEVICE — DRAIN CHANNEL ROUND 19FR

## (undated) DEVICE — WIRE INTRAMYOCARDIAL TEMP

## (undated) DEVICE — SYR 10ML LIDOCAINE

## (undated) DEVICE — CATH OPTITORQUE RADIAL 5FR

## (undated) DEVICE — PAD K-THERMIA 24IN X 60IN

## (undated) DEVICE — ELECTRODE REM PLYHSV RETURN 9

## (undated) DEVICE — DRESSING TEGADERM 4.4X5IN

## (undated) DEVICE — ADHESIVE DERMABOND ADVANCED